# Patient Record
Sex: FEMALE | Race: WHITE | Employment: FULL TIME | URBAN - METROPOLITAN AREA
[De-identification: names, ages, dates, MRNs, and addresses within clinical notes are randomized per-mention and may not be internally consistent; named-entity substitution may affect disease eponyms.]

---

## 2018-09-17 ENCOUNTER — HOSPITAL ENCOUNTER (OUTPATIENT)
Age: 58
Discharge: HOME | End: 2018-09-19
Payer: COMMERCIAL

## 2018-09-17 LAB
ALBUMIN SERPL-MCNC: 4.9 G/DL (ref 3.5–5.2)
ALP BLD-CCNC: 92 U/L (ref 35–104)
ALT SERPL-CCNC: 21 U/L (ref 0–32)
ANION GAP SERPL CALCULATED.3IONS-SCNC: 19 MMOL/L (ref 7–16)
AST SERPL-CCNC: 20 U/L (ref 0–31)
BASOPHILS ABSOLUTE: 0.05 E9/L (ref 0–0.2)
BASOPHILS RELATIVE PERCENT: 0.6 % (ref 0–2)
BILIRUB SERPL-MCNC: 0.7 MG/DL (ref 0–1.2)
BUN BLDV-MCNC: 19 MG/DL (ref 6–20)
CALCIUM SERPL-MCNC: 9.7 MG/DL (ref 8.6–10.2)
CHLORIDE BLD-SCNC: 95 MMOL/L (ref 98–107)
CHOLESTEROL, TOTAL: 213 MG/DL (ref 0–199)
CO2: 24 MMOL/L (ref 22–29)
CREAT SERPL-MCNC: 0.7 MG/DL (ref 0.5–1)
EOSINOPHILS ABSOLUTE: 0.16 E9/L (ref 0.05–0.5)
EOSINOPHILS RELATIVE PERCENT: 2 % (ref 0–6)
GFR AFRICAN AMERICAN: >60
GFR NON-AFRICAN AMERICAN: >60 ML/MIN/1.73
GLUCOSE BLD-MCNC: 115 MG/DL (ref 74–109)
HCT VFR BLD CALC: 49.4 % (ref 34–48)
HDLC SERPL-MCNC: 33 MG/DL
HEMOGLOBIN: 15.2 G/DL (ref 11.5–15.5)
IMMATURE GRANULOCYTES #: 0.03 E9/L
IMMATURE GRANULOCYTES %: 0.4 % (ref 0–5)
LDL CHOLESTEROL CALCULATED: 129 MG/DL (ref 0–99)
LYMPHOCYTES ABSOLUTE: 2.66 E9/L (ref 1.5–4)
LYMPHOCYTES RELATIVE PERCENT: 33.5 % (ref 20–42)
MCH RBC QN AUTO: 30.5 PG (ref 26–35)
MCHC RBC AUTO-ENTMCNC: 30.8 % (ref 32–34.5)
MCV RBC AUTO: 99 FL (ref 80–99.9)
MONOCYTES ABSOLUTE: 0.65 E9/L (ref 0.1–0.95)
MONOCYTES RELATIVE PERCENT: 8.2 % (ref 2–12)
NEUTROPHILS ABSOLUTE: 4.38 E9/L (ref 1.8–7.3)
NEUTROPHILS RELATIVE PERCENT: 55.3 % (ref 43–80)
PDW BLD-RTO: 14.3 FL (ref 11.5–15)
PLATELET # BLD: 259 E9/L (ref 130–450)
PMV BLD AUTO: 10 FL (ref 7–12)
POTASSIUM SERPL-SCNC: 4.5 MMOL/L (ref 3.5–5)
RBC # BLD: 4.99 E12/L (ref 3.5–5.5)
RHEUMATOID FACTOR: <10 IU/ML (ref 0–13)
SEDIMENTATION RATE, ERYTHROCYTE: 35 MM/HR (ref 0–20)
SODIUM BLD-SCNC: 138 MMOL/L (ref 132–146)
TOTAL PROTEIN: 8.3 G/DL (ref 6.4–8.3)
TRIGL SERPL-MCNC: 253 MG/DL (ref 0–149)
TSH SERPL DL<=0.05 MIU/L-ACNC: 16.07 UIU/ML (ref 0.27–4.2)
URIC ACID, SERUM: 6.2 MG/DL (ref 2.4–5.7)
VITAMIN D 25-HYDROXY: 13 NG/ML (ref 30–100)
VLDLC SERPL CALC-MCNC: 51 MG/DL
WBC # BLD: 7.9 E9/L (ref 4.5–11.5)

## 2018-09-17 PROCEDURE — 84550 ASSAY OF BLOOD/URIC ACID: CPT

## 2018-09-17 PROCEDURE — 85651 RBC SED RATE NONAUTOMATED: CPT

## 2018-09-17 PROCEDURE — 80061 LIPID PANEL: CPT

## 2018-09-17 PROCEDURE — 85025 COMPLETE CBC W/AUTO DIFF WBC: CPT

## 2018-09-17 PROCEDURE — 87088 URINE BACTERIA CULTURE: CPT

## 2018-09-17 PROCEDURE — 82306 VITAMIN D 25 HYDROXY: CPT

## 2018-09-17 PROCEDURE — 86431 RHEUMATOID FACTOR QUANT: CPT

## 2018-09-17 PROCEDURE — 84443 ASSAY THYROID STIM HORMONE: CPT

## 2018-09-17 PROCEDURE — 80053 COMPREHEN METABOLIC PANEL: CPT

## 2018-09-19 LAB — URINE CULTURE, ROUTINE: NORMAL

## 2019-04-02 ENCOUNTER — HOSPITAL ENCOUNTER (OUTPATIENT)
Age: 59
Discharge: HOME | End: 2019-04-04
Payer: COMMERCIAL

## 2019-04-02 PROCEDURE — 87088 URINE BACTERIA CULTURE: CPT

## 2019-04-03 LAB — URINE CULTURE, ROUTINE: NORMAL

## 2019-05-06 ENCOUNTER — HOSPITAL ENCOUNTER (OUTPATIENT)
Age: 59
Discharge: HOME | End: 2019-05-08
Payer: COMMERCIAL

## 2019-05-06 LAB
CHOLESTEROL, TOTAL: 229 MG/DL
CHOLESTEROL, TOTAL: 229 MG/DL (ref 0–199)
CHOLESTEROL/HDL RATIO: ABNORMAL
HDLC SERPL-MCNC: 32 MG/DL
HDLC SERPL-MCNC: 32 MG/DL (ref 35–70)
LDL CHOLESTEROL CALCULATED: ABNORMAL MG/DL (ref 0–160)
LDL CHOLESTEROL CALCULATED: ABNORMAL MG/DL (ref 0–99)
TRIGL SERPL-MCNC: 413 MG/DL
TRIGL SERPL-MCNC: 413 MG/DL (ref 0–149)
VITAMIN D 25-HYDROXY: 20 NG/ML (ref 30–100)
VLDLC SERPL CALC-MCNC: ABNORMAL MG/DL
VLDLC SERPL CALC-MCNC: ABNORMAL MG/DL

## 2019-05-06 PROCEDURE — 80061 LIPID PANEL: CPT

## 2019-05-06 PROCEDURE — 82306 VITAMIN D 25 HYDROXY: CPT

## 2019-06-10 ENCOUNTER — OFFICE VISIT (OUTPATIENT)
Dept: FAMILY MEDICINE CLINIC | Age: 59
End: 2019-06-10
Payer: COMMERCIAL

## 2019-06-10 VITALS
DIASTOLIC BLOOD PRESSURE: 72 MMHG | OXYGEN SATURATION: 96 % | RESPIRATION RATE: 16 BRPM | HEIGHT: 64 IN | HEART RATE: 73 BPM | BODY MASS INDEX: 48.14 KG/M2 | WEIGHT: 282 LBS | SYSTOLIC BLOOD PRESSURE: 122 MMHG

## 2019-06-10 DIAGNOSIS — L30.9 DERMATITIS: ICD-10-CM

## 2019-06-10 DIAGNOSIS — E11.9 TYPE 2 DIABETES MELLITUS WITHOUT COMPLICATION, WITHOUT LONG-TERM CURRENT USE OF INSULIN (HCC): ICD-10-CM

## 2019-06-10 DIAGNOSIS — M70.22 OLECRANON BURSITIS OF LEFT ELBOW: ICD-10-CM

## 2019-06-10 DIAGNOSIS — E03.9 ACQUIRED HYPOTHYROIDISM: ICD-10-CM

## 2019-06-10 PROCEDURE — 99203 OFFICE O/P NEW LOW 30 MIN: CPT | Performed by: FAMILY MEDICINE

## 2019-06-10 RX ORDER — DICLOFENAC SODIUM 75 MG/1
TABLET, DELAYED RELEASE ORAL
Refills: 0 | COMMUNITY
Start: 2019-05-07 | End: 2019-08-01 | Stop reason: SDUPTHER

## 2019-06-10 RX ORDER — OMEGA-3-ACID ETHYL ESTERS 1 G/1
CAPSULE, LIQUID FILLED ORAL
Refills: 0 | COMMUNITY
Start: 2019-05-08 | End: 2019-08-01 | Stop reason: SDUPTHER

## 2019-06-10 RX ORDER — CHOLECALCIFEROL (VITAMIN D3) 1250 MCG
CAPSULE ORAL WEEKLY
COMMUNITY
End: 2019-12-10

## 2019-06-10 RX ORDER — OMEPRAZOLE 20 MG/1
20 CAPSULE, DELAYED RELEASE ORAL DAILY
COMMUNITY
End: 2019-08-01 | Stop reason: SDUPTHER

## 2019-06-10 RX ORDER — LEVOTHYROXINE SODIUM 0.2 MG/1
200 TABLET ORAL DAILY
COMMUNITY
End: 2020-07-06

## 2019-06-10 RX ORDER — GEMFIBROZIL 600 MG/1
TABLET, FILM COATED ORAL
Refills: 0 | COMMUNITY
Start: 2019-05-07 | End: 2019-08-01 | Stop reason: SDUPTHER

## 2019-06-10 RX ORDER — ACETAMINOPHEN 500 MG
1000 TABLET ORAL 3 TIMES DAILY
Qty: 540 TABLET | Refills: 1 | Status: SHIPPED
Start: 2019-06-10 | End: 2021-01-11

## 2019-06-10 RX ORDER — DICYCLOMINE HYDROCHLORIDE 10 MG/1
10 CAPSULE ORAL
COMMUNITY
End: 2020-01-31 | Stop reason: SDUPTHER

## 2019-06-10 SDOH — HEALTH STABILITY: MENTAL HEALTH: HOW OFTEN DO YOU HAVE A DRINK CONTAINING ALCOHOL?: NEVER

## 2019-06-10 ASSESSMENT — PATIENT HEALTH QUESTIONNAIRE - PHQ9
SUM OF ALL RESPONSES TO PHQ QUESTIONS 1-9: 0
SUM OF ALL RESPONSES TO PHQ9 QUESTIONS 1 & 2: 0
SUM OF ALL RESPONSES TO PHQ QUESTIONS 1-9: 0
2. FEELING DOWN, DEPRESSED OR HOPELESS: 0
1. LITTLE INTEREST OR PLEASURE IN DOING THINGS: 0

## 2019-06-10 ASSESSMENT — ENCOUNTER SYMPTOMS
APNEA: 0
EYE ITCHING: 0
COUGH: 0
DIARRHEA: 0
ABDOMINAL PAIN: 0
SHORTNESS OF BREATH: 0
BLOOD IN STOOL: 0
SORE THROAT: 0
EYE REDNESS: 0
VISUAL CHANGE: 0
EYE PAIN: 0
SINUS PRESSURE: 0
CONSTIPATION: 0
CHEST TIGHTNESS: 0
BACK PAIN: 0
NAUSEA: 0
WHEEZING: 0
RHINORRHEA: 0
VOMITING: 0
COLOR CHANGE: 0

## 2019-06-10 NOTE — PROGRESS NOTES
Chief Complaint:     Chief Complaint   Patient presents with    Established New Doctor     check up    Diabetes    Fatigue    Hypothyroidism    Rash         Diabetes   She presents for her follow-up diabetic visit. She has type 2 diabetes mellitus. The initial diagnosis of diabetes was made 1 month ago. Her disease course has been stable. There are no hypoglycemic associated symptoms. Pertinent negatives for hypoglycemia include no dizziness, headaches or nervousness/anxiousness. Associated symptoms include fatigue. Pertinent negatives for diabetes include no chest pain, no visual change and no weakness. There are no hypoglycemic complications. Symptoms are stable. There are no diabetic complications. Risk factors for coronary artery disease include dyslipidemia and obesity. Current diabetic treatment includes oral agent (monotherapy). She is compliant with treatment all of the time. She is following a generally healthy diet. When asked about meal planning, she reported none. She has not had a previous visit with a dietitian. There is no change in her home blood glucose trend. An ACE inhibitor/angiotensin II receptor blocker is not being taken. She does not see a podiatrist.Eye exam is not current. Fatigue   This is a chronic problem. The problem occurs intermittently. The problem has been waxing and waning. Associated symptoms include fatigue and a rash. Pertinent negatives include no abdominal pain, arthralgias, chest pain, congestion, coughing, diaphoresis, fever, headaches, myalgias, nausea, neck pain, numbness, sore throat, visual change, vomiting or weakness. Nothing aggravates the symptoms. She has tried nothing for the symptoms. The treatment provided no relief. Rash   This is a new problem. The current episode started more than 1 month ago. The affected locations include the face. The rash is characterized by redness. It is unknown if there was an exposure to a precipitant.  Associated symptoms level: None   Occupational History     Employer: [employer]   Social Needs    Financial resource strain: None    Food insecurity:     Worry: None     Inability: None    Transportation needs:     Medical: None     Non-medical: None   Tobacco Use    Smoking status: Never Smoker    Smokeless tobacco: Never Used   Substance and Sexual Activity    Alcohol use: Never     Frequency: Never    Drug use: None    Sexual activity: None   Lifestyle    Physical activity:     Days per week: None     Minutes per session: None    Stress: None   Relationships    Social connections:     Talks on phone: None     Gets together: None     Attends Rastafarian service: None     Active member of club or organization: None     Attends meetings of clubs or organizations: None     Relationship status: None    Intimate partner violence:     Fear of current or ex partner: None     Emotionally abused: None     Physically abused: None     Forced sexual activity: None   Other Topics Concern    None   Social History Narrative    None       Family History   Problem Relation Age of Onset    Diabetes Mother     Diabetes Father           Review of Systems   Constitutional: Positive for fatigue. Negative for activity change, appetite change, diaphoresis and fever. HENT: Negative for congestion, ear pain, hearing loss, nosebleeds, rhinorrhea, sinus pressure and sore throat. Eyes: Negative for pain, redness, itching and visual disturbance. Respiratory: Negative for apnea, cough, chest tightness, shortness of breath and wheezing. Cardiovascular: Negative for chest pain, palpitations and leg swelling. Gastrointestinal: Negative for abdominal pain, blood in stool, constipation, diarrhea, nausea and vomiting. Endocrine: Negative. Genitourinary: Negative for decreased urine volume, difficulty urinating, dysuria, frequency, hematuria and urgency.    Musculoskeletal: Negative for arthralgias, back pain, gait problem, myalgias and neck pain. Skin: Positive for rash. Negative for color change. Allergic/Immunologic: Negative for environmental allergies and food allergies. Neurological: Negative for dizziness, weakness, light-headedness, numbness and headaches. Hematological: Negative for adenopathy. Does not bruise/bleed easily. Psychiatric/Behavioral: Negative for behavioral problems, dysphoric mood and sleep disturbance. The patient is not nervous/anxious and is not hyperactive. All other systems reviewed and are negative. /72   Pulse 73   Resp 16   Ht 5' 4\" (1.626 m)   Wt 282 lb (127.9 kg)   SpO2 96%   BMI 48.41 kg/m²     Physical Exam   Constitutional: She is oriented to person, place, and time. She appears well-developed and well-nourished. HENT:   Head: Normocephalic and atraumatic. Right Ear: External ear normal.   Left Ear: External ear normal.   Nose: Nose normal.   Mouth/Throat: Oropharynx is clear and moist.   Eyes: Pupils are equal, round, and reactive to light. Conjunctivae and EOM are normal. No scleral icterus. Neck: Normal range of motion. Neck supple. No thyromegaly present. Cardiovascular: Normal rate, regular rhythm and normal heart sounds. No murmur heard. Pulmonary/Chest: Effort normal and breath sounds normal. No respiratory distress. She has no wheezes. She has no rales. Abdominal: Soft. Bowel sounds are normal. She exhibits no distension. There is no tenderness. Musculoskeletal: Normal range of motion. She exhibits no edema or tenderness. Lymphadenopathy:     She has no cervical adenopathy. Neurological: She is alert and oriented to person, place, and time. She has normal reflexes. She displays normal reflexes. No cranial nerve deficit. Skin: Skin is warm and dry. No rash noted. No erythema. Psychiatric: She has a normal mood and affect. Nursing note and vitals reviewed.                               ASSESSMENT/PLAN:    Patient Active Problem List   Diagnosis    Type 2 diabetes mellitus without complication, without long-term current use of insulin ([address]    Dermatitis    Acquired hypothyroidism    Olecranon bursitis of left elbow       Christian Kerns was seen today for established new doctor, diabetes, fatigue, hypothyroidism and rash. Diagnoses and all orders for this visit:    Type 2 diabetes mellitus without complication, without long-term current use of insulin ([address]  -     External Referral To Ophthalmology    Dermatitis  -     External Referral To Dermatology    Acquired hypothyroidism    Olecranon bursitis of left elbow    Other orders  -     acetaminophen (TYLENOL) 500 MG tablet; Take 2 tablets by mouth 3 times daily          Return in about 3 months (around 9/10/2019) for Follow up DM, Recheck labs, Recheck Meds.         Nanette August, DO  6/10/2019  1:26 PM

## 2019-08-01 RX ORDER — OMEGA-3-ACID ETHYL ESTERS 1 G/1
CAPSULE, LIQUID FILLED ORAL
Qty: 90 CAPSULE | Refills: 3 | Status: SHIPPED
Start: 2019-08-01 | End: 2020-07-28 | Stop reason: SDUPTHER

## 2019-08-01 RX ORDER — GEMFIBROZIL 600 MG/1
TABLET, FILM COATED ORAL
Qty: 180 TABLET | Refills: 3 | Status: SHIPPED
Start: 2019-08-01 | End: 2020-07-24 | Stop reason: SDUPTHER

## 2019-08-01 RX ORDER — OMEPRAZOLE 20 MG/1
20 CAPSULE, DELAYED RELEASE ORAL DAILY
Qty: 90 CAPSULE | Refills: 3 | Status: SHIPPED
Start: 2019-08-01 | End: 2020-07-24 | Stop reason: SDUPTHER

## 2019-08-01 RX ORDER — DICLOFENAC SODIUM 75 MG/1
TABLET, DELAYED RELEASE ORAL
Qty: 90 TABLET | Refills: 3 | Status: SHIPPED
Start: 2019-08-01 | End: 2020-07-23 | Stop reason: SDUPTHER

## 2019-08-06 ENCOUNTER — OFFICE VISIT (OUTPATIENT)
Dept: FAMILY MEDICINE CLINIC | Age: 59
End: 2019-08-06
Payer: COMMERCIAL

## 2019-08-06 ENCOUNTER — HOSPITAL ENCOUNTER (OUTPATIENT)
Age: 59
Discharge: HOME | End: 2019-08-08
Payer: COMMERCIAL

## 2019-08-06 VITALS
OXYGEN SATURATION: 98 % | HEIGHT: 64 IN | DIASTOLIC BLOOD PRESSURE: 82 MMHG | RESPIRATION RATE: 16 BRPM | SYSTOLIC BLOOD PRESSURE: 130 MMHG | BODY MASS INDEX: 48.32 KG/M2 | HEART RATE: 88 BPM | WEIGHT: 283 LBS

## 2019-08-06 DIAGNOSIS — R35.0 URINARY FREQUENCY: Primary | ICD-10-CM

## 2019-08-06 DIAGNOSIS — N39.0 URINARY TRACT INFECTION WITHOUT HEMATURIA, SITE UNSPECIFIED: ICD-10-CM

## 2019-08-06 DIAGNOSIS — R35.0 URINARY FREQUENCY: ICD-10-CM

## 2019-08-06 LAB
BILIRUBIN, POC: ABNORMAL
BLOOD URINE, POC: ABNORMAL
CLARITY, POC: ABNORMAL
COLOR, POC: ABNORMAL
GLUCOSE URINE, POC: 250
KETONES, POC: ABNORMAL
LEUKOCYTE EST, POC: ABNORMAL
NITRITE, POC: ABNORMAL
PH, POC: 6.5
PROTEIN, POC: 30
SPECIFIC GRAVITY, POC: 1.02
UROBILINOGEN, POC: 0.2

## 2019-08-06 PROCEDURE — 99213 OFFICE O/P EST LOW 20 MIN: CPT | Performed by: FAMILY MEDICINE

## 2019-08-06 PROCEDURE — 81002 URINALYSIS NONAUTO W/O SCOPE: CPT | Performed by: FAMILY MEDICINE

## 2019-08-06 PROCEDURE — 87186 SC STD MICRODIL/AGAR DIL: CPT

## 2019-08-06 PROCEDURE — 87088 URINE BACTERIA CULTURE: CPT

## 2019-08-06 PROCEDURE — 87077 CULTURE AEROBIC IDENTIFY: CPT

## 2019-08-06 RX ORDER — FLUCONAZOLE 150 MG/1
150 TABLET ORAL ONCE
Qty: 1 TABLET | Refills: 0 | Status: SHIPPED | OUTPATIENT
Start: 2019-08-06 | End: 2019-08-06

## 2019-08-06 RX ORDER — CIPROFLOXACIN 500 MG/1
500 TABLET, FILM COATED ORAL 2 TIMES DAILY
Qty: 20 TABLET | Refills: 0 | Status: SHIPPED
Start: 2019-08-06 | End: 2021-08-27 | Stop reason: SDUPTHER

## 2019-08-06 ASSESSMENT — ENCOUNTER SYMPTOMS
SINUS PRESSURE: 0
VOMITING: 0
BLOOD IN STOOL: 0
CONSTIPATION: 0
CHEST TIGHTNESS: 0
COUGH: 0
EYE ITCHING: 0
WHEEZING: 0
COLOR CHANGE: 0
DIARRHEA: 0
NAUSEA: 0
ABDOMINAL PAIN: 0
SHORTNESS OF BREATH: 0
EYE PAIN: 0
EYE REDNESS: 0
APNEA: 0
RHINORRHEA: 0
SORE THROAT: 0
BACK PAIN: 0

## 2019-08-06 ASSESSMENT — PATIENT HEALTH QUESTIONNAIRE - PHQ9
SUM OF ALL RESPONSES TO PHQ9 QUESTIONS 1 & 2: 0
SUM OF ALL RESPONSES TO PHQ QUESTIONS 1-9: 0
SUM OF ALL RESPONSES TO PHQ QUESTIONS 1-9: 0
2. FEELING DOWN, DEPRESSED OR HOPELESS: 0
1. LITTLE INTEREST OR PLEASURE IN DOING THINGS: 0

## 2019-08-06 NOTE — PROGRESS NOTES
Chief Complaint:     Chief Complaint   Patient presents with    Urinary Frequency     thinks its yeast infection    Vaginal Itching         Urinary Frequency    This is a new problem. The current episode started in the past 7 days. The problem occurs intermittently. The problem has been unchanged. The quality of the pain is described as aching. The patient is experiencing no pain. Associated symptoms include frequency. Pertinent negatives include no hematuria, nausea, urgency or vomiting. She has tried nothing for the symptoms.        Patient Active Problem List   Diagnosis    Type 2 diabetes mellitus without complication, without long-term current use of insulin [facility]    Dermatitis    Acquired hypothyroidism    Olecranon bursitis of left elbow       Past Medical History:   Diagnosis Date    [facility]     Hypothyroidism        Past Surgical History:   Procedure Laterality Date    CHOLECYSTECTOMY      HYSTERECTOMY, TOTAL ABDOMINAL      INCONTINENCE SURGERY      KNEE SURGERY Left        Current Outpatient Medications   Medication Sig Dispense Refill    Oxymetazoline HCl (RHOFADE) 1 % CREA Apply topically      ciprofloxacin (CIPRO) 500 MG tablet Take 1 tablet by mouth 2 times daily for 10 days 20 tablet 0    fluconazole (DIFLUCAN) 150 MG tablet Take 1 tablet by mouth once for 1 dose 1 tablet 0    omeprazole (PRILOSEC) 20 MG delayed release capsule Take 1 capsule by mouth daily 90 capsule 3    metFORMIN (GLUCOPHAGE) 500 MG tablet Take 1 tablet by mouth 2 times daily (with meals) 180 tablet 3    gemfibrozil (LOPID) 600 MG tablet TAKE 1 TABLET EVERY 12 HOURS 180 tablet 3    diclofenac (VOLTAREN) 75 MG EC tablet TAKE 1 TABLET BY MOUTH EVERY DAY FOR ARTHRITIS PAIN 90 tablet 3    omega-3 acid ethyl esters (LOVAZA) 1 g capsule TAKE 1 CAPSULE BY MOUTH EVERY DAY 90 capsule 3    dicyclomine (BENTYL) 10 MG capsule Take 10 mg by mouth 4 times daily (before meals and nightly)      Polyethylene Glycol 3350 (MIRALAX PO) Take 17 g by mouth      levothyroxine (SYNTHROID) 200 MCG tablet Take 200 mcg by mouth Daily      Cholecalciferol (VITAMIN D3) 94713 units CAPS Take by mouth      acetaminophen (TYLENOL) 500 MG tablet Take 2 tablets by mouth 3 times daily 540 tablet 1     No current facility-administered medications for this visit. Allergies   Allergen Reactions    Keflex [Cephalexin]     Percocet [Oxycodone-Acetaminophen]        Social History     Socioeconomic History    Marital status:      Spouse name: None    Number of children: None    Years of education: None    Highest education level: None   Occupational History     Employer: [employer]   Social Needs    Financial resource strain: None    Food insecurity:     Worry: None     Inability: None    Transportation needs:     Medical: None     Non-medical: None   Tobacco Use    Smoking status: Never Smoker    Smokeless tobacco: Never Used   Substance and Sexual Activity    Alcohol use: Never     Frequency: Never    Drug use: None    Sexual activity: None   Lifestyle    Physical activity:     Days per week: None     Minutes per session: None    Stress: None   Relationships    Social connections:     Talks on phone: None     Gets together: None     Attends Sikh service: None     Active member of club or organization: None     Attends meetings of clubs or organizations: None     Relationship status: None    Intimate partner violence:     Fear of current or ex partner: None     Emotionally abused: None     Physically abused: None     Forced sexual activity: None   Other Topics Concern    None   Social History Narrative    None       Family History   Problem Relation Age of Onset    Diabetes Mother     Diabetes Father           Review of Systems   Constitutional: Negative for activity change, appetite change, fatigue and fever.    HENT: Negative for congestion, ear pain, hearing loss, nosebleeds, rhinorrhea, sinus pressure and sore throat. Eyes: Negative for pain, redness, itching and visual disturbance. Respiratory: Negative for apnea, cough, chest tightness, shortness of breath and wheezing. Cardiovascular: Negative for chest pain, palpitations and leg swelling. Gastrointestinal: Negative for abdominal pain, blood in stool, constipation, diarrhea, nausea and vomiting. Endocrine: Negative. Genitourinary: Positive for frequency. Negative for decreased urine volume, difficulty urinating, dysuria, hematuria and urgency. Musculoskeletal: Negative for arthralgias, back pain, gait problem, myalgias and neck pain. Skin: Negative for color change and rash. Allergic/Immunologic: Negative for environmental allergies and food allergies. Neurological: Negative for dizziness, weakness, light-headedness, numbness and headaches. Hematological: Negative for adenopathy. Does not bruise/bleed easily. Psychiatric/Behavioral: Negative for behavioral problems, dysphoric mood and sleep disturbance. The patient is not nervous/anxious and is not hyperactive. All other systems reviewed and are negative. /82   Pulse 88   Resp 16   Ht 5' 4\" (1.626 m)   Wt 283 lb (128.4 kg)   SpO2 98%   BMI 48.58 kg/m²     Physical Exam   Constitutional: She is oriented to person, place, and time. She appears well-developed and well-nourished. HENT:   Head: Normocephalic and atraumatic. Right Ear: External ear normal.   Left Ear: External ear normal.   Nose: Nose normal.   Mouth/Throat: Oropharynx is clear and moist.   Eyes: Pupils are equal, round, and reactive to light. Conjunctivae and EOM are normal. No scleral icterus. Neck: Normal range of motion. Neck supple. No thyromegaly present. Cardiovascular: Normal rate, regular rhythm and normal heart sounds. No murmur heard. Pulmonary/Chest: Effort normal and breath sounds normal. No respiratory distress. She has no wheezes.  She

## 2019-08-09 LAB
ORGANISM: ABNORMAL
ORGANISM: ABNORMAL
URINE CULTURE, ROUTINE: ABNORMAL

## 2019-08-23 ENCOUNTER — OFFICE VISIT (OUTPATIENT)
Dept: PRIMARY CARE CLINIC | Age: 59
End: 2019-08-23
Payer: COMMERCIAL

## 2019-08-23 VITALS
OXYGEN SATURATION: 98 % | WEIGHT: 280.8 LBS | HEIGHT: 64 IN | TEMPERATURE: 98.5 F | SYSTOLIC BLOOD PRESSURE: 136 MMHG | BODY MASS INDEX: 47.94 KG/M2 | HEART RATE: 96 BPM | DIASTOLIC BLOOD PRESSURE: 80 MMHG

## 2019-08-23 DIAGNOSIS — R30.0 BURNING WITH URINATION: Primary | ICD-10-CM

## 2019-08-23 DIAGNOSIS — N76.0 ACUTE VAGINITIS: ICD-10-CM

## 2019-08-23 LAB
BILIRUBIN, POC: NORMAL
BLOOD URINE, POC: NORMAL
CLARITY, POC: CLEAR
COLOR, POC: NORMAL
GLUCOSE URINE, POC: >1000
KETONES, POC: NORMAL
LEUKOCYTE EST, POC: NORMAL
NITRITE, POC: NORMAL
PH, POC: 5.5
PROTEIN, POC: NORMAL
SPECIFIC GRAVITY, POC: 1.03
UROBILINOGEN, POC: 0.2

## 2019-08-23 PROCEDURE — 99213 OFFICE O/P EST LOW 20 MIN: CPT | Performed by: FAMILY MEDICINE

## 2019-08-23 PROCEDURE — 81002 URINALYSIS NONAUTO W/O SCOPE: CPT | Performed by: FAMILY MEDICINE

## 2019-08-23 RX ORDER — FLUCONAZOLE 100 MG/1
100 TABLET ORAL DAILY
Qty: 3 TABLET | Refills: 0 | Status: SHIPPED | OUTPATIENT
Start: 2019-08-23 | End: 2019-10-03 | Stop reason: SDUPTHER

## 2019-08-23 RX ORDER — SULFAMETHOXAZOLE AND TRIMETHOPRIM 800; 160 MG/1; MG/1
1 TABLET ORAL 2 TIMES DAILY
Qty: 14 TABLET | Refills: 0 | Status: SHIPPED | OUTPATIENT
Start: 2019-08-23 | End: 2019-08-30

## 2019-08-23 ASSESSMENT — ENCOUNTER SYMPTOMS
ABDOMINAL PAIN: 0
SHORTNESS OF BREATH: 0
SORE THROAT: 0
BLOOD IN STOOL: 0
NAUSEA: 0
EYE PAIN: 0
RHINORRHEA: 0
APNEA: 0
WHEEZING: 0
BACK PAIN: 0
EYE ITCHING: 0
SINUS PRESSURE: 0
DIARRHEA: 0
VOMITING: 0
COUGH: 0
CHEST TIGHTNESS: 0
COLOR CHANGE: 0
EYE REDNESS: 0
CONSTIPATION: 0

## 2019-08-23 ASSESSMENT — PATIENT HEALTH QUESTIONNAIRE - PHQ9
SUM OF ALL RESPONSES TO PHQ QUESTIONS 1-9: 0
1. LITTLE INTEREST OR PLEASURE IN DOING THINGS: 0
SUM OF ALL RESPONSES TO PHQ QUESTIONS 1-9: 0
2. FEELING DOWN, DEPRESSED OR HOPELESS: 0
SUM OF ALL RESPONSES TO PHQ9 QUESTIONS 1 & 2: 0

## 2019-08-23 NOTE — PROGRESS NOTES
Chief Complaint:     Chief Complaint   Patient presents with    Dysuria     itching and irritation, burning         Dysuria    This is a new problem. The current episode started in the past 7 days. The problem occurs intermittently. The patient is experiencing no pain. There has been no fever. Associated symptoms include urgency. Pertinent negatives include no discharge, frequency, hematuria, nausea or vomiting. She has tried nothing for the symptoms. The treatment provided no relief.        Patient Active Problem List   Diagnosis    Type 2 diabetes mellitus without complication, without long-term current use of insulin ([facility]    Dermatitis    Acquired hypothyroidism    Olecranon bursitis of left elbow       Past Medical History:   Diagnosis Date    [facility]     Hypothyroidism        Past Surgical History:   Procedure Laterality Date    CHOLECYSTECTOMY      HYSTERECTOMY, TOTAL ABDOMINAL      INCONTINENCE SURGERY      KNEE SURGERY Left        Current Outpatient Medications   Medication Sig Dispense Refill    fluconazole (DIFLUCAN) 100 MG tablet Take 1 tablet by mouth daily for 3 days 3 tablet 0    sulfamethoxazole-trimethoprim (BACTRIM DS;SEPTRA DS) 800-160 MG per tablet Take 1 tablet by mouth 2 times daily for 7 days 14 tablet 0    Oxymetazoline HCl (RHOFADE) 1 % CREA Apply topically      omeprazole (PRILOSEC) 20 MG delayed release capsule Take 1 capsule by mouth daily 90 capsule 3    metFORMIN (GLUCOPHAGE) 500 MG tablet Take 1 tablet by mouth 2 times daily (with meals) 180 tablet 3    gemfibrozil (LOPID) 600 MG tablet TAKE 1 TABLET EVERY 12 HOURS 180 tablet 3    diclofenac (VOLTAREN) 75 MG EC tablet TAKE 1 TABLET BY MOUTH EVERY DAY FOR ARTHRITIS PAIN 90 tablet 3    omega-3 acid ethyl esters (LOVAZA) 1 g capsule TAKE 1 CAPSULE BY MOUTH EVERY DAY 90 capsule 3    dicyclomine (BENTYL) 10 MG capsule Take 10 mg by mouth 4 times daily (before meals and nightly)      Polyethylene Glycol

## 2019-09-10 ENCOUNTER — OFFICE VISIT (OUTPATIENT)
Dept: PRIMARY CARE CLINIC | Age: 59
End: 2019-09-10
Payer: COMMERCIAL

## 2019-09-10 ENCOUNTER — HOSPITAL ENCOUNTER (OUTPATIENT)
Age: 59
Discharge: HOME | End: 2019-09-12
Payer: COMMERCIAL

## 2019-09-10 VITALS
RESPIRATION RATE: 16 BRPM | HEART RATE: 78 BPM | DIASTOLIC BLOOD PRESSURE: 80 MMHG | BODY MASS INDEX: 48.14 KG/M2 | OXYGEN SATURATION: 98 % | HEIGHT: 64 IN | SYSTOLIC BLOOD PRESSURE: 122 MMHG | WEIGHT: 282 LBS

## 2019-09-10 DIAGNOSIS — E11.9 TYPE 2 DIABETES MELLITUS WITHOUT COMPLICATION, WITHOUT LONG-TERM CURRENT USE OF INSULIN (HCC): ICD-10-CM

## 2019-09-10 DIAGNOSIS — E03.9 ACQUIRED HYPOTHYROIDISM: ICD-10-CM

## 2019-09-10 DIAGNOSIS — G62.9 PERIPHERAL POLYNEUROPATHY: ICD-10-CM

## 2019-09-10 DIAGNOSIS — E11.9 TYPE 2 DIABETES MELLITUS WITHOUT COMPLICATION, WITHOUT LONG-TERM CURRENT USE OF INSULIN (HCC): Primary | ICD-10-CM

## 2019-09-10 PROBLEM — L30.9 DERMATITIS: Status: RESOLVED | Noted: 2019-06-10 | Resolved: 2019-09-10

## 2019-09-10 PROBLEM — M70.22 OLECRANON BURSITIS OF LEFT ELBOW: Status: RESOLVED | Noted: 2019-06-10 | Resolved: 2019-09-10

## 2019-09-10 LAB
ALBUMIN SERPL-MCNC: 4.7 G/DL (ref 3.5–5.2)
ALP BLD-CCNC: 95 U/L (ref 35–104)
ALT SERPL-CCNC: 27 U/L (ref 0–32)
ANION GAP SERPL CALCULATED.3IONS-SCNC: 16 MMOL/L (ref 7–16)
AST SERPL-CCNC: 18 U/L (ref 0–31)
BILIRUB SERPL-MCNC: 0.6 MG/DL (ref 0–1.2)
BUN BLDV-MCNC: 19 MG/DL (ref 6–20)
CALCIUM SERPL-MCNC: 9.4 MG/DL (ref 8.6–10.2)
CHLORIDE BLD-SCNC: 101 MMOL/L (ref 98–107)
CHOLESTEROL, TOTAL: 221 MG/DL (ref 0–199)
CO2: 24 MMOL/L (ref 22–29)
CREAT SERPL-MCNC: 0.7 MG/DL (ref 0.5–1)
CREATININE URINE: 83 MG/DL (ref 29–226)
GFR AFRICAN AMERICAN: >60
GFR NON-AFRICAN AMERICAN: >60 ML/MIN/1.73
GLUCOSE BLD-MCNC: 194 MG/DL (ref 74–99)
HBA1C MFR BLD: 9.2 % (ref 4–5.6)
HCT VFR BLD CALC: 47.6 % (ref 34–48)
HDLC SERPL-MCNC: 32 MG/DL
HEMOGLOBIN: 15.6 G/DL (ref 11.5–15.5)
LDL CHOLESTEROL CALCULATED: 116 MG/DL (ref 0–99)
MCH RBC QN AUTO: 29.2 PG (ref 26–35)
MCHC RBC AUTO-ENTMCNC: 32.8 % (ref 32–34.5)
MCV RBC AUTO: 89.1 FL (ref 80–99.9)
MICROALBUMIN UR-MCNC: 33.1 MG/L
MICROALBUMIN/CREAT UR-RTO: 39.9 (ref 0–30)
PDW BLD-RTO: 14.1 FL (ref 11.5–15)
PLATELET # BLD: 237 E9/L (ref 130–450)
PMV BLD AUTO: 10.7 FL (ref 7–12)
POTASSIUM SERPL-SCNC: 4.1 MMOL/L (ref 3.5–5)
RBC # BLD: 5.34 E12/L (ref 3.5–5.5)
SODIUM BLD-SCNC: 141 MMOL/L (ref 132–146)
TOTAL PROTEIN: 8 G/DL (ref 6.4–8.3)
TRIGL SERPL-MCNC: 366 MG/DL (ref 0–149)
TSH SERPL DL<=0.05 MIU/L-ACNC: 3.52 UIU/ML (ref 0.27–4.2)
VLDLC SERPL CALC-MCNC: 73 MG/DL
WBC # BLD: 6.9 E9/L (ref 4.5–11.5)

## 2019-09-10 PROCEDURE — 82044 UR ALBUMIN SEMIQUANTITATIVE: CPT

## 2019-09-10 PROCEDURE — 85027 COMPLETE CBC AUTOMATED: CPT

## 2019-09-10 PROCEDURE — 80053 COMPREHEN METABOLIC PANEL: CPT

## 2019-09-10 PROCEDURE — 83036 HEMOGLOBIN GLYCOSYLATED A1C: CPT

## 2019-09-10 PROCEDURE — 80061 LIPID PANEL: CPT

## 2019-09-10 PROCEDURE — 82570 ASSAY OF URINE CREATININE: CPT

## 2019-09-10 PROCEDURE — 36415 COLL VENOUS BLD VENIPUNCTURE: CPT

## 2019-09-10 PROCEDURE — 90471 IMMUNIZATION ADMIN: CPT | Performed by: FAMILY MEDICINE

## 2019-09-10 PROCEDURE — 99214 OFFICE O/P EST MOD 30 MIN: CPT | Performed by: FAMILY MEDICINE

## 2019-09-10 PROCEDURE — 90686 IIV4 VACC NO PRSV 0.5 ML IM: CPT | Performed by: FAMILY MEDICINE

## 2019-09-10 PROCEDURE — 84443 ASSAY THYROID STIM HORMONE: CPT

## 2019-09-10 RX ORDER — LANOLIN ALCOHOL/MO/W.PET/CERES
100 CREAM (GRAM) TOPICAL DAILY
Qty: 90 TABLET | Refills: 3 | Status: SHIPPED
Start: 2019-09-10 | End: 2021-01-11 | Stop reason: ALTCHOICE

## 2019-09-10 ASSESSMENT — ENCOUNTER SYMPTOMS
COLOR CHANGE: 0
NAUSEA: 0
SORE THROAT: 0
COUGH: 0
EYE REDNESS: 0
ABDOMINAL PAIN: 0
RHINORRHEA: 0
BACK PAIN: 0
SINUS PRESSURE: 0
EYE ITCHING: 0
BLOOD IN STOOL: 0
CONSTIPATION: 0
WHEEZING: 0
APNEA: 0
VOMITING: 0
SHORTNESS OF BREATH: 0
DIARRHEA: 0
EYE PAIN: 0
CHEST TIGHTNESS: 0
VISUAL CHANGE: 0

## 2019-09-10 ASSESSMENT — PATIENT HEALTH QUESTIONNAIRE - PHQ9
SUM OF ALL RESPONSES TO PHQ QUESTIONS 1-9: 0
2. FEELING DOWN, DEPRESSED OR HOPELESS: 0
SUM OF ALL RESPONSES TO PHQ QUESTIONS 1-9: 0
SUM OF ALL RESPONSES TO PHQ9 QUESTIONS 1 & 2: 0
1. LITTLE INTEREST OR PLEASURE IN DOING THINGS: 0

## 2019-09-10 NOTE — PROGRESS NOTES
Past Medical History:   Diagnosis Date    [facility] mellitus [facility]     Hypothyroidism        Past Surgical History:   Procedure Laterality Date    CHOLECYSTECTOMY      HYSTERECTOMY, TOTAL ABDOMINAL      INCONTINENCE SURGERY      KNEE SURGERY Left        Current Outpatient Medications   Medication Sig Dispense Refill    metFORMIN (GLUCOPHAGE) 500 MG tablet Take 1 tablet by mouth daily (with breakfast) 90 tablet 3    thiamine 100 MG tablet Take 1 tablet by mouth daily 90 tablet 3    Oxymetazoline HCl (RHOFADE) 1 % CREA Apply topically      omeprazole (PRILOSEC) 20 MG delayed release capsule Take 1 capsule by mouth daily 90 capsule 3    gemfibrozil (LOPID) 600 MG tablet TAKE 1 TABLET EVERY 12 HOURS 180 tablet 3    diclofenac (VOLTAREN) 75 MG EC tablet TAKE 1 TABLET BY MOUTH EVERY DAY FOR ARTHRITIS PAIN 90 tablet 3    omega-3 acid ethyl esters (LOVAZA) 1 g capsule TAKE 1 CAPSULE BY MOUTH EVERY DAY 90 capsule 3    dicyclomine (BENTYL) 10 MG capsule Take 10 mg by mouth 4 times daily (before meals and nightly)      Polyethylene Glycol 3350 (MIRALAX PO) Take 17 g by mouth      levothyroxine (SYNTHROID) 200 MCG tablet Take 200 mcg by mouth Daily      Cholecalciferol (VITAMIN D3) 92669 units CAPS Take by mouth      acetaminophen (TYLENOL) 500 MG tablet Take 2 tablets by mouth 3 times daily 540 tablet 1     No current facility-administered medications for this visit.         Allergies   Allergen Reactions    Keflex [Cephalexin]     Percocet [Oxycodone-Acetaminophen]        Social History     Socioeconomic History    Marital status:      Spouse name: None    Number of children: None    Years of education: None    Highest education level: None   Occupational History     Employer: [employer]   Social Needs    Financial resource strain: None    Food insecurity:     Worry: None     Inability: None    Transportation needs:     Medical: None     Non-medical: None 2 diabetes mellitus without complication, without long-term current use of insulin ([facility])  -     CBC; Future  -     Comprehensive Metabolic Panel; Future  -     Hemoglobin A1C; Future  -     Lipid Panel; Future  -     Microalbumin / Creatinine Urine Ratio; Future  -     TSH without Reflex; Future    Acquired hypothyroidism    Peripheral polyneuropathy  -     EMG; Future    Other orders  -     metFORMIN (GLUCOPHAGE) 500 MG tablet; Take 1 tablet by mouth daily (with breakfast)  -     INFLUENZA, QUADV, 3 YRS AND OLDER, IM PF, PREFILL SYR OR SDV, 0.5ML (AFLURIA QUADV, PF)  -     thiamine 100 MG tablet; Take 1 tablet by mouth daily          Return in about 6 months (around 3/10/2020) for Follow up DM, Recheck labs, Recheck Meds.         Greta Smith DO  9/10/2019  10:04 AM

## 2019-09-11 ENCOUNTER — TELEPHONE (OUTPATIENT)
Dept: PRIMARY CARE CLINIC | Age: 59
End: 2019-09-11

## 2019-09-11 NOTE — TELEPHONE ENCOUNTER
----- Message from Alex Saravia DO sent at 9/10/2019  4:12 PM EDT -----  Microalbumin elevated. Start Lisinopril 10 mg daily. Sugar elevated.  Start Ozempic weekly injection

## 2019-09-12 ENCOUNTER — TELEPHONE (OUTPATIENT)
Dept: PRIMARY CARE CLINIC | Age: 59
End: 2019-09-12

## 2019-09-12 RX ORDER — LISINOPRIL 10 MG/1
10 TABLET ORAL DAILY
Qty: 30 TABLET | Refills: 5 | Status: SHIPPED | OUTPATIENT
Start: 2019-09-12 | End: 2019-12-10 | Stop reason: ALTCHOICE

## 2019-09-16 ENCOUNTER — TELEPHONE (OUTPATIENT)
Dept: PRIMARY CARE CLINIC | Age: 59
End: 2019-09-16

## 2019-09-16 NOTE — TELEPHONE ENCOUNTER
Please send new script for pen needles. Needs to have a quantity of 100 and instructions to test once daily.

## 2019-09-23 ENCOUNTER — OFFICE VISIT (OUTPATIENT)
Dept: PRIMARY CARE CLINIC | Age: 59
End: 2019-09-23
Payer: COMMERCIAL

## 2019-09-23 ENCOUNTER — HOSPITAL ENCOUNTER (OUTPATIENT)
Age: 59
Discharge: HOME | End: 2019-09-25
Payer: COMMERCIAL

## 2019-09-23 VITALS
DIASTOLIC BLOOD PRESSURE: 86 MMHG | OXYGEN SATURATION: 98 % | HEART RATE: 91 BPM | TEMPERATURE: 98.2 F | WEIGHT: 275 LBS | HEIGHT: 64 IN | RESPIRATION RATE: 18 BRPM | BODY MASS INDEX: 46.95 KG/M2 | SYSTOLIC BLOOD PRESSURE: 130 MMHG

## 2019-09-23 DIAGNOSIS — M25.551 PAIN OF RIGHT HIP JOINT: ICD-10-CM

## 2019-09-23 DIAGNOSIS — R30.0 BURNING WITH URINATION: ICD-10-CM

## 2019-09-23 DIAGNOSIS — M70.61 TROCHANTERIC BURSITIS OF RIGHT HIP: ICD-10-CM

## 2019-09-23 DIAGNOSIS — R30.0 BURNING WITH URINATION: Primary | ICD-10-CM

## 2019-09-23 LAB
BILIRUBIN, POC: ABNORMAL
BLOOD URINE, POC: ABNORMAL
CLARITY, POC: CLEAR
COLOR, POC: ABNORMAL
GLUCOSE URINE, POC: ABNORMAL
KETONES, POC: ABNORMAL
LEUKOCYTE EST, POC: ABNORMAL
NITRITE, POC: ABNORMAL
PH, POC: 6
PROTEIN, POC: ABNORMAL
SPECIFIC GRAVITY, POC: 1.03
UROBILINOGEN, POC: 0.2

## 2019-09-23 PROCEDURE — 81002 URINALYSIS NONAUTO W/O SCOPE: CPT | Performed by: FAMILY MEDICINE

## 2019-09-23 PROCEDURE — 96372 THER/PROPH/DIAG INJ SC/IM: CPT | Performed by: FAMILY MEDICINE

## 2019-09-23 PROCEDURE — 87088 URINE BACTERIA CULTURE: CPT

## 2019-09-23 PROCEDURE — 99213 OFFICE O/P EST LOW 20 MIN: CPT | Performed by: FAMILY MEDICINE

## 2019-09-23 RX ORDER — METHYLPREDNISOLONE ACETATE 80 MG/ML
80 INJECTION, SUSPENSION INTRA-ARTICULAR; INTRALESIONAL; INTRAMUSCULAR; SOFT TISSUE ONCE
Status: COMPLETED | OUTPATIENT
Start: 2019-09-23 | End: 2019-09-23

## 2019-09-23 RX ORDER — TRAMADOL HYDROCHLORIDE 50 MG/1
50 TABLET ORAL EVERY 8 HOURS PRN
Qty: 15 TABLET | Refills: 0 | Status: SHIPPED | OUTPATIENT
Start: 2019-09-23 | End: 2019-09-28

## 2019-09-23 RX ADMIN — METHYLPREDNISOLONE ACETATE 80 MG: 80 INJECTION, SUSPENSION INTRA-ARTICULAR; INTRALESIONAL; INTRAMUSCULAR; SOFT TISSUE at 12:43

## 2019-09-23 ASSESSMENT — ENCOUNTER SYMPTOMS
ALLERGIC/IMMUNOLOGIC NEGATIVE: 1
VOMITING: 0
CHEST TIGHTNESS: 0
NAUSEA: 0
FACIAL SWELLING: 0
APNEA: 0
COLOR CHANGE: 0
DIARRHEA: 0
BACK PAIN: 0
COUGH: 0
SINUS PRESSURE: 0
WHEEZING: 0
SHORTNESS OF BREATH: 0
BLOOD IN STOOL: 0
PHOTOPHOBIA: 0
SORE THROAT: 0
ABDOMINAL PAIN: 0

## 2019-09-25 LAB — URINE CULTURE, ROUTINE: NORMAL

## 2019-09-26 ENCOUNTER — OFFICE VISIT (OUTPATIENT)
Dept: PRIMARY CARE CLINIC | Age: 59
End: 2019-09-26
Payer: COMMERCIAL

## 2019-09-26 VITALS
BODY MASS INDEX: 46.95 KG/M2 | HEART RATE: 74 BPM | DIASTOLIC BLOOD PRESSURE: 74 MMHG | SYSTOLIC BLOOD PRESSURE: 132 MMHG | HEIGHT: 64 IN | RESPIRATION RATE: 16 BRPM | OXYGEN SATURATION: 96 % | WEIGHT: 275 LBS

## 2019-09-26 DIAGNOSIS — M70.61 TROCHANTERIC BURSITIS OF RIGHT HIP: Primary | ICD-10-CM

## 2019-09-26 DIAGNOSIS — M25.551 PAIN OF RIGHT HIP JOINT: ICD-10-CM

## 2019-09-26 PROCEDURE — 99213 OFFICE O/P EST LOW 20 MIN: CPT | Performed by: FAMILY MEDICINE

## 2019-09-26 RX ORDER — PREDNISONE 10 MG/1
TABLET ORAL
Qty: 18 TABLET | Refills: 0 | Status: SHIPPED | OUTPATIENT
Start: 2019-09-26 | End: 2019-12-10

## 2019-09-26 RX ORDER — TIZANIDINE 4 MG/1
4 TABLET ORAL EVERY 8 HOURS PRN
Qty: 30 TABLET | Refills: 0 | Status: SHIPPED | OUTPATIENT
Start: 2019-09-26 | End: 2019-12-10

## 2019-09-26 ASSESSMENT — ENCOUNTER SYMPTOMS
COLOR CHANGE: 0
CHEST TIGHTNESS: 0
SORE THROAT: 0
SHORTNESS OF BREATH: 0
APNEA: 0
EYE REDNESS: 0
EYE ITCHING: 0
NAUSEA: 0
DIARRHEA: 0
EYE PAIN: 0
VOMITING: 0
BLOOD IN STOOL: 0
ABDOMINAL PAIN: 0
COUGH: 0
RHINORRHEA: 0
CONSTIPATION: 0
BACK PAIN: 0
SINUS PRESSURE: 0
WHEEZING: 0

## 2019-09-30 ENCOUNTER — TELEPHONE (OUTPATIENT)
Dept: PRIMARY CARE CLINIC | Age: 59
End: 2019-09-30

## 2019-09-30 DIAGNOSIS — M25.551 PAIN OF RIGHT HIP JOINT: ICD-10-CM

## 2019-09-30 DIAGNOSIS — M70.61 TROCHANTERIC BURSITIS OF RIGHT HIP: Primary | ICD-10-CM

## 2019-09-30 NOTE — TELEPHONE ENCOUNTER
Pt called and said when she saw dr Pam Catalan he mentioned her seeing an orthopaedic to get injections in her bursa.  Asking if you can place that referral.   Anmol Guadalupe- [phone]

## 2019-10-02 ENCOUNTER — OFFICE VISIT (OUTPATIENT)
Dept: ORTHOPEDIC SURGERY | Age: 59
End: 2019-10-02
Payer: COMMERCIAL

## 2019-10-02 ENCOUNTER — TELEPHONE (OUTPATIENT)
Dept: PRIMARY CARE CLINIC | Age: 59
End: 2019-10-02

## 2019-10-02 VITALS
HEIGHT: 64 IN | HEART RATE: 91 BPM | DIASTOLIC BLOOD PRESSURE: 88 MMHG | WEIGHT: 285 LBS | BODY MASS INDEX: 48.65 KG/M2 | SYSTOLIC BLOOD PRESSURE: 133 MMHG | TEMPERATURE: 97.9 F

## 2019-10-02 DIAGNOSIS — M70.71 BURSITIS OF RIGHT HIP, UNSPECIFIED BURSA: ICD-10-CM

## 2019-10-02 DIAGNOSIS — M76.31 ILIOTIBIAL BAND SYNDROME OF RIGHT SIDE: Primary | ICD-10-CM

## 2019-10-02 PROCEDURE — 99203 OFFICE O/P NEW LOW 30 MIN: CPT | Performed by: ORTHOPAEDIC SURGERY

## 2019-10-03 ENCOUNTER — TELEPHONE (OUTPATIENT)
Dept: ORTHOPEDIC SURGERY | Age: 59
End: 2019-10-03

## 2019-10-03 DIAGNOSIS — M76.31 ILIOTIBIAL BAND SYNDROME OF RIGHT SIDE: Primary | ICD-10-CM

## 2019-10-07 ENCOUNTER — TELEPHONE (OUTPATIENT)
Dept: PRIMARY CARE CLINIC | Age: 59
End: 2019-10-07

## 2019-10-07 RX ORDER — HYDROXYZINE PAMOATE 50 MG/1
50 CAPSULE ORAL NIGHTLY PRN
Qty: 30 CAPSULE | Refills: 0 | Status: SHIPPED | OUTPATIENT
Start: 2019-10-07 | End: 2019-10-29 | Stop reason: SDUPTHER

## 2019-10-10 ENCOUNTER — TELEPHONE (OUTPATIENT)
Dept: PRIMARY CARE CLINIC | Age: 59
End: 2019-10-10

## 2019-10-10 DIAGNOSIS — M25.551 PAIN OF RIGHT HIP JOINT: ICD-10-CM

## 2019-10-10 DIAGNOSIS — M70.61 TROCHANTERIC BURSITIS OF RIGHT HIP: Primary | ICD-10-CM

## 2019-10-11 ENCOUNTER — OFFICE VISIT (OUTPATIENT)
Dept: NEUROLOGY | Age: 59
End: 2019-10-11
Payer: COMMERCIAL

## 2019-10-11 VITALS
OXYGEN SATURATION: 96 % | HEIGHT: 64 IN | DIASTOLIC BLOOD PRESSURE: 89 MMHG | HEART RATE: 98 BPM | SYSTOLIC BLOOD PRESSURE: 146 MMHG | BODY MASS INDEX: 45.58 KG/M2 | WEIGHT: 267 LBS

## 2019-10-11 DIAGNOSIS — G62.9 PERIPHERAL POLYNEUROPATHY: ICD-10-CM

## 2019-10-11 PROCEDURE — 95913 NRV CNDJ TEST 13/> STUDIES: CPT | Performed by: PSYCHIATRY & NEUROLOGY

## 2019-10-11 PROCEDURE — 95886 MUSC TEST DONE W/N TEST COMP: CPT | Performed by: PSYCHIATRY & NEUROLOGY

## 2019-10-11 RX ORDER — TRAMADOL HYDROCHLORIDE 50 MG/1
50 TABLET ORAL EVERY 6 HOURS PRN
COMMUNITY
End: 2019-12-10

## 2019-10-24 ENCOUNTER — TELEPHONE (OUTPATIENT)
Dept: PRIMARY CARE CLINIC | Age: 59
End: 2019-10-24

## 2019-10-29 RX ORDER — HYDROXYZINE PAMOATE 50 MG/1
50 CAPSULE ORAL NIGHTLY PRN
Qty: 30 CAPSULE | Refills: 0 | Status: SHIPPED | OUTPATIENT
Start: 2019-10-29 | End: 2019-11-21 | Stop reason: SDUPTHER

## 2019-11-05 RX ORDER — ERGOCALCIFEROL 1.25 MG/1
CAPSULE ORAL
Refills: 0 | COMMUNITY
Start: 2019-10-08 | End: 2019-11-05 | Stop reason: SDUPTHER

## 2019-11-05 RX ORDER — ERGOCALCIFEROL 1.25 MG/1
CAPSULE ORAL
Qty: 30 CAPSULE | Refills: 0 | Status: SHIPPED
Start: 2019-11-05 | End: 2020-04-27 | Stop reason: SDUPTHER

## 2019-11-21 RX ORDER — HYDROXYZINE PAMOATE 50 MG/1
50 CAPSULE ORAL NIGHTLY PRN
Qty: 30 CAPSULE | Refills: 0 | Status: SHIPPED | OUTPATIENT
Start: 2019-11-21 | End: 2019-12-10

## 2019-12-10 ENCOUNTER — HOSPITAL ENCOUNTER (OUTPATIENT)
Age: 59
Discharge: HOME | End: 2019-12-12
Payer: COMMERCIAL

## 2019-12-10 ENCOUNTER — OFFICE VISIT (OUTPATIENT)
Dept: PRIMARY CARE CLINIC | Age: 59
End: 2019-12-10
Payer: COMMERCIAL

## 2019-12-10 VITALS
DIASTOLIC BLOOD PRESSURE: 82 MMHG | OXYGEN SATURATION: 96 % | HEART RATE: 81 BPM | WEIGHT: 266 LBS | SYSTOLIC BLOOD PRESSURE: 122 MMHG | BODY MASS INDEX: 45.41 KG/M2 | RESPIRATION RATE: 18 BRPM | HEIGHT: 64 IN

## 2019-12-10 DIAGNOSIS — E11.9 TYPE 2 DIABETES MELLITUS WITHOUT COMPLICATION, WITHOUT LONG-TERM CURRENT USE OF INSULIN (HCC): ICD-10-CM

## 2019-12-10 DIAGNOSIS — E03.9 ACQUIRED HYPOTHYROIDISM: ICD-10-CM

## 2019-12-10 DIAGNOSIS — E11.9 TYPE 2 DIABETES MELLITUS WITHOUT COMPLICATION, WITHOUT LONG-TERM CURRENT USE OF INSULIN (HCC): Primary | ICD-10-CM

## 2019-12-10 DIAGNOSIS — G56.02 CARPAL TUNNEL SYNDROME OF LEFT WRIST: ICD-10-CM

## 2019-12-10 LAB
ALBUMIN SERPL-MCNC: 4.5 G/DL (ref 3.5–5.2)
ALP BLD-CCNC: 73 U/L (ref 35–104)
ALT SERPL-CCNC: 22 U/L (ref 0–32)
ANION GAP SERPL CALCULATED.3IONS-SCNC: 15 MMOL/L (ref 7–16)
AST SERPL-CCNC: 13 U/L (ref 0–31)
BILIRUB SERPL-MCNC: 0.7 MG/DL (ref 0–1.2)
BUN BLDV-MCNC: 17 MG/DL (ref 6–20)
CALCIUM SERPL-MCNC: 9.8 MG/DL (ref 8.6–10.2)
CHLORIDE BLD-SCNC: 101 MMOL/L (ref 98–107)
CHOLESTEROL, TOTAL: 232 MG/DL (ref 0–199)
CO2: 23 MMOL/L (ref 22–29)
CREAT SERPL-MCNC: 0.7 MG/DL (ref 0.5–1)
GFR AFRICAN AMERICAN: >60
GFR NON-AFRICAN AMERICAN: >60 ML/MIN/1.73
GLUCOSE BLD-MCNC: 117 MG/DL (ref 74–99)
HBA1C MFR BLD: 7.1 % (ref 4–5.6)
HDLC SERPL-MCNC: 31 MG/DL
LDL CHOLESTEROL CALCULATED: 147 MG/DL (ref 0–99)
POTASSIUM SERPL-SCNC: 4.1 MMOL/L (ref 3.5–5)
SODIUM BLD-SCNC: 139 MMOL/L (ref 132–146)
TOTAL PROTEIN: 7.4 G/DL (ref 6.4–8.3)
TRIGL SERPL-MCNC: 272 MG/DL (ref 0–149)
TSH SERPL DL<=0.05 MIU/L-ACNC: 1.04 UIU/ML (ref 0.27–4.2)
VLDLC SERPL CALC-MCNC: 54 MG/DL

## 2019-12-10 PROCEDURE — 83036 HEMOGLOBIN GLYCOSYLATED A1C: CPT

## 2019-12-10 PROCEDURE — 36415 COLL VENOUS BLD VENIPUNCTURE: CPT

## 2019-12-10 PROCEDURE — 80061 LIPID PANEL: CPT

## 2019-12-10 PROCEDURE — 84443 ASSAY THYROID STIM HORMONE: CPT

## 2019-12-10 PROCEDURE — 99214 OFFICE O/P EST MOD 30 MIN: CPT | Performed by: FAMILY MEDICINE

## 2019-12-10 PROCEDURE — 80053 COMPREHEN METABOLIC PANEL: CPT

## 2019-12-10 RX ORDER — VALSARTAN 80 MG/1
80 TABLET ORAL DAILY
Qty: 90 TABLET | Refills: 1 | Status: SHIPPED
Start: 2019-12-10 | End: 2020-06-01 | Stop reason: SDUPTHER

## 2019-12-10 RX ORDER — SEMAGLUTIDE 1.34 MG/ML
1 INJECTION, SOLUTION SUBCUTANEOUS WEEKLY
Qty: 6 PEN | Refills: 3 | Status: SHIPPED
Start: 2019-12-10 | End: 2020-10-20 | Stop reason: SDUPTHER

## 2019-12-10 ASSESSMENT — ENCOUNTER SYMPTOMS
WHEEZING: 0
SORE THROAT: 0
BLOOD IN STOOL: 0
ABDOMINAL PAIN: 0
CHEST TIGHTNESS: 0
SHORTNESS OF BREATH: 0
COUGH: 1
BACK PAIN: 0
EYE REDNESS: 0
NAUSEA: 0
VOMITING: 0
EYE PAIN: 0
CONSTIPATION: 0
APNEA: 0
RHINORRHEA: 0
EYE ITCHING: 0
SINUS PRESSURE: 0
COLOR CHANGE: 0
DIARRHEA: 0

## 2019-12-15 RX ORDER — HYDROXYZINE PAMOATE 50 MG/1
50 CAPSULE ORAL NIGHTLY PRN
Qty: 30 CAPSULE | Refills: 0 | Status: SHIPPED | OUTPATIENT
Start: 2019-12-15 | End: 2020-01-08

## 2019-12-30 ENCOUNTER — TELEPHONE (OUTPATIENT)
Dept: PRIMARY CARE CLINIC | Age: 59
End: 2019-12-30

## 2019-12-30 NOTE — TELEPHONE ENCOUNTER
Chantell from [facility] calling states that they are in need of a diabetic clearance for patient to have a lumbar epidural steroid injection under fluoroscopy.   Please call chantell at [phone]

## 2020-01-08 RX ORDER — HYDROXYZINE PAMOATE 50 MG/1
50 CAPSULE ORAL NIGHTLY PRN
Qty: 30 CAPSULE | Refills: 0 | Status: SHIPPED | OUTPATIENT
Start: 2020-01-08 | End: 2020-02-07

## 2020-01-31 RX ORDER — DICYCLOMINE HYDROCHLORIDE 10 MG/1
10 CAPSULE ORAL
Qty: 360 CAPSULE | Refills: 0 | Status: SHIPPED
Start: 2020-01-31 | End: 2021-05-13

## 2020-02-07 ENCOUNTER — TELEPHONE (OUTPATIENT)
Dept: ORTHOPEDIC SURGERY | Age: 60
End: 2020-02-07

## 2020-02-11 ENCOUNTER — OFFICE VISIT (OUTPATIENT)
Dept: ORTHOPEDIC SURGERY | Age: 60
End: 2020-02-11
Payer: COMMERCIAL

## 2020-02-11 VITALS
RESPIRATION RATE: 16 BRPM | HEIGHT: 64 IN | DIASTOLIC BLOOD PRESSURE: 73 MMHG | SYSTOLIC BLOOD PRESSURE: 117 MMHG | WEIGHT: 257 LBS | BODY MASS INDEX: 43.87 KG/M2 | HEART RATE: 84 BPM

## 2020-02-11 PROCEDURE — 99203 OFFICE O/P NEW LOW 30 MIN: CPT | Performed by: ORTHOPAEDIC SURGERY

## 2020-02-11 NOTE — PROGRESS NOTES
Department of Orthopedic Surgery  [facility] Charity Geller MD  History and Physical      CHIEF COMPLAINT:  Left hand numbness and tingling     HISTORY OF PRESENT ILLNESS:                The patient is a 63 y.o. female who presents with left hand numbness and tingling that has been going on for the past 1 year or so. The patient has not tried anything yet for her symptoms. She says it does wake her up sometimes at night but it doesn't bother her too much during the day. She says over the past month or so it has not been bothering her too much. 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 EMG demonstrating median latency of 4.17 on the right 4.74 on the left. Sensory latency of 2.81 on the right and 3.59 on the left. Ulnar velocity of 54 below the elbow and 51 above on the right and 52 below elbow and 45 above on the left. No changes on the EMG portion of the exam.     Past Medical History:        Diagnosis Date    Diabetes mellitus ([address]     Hypothyroidism      Past Surgical History:        Procedure Laterality Date    CHOLECYSTECTOMY      HYSTERECTOMY, TOTAL ABDOMINAL      INCONTINENCE SURGERY      KNEE SURGERY Left      Current Medications:   No current facility-administered medications for this visit. Allergies:  Keflex [cephalexin] and Percocet [oxycodone-acetaminophen]    Social History:   TOBACCO:   reports that she has never smoked. She has never used smokeless tobacco.  ETOH:   reports no history of alcohol use. DRUGS:   has no history on file for drug.   ACTIVITIES OF DAILY LIVING:    OCCUPATION:    Family History:       Problem Relation Age of Onset    Diabetes Mother     Diabetes Father        REVIEW OF SYSTEMS:  CONSTITUTIONAL:  negative  EYES:  negative  HEENT:  negative  RESPIRATORY:  negative  CARDIOVASCULAR:  negative  GASTROINTESTINAL:  negative  MUSCULOSKELETAL:  positive for  pain  NEUROLOGICAL:  positive for numbness and tingling    PHYSICAL EXAM:    VITALS:  /73 (Site: Left Upper Arm, Position: Sitting)   Pulse 84   Resp 16   Ht 5' 4\" (1.626 m)   Wt 257 lb (116.6 kg)   BMI 44.11 kg/m²   CONSTITUTIONAL:  awake, alert, cooperative, no apparent distress, and appears stated age  EYES:  Lids and lashes normal, pupils equal, round and reactive to light, extra ocular muscles intact, sclera clear, conjunctiva normal  ENT:  normocepalic, without obvious abnormality  NECK:  supple, symmetrical, trachea midline  LUNGS:  no increased work of breathing  CARDIOVASCULAR:  no edema  NEUROLOGIC:  Awake, alert, oriented to name, place and time. Cranial nerves II-XII are grossly intact. Motor is 5 out of 5 bilaterally. MUSCULOSKELETAL:  Left upper extremity:   Full ROM of the shoulder, elbow and wrist. No pain at the shoulder or elbow. - tinel at the elbow. + tinel at the wrist, + paige, - cmc grind. No pain or active triggering over any of the A1 pulley's, - finkelstein. Full digital ROM, SILT median, ulnar, radial nerve distribution. +2/4 radial pulse    Right upper extremity   Full ROM of the shoulder, elbow and wrist. No pain at the shoulder or elbow. - tinel at the elbow. - tinel at the wrist, - paige, - cmc grind. No pain or active triggering over any of the A1 pulley's, - finkelstein. Full digital ROM, SILT median, ulnar, radial nerve distribution. +2/4 radial pulse      DATA:    CBC:   Lab Results   Component Value Date    WBC 6.9 09/10/2019    RBC 5.34 09/10/2019    HGB 15.6 09/10/2019    HCT 47.6 09/10/2019    MCV 89.1 09/10/2019    MCH 29.2 09/10/2019    MCHC 32.8 09/10/2019    RDW 14.1 09/10/2019     09/10/2019    MPV 10.7 09/10/2019     PT/INR:  No results found for: PROTIME, INR    Radiology Review:  Xray left wrist obtained and reviewed in office. AP, Lateral, Oblique and carpal tunnel view demonstrating no fracture or dislocation. Impression: No fracture or dislocation.      IMPRESSION:  · Left carpal tunnel syndrome     PLAN:  At this time patient does not want to

## 2020-04-27 RX ORDER — ERGOCALCIFEROL 1.25 MG/1
CAPSULE ORAL
Qty: 30 CAPSULE | Refills: 0 | Status: SHIPPED
Start: 2020-04-27 | End: 2020-07-20

## 2020-06-01 RX ORDER — VALSARTAN 80 MG/1
80 TABLET ORAL DAILY
Qty: 90 TABLET | Refills: 1 | Status: SHIPPED
Start: 2020-06-01 | End: 2020-12-22

## 2020-07-02 ENCOUNTER — HOSPITAL ENCOUNTER (OUTPATIENT)
Age: 60
Discharge: HOME | End: 2020-07-04
Payer: COMMERCIAL

## 2020-07-02 ENCOUNTER — OFFICE VISIT (OUTPATIENT)
Dept: PRIMARY CARE CLINIC | Age: 60
End: 2020-07-02
Payer: COMMERCIAL

## 2020-07-02 VITALS
HEIGHT: 64 IN | HEART RATE: 70 BPM | DIASTOLIC BLOOD PRESSURE: 80 MMHG | TEMPERATURE: 97.4 F | RESPIRATION RATE: 16 BRPM | OXYGEN SATURATION: 98 % | BODY MASS INDEX: 45.24 KG/M2 | SYSTOLIC BLOOD PRESSURE: 132 MMHG | WEIGHT: 265 LBS

## 2020-07-02 LAB
ALBUMIN SERPL-MCNC: 4.3 G/DL (ref 3.5–5.2)
ALP BLD-CCNC: 94 U/L (ref 35–104)
ALT SERPL-CCNC: 30 U/L (ref 0–32)
AMORPHOUS: ABNORMAL
ANION GAP SERPL CALCULATED.3IONS-SCNC: 16 MMOL/L (ref 7–16)
AST SERPL-CCNC: 21 U/L (ref 0–31)
BACTERIA: ABNORMAL /HPF
BILIRUB SERPL-MCNC: 0.6 MG/DL (ref 0–1.2)
BILIRUBIN URINE: NEGATIVE
BLOOD, URINE: NEGATIVE
BUN BLDV-MCNC: 15 MG/DL (ref 6–20)
CALCIUM SERPL-MCNC: 9.2 MG/DL (ref 8.6–10.2)
CHLORIDE BLD-SCNC: 105 MMOL/L (ref 98–107)
CHOLESTEROL, TOTAL: 218 MG/DL (ref 0–199)
CLARITY: ABNORMAL
CO2: 22 MMOL/L (ref 22–29)
COLOR: YELLOW
CREAT SERPL-MCNC: 0.8 MG/DL (ref 0.5–1)
CREATININE URINE: 280 MG/DL (ref 29–226)
EPITHELIAL CELLS, UA: ABNORMAL /HPF
GFR AFRICAN AMERICAN: >60
GFR NON-AFRICAN AMERICAN: >60 ML/MIN/1.73
GLUCOSE BLD-MCNC: 107 MG/DL (ref 74–99)
GLUCOSE URINE: NEGATIVE MG/DL
HBA1C MFR BLD: 6.2 % (ref 4–5.6)
HCT VFR BLD CALC: 49.1 % (ref 34–48)
HDLC SERPL-MCNC: 31 MG/DL
HEMOGLOBIN: 15.4 G/DL (ref 11.5–15.5)
KETONES, URINE: NEGATIVE MG/DL
LDL CHOLESTEROL CALCULATED: 118 MG/DL (ref 0–99)
LEUKOCYTE ESTERASE, URINE: NEGATIVE
MCH RBC QN AUTO: 29.4 PG (ref 26–35)
MCHC RBC AUTO-ENTMCNC: 31.4 % (ref 32–34.5)
MCV RBC AUTO: 93.7 FL (ref 80–99.9)
MICROALBUMIN UR-MCNC: 17.5 MG/L
MICROALBUMIN/CREAT UR-RTO: 6.3 (ref 0–30)
NITRITE, URINE: NEGATIVE
PDW BLD-RTO: 13.8 FL (ref 11.5–15)
PH UA: 6 (ref 5–9)
PLATELET # BLD: 268 E9/L (ref 130–450)
PMV BLD AUTO: 10 FL (ref 7–12)
POTASSIUM SERPL-SCNC: 4.7 MMOL/L (ref 3.5–5)
PROTEIN UA: NEGATIVE MG/DL
RBC # BLD: 5.24 E12/L (ref 3.5–5.5)
RBC UA: ABNORMAL /HPF (ref 0–2)
SODIUM BLD-SCNC: 143 MMOL/L (ref 132–146)
SPECIFIC GRAVITY UA: >=1.03 (ref 1–1.03)
TOTAL PROTEIN: 7.3 G/DL (ref 6.4–8.3)
TRIGL SERPL-MCNC: 344 MG/DL (ref 0–149)
TSH SERPL DL<=0.05 MIU/L-ACNC: 45.1 UIU/ML (ref 0.27–4.2)
UROBILINOGEN, URINE: 1 E.U./DL
VLDLC SERPL CALC-MCNC: 69 MG/DL
WBC # BLD: 6.9 E9/L (ref 4.5–11.5)
WBC UA: ABNORMAL /HPF (ref 0–5)

## 2020-07-02 PROCEDURE — 87088 URINE BACTERIA CULTURE: CPT

## 2020-07-02 PROCEDURE — 85027 COMPLETE CBC AUTOMATED: CPT

## 2020-07-02 PROCEDURE — 81001 URINALYSIS AUTO W/SCOPE: CPT

## 2020-07-02 PROCEDURE — 99214 OFFICE O/P EST MOD 30 MIN: CPT | Performed by: FAMILY MEDICINE

## 2020-07-02 PROCEDURE — 82570 ASSAY OF URINE CREATININE: CPT

## 2020-07-02 PROCEDURE — 36415 COLL VENOUS BLD VENIPUNCTURE: CPT

## 2020-07-02 PROCEDURE — 80061 LIPID PANEL: CPT

## 2020-07-02 PROCEDURE — 83036 HEMOGLOBIN GLYCOSYLATED A1C: CPT

## 2020-07-02 PROCEDURE — 84443 ASSAY THYROID STIM HORMONE: CPT

## 2020-07-02 PROCEDURE — 82044 UR ALBUMIN SEMIQUANTITATIVE: CPT

## 2020-07-02 PROCEDURE — 96372 THER/PROPH/DIAG INJ SC/IM: CPT | Performed by: FAMILY MEDICINE

## 2020-07-02 PROCEDURE — 80053 COMPREHEN METABOLIC PANEL: CPT

## 2020-07-02 RX ORDER — KETOROLAC TROMETHAMINE 30 MG/ML
30 INJECTION, SOLUTION INTRAMUSCULAR; INTRAVENOUS ONCE
Status: COMPLETED | OUTPATIENT
Start: 2020-07-02 | End: 2020-07-02

## 2020-07-02 RX ADMIN — KETOROLAC TROMETHAMINE 30 MG: 30 INJECTION, SOLUTION INTRAMUSCULAR; INTRAVENOUS at 09:07

## 2020-07-02 ASSESSMENT — ENCOUNTER SYMPTOMS
COUGH: 0
APNEA: 0
VOMITING: 0
EYE PAIN: 0
SORE THROAT: 0
VISUAL CHANGE: 0
CONSTIPATION: 0
EYE ITCHING: 0
BACK PAIN: 0
CHEST TIGHTNESS: 0
DIARRHEA: 0
RHINORRHEA: 0
SINUS PRESSURE: 0
NAUSEA: 0
BLOOD IN STOOL: 0
EYE REDNESS: 0
ABDOMINAL PAIN: 0
COLOR CHANGE: 0
WHEEZING: 0
SHORTNESS OF BREATH: 0

## 2020-07-02 ASSESSMENT — PATIENT HEALTH QUESTIONNAIRE - PHQ9
SUM OF ALL RESPONSES TO PHQ QUESTIONS 1-9: 0
SUM OF ALL RESPONSES TO PHQ9 QUESTIONS 1 & 2: 0
1. LITTLE INTEREST OR PLEASURE IN DOING THINGS: 0
2. FEELING DOWN, DEPRESSED OR HOPELESS: 0
SUM OF ALL RESPONSES TO PHQ QUESTIONS 1-9: 0

## 2020-07-02 NOTE — PROGRESS NOTES
Chief Complaint:     Chief Complaint   Patient presents with    Diabetes    Hypothyroidism    Hypertension         Diabetes   She presents for her follow-up diabetic visit. She has type 2 diabetes mellitus. The initial diagnosis of diabetes was made 1 month ago. Her disease course has been stable. There are no hypoglycemic associated symptoms. Pertinent negatives for hypoglycemia include no dizziness, headaches or nervousness/anxiousness. Associated symptoms include fatigue. Pertinent negatives for diabetes include no chest pain, no visual change and no weakness. There are no hypoglycemic complications. Symptoms are stable. There are no diabetic complications. Risk factors for coronary artery disease include dyslipidemia and obesity. Current diabetic treatment includes oral agent (monotherapy). She is compliant with treatment all of the time. She is following a generally healthy diet. When asked about meal planning, she reported none. She has not had a previous visit with a dietitian. There is no change in her home blood glucose trend. An ACE inhibitor/angiotensin II receptor blocker is not being taken. She does not see a podiatrist.Eye exam is not current. Fatigue   This is a chronic problem. The problem occurs intermittently. The problem has been waxing and waning. Associated symptoms include fatigue and a rash. Pertinent negatives include no abdominal pain, arthralgias, chest pain, congestion, coughing, diaphoresis, fever, headaches, myalgias, nausea, neck pain, numbness, sore throat, visual change, vomiting or weakness. Nothing aggravates the symptoms. She has tried nothing for the symptoms. The treatment provided no relief. Back Pain   This is a recurrent problem. The current episode started more than 1 month ago. The problem occurs intermittently. The problem has been waxing and waning since onset. The pain is present in the lumbar spine. The quality of the pain is described as aching.  The pain is moderate. Pertinent negatives include no abdominal pain, chest pain, dysuria, fever, headaches, numbness or weakness. She has tried nothing for the symptoms. The treatment provided no relief.        Patient Active Problem List   Diagnosis    Type 2 diabetes mellitus without complication, without long-term current use of insulin ([facility] [address]    Acquired hypothyroidism    Peripheral neuropathy    Carpal tunnel syndrome of left wrist       Past Medical History:   Diagnosis Date    Diabetes mellitus ([facility] [address]     Hypothyroidism        Past Surgical History:   Procedure Laterality Date    CHOLECYSTECTOMY      HYSTERECTOMY, TOTAL ABDOMINAL      INCONTINENCE SURGERY      KNEE SURGERY Left        Current Outpatient Medications   Medication Sig Dispense Refill    linaclotide (LINZESS) 145 MCG capsule Take 1 capsule by mouth every morning (before breakfast) 90 capsule 3    valsartan (DIOVAN) 80 MG tablet Take 1 tablet by mouth daily 90 tablet 1    vitamin D (ERGOCALCIFEROL) 1.25 MG (17024 UT) CAPS capsule TAKE 1 CAPSULE WEEKLY X 30 WEEKS 30 capsule 0    OZEMPIC, 1 MG/DOSE, 2 MG/1.5ML SOPN Inject 1 mg into the skin once a week 6 pen 3    metFORMIN (GLUCOPHAGE) 500 MG tablet Take 1 tablet by mouth daily (with breakfast) 90 tablet 3    thiamine 100 MG tablet Take 1 tablet by mouth daily 90 tablet 3    omeprazole (PRILOSEC) 20 MG delayed release capsule Take 1 capsule by mouth daily 90 capsule 3    gemfibrozil (LOPID) 600 MG tablet TAKE 1 TABLET EVERY 12 HOURS 180 tablet 3    diclofenac (VOLTAREN) 75 MG EC tablet TAKE 1 TABLET BY MOUTH EVERY DAY FOR ARTHRITIS PAIN 90 tablet 3    omega-3 acid ethyl esters (LOVAZA) 1 g capsule TAKE 1 CAPSULE BY MOUTH EVERY DAY 90 capsule 3    Polyethylene Glycol 3350 (MIRALAX PO) Take 17 g by mouth daily       levothyroxine (SYNTHROID) 200 MCG tablet Take 200 mcg by mouth Daily      acetaminophen (TYLENOL) 500 MG tablet Take 2 tablets by mouth 3 times daily 540 tablet 1    dicyclomine (BENTYL) 10 MG capsule Take 1 capsule by mouth 4 times daily (before meals and nightly) 360 capsule 0     Current Facility-Administered Medications   Medication Dose Route Frequency Provider Last Rate Last Dose    ketorolac (TORADOL) injection 30 mg  30 mg Intravenous Once Haydee Gomez DO           Allergies   Allergen Reactions    Keflex [Cephalexin]     Percocet [Oxycodone-Acetaminophen]        Social History     Socioeconomic History    Marital status:      Spouse name: None    Number of children: None    Years of education: None    Highest education level: None   Occupational History     Employer: [employer]   Social Needs    Financial resource strain: None    Food insecurity     Worry: None     Inability: None    Transportation needs     Medical: None     Non-medical: None   Tobacco Use    Smoking status: Never Smoker    Smokeless tobacco: Never Used   Substance and Sexual Activity    Alcohol use: Never     Frequency: Never    Drug use: None    Sexual activity: None   Lifestyle    Physical activity     Days per week: None     Minutes per session: None    Stress: None   Relationships    Social connections     Talks on phone: None     Gets together: None     Attends Jainism service: None     Active member of club or organization: None     Attends meetings of clubs or organizations: None     Relationship status: None    Intimate partner violence     Fear of current or ex partner: None     Emotionally abused: None     Physically abused: None     Forced sexual activity: None   Other Topics Concern    None   Social History Narrative    None       Family History   Problem Relation Age of Onset    Diabetes Mother     Diabetes Father           Review of Systems   Constitutional: Positive for fatigue. Negative for activity change, appetite change, diaphoresis and fever.    HENT: Negative for congestion, ear pain, hearing loss, nosebleeds, rhinorrhea, sinus pressure and sore throat. Eyes: Negative for pain, redness, itching and visual disturbance. Respiratory: Negative for apnea, cough, chest tightness, shortness of breath and wheezing. Cardiovascular: Negative for chest pain, palpitations and leg swelling. Gastrointestinal: Negative for abdominal pain, blood in stool, constipation, diarrhea, nausea and vomiting. Endocrine: Negative. Genitourinary: Negative for decreased urine volume, difficulty urinating, dysuria, frequency, hematuria and urgency. Musculoskeletal: Negative for arthralgias, back pain, gait problem, myalgias and neck pain. Skin: Positive for rash. Negative for color change. Allergic/Immunologic: Negative for environmental allergies and food allergies. Neurological: Negative for dizziness, weakness, light-headedness, numbness and headaches. Hematological: Negative for adenopathy. Does not bruise/bleed easily. Psychiatric/Behavioral: Negative for behavioral problems, dysphoric mood and sleep disturbance. The patient is not nervous/anxious and is not hyperactive. All other systems reviewed and are negative. /80   Pulse 70   Temp 97.4 °F (36.3 °C)   Resp 16   Ht 5' 4\" (1.626 m)   Wt 265 lb (120.2 kg)   SpO2 98%   BMI 45.49 kg/m²     Physical Exam  Vitals signs and nursing note reviewed. Constitutional:       General: She is not in acute distress. Appearance: Normal appearance. She is well-developed. HENT:      Head: Normocephalic and atraumatic. Right Ear: Hearing, tympanic membrane and external ear normal. No tenderness. No middle ear effusion. Left Ear: Hearing, tympanic membrane and external ear normal. No tenderness. No middle ear effusion. Nose: Nose normal. No congestion or rhinorrhea. Right Turbinates: Not enlarged. Left Turbinates: Not enlarged. Mouth/Throat:      Mouth: Mucous membranes are moist.      Tongue: No lesions. Pharynx: Oropharynx is clear.  No

## 2020-07-04 LAB — URINE CULTURE, ROUTINE: NORMAL

## 2020-07-06 RX ORDER — LEVOTHYROXINE SODIUM 0.12 MG/1
250 TABLET ORAL DAILY
Qty: 60 TABLET | Refills: 3 | Status: SHIPPED
Start: 2020-07-06 | End: 2020-07-06

## 2020-07-20 RX ORDER — ERGOCALCIFEROL 1.25 MG/1
CAPSULE ORAL
Qty: 12 CAPSULE | Refills: 0 | Status: SHIPPED
Start: 2020-07-20 | End: 2020-10-15

## 2020-07-23 RX ORDER — DICLOFENAC SODIUM 75 MG/1
TABLET, DELAYED RELEASE ORAL
Qty: 90 TABLET | Refills: 3 | Status: SHIPPED
Start: 2020-07-23 | End: 2020-12-28 | Stop reason: SDUPTHER

## 2020-07-24 RX ORDER — GEMFIBROZIL 600 MG/1
TABLET, FILM COATED ORAL
Qty: 180 TABLET | Refills: 3 | Status: ON HOLD
Start: 2020-07-24 | End: 2021-05-31 | Stop reason: HOSPADM

## 2020-07-24 RX ORDER — OMEPRAZOLE 20 MG/1
20 CAPSULE, DELAYED RELEASE ORAL DAILY
Qty: 90 CAPSULE | Refills: 3 | Status: SHIPPED
Start: 2020-07-24 | End: 2021-07-12

## 2020-07-28 RX ORDER — OMEGA-3-ACID ETHYL ESTERS 1 G/1
CAPSULE, LIQUID FILLED ORAL
Qty: 90 CAPSULE | Refills: 3 | Status: SHIPPED
Start: 2020-07-28 | End: 2021-07-12

## 2020-08-06 ENCOUNTER — HOSPITAL ENCOUNTER (OUTPATIENT)
Age: 60
Discharge: HOME | End: 2020-08-08
Payer: COMMERCIAL

## 2020-08-06 ENCOUNTER — TELEPHONE (OUTPATIENT)
Dept: PRIMARY CARE CLINIC | Age: 60
End: 2020-08-06

## 2020-08-06 LAB — TSH SERPL DL<=0.05 MIU/L-ACNC: 0.07 UIU/ML (ref 0.27–4.2)

## 2020-08-06 PROCEDURE — 36415 COLL VENOUS BLD VENIPUNCTURE: CPT

## 2020-08-06 PROCEDURE — 84443 ASSAY THYROID STIM HORMONE: CPT

## 2020-09-04 ENCOUNTER — HOSPITAL ENCOUNTER (OUTPATIENT)
Age: 60
Discharge: HOME | End: 2020-09-06
Payer: COMMERCIAL

## 2020-09-04 LAB — TSH SERPL DL<=0.05 MIU/L-ACNC: 0.02 UIU/ML (ref 0.27–4.2)

## 2020-09-04 PROCEDURE — 84443 ASSAY THYROID STIM HORMONE: CPT

## 2020-09-04 PROCEDURE — 36415 COLL VENOUS BLD VENIPUNCTURE: CPT

## 2020-09-08 RX ORDER — LEVOTHYROXINE SODIUM 112 MCG
112 TABLET ORAL DAILY
Qty: 30 TABLET | Refills: 3 | Status: SHIPPED
Start: 2020-09-08 | End: 2020-09-08 | Stop reason: SDUPTHER

## 2020-09-08 RX ORDER — LEVOTHYROXINE SODIUM 112 UG/1
112 TABLET ORAL DAILY
Qty: 30 TABLET | Refills: 3 | Status: SHIPPED
Start: 2020-09-08 | End: 2020-12-21

## 2020-09-08 RX ORDER — LEVOTHYROXINE SODIUM 0.12 MG/1
TABLET ORAL
COMMUNITY
Start: 2020-07-06 | End: 2020-09-08

## 2020-09-28 RX ORDER — LEVOTHYROXINE SODIUM 0.12 MG/1
TABLET ORAL
Qty: 180 TABLET | Refills: 1 | Status: SHIPPED
Start: 2020-09-28 | End: 2021-01-11 | Stop reason: ALTCHOICE

## 2020-10-15 RX ORDER — ERGOCALCIFEROL 1.25 MG/1
CAPSULE ORAL
Qty: 12 CAPSULE | Refills: 0 | Status: SHIPPED
Start: 2020-10-15 | End: 2020-12-28 | Stop reason: SDUPTHER

## 2020-10-20 RX ORDER — SEMAGLUTIDE 1.34 MG/ML
1 INJECTION, SOLUTION SUBCUTANEOUS WEEKLY
Qty: 6 PEN | Refills: 3 | Status: SHIPPED
Start: 2020-10-20 | End: 2021-10-07 | Stop reason: SDUPTHER

## 2020-10-21 ENCOUNTER — NURSE ONLY (OUTPATIENT)
Dept: PRIMARY CARE CLINIC | Age: 60
End: 2020-10-21
Payer: COMMERCIAL

## 2020-10-21 PROCEDURE — 90686 IIV4 VACC NO PRSV 0.5 ML IM: CPT | Performed by: FAMILY MEDICINE

## 2020-10-21 PROCEDURE — 90471 IMMUNIZATION ADMIN: CPT | Performed by: FAMILY MEDICINE

## 2020-12-21 RX ORDER — LEVOTHYROXINE SODIUM 112 UG/1
TABLET ORAL
Qty: 90 TABLET | Refills: 1 | Status: SHIPPED
Start: 2020-12-21 | End: 2021-01-12

## 2020-12-22 ENCOUNTER — NURSE TRIAGE (OUTPATIENT)
Dept: OTHER | Facility: CLINIC | Age: 60
End: 2020-12-22

## 2020-12-22 ENCOUNTER — TELEPHONE (OUTPATIENT)
Dept: ADMINISTRATIVE | Age: 60
End: 2020-12-22

## 2020-12-22 ENCOUNTER — VIRTUAL VISIT (OUTPATIENT)
Dept: PRIMARY CARE CLINIC | Age: 60
End: 2020-12-22
Payer: COMMERCIAL

## 2020-12-22 PROCEDURE — 99213 OFFICE O/P EST LOW 20 MIN: CPT | Performed by: FAMILY MEDICINE

## 2020-12-22 RX ORDER — UBIDECARENONE 75 MG
100 CAPSULE ORAL DAILY
Qty: 30 TABLET | Refills: 3 | Status: SHIPPED
Start: 2020-12-22 | End: 2020-12-28 | Stop reason: SDUPTHER

## 2020-12-22 RX ORDER — DOXYCYCLINE HYCLATE 100 MG
100 TABLET ORAL 2 TIMES DAILY
Qty: 20 TABLET | Refills: 0 | Status: SHIPPED | OUTPATIENT
Start: 2020-12-22 | End: 2021-01-01

## 2020-12-22 RX ORDER — VALSARTAN 80 MG/1
TABLET ORAL
Qty: 90 TABLET | Refills: 1 | Status: ON HOLD
Start: 2020-12-22 | End: 2021-05-31 | Stop reason: HOSPADM

## 2020-12-22 RX ORDER — PREDNISONE 20 MG/1
20 TABLET ORAL 2 TIMES DAILY
Qty: 10 TABLET | Refills: 0 | Status: SHIPPED | OUTPATIENT
Start: 2020-12-22 | End: 2020-12-27

## 2020-12-22 ASSESSMENT — ENCOUNTER SYMPTOMS
DIARRHEA: 0
SORE THROAT: 1
VOMITING: 0
COUGH: 1
SINUS PRESSURE: 1
SHORTNESS OF BREATH: 0
VISUAL CHANGE: 0
NAUSEA: 0
RHINORRHEA: 1
WHEEZING: 0
EYE REDNESS: 0
SWOLLEN GLANDS: 0

## 2020-12-22 NOTE — PROGRESS NOTES
TeleMedicine Video Visit    This visit was performed as a virtual video visit using a synchronous, two-way, audio-video telehealth technology platform. Patient identification was verified at the start of the visit, including the patient's telephone number and physical location. I discussed with the patient the nature of our telehealth visits, that:     1. Due to the nature of an audio- video modality, the only components of a physical exam that could be done are the elements supported by direct observation. 2. I would evaluate the patient and recommend diagnostics and treatments based on my assessment. 3. If it was felt that the patient should be evaluated in clinic or an emergency room setting, then they would be directed there. 4. Our sessions are not being recorded and that personal health information is protected. 5. Our team would provide follow up care in person if/when the patient needs it. Patient does agree to proceed with telemedicine consultation. Patient's location: home address in [facility]  Physician  location other address in [facility] other people involved in call n/a      Time spent: Greater than 15    This visit was completed virtually using D[employer]. me        Chief Complaint:     Chief Complaint   Patient presents with    Cough     bringing up phlem    Pharyngitis     x4 days         Cough  This is a new problem. The current episode started in the past 7 days. The problem has been unchanged. The cough is productive of sputum. Associated symptoms include nasal congestion, postnasal drip, rhinorrhea and a sore throat. Pertinent negatives include no chills, ear pain, eye redness, fever, rash, shortness of breath or wheezing. Nothing aggravates the symptoms. She has tried OTC cough suppressant for the symptoms. The treatment provided mild relief. Pharyngitis  This is a new problem. The current episode started in the past 7 days. The problem occurs daily. The problem has been waxing and waning. Associated symptoms include congestion, coughing and a sore throat. Pertinent negatives include no chills, fever, nausea, neck pain, rash, swollen glands, visual change, vomiting or weakness. Nothing aggravates the symptoms. She has tried nothing for the symptoms.        Patient Active Problem List   Diagnosis    Type 2 diabetes mellitus without complication, without long-term current use of insulin ([facility] [address]    Acquired hypothyroidism    Peripheral neuropathy    Carpal tunnel syndrome of left wrist       Past Medical History:   Diagnosis Date    Diabetes mellitus ([facility] [address]     Hypothyroidism        Past Surgical History:   Procedure Laterality Date    CHOLECYSTECTOMY      HYSTERECTOMY, TOTAL ABDOMINAL      INCONTINENCE SURGERY      KNEE SURGERY Left        Current Outpatient Medications   Medication Sig Dispense Refill    valsartan (DIOVAN) 80 MG tablet TAKE 1 TABLET BY MOUTH EVERY DAY 90 tablet 1    vitamin B-12 (CYANOCOBALAMIN) 100 MCG tablet Take 1 tablet by mouth daily 30 tablet 3    doxycycline hyclate (VIBRA-TABS) 100 MG tablet Take 1 tablet by mouth 2 times daily for 10 days 20 tablet 0    predniSONE (DELTASONE) 20 MG tablet Take 1 tablet by mouth 2 times daily for 5 days 10 tablet 0    levothyroxine (SYNTHROID) 112 MCG tablet TAKE 1 TABLET BY MOUTH EVERY DAY 90 tablet 1    OZEMPIC, 1 MG/DOSE, 2 MG/1.5ML SOPN Inject 1 mg into the skin once a week 6 pen 3    vitamin D (ERGOCALCIFEROL) 1.25 MG (41737 UT) CAPS capsule TAKE 1 CAPSULE WEEKLY 12 capsule 0    levothyroxine (SYNTHROID) 125 MCG tablet TAKE 2 TABLETS BY MOUTH EVERY  tablet 1    omega-3 acid ethyl esters (LOVAZA) 1 g capsule TAKE 1 CAPSULE BY MOUTH EVERY DAY 90 capsule 3    gemfibrozil (LOPID) 600 MG tablet TAKE 1 TABLET EVERY 12 HOURS 180 tablet 3    omeprazole (PRILOSEC) 20 MG delayed release capsule Take 1 capsule by mouth daily 90 capsule 3    metFORMIN (GLUCOPHAGE) 500 MG tablet Take 1 tablet by mouth daily (with breakfast) 90 tablet 3    diclofenac (VOLTAREN) 75 MG EC tablet TAKE 1 TABLET BY MOUTH EVERY DAY FOR ARTHRITIS PAIN 90 tablet 3    linaclotide (LINZESS) 145 MCG capsule Take 1 capsule by mouth every morning (before breakfast) 90 capsule 3    thiamine 100 MG tablet Take 1 tablet by mouth daily 90 tablet 3    Polyethylene Glycol 3350 (MIRALAX PO) Take 17 g by mouth daily       acetaminophen (TYLENOL) 500 MG tablet Take 2 tablets by mouth 3 times daily 540 tablet 1    dicyclomine (BENTYL) 10 MG capsule Take 1 capsule by mouth 4 times daily (before meals and nightly) 360 capsule 0     No current facility-administered medications for this visit.         Allergies   Allergen Reactions    Keflex [Cephalexin]     Percocet [Oxycodone-Acetaminophen]        Social History     Socioeconomic History    Marital status:      Spouse name: None    Number of children: None    Years of education: None    Highest education level: None   Occupational History     Employer: [employer]   Social Needs    Financial resource strain: None    Food insecurity     Worry: None     Inability: None    Transportation needs     Medical: None     Non-medical: None   Tobacco Use    Smoking status: Never Smoker    Smokeless tobacco: Never Used   Substance and Sexual Activity    Alcohol use: Never     Frequency: Never    Drug use: None    Sexual activity: None   Lifestyle    Physical activity     Days per week: None     Minutes per session: None    Stress: None   Relationships    Social connections     Talks on phone: None     Gets together: None     Attends Jehovah's witness service: None     Active member of club or organization: None     Attends meetings of clubs or organizations: None     Relationship status: None    Intimate partner violence     Fear of current or ex partner: None     Emotionally abused: None     Physically abused: None     Forced sexual activity: None   Other Topics Concern    None   Social History Narrative    None       Family History   Problem Relation Age of Onset    Diabetes Mother     Diabetes Father           Review of Systems   Constitutional: Negative for chills and fever. HENT: Positive for congestion, postnasal drip, rhinorrhea, sinus pressure and sore throat. Negative for ear discharge and ear pain. Eyes: Negative for redness. Respiratory: Positive for cough. Negative for shortness of breath and wheezing. Gastrointestinal: Negative for diarrhea, nausea and vomiting. Musculoskeletal: Negative for neck pain. Skin: Negative for rash. Neurological: Negative for weakness. All other systems reviewed and are negative. Patient-Reported Vitals 12/22/2020   Patient-Reported Weight 265lb   Patient-Reported Height 5 4      There were no vitals taken for this visit. Physical Exam  Vitals signs and nursing note reviewed. Constitutional:       General: She is not in acute distress. Appearance: Normal appearance. She is not toxic-appearing. Pulmonary:      Effort: Pulmonary effort is normal. No respiratory distress. Neurological:      Mental Status: She is alert and oriented to person, place, and time. Psychiatric:         Attention and Perception: Attention normal.         Mood and Affect: Mood and affect normal.         Speech: Speech normal.                                 ASSESSMENT/PLAN:    Patient Active Problem List   Diagnosis    Type 2 diabetes mellitus without complication, without long-term current use of insulin ([facility])    Acquired hypothyroidism    Peripheral neuropathy    Carpal tunnel syndrome of left wrist       Cathi Sneed was seen today for cough and pharyngitis. Diagnoses and all orders for this visit:    Acute non-recurrent maxillary sinusitis    Other orders  -     doxycycline hyclate (VIBRA-TABS) 100 MG tablet; Take 1 tablet by mouth 2 times daily for 10 days  -     predniSONE (DELTASONE) 20 MG tablet;  Take 1 tablet by mouth 2 times daily for 5 days Return if symptoms worsen or fail to improve. I spent 15 minutes with this patient. I spent greater than 50% of the time counseling this patient.         Susana Thomas DO  12/22/2020  11:59 AM

## 2020-12-22 NOTE — TELEPHONE ENCOUNTER
Pt called wanting to schedule an appt with Dr Leodan Kyle, for sore throat/losing her voice, and coughing up yellow mucous. [place] [facility].

## 2020-12-22 NOTE — TELEPHONE ENCOUNTER
Call from Nurse triage Nehal Short RN, pt needs seen for irritated throat, cough, green phlem.   Pt is scheduled w/ Dr Solomon Holland for today VV scheduled for 11:45am.

## 2020-12-22 NOTE — TELEPHONE ENCOUNTER
Patient called pre-service center [facility] with red flag complaint. Brief description of triage: sore throat    Triage indicates for patient to be seen today    Care advice provided, patient verbalizes understanding; denies any other questions or concerns; instructed to call back for any new or worsening symptoms. Writer provided warm transfer to Priya Jordan at [facility] for appointment scheduling. Attention Provider: Thank you for allowing me to participate in the care of your patient. The patient was connected to triage in response to information provided to the [facility]. Please do not respond through this encounter as the response is not directed to a shared pool. Reason for Disposition   Patient wants to be seen    Answer Assessment - Initial Assessment Questions  1. ONSET: \"When did the throat start hurting? \" (Hours or days ago)       Friday    2. SEVERITY: \"How bad is the sore throat? \" (Scale 1-10; mild, moderate or severe)    - MILD (1-3):  doesn't interfere with eating or normal activities    - MODERATE (4-7): interferes with eating some solids and normal activities    - SEVERE (8-10):  excruciating pain, interferes with most normal activities    - SEVERE DYSPHAGIA: can't swallow liquids, drooling      2/10    3. STREP EXPOSURE: \"Has there been any exposure to strep within the past week? \" If so, ask: \"What type of contact occurred? \"       No    4. VIRAL SYMPTOMS: Suman Rapidan there any symptoms of a cold, such as a runny nose, cough, hoarse voice or red eyes? \"       Runny nose, hoarse voice, slight cough    5. FEVER: \"Do you have a fever? \" If so, ask: \"What is your temperature, how was it measured, and when did it start? \"      No    6. PUS ON THE TONSILS: \"Is there pus on the tonsils in the back of your throat? \"      No    7. OTHER SYMPTOMS: \"Do you have any other symptoms? \" (e.g., difficulty breathing, headache, rash)      No    8. PREGNANCY: \"Is there any chance you are pregnant? \" \"When was your last menstrual period? \"      N/A    Protocols used: SORE THROAT-ADULT-[place]

## 2020-12-28 RX ORDER — DICLOFENAC SODIUM 75 MG/1
TABLET, DELAYED RELEASE ORAL
Qty: 90 TABLET | Refills: 3 | Status: ON HOLD
Start: 2020-12-28 | End: 2021-05-25

## 2020-12-28 RX ORDER — ERGOCALCIFEROL 1.25 MG/1
CAPSULE ORAL
Qty: 12 CAPSULE | Refills: 0 | Status: SHIPPED
Start: 2020-12-28 | End: 2021-07-12

## 2020-12-28 RX ORDER — UBIDECARENONE 75 MG
100 CAPSULE ORAL DAILY
Qty: 30 TABLET | Refills: 3 | Status: SHIPPED
Start: 2020-12-28 | End: 2021-01-11

## 2020-12-30 ENCOUNTER — APPOINTMENT (OUTPATIENT)
Dept: GENERAL RADIOLOGY | Age: 60
End: 2020-12-30
Payer: COMMERCIAL

## 2020-12-30 ENCOUNTER — HOSPITAL ENCOUNTER (EMERGENCY)
Age: 60
Discharge: HOME | End: 2020-12-30
Payer: COMMERCIAL

## 2020-12-30 VITALS
BODY MASS INDEX: 45.24 KG/M2 | RESPIRATION RATE: 14 BRPM | HEART RATE: 88 BPM | SYSTOLIC BLOOD PRESSURE: 172 MMHG | OXYGEN SATURATION: 96 % | TEMPERATURE: 98.4 F | HEIGHT: 64 IN | DIASTOLIC BLOOD PRESSURE: 86 MMHG | WEIGHT: 265 LBS

## 2020-12-30 PROCEDURE — 6360000002 HC RX W HCPCS: Performed by: NURSE PRACTITIONER

## 2020-12-30 PROCEDURE — 96372 THER/PROPH/DIAG INJ SC/IM: CPT

## 2020-12-30 PROCEDURE — 73564 X-RAY EXAM KNEE 4 OR MORE: CPT

## 2020-12-30 PROCEDURE — 99283 EMERGENCY DEPT VISIT LOW MDM: CPT

## 2020-12-30 PROCEDURE — 6370000000 HC RX 637 (ALT 250 FOR IP): Performed by: NURSE PRACTITIONER

## 2020-12-30 PROCEDURE — 99284 EMERGENCY DEPT VISIT MOD MDM: CPT

## 2020-12-30 RX ORDER — METHYLPREDNISOLONE 4 MG/1
TABLET ORAL
Qty: 1 KIT | Status: SHIPPED | OUTPATIENT
Start: 2020-12-30 | End: 2021-01-05

## 2020-12-30 RX ORDER — KETOROLAC TROMETHAMINE 30 MG/ML
60 INJECTION, SOLUTION INTRAMUSCULAR; INTRAVENOUS ONCE
Status: COMPLETED | OUTPATIENT
Start: 2020-12-30 | End: 2020-12-30

## 2020-12-30 RX ORDER — HYDROCODONE BITARTRATE AND ACETAMINOPHEN 5; 325 MG/1; MG/1
1 TABLET ORAL EVERY 6 HOURS PRN
Qty: 12 TABLET | Refills: 0 | Status: SHIPPED | OUTPATIENT
Start: 2020-12-30 | End: 2021-01-07 | Stop reason: SDUPTHER

## 2020-12-30 RX ORDER — ORPHENADRINE CITRATE 30 MG/ML
60 INJECTION INTRAMUSCULAR; INTRAVENOUS ONCE
Status: COMPLETED | OUTPATIENT
Start: 2020-12-30 | End: 2020-12-30

## 2020-12-30 RX ORDER — HYDROCODONE BITARTRATE AND ACETAMINOPHEN 5; 325 MG/1; MG/1
1 TABLET ORAL ONCE
Status: COMPLETED | OUTPATIENT
Start: 2020-12-30 | End: 2020-12-30

## 2020-12-30 RX ORDER — TRAMADOL HYDROCHLORIDE 50 MG/1
50 TABLET ORAL ONCE
Status: DISCONTINUED | OUTPATIENT
Start: 2020-12-30 | End: 2020-12-30

## 2020-12-30 RX ADMIN — KETOROLAC TROMETHAMINE 60 MG: 30 INJECTION, SOLUTION INTRAMUSCULAR at 12:19

## 2020-12-30 RX ADMIN — ORPHENADRINE CITRATE 60 MG: 60 INJECTION INTRAMUSCULAR; INTRAVENOUS at 12:19

## 2020-12-30 RX ADMIN — HYDROCODONE BITARTRATE AND ACETAMINOPHEN 1 TABLET: 5; 325 TABLET ORAL at 13:21

## 2020-12-30 ASSESSMENT — PAIN SCALES - GENERAL
PAINLEVEL_OUTOF10: 8

## 2020-12-30 ASSESSMENT — PAIN DESCRIPTION - DESCRIPTORS: DESCRIPTORS: CONSTANT;SORE;ACHING

## 2020-12-30 ASSESSMENT — PAIN DESCRIPTION - PAIN TYPE: TYPE: ACUTE PAIN

## 2020-12-30 ASSESSMENT — PAIN DESCRIPTION - LOCATION: LOCATION: BACK

## 2020-12-30 ASSESSMENT — PAIN DESCRIPTION - FREQUENCY: FREQUENCY: CONTINUOUS

## 2020-12-30 ASSESSMENT — PAIN DESCRIPTION - ORIENTATION: ORIENTATION: RIGHT;LOWER

## 2020-12-30 NOTE — ED PROVIDER NOTES
[facility]  Department of Emergency Medicine   ED  Encounter Note  Admit Date/RoomTime: 2020 11:26 AM  ED Room: 33/33  NAME: Ning Caro  : 1960  MRN: [id number]     Chief Complaint:  Back Pain (Pt reports chronic low back pain, hx of epidurals. Pt states recently low right pain is worsening. Radiation of pain from buttocks to mid right leg. Denies numbness/tingling. Denies loss of bowel of bladder. Hx of spinal stenosis.)    HISTORY OF PRESENT ILLNESS        Ning Caro is a 62 y.o. female who presents to the ED by private vehicle with her spouse for on chronic right lower back pain that radiates down to her buttock to the level of the knee and is additionally having right knee pain. Patient reports that she has had MRIs in the past and has spinal stenosis she has followed with Dr. Bakari Ramirez and did have epidurals in January and 2020 which helped until now. She denies any recent or new injury. Patient denies any loss of bladder bowel function denies any rectal paresthesias. 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 She denies any chest pain, shortness of breath, fever or chills. The complaint has been persistent and gradually worsening and are moderate in severity. ROS   Pertinent positives and negatives are stated within HPI, all other systems reviewed and are negative. Past Medical History:  has a past medical history of Diabetes mellitus ([address] and Hypothyroidism. Surgical History:  has a past surgical history that includes Hysterectomy, total abdominal; Cholecystectomy; knee surgery (Left); and Incontinence surgery. Social History:  reports that she has never smoked. She has never used smokeless tobacco. She reports that she does not drink alcohol.     Family History: family history includes Diabetes in her father and mother. Allergies: Keflex [cephalexin], Percocet [oxycodone-acetaminophen], and Ceftin [cefuroxime]    PHYSICAL EXAM   Oxygen Saturation Interpretation: Normal.        ED Triage Vitals [12/30/20 1127]   BP Temp Temp src Pulse Resp SpO2 Height Weight   (!) 172/86 -- -- 88 14 96 % 5' 4\" (1.626 m) 265 lb (120.2 kg)         Physical Exam  Constitutional/General: Alert and oriented x3, well appearing, non toxic  HEENT:  NC/NT. PERRLA,  Airway patent. Neck: Supple, full ROM, non tender to palpation in the midline, no stridor, no crepitus, no meningeal signs  Respiratory: Lungs clear to auscultation bilaterally, no wheezes, rales, or rhonchi. Not in respiratory distress  CV:  Regular rate. Regular rhythm. No murmurs, gallops, or rubs. 2+ distal pulses  Chest: No chest wall tenderness  GI:  Abdomen Soft, Non tender, Non distended. +BS. No rebound, guarding, or rigidity. No pulsatile masses. Musculoskeletal: Moves all extremities x 4. Warm and well perfused, no clubbing, cyanosis, or edema. Capillary refill <3 seconds. Tenderness over the right para spinous muscles and SI joint with no midline tenderness. Tenderness over anserine bursa. 2 + pedal and posterior tibial pulses intact. Lower patellar and ankle reflexes intact. Integument: skin warm and dry. No rashes. Lymphatic: no lymphadenopathy noted  Neurologic: GCS 15, no focal deficits, symmetric strength 5/5 in the upper and lower extremities bilaterally. Patient ambulatory with steady gait with slight limp. Psychiatric: Normal Affect    Lab / Imaging Results   (All laboratory and radiology results have been personally reviewed by myself)  Labs:  No results found for this visit on 12/30/20. Imaging: All Radiology results interpreted by Radiologist unless otherwise noted. XR KNEE RIGHT (MIN 4 VIEWS)   Final Result   No acute radiographic findings.           ED Course / Medical Decision Making     Medications   ketorolac (TORADOL) injection 60 mg (60 mg Intramuscular Given 12/30/20 1219)   orphenadrine (NORFLEX) injection 60 mg (60 mg Intramuscular Given 12/30/20 1219)   HYDROcodone-acetaminophen (NORCO) 5-325 MG per tablet 1 tablet (1 tablet Oral Given 12/30/20 1321)        Re-examination:  12/30/20       Time: 1311 Discussed results and patient getting no relief and will medicate with toradol. 36 Patients condition is improving after norco.    Consult(s):   None    Procedure(s):   none    MDM:   Will obtain x-ray of the knee and medicate for acute exacerbation of chronic back pain. Patient has no signs of acute cauda equina syndrome at this time; she has no neurologic or sensory deficits on examination. Patient is afebrile, nontoxic in appearance, hemodynamically stable has no signs of acute discitis. She has reproducible pain right over the anserine bursa and will give her a Medrol Dosepak. Patient was strongly encouraged to take her blood sugars daily because she just finished up a course of steroids and to report any abnormal high. Patient was given oral and Norflex and no relief and was remedicated with Norco had improvement of symptoms. Patient also given a walker for assisting her ambulation work excuse and advised on signs and symptoms warranting immediate return. Patient has no erythema or fevers or any signs of septic joint. She has no chest pain, shortness of breath or any calf swelling or pain or signs of DVT. Ambulatory on discharge with a steady gait. Patient advised on follow up with her PCP additionally. Controlled Substance Monitoring:    Acute and Chronic Pain Monitoring:   RX Monitoring 12/30/2020   Periodic Controlled Substance Monitoring Possible medication side effects, risk of tolerance/dependence & alternative treatments discussed. ;No signs of potential drug abuse or diversion identified.          Plan of Care/Counseling:  I reviewed today's visit with the patient in addition to providing specific details for the plan of care and counseling regarding the diagnosis and prognosis. Questions are answered at this time and are agreeable with the plan. ASSESSMENT     1. Acute exacerbation of chronic low back pain    2. Right anterior knee pain    3. Anserine bursitis      PLAN   Discharge to home. Patient condition is good    New Medications     New Prescriptions    HYDROCODONE-ACETAMINOPHEN (NORCO) 5-325 MG PER TABLET    Take 1 tablet by mouth every 6 hours as needed for Pain for up to 3 days. METHYLPREDNISOLONE (MEDROL, LAURA,) 4 MG TABLET    Take by mouth. MISC. DEVICES Verlene Stager) MISC    Knee bursitis     Electronically signed by DL Claudio CNP   DD: 12/30/20  **This report was transcribed using voice recognition software. Every effort was made to ensure accuracy; however, inadvertent computerized transcription errors may be present.   END OF ED PROVIDER NOTE      DL Claudio CNP  12/30/20 [id number]

## 2020-12-30 NOTE — ED NOTES
Reviewed discharge instructions with patient, discussed medications and addressed all patient and family questions/concerns. Pt verbalizes understanding.        Walt Arrieta RN  12/30/20 [id number]

## 2020-12-31 ENCOUNTER — TELEPHONE (OUTPATIENT)
Dept: PRIMARY CARE CLINIC | Age: 60
End: 2020-12-31

## 2020-12-31 NOTE — TELEPHONE ENCOUNTER
Patient calling states that she was in ER yesterday.   Asking for order for physical therapy to be sent to [facility] physical therapy in [facility]

## 2021-01-04 ENCOUNTER — TELEPHONE (OUTPATIENT)
Dept: PRIMARY CARE CLINIC | Age: 61
End: 2021-01-04

## 2021-01-04 NOTE — TELEPHONE ENCOUNTER
Patient has made her ED follow up visit appointment for Thursday 1/7- She is asking for Dr. Maximus Trejo to provide her with an excuse to miss work this week as she is unable to return due to pain.  Please follow up with patient

## 2021-01-04 NOTE — LETTER
[facility]  [address]  Erica Stair  [place] CRUZ [address]  Phone: [phone]  Fax: [phone], [place]        January 4, 2021     Patient: Macey Dunne   YOB: 1960   Date of Visit: 1/4/2021       To Whom It May Concern: It is my medical opinion that Katie Bond may return to work on 1/8/21. Please excuse from work until then. If you have any questions or concerns, please don't hesitate to call.     Sincerely,        Apryl Armstrong, DO

## 2021-01-07 ENCOUNTER — OFFICE VISIT (OUTPATIENT)
Dept: PRIMARY CARE CLINIC | Age: 61
End: 2021-01-07
Payer: COMMERCIAL

## 2021-01-07 VITALS
TEMPERATURE: 97.4 F | HEART RATE: 74 BPM | RESPIRATION RATE: 16 BRPM | DIASTOLIC BLOOD PRESSURE: 76 MMHG | HEIGHT: 64 IN | WEIGHT: 265 LBS | BODY MASS INDEX: 45.24 KG/M2 | OXYGEN SATURATION: 97 % | SYSTOLIC BLOOD PRESSURE: 134 MMHG

## 2021-01-07 DIAGNOSIS — E11.9 TYPE 2 DIABETES MELLITUS WITHOUT COMPLICATION, WITHOUT LONG-TERM CURRENT USE OF INSULIN (HCC): ICD-10-CM

## 2021-01-07 DIAGNOSIS — G62.9 PERIPHERAL POLYNEUROPATHY: ICD-10-CM

## 2021-01-07 DIAGNOSIS — G89.29 ACUTE EXACERBATION OF CHRONIC LOW BACK PAIN: ICD-10-CM

## 2021-01-07 DIAGNOSIS — M54.50 ACUTE EXACERBATION OF CHRONIC LOW BACK PAIN: ICD-10-CM

## 2021-01-07 DIAGNOSIS — M48.062 SPINAL STENOSIS OF LUMBAR REGION WITH NEUROGENIC CLAUDICATION: Primary | ICD-10-CM

## 2021-01-07 DIAGNOSIS — M51.36 LUMBAR DEGENERATIVE DISC DISEASE: ICD-10-CM

## 2021-01-07 PROCEDURE — 96372 THER/PROPH/DIAG INJ SC/IM: CPT | Performed by: FAMILY MEDICINE

## 2021-01-07 PROCEDURE — 99214 OFFICE O/P EST MOD 30 MIN: CPT | Performed by: FAMILY MEDICINE

## 2021-01-07 RX ORDER — KETOROLAC TROMETHAMINE 30 MG/ML
30 INJECTION, SOLUTION INTRAMUSCULAR; INTRAVENOUS ONCE
Status: COMPLETED | OUTPATIENT
Start: 2021-01-07 | End: 2021-01-07

## 2021-01-07 RX ORDER — HYDROCODONE BITARTRATE AND ACETAMINOPHEN 5; 325 MG/1; MG/1
1 TABLET ORAL EVERY 6 HOURS PRN
Qty: 28 TABLET | Refills: 0 | Status: SHIPPED | OUTPATIENT
Start: 2021-01-07 | End: 2021-01-14

## 2021-01-07 RX ORDER — TIZANIDINE 4 MG/1
4 TABLET ORAL EVERY 8 HOURS PRN
Qty: 30 TABLET | Refills: 0 | Status: SHIPPED
Start: 2021-01-07 | End: 2021-05-13 | Stop reason: SDUPTHER

## 2021-01-07 RX ADMIN — KETOROLAC TROMETHAMINE 30 MG: 30 INJECTION, SOLUTION INTRAMUSCULAR; INTRAVENOUS at 14:20

## 2021-01-07 ASSESSMENT — ENCOUNTER SYMPTOMS
COUGH: 0
BLOOD IN STOOL: 0
VISUAL CHANGE: 0
EYE PAIN: 0
BACK PAIN: 1
NAUSEA: 0
SORE THROAT: 0
APNEA: 0
WHEEZING: 0
EYE ITCHING: 0
RHINORRHEA: 0
DIARRHEA: 0
SHORTNESS OF BREATH: 0
EYE REDNESS: 0
COLOR CHANGE: 0
CHEST TIGHTNESS: 0
ABDOMINAL PAIN: 0
CONSTIPATION: 0
SINUS PRESSURE: 0
VOMITING: 0

## 2021-01-07 ASSESSMENT — PATIENT HEALTH QUESTIONNAIRE - PHQ9
SUM OF ALL RESPONSES TO PHQ QUESTIONS 1-9: 0
2. FEELING DOWN, DEPRESSED OR HOPELESS: 0

## 2021-01-07 NOTE — PROGRESS NOTES
Chief Complaint:     Chief Complaint   Patient presents with    Back Pain     went to ER on 12/30 for back pain, started in back and went  into right  hip bone and thigh and knee. and now going into left side         Back Pain  This is a recurrent problem. The current episode started 1 to 4 weeks ago. The problem occurs daily. The problem has been waxing and waning since onset. The pain is present in the lumbar spine. The quality of the pain is described as aching. The pain radiates to the left foot, left thigh and left knee. The pain is severe. The pain is the same all the time. The symptoms are aggravated by position and twisting. Pertinent negatives include no abdominal pain, chest pain, dysuria, fever, headaches, numbness, paresthesias or weakness. She has tried nothing for the symptoms. The treatment provided no relief. Diabetes  She presents for her follow-up diabetic visit. She has type 2 diabetes mellitus. The initial diagnosis of diabetes was made 1 month ago. Her disease course has been stable. There are no hypoglycemic associated symptoms. Pertinent negatives for hypoglycemia include no dizziness, headaches or nervousness/anxiousness. Associated symptoms include fatigue. Pertinent negatives for diabetes include no chest pain, no visual change and no weakness. There are no hypoglycemic complications. Symptoms are stable. There are no diabetic complications. Risk factors for coronary artery disease include dyslipidemia and obesity. Current diabetic treatment includes oral agent (monotherapy). She is compliant with treatment all of the time. She is following a generally healthy diet. When asked about meal planning, she reported none. She has not had a previous visit with a dietitian. There is no change in her home blood glucose trend. An ACE inhibitor/angiotensin II receptor blocker is not being taken. She does not see a podiatrist.Eye exam is not current. Fatigue  This is a chronic problem.  The problem occurs intermittently. The problem has been waxing and waning. Associated symptoms include fatigue and a rash. Pertinent negatives include no abdominal pain, arthralgias, chest pain, congestion, coughing, diaphoresis, fever, headaches, myalgias, nausea, neck pain, numbness, sore throat, visual change, vomiting or weakness. Nothing aggravates the symptoms. She has tried nothing for the symptoms. The treatment provided no relief. Patient Active Problem List   Diagnosis    Type 2 diabetes mellitus without complication, without long-term current use of insulin ([facility] [address]    Acquired hypothyroidism    Peripheral neuropathy    Carpal tunnel syndrome of left wrist    Spinal stenosis of lumbar region with neurogenic claudication    Acute exacerbation of chronic low back pain       Past Medical History:   Diagnosis Date    Diabetes mellitus ([facility] [address]     Hypothyroidism        Past Surgical History:   Procedure Laterality Date    CHOLECYSTECTOMY      HYSTERECTOMY, TOTAL ABDOMINAL      INCONTINENCE SURGERY      KNEE SURGERY Left        Current Outpatient Medications   Medication Sig Dispense Refill    HYDROcodone-acetaminophen (NORCO) 5-325 MG per tablet Take 1 tablet by mouth every 6 hours as needed for Pain for up to 7 days. 28 tablet 0    tiZANidine (ZANAFLEX) 4 MG tablet Take 1 tablet by mouth every 8 hours as needed (pain) 30 tablet 0    Misc.  Devices (WALKER) MISC Knee bursitis 1 each 0    vitamin B-12 (CYANOCOBALAMIN) 100 MCG tablet Take 1 tablet by mouth daily 30 tablet 3    diclofenac (VOLTAREN) 75 MG EC tablet TAKE 1 TABLET BY MOUTH EVERY DAY FOR ARTHRITIS PAIN 90 tablet 3    vitamin D (ERGOCALCIFEROL) 1.25 MG (85089 UT) CAPS capsule TAKE 1 CAPSULE WEEKLY 12 capsule 0    valsartan (DIOVAN) 80 MG tablet TAKE 1 TABLET BY MOUTH EVERY DAY 90 tablet 1    levothyroxine (SYNTHROID) 112 MCG tablet TAKE 1 TABLET BY MOUTH EVERY DAY 90 tablet 1    OZEMPIC, 1 MG/DOSE, 2 MG/1.5ML SOPN Inject 1 mg into the skin once a week 6 pen 3    levothyroxine (SYNTHROID) 125 MCG tablet TAKE 2 TABLETS BY MOUTH EVERY  tablet 1    omega-3 acid ethyl esters (LOVAZA) 1 g capsule TAKE 1 CAPSULE BY MOUTH EVERY DAY 90 capsule 3    gemfibrozil (LOPID) 600 MG tablet TAKE 1 TABLET EVERY 12 HOURS 180 tablet 3    omeprazole (PRILOSEC) 20 MG delayed release capsule Take 1 capsule by mouth daily 90 capsule 3    metFORMIN (GLUCOPHAGE) 500 MG tablet Take 1 tablet by mouth daily (with breakfast) 90 tablet 3    linaclotide (LINZESS) 145 MCG capsule Take 1 capsule by mouth every morning (before breakfast) 90 capsule 3    thiamine 100 MG tablet Take 1 tablet by mouth daily 90 tablet 3    Polyethylene Glycol 3350 (MIRALAX PO) Take 17 g by mouth daily       acetaminophen (TYLENOL) 500 MG tablet Take 2 tablets by mouth 3 times daily 540 tablet 1    dicyclomine (BENTYL) 10 MG capsule Take 1 capsule by mouth 4 times daily (before meals and nightly) 360 capsule 0     Current Facility-Administered Medications   Medication Dose Route Frequency Provider Last Rate Last Admin    ketorolac (TORADOL) injection 30 mg  30 mg Intramuscular Once Prieto F Sharda, DO           Allergies   Allergen Reactions    Keflex [Cephalexin]     Percocet [Oxycodone-Acetaminophen]     Ceftin [Cefuroxime] Rash       Social History     Socioeconomic History    Marital status:      Spouse name: None    Number of children: None    Years of education: None    Highest education level: None   Occupational History     Employer: [employer]   Social Needs    Financial resource strain: None    Food insecurity     Worry: None     Inability: None    Transportation needs     Medical: None     Non-medical: None   Tobacco Use    Smoking status: Never Smoker    Smokeless tobacco: Never Used   Substance and Sexual Activity    Alcohol use: Never     Frequency: Never    Drug use: None    Sexual activity: None   Lifestyle    Physical activity Days per week: None     Minutes per session: None    Stress: None   Relationships    Social connections     Talks on phone: None     Gets together: None     Attends Taoism service: None     Active member of club or organization: None     Attends meetings of clubs or organizations: None     Relationship status: None    Intimate partner violence     Fear of current or ex partner: None     Emotionally abused: None     Physically abused: None     Forced sexual activity: None   Other Topics Concern    None   Social History Narrative    None       Family History   Problem Relation Age of Onset    Diabetes Mother     Diabetes Father           Review of Systems   Constitutional: Positive for fatigue. Negative for activity change, appetite change, diaphoresis and fever. HENT: Negative for congestion, ear pain, hearing loss, nosebleeds, rhinorrhea, sinus pressure and sore throat. Eyes: Negative for pain, redness, itching and visual disturbance. Respiratory: Negative for apnea, cough, chest tightness, shortness of breath and wheezing. Cardiovascular: Negative for chest pain, palpitations and leg swelling. Gastrointestinal: Negative for abdominal pain, blood in stool, constipation, diarrhea, nausea and vomiting. Endocrine: Negative. Genitourinary: Negative for decreased urine volume, difficulty urinating, dysuria, frequency, hematuria and urgency. Musculoskeletal: Positive for back pain. Negative for arthralgias, gait problem, myalgias and neck pain. Skin: Positive for rash. Negative for color change. Allergic/Immunologic: Negative for environmental allergies and food allergies. Neurological: Negative for dizziness, weakness, light-headedness, numbness, headaches and paresthesias. Hematological: Negative for adenopathy. Does not bruise/bleed easily. Psychiatric/Behavioral: Negative for behavioral problems, dysphoric mood and sleep disturbance.  The patient is not nervous/anxious and is not hyperactive. All other systems reviewed and are negative. /76   Pulse 74   Temp 97.4 °F (36.3 °C)   Resp 16   Ht 5' 4\" (1.626 m)   Wt 265 lb (120.2 kg)   SpO2 97%   BMI 45.49 kg/m²     Physical Exam  Vitals signs and nursing note reviewed. Constitutional:       General: She is not in acute distress. Appearance: Normal appearance. She is well-developed. HENT:      Head: Normocephalic and atraumatic. Right Ear: Hearing, tympanic membrane and external ear normal. No tenderness. No middle ear effusion. Left Ear: Hearing, tympanic membrane and external ear normal. No tenderness. No middle ear effusion. Nose: Nose normal. No congestion or rhinorrhea. Right Turbinates: Not enlarged. Left Turbinates: Not enlarged. Mouth/Throat:      Mouth: Mucous membranes are moist.      Tongue: No lesions. Pharynx: Oropharynx is clear. No oropharyngeal exudate or posterior oropharyngeal erythema. Eyes:      General: No scleral icterus. Conjunctiva/sclera: Conjunctivae normal.      Pupils: Pupils are equal, round, and reactive to light. Neck:      Musculoskeletal: Normal range of motion and neck supple. No neck rigidity or muscular tenderness. Thyroid: No thyromegaly. Cardiovascular:      Rate and Rhythm: Normal rate and regular rhythm. Heart sounds: Normal heart sounds. No murmur. Pulmonary:      Effort: Pulmonary effort is normal. No respiratory distress. Breath sounds: Normal breath sounds. No wheezing or rales. Abdominal:      General: Bowel sounds are normal. There is no distension. Palpations: Abdomen is soft. Tenderness: There is no abdominal tenderness. Musculoskeletal:      Lumbar back: She exhibits decreased range of motion, tenderness, pain and spasm. Lymphadenopathy:      Cervical: No cervical adenopathy. Skin:     General: Skin is warm and dry. Findings: No erythema or rash.    Neurological:      General: No focal deficit present. Mental Status: She is alert and oriented to person, place, and time. Cranial Nerves: No cranial nerve deficit. Sensory: Sensory deficit (decreased sensation in left leg/foot) present. Motor: Weakness (left leg) present. Deep Tendon Reflexes: Reflexes are normal and symmetric. Reflexes normal.   Psychiatric:         Mood and Affect: Mood normal.                                 ASSESSMENT/PLAN:    Patient Active Problem List   Diagnosis    Type 2 diabetes mellitus without complication, without long-term current use of insulin ([facility] [address]    Acquired hypothyroidism    Peripheral neuropathy    Carpal tunnel syndrome of left wrist    Spinal stenosis of lumbar region with neurogenic claudication    Acute exacerbation of chronic low back pain       Alissa Granado was seen today for back pain. Diagnoses and all orders for this visit:    Spinal stenosis of lumbar region with neurogenic claudication    Acute exacerbation of chronic low back pain  -     HYDROcodone-acetaminophen (NORCO) 5-325 MG per tablet; Take 1 tablet by mouth every 6 hours as needed for Pain for up to 7 days. Type 2 diabetes mellitus without complication, without long-term current use of insulin ([facility])    Peripheral polyneuropathy    Lumbar degenerative disc disease    Other orders  -     ketorolac (TORADOL) injection 30 mg  -     tiZANidine (ZANAFLEX) 4 MG tablet; Take 1 tablet by mouth every 8 hours as needed (pain)      Follow up with Dr. Cyndi Syed at [facility]    Return if symptoms worsen or fail to improve. I spent 30 minutes with this patient. I spent greater than 50% of the time counseling this patient.         Dio Xiong DO  1/7/2021  2:09 PM

## 2021-01-11 ENCOUNTER — OFFICE VISIT (OUTPATIENT)
Dept: PRIMARY CARE CLINIC | Age: 61
End: 2021-01-11
Payer: COMMERCIAL

## 2021-01-11 VITALS
SYSTOLIC BLOOD PRESSURE: 124 MMHG | OXYGEN SATURATION: 98 % | HEART RATE: 84 BPM | DIASTOLIC BLOOD PRESSURE: 78 MMHG | BODY MASS INDEX: 44.59 KG/M2 | HEIGHT: 64 IN | TEMPERATURE: 97.6 F | RESPIRATION RATE: 16 BRPM | WEIGHT: 261.2 LBS

## 2021-01-11 DIAGNOSIS — Z12.31 ENCOUNTER FOR SCREENING MAMMOGRAM FOR MALIGNANT NEOPLASM OF BREAST: ICD-10-CM

## 2021-01-11 DIAGNOSIS — M48.062 SPINAL STENOSIS OF LUMBAR REGION WITH NEUROGENIC CLAUDICATION: ICD-10-CM

## 2021-01-11 DIAGNOSIS — E03.9 ACQUIRED HYPOTHYROIDISM: ICD-10-CM

## 2021-01-11 DIAGNOSIS — G62.9 PERIPHERAL POLYNEUROPATHY: ICD-10-CM

## 2021-01-11 DIAGNOSIS — E11.9 TYPE 2 DIABETES MELLITUS WITHOUT COMPLICATION, WITHOUT LONG-TERM CURRENT USE OF INSULIN (HCC): Primary | ICD-10-CM

## 2021-01-11 DIAGNOSIS — M54.50 ACUTE EXACERBATION OF CHRONIC LOW BACK PAIN: ICD-10-CM

## 2021-01-11 DIAGNOSIS — E11.9 TYPE 2 DIABETES MELLITUS WITHOUT COMPLICATION, WITHOUT LONG-TERM CURRENT USE OF INSULIN (HCC): ICD-10-CM

## 2021-01-11 DIAGNOSIS — G89.29 ACUTE EXACERBATION OF CHRONIC LOW BACK PAIN: ICD-10-CM

## 2021-01-11 LAB
ALBUMIN SERPL-MCNC: 4.4 G/DL (ref 3.5–5.2)
ALP BLD-CCNC: 82 U/L (ref 35–104)
ALT SERPL-CCNC: 19 U/L (ref 0–32)
ANION GAP SERPL CALCULATED.3IONS-SCNC: 16 MMOL/L (ref 7–16)
AST SERPL-CCNC: 13 U/L (ref 0–31)
BILIRUB SERPL-MCNC: 0.5 MG/DL (ref 0–1.2)
BUN BLDV-MCNC: 19 MG/DL (ref 6–20)
CALCIUM SERPL-MCNC: 9.9 MG/DL (ref 8.6–10.2)
CHLORIDE BLD-SCNC: 102 MMOL/L (ref 98–107)
CHOLESTEROL, TOTAL: 250 MG/DL (ref 0–199)
CO2: 23 MMOL/L (ref 22–29)
CREAT SERPL-MCNC: 0.7 MG/DL (ref 0.5–1)
FOLATE: 5.7 NG/ML (ref 4.8–24.2)
GFR AFRICAN AMERICAN: >60
GFR NON-AFRICAN AMERICAN: >60 ML/MIN/1.73
GLUCOSE BLD-MCNC: 154 MG/DL (ref 74–99)
HBA1C MFR BLD: 7 % (ref 4–5.6)
HCT VFR BLD CALC: 50.2 % (ref 34–48)
HDLC SERPL-MCNC: 35 MG/DL
HEMOGLOBIN: 16.8 G/DL (ref 11.5–15.5)
LDL CHOLESTEROL CALCULATED: 150 MG/DL (ref 0–99)
MCH RBC QN AUTO: 28.8 PG (ref 26–35)
MCHC RBC AUTO-ENTMCNC: 33.5 % (ref 32–34.5)
MCV RBC AUTO: 86.1 FL (ref 80–99.9)
PDW BLD-RTO: 13.7 FL (ref 11.5–15)
PLATELET # BLD: 276 E9/L (ref 130–450)
PMV BLD AUTO: 10.4 FL (ref 7–12)
POTASSIUM SERPL-SCNC: 4.4 MMOL/L (ref 3.5–5)
RBC # BLD: 5.83 E12/L (ref 3.5–5.5)
SODIUM BLD-SCNC: 141 MMOL/L (ref 132–146)
T4 FREE: 1.02 NG/DL (ref 0.93–1.7)
TOTAL PROTEIN: 7.5 G/DL (ref 6.4–8.3)
TRIGL SERPL-MCNC: 324 MG/DL (ref 0–149)
TSH SERPL DL<=0.05 MIU/L-ACNC: 4.86 UIU/ML (ref 0.27–4.2)
VITAMIN B-12: 616 PG/ML (ref 211–946)
VLDLC SERPL CALC-MCNC: 65 MG/DL
WBC # BLD: 9.6 E9/L (ref 4.5–11.5)

## 2021-01-11 PROCEDURE — 99214 OFFICE O/P EST MOD 30 MIN: CPT | Performed by: FAMILY MEDICINE

## 2021-01-11 RX ORDER — MELOXICAM 15 MG/1
15 TABLET ORAL DAILY PRN
Qty: 30 TABLET | Refills: 5 | Status: SHIPPED
Start: 2021-01-11 | Stop reason: SDUPTHER

## 2021-01-11 ASSESSMENT — ENCOUNTER SYMPTOMS
NAUSEA: 0
VOMITING: 0
RHINORRHEA: 0
CONSTIPATION: 0
ABDOMINAL PAIN: 0
COLOR CHANGE: 0
SHORTNESS OF BREATH: 0
WHEEZING: 0
BACK PAIN: 0
EYE ITCHING: 0
SINUS PRESSURE: 0
APNEA: 0
CHEST TIGHTNESS: 0
DIARRHEA: 0
BLOOD IN STOOL: 0
SORE THROAT: 0
COUGH: 0
EYE REDNESS: 0
VISUAL CHANGE: 0
EYE PAIN: 0

## 2021-01-11 NOTE — PROGRESS NOTES
Chief Complaint:     Chief Complaint   Patient presents with    Diabetes    Hypothyroidism         Diabetes  She presents for her follow-up diabetic visit. She has type 2 diabetes mellitus. The initial diagnosis of diabetes was made 1 month ago. Her disease course has been stable. There are no hypoglycemic associated symptoms. Pertinent negatives for hypoglycemia include no dizziness, headaches or nervousness/anxiousness. Associated symptoms include fatigue. Pertinent negatives for diabetes include no chest pain, no visual change and no weakness. There are no hypoglycemic complications. Symptoms are stable. There are no diabetic complications. Risk factors for coronary artery disease include dyslipidemia and obesity. Current diabetic treatment includes oral agent (monotherapy). She is compliant with treatment all of the time. She is following a generally healthy diet. When asked about meal planning, she reported none. She has not had a previous visit with a dietitian. There is no change in her home blood glucose trend. An ACE inhibitor/angiotensin II receptor blocker is not being taken. She does not see a podiatrist.Eye exam is not current. Fatigue  This is a chronic problem. The problem occurs intermittently. The problem has been waxing and waning. Associated symptoms include fatigue and a rash. Pertinent negatives include no abdominal pain, arthralgias, chest pain, congestion, coughing, diaphoresis, fever, headaches, myalgias, nausea, neck pain, numbness, sore throat, visual change, vomiting or weakness. Nothing aggravates the symptoms. She has tried nothing for the symptoms. The treatment provided no relief. Back Pain  This is a recurrent problem. The current episode started more than 1 month ago. The problem occurs intermittently. The problem has been waxing and waning since onset. The pain is present in the lumbar spine. The quality of the pain is described as aching. The pain is moderate.  Pertinent negatives include no abdominal pain, chest pain, dysuria, fever, headaches, numbness or weakness. She has tried nothing for the symptoms. The treatment provided no relief. Patient Active Problem List   Diagnosis    Type 2 diabetes mellitus without complication, without long-term current use of insulin ([facility] [address]    Acquired hypothyroidism    Peripheral neuropathy    Carpal tunnel syndrome of left wrist    Spinal stenosis of lumbar region with neurogenic claudication    Acute exacerbation of chronic low back pain       Past Medical History:   Diagnosis Date    Diabetes mellitus ([facility] [address]     Hypothyroidism        Past Surgical History:   Procedure Laterality Date    CHOLECYSTECTOMY      HYSTERECTOMY, TOTAL ABDOMINAL      INCONTINENCE SURGERY      KNEE SURGERY Left        Current Outpatient Medications   Medication Sig Dispense Refill    meloxicam (MOBIC) 15 MG tablet Take 1 tablet by mouth daily as needed for Pain 30 tablet 5    HYDROcodone-acetaminophen (NORCO) 5-325 MG per tablet Take 1 tablet by mouth every 6 hours as needed for Pain for up to 7 days. 28 tablet 0    tiZANidine (ZANAFLEX) 4 MG tablet Take 1 tablet by mouth every 8 hours as needed (pain) 30 tablet 0    Misc.  Devices (WALKER) MISC Knee bursitis 1 each 0    diclofenac (VOLTAREN) 75 MG EC tablet TAKE 1 TABLET BY MOUTH EVERY DAY FOR ARTHRITIS PAIN 90 tablet 3    vitamin D (ERGOCALCIFEROL) 1.25 MG (78563 UT) CAPS capsule TAKE 1 CAPSULE WEEKLY 12 capsule 0    valsartan (DIOVAN) 80 MG tablet TAKE 1 TABLET BY MOUTH EVERY DAY 90 tablet 1    levothyroxine (SYNTHROID) 112 MCG tablet TAKE 1 TABLET BY MOUTH EVERY DAY 90 tablet 1    OZEMPIC, 1 MG/DOSE, 2 MG/1.5ML SOPN Inject 1 mg into the skin once a week 6 pen 3    omega-3 acid ethyl esters (LOVAZA) 1 g capsule TAKE 1 CAPSULE BY MOUTH EVERY DAY 90 capsule 3    gemfibrozil (LOPID) 600 MG tablet TAKE 1 TABLET EVERY 12 HOURS 180 tablet 3    omeprazole (PRILOSEC) 20 MG delayed release capsule Take 1 capsule by mouth daily 90 capsule 3    metFORMIN (GLUCOPHAGE) 500 MG tablet Take 1 tablet by mouth daily (with breakfast) 90 tablet 3    linaclotide (LINZESS) 145 MCG capsule Take 1 capsule by mouth every morning (before breakfast) 90 capsule 3    dicyclomine (BENTYL) 10 MG capsule Take 1 capsule by mouth 4 times daily (before meals and nightly) 360 capsule 0     No current facility-administered medications for this visit.         Allergies   Allergen Reactions    Keflex [Cephalexin]     Percocet [Oxycodone-Acetaminophen]     Ceftin [Cefuroxime] Rash       Social History     Socioeconomic History    Marital status:      Spouse name: None    Number of children: None    Years of education: None    Highest education level: None   Occupational History     Employer: [employer]   Social Needs    Financial resource strain: None    Food insecurity     Worry: None     Inability: None    Transportation needs     Medical: None     Non-medical: None   Tobacco Use    Smoking status: Never Smoker    Smokeless tobacco: Never Used   Substance and Sexual Activity    Alcohol use: Never     Frequency: Never    Drug use: None    Sexual activity: None   Lifestyle    Physical activity     Days per week: None     Minutes per session: None    Stress: None   Relationships    Social connections     Talks on phone: None     Gets together: None     Attends Rastafarian service: None     Active member of club or organization: None     Attends meetings of clubs or organizations: None     Relationship status: None    Intimate partner violence     Fear of current or ex partner: None     Emotionally abused: None     Physically abused: None     Forced sexual activity: None   Other Topics Concern    None   Social History Narrative    None       Family History   Problem Relation Age of Onset    Diabetes Mother     Diabetes Father           Review of Systems   Constitutional: Positive for fatigue. Negative for activity change, appetite change, diaphoresis and fever. HENT: Negative for congestion, ear pain, hearing loss, nosebleeds, rhinorrhea, sinus pressure and sore throat. Eyes: Negative for pain, redness, itching and visual disturbance. Respiratory: Negative for apnea, cough, chest tightness, shortness of breath and wheezing. Cardiovascular: Negative for chest pain, palpitations and leg swelling. Gastrointestinal: Negative for abdominal pain, blood in stool, constipation, diarrhea, nausea and vomiting. Endocrine: Negative. Genitourinary: Negative for decreased urine volume, difficulty urinating, dysuria, frequency, hematuria and urgency. Musculoskeletal: Negative for arthralgias, back pain, gait problem, myalgias and neck pain. Skin: Positive for rash. Negative for color change. Allergic/Immunologic: Negative for environmental allergies and food allergies. Neurological: Negative for dizziness, weakness, light-headedness, numbness and headaches. Hematological: Negative for adenopathy. Does not bruise/bleed easily. Psychiatric/Behavioral: Negative for behavioral problems, dysphoric mood and sleep disturbance. The patient is not nervous/anxious and is not hyperactive. All other systems reviewed and are negative. /78   Pulse 84   Temp 97.6 °F (36.4 °C)   Resp 16   Ht 5' 4\" (1.626 m)   Wt 261 lb 3.2 oz (118.5 kg)   SpO2 98%   BMI 44.83 kg/m²     Physical Exam  Vitals signs and nursing note reviewed. Constitutional:       General: She is not in acute distress. Appearance: Normal appearance. She is well-developed. HENT:      Head: Normocephalic and atraumatic. Right Ear: Hearing, tympanic membrane and external ear normal. No tenderness. No middle ear effusion. Left Ear: Hearing, tympanic membrane and external ear normal. No tenderness. No middle ear effusion. Nose: Nose normal. No congestion or rhinorrhea.       Right Turbinates: Not enlarged. Left Turbinates: Not enlarged. Mouth/Throat:      Mouth: Mucous membranes are moist.      Tongue: No lesions. Pharynx: Oropharynx is clear. No oropharyngeal exudate or posterior oropharyngeal erythema. Eyes:      General: No scleral icterus. Conjunctiva/sclera: Conjunctivae normal.      Pupils: Pupils are equal, round, and reactive to light. Neck:      Musculoskeletal: Normal range of motion and neck supple. No neck rigidity or muscular tenderness. Thyroid: No thyromegaly. Cardiovascular:      Rate and Rhythm: Normal rate and regular rhythm. Heart sounds: Normal heart sounds. No murmur. Pulmonary:      Effort: Pulmonary effort is normal. No respiratory distress. Breath sounds: Normal breath sounds. No wheezing or rales. Abdominal:      General: Bowel sounds are normal. There is no distension. Palpations: Abdomen is soft. Tenderness: There is no abdominal tenderness. Musculoskeletal: Normal range of motion. General: No tenderness. Lymphadenopathy:      Cervical: No cervical adenopathy. Skin:     General: Skin is warm and dry. Findings: No erythema or rash. Neurological:      General: No focal deficit present. Mental Status: She is alert and oriented to person, place, and time. Cranial Nerves: No cranial nerve deficit. Deep Tendon Reflexes: Reflexes are normal and symmetric. Reflexes normal.   Psychiatric:         Mood and Affect: Mood normal.                                 ASSESSMENT/PLAN:    Patient Active Problem List   Diagnosis    Type 2 diabetes mellitus without complication, without long-term current use of insulin ([address]    Acquired hypothyroidism    Peripheral neuropathy    Carpal tunnel syndrome of left wrist    Spinal stenosis of lumbar region with neurogenic claudication    Acute exacerbation of chronic low back pain       Alissa Granado was seen today for diabetes and hypothyroidism.     Diagnoses and all orders for this visit:    Type 2 diabetes mellitus without complication, without long-term current use of insulin (HCC)  -     CBC; Future  -     Comprehensive Metabolic Panel; Future  -     Hemoglobin A1C; Future  -     Lipid Panel; Future  -     TSH without Reflex; Future    Acquired hypothyroidism  -     TSH without Reflex; Future  -     T4, Free; Future    Peripheral polyneuropathy  -     TSH without Reflex; Future  -     T4, Free; Future  -     Vitamin B12 & Folate; Future    Spinal stenosis of lumbar region with neurogenic claudication  -     Bruce Abraham MD, Physical Medicine and Rehabilitation, [place]    Acute exacerbation of chronic low back pain  -     Bruce Abraham MD, Physical Medicine and Rehabilitation, [facility]    Encounter for screening mammogram for malignant neoplasm of breast  -     TOBY DIGITAL SCREEN W OR WO CAD BILATERAL; Future    Other orders  -     meloxicam (MOBIC) 15 MG tablet; Take 1 tablet by mouth daily as needed for Pain          Return in about 6 months (around 7/11/2021) for Follow up DM, Recheck labs, Recheck Meds. I spent 30 minutes with this patient. I spent greater than 50% of the time counseling this patient.         Trever Sung DO  1/11/2021  9:32 AM

## 2021-01-12 DIAGNOSIS — E03.9 ACQUIRED HYPOTHYROIDISM: ICD-10-CM

## 2021-01-12 DIAGNOSIS — E11.9 TYPE 2 DIABETES MELLITUS WITHOUT COMPLICATION, WITHOUT LONG-TERM CURRENT USE OF INSULIN (HCC): Primary | ICD-10-CM

## 2021-01-12 RX ORDER — LEVOTHYROXINE SODIUM 0.15 MG/1
150 TABLET ORAL DAILY
Qty: 90 TABLET | Refills: 1 | Status: SHIPPED
Start: 2021-01-12 | Stop reason: SDUPTHER

## 2021-01-12 RX ORDER — ROSUVASTATIN CALCIUM 5 MG/1
5 TABLET, COATED ORAL DAILY
Qty: 90 TABLET | Refills: 1 | Status: SHIPPED
Start: 2021-01-12 | Stop reason: SDUPTHER

## 2021-02-05 RX ORDER — ROSUVASTATIN CALCIUM 5 MG/1
5 TABLET, COATED ORAL DAILY
Qty: 90 TABLET | Refills: 1 | Status: SHIPPED
Start: 2021-02-05 | End: 2021-05-13 | Stop reason: SINTOL

## 2021-02-05 RX ORDER — MELOXICAM 15 MG/1
15 TABLET ORAL DAILY PRN
Qty: 30 TABLET | Refills: 5 | Status: SHIPPED
Start: 2021-02-05 | End: 2021-05-13

## 2021-02-05 RX ORDER — LEVOTHYROXINE SODIUM 0.15 MG/1
150 TABLET ORAL DAILY
Qty: 90 TABLET | Refills: 1 | Status: SHIPPED
Start: 2021-02-05 | End: 2021-08-13

## 2021-02-22 ENCOUNTER — TELEPHONE (OUTPATIENT)
Dept: PRIMARY CARE CLINIC | Age: 61
End: 2021-02-22

## 2021-02-22 NOTE — TELEPHONE ENCOUNTER
Patient calling states she has leg pain and cramping. She received an epidural for spinal stenosis and it does not touch her discomfort. It never goes away. Asking if this pain could be a side effect from crestor? No redness or warmth to the area.

## 2021-02-22 NOTE — TELEPHONE ENCOUNTER
OK to hold crestor for up to 2 weeks to see if pain/cramping improves.  If no better, will need to be seen

## 2021-03-11 ENCOUNTER — OFFICE VISIT (OUTPATIENT)
Dept: PRIMARY CARE CLINIC | Age: 61
End: 2021-03-11
Payer: COMMERCIAL

## 2021-03-11 VITALS
TEMPERATURE: 97.4 F | HEART RATE: 80 BPM | WEIGHT: 254 LBS | HEIGHT: 64 IN | RESPIRATION RATE: 16 BRPM | OXYGEN SATURATION: 98 % | BODY MASS INDEX: 43.36 KG/M2 | SYSTOLIC BLOOD PRESSURE: 124 MMHG | DIASTOLIC BLOOD PRESSURE: 84 MMHG

## 2021-03-11 DIAGNOSIS — E11.9 TYPE 2 DIABETES MELLITUS WITHOUT COMPLICATION, WITHOUT LONG-TERM CURRENT USE OF INSULIN (HCC): Primary | ICD-10-CM

## 2021-03-11 DIAGNOSIS — G62.9 PERIPHERAL POLYNEUROPATHY: ICD-10-CM

## 2021-03-11 DIAGNOSIS — M48.062 SPINAL STENOSIS OF LUMBAR REGION WITH NEUROGENIC CLAUDICATION: ICD-10-CM

## 2021-03-11 DIAGNOSIS — E03.9 ACQUIRED HYPOTHYROIDISM: ICD-10-CM

## 2021-03-11 DIAGNOSIS — E78.49 OTHER HYPERLIPIDEMIA: ICD-10-CM

## 2021-03-11 DIAGNOSIS — M54.50 ACUTE EXACERBATION OF CHRONIC LOW BACK PAIN: ICD-10-CM

## 2021-03-11 DIAGNOSIS — G89.29 ACUTE EXACERBATION OF CHRONIC LOW BACK PAIN: ICD-10-CM

## 2021-03-11 PROCEDURE — 3051F HG A1C>EQUAL 7.0%<8.0%: CPT | Performed by: FAMILY MEDICINE

## 2021-03-11 PROCEDURE — 99214 OFFICE O/P EST MOD 30 MIN: CPT | Performed by: FAMILY MEDICINE

## 2021-03-11 PROCEDURE — 96372 THER/PROPH/DIAG INJ SC/IM: CPT | Performed by: FAMILY MEDICINE

## 2021-03-11 RX ORDER — KETOROLAC TROMETHAMINE 30 MG/ML
30 INJECTION, SOLUTION INTRAMUSCULAR; INTRAVENOUS ONCE
Status: COMPLETED | OUTPATIENT
Start: 2021-03-11 | End: 2021-03-11

## 2021-03-11 RX ORDER — METHION/INOS/CHOL BT/B COM/LIV 110MG-86MG
CAPSULE ORAL
Qty: 90 TABLET | Refills: 1 | Status: SHIPPED
Start: 2021-03-11 | End: 2021-10-17 | Stop reason: SDUPTHER

## 2021-03-11 RX ORDER — PRAVASTATIN SODIUM 10 MG
10 TABLET ORAL EVERY OTHER DAY
Qty: 45 TABLET | Refills: 1 | Status: SHIPPED
Start: 2021-03-11 | End: 2021-08-30 | Stop reason: SDUPTHER

## 2021-03-11 RX ADMIN — KETOROLAC TROMETHAMINE 30 MG: 30 INJECTION, SOLUTION INTRAMUSCULAR; INTRAVENOUS at 14:50

## 2021-03-11 ASSESSMENT — ENCOUNTER SYMPTOMS
EYE REDNESS: 0
BACK PAIN: 1
EYE ITCHING: 0
APNEA: 0
EYE PAIN: 0
ABDOMINAL PAIN: 0
WHEEZING: 0
SINUS PRESSURE: 0
RHINORRHEA: 0
CHEST TIGHTNESS: 0
DIARRHEA: 0
CONSTIPATION: 0
BLOOD IN STOOL: 0
SHORTNESS OF BREATH: 0
NAUSEA: 0
VISUAL CHANGE: 0
SORE THROAT: 0
COLOR CHANGE: 0
VOMITING: 0
COUGH: 0

## 2021-03-11 NOTE — PROGRESS NOTES
Chief Complaint:     Chief Complaint   Patient presents with    Back Pain     discuss spinal stenosis    Hyperlipidemia    Diabetes         Back Pain  This is a recurrent problem. The current episode started more than 1 month ago. The problem occurs intermittently. The problem has been waxing and waning since onset. The pain is present in the lumbar spine. The quality of the pain is described as aching. The pain is moderate. Associated symptoms include leg pain. Pertinent negatives include no abdominal pain, chest pain, dysuria, fever, headaches, numbness or weakness. She has tried nothing for the symptoms. The treatment provided no relief. Hyperlipidemia  This is a chronic problem. The current episode started more than 1 year ago. The problem is controlled. Recent lipid tests were reviewed and are normal. Exacerbating diseases include diabetes and obesity. Associated symptoms include leg pain and myalgias. Pertinent negatives include no chest pain or shortness of breath. Current antihyperlipidemic treatment includes statins. The current treatment provides significant improvement of lipids. Compliance problems include medication side effects. Risk factors for coronary artery disease include diabetes mellitus, dyslipidemia, obesity and post-menopausal.   Diabetes  She presents for her follow-up diabetic visit. She has type 2 diabetes mellitus. The initial diagnosis of diabetes was made 1 month ago. Her disease course has been stable. There are no hypoglycemic associated symptoms. Pertinent negatives for hypoglycemia include no dizziness, headaches or nervousness/anxiousness. Associated symptoms include fatigue. Pertinent negatives for diabetes include no chest pain, no visual change and no weakness. There are no hypoglycemic complications. Symptoms are stable. There are no diabetic complications. Risk factors for coronary artery disease include dyslipidemia and obesity.  Current diabetic treatment includes oral agent (monotherapy). She is compliant with treatment all of the time. She is following a generally healthy diet. When asked about meal planning, she reported none. She has not had a previous visit with a dietitian. There is no change in her home blood glucose trend. An ACE inhibitor/angiotensin II receptor blocker is not being taken. She does not see a podiatrist.Eye exam is not current. Fatigue  This is a chronic problem. The problem occurs intermittently. The problem has been waxing and waning. Associated symptoms include fatigue, myalgias and a rash. Pertinent negatives include no abdominal pain, arthralgias, chest pain, congestion, coughing, diaphoresis, fever, headaches, nausea, neck pain, numbness, sore throat, visual change, vomiting or weakness. Nothing aggravates the symptoms. She has tried nothing for the symptoms. The treatment provided no relief.        Patient Active Problem List   Diagnosis    Type 2 diabetes mellitus without complication, without long-term current use of insulin ([facility] [address]    Acquired hypothyroidism    Peripheral neuropathy    Carpal tunnel syndrome of left wrist    Spinal stenosis of lumbar region with neurogenic claudication    Acute exacerbation of chronic low back pain    Other hyperlipidemia       Past Medical History:   Diagnosis Date    Diabetes mellitus ([address]     Hypothyroidism        Past Surgical History:   Procedure Laterality Date    CHOLECYSTECTOMY      HYSTERECTOMY, TOTAL ABDOMINAL      INCONTINENCE SURGERY      KNEE SURGERY Left        Current Outpatient Medications   Medication Sig Dispense Refill    Thiamine HCl (B-1) 100 MG TABS Take 1 pill daily 90 tablet 1    pravastatin (PRAVACHOL) 10 MG tablet Take 1 tablet by mouth every other day 45 tablet 1    levothyroxine (SYNTHROID) 150 MCG tablet Take 1 tablet by mouth Daily 90 tablet 1    rosuvastatin (CRESTOR) 5 MG tablet Take 1 tablet by mouth daily 90 tablet 1    meloxicam (MOBIC) 15 MG tablet Take 1 tablet by mouth daily as needed for Pain 30 tablet 5    tiZANidine (ZANAFLEX) 4 MG tablet Take 1 tablet by mouth every 8 hours as needed (pain) 30 tablet 0    Misc.  Devices (WALKER) MISC Knee bursitis 1 each 0    diclofenac (VOLTAREN) 75 MG EC tablet TAKE 1 TABLET BY MOUTH EVERY DAY FOR ARTHRITIS PAIN 90 tablet 3    vitamin D (ERGOCALCIFEROL) 1.25 MG (56664 UT) CAPS capsule TAKE 1 CAPSULE WEEKLY 12 capsule 0    valsartan (DIOVAN) 80 MG tablet TAKE 1 TABLET BY MOUTH EVERY DAY 90 tablet 1    OZEMPIC, 1 MG/DOSE, 2 MG/1.5ML SOPN Inject 1 mg into the skin once a week 6 pen 3    omega-3 acid ethyl esters (LOVAZA) 1 g capsule TAKE 1 CAPSULE BY MOUTH EVERY DAY 90 capsule 3    gemfibrozil (LOPID) 600 MG tablet TAKE 1 TABLET EVERY 12 HOURS 180 tablet 3    omeprazole (PRILOSEC) 20 MG delayed release capsule Take 1 capsule by mouth daily 90 capsule 3    metFORMIN (GLUCOPHAGE) 500 MG tablet Take 1 tablet by mouth daily (with breakfast) 90 tablet 3    linaclotide (LINZESS) 145 MCG capsule Take 1 capsule by mouth every morning (before breakfast) 90 capsule 3    dicyclomine (BENTYL) 10 MG capsule Take 1 capsule by mouth 4 times daily (before meals and nightly) 360 capsule 0     Current Facility-Administered Medications   Medication Dose Route Frequency Provider Last Rate Last Admin    ketorolac (TORADOL) injection 30 mg  30 mg Intramuscular Once Prieto F Sharda, DO           Allergies   Allergen Reactions    Keflex [Cephalexin]     Percocet [Oxycodone-Acetaminophen]     Ceftin [Cefuroxime] Rash       Social History     Socioeconomic History    Marital status:      Spouse name: None    Number of children: None    Years of education: None    Highest education level: None   Occupational History     Employer: [employer]   Social Needs    Financial resource strain: None    Food insecurity     Worry: None     Inability: None    Transportation needs     Medical: None     Non-medical: None   Tobacco Use    Smoking status: Never Smoker    Smokeless tobacco: Never Used   Substance and Sexual Activity    Alcohol use: Never     Frequency: Never    Drug use: None    Sexual activity: None   Lifestyle    Physical activity     Days per week: None     Minutes per session: None    Stress: None   Relationships    Social connections     Talks on phone: None     Gets together: None     Attends Zoroastrianism service: None     Active member of club or organization: None     Attends meetings of clubs or organizations: None     Relationship status: None    Intimate partner violence     Fear of current or ex partner: None     Emotionally abused: None     Physically abused: None     Forced sexual activity: None   Other Topics Concern    None   Social History Narrative    None       Family History   Problem Relation Age of Onset    Diabetes Mother     Diabetes Father           Review of Systems   Constitutional: Positive for fatigue. Negative for activity change, appetite change, diaphoresis and fever. HENT: Negative for congestion, ear pain, hearing loss, nosebleeds, rhinorrhea, sinus pressure and sore throat. Eyes: Negative for pain, redness, itching and visual disturbance. Respiratory: Negative for apnea, cough, chest tightness, shortness of breath and wheezing. Cardiovascular: Negative for chest pain, palpitations and leg swelling. Gastrointestinal: Negative for abdominal pain, blood in stool, constipation, diarrhea, nausea and vomiting. Endocrine: Negative. Genitourinary: Negative for decreased urine volume, difficulty urinating, dysuria, frequency, hematuria and urgency. Musculoskeletal: Positive for back pain and myalgias. Negative for arthralgias, gait problem and neck pain. Skin: Positive for rash. Negative for color change. Allergic/Immunologic: Negative for environmental allergies and food allergies.    Neurological: Negative for dizziness, weakness, light-headedness, numbness and headaches. Hematological: Negative for adenopathy. Does not bruise/bleed easily. Psychiatric/Behavioral: Negative for behavioral problems, dysphoric mood and sleep disturbance. The patient is not nervous/anxious and is not hyperactive. All other systems reviewed and are negative. /84   Pulse 80   Temp 97.4 °F (36.3 °C)   Resp 16   Ht 5' 4\" (1.626 m)   Wt 254 lb (115.2 kg)   SpO2 98%   BMI 43.60 kg/m²     Physical Exam  Vitals signs and nursing note reviewed. Constitutional:       General: She is not in acute distress. Appearance: Normal appearance. She is well-developed. HENT:      Head: Normocephalic and atraumatic. Right Ear: Hearing, tympanic membrane and external ear normal. No tenderness. No middle ear effusion. Left Ear: Hearing, tympanic membrane and external ear normal. No tenderness. No middle ear effusion. Nose: Nose normal. No congestion or rhinorrhea. Right Turbinates: Not enlarged. Left Turbinates: Not enlarged. Mouth/Throat:      Mouth: Mucous membranes are moist.      Tongue: No lesions. Pharynx: Oropharynx is clear. No oropharyngeal exudate or posterior oropharyngeal erythema. Eyes:      General: No scleral icterus. Conjunctiva/sclera: Conjunctivae normal.      Pupils: Pupils are equal, round, and reactive to light. Neck:      Musculoskeletal: Normal range of motion and neck supple. No neck rigidity or muscular tenderness. Thyroid: No thyromegaly. Cardiovascular:      Rate and Rhythm: Normal rate and regular rhythm. Heart sounds: Normal heart sounds. No murmur. Pulmonary:      Effort: Pulmonary effort is normal. No respiratory distress. Breath sounds: Normal breath sounds. No wheezing or rales. Abdominal:      General: Bowel sounds are normal. There is no distension. Palpations: Abdomen is soft. Tenderness: There is no abdominal tenderness.    Musculoskeletal: Normal range of

## 2021-03-16 ENCOUNTER — OFFICE VISIT (OUTPATIENT)
Dept: PHYSICAL MEDICINE AND REHAB | Age: 61
End: 2021-03-16
Payer: COMMERCIAL

## 2021-03-16 VITALS — DIASTOLIC BLOOD PRESSURE: 78 MMHG | SYSTOLIC BLOOD PRESSURE: 130 MMHG

## 2021-03-16 DIAGNOSIS — M54.17 RADICULOPATHY, LUMBOSACRAL REGION: ICD-10-CM

## 2021-03-16 DIAGNOSIS — M48.062 SPINAL STENOSIS OF LUMBAR REGION WITH NEUROGENIC CLAUDICATION: Primary | ICD-10-CM

## 2021-03-16 PROCEDURE — 99204 OFFICE O/P NEW MOD 45 MIN: CPT | Performed by: PHYSICAL MEDICINE & REHABILITATION

## 2021-03-16 RX ORDER — LIDOCAINE 50 MG/G
1 PATCH TOPICAL DAILY
Qty: 30 PATCH | Refills: 2 | Status: SHIPPED
Start: 2021-03-16 | End: 2021-04-06

## 2021-03-16 RX ORDER — GABAPENTIN 100 MG/1
100 CAPSULE ORAL 3 TIMES DAILY
Qty: 90 CAPSULE | Refills: 1 | Status: SHIPPED
Start: 2021-03-16 | End: 2021-04-06

## 2021-03-16 NOTE — PROGRESS NOTES
Maria De Jesus Rosenthal M.D. [facility] PHYSICAL MEDICINE AND REHABILITAION  [address]  [place] [address] [facility]  Dept: [phone]  Dept Fax: [phone]    PCP: Radu Munguia DO  Date of visit: 3/16/21    Chief Complaint   Patient presents with    Lower Back Pain      c/o lower back pain,hx of epidurals c/o  leg weakness and pain in L leg, numbness and tingling. using cane, currently in second round of PT       Nighat Garcia is a 62 y.o. woman with gradual onset of low back pain after no known injury years ago. She reports back and leg pain has now been worse since December 2020 and she is now noticing weakness in her legs. Now, the pain is constant. The pain is rated Pain Score:   5, is described as \"hurting\", and is located all across the low back with radiation to both lateral and posterior lower extremities, worse on the left side. Pain in the left leg radiates all the way to the foot. She endorses numbness/tingling in the left leg and foot. She reports weakness in both legs. She states that she has had back pain for years, but the numbness and leg weakness is new since December 2020. She also endorses spasms in both legs. She states she has been falling. She has needed to use a cane to ambulate since December 2020. She denies bowel/bladder incontinence or saddle anesthesia. The symptoms have been worse since onset, with exacerbation since December 2020. The pain is better with OTC pain patches \"cold and hot\" patches. The pain is worse when trying to stand from a seated position, bending. She is currently in PT, states this is her second round of PT. She has had lumbar ZAK at [facility] without significant relief--most recent epidural on 2/18/2021 with Dr. Padmini Oshea.        The prior workup has included:   Prior lumbar MRI 2019 [facility] -- not available to review at time of office visit, will attempt to obtain      The prior treatment has included:  PT: Attending PT currently (second course of PT) without relief thus far  Modalities: Heat with partial relief. Thinks she used a TENS in the past, unsure of relief. Topicals: \"hot and cold patches\" over the counter with minimal relief. OTC Tylenol: Takes with minimal relief   NSAIDS: Tried mobic and diclofenac without relief   Opioids: Norco for acute exacerbation, states she did not like how it made her feel, no longer taking  Membrane stabilizers: patient does not recall taking   Muscle relaxers: tizanidine - states helps her relax, but not with pain   Previous injections: Multiple epidurals at [facility], most recent on 2/18/2021. She reports set of 3 epidurals helped a year ago, but the most recent set of epidurals did not help with this current exacerbation of pain.    Previous surgery at this site: None        Past Medical History:   Diagnosis Date    Diabetes mellitus ([facility] [address]     Hypothyroidism        Past Surgical History:   Procedure Laterality Date    CHOLECYSTECTOMY      HYSTERECTOMY, TOTAL ABDOMINAL      INCONTINENCE SURGERY      KNEE SURGERY Left        Social History     Socioeconomic History    Marital status:      Spouse name: Not on file    Number of children: Not on file    Years of education: Not on file    Highest education level: Not on file   Occupational History     Employer: [employer] resource strain: Not on file    Food insecurity     Worry: Not on file     Inability: Not on file    Transportation needs     Medical: Not on file     Non-medical: Not on file   Tobacco Use    Smoking status: Never Smoker    Smokeless tobacco: Never Used   Substance and Sexual Activity    Alcohol use: Never     Frequency: Never    Drug use: Not on file    Sexual activity: Not on file   Lifestyle    Physical activity     Days per week: Not on file     Minutes per session: Not on file    Stress: Not on file Relationships    Social connections     Talks on phone: Not on file     Gets together: Not on file     Attends [employer] service: Not on file     Active member of club or organization: Not on file     Attends meetings of clubs or organizations: Not on file     Relationship status: Not on file    Intimate partner violence     Fear of current or ex partner: Not on file     Emotionally abused: Not on file     Physically abused: Not on file     Forced sexual activity: Not on file   Other Topics Concern    Not on file   Social History Narrative    Not on file          Family History   Problem Relation Age of Onset    Diabetes Mother     Diabetes Father        Allergies   Allergen Reactions    Keflex [Cephalexin]     Percocet [Oxycodone-Acetaminophen]     Ceftin [Cefuroxime] Rash       Current Outpatient Medications   Medication Sig Dispense Refill    Thiamine HCl (B-1) 100 MG TABS Take 1 pill daily 90 tablet 1    pravastatin (PRAVACHOL) 10 MG tablet Take 1 tablet by mouth every other day 45 tablet 1    levothyroxine (SYNTHROID) 150 MCG tablet Take 1 tablet by mouth Daily 90 tablet 1    rosuvastatin (CRESTOR) 5 MG tablet Take 1 tablet by mouth daily 90 tablet 1    meloxicam (MOBIC) 15 MG tablet Take 1 tablet by mouth daily as needed for Pain 30 tablet 5    tiZANidine (ZANAFLEX) 4 MG tablet Take 1 tablet by mouth every 8 hours as needed (pain) 30 tablet 0    Misc.  Devices (WALKER) MISC Knee bursitis 1 each 0    diclofenac (VOLTAREN) 75 MG EC tablet TAKE 1 TABLET BY MOUTH EVERY DAY FOR ARTHRITIS PAIN 90 tablet 3    vitamin D (ERGOCALCIFEROL) 1.25 MG (31364 UT) CAPS capsule TAKE 1 CAPSULE WEEKLY 12 capsule 0    valsartan (DIOVAN) 80 MG tablet TAKE 1 TABLET BY MOUTH EVERY DAY 90 tablet 1    OZEMPIC, 1 MG/DOSE, 2 MG/1.5ML SOPN Inject 1 mg into the skin once a week 6 pen 3    omega-3 acid ethyl esters (LOVAZA) 1 g capsule TAKE 1 CAPSULE BY MOUTH EVERY DAY 90 capsule 3    gemfibrozil (LOPID) 600 MG tablet placed by primary care. Agree with NSGY referral. Patient is not improving despite conservative treatments. The patient was educated about the diagnosis, prognosis, indications, risks and benefits of treatment. An opportunity to ask questions was given to the patient and questions were answered. The patient agreed to proceed with the recommended treatment as described above. Follow up after imaging    Thank you for the consultation and for allowing me to participate in the care of this patient. Virginia Mccann M.D.   Physical Medicine and Rehabilitation

## 2021-03-22 RX ORDER — LEVOTHYROXINE SODIUM 0.12 MG/1
TABLET ORAL
Qty: 180 TABLET | Refills: 1 | Status: SHIPPED
Start: 2021-03-22 | End: 2021-05-13

## 2021-03-27 ENCOUNTER — HOSPITAL ENCOUNTER (OUTPATIENT)
Dept: MRI IMAGING | Age: 61
Discharge: HOME | End: 2021-03-29
Payer: COMMERCIAL

## 2021-03-27 DIAGNOSIS — M48.062 SPINAL STENOSIS OF LUMBAR REGION WITH NEUROGENIC CLAUDICATION: ICD-10-CM

## 2021-03-27 DIAGNOSIS — M54.17 RADICULOPATHY, LUMBOSACRAL REGION: ICD-10-CM

## 2021-03-27 PROCEDURE — 72148 MRI LUMBAR SPINE W/O DYE: CPT

## 2021-03-30 ENCOUNTER — TELEPHONE (OUTPATIENT)
Dept: PHYSICAL MEDICINE AND REHAB | Age: 61
End: 2021-03-30

## 2021-03-30 NOTE — TELEPHONE ENCOUNTER
Patient called stating that Gabapentin 100mg is working a little bit, and wants dose increased, also wants more lidocaine patches, is in a lot of pain.  Had MRI done and is waiting on results

## 2021-04-06 ENCOUNTER — TELEPHONE (OUTPATIENT)
Dept: PHYSICAL MEDICINE AND REHAB | Age: 61
End: 2021-04-06

## 2021-04-06 DIAGNOSIS — M54.17 RADICULOPATHY, LUMBOSACRAL REGION: ICD-10-CM

## 2021-04-06 DIAGNOSIS — M48.062 SPINAL STENOSIS OF LUMBAR REGION WITH NEUROGENIC CLAUDICATION: Primary | ICD-10-CM

## 2021-04-06 RX ORDER — LIDOCAINE 50 MG/G
2 PATCH TOPICAL DAILY
Qty: 60 PATCH | Refills: 2 | Status: ON HOLD
Start: 2021-04-06 | End: 2021-05-25

## 2021-04-06 RX ORDER — GABAPENTIN 300 MG/1
300 CAPSULE ORAL 3 TIMES DAILY
Qty: 90 CAPSULE | Refills: 1 | Status: SHIPPED
Start: 2021-04-06 | End: 2021-04-30

## 2021-04-06 NOTE — TELEPHONE ENCOUNTER
3rd attempt to call patient to review lumbar MRI. I did reach her and went over the results in detail with her. MRI shows large disc extrusion at L4-L5 causing severe canal stenosis. NSGY evaluation is recommended. She is scheduled to see NSGY in about 2 weeks and she will keep this appointment. She denies any worsening of weakness or numbness since we saw her. She denies incontinence. Reviewed all red flag signs/symptoms that should prompt urgent medical evaluation and patient voices understanding. She reports gabapentin is helping, but still having pain. Will increase the gabapentin to 300mg TID.

## 2021-04-13 DIAGNOSIS — E11.9 TYPE 2 DIABETES MELLITUS WITHOUT COMPLICATION, WITHOUT LONG-TERM CURRENT USE OF INSULIN (HCC): ICD-10-CM

## 2021-04-13 DIAGNOSIS — E03.9 ACQUIRED HYPOTHYROIDISM: ICD-10-CM

## 2021-04-13 LAB
ALBUMIN SERPL-MCNC: 4.4 G/DL (ref 3.5–5.2)
ALP BLD-CCNC: 81 U/L (ref 35–104)
ALT SERPL-CCNC: 20 U/L (ref 0–32)
ANION GAP SERPL CALCULATED.3IONS-SCNC: 14 MMOL/L (ref 7–16)
AST SERPL-CCNC: 14 U/L (ref 0–31)
BILIRUB SERPL-MCNC: 0.4 MG/DL (ref 0–1.2)
BUN BLDV-MCNC: 17 MG/DL (ref 6–20)
CALCIUM SERPL-MCNC: 9.6 MG/DL (ref 8.6–10.2)
CHLORIDE BLD-SCNC: 106 MMOL/L (ref 98–107)
CHOLESTEROL, TOTAL: 207 MG/DL (ref 0–199)
CO2: 24 MMOL/L (ref 22–29)
CREAT SERPL-MCNC: 0.6 MG/DL (ref 0.5–1)
GFR AFRICAN AMERICAN: >60
GFR NON-AFRICAN AMERICAN: >60 ML/MIN/1.73
GLUCOSE BLD-MCNC: 112 MG/DL (ref 74–99)
HDLC SERPL-MCNC: 32 MG/DL
LDL CHOLESTEROL CALCULATED: 121 MG/DL (ref 0–99)
POTASSIUM SERPL-SCNC: 4.3 MMOL/L (ref 3.5–5)
SODIUM BLD-SCNC: 144 MMOL/L (ref 132–146)
TOTAL PROTEIN: 7.1 G/DL (ref 6.4–8.3)
TRIGL SERPL-MCNC: 270 MG/DL (ref 0–149)
TSH SERPL DL<=0.05 MIU/L-ACNC: 0.28 UIU/ML (ref 0.27–4.2)
VLDLC SERPL CALC-MCNC: 54 MG/DL

## 2021-04-22 ENCOUNTER — INITIAL CONSULT (OUTPATIENT)
Dept: NEUROSURGERY | Age: 61
End: 2021-04-22
Payer: COMMERCIAL

## 2021-04-22 VITALS
SYSTOLIC BLOOD PRESSURE: 138 MMHG | HEART RATE: 82 BPM | BODY MASS INDEX: 43.36 KG/M2 | DIASTOLIC BLOOD PRESSURE: 77 MMHG | HEIGHT: 64 IN | WEIGHT: 254 LBS

## 2021-04-22 DIAGNOSIS — M51.26 LUMBAR DISC HERNIATION: Primary | ICD-10-CM

## 2021-04-22 PROCEDURE — 99204 OFFICE O/P NEW MOD 45 MIN: CPT | Performed by: PHYSICIAN ASSISTANT

## 2021-04-22 ASSESSMENT — ENCOUNTER SYMPTOMS
RESPIRATORY NEGATIVE: 1
EYES NEGATIVE: 1
GASTROINTESTINAL NEGATIVE: 1
BACK PAIN: 1
ALLERGIC/IMMUNOLOGIC NEGATIVE: 1

## 2021-04-22 NOTE — PROGRESS NOTES
Subjective:      Patient ID: Jimbo Noble is a 62 y.o. female. Back Pain  This is a new problem. Episode onset: 4 months. The problem occurs constantly. The problem has been gradually worsening since onset. The pain is present in the lumbar spine (and left leg pain/numbness into the foot. ). The pain is at a severity of 8/10. The symptoms are aggravated by twisting, standing, lying down, sitting and bending. Treatments tried: physical therapy, ZAK x 3, gabapentin, tylenol, lidocain patches. The treatment provided mild relief. Review of Systems   Constitutional: Negative. HENT: Negative. Eyes: Negative. Respiratory: Negative. Cardiovascular: Negative. Gastrointestinal: Negative. Endocrine: Negative. Genitourinary: Negative. Musculoskeletal: Positive for back pain. Skin: Negative. Allergic/Immunologic: Negative. Neurological: Negative. Hematological: Negative. Psychiatric/Behavioral: Negative. Objective:   Physical Exam  Constitutional:       Appearance: Normal appearance. HENT:      Head: Normocephalic and atraumatic. Nose: Nose normal.   Eyes:      Pupils: Pupils are equal, round, and reactive to light. Pulmonary:      Effort: Pulmonary effort is normal.   Abdominal:      General: There is no distension. Skin:     General: Skin is warm and dry. Neurological:      Mental Status: She is alert. GCS: GCS eye subscore is 4. GCS verbal subscore is 5. GCS motor subscore is 6. Cranial Nerves: Cranial nerves are intact. Sensory: Sensory deficit present. Motor: Weakness present. Gait: Gait abnormal.      Deep Tendon Reflexes:      Reflex Scores:       Patellar reflexes are 2+ on the right side and 2+ on the left side. Achilles reflexes are 2+ on the right side and 2+ on the left side.      Comments: Left DF 4/5, right quad 4/5    Decreased sensation to LT in left L5 dist   Psychiatric:         Mood and Affect: Mood normal. Assessment:      62year old female with severe low back and bilateral leg pain/numbness/weakness x 4 months. Lumbar MRI reveals large L4-5 disc herniation with severe central stenosis and right L3-4 far lateral disc herniation causing foraminal stenosis. There is a dynamic L4-5 lithesis on x-rays. Plan:      She has exhausted all conservative measures:  PT, NSAIDS, gabapentin, ZAK x 3.  She will return to schedule a Lumbar decompression and fusion        DIEUDONNE Ramirez

## 2021-04-27 ENCOUNTER — TELEPHONE (OUTPATIENT)
Dept: PHYSICAL MEDICINE AND REHAB | Age: 61
End: 2021-04-27

## 2021-04-27 DIAGNOSIS — M48.062 SPINAL STENOSIS OF LUMBAR REGION WITH NEUROGENIC CLAUDICATION: ICD-10-CM

## 2021-04-27 DIAGNOSIS — M54.17 RADICULOPATHY, LUMBOSACRAL REGION: ICD-10-CM

## 2021-04-27 NOTE — TELEPHONE ENCOUNTER
Patient called stating she is currently on Gabapentin 300mg TID and  is requesting to increase dose because she is still having a lot of pain   Please advise

## 2021-04-29 NOTE — TELEPHONE ENCOUNTER
Patient calling again stating she is still in a lot of pain, wanting to know if her dose can be increased called on Monday and hasn't heard back.  Please advise  Thanks

## 2021-04-30 RX ORDER — GABAPENTIN 400 MG/1
400 CAPSULE ORAL 3 TIMES DAILY
Qty: 90 CAPSULE | Refills: 1 | Status: SHIPPED
Start: 2021-04-30 | End: 2021-05-17 | Stop reason: ALTCHOICE

## 2021-04-30 NOTE — PROGRESS NOTES
NEUROSURGERY HISTORY AND PHYSICAL NOTE    Chief Complaint: ***    Meng Donaldson is a 62 y.o.  female who presents today with weakness and leg pain.       HPI:   HPI     Past Medical History:   Diagnosis Date    Diabetes mellitus ([facility] [address]     Hyperlipidemia     Hypothyroidism      Past Surgical History:   Procedure Laterality Date    CHOLECYSTECTOMY      HYSTERECTOMY, TOTAL ABDOMINAL      INCONTINENCE SURGERY      KNEE SURGERY Left       Family History   Problem Relation Age of Onset    Diabetes Mother     Diabetes Father       Social History     Socioeconomic History    Marital status:      Spouse name: Not on file    Number of children: Not on file    Years of education: Not on file    Highest education level: Not on file   Occupational History     Employer: [employer] resource strain: Not on file    Food insecurity     Worry: Not on file     Inability: Not on file    Transportation needs     Medical: Not on file     Non-medical: Not on file   Tobacco Use    Smoking status: Never Smoker    Smokeless tobacco: Never Used   Substance and Sexual Activity    Alcohol use: Never     Frequency: Never    Drug use: Not on file    Sexual activity: Not on file   Lifestyle    Physical activity     Days per week: Not on file     Minutes per session: Not on file    Stress: Not on file   Relationships    Social connections     Talks on phone: Not on file     Gets together: Not on file     Attends [employer] service: Not on file     Active member of club or organization: Not on file     Attends meetings of clubs or organizations: Not on file     Relationship status: Not on file    Intimate partner violence     Fear of current or ex partner: Not on file     Emotionally abused: Not on file     Physically abused: Not on file     Forced sexual activity: Not on file   Other Topics Concern    Not on file   Social History Narrative    Not on file Medications:   Current Outpatient Medications   Medication Sig Dispense Refill    gabapentin (NEURONTIN) 400 MG capsule Take 1 capsule by mouth 3 times daily for 60 days. 90 capsule 1    lidocaine (LIDODERM) 5 % Place 2 patches onto the skin daily 12 hours on, 12 hours off. 60 patch 2    levothyroxine (SYNTHROID) 125 MCG tablet TAKE 2 TABLETS BY MOUTH EVERY DAY (Patient not taking: Reported on 4/22/2021) 180 tablet 1    Thiamine HCl (B-1) 100 MG TABS Take 1 pill daily 90 tablet 1    pravastatin (PRAVACHOL) 10 MG tablet Take 1 tablet by mouth every other day 45 tablet 1    levothyroxine (SYNTHROID) 150 MCG tablet Take 1 tablet by mouth Daily 90 tablet 1    rosuvastatin (CRESTOR) 5 MG tablet Take 1 tablet by mouth daily 90 tablet 1    meloxicam (MOBIC) 15 MG tablet Take 1 tablet by mouth daily as needed for Pain (Patient not taking: Reported on 4/22/2021) 30 tablet 5    tiZANidine (ZANAFLEX) 4 MG tablet Take 1 tablet by mouth every 8 hours as needed (pain) (Patient not taking: Reported on 4/22/2021) 30 tablet 0    Misc.  Devices (WALKER) MISC Knee bursitis 1 each 0    diclofenac (VOLTAREN) 75 MG EC tablet TAKE 1 TABLET BY MOUTH EVERY DAY FOR ARTHRITIS PAIN 90 tablet 3    vitamin D (ERGOCALCIFEROL) 1.25 MG (69524 UT) CAPS capsule TAKE 1 CAPSULE WEEKLY 12 capsule 0    valsartan (DIOVAN) 80 MG tablet TAKE 1 TABLET BY MOUTH EVERY DAY 90 tablet 1    OZEMPIC, 1 MG/DOSE, 2 MG/1.5ML SOPN Inject 1 mg into the skin once a week 6 pen 3    omega-3 acid ethyl esters (LOVAZA) 1 g capsule TAKE 1 CAPSULE BY MOUTH EVERY DAY 90 capsule 3    gemfibrozil (LOPID) 600 MG tablet TAKE 1 TABLET EVERY 12 HOURS 180 tablet 3    omeprazole (PRILOSEC) 20 MG delayed release capsule Take 1 capsule by mouth daily 90 capsule 3    metFORMIN (GLUCOPHAGE) 500 MG tablet Take 1 tablet by mouth daily (with breakfast) 90 tablet 3    linaclotide (LINZESS) 145 MCG capsule Take 1 capsule by mouth every morning (before breakfast) 90 capsule 3  dicyclomine (BENTYL) 10 MG capsule Take 1 capsule by mouth 4 times daily (before meals and nightly) (Patient not taking: Reported on 4/22/2021) 360 capsule 0     No current facility-administered medications for this visit. Allergies:    Keflex [cephalexin], Percocet [oxycodone-acetaminophen], and Ceftin [cefuroxime]       Review of Systems     Physical Exam     There were no vitals taken for this visit.        Assessment:   · ***    Plan:  · ***      Electronically signed by Micky Giles MD on 4/30/2021 at 2:24 PM

## 2021-05-03 ENCOUNTER — TELEPHONE (OUTPATIENT)
Dept: PRIMARY CARE CLINIC | Age: 61
End: 2021-05-03

## 2021-05-03 ENCOUNTER — OFFICE VISIT (OUTPATIENT)
Dept: NEUROSURGERY | Age: 61
End: 2021-05-03
Payer: COMMERCIAL

## 2021-05-03 VITALS
HEART RATE: 80 BPM | SYSTOLIC BLOOD PRESSURE: 150 MMHG | HEIGHT: 64 IN | BODY MASS INDEX: 43.36 KG/M2 | DIASTOLIC BLOOD PRESSURE: 85 MMHG | WEIGHT: 254 LBS

## 2021-05-03 DIAGNOSIS — M48.062 LUMBAR STENOSIS WITH NEUROGENIC CLAUDICATION: Primary | ICD-10-CM

## 2021-05-03 DIAGNOSIS — M43.16 SPONDYLOLISTHESIS OF LUMBAR REGION: ICD-10-CM

## 2021-05-03 DIAGNOSIS — R29.898 WEAKNESS OF BOTH LOWER EXTREMITIES: ICD-10-CM

## 2021-05-03 PROCEDURE — 99204 OFFICE O/P NEW MOD 45 MIN: CPT | Performed by: NEUROLOGICAL SURGERY

## 2021-05-03 ASSESSMENT — ENCOUNTER SYMPTOMS
BACK PAIN: 1
RESPIRATORY NEGATIVE: 1
EYES NEGATIVE: 1
ALLERGIC/IMMUNOLOGIC NEGATIVE: 1
GASTROINTESTINAL NEGATIVE: 1

## 2021-05-03 NOTE — TELEPHONE ENCOUNTER
Patient calling stating she had a visit with neurosurgery today and will be having surgery scheduled in 2-3 weeks. She is off of work starting today for approximately 3 months. Dr. Shaista Bowman advised patient call pcp office for handicap placard rx. She will pick it up.

## 2021-05-03 NOTE — PROGRESS NOTES
Subjective:      Patient ID: Ulises Medeiros is a 62 y.o. female. Back Pain  This is a chronic problem. The current episode started more than 1 month ago. The problem occurs 2 to 4 times per day. The problem has been gradually worsening since onset. The pain is present in the gluteal and lumbar spine. The quality of the pain is described as cramping. The pain radiates to the left knee and left foot. The pain is at a severity of 7/10. The pain is moderate. The pain is worse during the day. The symptoms are aggravated by bending and standing. Stiffness is present at night. Associated symptoms include leg pain, numbness and weakness. She has tried analgesics and NSAIDs (ESIx3) for the symptoms. Review of Systems   Constitutional: Negative. HENT: Negative. Eyes: Negative. Respiratory: Negative. Cardiovascular: Negative. Gastrointestinal: Negative. Endocrine: Negative. Genitourinary: Negative. Musculoskeletal: Positive for back pain. Skin: Negative. Allergic/Immunologic: Negative. Neurological: Positive for weakness and numbness. Hematological: Negative. Psychiatric/Behavioral: Negative. Objective:   Physical Exam  Vitals signs and nursing note reviewed. HENT:      Head: Normocephalic. Right Ear: Tympanic membrane normal.      Nose: Nose normal.      Mouth/Throat:      Mouth: Mucous membranes are moist.   Eyes:      Extraocular Movements: Extraocular movements intact. Conjunctiva/sclera: Conjunctivae normal.      Pupils: Pupils are equal, round, and reactive to light. Neck:      Musculoskeletal: Normal range of motion and neck supple. Cardiovascular:      Rate and Rhythm: Normal rate and regular rhythm. Pulses: Normal pulses. Heart sounds: Normal heart sounds. Pulmonary:      Effort: Pulmonary effort is normal.      Breath sounds: Normal breath sounds. Abdominal:      Palpations: Abdomen is soft. Musculoskeletal: Normal range of motion. Skin:     General: Skin is warm. Neurological:      Mental Status: She is alert and oriented to person, place, and time. Cranial Nerves: Cranial nerves are intact. Motor: Weakness (in the left DF, HF) present. Coordination: Coordination is intact. Deep Tendon Reflexes: Reflexes are normal and symmetric. Psychiatric:         Mood and Affect: Mood normal.         Assessment:      61 yo with severe leg pain and weakness. Imaging shows severe stenosis and spondylisthesis L3-5 with large disc herniation. Plan: This patient as evaluated in the outpatient neurosurgical clinic with complaints of intractable low back and right leg pain numbness and weakness. An MRI demonstrated degenerative spondylisthesis and lumbar spondylosis from L3-L5 resulting in moderate to severe foraminal stenosis. The patient has significant weakness in hip flexion and dorsiflexion. The patient had undergone a course of physical therapy and injections, but had failed to obtain lasting relief of his symptoms. Given the failure of medical management, operative intervention was discussed in the form of instrumented decompression and fusion L3-L5. The risks and benefits of surgery as well as those of nonoperative treatment were discussed. The risks include, but are not limited to: bleeding, infection, numbness, weakness, paralysis, loss of bowel bladder function, loss of sexual function, spinal fluid leak, failure to improve, failure of fusion, failure of instrumentation, need for further surgery, venous embolism, heart attack, stroke and death. The patient appeared to understand these risks and stated his desire to proceed with surgery.           Jovanna Jones MD

## 2021-05-03 NOTE — PATIENT INSTRUCTIONS
minutes at a time for 2 to 4 weeks after surgery. If you must ride in a car for a longer distance, stop often to walk and stretch your legs.     · Try to change your position about every 30 minutes while sitting or standing. This will help decrease your back pain while you are healing.     · You will probably need to take at least 4 to 6 weeks off from work. It depends on the type of work you do and how you feel.     · You may have sex as soon as you feel able, but avoid positions that put stress on your back or cause pain. Diet    · You can eat your normal diet. If your stomach is upset, try bland, low-fat foods like plain rice, broiled chicken, toast, and yogurt.     · Drink plenty of fluids (unless your doctor tells you not to).     · You may notice that your bowel movements are not regular right after your surgery. This is common. Try to avoid constipation and straining with bowel movements. You may want to take a fiber supplement every day. If you have not had a bowel movement after a couple of days, ask your doctor about taking a mild laxative. Medicines    · Be safe with medicines. Take pain medicines exactly as directed. ? If the doctor gave you a prescription medicine for pain, take it as prescribed. ? If you are not taking a prescription pain medicine, ask your doctor if you can take an over-the-counter medicine.     · If your doctor prescribed antibiotics, take them as directed. Do not stop taking them just because you feel better. You need to take the full course of antibiotics.     · If you think your pain medicine is making you sick to your stomach:  ? Take your medicine after meals (unless your doctor has told you not to). ? Ask your doctor for a different pain medicine. Incision care    · You will be given specific instructions about how to care for the cuts (incisions) the doctor made. The instructions will depend on the type of materials used to close the cut.    Exercise    · Do back exercises as instructed by your doctor.     · Your doctor may advise you to work with a physical therapist to improve the strength and flexibility of your back. Other instructions    · To reduce stiffness and help sore muscles, use a warm water bottle, a heating pad set on low, or a warm cloth on your back. Do not put heat right over the incision. Do not go to sleep with a heating pad on your skin. Follow-up care is a key part of your treatment and safety. Be sure to make and go to all appointments, and call your doctor if you are having problems. It's also a good idea to know your test results and keep a list of the medicines you take. When should you call for help? Call 911 anytime you think you may need emergency care. For example, call if:    · You passed out (lost consciousness).     · You have sudden chest pain and shortness of breath, or you cough up blood.     · You are unable to move a leg at all. Call your doctor now or seek immediate medical care if:    · You have pain that does not get better after you take pain pills.     · You have new or worse symptoms in your legs or buttocks. Symptoms may include:  ? Numbness or tingling. ? Weakness. ? Pain.     · You lose bladder or bowel control.     · You have loose stitches, or your incision comes open.     · You have blood or fluid draining from the incision.     · You have signs of infection, such as:  ? Increased pain, swelling, warmth, or redness. ? Pus draining from the incision. ? A fever. Watch closely for any changes in your health, and be sure to contact your doctor if:    · You do not have a bowel movement after taking a laxative.     · You are not getting better as expected. Where can you learn more? Go to https://whoplusyoujasseb.TickTickTickets. org and sign in to your [employer] account. Enter R374 in the [employer] box to learn more about \"Lumbar Spinal Fusion: What to Expect at Home. \"     If you do not have an account,

## 2021-05-04 ENCOUNTER — PREP FOR PROCEDURE (OUTPATIENT)
Dept: NEUROSURGERY | Age: 61
End: 2021-05-04

## 2021-05-04 DIAGNOSIS — Z01.818 PRE-OP TESTING: Primary | ICD-10-CM

## 2021-05-04 RX ORDER — SODIUM CHLORIDE 0.9 % (FLUSH) 0.9 %
10 SYRINGE (ML) INJECTION EVERY 12 HOURS SCHEDULED
Status: CANCELLED | OUTPATIENT
Start: 2021-05-04

## 2021-05-04 RX ORDER — SODIUM CHLORIDE 0.9 % (FLUSH) 0.9 %
10 SYRINGE (ML) INJECTION PRN
Status: CANCELLED | OUTPATIENT
Start: 2021-05-04

## 2021-05-04 RX ORDER — SODIUM CHLORIDE 9 MG/ML
25 INJECTION, SOLUTION INTRAVENOUS PRN
Status: CANCELLED | OUTPATIENT
Start: 2021-05-04

## 2021-05-04 RX ORDER — SODIUM CHLORIDE 9 MG/ML
INJECTION, SOLUTION INTRAVENOUS CONTINUOUS
Status: CANCELLED | OUTPATIENT
Start: 2021-05-04

## 2021-05-06 ENCOUNTER — TELEPHONE (OUTPATIENT)
Dept: NEUROSURGERY | Age: 61
End: 2021-05-06

## 2021-05-07 NOTE — PROGRESS NOTES
Patient agreed to COVID test on 5/20 at the  [facility], 7 am- 10 am located at  [address] [place] [address]. Patient instructed to bring ID. Patient instructed to self isolate until day of surgery.

## 2021-05-13 ENCOUNTER — TELEPHONE (OUTPATIENT)
Dept: PHYSICAL MEDICINE AND REHAB | Age: 61
End: 2021-05-13

## 2021-05-13 ENCOUNTER — TELEPHONE (OUTPATIENT)
Dept: PRIMARY CARE CLINIC | Age: 61
End: 2021-05-13

## 2021-05-13 ENCOUNTER — OFFICE VISIT (OUTPATIENT)
Dept: PRIMARY CARE CLINIC | Age: 61
End: 2021-05-13
Payer: COMMERCIAL

## 2021-05-13 VITALS
RESPIRATION RATE: 16 BRPM | DIASTOLIC BLOOD PRESSURE: 76 MMHG | HEIGHT: 64 IN | BODY MASS INDEX: 43.54 KG/M2 | TEMPERATURE: 96.9 F | HEART RATE: 86 BPM | OXYGEN SATURATION: 98 % | WEIGHT: 255 LBS | SYSTOLIC BLOOD PRESSURE: 124 MMHG

## 2021-05-13 DIAGNOSIS — M48.062 SPINAL STENOSIS OF LUMBAR REGION WITH NEUROGENIC CLAUDICATION: ICD-10-CM

## 2021-05-13 DIAGNOSIS — E78.49 OTHER HYPERLIPIDEMIA: ICD-10-CM

## 2021-05-13 DIAGNOSIS — M51.36 LUMBAR DEGENERATIVE DISC DISEASE: ICD-10-CM

## 2021-05-13 DIAGNOSIS — M54.17 RADICULOPATHY, LUMBOSACRAL REGION: ICD-10-CM

## 2021-05-13 DIAGNOSIS — E11.9 TYPE 2 DIABETES MELLITUS WITHOUT COMPLICATION, WITHOUT LONG-TERM CURRENT USE OF INSULIN (HCC): Primary | ICD-10-CM

## 2021-05-13 DIAGNOSIS — M48.062 SPINAL STENOSIS OF LUMBAR REGION WITH NEUROGENIC CLAUDICATION: Primary | ICD-10-CM

## 2021-05-13 DIAGNOSIS — E03.9 ACQUIRED HYPOTHYROIDISM: ICD-10-CM

## 2021-05-13 PROCEDURE — 99214 OFFICE O/P EST MOD 30 MIN: CPT | Performed by: FAMILY MEDICINE

## 2021-05-13 PROCEDURE — 3051F HG A1C>EQUAL 7.0%<8.0%: CPT | Performed by: FAMILY MEDICINE

## 2021-05-13 RX ORDER — POLYETHYLENE GLYCOL 3350 17 G/17G
17 POWDER, FOR SOLUTION ORAL DAILY PRN
COMMUNITY

## 2021-05-13 RX ORDER — TIZANIDINE 4 MG/1
4 TABLET ORAL EVERY 8 HOURS PRN
Qty: 90 TABLET | Refills: 0 | Status: ON HOLD
Start: 2021-05-13 | End: 2021-05-31 | Stop reason: HOSPADM

## 2021-05-13 ASSESSMENT — ENCOUNTER SYMPTOMS
COLOR CHANGE: 0
CHEST TIGHTNESS: 0
BACK PAIN: 1
NAUSEA: 0
WHEEZING: 0
DIARRHEA: 0
COUGH: 0
ABDOMINAL PAIN: 0
APNEA: 0
EYE REDNESS: 0
CONSTIPATION: 0
SINUS PRESSURE: 0
VISUAL CHANGE: 0
EYE ITCHING: 0
RHINORRHEA: 0
SHORTNESS OF BREATH: 0
VOMITING: 0
EYE PAIN: 0
SORE THROAT: 0
BLOOD IN STOOL: 0

## 2021-05-13 NOTE — PROGRESS NOTES
Chief Complaint:     Chief Complaint   Patient presents with    Pre-op Exam     5/25/21 spinal fusion    Back Pain    Diabetes         Back Pain  This is a recurrent problem. The current episode started more than 1 month ago. The problem occurs intermittently. The problem has been waxing and waning since onset. The pain is present in the lumbar spine. The quality of the pain is described as aching. The pain is moderate. Associated symptoms include leg pain. Pertinent negatives include no abdominal pain, chest pain, dysuria, fever, headaches, numbness or weakness. She has tried nothing for the symptoms. The treatment provided no relief. Hyperlipidemia  This is a chronic problem. The current episode started more than 1 year ago. The problem is controlled. Recent lipid tests were reviewed and are normal. Exacerbating diseases include diabetes and obesity. Associated symptoms include leg pain and myalgias. Pertinent negatives include no chest pain or shortness of breath. Current antihyperlipidemic treatment includes statins. The current treatment provides significant improvement of lipids. Compliance problems include medication side effects. Risk factors for coronary artery disease include diabetes mellitus, dyslipidemia, obesity and post-menopausal.   Diabetes  She presents for her follow-up diabetic visit. She has type 2 diabetes mellitus. The initial diagnosis of diabetes was made 1 month ago. Her disease course has been stable. There are no hypoglycemic associated symptoms. Pertinent negatives for hypoglycemia include no dizziness, headaches or nervousness/anxiousness. Associated symptoms include fatigue. Pertinent negatives for diabetes include no chest pain, no visual change and no weakness. There are no hypoglycemic complications. Symptoms are stable. There are no diabetic complications. Risk factors for coronary artery disease include dyslipidemia and obesity.  Current diabetic treatment includes oral agent 0    gabapentin (NEURONTIN) 400 MG capsule Take 1 capsule by mouth 3 times daily for 60 days. 90 capsule 1    lidocaine (LIDODERM) 5 % Place 2 patches onto the skin daily 12 hours on, 12 hours off. 60 patch 2    Thiamine HCl (B-1) 100 MG TABS Take 1 pill daily 90 tablet 1    pravastatin (PRAVACHOL) 10 MG tablet Take 1 tablet by mouth every other day 45 tablet 1    levothyroxine (SYNTHROID) 150 MCG tablet Take 1 tablet by mouth Daily 90 tablet 1    rosuvastatin (CRESTOR) 5 MG tablet Take 1 tablet by mouth daily 90 tablet 1    diclofenac (VOLTAREN) 75 MG EC tablet TAKE 1 TABLET BY MOUTH EVERY DAY FOR ARTHRITIS PAIN 90 tablet 3    vitamin D (ERGOCALCIFEROL) 1.25 MG (50431 UT) CAPS capsule TAKE 1 CAPSULE WEEKLY 12 capsule 0    valsartan (DIOVAN) 80 MG tablet TAKE 1 TABLET BY MOUTH EVERY DAY 90 tablet 1    OZEMPIC, 1 MG/DOSE, 2 MG/1.5ML SOPN Inject 1 mg into the skin once a week 6 pen 3    omega-3 acid ethyl esters (LOVAZA) 1 g capsule TAKE 1 CAPSULE BY MOUTH EVERY DAY 90 capsule 3    gemfibrozil (LOPID) 600 MG tablet TAKE 1 TABLET EVERY 12 HOURS 180 tablet 3    omeprazole (PRILOSEC) 20 MG delayed release capsule Take 1 capsule by mouth daily 90 capsule 3    metFORMIN (GLUCOPHAGE) 500 MG tablet Take 1 tablet by mouth daily (with breakfast) 90 tablet 3    linaclotide (LINZESS) 145 MCG capsule Take 1 capsule by mouth every morning (before breakfast) 90 capsule 3     No current facility-administered medications for this visit.         Allergies   Allergen Reactions    Keflex [Cephalexin]     Percocet [Oxycodone-Acetaminophen]     Ceftin [Cefuroxime] Rash       Social History     Socioeconomic History    Marital status:      Spouse name: None    Number of children: None    Years of education: None    Highest education level: None   Occupational History     Employer: [employer]   Social Needs    Financial resource strain: None    Food insecurity     Worry: None Inability: None    Transportation needs     Medical: None     Non-medical: None   Tobacco Use    Smoking status: Never Smoker    Smokeless tobacco: Never Used   Substance and Sexual Activity    Alcohol use: Never     Frequency: Never    Drug use: None    Sexual activity: None   Lifestyle    Physical activity     Days per week: None     Minutes per session: None    Stress: None   Relationships    Social connections     Talks on phone: None     Gets together: None     Attends Christianity service: None     Active member of club or organization: None     Attends meetings of clubs or organizations: None     Relationship status: None    Intimate partner violence     Fear of current or ex partner: None     Emotionally abused: None     Physically abused: None     Forced sexual activity: None   Other Topics Concern    None   Social History Narrative    None       Family History   Problem Relation Age of Onset    Diabetes Mother     Diabetes Father           Review of Systems   Constitutional: Positive for fatigue. Negative for activity change, appetite change, diaphoresis and fever. HENT: Negative for congestion, ear pain, hearing loss, nosebleeds, rhinorrhea, sinus pressure and sore throat. Eyes: Negative for pain, redness, itching and visual disturbance. Respiratory: Negative for apnea, cough, chest tightness, shortness of breath and wheezing. Cardiovascular: Negative for chest pain, palpitations and leg swelling. Gastrointestinal: Negative for abdominal pain, blood in stool, constipation, diarrhea, nausea and vomiting. Endocrine: Negative. Genitourinary: Negative for decreased urine volume, difficulty urinating, dysuria, frequency, hematuria and urgency. Musculoskeletal: Positive for back pain and myalgias. Negative for arthralgias, gait problem and neck pain. Skin: Positive for rash. Negative for color change.    Allergic/Immunologic: Negative for environmental allergies and food allergies. Neurological: Negative for dizziness, weakness, light-headedness, numbness and headaches. Hematological: Negative for adenopathy. Does not bruise/bleed easily. Psychiatric/Behavioral: Negative for behavioral problems, dysphoric mood and sleep disturbance. The patient is not nervous/anxious and is not hyperactive. All other systems reviewed and are negative. /76   Pulse 86   Temp 96.9 °F (36.1 °C)   Resp 16   Ht 5' 4\" (1.626 m)   Wt 255 lb (115.7 kg)   SpO2 98%   BMI 43.77 kg/m²     Physical Exam  Vitals signs and nursing note reviewed. Constitutional:       General: She is not in acute distress. Appearance: Normal appearance. She is well-developed. HENT:      Head: Normocephalic and atraumatic. Right Ear: Hearing, tympanic membrane and external ear normal. No tenderness. No middle ear effusion. Left Ear: Hearing, tympanic membrane and external ear normal. No tenderness. No middle ear effusion. Nose: Nose normal. No congestion or rhinorrhea. Right Turbinates: Not enlarged. Left Turbinates: Not enlarged. Mouth/Throat:      Mouth: Mucous membranes are moist.      Tongue: No lesions. Pharynx: Oropharynx is clear. No oropharyngeal exudate or posterior oropharyngeal erythema. Eyes:      General: No scleral icterus. Conjunctiva/sclera: Conjunctivae normal.      Pupils: Pupils are equal, round, and reactive to light. Neck:      Musculoskeletal: Normal range of motion and neck supple. No neck rigidity or muscular tenderness. Thyroid: No thyromegaly. Cardiovascular:      Rate and Rhythm: Normal rate and regular rhythm. Heart sounds: Normal heart sounds. No murmur. Pulmonary:      Effort: Pulmonary effort is normal. No respiratory distress. Breath sounds: Normal breath sounds. No wheezing or rales. Abdominal:      General: Bowel sounds are normal. There is no distension. Palpations: Abdomen is soft. Tenderness: There is no abdominal tenderness. Musculoskeletal: Normal range of motion. General: No tenderness. Lymphadenopathy:      Cervical: No cervical adenopathy. Skin:     General: Skin is warm and dry. Findings: No erythema or rash. Neurological:      General: No focal deficit present. Mental Status: She is alert and oriented to person, place, and time. Cranial Nerves: No cranial nerve deficit. Deep Tendon Reflexes: Reflexes are normal and symmetric. Reflexes normal.   Psychiatric:         Mood and Affect: Mood normal.                                 ASSESSMENT/PLAN:    Patient Active Problem List   Diagnosis    Type 2 diabetes mellitus without complication, without long-term current use of insulin ([facility] [address]    Acquired hypothyroidism    Peripheral neuropathy    Carpal tunnel syndrome of left wrist    Spinal stenosis of lumbar region with neurogenic claudication    Acute exacerbation of chronic low back pain    Other hyperlipidemia    Spondylolisthesis of lumbar region       [place] was seen today for pre-op exam, back pain and diabetes. Diagnoses and all orders for this visit:    Type 2 diabetes mellitus without complication, without long-term current use of insulin (HCC)    Acquired hypothyroidism    Other hyperlipidemia    Lumbar degenerative disc disease    Spinal stenosis of lumbar region with neurogenic claudication    Other orders  -     tiZANidine (ZANAFLEX) 4 MG tablet; Take 1 tablet by mouth every 8 hours as needed (pain)    will be getting PAT done at the hospital  Chart reviewed  meds and consult notes reviewed      Return if symptoms worsen or fail to improve. I spent 30 minutes with this patient. I spent greater than 50% of the time counseling this patient.         Nick Jauregui DO  5/13/2021  11:25 AM

## 2021-05-13 NOTE — TELEPHONE ENCOUNTER
Patient called stating she is still having a lot of pain even after increasing her Gabapentin dose from 300mg to 400mg was wondering if you can increase it anymore.  Please advise  Thanks

## 2021-05-14 ENCOUNTER — HOSPITAL ENCOUNTER (OUTPATIENT)
Dept: CT IMAGING | Age: 61
Discharge: HOME | End: 2021-05-16
Payer: COMMERCIAL

## 2021-05-14 DIAGNOSIS — M48.062 LUMBAR STENOSIS WITH NEUROGENIC CLAUDICATION: ICD-10-CM

## 2021-05-14 PROCEDURE — 72131 CT LUMBAR SPINE W/O DYE: CPT

## 2021-05-17 RX ORDER — GABAPENTIN 300 MG/1
600 CAPSULE ORAL 3 TIMES DAILY
Qty: 180 CAPSULE | Refills: 2 | Status: SHIPPED
Start: 2021-05-17 | End: 2021-07-26

## 2021-05-17 NOTE — TELEPHONE ENCOUNTER
Increase gabapentin to 600mg TID. New script sent to pharmacy. Please have patient call if side effects. Thank you.

## 2021-05-18 NOTE — PROGRESS NOTES
exercises reviewed. [x]Medication information sheet(s)   [x]Fluoroscopy-Xray used in surgery reviewed with patient. Educational pamphlet placed in chart. [x]Pain: Post-op pain is normal and to be expected. You will be asked to rate your pain from 0-10(a zero is not acceptable-education is needed). Your post-op pain goal is:5  [x] Ask your nurse for your pain medication. [x] Other: No bending, lifting, or twisting until permitted by surgeon. Wear loose comfortable clothing. MEDICATION INSTRUCTIONS:   [x]Bring a complete list of your medications, please write the last time you took the medicine, give this list to the nurse. [x] Take the following medications the morning of surgery with 1-2 ounces of water: GABAPENTIN, OMEPRAZOLE  [x] Stop herbal supplements and vitamins 5 days before your surgery. [x] DO NOT take any diabetic medicine the morning of surgery. [x] If you are diabetic and your blood sugar is low or you feel symptomatic, you may drink 1-2 ounces of apple juice or take a glucose tablet. The morning of your procedure, you may call the pre-op area if you have concerns about your blood sugar [phone]. WHAT TO EXPECT:  [x] The day of surgery you will be greeted and checked in by the [employer].  In addition, you will be registered in the [place] by a Patient Access Representative. Please bring your photo ID and insurance card. A nurse will greet you in accordance to the time you are needed in the pre-op area to prepare you for surgery. Please do not be discouraged if you are not greeted in the order you arrive as there are many variables that are involved in patient preparation. Your patience is greatly appreciated as you wait for your nurse. [x]  Delays may occur with surgery and staff will make a sincere effort to keep you informed of delays.   If any delays occur with your procedure, we apologize ahead of time for your inconvenience as we recognize the value of your time.

## 2021-05-19 ENCOUNTER — TELEPHONE (OUTPATIENT)
Dept: PRIMARY CARE CLINIC | Age: 61
End: 2021-05-19

## 2021-05-19 NOTE — TELEPHONE ENCOUNTER
Patient called into the office stating that she had FMLA papers faxed yesterday and on page 3 it states that her leave started on 05/13/21. She stated the surgeon put her off starting on 05/03/21. She is asking if it is possible to pcp to change the start date. Pts surgery is scheduled for 05/25/21. Please advise.

## 2021-05-20 ENCOUNTER — HOSPITAL ENCOUNTER (OUTPATIENT)
Age: 61
Discharge: HOME | End: 2021-05-22
Payer: COMMERCIAL

## 2021-05-20 ENCOUNTER — HOSPITAL ENCOUNTER (OUTPATIENT)
Dept: PREADMISSION TESTING | Age: 61
Discharge: HOME | End: 2021-05-20
Payer: COMMERCIAL

## 2021-05-20 ENCOUNTER — HOSPITAL ENCOUNTER (OUTPATIENT)
Dept: GENERAL RADIOLOGY | Age: 61
Discharge: HOME | End: 2021-05-22
Payer: COMMERCIAL

## 2021-05-20 ENCOUNTER — TELEPHONE (OUTPATIENT)
Dept: PRIMARY CARE CLINIC | Age: 61
End: 2021-05-20

## 2021-05-20 VITALS
TEMPERATURE: 98.7 F | RESPIRATION RATE: 18 BRPM | DIASTOLIC BLOOD PRESSURE: 75 MMHG | WEIGHT: 252 LBS | BODY MASS INDEX: 43.26 KG/M2 | HEART RATE: 77 BPM | SYSTOLIC BLOOD PRESSURE: 120 MMHG | OXYGEN SATURATION: 96 %

## 2021-05-20 DIAGNOSIS — Z01.812 PRE-OPERATIVE LABORATORY EXAMINATION: ICD-10-CM

## 2021-05-20 DIAGNOSIS — Z01.818 PRE-OP TESTING: ICD-10-CM

## 2021-05-20 DIAGNOSIS — U07.1 COVID-19: ICD-10-CM

## 2021-05-20 LAB
ABO/RH: NORMAL
ANION GAP SERPL CALCULATED.3IONS-SCNC: 12 MMOL/L (ref 7–16)
ANTIBODY SCREEN: NORMAL
BASOPHILS ABSOLUTE: 0.05 E9/L (ref 0–0.2)
BASOPHILS RELATIVE PERCENT: 0.7 % (ref 0–2)
BILIRUBIN URINE: NEGATIVE
BLOOD, URINE: NEGATIVE
BUN BLDV-MCNC: 21 MG/DL (ref 6–20)
CALCIUM SERPL-MCNC: 10.1 MG/DL (ref 8.6–10.2)
CHLORIDE BLD-SCNC: 104 MMOL/L (ref 98–107)
CLARITY: CLEAR
CO2: 24 MMOL/L (ref 22–29)
COLOR: YELLOW
CREAT SERPL-MCNC: 0.6 MG/DL (ref 0.5–1)
EKG ATRIAL RATE: 75 BPM
EKG P AXIS: 35 DEGREES
EKG P-R INTERVAL: 156 MS
EKG Q-T INTERVAL: 410 MS
EKG QRS DURATION: 88 MS
EKG QTC CALCULATION (BAZETT): 457 MS
EKG R AXIS: -24 DEGREES
EKG T AXIS: 64 DEGREES
EKG VENTRICULAR RATE: 75 BPM
EOSINOPHILS ABSOLUTE: 0.11 E9/L (ref 0.05–0.5)
EOSINOPHILS RELATIVE PERCENT: 1.4 % (ref 0–6)
GFR AFRICAN AMERICAN: >60
GFR NON-AFRICAN AMERICAN: >60 ML/MIN/1.73
GLUCOSE BLD-MCNC: 145 MG/DL (ref 74–99)
GLUCOSE URINE: NEGATIVE MG/DL
HCT VFR BLD CALC: 45 % (ref 34–48)
HEMOGLOBIN: 15.2 G/DL (ref 11.5–15.5)
IMMATURE GRANULOCYTES #: 0.04 E9/L
IMMATURE GRANULOCYTES %: 0.5 % (ref 0–5)
INR BLD: 1.1
KETONES, URINE: NEGATIVE MG/DL
LEUKOCYTE ESTERASE, URINE: NEGATIVE
LYMPHOCYTES ABSOLUTE: 2.38 E9/L (ref 1.5–4)
LYMPHOCYTES RELATIVE PERCENT: 31.3 % (ref 20–42)
MCH RBC QN AUTO: 29.5 PG (ref 26–35)
MCHC RBC AUTO-ENTMCNC: 33.8 % (ref 32–34.5)
MCV RBC AUTO: 87.2 FL (ref 80–99.9)
MONOCYTES ABSOLUTE: 0.57 E9/L (ref 0.1–0.95)
MONOCYTES RELATIVE PERCENT: 7.5 % (ref 2–12)
NEUTROPHILS ABSOLUTE: 4.45 E9/L (ref 1.8–7.3)
NEUTROPHILS RELATIVE PERCENT: 58.6 % (ref 43–80)
NITRITE, URINE: NEGATIVE
PDW BLD-RTO: 12.9 FL (ref 11.5–15)
PH UA: 5.5 (ref 5–9)
PLATELET # BLD: 255 E9/L (ref 130–450)
PMV BLD AUTO: 9.8 FL (ref 7–12)
POTASSIUM REFLEX MAGNESIUM: 4.2 MMOL/L (ref 3.5–5)
PROTEIN UA: NEGATIVE MG/DL
PROTHROMBIN TIME: 11.8 SEC (ref 9.3–12.4)
RBC # BLD: 5.16 E12/L (ref 3.5–5.5)
SODIUM BLD-SCNC: 140 MMOL/L (ref 132–146)
SPECIFIC GRAVITY UA: >=1.03 (ref 1–1.03)
UROBILINOGEN, URINE: 0.2 E.U./DL
WBC # BLD: 7.6 E9/L (ref 4.5–11.5)

## 2021-05-20 PROCEDURE — 85610 PROTHROMBIN TIME: CPT

## 2021-05-20 PROCEDURE — 81003 URINALYSIS AUTO W/O SCOPE: CPT

## 2021-05-20 PROCEDURE — 71046 X-RAY EXAM CHEST 2 VIEWS: CPT

## 2021-05-20 PROCEDURE — 93005 ELECTROCARDIOGRAM TRACING: CPT

## 2021-05-20 PROCEDURE — 36415 COLL VENOUS BLD VENIPUNCTURE: CPT

## 2021-05-20 PROCEDURE — U0005 INFEC AGEN DETEC AMPLI PROBE: HCPCS

## 2021-05-20 PROCEDURE — U0003 INFECTIOUS AGENT DETECTION BY NUCLEIC ACID (DNA OR RNA); SEVERE ACUTE RESPIRATORY SYNDROME CORONAVIRUS 2 (SARS-COV-2) (CORONAVIRUS DISEASE [COVID-19]), AMPLIFIED PROBE TECHNIQUE, MAKING USE OF HIGH THROUGHPUT TECHNOLOGIES AS DESCRIBED BY CMS-2020-01-R: HCPCS

## 2021-05-20 PROCEDURE — 86850 RBC ANTIBODY SCREEN: CPT

## 2021-05-20 PROCEDURE — 93010 ELECTROCARDIOGRAM REPORT: CPT | Performed by: INTERNAL MEDICINE

## 2021-05-20 PROCEDURE — 87081 CULTURE SCREEN ONLY: CPT

## 2021-05-20 PROCEDURE — 80048 BASIC METABOLIC PNL TOTAL CA: CPT

## 2021-05-20 PROCEDURE — 86901 BLOOD TYPING SEROLOGIC RH(D): CPT

## 2021-05-20 PROCEDURE — 86900 BLOOD TYPING SEROLOGIC ABO: CPT

## 2021-05-20 PROCEDURE — 87088 URINE BACTERIA CULTURE: CPT

## 2021-05-20 PROCEDURE — 85025 COMPLETE CBC W/AUTO DIFF WBC: CPT

## 2021-05-20 NOTE — TELEPHONE ENCOUNTER
Pre admission calling to see if patient is cleared for surgery. Stated she was seen for pre op but nothing in not says shes cleared. Also pt told them you had said something about reviewing her bloodwork before clearing.  Please advise and call pre-admission back      Marlena Domingo: [phone]

## 2021-05-20 NOTE — LETTER
[facility]  [address] [place] [address]  Lisa Huerta CRZ[facility] [address]  Phone: [phone]  Fax: [phone], DO        May 20, 2021     Patient: Kal Krishnamurthy   YOB: 1960   Date of Visit: 5/20/2021       To Whom It May Concern: It is my medical opinion that Dennie Medley is medically stable for surgery. If you have any questions or concerns, please don't hesitate to call.     Sincerely,        Zoraida Jensen, DO

## 2021-05-21 LAB
MRSA CULTURE ONLY: NORMAL
SARS-COV-2, PCR: NOT DETECTED

## 2021-05-22 LAB — URINE CULTURE, ROUTINE: NORMAL

## 2021-05-24 ENCOUNTER — ANESTHESIA EVENT (OUTPATIENT)
Dept: OPERATING ROOM | Age: 61
DRG: 454 | End: 2021-05-24
Payer: COMMERCIAL

## 2021-05-25 ENCOUNTER — ANESTHESIA (OUTPATIENT)
Dept: OPERATING ROOM | Age: 61
DRG: 454 | End: 2021-05-25
Payer: COMMERCIAL

## 2021-05-25 ENCOUNTER — HOSPITAL ENCOUNTER (INPATIENT)
Age: 61
LOS: 5 days | Discharge: HOME | DRG: 454 | End: 2021-05-31
Attending: NEUROLOGICAL SURGERY | Admitting: NEUROLOGICAL SURGERY
Payer: COMMERCIAL

## 2021-05-25 ENCOUNTER — APPOINTMENT (OUTPATIENT)
Dept: GENERAL RADIOLOGY | Age: 61
DRG: 454 | End: 2021-05-25
Attending: NEUROLOGICAL SURGERY
Payer: COMMERCIAL

## 2021-05-25 VITALS — OXYGEN SATURATION: 95 % | DIASTOLIC BLOOD PRESSURE: 64 MMHG | SYSTOLIC BLOOD PRESSURE: 97 MMHG | TEMPERATURE: 98.6 F

## 2021-05-25 DIAGNOSIS — Z01.812 PRE-OPERATIVE LABORATORY EXAMINATION: ICD-10-CM

## 2021-05-25 DIAGNOSIS — U07.1 COVID-19: Primary | ICD-10-CM

## 2021-05-25 DIAGNOSIS — M48.062 SPINAL STENOSIS OF LUMBAR REGION WITH NEUROGENIC CLAUDICATION: ICD-10-CM

## 2021-05-25 PROBLEM — M54.16 LUMBAR RADICULOPATHY: Status: ACTIVE | Noted: 2021-05-25

## 2021-05-25 LAB
METER GLUCOSE: 145 MG/DL (ref 74–99)
METER GLUCOSE: 263 MG/DL (ref 74–99)
METER GLUCOSE: 275 MG/DL (ref 74–99)
METER GLUCOSE: 280 MG/DL (ref 74–99)

## 2021-05-25 PROCEDURE — 2500000003 HC RX 250 WO HCPCS: Performed by: NEUROLOGICAL SURGERY

## 2021-05-25 PROCEDURE — 6370000000 HC RX 637 (ALT 250 FOR IP): Performed by: PHYSICIAN ASSISTANT

## 2021-05-25 PROCEDURE — 2580000003 HC RX 258

## 2021-05-25 PROCEDURE — C1821 INTERSPINOUS IMPLANT: HCPCS | Performed by: NEUROLOGICAL SURGERY

## 2021-05-25 PROCEDURE — 95940 IONM IN OPERATNG ROOM 15 MIN: CPT | Performed by: AUDIOLOGIST

## 2021-05-25 PROCEDURE — 22853 INSJ BIOMECHANICAL DEVICE: CPT | Performed by: NEUROLOGICAL SURGERY

## 2021-05-25 PROCEDURE — 3700000000 HC ANESTHESIA ATTENDED CARE: Performed by: NEUROLOGICAL SURGERY

## 2021-05-25 PROCEDURE — 95938 SOMATOSENSORY TESTING: CPT | Performed by: AUDIOLOGIST

## 2021-05-25 PROCEDURE — 6360000002 HC RX W HCPCS

## 2021-05-25 PROCEDURE — 0SG00AJ FUSION OF LUMBAR VERTEBRAL JOINT WITH INTERBODY FUSION DEVICE, POSTERIOR APPROACH, ANTERIOR COLUMN, OPEN APPROACH: ICD-10-PCS | Performed by: NEUROLOGICAL SURGERY

## 2021-05-25 PROCEDURE — 0SG1071 FUSION OF 2 OR MORE LUMBAR VERTEBRAL JOINTS WITH AUTOLOGOUS TISSUE SUBSTITUTE, POSTERIOR APPROACH, POSTERIOR COLUMN, OPEN APPROACH: ICD-10-PCS | Performed by: NEUROLOGICAL SURGERY

## 2021-05-25 PROCEDURE — 22630 ARTHRD PST TQ 1NTRSPC LUM: CPT | Performed by: NEUROLOGICAL SURGERY

## 2021-05-25 PROCEDURE — 01NB0ZZ RELEASE LUMBAR NERVE, OPEN APPROACH: ICD-10-PCS | Performed by: NEUROLOGICAL SURGERY

## 2021-05-25 PROCEDURE — 6360000002 HC RX W HCPCS: Performed by: ANESTHESIOLOGY

## 2021-05-25 PROCEDURE — C1713 ANCHOR/SCREW BN/BN,TIS/BN: HCPCS | Performed by: NEUROLOGICAL SURGERY

## 2021-05-25 PROCEDURE — 2580000003 HC RX 258: Performed by: PHYSICIAN ASSISTANT

## 2021-05-25 PROCEDURE — 88304 TISSUE EXAM BY PATHOLOGIST: CPT

## 2021-05-25 PROCEDURE — 2700000000 HC OXYGEN THERAPY PER DAY

## 2021-05-25 PROCEDURE — 7100000001 HC PACU RECOVERY - ADDTL 15 MIN: Performed by: NEUROLOGICAL SURGERY

## 2021-05-25 PROCEDURE — 3600000005 HC SURGERY LEVEL 5 BASE: Performed by: NEUROLOGICAL SURGERY

## 2021-05-25 PROCEDURE — 6370000000 HC RX 637 (ALT 250 FOR IP): Performed by: INTERNAL MEDICINE

## 2021-05-25 PROCEDURE — 6370000000 HC RX 637 (ALT 250 FOR IP): Performed by: ANESTHESIOLOGY

## 2021-05-25 PROCEDURE — 3600000015 HC SURGERY LEVEL 5 ADDTL 15MIN: Performed by: NEUROLOGICAL SURGERY

## 2021-05-25 PROCEDURE — 2709999900 HC NON-CHARGEABLE SUPPLY: Performed by: NEUROLOGICAL SURGERY

## 2021-05-25 PROCEDURE — 6360000002 HC RX W HCPCS: Performed by: PHYSICIAN ASSISTANT

## 2021-05-25 PROCEDURE — 82962 GLUCOSE BLOOD TEST: CPT

## 2021-05-25 PROCEDURE — 22842 INSERT SPINE FIXATION DEVICE: CPT | Performed by: NEUROLOGICAL SURGERY

## 2021-05-25 PROCEDURE — G0378 HOSPITAL OBSERVATION PER HR: HCPCS

## 2021-05-25 PROCEDURE — 3700000001 HC ADD 15 MINUTES (ANESTHESIA): Performed by: NEUROLOGICAL SURGERY

## 2021-05-25 PROCEDURE — 7100000000 HC PACU RECOVERY - FIRST 15 MIN: Performed by: NEUROLOGICAL SURGERY

## 2021-05-25 PROCEDURE — C1729 CATH, DRAINAGE: HCPCS | Performed by: NEUROLOGICAL SURGERY

## 2021-05-25 PROCEDURE — 6360000002 HC RX W HCPCS: Performed by: NEUROLOGICAL SURGERY

## 2021-05-25 PROCEDURE — 0ST20ZZ RESECTION OF LUMBAR VERTEBRAL DISC, OPEN APPROACH: ICD-10-PCS | Performed by: NEUROLOGICAL SURGERY

## 2021-05-25 PROCEDURE — 2500000003 HC RX 250 WO HCPCS

## 2021-05-25 PROCEDURE — 2580000003 HC RX 258: Performed by: NEUROLOGICAL SURGERY

## 2021-05-25 PROCEDURE — 6370000000 HC RX 637 (ALT 250 FOR IP): Performed by: NEUROLOGICAL SURGERY

## 2021-05-25 PROCEDURE — 3209999900 FLUORO FOR SURGICAL PROCEDURES

## 2021-05-25 DEVICE — STIMULAN® RAPID CURE PROVIDED STERILE FOR SINGLE PATIENT USE. STIMULAN® RAPID CURE CONTAINS CALCIUM SULFATE POWDER AND MIXING SOLUTION IN PRE-MEASURED QUANTITIES SO THAT WHEN MIXED TOGETHER IN A STERILE MIXING BOWL, THE RESULTANT PASTE IS TO BE DIGITALLY PACKED INTO OPEN BONE VOID/GAP TO SET INSITU OR PLACED INTO THE MOULD PROVIDED, THE MIXTURE SETS TO FORM BEADS. THE BIODEGRADABLE, RADIOPAQUE BEADS ARE RESORBED IN APPROXIMATELY 30 – 60 DAYS WHEN USED IN ACCORDANCE WITH THE DEVICE LABELLING. STIMULAN® RAPID CURE IS MANUFACTURED FROM SYNTHETIC IMPLANT GRADE CALCIUM SULFATE DIHYDRATE(CASO4.2H2O) THAT RESORBS AND IS REPLACED WITH BONE DURING THE HEALING PROCESS. ALSO, AS THE BONE VOID FILLER BEADS ARE BIODEGRADABLE AND BIOCOMPATIBLE, THEY MAY BE USED AT AN INFECTED SITE.
Type: IMPLANTABLE DEVICE | Site: BACK | Status: FUNCTIONAL
Brand: STIMULAN® RAPID CURE

## 2021-05-25 DEVICE — SCREW SPNL DIA5.5MM OPN TULIP LOK RELINE: Type: IMPLANTABLE DEVICE | Site: BACK | Status: FUNCTIONAL

## 2021-05-25 DEVICE — IMPLANTABLE DEVICE: Type: IMPLANTABLE DEVICE | Site: BACK | Status: FUNCTIONAL

## 2021-05-25 DEVICE — SCREW SPNL L40MM DIA6.5MM POST THORACOLUMBOSACRAL POLYAX 2S: Type: IMPLANTABLE DEVICE | Site: BACK | Status: FUNCTIONAL

## 2021-05-25 DEVICE — GRAFT BNE SUB 30CC 1.7-10MM CANC CHIP MORSELIZED FRZ DRY: Type: IMPLANTABLE DEVICE | Site: BACK | Status: FUNCTIONAL

## 2021-05-25 DEVICE — ROD SPNL LORDTC 5.5X70 MM TI RELINE-O: Type: IMPLANTABLE DEVICE | Site: BACK | Status: FUNCTIONAL

## 2021-05-25 DEVICE — GRAFT BNE SUB M 5ML CANC DBM FRMBL CELLULAR VIVIGEN: Type: IMPLANTABLE DEVICE | Site: BACK | Status: FUNCTIONAL

## 2021-05-25 DEVICE — GRAFT BNE PTTY LG DEGENERATIVE BNE MTRX PROPEL: Type: IMPLANTABLE DEVICE | Site: BACK | Status: FUNCTIONAL

## 2021-05-25 DEVICE — SCREW SPNL L45MM DIA6.5MM POST THORACOLUMBOSACRAL POLYAX 2S: Type: IMPLANTABLE DEVICE | Site: BACK | Status: FUNCTIONAL

## 2021-05-25 RX ORDER — OXYCODONE HYDROCHLORIDE 5 MG/1
10 TABLET ORAL EVERY 4 HOURS PRN
Status: DISCONTINUED | OUTPATIENT
Start: 2021-05-25 | End: 2021-05-31 | Stop reason: HOSPADM

## 2021-05-25 RX ORDER — MEPERIDINE HYDROCHLORIDE 25 MG/ML
12.5 INJECTION INTRAMUSCULAR; INTRAVENOUS; SUBCUTANEOUS EVERY 5 MIN PRN
Status: DISCONTINUED | OUTPATIENT
Start: 2021-05-25 | End: 2021-05-25 | Stop reason: HOSPADM

## 2021-05-25 RX ORDER — SODIUM CHLORIDE 9 MG/ML
INJECTION, SOLUTION INTRAVENOUS CONTINUOUS
Status: DISCONTINUED | OUTPATIENT
Start: 2021-05-25 | End: 2021-05-26

## 2021-05-25 RX ORDER — DEXTROSE MONOHYDRATE 50 MG/ML
100 INJECTION, SOLUTION INTRAVENOUS PRN
Status: DISCONTINUED | OUTPATIENT
Start: 2021-05-25 | End: 2021-05-31 | Stop reason: HOSPADM

## 2021-05-25 RX ORDER — PRAVASTATIN SODIUM 10 MG
10 TABLET ORAL EVERY OTHER DAY
Status: DISCONTINUED | OUTPATIENT
Start: 2021-05-25 | End: 2021-05-31 | Stop reason: HOSPADM

## 2021-05-25 RX ORDER — LIDOCAINE HYDROCHLORIDE 20 MG/ML
INJECTION, SOLUTION INTRAVENOUS PRN
Status: DISCONTINUED | OUTPATIENT
Start: 2021-05-25 | End: 2021-05-25 | Stop reason: SDUPTHER

## 2021-05-25 RX ORDER — SODIUM CHLORIDE 9 MG/ML
INJECTION, SOLUTION INTRAVENOUS CONTINUOUS
Status: DISCONTINUED | OUTPATIENT
Start: 2021-05-25 | End: 2021-05-25

## 2021-05-25 RX ORDER — VANCOMYCIN HYDROCHLORIDE 1 G/20ML
INJECTION, POWDER, LYOPHILIZED, FOR SOLUTION INTRAVENOUS PRN
Status: DISCONTINUED | OUTPATIENT
Start: 2021-05-25 | End: 2021-05-25 | Stop reason: SDUPTHER

## 2021-05-25 RX ORDER — DEXTROSE MONOHYDRATE 25 G/50ML
12.5 INJECTION, SOLUTION INTRAVENOUS PRN
Status: DISCONTINUED | OUTPATIENT
Start: 2021-05-25 | End: 2021-05-31 | Stop reason: HOSPADM

## 2021-05-25 RX ORDER — LEVOTHYROXINE SODIUM 0.15 MG/1
150 TABLET ORAL DAILY
Status: DISCONTINUED | OUTPATIENT
Start: 2021-05-26 | End: 2021-05-31 | Stop reason: HOSPADM

## 2021-05-25 RX ORDER — CYCLOBENZAPRINE HCL 10 MG
10 TABLET ORAL 3 TIMES DAILY PRN
Status: DISCONTINUED | OUTPATIENT
Start: 2021-05-25 | End: 2021-05-31 | Stop reason: HOSPADM

## 2021-05-25 RX ORDER — TOBRAMYCIN 1.2 G/30ML
INJECTION, POWDER, LYOPHILIZED, FOR SOLUTION INTRAVENOUS PRN
Status: DISCONTINUED | OUTPATIENT
Start: 2021-05-25 | End: 2021-05-25 | Stop reason: HOSPADM

## 2021-05-25 RX ORDER — POLYETHYLENE GLYCOL 3350 17 G/17G
17 POWDER, FOR SOLUTION ORAL DAILY
Status: DISCONTINUED | OUTPATIENT
Start: 2021-05-25 | End: 2021-05-31 | Stop reason: HOSPADM

## 2021-05-25 RX ORDER — SODIUM CHLORIDE 0.9 % (FLUSH) 0.9 %
10 SYRINGE (ML) INJECTION EVERY 12 HOURS SCHEDULED
Status: DISCONTINUED | OUTPATIENT
Start: 2021-05-25 | End: 2021-05-25 | Stop reason: HOSPADM

## 2021-05-25 RX ORDER — VANCOMYCIN HYDROCHLORIDE 1 G/20ML
INJECTION, POWDER, LYOPHILIZED, FOR SOLUTION INTRAVENOUS PRN
Status: DISCONTINUED | OUTPATIENT
Start: 2021-05-25 | End: 2021-05-25 | Stop reason: HOSPADM

## 2021-05-25 RX ORDER — FENTANYL CITRATE 50 UG/ML
INJECTION, SOLUTION INTRAMUSCULAR; INTRAVENOUS PRN
Status: DISCONTINUED | OUTPATIENT
Start: 2021-05-25 | End: 2021-05-25 | Stop reason: SDUPTHER

## 2021-05-25 RX ORDER — SODIUM CHLORIDE 0.9 % (FLUSH) 0.9 %
10 SYRINGE (ML) INJECTION PRN
Status: DISCONTINUED | OUTPATIENT
Start: 2021-05-25 | End: 2021-05-25 | Stop reason: HOSPADM

## 2021-05-25 RX ORDER — GEMFIBROZIL 600 MG/1
600 TABLET, FILM COATED ORAL 2 TIMES DAILY WITH MEALS
Status: DISCONTINUED | OUTPATIENT
Start: 2021-05-25 | End: 2021-05-25

## 2021-05-25 RX ORDER — SODIUM CHLORIDE 9 MG/ML
25 INJECTION, SOLUTION INTRAVENOUS PRN
Status: DISCONTINUED | OUTPATIENT
Start: 2021-05-25 | End: 2021-05-25 | Stop reason: HOSPADM

## 2021-05-25 RX ORDER — SENNA AND DOCUSATE SODIUM 50; 8.6 MG/1; MG/1
1 TABLET, FILM COATED ORAL 2 TIMES DAILY
Status: DISCONTINUED | OUTPATIENT
Start: 2021-05-25 | End: 2021-05-26

## 2021-05-25 RX ORDER — NICOTINE POLACRILEX 4 MG
15 LOZENGE BUCCAL PRN
Status: DISCONTINUED | OUTPATIENT
Start: 2021-05-25 | End: 2021-05-31 | Stop reason: HOSPADM

## 2021-05-25 RX ORDER — SODIUM CHLORIDE 9 MG/ML
25 INJECTION, SOLUTION INTRAVENOUS PRN
Status: DISCONTINUED | OUTPATIENT
Start: 2021-05-25 | End: 2021-05-31 | Stop reason: HOSPADM

## 2021-05-25 RX ORDER — DIPHENHYDRAMINE HYDROCHLORIDE 50 MG/ML
12.5 INJECTION INTRAMUSCULAR; INTRAVENOUS
Status: DISCONTINUED | OUTPATIENT
Start: 2021-05-25 | End: 2021-05-25 | Stop reason: HOSPADM

## 2021-05-25 RX ORDER — PROMETHAZINE HYDROCHLORIDE 25 MG/1
12.5 TABLET ORAL EVERY 6 HOURS PRN
Status: DISCONTINUED | OUTPATIENT
Start: 2021-05-25 | End: 2021-05-31 | Stop reason: HOSPADM

## 2021-05-25 RX ORDER — SODIUM PHOSPHATE, DIBASIC AND SODIUM PHOSPHATE, MONOBASIC 7; 19 G/133ML; G/133ML
1 ENEMA RECTAL DAILY PRN
Status: DISCONTINUED | OUTPATIENT
Start: 2021-05-25 | End: 2021-05-31 | Stop reason: HOSPADM

## 2021-05-25 RX ORDER — ONDANSETRON 2 MG/ML
4 INJECTION INTRAMUSCULAR; INTRAVENOUS EVERY 6 HOURS PRN
Status: DISCONTINUED | OUTPATIENT
Start: 2021-05-25 | End: 2021-05-31 | Stop reason: HOSPADM

## 2021-05-25 RX ORDER — DEXAMETHASONE SODIUM PHOSPHATE 10 MG/ML
INJECTION INTRAMUSCULAR; INTRAVENOUS PRN
Status: DISCONTINUED | OUTPATIENT
Start: 2021-05-25 | End: 2021-05-25 | Stop reason: SDUPTHER

## 2021-05-25 RX ORDER — ROCURONIUM BROMIDE 10 MG/ML
INJECTION, SOLUTION INTRAVENOUS PRN
Status: DISCONTINUED | OUTPATIENT
Start: 2021-05-25 | End: 2021-05-25 | Stop reason: SDUPTHER

## 2021-05-25 RX ORDER — 0.9 % SODIUM CHLORIDE 0.9 %
500 INTRAVENOUS SOLUTION INTRAVENOUS ONCE
Status: COMPLETED | OUTPATIENT
Start: 2021-05-25 | End: 2021-05-25

## 2021-05-25 RX ORDER — VALSARTAN 80 MG/1
80 TABLET ORAL DAILY
Status: DISCONTINUED | OUTPATIENT
Start: 2021-05-26 | End: 2021-05-26

## 2021-05-25 RX ORDER — SCOLOPAMINE TRANSDERMAL SYSTEM 1 MG/1
1 PATCH, EXTENDED RELEASE TRANSDERMAL ONCE
Status: DISCONTINUED | OUTPATIENT
Start: 2021-05-25 | End: 2021-05-28

## 2021-05-25 RX ORDER — SODIUM CHLORIDE 9 MG/ML
INJECTION, SOLUTION INTRAVENOUS CONTINUOUS PRN
Status: DISCONTINUED | OUTPATIENT
Start: 2021-05-25 | End: 2021-05-25 | Stop reason: SDUPTHER

## 2021-05-25 RX ORDER — SODIUM CHLORIDE 0.9 % (FLUSH) 0.9 %
5-40 SYRINGE (ML) INJECTION EVERY 12 HOURS SCHEDULED
Status: DISCONTINUED | OUTPATIENT
Start: 2021-05-25 | End: 2021-05-31 | Stop reason: HOSPADM

## 2021-05-25 RX ORDER — PROPOFOL 10 MG/ML
INJECTION, EMULSION INTRAVENOUS PRN
Status: DISCONTINUED | OUTPATIENT
Start: 2021-05-25 | End: 2021-05-25 | Stop reason: SDUPTHER

## 2021-05-25 RX ORDER — PHENYLEPHRINE HCL IN 0.9% NACL 1 MG/10 ML
SYRINGE (ML) INTRAVENOUS PRN
Status: DISCONTINUED | OUTPATIENT
Start: 2021-05-25 | End: 2021-05-25 | Stop reason: SDUPTHER

## 2021-05-25 RX ORDER — MIDAZOLAM HYDROCHLORIDE 1 MG/ML
INJECTION INTRAMUSCULAR; INTRAVENOUS PRN
Status: DISCONTINUED | OUTPATIENT
Start: 2021-05-25 | End: 2021-05-25 | Stop reason: SDUPTHER

## 2021-05-25 RX ORDER — ONDANSETRON 2 MG/ML
INJECTION INTRAMUSCULAR; INTRAVENOUS PRN
Status: DISCONTINUED | OUTPATIENT
Start: 2021-05-25 | End: 2021-05-25 | Stop reason: SDUPTHER

## 2021-05-25 RX ORDER — GABAPENTIN 300 MG/1
600 CAPSULE ORAL 3 TIMES DAILY
Status: DISCONTINUED | OUTPATIENT
Start: 2021-05-25 | End: 2021-05-31 | Stop reason: HOSPADM

## 2021-05-25 RX ORDER — SODIUM CHLORIDE 0.9 % (FLUSH) 0.9 %
5-40 SYRINGE (ML) INJECTION PRN
Status: DISCONTINUED | OUTPATIENT
Start: 2021-05-25 | End: 2021-05-31 | Stop reason: HOSPADM

## 2021-05-25 RX ORDER — OXYCODONE HYDROCHLORIDE 5 MG/1
5 TABLET ORAL EVERY 4 HOURS PRN
Status: DISCONTINUED | OUTPATIENT
Start: 2021-05-25 | End: 2021-05-31 | Stop reason: HOSPADM

## 2021-05-25 RX ORDER — VANCOMYCIN HYDROCHLORIDE 500 MG/10ML
INJECTION, POWDER, LYOPHILIZED, FOR SOLUTION INTRAVENOUS PRN
Status: DISCONTINUED | OUTPATIENT
Start: 2021-05-25 | End: 2021-05-25 | Stop reason: HOSPADM

## 2021-05-25 RX ADMIN — SUGAMMADEX 200 MG: 100 INJECTION, SOLUTION INTRAVENOUS at 13:15

## 2021-05-25 RX ADMIN — ROCURONIUM BROMIDE 30 MG: 10 INJECTION, SOLUTION INTRAVENOUS at 08:39

## 2021-05-25 RX ADMIN — ONDANSETRON 4 MG: 2 INJECTION INTRAMUSCULAR; INTRAVENOUS at 17:44

## 2021-05-25 RX ADMIN — GABAPENTIN 600 MG: 300 CAPSULE ORAL at 21:32

## 2021-05-25 RX ADMIN — Medication 50 MCG: at 09:30

## 2021-05-25 RX ADMIN — ROCURONIUM BROMIDE 10 MG: 10 INJECTION, SOLUTION INTRAVENOUS at 11:34

## 2021-05-25 RX ADMIN — VANCOMYCIN HYDROCHLORIDE 1000 MG: 1 INJECTION, POWDER, LYOPHILIZED, FOR SOLUTION INTRAVENOUS at 08:25

## 2021-05-25 RX ADMIN — HYDROMORPHONE HYDROCHLORIDE 0.5 MG: 1 INJECTION, SOLUTION INTRAMUSCULAR; INTRAVENOUS; SUBCUTANEOUS at 18:43

## 2021-05-25 RX ADMIN — BISACODYL 5 MG: 5 TABLET, COATED ORAL at 17:10

## 2021-05-25 RX ADMIN — FENTANYL CITRATE 50 MCG: 50 INJECTION, SOLUTION INTRAMUSCULAR; INTRAVENOUS at 12:14

## 2021-05-25 RX ADMIN — HYDROMORPHONE HYDROCHLORIDE 0.5 MG: 1 INJECTION, SOLUTION INTRAMUSCULAR; INTRAVENOUS; SUBCUTANEOUS at 22:58

## 2021-05-25 RX ADMIN — VANCOMYCIN HYDROCHLORIDE 1000 MG: 1 INJECTION, POWDER, LYOPHILIZED, FOR SOLUTION INTRAVENOUS at 06:38

## 2021-05-25 RX ADMIN — ROCURONIUM BROMIDE 20 MG: 10 INJECTION, SOLUTION INTRAVENOUS at 08:20

## 2021-05-25 RX ADMIN — GABAPENTIN 600 MG: 300 CAPSULE ORAL at 17:09

## 2021-05-25 RX ADMIN — HYDROMORPHONE HYDROCHLORIDE 0.5 MG: 1 INJECTION, SOLUTION INTRAMUSCULAR; INTRAVENOUS; SUBCUTANEOUS at 13:55

## 2021-05-25 RX ADMIN — SODIUM CHLORIDE: 9 INJECTION, SOLUTION INTRAVENOUS at 19:21

## 2021-05-25 RX ADMIN — SODIUM CHLORIDE 500 ML: 9 INJECTION, SOLUTION INTRAVENOUS at 17:12

## 2021-05-25 RX ADMIN — SODIUM CHLORIDE: 9 INJECTION, SOLUTION INTRAVENOUS at 07:30

## 2021-05-25 RX ADMIN — INSULIN LISPRO 3 UNITS: 100 INJECTION, SOLUTION INTRAVENOUS; SUBCUTANEOUS at 21:34

## 2021-05-25 RX ADMIN — VANCOMYCIN HYDROCHLORIDE 1000 MG: 1 INJECTION, POWDER, LYOPHILIZED, FOR SOLUTION INTRAVENOUS at 22:57

## 2021-05-25 RX ADMIN — HYDROMORPHONE HYDROCHLORIDE 0.5 MG: 1 INJECTION, SOLUTION INTRAMUSCULAR; INTRAVENOUS; SUBCUTANEOUS at 14:32

## 2021-05-25 RX ADMIN — OXYCODONE HYDROCHLORIDE 10 MG: 10 TABLET ORAL at 21:32

## 2021-05-25 RX ADMIN — DEXAMETHASONE SODIUM PHOSPHATE 10 MG: 10 INJECTION INTRAMUSCULAR; INTRAVENOUS at 07:43

## 2021-05-25 RX ADMIN — ONDANSETRON HYDROCHLORIDE 4 MG: 2 INJECTION, SOLUTION INTRAMUSCULAR; INTRAVENOUS at 12:41

## 2021-05-25 RX ADMIN — SODIUM CHLORIDE: 9 INJECTION, SOLUTION INTRAVENOUS at 12:54

## 2021-05-25 RX ADMIN — ROCURONIUM BROMIDE 20 MG: 10 INJECTION, SOLUTION INTRAVENOUS at 10:05

## 2021-05-25 RX ADMIN — LIDOCAINE HYDROCHLORIDE 1.31 MG/KG/HR: 10 INJECTION, SOLUTION INFILTRATION; PERINEURAL at 08:10

## 2021-05-25 RX ADMIN — SODIUM CHLORIDE 25 ML: 9 INJECTION, SOLUTION INTRAVENOUS at 15:35

## 2021-05-25 RX ADMIN — LIDOCAINE HYDROCHLORIDE 100 MG: 20 INJECTION, SOLUTION INTRAVENOUS at 07:35

## 2021-05-25 RX ADMIN — MIDAZOLAM 2 MG: 1 INJECTION INTRAMUSCULAR; INTRAVENOUS at 07:28

## 2021-05-25 RX ADMIN — FENTANYL CITRATE 100 MCG: 50 INJECTION, SOLUTION INTRAMUSCULAR; INTRAVENOUS at 07:35

## 2021-05-25 RX ADMIN — POLYETHYLENE GLYCOL 3350 17 G: 17 POWDER, FOR SOLUTION ORAL at 17:09

## 2021-05-25 RX ADMIN — ROCURONIUM BROMIDE 30 MG: 10 INJECTION, SOLUTION INTRAVENOUS at 09:39

## 2021-05-25 RX ADMIN — OXYCODONE HYDROCHLORIDE 10 MG: 10 TABLET ORAL at 17:10

## 2021-05-25 RX ADMIN — SENNOSIDES AND DOCUSATE SODIUM 1 TABLET: 8.6; 5 TABLET ORAL at 21:33

## 2021-05-25 RX ADMIN — ROCURONIUM BROMIDE 50 MG: 10 INJECTION, SOLUTION INTRAVENOUS at 07:35

## 2021-05-25 RX ADMIN — FENTANYL CITRATE 50 MCG: 50 INJECTION, SOLUTION INTRAMUSCULAR; INTRAVENOUS at 12:50

## 2021-05-25 RX ADMIN — PRAVASTATIN SODIUM 10 MG: 20 TABLET ORAL at 22:58

## 2021-05-25 RX ADMIN — CYCLOBENZAPRINE 10 MG: 10 TABLET, FILM COATED ORAL at 17:09

## 2021-05-25 RX ADMIN — Medication 0.2 MCG/KG/HR: at 08:00

## 2021-05-25 RX ADMIN — Medication 50 MCG: at 09:35

## 2021-05-25 RX ADMIN — PROPOFOL 200 MG: 10 INJECTION, EMULSION INTRAVENOUS at 07:35

## 2021-05-25 RX ADMIN — Medication 100 MCG: at 12:07

## 2021-05-25 ASSESSMENT — PULMONARY FUNCTION TESTS
PIF_VALUE: 24
PIF_VALUE: 25
PIF_VALUE: 18
PIF_VALUE: 24
PIF_VALUE: 18
PIF_VALUE: 23
PIF_VALUE: 24
PIF_VALUE: 17
PIF_VALUE: 18
PIF_VALUE: 24
PIF_VALUE: 6
PIF_VALUE: 16
PIF_VALUE: 16
PIF_VALUE: 3
PIF_VALUE: 24
PIF_VALUE: 24
PIF_VALUE: 17
PIF_VALUE: 23
PIF_VALUE: 24
PIF_VALUE: 24
PIF_VALUE: 5
PIF_VALUE: 24
PIF_VALUE: 18
PIF_VALUE: 24
PIF_VALUE: 18
PIF_VALUE: 1
PIF_VALUE: 0
PIF_VALUE: 17
PIF_VALUE: 24
PIF_VALUE: 17
PIF_VALUE: 18
PIF_VALUE: 16
PIF_VALUE: 2
PIF_VALUE: 18
PIF_VALUE: 24
PIF_VALUE: 25
PIF_VALUE: 25
PIF_VALUE: 3
PIF_VALUE: 1
PIF_VALUE: 24
PIF_VALUE: 23
PIF_VALUE: 18
PIF_VALUE: 24
PIF_VALUE: 16
PIF_VALUE: 24
PIF_VALUE: 24
PIF_VALUE: 23
PIF_VALUE: 23
PIF_VALUE: 26
PIF_VALUE: 25
PIF_VALUE: 24
PIF_VALUE: 24
PIF_VALUE: 25
PIF_VALUE: 18
PIF_VALUE: 24
PIF_VALUE: 25
PIF_VALUE: 25
PIF_VALUE: 22
PIF_VALUE: 24
PIF_VALUE: 0
PIF_VALUE: 24
PIF_VALUE: 18
PIF_VALUE: 24
PIF_VALUE: 16
PIF_VALUE: 3
PIF_VALUE: 24
PIF_VALUE: 23
PIF_VALUE: 24
PIF_VALUE: 24
PIF_VALUE: 1
PIF_VALUE: 16
PIF_VALUE: 24
PIF_VALUE: 24
PIF_VALUE: 1
PIF_VALUE: 24
PIF_VALUE: 18
PIF_VALUE: 1
PIF_VALUE: 24
PIF_VALUE: 24
PIF_VALUE: 14
PIF_VALUE: 16
PIF_VALUE: 24
PIF_VALUE: 24
PIF_VALUE: 16
PIF_VALUE: 24
PIF_VALUE: 25
PIF_VALUE: 6
PIF_VALUE: 24
PIF_VALUE: 24
PIF_VALUE: 0
PIF_VALUE: 17
PIF_VALUE: 16
PIF_VALUE: 18
PIF_VALUE: 18
PIF_VALUE: 24
PIF_VALUE: 24
PIF_VALUE: 23
PIF_VALUE: 16
PIF_VALUE: 16
PIF_VALUE: 24
PIF_VALUE: 16
PIF_VALUE: 24
PIF_VALUE: 16
PIF_VALUE: 24
PIF_VALUE: 1
PIF_VALUE: 25
PIF_VALUE: 24
PIF_VALUE: 18
PIF_VALUE: 24
PIF_VALUE: 27
PIF_VALUE: 24
PIF_VALUE: 18
PIF_VALUE: 3
PIF_VALUE: 24
PIF_VALUE: 23
PIF_VALUE: 24
PIF_VALUE: 16
PIF_VALUE: 0
PIF_VALUE: 25
PIF_VALUE: 2
PIF_VALUE: 24
PIF_VALUE: 24
PIF_VALUE: 23
PIF_VALUE: 27
PIF_VALUE: 24
PIF_VALUE: 2
PIF_VALUE: 24
PIF_VALUE: 17
PIF_VALUE: 22
PIF_VALUE: 25
PIF_VALUE: 24
PIF_VALUE: 25
PIF_VALUE: 24
PIF_VALUE: 25
PIF_VALUE: 24
PIF_VALUE: 16
PIF_VALUE: 25
PIF_VALUE: 24
PIF_VALUE: 25
PIF_VALUE: 24
PIF_VALUE: 4
PIF_VALUE: 25
PIF_VALUE: 24
PIF_VALUE: 24
PIF_VALUE: 18

## 2021-05-25 ASSESSMENT — PAIN SCALES - GENERAL
PAINLEVEL_OUTOF10: 4
PAINLEVEL_OUTOF10: 6
PAINLEVEL_OUTOF10: 7
PAINLEVEL_OUTOF10: 7
PAINLEVEL_OUTOF10: 2
PAINLEVEL_OUTOF10: 5
PAINLEVEL_OUTOF10: 7
PAINLEVEL_OUTOF10: 7
PAINLEVEL_OUTOF10: 0
PAINLEVEL_OUTOF10: 7
PAINLEVEL_OUTOF10: 0
PAINLEVEL_OUTOF10: 0
PAINLEVEL_OUTOF10: 7
PAINLEVEL_OUTOF10: 4
PAINLEVEL_OUTOF10: 0
PAINLEVEL_OUTOF10: 0

## 2021-05-25 ASSESSMENT — PAIN DESCRIPTION - PAIN TYPE
TYPE: SURGICAL PAIN
TYPE: SURGICAL PAIN

## 2021-05-25 ASSESSMENT — PAIN DESCRIPTION - DESCRIPTORS
DESCRIPTORS: CONSTANT
DESCRIPTORS: ACHING;DISCOMFORT

## 2021-05-25 ASSESSMENT — PAIN DESCRIPTION - ORIENTATION
ORIENTATION: MID

## 2021-05-25 ASSESSMENT — PAIN DESCRIPTION - LOCATION
LOCATION: BACK

## 2021-05-25 ASSESSMENT — PAIN - FUNCTIONAL ASSESSMENT: PAIN_FUNCTIONAL_ASSESSMENT: 0-10

## 2021-05-25 NOTE — OP NOTE
Operative Note      Patient: Meng Donaldson  YOB: 1960  MRN: [id number]    Date of Procedure: 5/25/2021    Pre-Op Diagnosis: Lumbar stenosis, lumbar spondylolisthesis, leg weakness, intractable back pain    Post-Op Diagnosis: same         PROCEDURES PERFORMED:     1. L4 Hedrick laminectomy with removal of the bilateral inferior articulating processes and pars interarticularis for decompression of the cauda equina nerve roots     2. L3, L4 bilateral laminectomies with bilateral medial facetectomies and foraminotomies for decompression of the cauda equina nerve roots     3. L4-L5 posterior radical discectomy and interbody arthrodesis     4. Insertion of intervertebral biomechanical device (Nuvasive      Modulus TLIF cage ) at L4-L5     5. L3-L5 posterior arthrodesis between the transverse processes      of L3-L5     6. L3-L5 posterior segmental instrumentation using Nuvasive      Reline pedicle screws, titanium alloy rods. 7. Use of morsellized autograft obtained from same incision     8. Use of morsellized allograft (cancellous bone chips) and DBM       Putty, and Vivigen stem cells    9. Intraoperative spinal image guidance using Vouchercloud     O-arm-based computer navigation. 10. Use of Intraoperative Fluoroscopy and interpretation of radiographic images         11. Use of intraoperative SSEP and EMG monitoring      Surgeon(s): Isabella Paula MD    Assistant:   Physician Assistant: oRyer Murphy PA-C (no resident available)    Anesthesia: General      EBL: 150 cc    Preoperative Indications: This is a patient  who was evaluated in the outpatient neurosurgical clinic with complaints of intractable low back and right leg pain numbness and weakness. An MRI demonstrated degenerative spondylisthesis and lumbar spondylosis resulting in moderate to severe foraminal stenosis. The patient has significant weakness.  The patient had undergone a course of physical therapy and injections, but had failed to obtain lasting relief of his symptoms. Given the failure of medical management, operative intervention was discussed in the form of instrumented decompression and fusion. The risks and benefits of surgery as well as those of nonoperative treatment were discussed. The risks include, but are not limited to: bleeding, infection, numbness, weakness, paralysis, loss of bowel bladder function, loss of sexual function, spinal fluid leak, failure to improve, failure of fusion, failure of instrumentation, need for further surgery, venous embolism, heart attack, stroke and death. The patient appeared to understand these risks and stated his desire to proceed with surgery. OPERATIVE TECHNIQUE:       ID and anesthesia: The patient was identified in the preoperative holding area and taken to the operating room, where general endotracheal anesthesia was administered. Antibiotics: I ordered, and the patient received antibiotics intravenously prior to incision. DVT prophylaxis: Teds and SCDs were placed. Monitoring: Electrodes were placed for intraoperative monitoring of somatosensory evoked potentials and EMG. Preposition baseline potentials were obtained. No changes were observed in the somatosensory evoked potentials or EMG at any point during the case. Positioning: The patient was positioned prone on a Sumanth table with a spine top. All bony prominences were padded. Initial localization and prep: Using C arm fluoroscopy in the lateral plane, the correct levels were identified percutaneously with a metallic marker and a midline incision marked. The lumbosacral region was then prepped and draped in the usual sterile fashion. A preoperative timeout was performed and all parties were in agreement. Exposure: The incision was made with a #10 scalpel blade. Bovie electrocautery was used for hemostasis and to perform a subperiosteal dissection bilaterally.  A Penfield #4 dissector was placed on the L4 transverse process and the level confirmed using C-arm fluoroscopy. Self-retaining cerebellar retractors were inserted. Instrumentation:  The Medtronic spinous process clamp was then affixed to the spinous process and the reference array attached to it. The patient was then draped with two three-quarter drapes clipped together in longitudinal fashion, and the Medtronic O arm brought into the field. A spin was obtained and the data uploaded to the navigation module. The O-arm was then removed from the field and the drapes allowed to fall to the sides of the patient. Then, using intraoperative spinal image guidance, the following pedicles were cannulated and instrumented as follows: at L3 and L4, 6.5x45 mm screws were inserted bilaterally; at L5, 6.5 x40 mm screws were inserted bilaterally; AP and Lateral fluoroscopy and an O-Arm 3-D spin confirmed adequate placement. Decompression: Using Leksell rongeurs and high speed drill, the spinous processes from L3 to L4 were then removed. A complete Hedrick laminectomy was performed at L4 using Kerrison rongeurs and the high-speed electric drill, along with removal of the bilateral inferior articulating processes and pars interarticularis for thorough exposure of the exiting and traversing L4 and L5 . Medial facetectomies were further performed bilaterally from L3 to L5 along with wide foraminotomies. Discectomy and Arthrodesis: The neural elements were then gently medialized at L4-L5, exposing the disc annulus. An annulotomy was made with a #15 scalpel blade. A radical discectomy was carried out in piecemeal fashion with pituitary rongeurs, endplate bishop, and curettes. A 8 mm high x 25mm long, 10° lordotic cage was then selected for insertion. It was packed with several pieces of the patient's autograft bone, which had been cleaned of all soft tissue and morcellized in the bone mill.  The cage was then inserted under direct fluoroscopic visualization in the lateral plane and satisfactory placement confirmed with final AP and lateral fluoroscopic radiographs. The wound was copiously irrigated with antibiotic-containing solution. Then, using a high-speed electric drill, the exposed bony surfaces were decorticated. The patient's autograft bone, which had been harvested during laminectomy and placement of screws, was cleaned of all soft tissue, morcellized in the bone mill, and combined with cancellous bone chips,DBM putty, and stem cells were placed over the decorticated bone surfaces bilaterally. Two titanium alloy rods were selected, bent, and inserted into the screw heads. These were secured in place using locking caps, which were final tightened to the 's specifications. Drains and closure: The self-retaining retractors were removed and the wound checked for hemostasis. Two 1/8 inch medium Hemovac drains were brought out through separate stab incisions, secured to the skin using  suture, and placed into the subfascial space. Antibiotic impregnated stimulan beads were placed in the wound. The wound was then closed in a layered fashion with 0 Vicryl in the fascia, and nylon and staples for the skin closure. The wound was dressed with Aquacel AG bandage. There were no changes in SSEP and EMG monitoring throughout the case.          Complications: none          Electronically signed by Mariela Meek MD on 5/25/2021 at 1:49 PM

## 2021-05-25 NOTE — PROGRESS NOTES
INTRAOPERATIVE MONITORING EMG REPORT    Diagnosis: lumbar stenosis  Procedure: lumbar laminectomy and fusion L3-5, TLIF L4/5 left   Anesthesia: isoflurane  Surgeon: Helen Plata MD     ntra-op Monitorin.25 hours    Procedure:     EMG recording electrodes were placed over the .vastus medialis, vastus lateralis, anterior tibialis, and medial gastrocnemius  musles for recording spontaneous EMG activity. A silent control was performed to test the integrity of the system prior to the procedure. Results:  Spontaneous EMG: was quiet throughout the surgical procedure.      Evoked EMG:     none

## 2021-05-25 NOTE — ANESTHESIA PRE PROCEDURE
Department of Anesthesiology  Preprocedure Note       Name:  Barry Hall   Age:  62 y.o.  :  1960                                          MRN:  [id number]         Date:  2021      Surgeon: Jacy Rowe): Michael Dominguez MD    Procedure: Procedure(s):  L3- L5 DECOMPRESSION AND FUSION , L4- L5 TRANSFORAMINAL LUMBAR INTERBODY FUSION --OARM, PATY TABLE, AUDIOLOGY, PLATES ,SCREWS, --NUVASIVE    Medications prior to admission:   Prior to Admission medications    Medication Sig Start Date End Date Taking? Authorizing Provider   gabapentin (NEURONTIN) 300 MG capsule Take 2 capsules by mouth 3 times daily for 90 days.  5/17/21 8/15/21 Yes Brooklyn Crook MD   tiZANidine (ZANAFLEX) 4 MG tablet Take 1 tablet by mouth every 8 hours as needed (pain) 21  Yes Hailey Hernandez DO   polyethylene glycol (GLYCOLAX) 17 g packet Take 17 g by mouth daily as needed   Yes Historical Provider, MD   lidocaine (LIDODERM) 5 % Place 2 patches onto the skin daily 12 hours on, 12 hours off. 21  Yes Brooklyn Crook MD   pravastatin (PRAVACHOL) 10 MG tablet Take 1 tablet by mouth every other day 3/11/21  Yes Hailey Hernandez DO   levothyroxine (SYNTHROID) 150 MCG tablet Take 1 tablet by mouth Daily 21  Yes Hailey Hernandez DO   valsartan (DIOVAN) 80 MG tablet TAKE 1 TABLET BY MOUTH EVERY DAY 20  Yes Hailey Hernandez DO   gemfibrozil (LOPID) 600 MG tablet TAKE 1 TABLET EVERY 12 HOURS 20  Yes Prieto Robin,    omeprazole (PRILOSEC) 20 MG delayed release capsule Take 1 capsule by mouth daily 20  Yes Hailey Hernandez DO   metFORMIN (GLUCOPHAGE) 500 MG tablet Take 1 tablet by mouth daily (with breakfast) 20  Yes Prieto Sanford, DO   linaclotide (LINZESS) 145 MCG capsule Take 1 capsule by mouth every morning (before breakfast) 20  Yes Hailey Hernandez DO   Handicap Placard [id number] [facility] by Does not apply route Patient cannot walk 200 ft without stopping to rest.    Expiration 1 yr 5/3/21   Alexis Abbasi Min PRN Akin Mackey MD        HYDROmorphone (DILAUDID) injection 0.25 mg  0.25 mg Intravenous Q5 Min PRN Akin Mackey MD           Allergies:     Allergies   Allergen Reactions    Keflex [Cephalexin]     Percocet [Oxycodone-Acetaminophen]     Ceftin [Cefuroxime] Rash       Problem List:    Patient Active Problem List   Diagnosis Code    Type 2 diabetes mellitus without complication, without long-term current use of insulin (HCC) E11.9    Acquired hypothyroidism E03.9    Peripheral neuropathy G62.9    Carpal tunnel syndrome of left wrist G56.02    Spinal stenosis of lumbar region with neurogenic claudication M48.062    Acute exacerbation of chronic low back pain M54.5, G89.29    Other hyperlipidemia E78.49    Spondylolisthesis of lumbar region M43.16       Past Medical History:        Diagnosis Date    Diabetes mellitus ([address]     Hyperlipidemia     Hypothyroidism     Obesity        Past Surgical History:        Procedure Laterality Date    CHOLECYSTECTOMY      HYSTERECTOMY, TOTAL ABDOMINAL      INCONTINENCE SURGERY      BLADDER SLING    KNEE SURGERY Left        Social History:    Social History     Tobacco Use    Smoking status: Never Smoker    Smokeless tobacco: Never Used   Substance Use Topics    Alcohol use: Never                                Counseling given: Not Answered      Vital Signs (Current):   Vitals:    05/25/21 0552   BP: (!) 145/77   Pulse: 83   Resp: 18   Temp: 97.8 °F (36.6 °C)   TempSrc: Temporal   SpO2: 97%   Weight: 252 lb (114.3 kg)   Height: 5' 4\" (1.626 m)                                              BP Readings from Last 3 Encounters:   05/25/21 (!) 145/77   05/20/21 120/75   05/13/21 124/76       NPO Status: Time of last liquid consumption: 2230                        Time of last solid consumption: 2200                        Date of last liquid consumption: 05/24/21                        Date of last solid food consumption: 05/24/21    BMI:   Wt Readings from Last 3 Encounters:   05/25/21 252 lb (114.3 kg)   05/20/21 252 lb (114.3 kg)   05/13/21 255 lb (115.7 kg)     Body mass index is 43.26 kg/m². CBC:   Lab Results   Component Value Date    WBC 7.6 05/20/2021    RBC 5.16 05/20/2021    HGB 15.2 05/20/2021    HCT 45.0 05/20/2021    MCV 87.2 05/20/2021    RDW 12.9 05/20/2021     05/20/2021       CMP:   Lab Results   Component Value Date     05/20/2021    K 4.2 05/20/2021     05/20/2021    CO2 24 05/20/2021    BUN 21 05/20/2021    CREATININE 0.6 05/20/2021    GFRAA >60 05/20/2021    LABGLOM >60 05/20/2021    GLUCOSE 145 05/20/2021    PROT 7.1 04/13/2021    CALCIUM 10.1 05/20/2021    BILITOT 0.4 04/13/2021    ALKPHOS 81 04/13/2021    AST 14 04/13/2021    ALT 20 04/13/2021       POC Tests: No results for input(s): POCGLU, POCNA, POCK, POCCL, POCBUN, POCHEMO, POCHCT in the last 72 hours.     Coags:   Lab Results   Component Value Date    PROTIME 11.8 05/20/2021    INR 1.1 05/20/2021       HCG (If Applicable): No results found for: PREGTESTUR, PREGSERUM, HCG, HCGQUANT     ABGs: No results found for: PHART, PO2ART, CFE9UYB, ULB2OFH, BEART, R2RNHWJG     Type & Screen (If Applicable):  No results found for: LABABO, LABRH    Drug/Infectious Status (If Applicable):  No results found for: HIV, HEPCAB    COVID-19 Screening (If Applicable):   Lab Results   Component Value Date    COVID19 Not Detected 05/20/2021           Anesthesia Evaluation  Patient summary reviewed no history of anesthetic complications:   Airway: Mallampati: II  TM distance: >3 FB   Neck ROM: full   Dental:    (+) upper dentures      Pulmonary:Negative Pulmonary ROS breath sounds clear to auscultation                             Cardiovascular:    (+) hypertension:, hyperlipidemia      ECG reviewed  Rhythm: regular  Rate: normal           Beta Blocker:  Not on Beta Blocker         Neuro/Psych:   (+) neuromuscular disease (Peripheral neuropathy):,             GI/Hepatic/Renal:   (+) morbid obesity          Endo/Other:    (+) DiabetesType II DM, , hypothyroidism::., .                 Abdominal:           Vascular: negative vascular ROS. Anesthesia Plan      general     ASA 3       Induction: intravenous. arterial line  MIPS: Postoperative opioids intended and Prophylactic antiemetics administered. Anesthetic plan and risks discussed with patient. Plan discussed with CRNA.                   Michael Nam MD   5/25/2021

## 2021-05-25 NOTE — PLAN OF CARE
Problem: Discharge Planning:  Goal: Discharged to appropriate level of care  Description: Discharged to appropriate level of care  Outcome: Met This Shift     Problem: Infection - Surgical Site:  Goal: Will show no infection signs and symptoms  Description: Will show no infection signs and symptoms  Outcome: Met This Shift

## 2021-05-25 NOTE — H&P
Back Pain  This is a chronic problem. The current episode started more than 1 month ago. The problem occurs 2 to 4 times per day. The problem has been gradually worsening since onset. The pain is present in the gluteal and lumbar spine. The quality of the pain is described as cramping. The pain radiates to the left knee and left foot. The pain is at a severity of 7/10. The pain is moderate. The pain is worse during the day. The symptoms are aggravated by bending and standing. Stiffness is present at night. Associated symptoms include leg pain, numbness and weakness. She has tried analgesics and NSAIDs (ESIx3) for the symptoms.         Review of Systems   Constitutional: Negative. HENT: Negative. Eyes: Negative. Respiratory: Negative. Cardiovascular: Negative. Gastrointestinal: Negative. Endocrine: Negative. Genitourinary: Negative. Musculoskeletal: Positive for back pain. Skin: Negative. Allergic/Immunologic: Negative. Neurological: Positive for weakness and numbness. Hematological: Negative. Psychiatric/Behavioral: Negative.          Objective:   Physical Exam  Vitals signs and nursing note reviewed. HENT:      Head: Normocephalic. Right Ear: Tympanic membrane normal.      Nose: Nose normal.      Mouth/Throat:      Mouth: Mucous membranes are moist.   Eyes:      Extraocular Movements: Extraocular movements intact. Conjunctiva/sclera: Conjunctivae normal.      Pupils: Pupils are equal, round, and reactive to light. Neck:      Musculoskeletal: Normal range of motion and neck supple. Cardiovascular:      Rate and Rhythm: Normal rate and regular rhythm. Pulses: Normal pulses. Heart sounds: Normal heart sounds. Pulmonary:      Effort: Pulmonary effort is normal.      Breath sounds: Normal breath sounds. Abdominal:      Palpations: Abdomen is soft. Musculoskeletal: Normal range of motion. Skin:     General: Skin is warm.    Neurological:      Mental Status: She is alert and oriented to person, place, and time. Cranial Nerves: Cranial nerves are intact. Motor: Weakness (in the left DF, HF) present. Coordination: Coordination is intact. Deep Tendon Reflexes: Reflexes are normal and symmetric. Psychiatric:         Mood and Affect: Mood normal.            Assessment:   63 yo with severe leg pain and weakness. Imaging shows severe stenosis and spondylisthesis L3-5 with large disc herniation. Plan: An MRI demonstrated degenerative spondylisthesis and lumbar spondylosis from L3-L5 resulting in moderate to severe foraminal stenosis. The patient has significant weakness in hip flexion and dorsiflexion. The patient had undergone a course of physical therapy and injections, but had failed to obtain lasting relief of his symptoms. Given the failure of medical management, operative intervention was discussed in the form of instrumented decompression and fusion L3-L5, possible L3-4, L4-5 interbodies.      The risks and benefits of surgery as well as those of nonoperative treatment were discussed. The risks include, but are not limited to: bleeding, infection, numbness, weakness, paralysis, loss of bowel bladder function, loss of sexual function, spinal fluid leak, failure to improve, failure of fusion, failure of instrumentation, need for further surgery, venous embolism, heart attack, stroke and death. The patient appeared to understand these risks and stated his desire to proceed with surgery.

## 2021-05-25 NOTE — PROGRESS NOTES
INTRAOPERATIVE SSEP REPORT      Date of Surgery: 2021   Diagnosis: lumbar stenosis  Procedure: lumbar laminectomy and fusion L3-5, TLIF L4/5 left   Anesthesia: isoflurane   Surgeon: Michael Dominguez MD    Intra-op Monitorin.25 hours    Somatosensory evoked potentials (SSEPs) for ulnar and tibial nerve stimulation were used to monitor upper and lower limb function during surgery. Responses were elicited at the aforementioned sites and were recorded peripherally, cervically and over the somatosensory cortex. Standard elicitation and recording parameters were used. Pre-incision responses were present from all recording sites. Baseline data for upper and lower limb SSEPs were obtained. At closing, responses were comparable to baseline. The surgeon was informed of the status of all responses.           _____________________________________  Bárbara Salvador M.D., Interpreting Physician

## 2021-05-25 NOTE — ANESTHESIA PROCEDURE NOTES
Arterial Line:    An arterial line was placed using ultrasound guidance, in the OR for the following indication(s): continuous blood pressure monitoring. A 20 gauge (size), 1 and 3/4 inch (length), Arrow (type) catheter was placed, into the left radial artery, secured by Tegaderm. Anesthesia type: General    Events:  patient tolerated procedure well with no complications.   Anesthesiologist: Birtni Wren MD  Resident/CRNA: DL Mejía CRNA  Performed: Resident/CRNA   Preanesthetic Checklist  Completed: patient identified, IV checked, site marked, risks and benefits discussed, surgical consent, monitors and equipment checked, pre-op evaluation, timeout performed, anesthesia consent given, oxygen available and patient being monitored

## 2021-05-25 NOTE — PROGRESS NOTES
Alissa Ulrich was ordered linaclotide (linzess) 145mcg which is a nonformulary medication. The patient has indicated that the home supply of this medication will be brought in to the hospital for inpatient use. If the medication has not been administered by 1400 on the following day from the time the order was placed, a pharmacist will follow-up with the nurse of the patient to assess the capability of the patient to bring in the medication. If it is determined that the patient cannot supply the medication and it is not available to be dispensed from the pharmacy, a call will be placed to the ordering provider to discuss alternative options.

## 2021-05-25 NOTE — PROGRESS NOTES
Admitted to Same Day Surgery. Preop instructions given to patient. COVID testing completed on: 5/20/21  Results of the test: not detected  The patient verbally confirms that they did adhere to the self-quarantine guidelines. No signs or symptoms expressed or observed.

## 2021-05-26 ENCOUNTER — APPOINTMENT (OUTPATIENT)
Dept: CT IMAGING | Age: 61
DRG: 454 | End: 2021-05-26
Attending: NEUROLOGICAL SURGERY
Payer: COMMERCIAL

## 2021-05-26 ENCOUNTER — APPOINTMENT (OUTPATIENT)
Dept: GENERAL RADIOLOGY | Age: 61
DRG: 454 | End: 2021-05-26
Attending: NEUROLOGICAL SURGERY
Payer: COMMERCIAL

## 2021-05-26 PROBLEM — N17.9 AKI (ACUTE KIDNEY INJURY) (HCC): Status: ACTIVE | Noted: 2021-05-26

## 2021-05-26 PROBLEM — E78.5 HYPERLIPIDEMIA: Chronic | Status: ACTIVE | Noted: 2021-03-11

## 2021-05-26 PROBLEM — I95.81 POSTOPERATIVE HYPOTENSION: Status: ACTIVE | Noted: 2021-05-26

## 2021-05-26 PROBLEM — E03.9 ACQUIRED HYPOTHYROIDISM: Chronic | Status: ACTIVE | Noted: 2019-06-10

## 2021-05-26 PROBLEM — D62 POSTOPERATIVE ANEMIA DUE TO ACUTE BLOOD LOSS: Status: ACTIVE | Noted: 2021-05-26

## 2021-05-26 PROBLEM — E11.9 TYPE 2 DIABETES MELLITUS WITHOUT COMPLICATION, WITHOUT LONG-TERM CURRENT USE OF INSULIN (HCC): Chronic | Status: ACTIVE | Noted: 2019-06-10

## 2021-05-26 PROBLEM — E78.5 HYPERLIPIDEMIA: Status: ACTIVE | Noted: 2021-03-11

## 2021-05-26 LAB
ANION GAP SERPL CALCULATED.3IONS-SCNC: 9 MMOL/L (ref 7–16)
ANISOCYTOSIS: ABNORMAL
BACTERIA: ABNORMAL /HPF
BACTERIA: ABNORMAL /HPF
BASOPHILS ABSOLUTE: 0 E9/L (ref 0–0.2)
BASOPHILS RELATIVE PERCENT: 0.3 % (ref 0–2)
BILIRUBIN URINE: NEGATIVE
BILIRUBIN URINE: NEGATIVE
BLOOD, URINE: ABNORMAL
BLOOD, URINE: ABNORMAL
BUN BLDV-MCNC: 28 MG/DL (ref 6–20)
CALCIUM SERPL-MCNC: 8.1 MG/DL (ref 8.6–10.2)
CHLORIDE BLD-SCNC: 104 MMOL/L (ref 98–107)
CHLORIDE URINE RANDOM: 95 MMOL/L
CK MB: 10.9 NG/ML (ref 0–4.3)
CLARITY: CLEAR
CLARITY: CLEAR
CO2: 24 MMOL/L (ref 22–29)
COLOR: YELLOW
COLOR: YELLOW
CORTISOL TOTAL: 13.12 MCG/DL (ref 2.68–18.4)
CREAT SERPL-MCNC: 1.7 MG/DL (ref 0.5–1)
CREATININE URINE: 39 MG/DL (ref 29–226)
EOSINOPHILS ABSOLUTE: 0 E9/L (ref 0.05–0.5)
EOSINOPHILS RELATIVE PERCENT: 0.1 % (ref 0–6)
FERRITIN: 404 NG/ML
FOLATE: 14.3 NG/ML (ref 4.8–24.2)
GFR AFRICAN AMERICAN: 37
GFR NON-AFRICAN AMERICAN: 31 ML/MIN/1.73
GLUCOSE BLD-MCNC: 167 MG/DL (ref 74–99)
GLUCOSE URINE: 250 MG/DL
GLUCOSE URINE: NEGATIVE MG/DL
HAPTOGLOBIN: 124 MG/DL (ref 30–200)
HBA1C MFR BLD: 6.5 % (ref 4–5.6)
HCT VFR BLD CALC: 26.2 % (ref 34–48)
HCT VFR BLD CALC: 27.2 % (ref 34–48)
HCT VFR BLD CALC: 27.3 % (ref 34–48)
HEMOGLOBIN: 8.7 G/DL (ref 11.5–15.5)
HEMOGLOBIN: 8.8 G/DL (ref 11.5–15.5)
HEMOGLOBIN: 9.2 G/DL (ref 11.5–15.5)
IRON SATURATION: 8 % (ref 15–50)
IRON: 18 MCG/DL (ref 37–145)
KETONES, URINE: 15 MG/DL
KETONES, URINE: NEGATIVE MG/DL
LACTATE DEHYDROGENASE: 154 U/L (ref 135–214)
LACTIC ACID, SEPSIS: 1.1 MMOL/L (ref 0.5–1.9)
LACTIC ACID, SEPSIS: 1.6 MMOL/L (ref 0.5–1.9)
LEUKOCYTE ESTERASE, URINE: NEGATIVE
LEUKOCYTE ESTERASE, URINE: NEGATIVE
LYMPHOCYTES ABSOLUTE: 2.64 E9/L (ref 1.5–4)
LYMPHOCYTES RELATIVE PERCENT: 14.7 % (ref 20–42)
MCH RBC QN AUTO: 29.3 PG (ref 26–35)
MCH RBC QN AUTO: 29.3 PG (ref 26–35)
MCH RBC QN AUTO: 29.9 PG (ref 26–35)
MCHC RBC AUTO-ENTMCNC: 32.4 % (ref 32–34.5)
MCHC RBC AUTO-ENTMCNC: 33.2 % (ref 32–34.5)
MCHC RBC AUTO-ENTMCNC: 33.7 % (ref 32–34.5)
MCV RBC AUTO: 88.2 FL (ref 80–99.9)
MCV RBC AUTO: 88.6 FL (ref 80–99.9)
MCV RBC AUTO: 90.7 FL (ref 80–99.9)
METER GLUCOSE: 192 MG/DL (ref 74–99)
METER GLUCOSE: 192 MG/DL (ref 74–99)
METER GLUCOSE: 199 MG/DL (ref 74–99)
METER GLUCOSE: 222 MG/DL (ref 74–99)
METER GLUCOSE: 272 MG/DL (ref 74–99)
MONOCYTES ABSOLUTE: 1.41 E9/L (ref 0.1–0.95)
MONOCYTES RELATIVE PERCENT: 7.8 % (ref 2–12)
NEUTROPHILS ABSOLUTE: 13.73 E9/L (ref 1.8–7.3)
NEUTROPHILS RELATIVE PERCENT: 77.6 % (ref 43–80)
NITRITE, URINE: NEGATIVE
NITRITE, URINE: NEGATIVE
PDW BLD-RTO: 13.2 FL (ref 11.5–15)
PDW BLD-RTO: 13.2 FL (ref 11.5–15)
PDW BLD-RTO: 13.4 FL (ref 11.5–15)
PH UA: 5.5 (ref 5–9)
PH UA: 5.5 (ref 5–9)
PLATELET # BLD: 259 E9/L (ref 130–450)
PLATELET # BLD: 325 E9/L (ref 130–450)
PLATELET # BLD: 332 E9/L (ref 130–450)
PMV BLD AUTO: 10.1 FL (ref 7–12)
PMV BLD AUTO: 10.2 FL (ref 7–12)
PMV BLD AUTO: 9.7 FL (ref 7–12)
POLYCHROMASIA: ABNORMAL
POTASSIUM SERPL-SCNC: 4.8 MMOL/L (ref 3.5–5)
POTASSIUM, UR: 20.4 MMOL/L
PRO-BNP: 107 PG/ML (ref 0–125)
PROCALCITONIN: 0.61 NG/ML (ref 0–0.08)
PROTEIN UA: NEGATIVE MG/DL
PROTEIN UA: NEGATIVE MG/DL
RBC # BLD: 2.97 E12/L (ref 3.5–5.5)
RBC # BLD: 3 E12/L (ref 3.5–5.5)
RBC # BLD: 3.08 E12/L (ref 3.5–5.5)
RBC UA: ABNORMAL /HPF (ref 0–2)
RBC UA: ABNORMAL /HPF (ref 0–2)
SODIUM BLD-SCNC: 137 MMOL/L (ref 132–146)
SODIUM URINE: 94 MMOL/L
SPECIFIC GRAVITY UA: 1.01 (ref 1–1.03)
SPECIFIC GRAVITY UA: 1.01 (ref 1–1.03)
T3 FREE: 1.9 PG/ML (ref 2–4.4)
T4 FREE: 1.21 NG/DL (ref 0.93–1.7)
TOTAL CK: 936 U/L (ref 20–180)
TOTAL IRON BINDING CAPACITY: 217 MCG/DL (ref 250–450)
TRANSFERRIN: 179 MG/DL (ref 200–360)
TROPONIN, HIGH SENSITIVITY: 17 NG/L (ref 0–9)
TROPONIN, HIGH SENSITIVITY: 18 NG/L (ref 0–9)
TROPONIN, HIGH SENSITIVITY: 25 NG/L (ref 0–9)
TSH SERPL DL<=0.05 MIU/L-ACNC: 0.14 UIU/ML (ref 0.27–4.2)
UREA NITROGEN, UR: 412 MG/DL (ref 800–1666)
UROBILINOGEN, URINE: 0.2 E.U./DL
UROBILINOGEN, URINE: 0.2 E.U./DL
VITAMIN B-12: 444 PG/ML (ref 211–946)
WBC # BLD: 12.5 E9/L (ref 4.5–11.5)
WBC # BLD: 17.6 E9/L (ref 4.5–11.5)
WBC # BLD: 18.5 E9/L (ref 4.5–11.5)
WBC UA: ABNORMAL /HPF (ref 0–5)
WBC UA: ABNORMAL /HPF (ref 0–5)

## 2021-05-26 PROCEDURE — 2580000003 HC RX 258: Performed by: INTERNAL MEDICINE

## 2021-05-26 PROCEDURE — 84484 ASSAY OF TROPONIN QUANT: CPT

## 2021-05-26 PROCEDURE — 83010 ASSAY OF HAPTOGLOBIN QUANT: CPT

## 2021-05-26 PROCEDURE — 82533 TOTAL CORTISOL: CPT

## 2021-05-26 PROCEDURE — 36620 INSERTION CATHETER ARTERY: CPT

## 2021-05-26 PROCEDURE — 84300 ASSAY OF URINE SODIUM: CPT

## 2021-05-26 PROCEDURE — 83540 ASSAY OF IRON: CPT

## 2021-05-26 PROCEDURE — 6370000000 HC RX 637 (ALT 250 FOR IP): Performed by: PHYSICIAN ASSISTANT

## 2021-05-26 PROCEDURE — 85025 COMPLETE CBC W/AUTO DIFF WBC: CPT

## 2021-05-26 PROCEDURE — 2500000003 HC RX 250 WO HCPCS: Performed by: INTERNAL MEDICINE

## 2021-05-26 PROCEDURE — 02HV33Z INSERTION OF INFUSION DEVICE INTO SUPERIOR VENA CAVA, PERCUTANEOUS APPROACH: ICD-10-PCS | Performed by: INTERNAL MEDICINE

## 2021-05-26 PROCEDURE — 6370000000 HC RX 637 (ALT 250 FOR IP): Performed by: INTERNAL MEDICINE

## 2021-05-26 PROCEDURE — 83550 IRON BINDING TEST: CPT

## 2021-05-26 PROCEDURE — 83615 LACTATE (LD) (LDH) ENZYME: CPT

## 2021-05-26 PROCEDURE — 83036 HEMOGLOBIN GLYCOSYLATED A1C: CPT

## 2021-05-26 PROCEDURE — 37799 UNLISTED PX VASCULAR SURGERY: CPT

## 2021-05-26 PROCEDURE — 84466 ASSAY OF TRANSFERRIN: CPT

## 2021-05-26 PROCEDURE — 83880 ASSAY OF NATRIURETIC PEPTIDE: CPT

## 2021-05-26 PROCEDURE — 74176 CT ABD & PELVIS W/O CONTRAST: CPT

## 2021-05-26 PROCEDURE — 82436 ASSAY OF URINE CHLORIDE: CPT

## 2021-05-26 PROCEDURE — 2000000000 HC ICU R&B

## 2021-05-26 PROCEDURE — 2580000003 HC RX 258: Performed by: PHYSICIAN ASSISTANT

## 2021-05-26 PROCEDURE — 71045 X-RAY EXAM CHEST 1 VIEW: CPT

## 2021-05-26 PROCEDURE — 84540 ASSAY OF URINE/UREA-N: CPT

## 2021-05-26 PROCEDURE — 93005 ELECTROCARDIOGRAM TRACING: CPT | Performed by: INTERNAL MEDICINE

## 2021-05-26 PROCEDURE — 83605 ASSAY OF LACTIC ACID: CPT

## 2021-05-26 PROCEDURE — 82550 ASSAY OF CK (CPK): CPT

## 2021-05-26 PROCEDURE — 80048 BASIC METABOLIC PNL TOTAL CA: CPT

## 2021-05-26 PROCEDURE — 81001 URINALYSIS AUTO W/SCOPE: CPT

## 2021-05-26 PROCEDURE — 2700000000 HC OXYGEN THERAPY PER DAY

## 2021-05-26 PROCEDURE — 99024 POSTOP FOLLOW-UP VISIT: CPT | Performed by: NEUROLOGICAL SURGERY

## 2021-05-26 PROCEDURE — 82553 CREATINE MB FRACTION: CPT

## 2021-05-26 PROCEDURE — 82607 VITAMIN B-12: CPT

## 2021-05-26 PROCEDURE — 84443 ASSAY THYROID STIM HORMONE: CPT

## 2021-05-26 PROCEDURE — 87040 BLOOD CULTURE FOR BACTERIA: CPT

## 2021-05-26 PROCEDURE — 85027 COMPLETE CBC AUTOMATED: CPT

## 2021-05-26 PROCEDURE — 82746 ASSAY OF FOLIC ACID SERUM: CPT

## 2021-05-26 PROCEDURE — 82962 GLUCOSE BLOOD TEST: CPT

## 2021-05-26 PROCEDURE — 2500000003 HC RX 250 WO HCPCS

## 2021-05-26 PROCEDURE — 82728 ASSAY OF FERRITIN: CPT

## 2021-05-26 PROCEDURE — 84145 PROCALCITONIN (PCT): CPT

## 2021-05-26 PROCEDURE — 84481 FREE ASSAY (FT-3): CPT

## 2021-05-26 PROCEDURE — 36556 INSERT NON-TUNNEL CV CATH: CPT

## 2021-05-26 PROCEDURE — 6360000002 HC RX W HCPCS: Performed by: INTERNAL MEDICINE

## 2021-05-26 PROCEDURE — 84133 ASSAY OF URINE POTASSIUM: CPT

## 2021-05-26 PROCEDURE — 82570 ASSAY OF URINE CREATININE: CPT

## 2021-05-26 PROCEDURE — 36415 COLL VENOUS BLD VENIPUNCTURE: CPT

## 2021-05-26 PROCEDURE — 87088 URINE BACTERIA CULTURE: CPT

## 2021-05-26 PROCEDURE — 84439 ASSAY OF FREE THYROXINE: CPT

## 2021-05-26 PROCEDURE — 03HY32Z INSERTION OF MONITORING DEVICE INTO UPPER ARTERY, PERCUTANEOUS APPROACH: ICD-10-PCS | Performed by: INTERNAL MEDICINE

## 2021-05-26 RX ORDER — SODIUM CHLORIDE, SODIUM LACTATE, POTASSIUM CHLORIDE, AND CALCIUM CHLORIDE .6; .31; .03; .02 G/100ML; G/100ML; G/100ML; G/100ML
500 INJECTION, SOLUTION INTRAVENOUS ONCE
Status: COMPLETED | OUTPATIENT
Start: 2021-05-26 | End: 2021-05-26

## 2021-05-26 RX ORDER — SODIUM CHLORIDE 9 MG/ML
INJECTION, SOLUTION INTRAVENOUS CONTINUOUS
Status: DISCONTINUED | OUTPATIENT
Start: 2021-05-26 | End: 2021-05-28

## 2021-05-26 RX ORDER — LIDOCAINE HYDROCHLORIDE 10 MG/ML
INJECTION, SOLUTION INFILTRATION; PERINEURAL
Status: COMPLETED
Start: 2021-05-26 | End: 2021-05-26

## 2021-05-26 RX ORDER — SENNA PLUS 8.6 MG/1
1 TABLET ORAL NIGHTLY
Status: DISCONTINUED | OUTPATIENT
Start: 2021-05-26 | End: 2021-05-31 | Stop reason: HOSPADM

## 2021-05-26 RX ORDER — 0.9 % SODIUM CHLORIDE 0.9 %
500 INTRAVENOUS SOLUTION INTRAVENOUS ONCE
Status: COMPLETED | OUTPATIENT
Start: 2021-05-26 | End: 2021-05-26

## 2021-05-26 RX ADMIN — SODIUM CHLORIDE, POTASSIUM CHLORIDE, SODIUM LACTATE AND CALCIUM CHLORIDE 500 ML: 600; 310; 30; 20 INJECTION, SOLUTION INTRAVENOUS at 13:31

## 2021-05-26 RX ADMIN — SENNOSIDES 8.6 MG: 8.6 TABLET, FILM COATED ORAL at 20:25

## 2021-05-26 RX ADMIN — INSULIN LISPRO 4 UNITS: 100 INJECTION, SOLUTION INTRAVENOUS; SUBCUTANEOUS at 17:06

## 2021-05-26 RX ADMIN — OXYCODONE HYDROCHLORIDE 10 MG: 10 TABLET ORAL at 04:36

## 2021-05-26 RX ADMIN — VANCOMYCIN HYDROCHLORIDE 1750 MG: 10 INJECTION, POWDER, LYOPHILIZED, FOR SOLUTION INTRAVENOUS at 16:51

## 2021-05-26 RX ADMIN — OXYCODONE 5 MG: 5 TABLET ORAL at 20:44

## 2021-05-26 RX ADMIN — SODIUM CHLORIDE 500 ML: 9 INJECTION, SOLUTION INTRAVENOUS at 17:28

## 2021-05-26 RX ADMIN — CEFEPIME HYDROCHLORIDE 1000 MG: 1 INJECTION, POWDER, FOR SOLUTION INTRAMUSCULAR; INTRAVENOUS at 13:24

## 2021-05-26 RX ADMIN — HYDROCORTISONE SODIUM SUCCINATE 100 MG: 100 INJECTION, POWDER, FOR SOLUTION INTRAMUSCULAR; INTRAVENOUS at 11:28

## 2021-05-26 RX ADMIN — INSULIN LISPRO 2 UNITS: 100 INJECTION, SOLUTION INTRAVENOUS; SUBCUTANEOUS at 11:28

## 2021-05-26 RX ADMIN — Medication 10 ML: at 20:26

## 2021-05-26 RX ADMIN — HYDROCORTISONE SODIUM SUCCINATE 100 MG: 100 INJECTION, POWDER, FOR SOLUTION INTRAMUSCULAR; INTRAVENOUS at 17:58

## 2021-05-26 RX ADMIN — LEVOTHYROXINE SODIUM 150 MCG: 0.15 TABLET ORAL at 06:31

## 2021-05-26 RX ADMIN — INSULIN LISPRO 2 UNITS: 100 INJECTION, SOLUTION INTRAVENOUS; SUBCUTANEOUS at 09:18

## 2021-05-26 RX ADMIN — LIDOCAINE HYDROCHLORIDE: 10 INJECTION, SOLUTION INFILTRATION; PERINEURAL at 14:28

## 2021-05-26 RX ADMIN — GABAPENTIN 600 MG: 300 CAPSULE ORAL at 20:25

## 2021-05-26 RX ADMIN — NOREPINEPHRINE BITARTRATE 10 MCG/MIN: 1 INJECTION, SOLUTION, CONCENTRATE INTRAVENOUS at 08:40

## 2021-05-26 RX ADMIN — GABAPENTIN 600 MG: 300 CAPSULE ORAL at 15:12

## 2021-05-26 RX ADMIN — INSULIN LISPRO 3 UNITS: 100 INJECTION, SOLUTION INTRAVENOUS; SUBCUTANEOUS at 20:28

## 2021-05-26 RX ADMIN — GABAPENTIN 600 MG: 300 CAPSULE ORAL at 09:17

## 2021-05-26 RX ADMIN — SODIUM CHLORIDE: 9 INJECTION, SOLUTION INTRAVENOUS at 19:30

## 2021-05-26 ASSESSMENT — PAIN SCALES - GENERAL
PAINLEVEL_OUTOF10: 5
PAINLEVEL_OUTOF10: 0
PAINLEVEL_OUTOF10: 0
PAINLEVEL_OUTOF10: 3
PAINLEVEL_OUTOF10: 7
PAINLEVEL_OUTOF10: 0
PAINLEVEL_OUTOF10: 0
PAINLEVEL_OUTOF10: 3
PAINLEVEL_OUTOF10: 1
PAINLEVEL_OUTOF10: 1
PAINLEVEL_OUTOF10: 0

## 2021-05-26 NOTE — PROGRESS NOTES
Pharmacy Consultation Note  (Antibiotic Dosing and Monitoring)    Initial consult date: 5/26/21  Consulting physician: Dr. Jeanie Syed  Drug(s): Vancomycin  Indication: Sepsis vs shock      Age/  Gender Height Weight IBW Dosing weight  Allergy Information   60 y.o./female 5' 4\" (162.6 cm) 252 lb (114.3 kg)     Ideal body weight: 54.7 kg (120 lb 9.5 oz)  Adjusted ideal body weight: 78.5 kg (173 lb 2.5 oz)  114.3 kg  Keflex [cephalexin], Percocet [oxycodone-acetaminophen], and Ceftin [cefuroxime]            Date  Tmax WBC BUN/CR CrCL  (mL/min) Drug/Dose Time   Given Level(s)   (Time) Comments   5/26 99 12.5 28/1.7 44  Vancomycin 1750 mg IV q18h                                             Intake/Output Summary (Last 24 hours) at 5/26/2021 1457  Last data filed at 5/26/2021 1400  Gross per 24 hour   Intake 120 ml   Output 3175 ml   Net -3055 ml       No results for input(s): BLOODCULT2 in the last 72 hours. Historical Cultures:  Organism   Date Value Ref Range Status   08/06/2019 Klebsiella pneumoniae ssp pneumoniae (A)  Final   08/06/2019 Klebsiella pneumoniae ssp pneumoniae (A)  Final     No results for input(s): BC in the last 72 hours. Assessment:  · 62 yoF s/p L3-L5 decompression and fusion w TLIF for L3-L5 spondylolisthesis admitted to MICU for further evaluation of shock. Empiric antibiotics initiated and pharmacy consulted to dose/monitor vancomycin. · Patient received vancomycin in op?   · Goal AUC/CLAUDY 400 - 600; Goal trough 15 - 20  · sCr 1.7 (baseline 0.6)    Plan:  · Vancomycin 1750 mg IV q18h  · Pharmacist will follow and monitor/adjust dosing as necessary    Sonali Mckenzie, PharmD, BCPS 5/26/2021 2:57 PM

## 2021-05-26 NOTE — CONSULTS
[address]                  [address] [id number],  [facility]                                  CONSULTATION    PATIENT NAME: Jose C Landry                      :        1960  MED REC NO:   [id number]                            ROOM:       [facility]  ACCOUNT NO:   [de-identified]                           ADMIT DATE: 2021  PROVIDER:     Estrellita Zapata DO    CONSULT DATE:  2021    MEDICAL CONSULTATION    ADMITTING PROVIDER:  Josephine Rudd MD    PRIMARY CARE PROVIDER:  Bonita Gill DO    REASON FOR CONSULTATION:  Medical management. CHIEF COMPLAINT:  Lumbar radiculopathy. HISTORY OF PRESENT ILLNESS:  The patient is a 61-year-old   female who was admitted to the hospital on 2021 after undergoing  lumbar laminectomy, diskectomy, arthrodesis per Dr. Darcie Haywood. Postoperatively, she developed hypotension and was transferred to the  intensive care unit for pressor support. PAST MEDICAL HISTORY:  Diabetes mellitus type 2, hyperlipidemia,  hypothyroidism, obesity. PAST SURGICAL HISTORY:  Cholecystectomy, hysterectomy, bladder sling,  left knee meniscus surgery. MEDICATIONS PRIOR TO ADMISSION:  Neurontin, Zanaflex, GlycoLax,  Pravachol, Synthroid, Diovan, Lopid, Prilosec, Glucophage, Linzess,  thiamine, Ozempic, vitamin D.    SOCIAL HISTORY:  No tobacco, no alcohol. REVIEW OF SYSTEMS:  Remarkable for above-stated chief complaint plus  allergy to [facility], [address], [facility]. PHYSICAL EXAMINATION:  GENERAL APPEARANCE:  Reveals a 61-year-old  female who is seen  in the intensive care unit. She is on pressor. She is alert,  cooperative and a fair historian. VITAL SIGNS:  Temperature 98.7, pulse 91, respirations 16, blood  pressure 95/50. HEENT:  Head:  Normocephalic, atraumatic. Eyes:  Pupils are equal and  reactive to light. Extraocular muscles intact. Fundi not well  visualized.   Nose:  No obstruction,

## 2021-05-26 NOTE — FLOWSHEET NOTE
Stage  CI HR MAP TPRI SVI   Baseline 2.2 2.9   23   Challenge 2.9 103   29   Result (%Ä) 30.4% 4.8%   27.0%

## 2021-05-26 NOTE — PROGRESS NOTES
[facility]  Internal Medicine Residency Program  History and Physical  MICU    Patient:  Todd Ulloa 62 y.o. female MRN: [id number]     Date of Service: 5/26/2021    Hospital Day: 2      Chief complaint: RRT for hypotension, admitted for elective L3-L5 laminectomy  History of Present Illness   The patient is a 62 y.o. female with PMHx of T2DM, HLP, hypothyrodism, chronic back pain 2/2 to L3-L5 spondylolisthesis, admitted yesterday for L3-L5 laminectomyand fusion w/ TLIF. Pt tolerating surgery well, with estimated BL of 150 ml, no intra and post procedural complications. Today, RRT was called because pt was hypotensive 60/38 at 0750am, given 3L during the RRT, started on levopad, and transfer to ICU for management. EKG shows, Sinus tachycardia with premature supraventricular complexes and fusion complexes, lab wise, Cr 1.7, procal 0.61, WBC 18.5, trop 25, with cortisol 13.12. concerning shock secondary to neurogenic vs adrenal insurfficient vs septic       Past Medical History:      Diagnosis Date    Diabetes mellitus ([facility] [address]     Hyperlipidemia     Hypothyroidism     Obesity        Past Surgical History:        Procedure Laterality Date    CHOLECYSTECTOMY      HYSTERECTOMY, TOTAL ABDOMINAL      INCONTINENCE SURGERY      BLADDER SLING    KNEE SURGERY Left        Medications Prior to Admission:    Prior to Admission medications    Medication Sig Start Date End Date Taking? Authorizing Provider   gabapentin (NEURONTIN) 300 MG capsule Take 2 capsules by mouth 3 times daily for 90 days.  5/17/21 8/15/21 Yes Romario Stahl MD   tiZANidine (ZANAFLEX) 4 MG tablet Take 1 tablet by mouth every 8 hours as needed (pain) 5/13/21  Yes Chary Treviño DO   polyethylene glycol (GLYCOLAX) 17 g packet Take 17 g by mouth daily as needed   Yes Historical Provider, MD   pravastatin (PRAVACHOL) 10 MG tablet Take 1 tablet by mouth every other day 3/11/21  Yes Chary Treviño,    levothyroxine (SYNTHROID) 150 MCG tablet Take 1 tablet by mouth Daily 2/5/21  Yes Ej Hernandez DO   valsartan (DIOVAN) 80 MG tablet TAKE 1 TABLET BY MOUTH EVERY DAY 12/22/20  Yes Ej Hernandez DO   gemfibrozil (LOPID) 600 MG tablet TAKE 1 TABLET EVERY 12 HOURS 7/24/20  Yes Ej Hernandez DO   omeprazole (PRILOSEC) 20 MG delayed release capsule Take 1 capsule by mouth daily 7/24/20  Yes Ej Hernandez DO   metFORMIN (GLUCOPHAGE) 500 MG tablet Take 1 tablet by mouth daily (with breakfast) 7/24/20  Yes Prieto Smiley DO   linaclotide (LINZESS) 145 MCG capsule Take 1 capsule by mouth every morning (before breakfast) 7/2/20  Yes Prieto Robin DO   Handicap Placard MISC by Does not apply route Patient cannot walk 200 ft without stopping to rest.    Expiration 1 yr 5/3/21   Ej Hernandez DO   Thiamine HCl (B-1) 100 MG TABS Take 1 pill daily 3/11/21   Ej Hernandez DO   levothyroxine (SYNTHROID) 150 MCG tablet Take 1 tablet by mouth Daily 1/12/21   Ej Hernandez DO   rosuvastatin (CRESTOR) 5 MG tablet Take 1 tablet by mouth daily 1/12/21   Ej Hernandez DO   meloxicam (MOBIC) 15 MG tablet Take 1 tablet by mouth daily as needed for Pain 1/11/21   Ej Hernandez DO   vitamin D (ERGOCALCIFEROL) 1.25 MG (57288 UT) CAPS capsule TAKE 1 CAPSULE WEEKLY  Patient taking differently: Take 50,000 Units by mouth once a week [facility] 12/28/20   Prieto Robin DO   OZEMPIC, 1 MG/DOSE, 2 MG/1.5ML SOPN Inject 1 mg into the skin once a week  Patient taking differently: Inject 1 mg into the skin once a week YESSY 10/20/20   Prieto Robin DO   omega-3 acid ethyl esters (LOVAZA) 1 g capsule TAKE 1 CAPSULE BY MOUTH EVERY DAY 7/28/20   Ej Hernandez DO       Allergies:  Keflex [cephalexin], Percocet [oxycodone-acetaminophen], and Ceftin [cefuroxime]    Social History:   TOBACCO:   reports that she has never smoked. She has never used smokeless tobacco.  ETOH:   reports no history of alcohol use.     Family History: Problem Relation Age of Onset    Diabetes Mother     Diabetes Father        REVIEW OF SYSTEMS:    · Constitutional: No fever, no chills, no change in weight; good appetite  · HEENT: No blurred vision, no ear problems, no sore throat, no rhinorrhea. · Respiratory: No cough, no sputum production, no pleuritic chest pain, no shortness of breath  · Cardiology: No angina, no dyspnea on exertion, no paroxysmal nocturnal dyspnea, no orthopnea, no palpitation, no leg swelling. · Gastroenterology: No dysphagia, no reflux; no abdominal pain, no nausea or vomiting; no constipation or diarrhea.  No hematochezia   · Genitourinary: No dysuria, no frequency, hesitancy; no hematuria  · Musculoskeletal: no joint pain, no myalgia, no change in range of movement  · Neurology: no focal weakness in extremities, no slurred speech, no double vision, no tingling or numbness sensation  · Endocrinology: no temperature intolerance, no polyphagia, polydipsia or polyuria  · Hematology: no increased bleeding, no bruising, no lymphadenopathy  · Skin: no skin changes noticed by patient  · Psychology: no depressed mood, no suicidal ideation    Physical Exam   · Vitals: BP (!) 75/55   Pulse 79   Temp 98.2 °F (36.8 °C) (Infrared)   Resp 26   Ht 5' 4\" (1.626 m)   Wt 252 lb (114.3 kg)   SpO2 100%   BMI 43.26 kg/m²   · ABP (Arterial line BP): 112/46  · ABP mean (Arterial line mean): 71 mmHg    · General Appearance: alert and oriented to person, place and time, well developed and well- nourished, in no acute distress  · Skin: warm and dry, no rash or erythema  · Head: normocephalic and atraumatic  · Eyes: pupils equal, round, and reactive to light, extraocular eye movements intact, conjunctivae normal  · ENT: tympanic membrane, external ear and ear canal normal bilaterally, nose without deformity, nasal mucosa and turbinates normal without polyps  · Neck: supple and non-tender without mass, no thyromegaly or thyroid nodules, no cervical 5/14/2021  EXAMINATION: CT OF THE LUMBAR SPINE WITHOUT CONTRAST  5/14/2021 TECHNIQUE: CT of the lumbar spine was performed without the administration of intravenous contrast. Multiplanar reformatted images are provided for review. Dose modulation, iterative reconstruction, and/or weight based adjustment of the mA/kV was utilized to reduce the radiation dose to as low as reasonably achievable. COMPARISON: MRI lumbar spine 03/27/2021 HISTORY: ORDERING SYSTEM PROVIDED HISTORY: Lumbar stenosis with neurogenic claudication TECHNOLOGIST PROVIDED HISTORY: Reason for exam:->preop CT scan must be done before surgery FINDINGS: BONES/ALIGNMENT: No acute fracture or traumatic malalignment. DEGENERATIVE CHANGES:  There is advanced disc space narrowing at L5-S1 with mild disc osteophyte complex. Moderate degenerative disc changes are present at the remaining levels with vacuum disc phenomenon at L4-L5. There is narrowing of the L3-L4 interspinous space, compatible with developing Baastrup's disease. Moderate-advanced facet arthropathy in the lower lumbar spine is most prominent on the right at L5-S1. Areas of central and neural foraminal stenosis are better evaluated on the recent MRI exam. SOFT TISSUES/RETROPERITONEUM: The visualized superficial soft tissues are unremarkable. There is mild-moderate atherosclerosis. A parapelvic cyst is partially imaged in the right kidney. 1.  No acute osseous abnormality is identified. 2. Degenerative changes appear similar to the recent MRI exam.     XR CHEST PORTABLE    Result Date: 5/26/2021  EXAMINATION: ONE XRAY VIEW OF THE CHEST 5/26/2021 9:05 am COMPARISON: May 20 HISTORY: ORDERING SYSTEM PROVIDED HISTORY: acute hypoxia TECHNOLOGIST PROVIDED HISTORY: Reason for exam:->acute hypoxia What reading provider will be dictating this exam?->CRC FINDINGS: Heart has upper normal size. Areas of subsegmental atelectasis are seen at the bases.   There is slight prominence of perihilar vessels. Development of signs of volume overload since may 20. FLUORO FOR SURGICAL PROCEDURES    Result Date: 5/25/2021  EXAMINATION: SPOT FLUOROSCOPIC IMAGES 5/25/2021 1:56 pm TECHNIQUE: Fluoroscopy was provided by the radiology department for procedure. Radiologist was not present during examination. FLUOROSCOPY DOSE AND TYPE OR TIME AND EXPOSURES: Fluoro time: 7 seconds. Images: 2 COMPARISON: None HISTORY: ORDERING SYSTEM PROVIDED HISTORY: lumbar fusion TECHNOLOGIST PROVIDED HISTORY: Reason for exam:->lumbar fusion What reading provider will be dictating this exam?->CRC Intraprocedural imaging. FINDINGS: Intraoperative fluoroscopy utilized for orthopedic spine procedure. Multiple pedicle screws seen at level of lumbar spine. Metallic surgical instrumentation present. Procedure appears to be underway. Intraprocedural fluoroscopic spot images as above. See separate procedure report for more information. EKG: Sinus tachycardia with premature supraventricular complexes and fusion complexes.     Resident's Assessment and Plan     Kenna Pepper is a 62 y.o. female    Assessment  Shock, in the setting of hypotension refractory to 2.5 L IVF NS, secondary to neurogenic (dural traction) vs adrenal insuffiency vs septic  Lumbar spondylolithesis s/p decompression and fusion  Acute anemia  ALTA, likely prerenal  Elevated troponin, likely 2/2 to demand ischemia  Hx of T2DM  Hx of HLP  Hx of hypothyroidism      Plan  IVF resuscitation, received 3.5 thus far, obtain NICOM, continue in 500 cc incremental as indicated  Solu cortef 100 q8h  Start cefepime and vanc  Septic workup, pro-marbin, esr, crp, pan-cultures, adjust abx per workup  Pain control dilaudid PRN per NS  NS on the case, recs apprecaited  CT abd pelvis  Obtain, FOBT, iron panel, PBS, B12/B9, hepato, LDH  Obtain EKG, CK CKMB, tropo  Obtain BNP  obtain Cr electrolyte, nitro, creatine  Carb control diet  MDSSI ACHS  followup BG ACHS      DVT prophylaxis/ GI prophylaxis:  Hold/on diet  Disposition: ICU management    Pattie Godinez MD, PGY-1   Attending physician: Dr. Ender Burton      I personally saw, examined and provided care for the patient. Radiographs, labs and medication list were reviewed by me independently. Review of Residents documentation was conducted and revisions were made as appropriate. I agree with the above documented exam, problem list and plan of care.         CCT excluding procedures >30 minutes    Kya Garcia DO

## 2021-05-26 NOTE — CARE COORDINATION
5/26, Patient to be transferred to ICU. Per neurosurgery note Pre-op and Post-op diagnosis-Lumbar stenosis, Lumbar spondylolistheses leg weakness, intractable back pain. Surgery was done on 5/25. ALEJANDRA to follow for further discharge planning needs.     JD Deleon  [facility] Case Management  [phone]

## 2021-05-26 NOTE — H&P
[facility]  Internal Medicine Residency Program  History and Physical  MICU    Patient:  Arcenio Carr 62 y.o. female MRN: [id number]     Date of Service: 5/26/2021    Hospital Day: 2      Chief complaint: refractory HyTN  History of Present Illness   The patient is a 62 y.o. female 1 day s/p L3-L5 decompression and fusion w/ TLIF for L3-L5 spondylolisthesis. Patient tolerated procedure well and there were no pre-, intra-, or post-operative complications. Patient was admitted to the floor for post-operative observation and was able to max out her incentive spirometry device with inhalation. Today, the patient was HyTN in the morning (60/38 @ 0750) and remained HyTN despite 2.5 L NaCl bolus (110/50 @ 0850). Patient was started on levophed drip and had received 17.5 mL of 20mcg/mn. Patient was transferred to MICU for further observation and remains on levophed drip. Plan to place art line for BP monitoring and a central line for fluid drip administration. Past Medical History:      Diagnosis Date    Diabetes mellitus ([facility] [address]     Hyperlipidemia     Hypothyroidism     Obesity        Past Surgical History:        Procedure Laterality Date    CHOLECYSTECTOMY      HYSTERECTOMY, TOTAL ABDOMINAL      INCONTINENCE SURGERY      BLADDER SLING    KNEE SURGERY Left        Medications Prior to Admission:    Prior to Admission medications    Medication Sig Start Date End Date Taking? Authorizing Provider   gabapentin (NEURONTIN) 300 MG capsule Take 2 capsules by mouth 3 times daily for 90 days.  5/17/21 8/15/21 Yes Willian Noland MD   tiZANidine (ZANAFLEX) 4 MG tablet Take 1 tablet by mouth every 8 hours as needed (pain) 5/13/21  Yes Ej Hernandez DO   polyethylene glycol (GLYCOLAX) 17 g packet Take 17 g by mouth daily as needed   Yes Historical Provider, MD   pravastatin (PRAVACHOL) 10 MG tablet Take 1 tablet by mouth every other day 3/11/21  Yes Ej Hernandez DO   levothyroxine (SYNTHROID) 150 MCG tablet Take 1 tablet by mouth Daily 2/5/21  Yes Chary Treviño DO   valsartan (DIOVAN) 80 MG tablet TAKE 1 TABLET BY MOUTH EVERY DAY 12/22/20  Yes Chary Treviño DO   gemfibrozil (LOPID) 600 MG tablet TAKE 1 TABLET EVERY 12 HOURS 7/24/20  Yes Chary Treviño DO   omeprazole (PRILOSEC) 20 MG delayed release capsule Take 1 capsule by mouth daily 7/24/20  Yes Chary Treviño DO   metFORMIN (GLUCOPHAGE) 500 MG tablet Take 1 tablet by mouth daily (with breakfast) 7/24/20  Yes Prieto Lamb DO   linaclotide (LINZESS) 145 MCG capsule Take 1 capsule by mouth every morning (before breakfast) 7/2/20  Yes Prieto Robin DO   Handicap Placard MISC by Does not apply route Patient cannot walk 200 ft without stopping to rest.    Expiration 1 yr 5/3/21   Chary Treviño DO   Thiamine HCl (B-1) 100 MG TABS Take 1 pill daily 3/11/21   Chary Treviño DO   levothyroxine (SYNTHROID) 150 MCG tablet Take 1 tablet by mouth Daily 1/12/21   Chary Treviño DO   rosuvastatin (CRESTOR) 5 MG tablet Take 1 tablet by mouth daily 1/12/21   Chary Treivño DO   meloxicam (MOBIC) 15 MG tablet Take 1 tablet by mouth daily as needed for Pain 1/11/21   Chary Treviño DO   vitamin D (ERGOCALCIFEROL) 1.25 MG (95685 UT) CAPS capsule TAKE 1 CAPSULE WEEKLY  Patient taking differently: Take 50,000 Units by mouth once a week [facility] 12/28/20   Prieto Robin DO   OZEMPIC, 1 MG/DOSE, 2 MG/1.5ML SOPN Inject 1 mg into the skin once a week  Patient taking differently: Inject 1 mg into the skin once a week YESSY 10/20/20   Prieto Robin DO   omega-3 acid ethyl esters (LOVAZA) 1 g capsule TAKE 1 CAPSULE BY MOUTH EVERY DAY 7/28/20   Chary Treviño DO       Allergies:  Keflex [cephalexin], Percocet [oxycodone-acetaminophen], and Ceftin [cefuroxime]    Social History:   TOBACCO:   reports that she has never smoked. She has never used smokeless tobacco.  ETOH:   reports no history of alcohol use.       Family History: Problem Relation Age of Onset    Diabetes Mother     Diabetes Father        REVIEW OF SYSTEMS:    · Constitutional: No fever, no chills, no change in weight; good appetite  · HEENT: No blurred vision, no ear problems, no sore throat, no rhinorrhea. · Respiratory: No cough, no sputum production, no pleuritic chest pain, no shortness of breath  · Cardiology: No angina, no dyspnea on exertion, no paroxysmal nocturnal dyspnea, no orthopnea, no palpitation, no leg swelling. · Gastroenterology: No dysphagia, no reflux; no abdominal pain, no nausea or vomiting; no constipation or diarrhea. No hematochezia   · Genitourinary: No dysuria, no frequency, hesitancy; no hematuria  · Musculoskeletal: no joint pain, no myalgia, no change in range of movement. Tenderness in RLE in calf region  · Neurology: no focal weakness in extremities, no slurred speech, no double vision, no tingling or numbness sensation.  Has weakness in BLE, much improved following surgery  · Endocrinology: no temperature intolerance, no polyphagia, polydipsia or polyuria  · Hematology: no increased bleeding, no bruising, no lymphadenopathy  · Skin: no skin changes noticed by patient  · Psychology: no depressed mood, no suicidal ideation    Physical Exam     Vitals:    05/26/21 0915 05/26/21 1030 05/26/21 1100 05/26/21 1200   BP: (!) 113/53 (!) 110/55 (!) 109/54 94/60   Pulse: 96 90 92 87   Resp: 20 20 21 28   Temp:  98.9 °F (37.2 °C) 99 °F (37.2 °C) 98.7 °F (37.1 °C)   TempSrc:  Infrared Infrared Infrared   SpO2:  98% 98% 100%   Weight:       Height:           · Vitals: BP 94/60   Pulse 87   Temp 98.7 °F (37.1 °C) (Infrared)   Resp 28   Ht 5' 4\" (1.626 m)   Wt 252 lb (114.3 kg)   SpO2 100%   BMI 43.26 kg/m²   ABP (Arterial line BP): 110/53  ABP mean (Arterial line mean): 68 mmHg    · General Appearance: alert and oriented to person, place and time, well-developed and well-nourished, in no acute distress  · Skin: warm and dry, no rash or erythema  · Head: normocephalic and atraumatic  · Eyes: pupils equal, round, and reactive to light  · Pulmonary/Chest: clear to auscultation bilaterally- no wheezes, rales or rhonchi, normal air movement, no respiratory distress  · Cardiovascular: normal rate, normal S1 and S2, no gallops, intact distal pulses and no carotid bruits  · Abdomen: soft, non-tender, non-distended, normal bowel sounds, no masses or organomegaly  · Extremities: no cyanosis and no clubbing  · Musculoskeletal: normal range of motion, no joint swelling, deformity. Posterior calf tenderness in RLE  · Neurologic: speech normal, 4/5 power in RLE w/ 2-5 beats of myoclonus, 3/5 power in LLE w/ 10-12 beats of myoclonus. Gait deferred due to post-op and HyTN status     Lines     site day    Art line   None Plan to place today   TLC R IJ 05/26/2021   PICC None    Hemoaccess None    Ponce catheter  05/25/2021   Oxygen:     nasal canula at 2L/min/face mask (FiO2:21%)  Mechanical Ventilation:   On nasal cannula    ABG:   No results found for: PHART, PH, QFX2VQC, PCO2, PO2ART, PO2, XDO7RUY, HCO3, BEART, BE, THGBART, THB, WVT8UGA, P3UMZQMR, O2SAT     Labs and Imaging Studies   Basic Labs  CBC:   Lab Results   Component Value Date    WBC 12.5 05/26/2021    RBC 3.00 05/26/2021    HGB 8.8 05/26/2021    HCT 27.2 05/26/2021    MCV 90.7 05/26/2021    RDW 13.4 05/26/2021     05/26/2021     CMP:  Lab Results   Component Value Date     05/26/2021    K 4.8 05/26/2021    K 4.2 05/20/2021     05/26/2021    CO2 24 05/26/2021    BUN 28 05/26/2021    PROT 7.1 04/13/2021     TSH:    Lab Results   Component Value Date    TSH 0.144 05/26/2021       Imaging Studies:     XR CHEST (2 VW)    Result Date: 5/20/2021  EXAMINATION: TWO XRAY VIEWS OF THE CHEST 5/20/2021 9:53 am COMPARISON: None.  HISTORY: ORDERING SYSTEM PROVIDED HISTORY: Pre-op testing TECHNOLOGIST PROVIDED HISTORY: Reason for exam:->pre op What reading provider will be dictating this exam?->CRC FINDINGS: No airspace opacity or pleural effusion. The heart is normal size. No pneumothorax. No free air beneath the hemidiaphragms. No pneumonia or pleural effusion. CT LUMBAR SPINE WO CONTRAST    Result Date: 5/14/2021  EXAMINATION: CT OF THE LUMBAR SPINE WITHOUT CONTRAST  5/14/2021 TECHNIQUE: CT of the lumbar spine was performed without the administration of intravenous contrast. Multiplanar reformatted images are provided for review. Dose modulation, iterative reconstruction, and/or weight based adjustment of the mA/kV was utilized to reduce the radiation dose to as low as reasonably achievable. COMPARISON: MRI lumbar spine 03/27/2021 HISTORY: ORDERING SYSTEM PROVIDED HISTORY: Lumbar stenosis with neurogenic claudication TECHNOLOGIST PROVIDED HISTORY: Reason for exam:->preop CT scan must be done before surgery FINDINGS: BONES/ALIGNMENT: No acute fracture or traumatic malalignment. DEGENERATIVE CHANGES:  There is advanced disc space narrowing at L5-S1 with mild disc osteophyte complex. Moderate degenerative disc changes are present at the remaining levels with vacuum disc phenomenon at L4-L5. There is narrowing of the L3-L4 interspinous space, compatible with developing Baastrup's disease. Moderate-advanced facet arthropathy in the lower lumbar spine is most prominent on the right at L5-S1. Areas of central and neural foraminal stenosis are better evaluated on the recent MRI exam. SOFT TISSUES/RETROPERITONEUM: The visualized superficial soft tissues are unremarkable. There is mild-moderate atherosclerosis. A parapelvic cyst is partially imaged in the right kidney. 1.  No acute osseous abnormality is identified.  2. Degenerative changes appear similar to the recent MRI exam.     XR CHEST PORTABLE    Result Date: 5/26/2021  EXAMINATION: ONE XRAY VIEW OF THE CHEST 5/26/2021 9:05 am COMPARISON: May 20 HISTORY: ORDERING SYSTEM PROVIDED HISTORY: acute hypoxia TECHNOLOGIST PROVIDED HISTORY: and labs (replete electrolytes as needed   -patient on IV NaCl   -LA 1.1, TNI high Sn 25     2. Lumbar spondylolithesis s/p decompression and fusion   -patient stable from a neurosurgical standpoint   -PRN pain medication on board    3. Hyperglycemia/ DM type 2   -continue patient home meds   -monitor patient labs   -sliding scale on board    4. Hx of hyperlipidemia   -continue home meds    5. Hx of hypothyroidism   -continue home meds    6.  Hx of obesity   -stable      PT/OT evaluation: consulted  DVT prophylaxis: none  GI prophylaxis: milk of magnesia  Disposition: maintain observation in ICU for refractory hypotension    Ivy Salazar, PGY-1   Attending physician: Dr. Mónica Mueller, DO

## 2021-05-26 NOTE — PROGRESS NOTES
Physical Therapy  Name: Ervin Novak  Room: 5129/9807-N  Date: 05/26/21    PT consult received and appreciated. Pt transferred to ICU d/t hypotension and RRT this AM.   Will follow and complete at a later time/date as appropriate.      Mandy Bledsoe, PT, DPT  [id number]

## 2021-05-26 NOTE — PROCEDURES
Central Line Insertion     Procedure: right internal jugular vein Triple Lumen Catheter placement. Indications: vascular access    Consent: The patient provided verbal consent for this procedure. Number of sticks: 1    Number of Kits used: 1    Procedure: Time Out: Immediately prior to the procedure a \"timeout\" was called to verify the correct patient and procedure. The patient was place in the trendelenburg position and the skin over the right internal jugular vein was prepped with betadine and draped in a sterile fashion. Local anesthesia was obtained by infiltration using 1% Lidocaine without epinephrine. With Ultrasound guidance a large bore needle was used to identify the vein, dark non pulsatile blood returned. The guide wire was then inserted through the needle with minimal resistance. 2 mm nick was made in the skin beside the guidewire. Then a dilator was inserted and removed. A triple lumen catheter was then inserted into the vessel over the guide wire using the Seldinger technique to the  16 cm osbaldo. All ports showed good, free flowing blood return and were flushed with saline solution. The catheter was then securely fastened to the skin with sutures and covered with a bio patch and sterile dressing. A post procedure X-ray was ordered and showed good line position. Complications: None   The patient tolerated the procedure well. Estimated blood loss: 5 ml.     Bakari Lubin MD PGY-1  5/26/2021  2:44 PM      Attending: Dr. Yvette Parker    Electronically signed by Nishant Grajeda DO on 5/26/2021 at 7:42 PM

## 2021-05-26 NOTE — SIGNIFICANT EVENT
Rapid Response Team Note  Date of event: 5/26/2021   Time of event: Dolores 62y.o. year old female   YOB: 1960   Admit date:  5/25/2021   Location: 0/36-A   Witnessed? : [x]Yes  [] No  Monitored? : [x]Yes  [] No  Code status: [x] Full  [] DNR-CCA  []DNR-CC  ______________________________________________________________________  Reason for RRT:    [] RR < 8     [] RR > 28   [] SpO2 <90%   [] HR < 40 bpm   [] HR > 130 bpm  [x] SBP < 90 mmHg    [] SpO2 <90%   [] LOC   [] Seizures    [] Significant Bleeding Event    [] Other:      Subjective:   CTSP regarding the above event, patient noticed to be hypotensive. She is s/p lumbar left neck 70 a day prior, as remains on IV fluids. She has no focal neurologic deficits, and heart rate was in the 90s. She is afebrile. She was given 3 L normal saline in total, patient remained hypotensive and was started on Levophed for infusion, and transferred to the MICU for further management.   Objective:   Vital signs: Temp: 99/BP: 74/39/RR: 20/HR: 94  Initial Condition:  Conscious   [x] Yes  [] No     Breathing [x] Yes  [] No     Pulse  [x] Yes  [] No    Airway:   [x] Open/ Clear     Intervention: [] None  [] Pooled secretions     [] Suctioned  [] Stridor      [] Intubation    Lungs:   [x] Symmetrical chest rise/ CTABL Intervention: [] None  [] Use of accessory muscles    [] NIV (CPAP/BiPAP)  [] Cyanosis      [] Nasal Oxygen/Mask  [] Wheezing       [] ABG             [] CXR      Circulation:   Rhythm:  [x] Sinus [] Other: with PVCs Intervention: [] None            [] IV Access  [] Peripheral              [] Central            [] EKG            [] Cardioversion            [] Defibrillation     Capillary Refill:  [] > 2 seconds [] < 2 seconds    Neurologic:   [] NIHSS      [] Pupillary Response:  PERRL   Response to pain:   [x] Yes  [] No  Follow commands:  [x] Yes  [] No  Facial asymmetry:  [] Yes  [x] No  Motor strength:  [x] Equal  [] Focal deficit:

## 2021-05-26 NOTE — PROGRESS NOTES
OT consult received and appreciated. Chart reviewed. Will hold evaluation due to patient with low BP this am and RRT called . Will evaluate at a later time. Thank you.  Ronak Emmanuel, OTR/L #[id number]

## 2021-05-26 NOTE — PLAN OF CARE
Problem: Discharge Planning:  Goal: Discharged to appropriate level of care  Description: Discharged to appropriate level of care  5/26/2021 0251 by Geraline Duverney, RN  Outcome: Met This Shift  5/25/2021 1857 by Ladonna Goodwin RN  Outcome: Met This Shift     Problem: Infection - Surgical Site:  Goal: Will show no infection signs and symptoms  Description: Will show no infection signs and symptoms  5/26/2021 0251 by Geraline Duverney, RN  Outcome: Met This Shift  5/25/2021 1857 by Ladonna Goodwin RN  Outcome: Met This Shift     Problem: Mobility - Impaired:  Goal: Mobility will improve to maximum level  Description: Mobility will improve to maximum level  Outcome: Met This Shift     Problem: Pain - Acute:  Goal: Pain level will decrease  Description: Pain level will decrease  Outcome: Met This Shift     Problem: Sensory Perception - Impaired:  Goal: Sensory function intact, lower extremity  Description: Sensory function intact, lower extremity  Outcome: Met This Shift     Problem: Falls - Risk of:  Goal: Will remain free from falls  Description: Will remain free from falls  Outcome: Met This Shift  Goal: Absence of physical injury  Description: Absence of physical injury  Outcome: Met This Shift     Problem: Pain:  Goal: Pain level will decrease  Description: Pain level will decrease  Outcome: Met This Shift  Goal: Control of acute pain  Description: Control of acute pain  Outcome: Met This Shift  Goal: Control of chronic pain  Description: Control of chronic pain  Outcome: Met This Shift

## 2021-05-26 NOTE — PROGRESS NOTES
Department of Neurosurgery  Progress Note        SUBJECTIVE:  Hypotensive this morning, Rapid response called    REVIEW OF SYSTEMS :  Constitutional: Negative for chills and fever. Neurological: Negative for dizziness, tremors and speech change.  no numbness, no weakness    OBJECTIVE:   VITALS:  BP (!) 108/52   Pulse 81   Temp 99 °F (37.2 °C) (Temporal)   Resp 18   Ht 5' 4\" (1.626 m)   Wt 252 lb (114.3 kg)   SpO2 91%   BMI 43.26 kg/m²   PHYSICAL:    COGNITION:       Awake, Alert and Oriented: X3    LANGUAGE:       comprehension-intact       repetition-intact       fluency-intact    CN:       OU- 4-3mm Brisk and reactive       EOMI, visual fields full, TML, no facial sensory deficits, face symmetric, Hearing intact    SENSORY: grossly normal      MOTOR:  (R)  (L)  FC  FC   ---------  FC  FC    5/5 strength in all motor groups, except left leg 4/5 baseline    DATA:  CBC:   Lab Results   Component Value Date    WBC 12.5 05/26/2021    RBC 3.00 05/26/2021    HGB 8.8 05/26/2021    HCT 27.2 05/26/2021    MCV 90.7 05/26/2021    MCH 29.3 05/26/2021    MCHC 32.4 05/26/2021    RDW 13.4 05/26/2021     05/26/2021    MPV 10.1 05/26/2021     BMP:    Lab Results   Component Value Date     05/26/2021    K 4.8 05/26/2021    K 4.2 05/20/2021     05/26/2021    CO2 24 05/26/2021    BUN 28 05/26/2021    LABALBU 4.4 04/13/2021    CREATININE 1.7 05/26/2021    CALCIUM 8.1 05/26/2021    GFRAA 37 05/26/2021    LABGLOM 31 05/26/2021    GLUCOSE 167 05/26/2021     PT/INR:    Lab Results   Component Value Date    PROTIME 11.8 05/20/2021    INR 1.1 05/20/2021     PTT:  No results found for: APTT, PTT[APTT}    Current Inpatient Medications  Current Facility-Administered Medications: norepinephrine (LEVOPHED) 16 mg in dextrose 5 % 250 mL infusion, 2-100 mcg/min, Intravenous, Continuous  gabapentin (NEURONTIN) capsule 600 mg, 600 mg, Oral, TID  levothyroxine (SYNTHROID) tablet 150 mcg, 150 mcg, Oral, Daily  linaclotide AM

## 2021-05-27 ENCOUNTER — APPOINTMENT (OUTPATIENT)
Dept: GENERAL RADIOLOGY | Age: 61
DRG: 454 | End: 2021-05-27
Attending: NEUROLOGICAL SURGERY
Payer: COMMERCIAL

## 2021-05-27 LAB
ALBUMIN SERPL-MCNC: 3.2 G/DL (ref 3.5–5.2)
ALP BLD-CCNC: 56 U/L (ref 35–104)
ALT SERPL-CCNC: 25 U/L (ref 0–32)
ANION GAP SERPL CALCULATED.3IONS-SCNC: 7 MMOL/L (ref 7–16)
AST SERPL-CCNC: 20 U/L (ref 0–31)
BILIRUB SERPL-MCNC: 0.2 MG/DL (ref 0–1.2)
BILIRUBIN DIRECT: <0.2 MG/DL (ref 0–0.3)
BILIRUBIN, INDIRECT: ABNORMAL MG/DL (ref 0–1)
BUN BLDV-MCNC: 7 MG/DL (ref 6–20)
CALCIUM SERPL-MCNC: 8.2 MG/DL (ref 8.6–10.2)
CHLORIDE BLD-SCNC: 106 MMOL/L (ref 98–107)
CO2: 26 MMOL/L (ref 22–29)
CREAT SERPL-MCNC: 0.5 MG/DL (ref 0.5–1)
EKG ATRIAL RATE: 101 BPM
EKG ATRIAL RATE: 102 BPM
EKG P AXIS: 25 DEGREES
EKG P AXIS: 47 DEGREES
EKG P-R INTERVAL: 148 MS
EKG P-R INTERVAL: 158 MS
EKG Q-T INTERVAL: 334 MS
EKG Q-T INTERVAL: 356 MS
EKG QRS DURATION: 76 MS
EKG QRS DURATION: 88 MS
EKG QTC CALCULATION (BAZETT): 435 MS
EKG QTC CALCULATION (BAZETT): 461 MS
EKG R AXIS: -6 DEGREES
EKG R AXIS: 27 DEGREES
EKG T AXIS: 21 DEGREES
EKG T AXIS: 52 DEGREES
EKG VENTRICULAR RATE: 101 BPM
EKG VENTRICULAR RATE: 102 BPM
GFR AFRICAN AMERICAN: >60
GFR NON-AFRICAN AMERICAN: >60 ML/MIN/1.73
GLUCOSE BLD-MCNC: 259 MG/DL (ref 74–99)
HCT VFR BLD CALC: 23.9 % (ref 34–48)
HEMOGLOBIN: 7.9 G/DL (ref 11.5–15.5)
MCH RBC QN AUTO: 29.3 PG (ref 26–35)
MCHC RBC AUTO-ENTMCNC: 33.1 % (ref 32–34.5)
MCV RBC AUTO: 88.5 FL (ref 80–99.9)
METER GLUCOSE: 236 MG/DL (ref 74–99)
METER GLUCOSE: 269 MG/DL (ref 74–99)
METER GLUCOSE: 279 MG/DL (ref 74–99)
METER GLUCOSE: 325 MG/DL (ref 74–99)
PATHOLOGIST REVIEW: NORMAL
PDW BLD-RTO: 13.2 FL (ref 11.5–15)
PLATELET # BLD: 248 E9/L (ref 130–450)
PMV BLD AUTO: 9.7 FL (ref 7–12)
POTASSIUM SERPL-SCNC: 3.8 MMOL/L (ref 3.5–5)
RBC # BLD: 2.7 E12/L (ref 3.5–5.5)
SODIUM BLD-SCNC: 139 MMOL/L (ref 132–146)
TOTAL PROTEIN: 5.5 G/DL (ref 6.4–8.3)
TROPONIN, HIGH SENSITIVITY: 15 NG/L (ref 0–9)
WBC # BLD: 12.3 E9/L (ref 4.5–11.5)

## 2021-05-27 PROCEDURE — 97165 OT EVAL LOW COMPLEX 30 MIN: CPT

## 2021-05-27 PROCEDURE — 80076 HEPATIC FUNCTION PANEL: CPT

## 2021-05-27 PROCEDURE — 82962 GLUCOSE BLOOD TEST: CPT

## 2021-05-27 PROCEDURE — 84484 ASSAY OF TROPONIN QUANT: CPT

## 2021-05-27 PROCEDURE — 6370000000 HC RX 637 (ALT 250 FOR IP): Performed by: INTERNAL MEDICINE

## 2021-05-27 PROCEDURE — 2580000003 HC RX 258: Performed by: INTERNAL MEDICINE

## 2021-05-27 PROCEDURE — 6360000002 HC RX W HCPCS: Performed by: PHYSICIAN ASSISTANT

## 2021-05-27 PROCEDURE — 93010 ELECTROCARDIOGRAM REPORT: CPT | Performed by: INTERNAL MEDICINE

## 2021-05-27 PROCEDURE — 97530 THERAPEUTIC ACTIVITIES: CPT

## 2021-05-27 PROCEDURE — 2700000000 HC OXYGEN THERAPY PER DAY

## 2021-05-27 PROCEDURE — 80048 BASIC METABOLIC PNL TOTAL CA: CPT

## 2021-05-27 PROCEDURE — 99024 POSTOP FOLLOW-UP VISIT: CPT | Performed by: NEUROLOGICAL SURGERY

## 2021-05-27 PROCEDURE — 36415 COLL VENOUS BLD VENIPUNCTURE: CPT

## 2021-05-27 PROCEDURE — 72100 X-RAY EXAM L-S SPINE 2/3 VWS: CPT

## 2021-05-27 PROCEDURE — 2500000003 HC RX 250 WO HCPCS: Performed by: INTERNAL MEDICINE

## 2021-05-27 PROCEDURE — 6370000000 HC RX 637 (ALT 250 FOR IP): Performed by: PHYSICIAN ASSISTANT

## 2021-05-27 PROCEDURE — 97161 PT EVAL LOW COMPLEX 20 MIN: CPT

## 2021-05-27 PROCEDURE — 6360000002 HC RX W HCPCS: Performed by: INTERNAL MEDICINE

## 2021-05-27 PROCEDURE — 97535 SELF CARE MNGMENT TRAINING: CPT

## 2021-05-27 PROCEDURE — 2000000000 HC ICU R&B

## 2021-05-27 PROCEDURE — 85027 COMPLETE CBC AUTOMATED: CPT

## 2021-05-27 RX ORDER — INSULIN GLARGINE 100 [IU]/ML
10 INJECTION, SOLUTION SUBCUTANEOUS NIGHTLY
Status: DISCONTINUED | OUTPATIENT
Start: 2021-05-27 | End: 2021-05-28

## 2021-05-27 RX ORDER — 0.9 % SODIUM CHLORIDE 0.9 %
500 INTRAVENOUS SOLUTION INTRAVENOUS ONCE
Status: COMPLETED | OUTPATIENT
Start: 2021-05-27 | End: 2021-05-27

## 2021-05-27 RX ORDER — POTASSIUM CHLORIDE 29.8 MG/ML
40 INJECTION INTRAVENOUS ONCE
Status: COMPLETED | OUTPATIENT
Start: 2021-05-27 | End: 2021-05-27

## 2021-05-27 RX ADMIN — SENNOSIDES 8.6 MG: 8.6 TABLET, FILM COATED ORAL at 21:46

## 2021-05-27 RX ADMIN — HYDROCORTISONE SODIUM SUCCINATE 100 MG: 100 INJECTION, POWDER, FOR SOLUTION INTRAMUSCULAR; INTRAVENOUS at 03:28

## 2021-05-27 RX ADMIN — INSULIN GLARGINE 10 UNITS: 100 INJECTION, SOLUTION SUBCUTANEOUS at 21:24

## 2021-05-27 RX ADMIN — GABAPENTIN 600 MG: 300 CAPSULE ORAL at 21:28

## 2021-05-27 RX ADMIN — OXYCODONE 5 MG: 5 TABLET ORAL at 09:49

## 2021-05-27 RX ADMIN — VANCOMYCIN HYDROCHLORIDE 1500 MG: 10 INJECTION, POWDER, LYOPHILIZED, FOR SOLUTION INTRAVENOUS at 21:28

## 2021-05-27 RX ADMIN — VANCOMYCIN HYDROCHLORIDE 1750 MG: 10 INJECTION, POWDER, LYOPHILIZED, FOR SOLUTION INTRAVENOUS at 07:59

## 2021-05-27 RX ADMIN — HYDROMORPHONE HYDROCHLORIDE 0.5 MG: 1 INJECTION, SOLUTION INTRAMUSCULAR; INTRAVENOUS; SUBCUTANEOUS at 18:47

## 2021-05-27 RX ADMIN — OXYCODONE 5 MG: 5 TABLET ORAL at 22:30

## 2021-05-27 RX ADMIN — CEFEPIME HYDROCHLORIDE 1000 MG: 1 INJECTION, POWDER, FOR SOLUTION INTRAMUSCULAR; INTRAVENOUS at 12:12

## 2021-05-27 RX ADMIN — POLYETHYLENE GLYCOL 3350 17 G: 17 POWDER, FOR SOLUTION ORAL at 07:55

## 2021-05-27 RX ADMIN — INSULIN LISPRO 12 UNITS: 100 INJECTION, SOLUTION INTRAVENOUS; SUBCUTANEOUS at 12:16

## 2021-05-27 RX ADMIN — LEVOTHYROXINE SODIUM 150 MCG: 0.15 TABLET ORAL at 06:55

## 2021-05-27 RX ADMIN — INSULIN LISPRO 5 UNITS: 100 INJECTION, SOLUTION INTRAVENOUS; SUBCUTANEOUS at 21:27

## 2021-05-27 RX ADMIN — CEFEPIME HYDROCHLORIDE 1000 MG: 1 INJECTION, POWDER, FOR SOLUTION INTRAMUSCULAR; INTRAVENOUS at 00:37

## 2021-05-27 RX ADMIN — INSULIN LISPRO 4 UNITS: 100 INJECTION, SOLUTION INTRAVENOUS; SUBCUTANEOUS at 07:56

## 2021-05-27 RX ADMIN — GABAPENTIN 600 MG: 300 CAPSULE ORAL at 07:53

## 2021-05-27 RX ADMIN — POTASSIUM CHLORIDE 40 MEQ: 400 INJECTION, SOLUTION INTRAVENOUS at 06:55

## 2021-05-27 RX ADMIN — INSULIN LISPRO 9 UNITS: 100 INJECTION, SOLUTION INTRAVENOUS; SUBCUTANEOUS at 16:51

## 2021-05-27 RX ADMIN — HYDROCORTISONE SODIUM SUCCINATE 100 MG: 100 INJECTION, POWDER, FOR SOLUTION INTRAMUSCULAR; INTRAVENOUS at 11:27

## 2021-05-27 RX ADMIN — PRAVASTATIN SODIUM 10 MG: 20 TABLET ORAL at 21:35

## 2021-05-27 RX ADMIN — Medication 10 MCG/MIN: at 12:55

## 2021-05-27 RX ADMIN — BISACODYL 5 MG: 5 TABLET, COATED ORAL at 07:54

## 2021-05-27 RX ADMIN — HYDROCORTISONE SODIUM SUCCINATE 100 MG: 100 INJECTION, POWDER, FOR SOLUTION INTRAMUSCULAR; INTRAVENOUS at 17:23

## 2021-05-27 RX ADMIN — OXYCODONE 5 MG: 5 TABLET ORAL at 16:54

## 2021-05-27 RX ADMIN — SODIUM CHLORIDE 500 ML: 9 INJECTION, SOLUTION INTRAVENOUS at 09:46

## 2021-05-27 RX ADMIN — GABAPENTIN 600 MG: 300 CAPSULE ORAL at 13:45

## 2021-05-27 ASSESSMENT — PAIN SCALES - GENERAL
PAINLEVEL_OUTOF10: 0
PAINLEVEL_OUTOF10: 5
PAINLEVEL_OUTOF10: 7
PAINLEVEL_OUTOF10: 5
PAINLEVEL_OUTOF10: 5
PAINLEVEL_OUTOF10: 0
PAINLEVEL_OUTOF10: 0
PAINLEVEL_OUTOF10: 5

## 2021-05-27 NOTE — PLAN OF CARE
Problem: Infection - Surgical Site:  Goal: Will show no infection signs and symptoms  Description: Will show no infection signs and symptoms  Outcome: Met This Shift     Problem: Pain - Acute:  Goal: Pain level will decrease  Description: Pain level will decrease  5/27/2021 1358 by Helena Ragsdale RN  Outcome: Met This Shift     Problem: Falls - Risk of:  Goal: Will remain free from falls  Description: Will remain free from falls  5/27/2021 1358 by Helena Ragsdale RN  Outcome: Met This Shift     Problem: Falls - Risk of:  Goal: Absence of physical injury  Description: Absence of physical injury  5/27/2021 1358 by Helena Ragsdale RN  Outcome: Met This Shift     Problem: Pain:  Goal: Pain level will decrease  Description: Pain level will decrease  5/27/2021 1358 by Helena Ragsdale RN  Outcome: Met This Shift

## 2021-05-27 NOTE — PLAN OF CARE
Problem: Pain - Acute:  Goal: Pain level will decrease  Description: Pain level will decrease  Outcome: Met This Shift     Problem: Falls - Risk of:  Goal: Will remain free from falls  Description: Will remain free from falls  Outcome: Met This Shift     Problem: Falls - Risk of:  Goal: Absence of physical injury  Description: Absence of physical injury  Outcome: Met This Shift     Problem: Pain:  Goal: Pain level will decrease  Description: Pain level will decrease  Outcome: Met This Shift     Problem: Pain:  Goal: Control of acute pain  Description: Control of acute pain  Outcome: Met This Shift     Problem: Skin Integrity:  Goal: Absence of new skin breakdown  Description: Absence of new skin breakdown  Outcome: Met This Shift

## 2021-05-27 NOTE — PROGRESS NOTES
[address] [facility]  Department of Internal Medicine   Internal Medicine Residency   MICU Progress Note    Patient:  Kal Krishnamurthy 62 y.o. female  MRN: [id number]     Date of Service: 5/27/2021    Allergy: Keflex [cephalexin], Percocet [oxycodone-acetaminophen], and Ceftin [cefuroxime]    Subjective     Seen and examined in the room, AOx3, strength intact UE, LE 4/5, Lt slightly weaker than Rt. CN grossly intact. Weaned levo to 13 this morning, will keep weaning. CT of abd and pelvis -ve of acute bleeding, Hb 7.9 this morning    Objective     VS: BP (!) 115/53   Pulse 96   Temp 98.8 °F (37.1 °C) (Temporal)   Resp 26   Ht 5' 4\" (1.626 m)   Wt 252 lb (114.3 kg)   SpO2 95%   BMI 43.26 kg/m²   ABP (Arterial line BP): 115/53  ABP mean (Arterial line mean): 73 mmHg    I & O - 24hr:     Intake/Output Summary (Last 24 hours) at 5/27/2021 1423  Last data filed at 5/27/2021 1400  Gross per 24 hour   Intake 4527.28 ml   Output 7005 ml   Net -2477.72 ml       Physical Exam:  · General Appearance: alert, appears stated age and cooperative  · Neck: no adenopathy, no carotid bruit, no JVD, supple, symmetrical, trachea midline and thyroid not enlarged, symmetric, no tenderness/mass/nodules  · Lung: clear to auscultation bilaterally  · Heart: regular rate and rhythm, S1, S2 normal, no murmur, click, rub or gallop  · Abdomen: soft, non-tender; bowel sounds normal; no masses,  no organomegaly  · Extremities:  extremities normal, atraumatic, no cyanosis or edema  · Musculoskeletal: No joint swelling, no muscle tenderness. ROM normal in all joints of extremities. · Neurologic: Mental status: Alert, oriented, thought content appropriate, strength intact UE, LE 4/5, Lt slightly weaker than Rt.  CN grossly intact    Lines     site day    Art line   L Radial 5/26   TLC R IJ 5/26   PICC None    Hemoaccess None    Oxygen:     nasal canula at 2L  Mechanical Ventilation:     ABG:   No results found for: PHART, PH, UJL6SNX, PCO2, PO2ART, PO2, EUA0LXT, HCO3, BEART, BE, THGBART, THB, TPV1XVO, G6QYNNUZ, O2SAT     Medications     Infusions: (Fluid, Sedation, Vasopressors)  IVF:    NS 75 ml /hr  Vasopressors   Levo currently weaning  Sedation       Nutrition:   Carb control  ATB:   Antibiotics  Days   cefepime 5/26   vanc 5/25         Labs     CBC with Differential:    Lab Results   Component Value Date    WBC 12.3 05/27/2021    RBC 2.70 05/27/2021    HGB 7.9 05/27/2021    HCT 23.9 05/27/2021     05/27/2021    MCV 88.5 05/27/2021    MCH 29.3 05/27/2021    MCHC 33.1 05/27/2021    RDW 13.2 05/27/2021    LYMPHOPCT 14.7 05/26/2021    MONOPCT 7.8 05/26/2021    BASOPCT 0.3 05/26/2021    MONOSABS 1.41 05/26/2021    LYMPHSABS 2.64 05/26/2021    EOSABS 0.00 05/26/2021    BASOSABS 0.00 05/26/2021     CMP:    Lab Results   Component Value Date     05/27/2021    K 3.8 05/27/2021    K 4.2 05/20/2021     05/27/2021    CO2 26 05/27/2021    BUN 7 05/27/2021    CREATININE 0.5 05/27/2021    GFRAA >60 05/27/2021    LABGLOM >60 05/27/2021    GLUCOSE 259 05/27/2021    PROT 5.5 05/27/2021    LABALBU 3.2 05/27/2021    CALCIUM 8.2 05/27/2021    BILITOT 0.2 05/27/2021    ALKPHOS 56 05/27/2021    AST 20 05/27/2021    ALT 25 05/27/2021         Resident's Assessment and Plan       Assessment  Shock, in the setting of hypotension refractory to 2.5 L IVF NS, secondary to neurogenic (local stimulation d/t infusion screw) vs adrenal insuffiency vs septic   Afebrile this am, mild leukocytosis though  Lumbar spondylolithesis s/p decompression and fusion  Acute anemia   Iron panel show ACD pattern  ALTA, likely prerenal, resolved  Elevated troponin, likely 2/2 to demand ischemia, improving  Hx of T2DM  Hx of HLP  Hx of hypothyroidism        Plan  Reassess NICOM this morning is non fluid responsive  Continue to wean levo as able  Solu cortef 100 q8h  On cefepime and vanc  Await pan pan-cultures, adjust abx per workup  Pain control dilaudid PRN per NS  NS on the case, recs apprecaited  Await FOBT  Carb control diet  HDSSI ACHS  followup BG Trina Stein MD, PGY-1    Attending physician: Dr. Misa Sevilla    I personally saw, examined and provided care for the patient. Radiographs, labs and medication list were reviewed by me independently. Review of Residents documentation was conducted and revisions were made as appropriate. I agree with the above documented exam, problem list and plan of care.         CCT excluding procedures >30 minutes    Mateusz Coto, DO

## 2021-05-27 NOTE — PROGRESS NOTES
[address] [facility]  Department of Internal Medicine   Internal Medicine Residency   MICU Progress Note    Patient:  Sheila Srivastava 62 y.o. female  MRN: [id number]     Date of Service: 5/27/2021    Allergy: Keflex [cephalexin], Percocet [oxycodone-acetaminophen], and Ceftin [cefuroxime]    Subjective     Patient examined at bedside. Patient remains on pressor support. BP around 128/58 throughout night while on pressor support. Patient continues to have no complaints. On physical exam it was noted patient was extremely tender to palpation of the anterior left ankle region. Patient MAP dropped and levophed increased to 12, SVI was 9.1 suggesting patient pressure not responsive to fluids.  24h change: as above  ROS: Denies Fever/chills/CP/SOB/N/V/D/C/Dysuria/Blood in stool or urine  Objective     VS: BP (!) 128/58   Pulse 92   Temp 99.1 °F (37.3 °C) (Temporal)   Resp 27   Ht 5' 4\" (1.626 m)   Wt 252 lb (114.3 kg)   SpO2 97%   BMI 43.26 kg/m²   ABP (Arterial line BP): 128/58  ABP mean (Arterial line mean): 82 mmHg    I & O - 24hr:     Intake/Output Summary (Last 24 hours) at 5/27/2021 0759  Last data filed at 5/27/2021 0700  Gross per 24 hour   Intake 3927.28 ml   Output 6830 ml   Net -2902.72 ml       Physical Exam:  · General Appearance: alert, appears stated age and cooperative  · Neck: no adenopathy, no carotid bruit, no JVD and supple, symmetrical, trachea midline  · Lung: clear to auscultation bilaterally  · Heart: regular rate and rhythm, S1, S2 normal, no murmur, click, rub or gallop  · Abdomen: soft, non-tender; bowel sounds normal; no masses,  no organomegaly  · Extremities:  extremities normal, atraumatic, no cyanosis or edema; left anterior ankle (cuboid and navicular region) exquisitely tender to palpation, no swelling or limited ROM  · Musculoskeletal: No joint swelling, no muscle tenderness. ROM normal in all joints of extremities.    · Neurologic: Mental status: Alert, oriented, thought content appropriate, power decreased in BLE L>R    Lines     site day    Art line   L Radial 05/26   TLC R IJ 05/26   PICC None    Hemoaccess None    Oxygen:     nasal canula at 2L/min/face mask (FiO2:21%)  Mechanical Ventilation:   none  ABG:   No results found for: PHART, PH, LAQ0BVM, PCO2, PO2ART, PO2, FFT7ADV, HCO3, BEART, BE, THGBART, THB, YUT0AYW, A6JNDVGM, O2SAT     Medications     Infusions: (Fluid, Sedation, Vasopressors)  IVF:    NaCl 0.9%: paired with meds  Vasopressors   Levophed at rate 13 mcg/min  Sedation  none  Nutrition:   Carb control diet  ATB:   Antibiotics  Days   cefepime 05/26-05/27   vancomycin 05/26-05/27         Skin issues: none  Patient currently has   Urinary cath  Isolation  Restraints  DVT prophylaxis: lovenox  GI prophylaxis,    PT/OT: pending stabilization   Labs     CBC with Differential:    Lab Results   Component Value Date    WBC 12.3 05/27/2021    RBC 2.70 05/27/2021    HGB 7.9 05/27/2021    HCT 23.9 05/27/2021     05/27/2021    MCV 88.5 05/27/2021    MCH 29.3 05/27/2021    MCHC 33.1 05/27/2021    RDW 13.2 05/27/2021    LYMPHOPCT 14.7 05/26/2021    MONOPCT 7.8 05/26/2021    BASOPCT 0.3 05/26/2021    MONOSABS 1.41 05/26/2021    LYMPHSABS 2.64 05/26/2021    EOSABS 0.00 05/26/2021    BASOSABS 0.00 05/26/2021     CMP:    Lab Results   Component Value Date     05/27/2021    K 3.8 05/27/2021    K 4.2 05/20/2021     05/27/2021    CO2 26 05/27/2021    BUN 7 05/27/2021    CREATININE 0.5 05/27/2021    GFRAA >60 05/27/2021    LABGLOM >60 05/27/2021    GLUCOSE 259 05/27/2021    PROT 7.1 04/13/2021    LABALBU 4.4 04/13/2021    CALCIUM 8.2 05/27/2021    BILITOT 0.4 04/13/2021    ALKPHOS 81 04/13/2021    AST 14 04/13/2021    ALT 20 04/13/2021     PT/INR:    Lab Results   Component Value Date    PROTIME 11.8 05/20/2021    INR 1.1 05/20/2021     U/A:    Lab Results   Component Value Date    NITRITE neg 09/23/2019    COLORU Yellow 05/26/2021    PROTEINU Negative 05/26/2021 PHUR 5.5 05/26/2021    WBCUA 0-1 05/26/2021    RBCUA 1-3 05/26/2021    BACTERIA FEW 05/26/2021    CLARITYU Clear 05/26/2021    SPECGRAV 1.015 05/26/2021    LEUKOCYTESUR Negative 05/26/2021    UROBILINOGEN 0.2 05/26/2021    BILIRUBINUR Negative 05/26/2021    BILIRUBINUR neg 09/23/2019    BLOODU SMALL 05/26/2021    GLUCOSEU 250 05/26/2021    AMORPHOUS MANY 07/02/2020       Imaging Studies:  XR CHEST PORTABLE [[id number]] Collected: 05/27/21 7710      Order Status: Completed Updated: 05/27/21 0720     Narrative:       EXAMINATION:   ONE XRAY VIEW OF THE CHEST     5/26/2021 1:45 pm     COMPARISON:   Comparison is dated the 06/20/2020     HISTORY:   ORDERING SYSTEM PROVIDED HISTORY: Central line placement   TECHNOLOGIST PROVIDED HISTORY:   Reason for exam:->Central line placement   What reading provider will be dictating this exam?->CRC     FINDINGS:   There is of subsegmental atelectasis at the lung bases are unchanged. Cardiac and mediastinal contours are unremarkable. Geovanna Martinez is been placement   of a right IJ central venous catheter with tip at the cavoatrial junction. No evidence for pneumothorax.  No pleural effusion seen.  Pulmonary   vasculature is unremarkable.      Impression:       Right IJ catheter appears in satisfactory position     No acute interval change          Resident's Assessment and Plan     Yrn Rahman is a 62 y.o. female 1 day s/p L3-L5 decompression and fusion currently being treated for:     1. Refractory HyTN 2/2 to adrenal insuffiency neurogenic shock (dural traction) v cardiogenic shock              -patient 1 day post-op from spinal surgery has remained hypotensive despite fluid bolus of 2.5L NaCl.  Has required norepinephrine drip              -continue steroids              -monitor patient vitals and labs (replete electrolytes as needed              -patient on IV NaCl, SVI 9.1 suggesting HyTN not responding to fluids, consider holding fluids and starting diuresis to prevent fluid overload              -TNI high Sn 25 -> 18 -> 17 -> 15     2.  possible pneumonia   -suggestive findings os possible infectious process/ atelectasis in the lungs   -patient on cefepime    3. Lumbar spondylolithesis s/p decompression and fusion              -patient stable from a neurosurgical standpoint              -PRN pain medication on board     4. Hyperglycemia/ DM type 2              -continue patient home meds              -monitor patient labs              -sliding scale on board     5. Hx of hyperlipidemia              -continue home meds     6. Hx of hypothyroidism              -continue home meds     7.  Hx of obesity              -stable        PT/OT evaluation: consulted  DVT prophylaxis: none  GI prophylaxis: milk of magnesia  Disposition: maintain observation in ICU for refractory hypotension    Tameka Uriarte, PGY-1    Attending physician: Dr. Isai Lainez

## 2021-05-27 NOTE — PROGRESS NOTES
Pharmacy Consultation Note  (Antibiotic Dosing and Monitoring)    Initial consult date: 5/26/21  Consulting physician: Dr. Ronald Sacks  Drug(s): Vancomycin  Indication: Septic vs. neurogenic shock - s/p RRT for hypotension      Age/  Gender Height Weight IBW Dosing weight  Allergy Information   60 y.o./female 5' 4\" (162.6 cm) 252 lb (114.3 kg)     Ideal body weight: 54.7 kg (120 lb 9.5 oz)  Adjusted ideal body weight: 78.5 kg (173 lb 2.5 oz)  114.3 kg  Keflex [cephalexin], Percocet [oxycodone-acetaminophen], and Ceftin [cefuroxime]            Date  Tmax WBC BUN/CR CrCL  (mL/min) Drug/Dose Time   Given Level(s)   (Time) Comments   5/26 99.2 12.5 28/1.7 44  Vancomycin 1750 mg IV q18h 1651     5/27 99.1 12.3 7/0.5 >100 Vancomycin 1750 mg IV q18h    Vancomycin 1500 mg IV q12h 0759        <2100>            Vanco trough @0730                     Intake/Output Summary (Last 24 hours) at 5/27/2021 0752  Last data filed at 5/27/2021 0600  Gross per 24 hour   Intake 3927.28 ml   Output 6430 ml   Net -2502.72 ml     Urine output over the last 24 hours: 2.4 mL/kg/hr (6630 mL total)      No results for input(s): BLOODCULT2 in the last 72 hours. Historical Cultures:  Organism   Date Value Ref Range Status   08/06/2019 Klebsiella pneumoniae ssp pneumoniae (A)  Final   08/06/2019 Klebsiella pneumoniae ssp pneumoniae (A)  Final     No results for input(s): BC in the last 72 hours. Assessment:  · 62 yoF s/p L3-L5 decompression and fusion w TLIF for L3-L5 spondylolisthesis admitted to MICU for further evaluation of shock following RRT for hypotension. Empiric antibiotics initiated and pharmacy consulted to dose/monitor vancomycin. · Patient received vancomycin in op?   · Goal AUC/CLAUDY 400 - 600; Goal trough 15 - 20  · ALTA resolved - sCr 0.5 (baseline 0.6)    Plan:  · Adjust dose to Vancomycin 1500 mg IV q12h  · Will order trough level with 9am dose tomorrow and will adjust dose as needed  · Pharmacist will follow and

## 2021-05-27 NOTE — ANESTHESIA POSTPROCEDURE EVALUATION
Department of Anesthesiology  Postprocedure Note    Patient: Jimbo Noble  MRN: [id number]  YOB: 1960  Date of evaluation: 5/27/2021  Time:  6:48 AM     Procedure Summary     Date: 05/25/21 Room / Location: [address] [place] [address] 20 / [facility]    Anesthesia Start: 0730 Anesthesia Stop: 4921    Procedure: L3- L5 DECOMPRESSION AND FUSION , L4- L5 TRANSFORAMINAL LUMBAR INTERBODY FUSION --OARM, PATY TABLE, AUDIOLOGY, PLATES ,SCREWS, --NUVASIVE (N/A Back) Diagnosis: (LUMBAR STENOSIS)    Surgeons: Macy Sanchez MD Responsible Provider: Adrianne Weaver MD    Anesthesia Type: general ASA Status: 3          Anesthesia Type: general    Miranda Phase I: Miranda Score: 8    Miranda Phase II:      Last vitals: Reviewed and per EMR flowsheets.        Anesthesia Post Evaluation    Patient location during evaluation: PACU  Patient participation: complete - patient participated  Level of consciousness: awake and alert  Airway patency: patent  Nausea & Vomiting: no nausea and no vomiting  Complications: no  Cardiovascular status: hemodynamically stable  Respiratory status: acceptable  Hydration status: euvolemic

## 2021-05-27 NOTE — PROGRESS NOTES
05/26/21 2026    sodium chloride flush 0.9 % injection 5-40 mL, 5-40 mL, Intravenous, PRN, Haley Csatellanos PA-C    0.9 % sodium chloride infusion, 25 mL, Intravenous, PRN, Carrie Clements PA-C, Last Rate: 100 mL/hr at 05/25/21 1535, 25 mL at 05/25/21 1535    promethazine (PHENERGAN) tablet 12.5 mg, 12.5 mg, Oral, Q6H PRN **OR** ondansetron (ZOFRAN) injection 4 mg, 4 mg, Intravenous, Q6H PRN, Carrie Clements PA-C, 4 mg at 05/25/21 1744    oxyCODONE (ROXICODONE) immediate release tablet 5 mg, 5 mg, Oral, Q4H PRN, 5 mg at 05/27/21 0949 **OR** oxyCODONE (ROXICODONE) immediate release tablet 10 mg, 10 mg, Oral, Q4H PRN, Carrie Clements PA-C, 10 mg at 05/26/21 0436    HYDROmorphone (DILAUDID) injection 0.5 mg, 0.5 mg, Intravenous, Q2H PRN, Carrie Clements PA-C, 0.5 mg at 05/25/21 2258    cyclobenzaprine (FLEXERIL) tablet 10 mg, 10 mg, Oral, TID PRN, Carrie Clements PA-C, 10 mg at 05/25/21 1709    polyethylene glycol (GLYCOLAX) packet 17 g, 17 g, Oral, Daily, Haley Castellanos PA-C, 17 g at 05/27/21 0755    bisacodyl (DULCOLAX) EC tablet 5 mg, 5 mg, Oral, Daily, Haley Castellanos PA-C, 5 mg at 05/27/21 0754    magnesium hydroxide (MILK OF MAGNESIA) 400 MG/5ML suspension 30 mL, 30 mL, Oral, Daily PRN, Carrie Clements PA-C    fleet rectal enema 1 enema, 1 enema, Rectal, Daily PRN, Haley Castellanos PA-C    glucose (GLUTOSE) 40 % oral gel 15 g, 15 g, Oral, PRN, Karen Pepper Malmer, DO    dextrose 50 % IV solution, 12.5 g, Intravenous, PRN, Karen Pepper Malmer, DO    glucagon (rDNA) injection 1 mg, 1 mg, Intramuscular, PRN, Karen Pepper Malmer, DO    dextrose 5 % solution, 100 mL/hr, Intravenous, PRN, Karen Pepper Malmer, DO    Objective:    BP (!) 136/56   Pulse 88   Temp 98.7 °F (37.1 °C) (Temporal)   Resp 16   Ht 5' 4\" (1.626 m)   Wt 252 lb (114.3 kg)   SpO2 96%   BMI 43.26 kg/m²     Heart:  reg  Lungs:  ctab  Abd: + bs soft nontender  Extrem:  W/o edema    CBC with Differential:    Lab Results   Component Value Date WBC 12.3 05/27/2021    RBC 2.70 05/27/2021    HGB 7.9 05/27/2021    HCT 23.9 05/27/2021     05/27/2021    MCV 88.5 05/27/2021    MCH 29.3 05/27/2021    MCHC 33.1 05/27/2021    RDW 13.2 05/27/2021    LYMPHOPCT 14.7 05/26/2021    MONOPCT 7.8 05/26/2021    BASOPCT 0.3 05/26/2021    MONOSABS 1.41 05/26/2021    LYMPHSABS 2.64 05/26/2021    EOSABS 0.00 05/26/2021    BASOSABS 0.00 05/26/2021     CMP:    Lab Results   Component Value Date     05/27/2021    K 3.8 05/27/2021    K 4.2 05/20/2021     05/27/2021    CO2 26 05/27/2021    BUN 7 05/27/2021    CREATININE 0.5 05/27/2021    GFRAA >60 05/27/2021    LABGLOM >60 05/27/2021    GLUCOSE 259 05/27/2021    PROT 5.5 05/27/2021    LABALBU 3.2 05/27/2021    CALCIUM 8.2 05/27/2021    BILITOT 0.2 05/27/2021    ALKPHOS 56 05/27/2021    AST 20 05/27/2021    ALT 25 05/27/2021     Warfarin PT/INR:    Lab Results   Component Value Date    INR 1.1 05/20/2021    PROTIME 11.8 05/20/2021       Assessment:    Active Problems:    Type 2 diabetes mellitus without complication, without long-term current use of insulin (HCC)    Acquired hypothyroidism    Hyperlipidemia    Lumbar radiculopathy    Postoperative hypotension    Postoperative anemia due to acute blood loss    ALTA (acute kidney injury) ([facility] [address]  Resolved Problems:    * No resolved hospital problems.  *      Plan:  Wean pressor as able cont ivf cont postop care as per neurosurgery        Kylejessica Damon, DO  4:28 PM  5/27/2021

## 2021-05-27 NOTE — PROGRESS NOTES
Occupational Therapy  OCCUPATIONAL THERAPY INITIAL EVALUATION     [address], [place]    Date:2021                                                  Patient Name: Ervin Novak  MRN: [id number]  : 1960  Room: [address] [place] [address]    Evaluating OT: Kenzie Howell OTR/EMILIO #[id number]  Referring Provider:  Viv Aguilar PA-C  Specific Provider Orders: OT eval and treat; 21    Diagnosis:  Lumbar radiculopathy [M54.16]  Surgery/Procedure:  L3- L5 DECOMPRESSION AND FUSION , L4- L5 TRANSFORAMINAL LUMBAR INTERBODY FUSION; 21  Pertinent Medical History: DM, HLD, hypothyroidism, obesity       Precautions:  Fall Risk, spine, O2, hemovac drain    Assessment of current deficits   [x] Functional mobility  [x]ADLs  [x] Strength               []Cognition   [x] Functional transfers   [x] IADLs         [x] Safety Awareness   [x]Endurance   [] Fine Coordination              [x] Balance      [] Vision/perception   []Sensation    []Gross Motor Coordination  [] ROM  [] Delirium                   [] Motor Control     OT PLAN OF CARE   OT POC based on physician orders, patient diagnosis and results of clinical assessment  Frequency/Duration: 2-5 days/wk for 2 weeks PRN   Specific OT Treatment to include:    Instruction/training on adapted ADL techniques and AE recommendations to increase functional independence within precautions  Training on energy conservation strategies, correct breathing pattern and techniques to improve independence/tolerance for self-care routine  Functional transfer/mobility training/DME recommendations for increased independence, safety, and fall prevention  Patient/Family education to increase follow through with safety techniques and functional independence  Recommendation of environmental modifications for increased safety with functional transfers/mobility and ADLs  Therapeutic exercise to improve motor endurance, ROM, and functional strength for ADLs/functional transfers  Therapeutic activities to facilitate/challenge dynamic balance, stand tolerance for increased safety and independence with ADLs  Positioning to improve skin integrity, interaction with environment and functional independence      Recommended Adaptive Equipment: TBD     Home Living: Pt lives with  and son in a 2 story house w/ 1 LEE ANN +1 step to enter kitchen level (no handrail); bed/bath on main floor   Bathroom setup: tub/shower   Equipment owned: [facility]    Prior Level of Function: Independent with ADLs , Independent with IADLs; ambulated w/ no AD; used SPC for steps as needed  Driving: Yes  Occupation: Nursing aide    Pain Level: Pt reports 4/10 low back/L ankle pain this session, RN administered pain meds prior to session  Cognition: A&O: 4/4; Follows 1-2 step directions   Memory:  good   Sequencing:  Fair+   Problem solving:  Fair+   Judgement/safety:  Fair+    RASS: 0  CAM-ICU: (NT) Delirium     Functional Assessment:  AM-PAC Daily Activity Raw Score: 15/24   Initial Eval Status  Date: 5/27/21 Treatment Status  Date: STGs = LTGs  Time frame: 10-14 days   Feeding Independent      Grooming Minimal Assist (seated in bedside chair)  Modified [place]    UB Dressing Moderate Assist (limited d/t lines)  Modified [place]    LB Dressing Max A  Modified [place]    Bathing Moderate Assist  Modified [place]    Toileting Maximal Assist   Modified [place]    Bed Mobility  Supine to sit: Maximal Assist x2 (log roll)  Sit to supine: NT   Supine to sit: Modified [place]   Sit to supine: Modified [place]    Functional Transfers Minimal Assist   Modified [place]    Functional Mobility Minimal Assist w/ ww (few steps to bedside chair)  Modified [place]    Balance Sitting:     Static: SBA    Dynamic:Min. A  Standing: Min. A w/ ww  Sitting:     Static:  Indep    Dynamic:Indep  Standing: Mod.  I   Activity Tolerance Fair (limited d/t pain)  good   Visual/  Perceptual Glasses: readers    WFL                Hand Dominance R   AROM (PROM) Strength Additional Info:    JOSE  WFL WFL noted during ADLs good  and wfl FMC/dexterity noted during ADL tasks       ANTONIA [place] WFL noted during ADLs good  and wfl FMC/dexterity noted during ADL tasks       Hearing: WFL   Sensation:  No c/o numbness or tingling   Tone: WFL   Edema: none noted      Vitals:   HR at rest: 108 bpm HR at end of session: 103 bpm   Spo2 at rest:98% Spo2 at end of session 98%   BP at rest:127/56 mmHg BP at end of session 122/48 mmHg       Comments: OK from RN to see patient. Upon arrival, patient lying in bed. Pt demo fair tolerance with fair+ understanding of education/techniques. At end of session, patient seated in bedside chair(RN aware). Call light within reach, all lines and tubes intact. Pt instructed on use of call light for assistance and fall prevention. Line management and environmental modifications made prior to and end of session to ensure patient safety and to increase efficiency of session. Skilled monitoring of HR, O2 saturation, blood pressure and patient's response to activity performed throughout session. Overall, pt presents with decreased activity tolerance, dynamic balance, functional mobility limiting completion of ADLs and safe return home. Pt can benefit from continued skilled OT to increase safety and functional independence. Treatment:   · Bed mobility: Facilitated bed mobility with cues for proper body mechanics and sequencing to prepare for ADL completion. Educated pt on log roll to maintain spinal precautions. · Functional transfers: Facilitated transfers from EOB(sit>stand) to bedside chair(stand to sit) with cues for body alignment, safety and hand placement. · ADL completion: Self-care retraining for the above-mentioned ADLs; training on proper hand placement, safety technique, sequencing, and energy conservation techniques.  Completed washing face seated in bedside chair. Educated pt on modified techniques to maintain spinal precautions during ADLs. · Postural Balance: Sitting and/or standing balance retraining to improve righting reactions with postural changes during ADLS. Use of ww for support in standing. · Skilled positioning: Proper positioning to improve interaction with environment, overall functioning and decrease/prevent edema and contractures. Assistance of 2 required for safety d/t pt's medical complexity, line management and pt's deconditioned. Rehab Potential: Good for established goals     Patient / Family Goal: to go home      Patient and/or family were instructed on functional diagnosis, prognosis/goals and OT plan of care. Demonstrated good understanding. Eval Complexity: Low  · History: Expanded chart review of consults, imaging, and psychosocial history related to current functional performance. · Exam: 5+ performance deficits identified limiting functional independence and safe return home   · Assistance/Modification: Min/mod assistance or modifications required to perform tasks. May have comorbidities that affect occupational performance. Time In: 10:20  Time Out: 10:45  Total Treatment Time: 10 minutes    Min Units   OT Eval Low 97165  x  1   OT Eval Medium 38281      OT Eval High 43093       OT Re-Eval [id number]       Therapeutic Ex 30688       Therapeutic Activities 03520       ADL/Self Care 41638  10  1   Orthotic Management 49278       Neuro Re-Ed 39406       Non-Billable Time          Evaluation Time includes thorough review of current medical information, gathering information on past medical history/social history and prior level of function, completion of standardized testing/informal observation of tasks, assessment of data and education on plan of care and goals.           Priya Rizzo, OTR/L #[id number]

## 2021-05-27 NOTE — PROGRESS NOTES
Physical Therapy  Physical Therapy Initial Assessment     Name: Erasmo Ruiz  : 1960  MRN: [id number]      Date of Service: 2021    Evaluating PT:  Agustin Peter, PT, DPT [id number]     Room #:  2679/5861-X  Diagnosis:  Lumbar radiculopathy [M54.16]  Reason for admission: back pain  Precautions:  Falls, spinal precautions, hemovac drain, hypotension  Procedure/Surgery:   L3-5 laminectomy and fusion  Equipment Recommendations:  FWW initially     SUBJECTIVE:  Pt lives with  and son in a 1 story home with 1 stair(s) to enter and 0 rail(s) - additional 1 step into kitchen. Bed is on 1st floor and bath is on 1st floor. Pt ambulated with no AD PTA. OBJECTIVE:   Initial Evaluation  Date:  Treatment   Short Term/ Long Term   Goals   AM-PAC 6 Clicks 98/32     Was pt agreeable to Eval/treatment? Yes      Does pt have pain?  4/10 back      Bed Mobility  Rolling: MaxA  Supine to sit: MaxA  Sit to supine: NT  Scooting: MaxA  Independent    Transfers Sit to stand: [place]  Stand to sit: Christianne  Stand pivot: [place]ne with [employer]  Independent    Ambulation    3 feet with [employer] Christianne    >150 feet with AAD mod I vs independent   Stair negotiation: ascended and descended  NT  >1 steps with 1 rail Mod I    ROM BUE:  See OT eval   BLE:  WFL     Strength BUE:  See OT eval   BLE:  knee ext 4/5  Ankle DF 4/5  Increase by 1/3 MMT grade    Balance Sitting EOB:  SBA  Dynamic Standing:  Christianne  Sitting EOB:  Independent   Dynamic Standing:  Independent      -Pt is A & O x 4  -Sensation:  Pt denies numbness and tingling to extremities  -Edema:  unremarkable   -RASS: 0  -CAM-ICU: NT    Vitals:  Heart Rate at rest 103 Heart Rate post session 102   SPO2 at rest 98% SPO2 post session 98%   Blood pressure at rest 120/56 Blood Pressure post session 122/48     Functional Status Score-Intensive Care Unit (FSS-ICU)   Rolling 2/7   Supine to sit transfer 2/7   Unsupported sitting  4/7   Sit to stand transfers /   Ambulation    Total 13/35     Therapeutic Exercises:  Functional activity     Patient education  Pt educated on safety, sequencing of transfers, and role of PT    Patient response to education:   Pt verbalized understanding Pt demonstrated skill Pt requires further education in this area   Yes  With assist Yes      ASSESSMENT:  Conditions Requiring Skilled Therapeutic Intervention:  [x]Decreased strength     []Decreased ROM  [x]Decreased functional mobility  [x]Decreased balance   [x]Decreased endurance   [x]Decreased posture  []Decreased sensation  []Decreased coordination   []Decreased vision  [x]Decreased safety awareness   [x]Increased pain     Comments:  Pt received supine in bed and agreeable to PT session with OT collaboration. RN cleared pt for participation prior to session. *OT collaboration required d/t ICU patient complexity, extent of line management, and for safety of patient and staff. Educated on spinal precautions with good understanding. Moving slowly needing extended time with verbal cueing and assist for all mobility. Slight drop in BP during transfers but quickly recovers back to baseline range with resting. Stood and took a few, small steps with support of walker. Seated in chair to end session  Pt with all needs met and call light in reach. Pt would benefit from continued PT POC to address functional deficits described above. Treatment:  Patient practiced and was instructed in the following treatment:     Patient education provided continuously throughout session for sequencing, safety maintenance, and improving any deficits found during the evaluation.    Bed mobility training - pt given verbal and tactile cues to facilitate proper sequencing and safety during rolling and supine>sit as well as provided with physical assistance to complete task     Sitting EOB for >7 minutes for upright tolerance, postural awareness and BLE ROM    STS and pivot transfer training - pt educated on proper hand and foot placement, safety and sequencing, and use of WW to safely complete sit<>stand and pivot transfers with hands on assistance to complete task safely        Pt's/ family goals   1. Return home     Patient and or family understand(s) diagnosis, prognosis, and plan of care. Yes     Prognosis is good for reaching above PT goals. PHYSICAL THERAPY PLAN OF CARE:    PT POC is established based on physician order and patient diagnosis     Referring provider/PT Order:    05/25/21 1515  PT eval and treat      Emiliano Agee PA-C     Diagnosis:  Lumbar radiculopathy [M54.16]  Specific instructions for next treatment:  Progress transfers. Increase ambulation distance. Reinforce precautions. Assess need for AD. Current Treatment Recommendations:   [x] Strengthening to improve independence with functional mobility   [] ROM to improve independence with functional mobility   [x] Balance Training to improve static/dynamic balance and to reduce fall risk  [x] Endurance Training to improve activity tolerance during functional mobility   [x] Transfer Training to improve safety and independence with all functional transfers   [x] Gait Training to improve gait mechanics, endurance and asses need for appropriate assistive device  [x] Stair Training in preparation for safe discharge home and/or into the community   [x] Positioning to prevent skin breakdown and contractures  [] Safety and Education Training   [] Patient/Caregiver Education   [] HEP  [] Other     PT long term treatment goals are located in above grid    Frequency of treatments: 2-5x/week x 1-2 weeks. Time in  1020  Time out  1050    Total Treatment Time  12 minutes     Evaluation Time includes thorough review of current medical information, gathering information on past medical history/social history and prior level of function, completion of standardized testing/informal observation of tasks, assessment of data and education on plan of care and goals.     CPT codes:  [x] Low Complexity PT evaluation 69380  [] Moderate Complexity PT evaluation 76139  [] High Complexity PT evaluation 06405  [] PT Re-evaluation 35884  [] Gait training 33274 - minutes  [] Manual therapy 55513 - minutes  [x] Therapeutic activities 11907 12 minutes  [] Therapeutic exercises 68465 - minutes  [] Neuromuscular reeducation 82658 - minutes     Prashant Grey, PT, DPT  [id number]

## 2021-05-27 NOTE — PROGRESS NOTES
Department of Neurosurgery  Progress Note        SUBJECTIVE:  C/o left ankle pain and weakness this AM. BP stable. REVIEW OF SYSTEMS :  Constitutional: Negative for chills and fever. Neurological: Negative for dizziness, tremors and speech change.  no numbness, no weakness    OBJECTIVE:   VITALS:  BP (!) 136/56   Pulse 88   Temp 98.7 °F (37.1 °C) (Temporal)   Resp 16   Ht 5' 4\" (1.626 m)   Wt 252 lb (114.3 kg)   SpO2 96%   BMI 43.26 kg/m²   PHYSICAL:    COGNITION:       Awake, Alert and Oriented: X3    LANGUAGE:       comprehension-intact       repetition-intact       fluency-intact    CN:       OU- 4-3mm Brisk and reactive       EOMI, visual fields full, TML, no facial sensory deficits, face symmetric, Hearing intact    SENSORY: grossly normal      MOTOR:  (R)  (L)  FC  FC   ---------  FC  FC    5/5 strength in all motor groups, except left leg 4/5 baseline    DATA:  CBC:   Lab Results   Component Value Date    WBC 12.3 05/27/2021    RBC 2.70 05/27/2021    HGB 7.9 05/27/2021    HCT 23.9 05/27/2021    MCV 88.5 05/27/2021    MCH 29.3 05/27/2021    MCHC 33.1 05/27/2021    RDW 13.2 05/27/2021     05/27/2021    MPV 9.7 05/27/2021     BMP:    Lab Results   Component Value Date     05/27/2021    K 3.8 05/27/2021    K 4.2 05/20/2021     05/27/2021    CO2 26 05/27/2021    BUN 7 05/27/2021    LABALBU 3.2 05/27/2021    CREATININE 0.5 05/27/2021    CALCIUM 8.2 05/27/2021    GFRAA >60 05/27/2021    LABGLOM >60 05/27/2021    GLUCOSE 259 05/27/2021     PT/INR:    Lab Results   Component Value Date    PROTIME 11.8 05/20/2021    INR 1.1 05/20/2021     PTT:  No results found for: APTT, PTT[APTT}    Current Inpatient Medications  Current Facility-Administered Medications: vancomycin 1500 mg in dextrose 5% 300 mL IVPB, 1,500 mg, Intravenous, Q12H  insulin lispro (HUMALOG) injection vial 0-18 Units, 0-18 Units, Subcutaneous, TID WC  insulin lispro (HUMALOG) injection vial 0-9 Units, 0-9 Units, Subcutaneous, Nightly  insulin glargine (LANTUS) injection vial 10 Units, 10 Units, Subcutaneous, Nightly  hydrocortisone sodium succinate PF (SOLU-CORTEF) injection 100 mg, 100 mg, Intravenous, Q8H  cefepime (MAXIPIME) 1,000 mg in dextrose 5 % 50 mL extended infusion IVPB, 1,000 mg, Intravenous, Q12H **AND** 0.9 % sodium chloride infusion admixture, , Intravenous, Q12H  norepinephrine (LEVOPHED) 16 mg in dextrose 5% 250 mL infusion, 2-100 mcg/min, Intravenous, Continuous  senna (SENOKOT) tablet 8.6 mg, 1 tablet, Oral, Nightly  0.9 % sodium chloride infusion, , Intravenous, Continuous  gabapentin (NEURONTIN) capsule 600 mg, 600 mg, Oral, TID  levothyroxine (SYNTHROID) tablet 150 mcg, 150 mcg, Oral, Daily  linaclotide (LINZESS) capsule 145 mcg, 145 mcg, Oral, QAM AC  pravastatin (PRAVACHOL) tablet 10 mg, 10 mg, Oral, Every Other Day  sodium chloride flush 0.9 % injection 5-40 mL, 5-40 mL, Intravenous, 2 times per day  sodium chloride flush 0.9 % injection 5-40 mL, 5-40 mL, Intravenous, PRN  0.9 % sodium chloride infusion, 25 mL, Intravenous, PRN  promethazine (PHENERGAN) tablet 12.5 mg, 12.5 mg, Oral, Q6H PRN **OR** ondansetron (ZOFRAN) injection 4 mg, 4 mg, Intravenous, Q6H PRN  oxyCODONE (ROXICODONE) immediate release tablet 5 mg, 5 mg, Oral, Q4H PRN **OR** oxyCODONE (ROXICODONE) immediate release tablet 10 mg, 10 mg, Oral, Q4H PRN  HYDROmorphone (DILAUDID) injection 0.5 mg, 0.5 mg, Intravenous, Q2H PRN  cyclobenzaprine (FLEXERIL) tablet 10 mg, 10 mg, Oral, TID PRN  polyethylene glycol (GLYCOLAX) packet 17 g, 17 g, Oral, Daily  bisacodyl (DULCOLAX) EC tablet 5 mg, 5 mg, Oral, Daily  magnesium hydroxide (MILK OF MAGNESIA) 400 MG/5ML suspension 30 mL, 30 mL, Oral, Daily PRN  fleet rectal enema 1 enema, 1 enema, Rectal, Daily PRN  glucose (GLUTOSE) 40 % oral gel 15 g, 15 g, Oral, PRN  dextrose 50 % IV solution, 12.5 g, Intravenous, PRN  glucagon (rDNA) injection 1 mg, 1 mg, Intramuscular, PRN  dextrose 5 % solution, 100 mL/hr, Intravenous, PRN    ASSESSMENT and Plan:    1. Continue ICU care and medical management   2. Patient will require admission for IV pain control and complex drain care  3. SCDs   4. Continue Hemovac drain. Plan to remove Saturday  5.  PMR consult      Electronically signed by Ct Mcmullen PA-C on 5/27/2021 at 4:56 PM

## 2021-05-28 LAB
ALBUMIN SERPL-MCNC: 2.9 G/DL (ref 3.5–5.2)
ALP BLD-CCNC: 49 U/L (ref 35–104)
ALT SERPL-CCNC: 20 U/L (ref 0–32)
ANION GAP SERPL CALCULATED.3IONS-SCNC: 6 MMOL/L (ref 7–16)
AST SERPL-CCNC: 13 U/L (ref 0–31)
BASOPHILS ABSOLUTE: 0.03 E9/L (ref 0–0.2)
BASOPHILS RELATIVE PERCENT: 0.3 % (ref 0–2)
BILIRUB SERPL-MCNC: <0.2 MG/DL (ref 0–1.2)
BUN BLDV-MCNC: 9 MG/DL (ref 6–20)
CALCIUM SERPL-MCNC: 8.5 MG/DL (ref 8.6–10.2)
CHLORIDE BLD-SCNC: 102 MMOL/L (ref 98–107)
CO2: 29 MMOL/L (ref 22–29)
CREAT SERPL-MCNC: 0.5 MG/DL (ref 0.5–1)
EOSINOPHILS ABSOLUTE: 0.19 E9/L (ref 0.05–0.5)
EOSINOPHILS RELATIVE PERCENT: 2.1 % (ref 0–6)
GFR AFRICAN AMERICAN: >60
GFR NON-AFRICAN AMERICAN: >60 ML/MIN/1.73
GLUCOSE BLD-MCNC: 157 MG/DL (ref 74–99)
HCT VFR BLD CALC: 20.5 % (ref 34–48)
HCT VFR BLD CALC: 20.9 % (ref 34–48)
HCT VFR BLD CALC: 21.9 % (ref 34–48)
HCT VFR BLD CALC: 22 % (ref 34–48)
HCT VFR BLD CALC: 22.5 % (ref 34–48)
HEMOGLOBIN: 6.8 G/DL (ref 11.5–15.5)
HEMOGLOBIN: 7 G/DL (ref 11.5–15.5)
HEMOGLOBIN: 7.3 G/DL (ref 11.5–15.5)
HEMOGLOBIN: 7.4 G/DL (ref 11.5–15.5)
IMMATURE GRANULOCYTES #: 0.08 E9/L
IMMATURE GRANULOCYTES %: 0.9 % (ref 0–5)
IMMATURE RETIC FRACT: 29.9 % (ref 3–15.9)
LYMPHOCYTES ABSOLUTE: 3.31 E9/L (ref 1.5–4)
LYMPHOCYTES RELATIVE PERCENT: 36.2 % (ref 20–42)
MCH RBC QN AUTO: 29.7 PG (ref 26–35)
MCHC RBC AUTO-ENTMCNC: 34.1 % (ref 32–34.5)
MCV RBC AUTO: 86.9 FL (ref 80–99.9)
METER GLUCOSE: 191 MG/DL (ref 74–99)
METER GLUCOSE: 207 MG/DL (ref 74–99)
METER GLUCOSE: 232 MG/DL (ref 74–99)
METER GLUCOSE: 263 MG/DL (ref 74–99)
MONOCYTES ABSOLUTE: 0.83 E9/L (ref 0.1–0.95)
MONOCYTES RELATIVE PERCENT: 9.1 % (ref 2–12)
NEUTROPHILS ABSOLUTE: 4.7 E9/L (ref 1.8–7.3)
NEUTROPHILS RELATIVE PERCENT: 51.4 % (ref 43–80)
PDW BLD-RTO: 13.2 FL (ref 11.5–15)
PLATELET # BLD: 216 E9/L (ref 130–450)
PMV BLD AUTO: 9.5 FL (ref 7–12)
POTASSIUM SERPL-SCNC: 3.4 MMOL/L (ref 3.5–5)
PRO-BNP: 237 PG/ML (ref 0–125)
PROCALCITONIN: 0.22 NG/ML (ref 0–0.08)
RBC # BLD: 2.36 E12/L (ref 3.5–5.5)
RETIC HGB EQUIVALENT: 29.9 PG (ref 28.2–36.6)
RETICULOCYTE ABSOLUTE COUNT: 0.1 E12/L
RETICULOCYTE COUNT PCT: 4.1 % (ref 0.4–1.9)
SODIUM BLD-SCNC: 137 MMOL/L (ref 132–146)
TOTAL PROTEIN: 5.3 G/DL (ref 6.4–8.3)
URINE CULTURE, ROUTINE: NORMAL
URINE CULTURE, ROUTINE: NORMAL
VANCOMYCIN TROUGH: 10.2 MCG/ML (ref 5–16)
WBC # BLD: 9.1 E9/L (ref 4.5–11.5)

## 2021-05-28 PROCEDURE — 2700000000 HC OXYGEN THERAPY PER DAY

## 2021-05-28 PROCEDURE — 6370000000 HC RX 637 (ALT 250 FOR IP): Performed by: PHYSICIAN ASSISTANT

## 2021-05-28 PROCEDURE — C9113 INJ PANTOPRAZOLE SODIUM, VIA: HCPCS | Performed by: INTERNAL MEDICINE

## 2021-05-28 PROCEDURE — 36415 COLL VENOUS BLD VENIPUNCTURE: CPT

## 2021-05-28 PROCEDURE — 85014 HEMATOCRIT: CPT

## 2021-05-28 PROCEDURE — 2580000003 HC RX 258: Performed by: INTERNAL MEDICINE

## 2021-05-28 PROCEDURE — 85025 COMPLETE CBC W/AUTO DIFF WBC: CPT

## 2021-05-28 PROCEDURE — 6360000002 HC RX W HCPCS: Performed by: INTERNAL MEDICINE

## 2021-05-28 PROCEDURE — 6360000002 HC RX W HCPCS: Performed by: PHYSICIAN ASSISTANT

## 2021-05-28 PROCEDURE — 84145 PROCALCITONIN (PCT): CPT

## 2021-05-28 PROCEDURE — 6370000000 HC RX 637 (ALT 250 FOR IP): Performed by: INTERNAL MEDICINE

## 2021-05-28 PROCEDURE — 85045 AUTOMATED RETICULOCYTE COUNT: CPT

## 2021-05-28 PROCEDURE — 80053 COMPREHEN METABOLIC PANEL: CPT

## 2021-05-28 PROCEDURE — 80202 ASSAY OF VANCOMYCIN: CPT

## 2021-05-28 PROCEDURE — 85018 HEMOGLOBIN: CPT

## 2021-05-28 PROCEDURE — 2580000003 HC RX 258: Performed by: PHYSICIAN ASSISTANT

## 2021-05-28 PROCEDURE — 99024 POSTOP FOLLOW-UP VISIT: CPT | Performed by: NEUROLOGICAL SURGERY

## 2021-05-28 PROCEDURE — 83880 ASSAY OF NATRIURETIC PEPTIDE: CPT

## 2021-05-28 PROCEDURE — 37799 UNLISTED PX VASCULAR SURGERY: CPT

## 2021-05-28 PROCEDURE — 99253 IP/OBS CNSLTJ NEW/EST LOW 45: CPT | Performed by: PHYSICAL MEDICINE & REHABILITATION

## 2021-05-28 PROCEDURE — 82962 GLUCOSE BLOOD TEST: CPT

## 2021-05-28 PROCEDURE — 2060000000 HC ICU INTERMEDIATE R&B

## 2021-05-28 RX ORDER — LIDOCAINE 4 G/G
1 PATCH TOPICAL DAILY
Status: DISCONTINUED | OUTPATIENT
Start: 2021-05-28 | End: 2021-05-31 | Stop reason: HOSPADM

## 2021-05-28 RX ORDER — INSULIN GLARGINE 100 [IU]/ML
12 INJECTION, SOLUTION SUBCUTANEOUS NIGHTLY
Status: DISCONTINUED | OUTPATIENT
Start: 2021-05-28 | End: 2021-05-30

## 2021-05-28 RX ORDER — PANTOPRAZOLE SODIUM 40 MG/10ML
40 INJECTION, POWDER, LYOPHILIZED, FOR SOLUTION INTRAVENOUS 2 TIMES DAILY
Status: DISCONTINUED | OUTPATIENT
Start: 2021-05-28 | End: 2021-05-30

## 2021-05-28 RX ORDER — SODIUM CHLORIDE 9 MG/ML
10 INJECTION INTRAVENOUS 2 TIMES DAILY
Status: DISCONTINUED | OUTPATIENT
Start: 2021-05-28 | End: 2021-05-31 | Stop reason: HOSPADM

## 2021-05-28 RX ORDER — POTASSIUM CHLORIDE 29.8 MG/ML
40 INJECTION INTRAVENOUS EVERY 4 HOURS
Status: COMPLETED | OUTPATIENT
Start: 2021-05-28 | End: 2021-05-28

## 2021-05-28 RX ADMIN — LEVOTHYROXINE SODIUM 150 MCG: 0.15 TABLET ORAL at 06:14

## 2021-05-28 RX ADMIN — INSULIN LISPRO 9 UNITS: 100 INJECTION, SOLUTION INTRAVENOUS; SUBCUTANEOUS at 11:19

## 2021-05-28 RX ADMIN — HYDROCORTISONE SODIUM SUCCINATE 50 MG: 100 INJECTION, POWDER, FOR SOLUTION INTRAMUSCULAR; INTRAVENOUS at 17:51

## 2021-05-28 RX ADMIN — CEFEPIME HYDROCHLORIDE 1000 MG: 1 INJECTION, POWDER, FOR SOLUTION INTRAMUSCULAR; INTRAVENOUS at 00:20

## 2021-05-28 RX ADMIN — INSULIN LISPRO 3 UNITS: 100 INJECTION, SOLUTION INTRAVENOUS; SUBCUTANEOUS at 15:58

## 2021-05-28 RX ADMIN — GABAPENTIN 600 MG: 300 CAPSULE ORAL at 08:04

## 2021-05-28 RX ADMIN — INSULIN LISPRO 6 UNITS: 100 INJECTION, SOLUTION INTRAVENOUS; SUBCUTANEOUS at 07:33

## 2021-05-28 RX ADMIN — OXYCODONE 5 MG: 5 TABLET ORAL at 06:16

## 2021-05-28 RX ADMIN — POLYETHYLENE GLYCOL 3350 17 G: 17 POWDER, FOR SOLUTION ORAL at 08:05

## 2021-05-28 RX ADMIN — INSULIN LISPRO 3 UNITS: 100 INJECTION, SOLUTION INTRAVENOUS; SUBCUTANEOUS at 20:32

## 2021-05-28 RX ADMIN — POTASSIUM CHLORIDE 40 MEQ: 400 INJECTION, SOLUTION INTRAVENOUS at 07:28

## 2021-05-28 RX ADMIN — HYDROCORTISONE SODIUM SUCCINATE 100 MG: 100 INJECTION, POWDER, FOR SOLUTION INTRAMUSCULAR; INTRAVENOUS at 04:30

## 2021-05-28 RX ADMIN — SENNOSIDES 8.6 MG: 8.6 TABLET, FILM COATED ORAL at 20:30

## 2021-05-28 RX ADMIN — GABAPENTIN 600 MG: 300 CAPSULE ORAL at 13:31

## 2021-05-28 RX ADMIN — VANCOMYCIN HYDROCHLORIDE 1500 MG: 10 INJECTION, POWDER, LYOPHILIZED, FOR SOLUTION INTRAVENOUS at 09:04

## 2021-05-28 RX ADMIN — PANTOPRAZOLE SODIUM 40 MG: 40 INJECTION, POWDER, FOR SOLUTION INTRAVENOUS at 08:55

## 2021-05-28 RX ADMIN — INSULIN GLARGINE 12 UNITS: 100 INJECTION, SOLUTION SUBCUTANEOUS at 20:30

## 2021-05-28 RX ADMIN — PANTOPRAZOLE SODIUM 40 MG: 40 INJECTION, POWDER, FOR SOLUTION INTRAVENOUS at 20:29

## 2021-05-28 RX ADMIN — HYDROCORTISONE SODIUM SUCCINATE 100 MG: 100 INJECTION, POWDER, FOR SOLUTION INTRAMUSCULAR; INTRAVENOUS at 10:14

## 2021-05-28 RX ADMIN — GABAPENTIN 600 MG: 300 CAPSULE ORAL at 20:30

## 2021-05-28 RX ADMIN — BISACODYL 5 MG: 5 TABLET, COATED ORAL at 08:05

## 2021-05-28 RX ADMIN — Medication 10 ML: at 20:30

## 2021-05-28 RX ADMIN — HYDROMORPHONE HYDROCHLORIDE 0.5 MG: 1 INJECTION, SOLUTION INTRAMUSCULAR; INTRAVENOUS; SUBCUTANEOUS at 20:26

## 2021-05-28 RX ADMIN — POTASSIUM CHLORIDE 40 MEQ: 400 INJECTION, SOLUTION INTRAVENOUS at 10:13

## 2021-05-28 RX ADMIN — SODIUM CHLORIDE, PRESERVATIVE FREE 10 ML: 5 INJECTION INTRAVENOUS at 20:30

## 2021-05-28 RX ADMIN — OXYCODONE HYDROCHLORIDE 10 MG: 10 TABLET ORAL at 13:31

## 2021-05-28 ASSESSMENT — PAIN SCALES - GENERAL
PAINLEVEL_OUTOF10: 0
PAINLEVEL_OUTOF10: 7
PAINLEVEL_OUTOF10: 0
PAINLEVEL_OUTOF10: 7
PAINLEVEL_OUTOF10: 5
PAINLEVEL_OUTOF10: 6
PAINLEVEL_OUTOF10: 0
PAINLEVEL_OUTOF10: 5
PAINLEVEL_OUTOF10: 6

## 2021-05-28 NOTE — PROGRESS NOTES
Pharmacy Consultation Note  (Antibiotic Dosing and Monitoring)    Initial consult date: 5/26/21  Consulting physician: Dr. Amilcar Garcia  Drug(s): Vancomycin  Indication: Septic vs. neurogenic shock - s/p RRT for hypotension    Vancomycin has been discontinued. Clinical pharmacy will sign off, please reconsult if further assistance is needed.      Celso Greer PharmD, BCPS 5/28/2021 12:09 PM

## 2021-05-28 NOTE — PLAN OF CARE
Problem: Infection - Surgical Site:  Goal: Will show no infection signs and symptoms  Description: Will show no infection signs and symptoms  Outcome: Met This Shift     Problem: Pain - Acute:  Goal: Pain level will decrease  Description: Pain level will decrease  Outcome: Met This Shift     Problem: Falls - Risk of:  Goal: Will remain free from falls  Description: Will remain free from falls  Outcome: Met This Shift     Problem: Falls - Risk of:  Goal: Absence of physical injury  Description: Absence of physical injury  Outcome: Met This Shift     Problem: Pain:  Goal: Pain level will decrease  Description: Pain level will decrease  Outcome: Met This Shift     Problem: Skin Integrity:  Goal: Will show no infection signs and symptoms  Description: Will show no infection signs and symptoms  Outcome: Met This Shift     Problem: Skin Integrity:  Goal: Absence of new skin breakdown  Description: Absence of new skin breakdown  Outcome: Met This Shift     Problem: Musculor/Skeletal Functional Status  Goal: Absence of falls  Outcome: Met This Shift

## 2021-05-28 NOTE — PROGRESS NOTES
injection 10 mL, 10 mL, Intravenous, BID  vancomycin 1500 mg in dextrose 5% 300 mL IVPB, 1,500 mg, Intravenous, Q12H  insulin lispro (HUMALOG) injection vial 0-18 Units, 0-18 Units, Subcutaneous, TID WC  insulin lispro (HUMALOG) injection vial 0-9 Units, 0-9 Units, Subcutaneous, Nightly  insulin glargine (LANTUS) injection vial 10 Units, 10 Units, Subcutaneous, Nightly  hydrocortisone sodium succinate PF (SOLU-CORTEF) injection 100 mg, 100 mg, Intravenous, Q8H  cefepime (MAXIPIME) 1,000 mg in dextrose 5 % 50 mL extended infusion IVPB, 1,000 mg, Intravenous, Q12H **AND** 0.9 % sodium chloride infusion admixture, , Intravenous, Q12H  norepinephrine (LEVOPHED) 16 mg in dextrose 5% 250 mL infusion, 2-100 mcg/min, Intravenous, Continuous  senna (SENOKOT) tablet 8.6 mg, 1 tablet, Oral, Nightly  0.9 % sodium chloride infusion, , Intravenous, Continuous  gabapentin (NEURONTIN) capsule 600 mg, 600 mg, Oral, TID  levothyroxine (SYNTHROID) tablet 150 mcg, 150 mcg, Oral, Daily  linaclotide (LINZESS) capsule 145 mcg, 145 mcg, Oral, QAM AC  pravastatin (PRAVACHOL) tablet 10 mg, 10 mg, Oral, Every Other Day  sodium chloride flush 0.9 % injection 5-40 mL, 5-40 mL, Intravenous, 2 times per day  sodium chloride flush 0.9 % injection 5-40 mL, 5-40 mL, Intravenous, PRN  0.9 % sodium chloride infusion, 25 mL, Intravenous, PRN  promethazine (PHENERGAN) tablet 12.5 mg, 12.5 mg, Oral, Q6H PRN **OR** ondansetron (ZOFRAN) injection 4 mg, 4 mg, Intravenous, Q6H PRN  oxyCODONE (ROXICODONE) immediate release tablet 5 mg, 5 mg, Oral, Q4H PRN **OR** oxyCODONE (ROXICODONE) immediate release tablet 10 mg, 10 mg, Oral, Q4H PRN  HYDROmorphone (DILAUDID) injection 0.5 mg, 0.5 mg, Intravenous, Q2H PRN  cyclobenzaprine (FLEXERIL) tablet 10 mg, 10 mg, Oral, TID PRN  polyethylene glycol (GLYCOLAX) packet 17 g, 17 g, Oral, Daily  bisacodyl (DULCOLAX) EC tablet 5 mg, 5 mg, Oral, Daily  magnesium hydroxide (MILK OF MAGNESIA) 400 MG/5ML suspension 30 mL, 30 mL, Oral, Daily PRN  fleet rectal enema 1 enema, 1 enema, Rectal, Daily PRN  glucose (GLUTOSE) 40 % oral gel 15 g, 15 g, Oral, PRN  dextrose 50 % IV solution, 12.5 g, Intravenous, PRN  glucagon (rDNA) injection 1 mg, 1 mg, Intramuscular, PRN  dextrose 5 % solution, 100 mL/hr, Intravenous, PRN    ASSESSMENT:    s/p L3-L5 PLIF 5/25    PLAN:    1. Continue ICU care and medical management   2. Patient will require admission for IV pain control and complex drain care  3. SCDs   4. Continue Hemovac drain. Plan to remove tomorrow  5. PMR consult  6.  No brace needed to be worn      Electronically signed by Al Juan PA-C on 5/28/2021 at 9:41 AM

## 2021-05-28 NOTE — PROGRESS NOTES
extremities. · Neurologic: Mental status: Alert, oriented, thought content appropriate, strength intact UE, LE 4/5, Lt slightly weaker than Rt. CN grossly intact    Lines     site day    Art line   L Radial 5/26   TLC R IJ 5/26   PICC None    Hemoaccess None    Oxygen:     RA  Mechanical Ventilation:     ABG:   No results found for: PHART, PH, IMZ1ZXU, PCO2, PO2ART, PO2, BQD1RZZ, HCO3, BEART, BE, THGBART, THB, HHK9NNX, A6JARXDR, O2SAT     Medications     Infusions: (Fluid, Sedation, Vasopressors)  IVF:    NS 75 ml /hr  Vasopressors   Off pressors  Sedation       Nutrition:   Carb control    ATB:   Antibiotics  Days   cefepime 5/26   vanc 5/25         Labs     CBC with Differential:    Lab Results   Component Value Date    WBC 9.1 05/28/2021    RBC 2.36 05/28/2021    HGB 7.0 05/28/2021    HCT 20.5 05/28/2021     05/28/2021    MCV 86.9 05/28/2021    MCH 29.7 05/28/2021    MCHC 34.1 05/28/2021    RDW 13.2 05/28/2021    LYMPHOPCT 36.2 05/28/2021    MONOPCT 9.1 05/28/2021    BASOPCT 0.3 05/28/2021    MONOSABS 0.83 05/28/2021    LYMPHSABS 3.31 05/28/2021    EOSABS 0.19 05/28/2021    BASOSABS 0.03 05/28/2021     CMP:    Lab Results   Component Value Date     05/28/2021    K 3.4 05/28/2021    K 4.2 05/20/2021     05/28/2021    CO2 29 05/28/2021    BUN 9 05/28/2021    CREATININE 0.5 05/28/2021    GFRAA >60 05/28/2021    LABGLOM >60 05/28/2021    GLUCOSE 157 05/28/2021    PROT 5.3 05/28/2021    LABALBU 2.9 05/28/2021    CALCIUM 8.5 05/28/2021    BILITOT <0.2 05/28/2021    ALKPHOS 49 05/28/2021    AST 13 05/28/2021    ALT 20 05/28/2021         Resident's Assessment and Plan       Assessment    1. Shock 2/2 neurogenic (local stimulation d/t infusion screw) vs septic, less likely  2. Lumbar spondylolithesis s/p decompression and fusion  3. Acute Normocytic anemia, continues to downtrend, iron profile showing ACD picture  4. ALTA, likely prerenal, resolved  5.  Elevated troponin, likely 2/2 to demand ischemia, improving  6. Hx of T2DM, stable  8. Hx of hypothyroidism, on synthroid        Plan    · Solu cortef 100 q8h, to be weaned down to 25mg Q8 today for 3 doses, followed by 25mg BID for 3 doses  · On cefepime and vanc, procal: 0.22, has downtrended  · Pan pan-cultures: neg, no leukocytosis today, have stopped all ABx  · Pain control dilaudid PRN per NS, NS is following, recommendations are apprecaited  · Await FOBT, H&H Q8H  · Carb control diet to be continued  · Continue Lipitor 10mg  · HDSSI ACHS, Lantus 10-->12U nightly, followup BG ACHS  · Continue Synthroid 150ug daily, T4: 1.21  · Transfer to telemetry bed    Honey Ashley MD, PGY-1  Attending physician: Dr. Michele Cobb      I personally saw, examined and provided care for the patient. Radiographs, labs and medication list were reviewed by me independently. Review of Residents documentation was conducted and revisions were made as appropriate. I agree with the above documented exam, problem list and plan of care. Wean off vasopressor and clinically improved.   Monitor off antibiotic  Follow hemoglobin  Wean Solu-Cortef    Transfer to telemetry    Pepito Mi DO

## 2021-05-28 NOTE — CARE COORDINATION
5/28 Care Coordination: Pt remains in MICU. Remains on IV Levophed drip. Awake,Alert. s/p L3-L5 PLIF 5/25. PT/OT consults. Eli Keller with Brit Geller MD put an order in for PM&R. Will await there input. Pt wanted to be discharged to home with OP rehab. Will need FWW per PT recommendation. CM/SW will continue to follow for discharge planning.    Amanda Singleton BSN,RN-CV-BC  [phone]

## 2021-05-28 NOTE — PLAN OF CARE
Problem: Pain - Acute:  Goal: Pain level will decrease  Description: Pain level will decrease  5/28/2021 1210 by Gilford Rodney, RN  Outcome: Met This Shift     Problem: Falls - Risk of:  Goal: Will remain free from falls  Description: Will remain free from falls  5/28/2021 1210 by Gilford Rodney, RN  Outcome: Met This Shift     Problem: Falls - Risk of:  Goal: Absence of physical injury  Description: Absence of physical injury  5/28/2021 1210 by Gilford Rodney, RN  Outcome: Met This Shift     Problem: Pain:  Goal: Pain level will decrease  Description: Pain level will decrease  5/28/2021 1210 by Gilford Rodney, RN  Outcome: Met This Shift

## 2021-05-28 NOTE — PROGRESS NOTES
[facility] Medicine    Subjective:  Pt alert conversive off pressor      Current Facility-Administered Medications:     pantoprazole (PROTONIX) injection 40 mg, 40 mg, Intravenous, BID, 40 mg at 05/28/21 0855 **AND** sodium chloride (PF) 0.9 % injection 10 mL, 10 mL, Intravenous, BID, Dennis Rogers MD    hydrocortisone sodium succinate PF (SOLU-CORTEF) injection 50 mg, 50 mg, Intravenous, Q8H, Santiago Pearson DO    [START ON 5/29/2021] hydrocortisone sodium succinate PF (SOLU-CORTEF) injection 25 mg, 25 mg, Intravenous, Q8H **FOLLOWED BY** [START ON 5/30/2021] hydrocortisone sodium succinate PF (SOLU-CORTEF) injection 25 mg, 25 mg, Intravenous, BID, Santiago Pearson DO    insulin glargine (LANTUS) injection vial 12 Units, 12 Units, Subcutaneous, Nightly, Dennis Rogers MD    insulin lispro (HUMALOG) injection vial 0-18 Units, 0-18 Units, Subcutaneous, TID WC, Paras Urena MD, 9 Units at 05/28/21 1119    insulin lispro (HUMALOG) injection vial 0-9 Units, 0-9 Units, Subcutaneous, Nightly, Paras Urena MD, 5 Units at 05/27/21 2127    senna (SENOKOT) tablet 8.6 mg, 1 tablet, Oral, Nightly, Fatemeh Domingo MD, 8.6 mg at 05/27/21 2146    gabapentin (NEURONTIN) capsule 600 mg, 600 mg, Oral, TID, Haley Castellanos PA-C, 600 mg at 05/28/21 8160    levothyroxine (SYNTHROID) tablet 150 mcg, 150 mcg, Oral, Daily, Julisa Mcgarry PA-C, 150 mcg at 05/28/21 8806    linaclotide (LINZESS) capsule 145 mcg, 145 mcg, Oral, QAM AC, Haley Castellanos PA-C    pravastatin (PRAVACHOL) tablet 10 mg, 10 mg, Oral, Every Other Day, Haley Castellanos PA-C, 10 mg at 05/27/21 2135    sodium chloride flush 0.9 % injection 5-40 mL, 5-40 mL, Intravenous, 2 times per day, Haley Castellanos PA-C, 10 mL at 05/26/21 2026    sodium chloride flush 0.9 % injection 5-40 mL, 5-40 mL, Intravenous, PRN, Julisa Mcgarry PA-C    0.9 % sodium chloride infusion, 25 mL, Intravenous, PRN, Julisa Mcgarry PA-C, Last Rate: 100 mL/hr at 05/25/21 1535, 25 mL at 05/25/21 1535    promethazine (PHENERGAN) tablet 12.5 mg, 12.5 mg, Oral, Q6H PRN **OR** ondansetron (ZOFRAN) injection 4 mg, 4 mg, Intravenous, Q6H PRN, Will Alison, PA-C, 4 mg at 05/25/21 1744    oxyCODONE (ROXICODONE) immediate release tablet 5 mg, 5 mg, Oral, Q4H PRN, 5 mg at 05/28/21 0616 **OR** oxyCODONE (ROXICODONE) immediate release tablet 10 mg, 10 mg, Oral, Q4H PRN, Will Sit, PA-C, 10 mg at 05/26/21 0436    HYDROmorphone (DILAUDID) injection 0.5 mg, 0.5 mg, Intravenous, Q2H PRN, Willdafne Rosas, PA-C, 0.5 mg at 05/27/21 1847    cyclobenzaprine (FLEXERIL) tablet 10 mg, 10 mg, Oral, TID PRN, Will Rosas PA-C, 10 mg at 05/25/21 1709    polyethylene glycol (GLYCOLAX) packet 17 g, 17 g, Oral, Daily, Haley Castellanos PA-C, 17 g at 05/28/21 0805    bisacodyl (DULCOLAX) EC tablet 5 mg, 5 mg, Oral, Daily, Haley Castellanos PA-C, 5 mg at 05/28/21 0805    magnesium hydroxide (MILK OF MAGNESIA) 400 MG/5ML suspension 30 mL, 30 mL, Oral, Daily PRN, Will Rosas PAVÍCTOR    fleet rectal enema 1 enema, 1 enema, Rectal, Daily PRN, Haley Castellanos PA-C    glucose (GLUTOSE) 40 % oral gel 15 g, 15 g, Oral, PRN, Abigail Giles,     dextrose 50 % IV solution, 12.5 g, Intravenous, PRN, Abigail Giles,     glucagon (rDNA) injection 1 mg, 1 mg, Intramuscular, PRN, Abigail Giles,     dextrose 5 % solution, 100 mL/hr, Intravenous, PRN, Abigail Giles,     Objective:    BP (!) 108/59   Pulse 84   Temp 98 °F (36.7 °C) (Temporal)   Resp 17   Ht 5' 4\" (1.626 m)   Wt 252 lb (114.3 kg)   SpO2 91%   BMI 43.26 kg/m²     Heart:  reg  Lungs:  ctab  Abd: + bs soft nontender   Extrem:  Min edema legs    CBC with Differential:    Lab Results   Component Value Date    WBC 9.1 05/28/2021    RBC 2.36 05/28/2021    HGB 7.3 05/28/2021    HCT 22.0 05/28/2021     05/28/2021    MCV 86.9 05/28/2021    MCH 29.7 05/28/2021    MCHC 34.1 05/28/2021    RDW 13.2 05/28/2021    LYMPHOPCT 36.2 05/28/2021 MONOPCT 9.1 05/28/2021    BASOPCT 0.3 05/28/2021    MONOSABS 0.83 05/28/2021    LYMPHSABS 3.31 05/28/2021    EOSABS 0.19 05/28/2021    BASOSABS 0.03 05/28/2021     CMP:    Lab Results   Component Value Date     05/28/2021    K 3.4 05/28/2021    K 4.2 05/20/2021     05/28/2021    CO2 29 05/28/2021    BUN 9 05/28/2021    CREATININE 0.5 05/28/2021    GFRAA >60 05/28/2021    LABGLOM >60 05/28/2021    GLUCOSE 157 05/28/2021    PROT 5.3 05/28/2021    LABALBU 2.9 05/28/2021    CALCIUM 8.5 05/28/2021    BILITOT <0.2 05/28/2021    ALKPHOS 49 05/28/2021    AST 13 05/28/2021    ALT 20 05/28/2021     Warfarin PT/INR:    Lab Results   Component Value Date    INR 1.1 05/20/2021    PROTIME 11.8 05/20/2021       Assessment:    Active Problems:    Type 2 diabetes mellitus without complication, without long-term current use of insulin (HCC)    Acquired hypothyroidism    Hyperlipidemia    Lumbar radiculopathy    Postoperative hypotension    Postoperative anemia due to acute blood loss    ALTA (acute kidney injury) ([facility] [address]  Resolved Problems:    * No resolved hospital problems.  *      Plan:  Pm&r to see transfuse prn dc ivf wean jo ann Whatley DO  1:28 PM  5/28/2021

## 2021-05-28 NOTE — CONSULTS
PM&R Consult Note  Patient: Geovanny Madison  Age/sex: 62 y.o. female  Medical Record #: [id number]      Referring physician: Radha Abad MD  Consulting physician: Dr. Antonino Peña MD  Primary care provider: Alan Alberto DO      Chief complaint:   Impairments and acitivities limitations in ADLs and mobility secondary to lumbar stenosis, now s/p L3-L5 PLIF    HPI:   Geovanny Madison is a 62 y.o. female with a history of chronic low back pain who failed conservative management. Patient had worsening pain radiating to the legs, as well as weakness in the lower extremities. Lumbar MRI showed degenerative spondylolisthesis and a large disc herniation causing severe lumbar stenosis at L3-L5. She was referred to neurosurgery who recommended surgical intervention. Patient presented to [facility] on 5/25/2021 and underwent L3-L5 PLIF. Postoperative course noted for hypotension requiring pressors. Patient is now off of pressors. Course also noted for acute blood loss anemia, acute postoperative pain. She has participated in therapy evaluations.        Past Medical History:   Diagnosis Date    Diabetes mellitus ([facility] [address]     Hyperlipidemia     Hypothyroidism     Obesity      Past Surgical History:   Procedure Laterality Date    CHOLECYSTECTOMY      HYSTERECTOMY, TOTAL ABDOMINAL      INCONTINENCE SURGERY      BLADDER SLING    KNEE SURGERY Left     LUMBAR SPINE SURGERY N/A 5/25/2021    L3- L5 DECOMPRESSION AND FUSION , L4- L5 TRANSFORAMINAL LUMBAR INTERBODY FUSION --OARM, PATY TABLE, AUDIOLOGY, PLATES ,SCREWS, --NUVASIVE performed by Radha Abad MD at [facility] OR       Family History   Problem Relation Age of Onset    Diabetes Mother     Diabetes Father        Allergies   Allergen Reactions    Keflex [Cephalexin]     Percocet [Oxycodone-Acetaminophen]      \"tummy hurts\"    Ceftin [Cefuroxime] Rash       Scheduled Meds:  pantoprazole, 40 mg, BID   And  sodium chloride (PF), 10 mL, BID  hydrocortisone sodium succinate PF, 50 mg, Q8H  [START ON 5/29/2021] hydrocortisone sodium succinate PF, 25 mg, Q8H   Followed by  Pennelope Liner ON 5/30/2021] hydrocortisone sodium succinate PF, 25 mg, BID  insulin glargine, 12 Units, Nightly  lidocaine, 1 patch, Daily  insulin lispro, 0-18 Units, TID WC  insulin lispro, 0-9 Units, Nightly  senna, 1 tablet, Nightly  gabapentin, 600 mg, TID  levothyroxine, 150 mcg, Daily  linaclotide, 145 mcg, QAM AC  pravastatin, 10 mg, Every Other Day  sodium chloride flush, 5-40 mL, 2 times per day  polyethylene glycol, 17 g, Daily  bisacodyl, 5 mg, Daily        PRN Meds  sodium chloride flush, 5-40 mL, PRN  sodium chloride, 25 mL, PRN  promethazine, 12.5 mg, Q6H PRN   Or  ondansetron, 4 mg, Q6H PRN  oxyCODONE, 5 mg, Q4H PRN   Or  oxyCODONE, 10 mg, Q4H PRN  HYDROmorphone, 0.5 mg, Q2H PRN  cyclobenzaprine, 10 mg, TID PRN  magnesium hydroxide, 30 mL, Daily PRN  fleet, 1 enema, Daily PRN  glucose, 15 g, PRN  dextrose, 12.5 g, PRN  glucagon (rDNA), 1 mg, PRN  dextrose, 100 mL/hr, PRN        Social History:  Social History     Socioeconomic History    Marital status:      Spouse name: Not on file    Number of children: Not on file    Years of education: Not on file    Highest education level: Not on file   Occupational History     Employer: [employer]   Tobacco Use    Smoking status: Never Smoker    Smokeless tobacco: Never Used   Substance and Sexual Activity    Alcohol use: Never    Drug use: Never    Sexual activity: Not on file   Other Topics Concern    Not on file   Social History Narrative    Not on file     Social Determinants of Health     Financial Resource Strain:     Difficulty of Paying Living Expenses:    Food Insecurity:     Worried About Running Out of Food in the Last Year:     Ran Out of Food in the Last Year:    Transportation Needs:     Lack of Transportation (Medical):      Lack of Transportation (Non-Medical):    Physical Activity:     Days of Exercise per Week:     Minutes of Exercise per Session:    Stress:     Feeling of Stress :    Social Connections:     Frequency of Communication with Friends and Family:     Frequency of Social Gatherings with Friends and Family:     Attends Restorationism Services:     Active Member of Clubs or Organizations:     Attends Club or Organization Meetings:     Marital Status:    Intimate Partner Violence:     Fear of Current or Ex-Partner:     Emotionally Abused:     Physically Abused:     Sexually Abused:      Home situation: Lives with  and son in a 1 level home with one-step to enter and 0 rails. Bed and bath are on first floor. Functional History:  Premorbid ADL's: independent   Premorbid Mobility: Independent  Present: significant decrease in mobility and function    Physical Therapy:  Bed mobility: Max. A  Transfers: Min. A  Ambulation: 3 feet WW min. A    Occupational Therapy:  Feeding: Independent  Grooming: Min. A  UB dressing: Mod. A  LB dressing: Max. A    Review Of Systems:   Contsitutional: No Fever, chills  HEENT: No HA, blurry vision  Respiratory: No Cough, SOB  Cardiovascular: No CP  Gastrointestinal: No nausea, vomiting, abdominal discomfort; + continent   Genitourinary: No Dysuria  Musculoskeletal: per HPI  Neurologic: No numbness or tingling  Psychiatric: No Depression, No anxiety      Physical Examination:  Vitals:   Vitals:    05/28/21 1100 05/28/21 1200 05/28/21 1300 05/28/21 1400   BP: 129/60 102/67 (!) 108/59 (!) 113/56   Pulse: 90 83 84 89   Resp: 25 18 17 18   Temp:  98.4 °F (36.9 °C)     TempSrc:  Temporal     SpO2: 92% 97% 91% 90%   Weight:       Height:           GEN: NAD, resting in bed  HEENT: NC/AT, PERRL, EOMI  RESP: CTAB, no R/R/W  CV: +S1 S2, RRR  ABD: soft, NT, ND, normal BS   EXT: No erythema/clubbing/cyanosis, 2+ pulses  Skin: No rash. Surgical incision not examined.   Psych: Appropriate mood and affect    Neuro Exam:  AAOx4  Speech clear content appropriate  Follows commands    Cranial Nerves:  I: olfactory not tested  II  visual fields intact to confrontation  III, IV, VI: extra occular muscles intact  Pupils (II, III): ERRL  V: Facial Sensation/Jaw clench: intact  VII: Facial Motor: intact  VIII. Hearing: intact  XI/X: Palate: intact  XI: Shoulder shrug/SCM:intact  XII: Tongue: Midline      Sensory:    LT: intact throughout          Motor:    Strength is 5/5 throughout bilateral upper extremities. 2/5 bilateral HF, 4/5 Right KE, DF, PF; 4-/5 left KE, DF, PF      DTRs:  Reflex Right Left   Biceps 2+ 2+   Triceps 2+ 2+   Brachioradialis 2+ 2+   Patella 2+ 2+   Achilles  2+ 2+     No Babinski    Balance/Gait:  Gait: Deferred    Laboratory:    Lab Results   Component Value Date    WBC 9.1 05/28/2021    HGB 7.4 (L) 05/28/2021    HCT 22.5 (L) 05/28/2021    MCV 86.9 05/28/2021     05/28/2021     Lab Results   Component Value Date     05/28/2021    K 3.4 05/28/2021    K 4.2 05/20/2021     05/28/2021    CO2 29 05/28/2021    BUN 9 05/28/2021    CREATININE 0.5 05/28/2021    GLUCOSE 157 05/28/2021    CALCIUM 8.5 05/28/2021      Lab Results   Component Value Date    ALT 20 05/28/2021    AST 13 05/28/2021    ALKPHOS 49 05/28/2021    BILITOT <0.2 05/28/2021       Lab Results   Component Value Date    COLORU Yellow 05/26/2021    NITRU Negative 05/26/2021    GLUCOSEU 250 05/26/2021    KETUA 15 05/26/2021    UROBILINOGEN 0.2 05/26/2021    BILIRUBINUR Negative 05/26/2021    BILIRUBINUR neg 09/23/2019     Lab Results   Component Value Date    LABA1C 6.5 (H) 05/26/2021         Pertinent Imaging:  Lumbar MRI  FINDINGS:   BONES/ALIGNMENT: There is normal alignment of the spine. The vertebral body   heights are maintained.  The bone marrow signal appears unremarkable.       SPINAL CORD: The conus terminates normally.       SOFT TISSUES: No paraspinal mass identified.       L1-L2: There is no significant disc herniation, spinal canal stenosis or   neural foraminal narrowing.       L2-L3: Disc bulge causes mild right foraminal stenosis.       L3-L4: Disc bulge with superimposed right foraminal disc protrusion causes   mild thecal sac, moderate right foraminal and mild left foraminal stenosis.       L4-L5: Large central cranially migrated disc extrusion measuring 25 x 12 x 13   mm (craniocaudal by anteroposterior by transverse) causes severe spinal canal   stenosis and may compress the cauda equina.  This is slightly worse on the   left side.  A disc bulge also causes mild bilateral foraminal stenosis.       L5-S1: Disc bulge and facet hypertrophy without stenosis.         Impression   L4-L5 disc extrusion causing severe spinal canal stenosis worse on the left   side.                   IMPRESSION:  Arcenio Carr is a 62 y.o. female with impairments and acitivities limitations in ADLs and mobility secondary to lumbar stenosis, now s/p L3-L5 PLIF    Demonstrating impaired strength, impaired ROM, impaired balance, decreased endurance, impaired functional mobility and impaired self care. Recommendations:   Patient is appropriate and would benefit from Acute Rehabilitation to improve functional outcomes. Her drain will be in place until tomorrow. Will need to get pre-cert for ARU. Thank you for the consult.     Kenney Jones MD  Physical Medicine and Rehabilitation

## 2021-05-29 LAB
ABO/RH: NORMAL
ALBUMIN SERPL-MCNC: 3 G/DL (ref 3.5–5.2)
ALP BLD-CCNC: 52 U/L (ref 35–104)
ALT SERPL-CCNC: 20 U/L (ref 0–32)
ANION GAP SERPL CALCULATED.3IONS-SCNC: 10 MMOL/L (ref 7–16)
ANTIBODY SCREEN: NORMAL
AST SERPL-CCNC: 14 U/L (ref 0–31)
BASOPHILS ABSOLUTE: 0.04 E9/L (ref 0–0.2)
BASOPHILS RELATIVE PERCENT: 0.5 % (ref 0–2)
BILIRUB SERPL-MCNC: 0.4 MG/DL (ref 0–1.2)
BLOOD BANK DISPENSE STATUS: NORMAL
BLOOD BANK PRODUCT CODE: NORMAL
BPU ID: NORMAL
BUN BLDV-MCNC: 16 MG/DL (ref 6–20)
CALCIUM SERPL-MCNC: 8.9 MG/DL (ref 8.6–10.2)
CHLORIDE BLD-SCNC: 101 MMOL/L (ref 98–107)
CO2: 28 MMOL/L (ref 22–29)
CREAT SERPL-MCNC: 0.6 MG/DL (ref 0.5–1)
DESCRIPTION BLOOD BANK: NORMAL
EOSINOPHILS ABSOLUTE: 0.22 E9/L (ref 0.05–0.5)
EOSINOPHILS RELATIVE PERCENT: 2.6 % (ref 0–6)
GFR AFRICAN AMERICAN: >60
GFR NON-AFRICAN AMERICAN: >60 ML/MIN/1.73
GLUCOSE BLD-MCNC: 163 MG/DL (ref 74–99)
HCT VFR BLD CALC: 23.9 % (ref 34–48)
HCT VFR BLD CALC: 24.5 % (ref 34–48)
HEMOGLOBIN: 7.8 G/DL (ref 11.5–15.5)
HEMOGLOBIN: 8.2 G/DL (ref 11.5–15.5)
IMMATURE GRANULOCYTES #: 0.13 E9/L
IMMATURE GRANULOCYTES %: 1.5 % (ref 0–5)
LYMPHOCYTES ABSOLUTE: 2.07 E9/L (ref 1.5–4)
LYMPHOCYTES RELATIVE PERCENT: 24.4 % (ref 20–42)
MCH RBC QN AUTO: 29.7 PG (ref 26–35)
MCHC RBC AUTO-ENTMCNC: 32.6 % (ref 32–34.5)
MCV RBC AUTO: 90.9 FL (ref 80–99.9)
METER GLUCOSE: 150 MG/DL (ref 74–99)
METER GLUCOSE: 187 MG/DL (ref 74–99)
METER GLUCOSE: 202 MG/DL (ref 74–99)
METER GLUCOSE: 228 MG/DL (ref 74–99)
MONOCYTES ABSOLUTE: 0.64 E9/L (ref 0.1–0.95)
MONOCYTES RELATIVE PERCENT: 7.6 % (ref 2–12)
NEUTROPHILS ABSOLUTE: 5.37 E9/L (ref 1.8–7.3)
NEUTROPHILS RELATIVE PERCENT: 63.4 % (ref 43–80)
PDW BLD-RTO: 13.2 FL (ref 11.5–15)
PLATELET # BLD: 233 E9/L (ref 130–450)
PMV BLD AUTO: 9.6 FL (ref 7–12)
POTASSIUM SERPL-SCNC: 4.1 MMOL/L (ref 3.5–5)
RBC # BLD: 2.63 E12/L (ref 3.5–5.5)
SODIUM BLD-SCNC: 139 MMOL/L (ref 132–146)
TOTAL PROTEIN: 5.7 G/DL (ref 6.4–8.3)
WBC # BLD: 8.5 E9/L (ref 4.5–11.5)

## 2021-05-29 PROCEDURE — 85018 HEMOGLOBIN: CPT

## 2021-05-29 PROCEDURE — 86923 COMPATIBILITY TEST ELECTRIC: CPT

## 2021-05-29 PROCEDURE — 36415 COLL VENOUS BLD VENIPUNCTURE: CPT

## 2021-05-29 PROCEDURE — 82962 GLUCOSE BLOOD TEST: CPT

## 2021-05-29 PROCEDURE — C9113 INJ PANTOPRAZOLE SODIUM, VIA: HCPCS | Performed by: INTERNAL MEDICINE

## 2021-05-29 PROCEDURE — 2580000003 HC RX 258: Performed by: PHYSICIAN ASSISTANT

## 2021-05-29 PROCEDURE — 6360000002 HC RX W HCPCS: Performed by: INTERNAL MEDICINE

## 2021-05-29 PROCEDURE — 6370000000 HC RX 637 (ALT 250 FOR IP): Performed by: INTERNAL MEDICINE

## 2021-05-29 PROCEDURE — 85025 COMPLETE CBC W/AUTO DIFF WBC: CPT

## 2021-05-29 PROCEDURE — 85014 HEMATOCRIT: CPT

## 2021-05-29 PROCEDURE — 2580000003 HC RX 258: Performed by: INTERNAL MEDICINE

## 2021-05-29 PROCEDURE — 36430 TRANSFUSION BLD/BLD COMPNT: CPT

## 2021-05-29 PROCEDURE — 2700000000 HC OXYGEN THERAPY PER DAY

## 2021-05-29 PROCEDURE — 6360000002 HC RX W HCPCS: Performed by: PHYSICIAN ASSISTANT

## 2021-05-29 PROCEDURE — 86850 RBC ANTIBODY SCREEN: CPT

## 2021-05-29 PROCEDURE — 37799 UNLISTED PX VASCULAR SURGERY: CPT

## 2021-05-29 PROCEDURE — 86901 BLOOD TYPING SEROLOGIC RH(D): CPT

## 2021-05-29 PROCEDURE — 80053 COMPREHEN METABOLIC PANEL: CPT

## 2021-05-29 PROCEDURE — P9016 RBC LEUKOCYTES REDUCED: HCPCS

## 2021-05-29 PROCEDURE — 6370000000 HC RX 637 (ALT 250 FOR IP): Performed by: PHYSICIAN ASSISTANT

## 2021-05-29 PROCEDURE — 2060000000 HC ICU INTERMEDIATE R&B

## 2021-05-29 PROCEDURE — 86900 BLOOD TYPING SEROLOGIC ABO: CPT

## 2021-05-29 RX ORDER — SODIUM CHLORIDE 9 MG/ML
INJECTION, SOLUTION INTRAVENOUS PRN
Status: DISCONTINUED | OUTPATIENT
Start: 2021-05-29 | End: 2021-05-31 | Stop reason: HOSPADM

## 2021-05-29 RX ADMIN — INSULIN LISPRO 3 UNITS: 100 INJECTION, SOLUTION INTRAVENOUS; SUBCUTANEOUS at 20:20

## 2021-05-29 RX ADMIN — INSULIN LISPRO 3 UNITS: 100 INJECTION, SOLUTION INTRAVENOUS; SUBCUTANEOUS at 08:30

## 2021-05-29 RX ADMIN — INSULIN LISPRO 6 UNITS: 100 INJECTION, SOLUTION INTRAVENOUS; SUBCUTANEOUS at 11:53

## 2021-05-29 RX ADMIN — HYDROCORTISONE SODIUM SUCCINATE 50 MG: 100 INJECTION, POWDER, FOR SOLUTION INTRAMUSCULAR; INTRAVENOUS at 03:10

## 2021-05-29 RX ADMIN — GABAPENTIN 600 MG: 300 CAPSULE ORAL at 20:30

## 2021-05-29 RX ADMIN — LEVOTHYROXINE SODIUM 150 MCG: 0.15 TABLET ORAL at 06:08

## 2021-05-29 RX ADMIN — Medication 10 ML: at 20:37

## 2021-05-29 RX ADMIN — BISACODYL 5 MG: 5 TABLET, COATED ORAL at 08:32

## 2021-05-29 RX ADMIN — INSULIN GLARGINE 12 UNITS: 100 INJECTION, SOLUTION SUBCUTANEOUS at 20:20

## 2021-05-29 RX ADMIN — PRAVASTATIN SODIUM 10 MG: 20 TABLET ORAL at 20:24

## 2021-05-29 RX ADMIN — SODIUM CHLORIDE, PRESERVATIVE FREE 10 ML: 5 INJECTION INTRAVENOUS at 20:22

## 2021-05-29 RX ADMIN — OXYCODONE HYDROCHLORIDE 10 MG: 10 TABLET ORAL at 15:52

## 2021-05-29 RX ADMIN — SODIUM CHLORIDE, PRESERVATIVE FREE 10 ML: 5 INJECTION INTRAVENOUS at 08:39

## 2021-05-29 RX ADMIN — SODIUM CHLORIDE, PRESERVATIVE FREE 10 ML: 5 INJECTION INTRAVENOUS at 15:07

## 2021-05-29 RX ADMIN — Medication 10 ML: at 08:32

## 2021-05-29 RX ADMIN — OXYCODONE 5 MG: 5 TABLET ORAL at 20:30

## 2021-05-29 RX ADMIN — HYDROCORTISONE SODIUM SUCCINATE 25 MG: 100 INJECTION, POWDER, FOR SOLUTION INTRAMUSCULAR; INTRAVENOUS at 18:33

## 2021-05-29 RX ADMIN — GABAPENTIN 600 MG: 300 CAPSULE ORAL at 08:31

## 2021-05-29 RX ADMIN — SENNOSIDES 8.6 MG: 8.6 TABLET, FILM COATED ORAL at 20:25

## 2021-05-29 RX ADMIN — POLYETHYLENE GLYCOL 3350 17 G: 17 POWDER, FOR SOLUTION ORAL at 08:32

## 2021-05-29 RX ADMIN — PANTOPRAZOLE SODIUM 40 MG: 40 INJECTION, POWDER, FOR SOLUTION INTRAVENOUS at 08:32

## 2021-05-29 RX ADMIN — HYDROMORPHONE HYDROCHLORIDE 0.5 MG: 1 INJECTION, SOLUTION INTRAMUSCULAR; INTRAVENOUS; SUBCUTANEOUS at 06:09

## 2021-05-29 RX ADMIN — HYDROMORPHONE HYDROCHLORIDE 0.5 MG: 1 INJECTION, SOLUTION INTRAMUSCULAR; INTRAVENOUS; SUBCUTANEOUS at 00:26

## 2021-05-29 RX ADMIN — INSULIN LISPRO 3 UNITS: 100 INJECTION, SOLUTION INTRAVENOUS; SUBCUTANEOUS at 16:49

## 2021-05-29 RX ADMIN — OXYCODONE 5 MG: 5 TABLET ORAL at 03:24

## 2021-05-29 RX ADMIN — HYDROCORTISONE SODIUM SUCCINATE 50 MG: 100 INJECTION, POWDER, FOR SOLUTION INTRAMUSCULAR; INTRAVENOUS at 11:53

## 2021-05-29 RX ADMIN — PANTOPRAZOLE SODIUM 40 MG: 40 INJECTION, POWDER, FOR SOLUTION INTRAVENOUS at 20:22

## 2021-05-29 RX ADMIN — GABAPENTIN 600 MG: 300 CAPSULE ORAL at 14:30

## 2021-05-29 ASSESSMENT — PAIN SCALES - GENERAL
PAINLEVEL_OUTOF10: 0
PAINLEVEL_OUTOF10: 8
PAINLEVEL_OUTOF10: 5
PAINLEVEL_OUTOF10: 6
PAINLEVEL_OUTOF10: 3
PAINLEVEL_OUTOF10: 7
PAINLEVEL_OUTOF10: 5
PAINLEVEL_OUTOF10: 0
PAINLEVEL_OUTOF10: 5

## 2021-05-29 ASSESSMENT — PAIN DESCRIPTION - DIRECTION: RADIATING_TOWARDS: BLE

## 2021-05-29 ASSESSMENT — PAIN DESCRIPTION - DESCRIPTORS
DESCRIPTORS: ACHING;DISCOMFORT
DESCRIPTORS: ACHING;DISCOMFORT

## 2021-05-29 ASSESSMENT — PAIN DESCRIPTION - PAIN TYPE
TYPE: SURGICAL PAIN
TYPE: SURGICAL PAIN

## 2021-05-29 ASSESSMENT — PAIN DESCRIPTION - ORIENTATION: ORIENTATION: MID

## 2021-05-29 ASSESSMENT — PAIN DESCRIPTION - LOCATION
LOCATION: BACK
LOCATION: BACK

## 2021-05-29 NOTE — PROGRESS NOTES
lispro (HUMALOG) injection vial 0-9 Units, 0-9 Units, Subcutaneous, Nightly  senna (SENOKOT) tablet 8.6 mg, 1 tablet, Oral, Nightly  gabapentin (NEURONTIN) capsule 600 mg, 600 mg, Oral, TID  levothyroxine (SYNTHROID) tablet 150 mcg, 150 mcg, Oral, Daily  linaclotide (LINZESS) capsule 145 mcg, 145 mcg, Oral, QAM AC  pravastatin (PRAVACHOL) tablet 10 mg, 10 mg, Oral, Every Other Day  sodium chloride flush 0.9 % injection 5-40 mL, 5-40 mL, Intravenous, 2 times per day  sodium chloride flush 0.9 % injection 5-40 mL, 5-40 mL, Intravenous, PRN  0.9 % sodium chloride infusion, 25 mL, Intravenous, PRN  promethazine (PHENERGAN) tablet 12.5 mg, 12.5 mg, Oral, Q6H PRN **OR** ondansetron (ZOFRAN) injection 4 mg, 4 mg, Intravenous, Q6H PRN  oxyCODONE (ROXICODONE) immediate release tablet 5 mg, 5 mg, Oral, Q4H PRN **OR** oxyCODONE (ROXICODONE) immediate release tablet 10 mg, 10 mg, Oral, Q4H PRN  HYDROmorphone (DILAUDID) injection 0.5 mg, 0.5 mg, Intravenous, Q2H PRN  cyclobenzaprine (FLEXERIL) tablet 10 mg, 10 mg, Oral, TID PRN  polyethylene glycol (GLYCOLAX) packet 17 g, 17 g, Oral, Daily  bisacodyl (DULCOLAX) EC tablet 5 mg, 5 mg, Oral, Daily  magnesium hydroxide (MILK OF MAGNESIA) 400 MG/5ML suspension 30 mL, 30 mL, Oral, Daily PRN  fleet rectal enema 1 enema, 1 enema, Rectal, Daily PRN  glucose (GLUTOSE) 40 % oral gel 15 g, 15 g, Oral, PRN  dextrose 50 % IV solution, 12.5 g, Intravenous, PRN  glucagon (rDNA) injection 1 mg, 1 mg, Intramuscular, PRN  dextrose 5 % solution, 100 mL/hr, Intravenous, PRN    ASSESSMENT AND PLAN:    POD 4 s/p L3-L5 fusion. Stable. Blood pressure is better. Pain is better. Will D/C drain. Okay to transfer to monitored bed.   Await rehab placement    Behzad Martin MD

## 2021-05-29 NOTE — PROGRESS NOTES
legs    CBC with Differential:    Lab Results   Component Value Date    WBC 8.5 05/29/2021    RBC 2.63 05/29/2021    HGB 7.8 05/29/2021    HCT 23.9 05/29/2021     05/29/2021    MCV 90.9 05/29/2021    MCH 29.7 05/29/2021    MCHC 32.6 05/29/2021    RDW 13.2 05/29/2021    LYMPHOPCT 24.4 05/29/2021    MONOPCT 7.6 05/29/2021    BASOPCT 0.5 05/29/2021    MONOSABS 0.64 05/29/2021    LYMPHSABS 2.07 05/29/2021    EOSABS 0.22 05/29/2021    BASOSABS 0.04 05/29/2021     CMP:    Lab Results   Component Value Date     05/29/2021    K 4.1 05/29/2021    K 4.2 05/20/2021     05/29/2021    CO2 28 05/29/2021    BUN 16 05/29/2021    CREATININE 0.6 05/29/2021    GFRAA >60 05/29/2021    LABGLOM >60 05/29/2021    GLUCOSE 163 05/29/2021    PROT 5.7 05/29/2021    LABALBU 3.0 05/29/2021    CALCIUM 8.9 05/29/2021    BILITOT 0.4 05/29/2021    ALKPHOS 52 05/29/2021    AST 14 05/29/2021    ALT 20 05/29/2021     Warfarin PT/INR:    Lab Results   Component Value Date    INR 1.1 05/20/2021    PROTIME 11.8 05/20/2021       Assessment:    Active Problems:    Type 2 diabetes mellitus without complication, without long-term current use of insulin (HCC)    Acquired hypothyroidism    Hyperlipidemia    Lumbar radiculopathy    Postoperative hypotension    Postoperative anemia due to acute blood loss    ALTA (acute kidney injury) ([facility] [address]  Resolved Problems:    * No resolved hospital problems.  *      Plan:  Increase activity ok to transfer to telemetry        [facility], DO  9:00 AM  5/29/2021

## 2021-05-29 NOTE — PLAN OF CARE
Problem: Infection - Surgical Site:  Goal: Will show no infection signs and symptoms  Description: Will show no infection signs and symptoms  Outcome: Met This Shift     Problem: Pain - Acute:  Goal: Pain level will decrease  Description: Pain level will decrease  Outcome: Met This Shift     Problem: Falls - Risk of:  Goal: Will remain free from falls  Description: Will remain free from falls  Outcome: Met This Shift     Problem: Falls - Risk of:  Goal: Absence of physical injury  Description: Absence of physical injury  Outcome: Met This Shift     Problem: Pain:  Goal: Pain level will decrease  Description: Pain level will decrease  Outcome: Met This Shift     Problem: Musculor/Skeletal Functional Status  Goal: Absence of falls  Outcome: Met This Shift

## 2021-05-29 NOTE — PROGRESS NOTES
Comprehensive Nutrition Assessment    Type and Reason for Visit:  RD Nutrition Re-Screen/LOS    Nutrition Recommendations/Plan: Continue current diet. Nutrition Assessment:  Adm with back pain, now s/p back surgery. Hx DM. PO variable: 0% or %. No current actual wt to assess. Presents with no apparent risk of malnutrition to follow. Malnutrition Assessment:  Malnutrition Status:  No malnutrition      Nutrition Related Findings:  Abd soft and NT. +BS. Edema noted to all extremities, non-pitting. Labs reviewed: A1c 6.5. Meds incl: Insulin.       Wounds:  Surgical Incision       Current Nutrition Therapies:    DIET CARB CONTROL; Carb Control: 3 carb choices (45 gms)/meal    Anthropometric Measures:  · Height: 5' 4\" (162.6 cm)  · Current Body Weight: 252 lb (114.3 kg)   · Admission Body Weight: 252 lb (114.3 kg) (Stated)    · Usual Body Weight:  (-748@ over past year, actual)     · Ideal Body Weight: 120 lbs; % Ideal Body Weight 210 %   · BMI: 43.2  · BMI Categories: Obese Class 3 (BMI 40.0 or greater)       Nutrition Diagnosis:   No nutrition diagnosis at this time    Nutrition Interventions:   Food and/or Nutrient Delivery:  Continue Current Diet  Nutrition Education/Counseling:  No recommendation at this time   Coordination of Nutrition Care:  Continue to monitor while inpatient    Goals:  PO % of most meals       Nutrition Monitoring and Evaluation:   Behavioral-Environmental Outcomes:  None Identified   Food/Nutrient Intake Outcomes:  Food and Nutrient Intake  Physical Signs/Symptoms Outcomes:  Biochemical Data, Fluid Status or Edema, Nutrition Focused Physical Findings, Weight     Discharge Planning:    Continue current diet     Electronically signed by Joann Green RD, LD on 5/28/21 at 8:23 PM EDT    Contact: [phone]

## 2021-05-29 NOTE — PROGRESS NOTES
[address]   Department of Internal Medicine   Internal Medicine Residency   MICU Progress Note    Patient:  Barry Hall 62 y.o. female  MRN: [id number]     Date of Service: 5/29/2021    Allergy: Keflex [cephalexin], Percocet [oxycodone-acetaminophen], and Ceftin [cefuroxime]    Subjective     Examined the patient by the bedside this morning. She is alert, awake, conversant. Off levophed, on scheduled Dulcolax, glycolax and senna, Magnesium hydroxide and fleet enema once for BM. she has been passing gas. Transfused with 1 unit because Hb 6.8 OV, repeat is 7.8 this am. Await FOBT, on Protonix, no evidence of overt/tatiana bleeding. She is stable on other measures, for transfer  Objective     VS: /60   Pulse 85   Temp 98.4 °F (36.9 °C) (Temporal)   Resp 24   Ht 5' 4\" (1.626 m)   Wt 252 lb (114.3 kg)   SpO2 93%   BMI 43.26 kg/m²   ABP (Arterial line BP): 95/65  ABP mean (Arterial line mean): 76 mmHg    I & O - 24hr:     Intake/Output Summary (Last 24 hours) at 5/29/2021 1349  Last data filed at 5/29/2021 1100  Gross per 24 hour   Intake 2034.98 ml   Output 2575 ml   Net -540.02 ml       Physical Exam:  · General Appearance: alert, appears stated age and cooperative  · Neck: no adenopathy, no carotid bruit, no JVD, supple, symmetrical, trachea midline and thyroid not enlarged, symmetric, no tenderness/mass/nodules  · Lung: clear to auscultation bilaterally  · Heart: regular rate and rhythm, S1, S2 normal, no murmur, click, rub or gallop  · Abdomen: soft, non-tender; bowel sounds normal; no masses,  no organomegaly  · Extremities:  extremities normal, atraumatic, no cyanosis or edema  · Musculoskeletal: No joint swelling, no muscle tenderness. ROM normal in all joints of extremities. · Neurologic: Mental status: Alert, oriented, thought content appropriate, strength intact UE, LE 4/5, Lt slightly weaker than Rt.  CN grossly intact    Lines     site day    Art line   L Radial 5/26   TLC R IJ 5/26   PICC None    Hemoaccess None    Oxygen:     RA  Mechanical Ventilation:     ABG:   No results found for: PHART, PH, BNA2DIR, PCO2, PO2ART, PO2, LFE8QLP, HCO3, BEART, BE, THGBART, THB, BOR5GUI, Q4GNYUVI, O2SAT     Medications     Infusions: (Fluid, Sedation, Vasopressors)  IVF:    NS 75 ml /hr  Vasopressors   Off pressors  Sedation       Nutrition:   Carb control    ATB:   Antibiotics  Days   cefepime 5/26   vanc 5/25         Labs     CBC with Differential:    Lab Results   Component Value Date    WBC 8.5 05/29/2021    RBC 2.63 05/29/2021    HGB 7.8 05/29/2021    HCT 23.9 05/29/2021     05/29/2021    MCV 90.9 05/29/2021    MCH 29.7 05/29/2021    MCHC 32.6 05/29/2021    RDW 13.2 05/29/2021    LYMPHOPCT 24.4 05/29/2021    MONOPCT 7.6 05/29/2021    BASOPCT 0.5 05/29/2021    MONOSABS 0.64 05/29/2021    LYMPHSABS 2.07 05/29/2021    EOSABS 0.22 05/29/2021    BASOSABS 0.04 05/29/2021     CMP:    Lab Results   Component Value Date     05/29/2021    K 4.1 05/29/2021    K 4.2 05/20/2021     05/29/2021    CO2 28 05/29/2021    BUN 16 05/29/2021    CREATININE 0.6 05/29/2021    GFRAA >60 05/29/2021    LABGLOM >60 05/29/2021    GLUCOSE 163 05/29/2021    PROT 5.7 05/29/2021    LABALBU 3.0 05/29/2021    CALCIUM 8.9 05/29/2021    BILITOT 0.4 05/29/2021    ALKPHOS 52 05/29/2021    AST 14 05/29/2021    ALT 20 05/29/2021         Resident's Assessment and Plan       Assessment    1. Shock 2/2 neurogenic (local stimulation d/t infusion screw) vs septic, less likely   Off levophad, VS stable resolve   Pan pan-cultures: neg, no leukocytosis today, have stopped all ABx  2. Lumbar spondylolithesis s/p decompression and fusion  3. Acute Normocytic anemia, continues to downtrend, iron profile showing ACD picture  4. ALTA, likely prerenal, resolved  5. Elevated troponin, likely 2/2 to demand ischemia, improving  6. Hx of T2DM, stable  8.  Hx of hypothyroidism, on synthroid        Plan    · Solu cortef 100 q8h, to be weaned down to 25mg Q8 today for 3 doses, followed by 25mg BID for 3 doses  · Pain control dilaudid PRN per NS, NS is following, recommendations are apprecaited  · Await FOBT, H&H Q8H  · Carb control diet to be continued  · Continue Lipitor 10mg  · HDSSI ACHS, Lantus 10-->12U nightly, followup BG ACHS  · Continue Synthroid 150ug daily, T4: 1.21  · Transfer to telemetry bed    Emilee Hebert MD, PGY-1  Attending physician: Dr. Noble Barone    I personally saw, examined and provided care for the patient. Radiographs, labs and medication list were reviewed by me independently. Review of Residents documentation was conducted and revisions were made as appropriate. I agree with the above documented exam, problem list and plan of care.       Sahil Bartlett, DO

## 2021-05-29 NOTE — PLAN OF CARE
Problem: Falls - Risk of:  Goal: Will remain free from falls  Description: Will remain free from falls  5/29/2021 0852 by Nahed Trinh RN  Outcome: Met This Shift  5/29/2021 0440 by Ramón Moe RN  Outcome: Met This Shift  Goal: Absence of physical injury  Description: Absence of physical injury  5/29/2021 0852 by Nahed Trinh RN  Outcome: Met This Shift  5/29/2021 0440 by Ramón Moe RN  Outcome: Met This Shift

## 2021-05-30 LAB
ALBUMIN SERPL-MCNC: 3.1 G/DL (ref 3.5–5.2)
ALP BLD-CCNC: 52 U/L (ref 35–104)
ALT SERPL-CCNC: 21 U/L (ref 0–32)
ANION GAP SERPL CALCULATED.3IONS-SCNC: 8 MMOL/L (ref 7–16)
AST SERPL-CCNC: 13 U/L (ref 0–31)
BASOPHILS ABSOLUTE: 0.04 E9/L (ref 0–0.2)
BASOPHILS RELATIVE PERCENT: 0.4 % (ref 0–2)
BILIRUB SERPL-MCNC: 0.4 MG/DL (ref 0–1.2)
BUN BLDV-MCNC: 14 MG/DL (ref 6–20)
CALCIUM SERPL-MCNC: 8.6 MG/DL (ref 8.6–10.2)
CHLORIDE BLD-SCNC: 100 MMOL/L (ref 98–107)
CO2: 29 MMOL/L (ref 22–29)
CREAT SERPL-MCNC: 0.5 MG/DL (ref 0.5–1)
EOSINOPHILS ABSOLUTE: 0.31 E9/L (ref 0.05–0.5)
EOSINOPHILS RELATIVE PERCENT: 3.2 % (ref 0–6)
GFR AFRICAN AMERICAN: >60
GFR NON-AFRICAN AMERICAN: >60 ML/MIN/1.73
GLUCOSE BLD-MCNC: 138 MG/DL (ref 74–99)
HCT VFR BLD CALC: 24.3 % (ref 34–48)
HCT VFR BLD CALC: 24.6 % (ref 34–48)
HCT VFR BLD CALC: 30.3 % (ref 34–48)
HEMOGLOBIN: 8 G/DL (ref 11.5–15.5)
HEMOGLOBIN: 8.1 G/DL (ref 11.5–15.5)
HEMOGLOBIN: 9.6 G/DL (ref 11.5–15.5)
IMMATURE GRANULOCYTES #: 0.15 E9/L
IMMATURE GRANULOCYTES %: 1.6 % (ref 0–5)
LYMPHOCYTES ABSOLUTE: 2.32 E9/L (ref 1.5–4)
LYMPHOCYTES RELATIVE PERCENT: 24.3 % (ref 20–42)
MCH RBC QN AUTO: 29.5 PG (ref 26–35)
MCHC RBC AUTO-ENTMCNC: 32.9 % (ref 32–34.5)
MCV RBC AUTO: 89.7 FL (ref 80–99.9)
METER GLUCOSE: 137 MG/DL (ref 74–99)
METER GLUCOSE: 144 MG/DL (ref 74–99)
METER GLUCOSE: 163 MG/DL (ref 74–99)
METER GLUCOSE: 176 MG/DL (ref 74–99)
MONOCYTES ABSOLUTE: 0.89 E9/L (ref 0.1–0.95)
MONOCYTES RELATIVE PERCENT: 9.3 % (ref 2–12)
NEUTROPHILS ABSOLUTE: 5.85 E9/L (ref 1.8–7.3)
NEUTROPHILS RELATIVE PERCENT: 61.2 % (ref 43–80)
PDW BLD-RTO: 13.5 FL (ref 11.5–15)
PLATELET # BLD: 263 E9/L (ref 130–450)
PMV BLD AUTO: 9.6 FL (ref 7–12)
POTASSIUM SERPL-SCNC: 3.8 MMOL/L (ref 3.5–5)
RBC # BLD: 2.71 E12/L (ref 3.5–5.5)
SODIUM BLD-SCNC: 137 MMOL/L (ref 132–146)
TOTAL PROTEIN: 5.4 G/DL (ref 6.4–8.3)
WBC # BLD: 9.6 E9/L (ref 4.5–11.5)

## 2021-05-30 PROCEDURE — 6360000002 HC RX W HCPCS: Performed by: INTERNAL MEDICINE

## 2021-05-30 PROCEDURE — 6370000000 HC RX 637 (ALT 250 FOR IP): Performed by: INTERNAL MEDICINE

## 2021-05-30 PROCEDURE — 85014 HEMATOCRIT: CPT

## 2021-05-30 PROCEDURE — 2700000000 HC OXYGEN THERAPY PER DAY

## 2021-05-30 PROCEDURE — 6370000000 HC RX 637 (ALT 250 FOR IP): Performed by: NEUROLOGICAL SURGERY

## 2021-05-30 PROCEDURE — 85025 COMPLETE CBC W/AUTO DIFF WBC: CPT

## 2021-05-30 PROCEDURE — 6370000000 HC RX 637 (ALT 250 FOR IP): Performed by: PHYSICIAN ASSISTANT

## 2021-05-30 PROCEDURE — 1200000000 HC SEMI PRIVATE

## 2021-05-30 PROCEDURE — 80053 COMPREHEN METABOLIC PANEL: CPT

## 2021-05-30 PROCEDURE — 2580000003 HC RX 258: Performed by: INTERNAL MEDICINE

## 2021-05-30 PROCEDURE — 82962 GLUCOSE BLOOD TEST: CPT

## 2021-05-30 PROCEDURE — 97530 THERAPEUTIC ACTIVITIES: CPT

## 2021-05-30 PROCEDURE — 2580000003 HC RX 258: Performed by: PHYSICIAN ASSISTANT

## 2021-05-30 PROCEDURE — 85018 HEMOGLOBIN: CPT

## 2021-05-30 PROCEDURE — 36415 COLL VENOUS BLD VENIPUNCTURE: CPT

## 2021-05-30 PROCEDURE — 2580000003 HC RX 258

## 2021-05-30 PROCEDURE — C9113 INJ PANTOPRAZOLE SODIUM, VIA: HCPCS | Performed by: INTERNAL MEDICINE

## 2021-05-30 RX ORDER — POTASSIUM CHLORIDE 7.45 MG/ML
10 INJECTION INTRAVENOUS
Status: DISCONTINUED | OUTPATIENT
Start: 2021-05-30 | End: 2021-05-30

## 2021-05-30 RX ORDER — POTASSIUM CHLORIDE 20 MEQ/1
20 TABLET, EXTENDED RELEASE ORAL ONCE
Status: COMPLETED | OUTPATIENT
Start: 2021-05-30 | End: 2021-05-30

## 2021-05-30 RX ORDER — PANTOPRAZOLE SODIUM 40 MG/1
40 TABLET, DELAYED RELEASE ORAL
Status: DISCONTINUED | OUTPATIENT
Start: 2021-05-31 | End: 2021-05-31 | Stop reason: HOSPADM

## 2021-05-30 RX ADMIN — OXYCODONE HYDROCHLORIDE 10 MG: 10 TABLET ORAL at 18:59

## 2021-05-30 RX ADMIN — WATER 2 ML: 1 INJECTION INTRAMUSCULAR; INTRAVENOUS; SUBCUTANEOUS at 09:53

## 2021-05-30 RX ADMIN — OXYCODONE 5 MG: 5 TABLET ORAL at 06:15

## 2021-05-30 RX ADMIN — LEVOTHYROXINE SODIUM 150 MCG: 0.15 TABLET ORAL at 08:40

## 2021-05-30 RX ADMIN — METFORMIN HYDROCHLORIDE 500 MG: 500 TABLET ORAL at 13:14

## 2021-05-30 RX ADMIN — POLYETHYLENE GLYCOL 3350 17 G: 17 POWDER, FOR SOLUTION ORAL at 08:43

## 2021-05-30 RX ADMIN — PANTOPRAZOLE SODIUM 40 MG: 40 INJECTION, POWDER, FOR SOLUTION INTRAVENOUS at 08:43

## 2021-05-30 RX ADMIN — Medication 10 ML: at 08:49

## 2021-05-30 RX ADMIN — GABAPENTIN 600 MG: 300 CAPSULE ORAL at 08:40

## 2021-05-30 RX ADMIN — GABAPENTIN 600 MG: 300 CAPSULE ORAL at 20:29

## 2021-05-30 RX ADMIN — HYDROCORTISONE SODIUM SUCCINATE 25 MG: 100 INJECTION, POWDER, FOR SOLUTION INTRAMUSCULAR; INTRAVENOUS at 20:29

## 2021-05-30 RX ADMIN — CYCLOBENZAPRINE 10 MG: 10 TABLET, FILM COATED ORAL at 18:59

## 2021-05-30 RX ADMIN — INSULIN LISPRO 3 UNITS: 100 INJECTION, SOLUTION INTRAVENOUS; SUBCUTANEOUS at 12:28

## 2021-05-30 RX ADMIN — GABAPENTIN 600 MG: 300 CAPSULE ORAL at 13:15

## 2021-05-30 RX ADMIN — BISACODYL 5 MG: 5 TABLET, COATED ORAL at 08:40

## 2021-05-30 RX ADMIN — SENNOSIDES 8.6 MG: 8.6 TABLET, FILM COATED ORAL at 20:29

## 2021-05-30 RX ADMIN — Medication 10 ML: at 20:29

## 2021-05-30 RX ADMIN — POTASSIUM CHLORIDE 20 MEQ: 1500 TABLET, EXTENDED RELEASE ORAL at 09:53

## 2021-05-30 RX ADMIN — HYDROCORTISONE SODIUM SUCCINATE 25 MG: 100 INJECTION, POWDER, FOR SOLUTION INTRAMUSCULAR; INTRAVENOUS at 09:48

## 2021-05-30 RX ADMIN — OXYCODONE HYDROCHLORIDE 10 MG: 10 TABLET ORAL at 00:30

## 2021-05-30 RX ADMIN — OXYCODONE 5 MG: 5 TABLET ORAL at 10:20

## 2021-05-30 RX ADMIN — INSULIN LISPRO 2 UNITS: 100 INJECTION, SOLUTION INTRAVENOUS; SUBCUTANEOUS at 20:34

## 2021-05-30 RX ADMIN — MAGNESIUM CITRATE 296 ML: 1.75 LIQUID ORAL at 18:32

## 2021-05-30 RX ADMIN — HYDROCORTISONE SODIUM SUCCINATE 25 MG: 100 INJECTION, POWDER, FOR SOLUTION INTRAMUSCULAR; INTRAVENOUS at 03:12

## 2021-05-30 RX ADMIN — SODIUM CHLORIDE, PRESERVATIVE FREE 10 ML: 5 INJECTION INTRAVENOUS at 03:12

## 2021-05-30 RX ADMIN — INSULIN LISPRO 3 UNITS: 100 INJECTION, SOLUTION INTRAVENOUS; SUBCUTANEOUS at 08:42

## 2021-05-30 RX ADMIN — SODIUM CHLORIDE, PRESERVATIVE FREE 10 ML: 5 INJECTION INTRAVENOUS at 20:30

## 2021-05-30 RX ADMIN — SODIUM CHLORIDE, PRESERVATIVE FREE 10 ML: 5 INJECTION INTRAVENOUS at 08:44

## 2021-05-30 ASSESSMENT — PAIN DESCRIPTION - DESCRIPTORS
DESCRIPTORS: ACHING;DISCOMFORT
DESCRIPTORS: ACHING

## 2021-05-30 ASSESSMENT — PAIN SCALES - GENERAL
PAINLEVEL_OUTOF10: 5
PAINLEVEL_OUTOF10: 4
PAINLEVEL_OUTOF10: 5
PAINLEVEL_OUTOF10: 2
PAINLEVEL_OUTOF10: 8
PAINLEVEL_OUTOF10: 2
PAINLEVEL_OUTOF10: 0
PAINLEVEL_OUTOF10: 7
PAINLEVEL_OUTOF10: 0
PAINLEVEL_OUTOF10: 0

## 2021-05-30 ASSESSMENT — PAIN DESCRIPTION - PAIN TYPE
TYPE: SURGICAL PAIN
TYPE: SURGICAL PAIN

## 2021-05-30 ASSESSMENT — PAIN DESCRIPTION - DIRECTION
RADIATING_TOWARDS: BLE
RADIATING_TOWARDS: BLE

## 2021-05-30 ASSESSMENT — PAIN DESCRIPTION - LOCATION
LOCATION: BACK;ANKLE
LOCATION: BACK

## 2021-05-30 ASSESSMENT — PAIN DESCRIPTION - ORIENTATION
ORIENTATION: LOWER
ORIENTATION: MID

## 2021-05-30 ASSESSMENT — PAIN DESCRIPTION - ONSET: ONSET: GRADUAL

## 2021-05-30 ASSESSMENT — PAIN DESCRIPTION - PROGRESSION
CLINICAL_PROGRESSION: GRADUALLY WORSENING
CLINICAL_PROGRESSION: GRADUALLY WORSENING

## 2021-05-30 ASSESSMENT — PAIN DESCRIPTION - FREQUENCY: FREQUENCY: CONTINUOUS

## 2021-05-30 ASSESSMENT — PAIN - FUNCTIONAL ASSESSMENT: PAIN_FUNCTIONAL_ASSESSMENT: PREVENTS OR INTERFERES SOME ACTIVE ACTIVITIES AND ADLS

## 2021-05-30 NOTE — PROGRESS NOTES
[facility]  Department of Internal Medicine   Internal Medicine Residency   MICU Progress Note    Patient:  Brady Gipson 62 y.o. female  MRN: [id number]     Date of Service: 5/30/2021    Allergy: Keflex [cephalexin], Percocet [oxycodone-acetaminophen], and Ceftin [cefuroxime]    Subjective     Examined the patient by the bedside this morning. She is alert, awake, conversant. Off levophed, on scheduled Dulcolax, glycolax and senna, Magnesium hydroxide and fleet enema once for BM. Did not move bowl yet, but she has been passing gas. Hb 8.0,  Await FOBT, on Protonix, no evidence of overt/tatiana bleeding. She is stable on other measures, for transfer to general floor, await bed  Objective     VS: /64   Pulse 86   Temp 98 °F (36.7 °C) (Oral)   Resp 14   Ht 5' 4\" (1.626 m)   Wt 252 lb (114.3 kg)   SpO2 97%   BMI 43.26 kg/m²   ABP (Arterial line BP): 95/65  ABP mean (Arterial line mean): 76 mmHg    I & O - 24hr:     Intake/Output Summary (Last 24 hours) at 5/30/2021 1129  Last data filed at 5/30/2021 0600  Gross per 24 hour   Intake 960 ml   Output 1825 ml   Net -865 ml       Physical Exam:  · General Appearance: alert, appears stated age and cooperative  · Neck: no adenopathy, no carotid bruit, no JVD, supple, symmetrical, trachea midline and thyroid not enlarged, symmetric, no tenderness/mass/nodules  · Lung: clear to auscultation bilaterally  · Heart: regular rate and rhythm, S1, S2 normal, no murmur, click, rub or gallop  · Abdomen: soft, non-tender; bowel sounds normal; no masses,  no organomegaly  · Extremities:  extremities normal, atraumatic, no cyanosis or edema  · Musculoskeletal: No joint swelling, no muscle tenderness. ROM normal in all joints of extremities. · Neurologic: Mental status: Alert, oriented, thought content appropriate, strength intact UE, LE 4/5, Lt slightly weaker than Rt.  CN grossly intact    Lines     site day    Art line   L Radial 5/26   TLC R IJ 5/26   PICC 25mg Q8 today for 3 doses, followed by 25mg BID for 3 doses  · Pain control dilaudid PRN per NS, NS is following, recommendations are apprecaited  · Await FOBT, H&H Q8H  · Carb control diet to be continued  · Continue Lipitor 10mg  · HDSSI ACHS, Lantus 12U nightly, followup BG ACHS  · Continue Synthroid 150ug daily, T4: 1.21  · Transfer to general bed    Tammy Miller MD, PGY-1  Attending physician: Dr. Garo Spear    I personally saw, examined and provided care for the patient. Radiographs, labs and medication list were reviewed by me independently. Review of Residents documentation was conducted and revisions were made as appropriate. I agree with the above documented exam, problem list and plan of care.     Transfer to F/post neuro-surg/ortho unit [address], DO

## 2021-05-30 NOTE — PROGRESS NOTES
Physical Therapy  Treatment Note     Name: Jimbo Noble  : 1960  MRN: [id number]      Date of Service: 2021    Evaluating PT:  Melvi Santana, PT, DPT [id number]     Room #:  4013/8705-S  Diagnosis:  Lumbar radiculopathy [M54.16]  Reason for admission: back pain  Precautions:  Falls, spinal precautions, hemovac drain, hypotension  Procedure/Surgery:   L3-5 laminectomy and fusion  Equipment Recommendations:  FWW initially     SUBJECTIVE:  Pt lives with  and son in a 1 story home with 1 stair(s) to enter and 0 rail(s) - additional 1 step into kitchen. Bed is on 1st floor and bath is on 1st floor. Pt ambulated with no AD PTA. OBJECTIVE:   Initial Evaluation  Date:  Treatment  Date:  Short Term/ Long Term   Goals   AM-PAC 6 Clicks     Was pt agreeable to Eval/treatment? Yes  Yes    Does pt have pain?  4/10 back  2/10 BLE \"nerve pain\"    Bed Mobility  Rolling: MaxA  Supine to sit: MaxA  Sit to supine: NT  Scooting: MaxA Rolling: Supervision  Supine to sit: Supervision  Scooting: Supervision Independent    Transfers Sit to stand: Christianne  Stand to sit: Christianne  Stand pivot: Christianne with [employer] Sit<>stand: Supervision  Stand pivot: Supervision with [employer] Independent    Ambulation    3 feet with [employer] Christianne   150 feet with [employer] with Supervision >150 feet with AAD mod I vs independent   Stair negotiation: ascended and descended  NT 3 steps with 1 rail with BUE support with SBA >1 steps with 1 rail Mod I    ROM BUE:  See OT eval   BLE:  WFL BLE: WFL    Strength BUE:  See OT eval   BLE:  knee ext 4/5  Ankle DF 4/5 BLE: grossly 4/5 at the knees and ankles, hip flexion was not resisted Increase by 1/3 MMT grade    Balance Sitting EOB:  SBA  Dynamic Standing:  Christianne Sitting: Independent  Standing: Supervision with AD Sitting EOB:  Independent   Dynamic Standing:  Independent        Pt is A & O x 4  Edema:  Unremarkable    Vitals:  Heart Rate at rest 85 Heart Rate post activity 101   SPO2 at rest 99 SPO2 post activity 93     Patient education  Pt educated on spinal precautions (reinforced), hand placement with transfers, safe stair negotiation. Patient response to education:   Pt verbalized understanding Pt demonstrated skill Pt requires further education in this area   Yes Yes Reinforce as needed     ASSESSMENT:    Comments: The pt was seen supine in bed and was agreeable to PT treatment. Discussed spinal precautions with the pt and she was able to repeat, required occasional cues throughout the session to maintain the precautions with ADLs and basic mobility. The pt was able to perform all activity on her own, she required no physical assistance to perform tasks. The pt was able to ambulate with a reciprocal gait pattern and no sign of buckling or unsteadiness, performed stair negotiation with education on side-step technique to allow for BUE support on a single rail while maintaining precautions. The pt was left seated in a bedside chair at the end of the treatment session. Treatment:  Patient practiced and was instructed in the following treatment:     Transfer training: verbal cues for hand placement sit to stand transfer   Stair negotiation: verbal cuing for body positioning and technique    Skilled repositioning in chair to promote improved posture and improved cardiorespiratory function; pt positioned in chair to ensure skin/joint protection    PLAN:    Patient is making good progress towards established goals. Will continue with current POC. Time in  0735  Time out  0800    Total Treatment Time  25 minutes     CPT codes:  [] Gait training 86458 0 minutes  [] Manual therapy 31186 0 minutes  [x] Therapeutic activities 86006 25 minutes  [] Therapeutic exercises 16760 0 minutes  [] Neuromuscular reeducation 85228 0 minutes    Emilee Alcaraz.  Emily Choudhary, [address]  number:  [id number]

## 2021-05-30 NOTE — PLAN OF CARE
Problem: Infection - Surgical Site:  Goal: Will show no infection signs and symptoms  Description: Will show no infection signs and symptoms  Outcome: Met This Shift     Problem: Pain - Acute:  Goal: Pain level will decrease  Description: Pain level will decrease  Outcome: Met This Shift     Problem: Sensory Perception - Impaired:  Goal: Sensory function intact, lower extremity  Description: Sensory function intact, lower extremity  Outcome: Met This Shift     Problem: Falls - Risk of:  Goal: Will remain free from falls  Description: Will remain free from falls  Outcome: Met This Shift  Goal: Absence of physical injury  Description: Absence of physical injury  Outcome: Met This Shift     Problem: Pain:  Goal: Pain level will decrease  Description: Pain level will decrease  Outcome: Met This Shift  Goal: Control of acute pain  Description: Control of acute pain  Outcome: Met This Shift     Problem: Skin Integrity:  Goal: Will show no infection signs and symptoms  Description: Will show no infection signs and symptoms  Outcome: Met This Shift  Goal: Absence of new skin breakdown  Description: Absence of new skin breakdown  Outcome: Met This Shift     Problem: Musculor/Skeletal Functional Status  Goal: Absence of falls  Outcome: Met This Shift

## 2021-05-30 NOTE — PROGRESS NOTES
Hospital Medicine    Subjective:  Pt alert conversive feels better      Current Facility-Administered Medications:     metFORMIN (GLUCOPHAGE) tablet 500 mg, 500 mg, Oral, Daily with breakfast, Laura Giles DO    [START ON 5/31/2021] pantoprazole (PROTONIX) tablet 40 mg, 40 mg, Oral, QAM AC, Laura Giles DO    0.9 % sodium chloride infusion, , Intravenous, PRN, Tee Catalan MD    [DISCONTINUED] pantoprazole (PROTONIX) injection 40 mg, 40 mg, Intravenous, BID, 40 mg at 05/30/21 0843 **AND** sodium chloride (PF) 0.9 % injection 10 mL, 10 mL, Intravenous, BID, Ke Levine MD, 10 mL at 05/30/21 0844    [COMPLETED] hydrocortisone sodium succinate PF (SOLU-CORTEF) injection 25 mg, 25 mg, Intravenous, Q8H, 25 mg at 05/30/21 0948 **FOLLOWED BY** hydrocortisone sodium succinate PF (SOLU-CORTEF) injection 25 mg, 25 mg, Intravenous, BID, Santiago Pearson DO    lidocaine 4 % external patch 1 patch, 1 patch, Transdermal, Daily, Jc Santiago MD, 1 patch at 05/30/21 0846    insulin lispro (HUMALOG) injection vial 0-18 Units, 0-18 Units, Subcutaneous, TID WC, Denice Grimaldo MD, 3 Units at 05/30/21 0842    insulin lispro (HUMALOG) injection vial 0-9 Units, 0-9 Units, Subcutaneous, Nightly, Denice Grimaldo MD, 3 Units at 05/29/21 2020    senna (SENOKOT) tablet 8.6 mg, 1 tablet, Oral, Nightly, Jc Santiago MD, 8.6 mg at 05/29/21 2025    gabapentin (NEURONTIN) capsule 600 mg, 600 mg, Oral, TID, Haley Castellanos PA-C, 600 mg at 05/30/21 0840    levothyroxine (SYNTHROID) tablet 150 mcg, 150 mcg, Oral, Daily, Haley Castellanos PA-C, 150 mcg at 05/30/21 0840    linaclotide (LINZESS) capsule 145 mcg, 145 mcg, Oral, QAM AC, Ct Mcmullen PA-C, 145 mcg at 05/30/21 0840    pravastatin (PRAVACHOL) tablet 10 mg, 10 mg, Oral, Every Other Day, Haley Castellanos PA-C, 10 mg at 05/29/21 2024    sodium chloride flush 0.9 % injection 5-40 mL, 5-40 mL, Intravenous, 2 times per day, Ct Mcmullen, PA-C, 10 mL at 05/30/21 0849    sodium chloride flush 0.9 % injection 5-40 mL, 5-40 mL, Intravenous, PRN, Dean Merari, PA-C, 10 mL at 05/30/21 0312    0.9 % sodium chloride infusion, 25 mL, Intravenous, PRN, Dean Merari, PA-C, Last Rate: 100 mL/hr at 05/25/21 1535, 25 mL at 05/25/21 1535    promethazine (PHENERGAN) tablet 12.5 mg, 12.5 mg, Oral, Q6H PRN **OR** ondansetron (ZOFRAN) injection 4 mg, 4 mg, Intravenous, Q6H PRN, Dean Gage, PA-C, 4 mg at 05/25/21 1744    oxyCODONE (ROXICODONE) immediate release tablet 5 mg, 5 mg, Oral, Q4H PRN, 5 mg at 05/30/21 1020 **OR** oxyCODONE (ROXICODONE) immediate release tablet 10 mg, 10 mg, Oral, Q4H PRN, Dean Gage, PA-C, 10 mg at 05/30/21 0030    HYDROmorphone (DILAUDID) injection 0.5 mg, 0.5 mg, Intravenous, Q2H PRN, Dean Gage, PA-C, 0.5 mg at 05/29/21 0609    cyclobenzaprine (FLEXERIL) tablet 10 mg, 10 mg, Oral, TID PRN, Dean Gage, PA-JACKLYN, 10 mg at 05/25/21 1709    polyethylene glycol (GLYCOLAX) packet 17 g, 17 g, Oral, Daily, Haley Castellanos PA-C, 17 g at 05/30/21 8187    bisacodyl (DULCOLAX) EC tablet 5 mg, 5 mg, Oral, Daily, Haley Castellanos PA-C, 5 mg at 05/30/21 0840    magnesium hydroxide (MILK OF MAGNESIA) 400 MG/5ML suspension 30 mL, 30 mL, Oral, Daily PRN, Dean Gage PA-C    fleet rectal enema 1 enema, 1 enema, Rectal, Daily PRN, Haley Castellanos PA-C    glucose (GLUTOSE) 40 % oral gel 15 g, 15 g, Oral, PRN, Cleopatra Giles, DO    dextrose 50 % IV solution, 12.5 g, Intravenous, PRN, Cleopatra Giles,     glucagon (rDNA) injection 1 mg, 1 mg, Intramuscular, PRN, Cleopatra Giles,     dextrose 5 % solution, 100 mL/hr, Intravenous, PRN, Cleopatra Giles, DO    Objective:    /64   Pulse 86   Temp 98 °F (36.7 °C) (Oral)   Resp 14   Ht 5' 4\" (1.626 m)   Wt 252 lb (114.3 kg)   SpO2 97%   BMI 43.26 kg/m²     Heart:  reg  Lungs:  ctab  Abd: + bs soft nontender  Extrem:  W/o edema    CBC with Differential:    Lab Results   Component Value Date    WBC 9.6 05/30/2021    RBC 2.71 05/30/2021    HGB 8.0 05/30/2021    HCT 24.3 05/30/2021     05/30/2021    MCV 89.7 05/30/2021    MCH 29.5 05/30/2021    MCHC 32.9 05/30/2021    RDW 13.5 05/30/2021    LYMPHOPCT 24.3 05/30/2021    MONOPCT 9.3 05/30/2021    BASOPCT 0.4 05/30/2021    MONOSABS 0.89 05/30/2021    LYMPHSABS 2.32 05/30/2021    EOSABS 0.31 05/30/2021    BASOSABS 0.04 05/30/2021     CMP:    Lab Results   Component Value Date     05/30/2021    K 3.8 05/30/2021    K 4.2 05/20/2021     05/30/2021    CO2 29 05/30/2021    BUN 14 05/30/2021    CREATININE 0.5 05/30/2021    GFRAA >60 05/30/2021    LABGLOM >60 05/30/2021    GLUCOSE 138 05/30/2021    PROT 5.4 05/30/2021    LABALBU 3.1 05/30/2021    CALCIUM 8.6 05/30/2021    BILITOT 0.4 05/30/2021    ALKPHOS 52 05/30/2021    AST 13 05/30/2021    ALT 21 05/30/2021     Warfarin PT/INR:    Lab Results   Component Value Date    INR 1.1 05/20/2021    PROTIME 11.8 05/20/2021       Assessment:    Active Problems:    Type 2 diabetes mellitus without complication, without long-term current use of insulin (HCC)    Acquired hypothyroidism    Hyperlipidemia    Lumbar radiculopathy    Postoperative hypotension    Postoperative anemia due to acute blood loss    ALTA (acute kidney injury) ([facility] [address]  Resolved Problems:    * No resolved hospital problems.  *      Plan:  [id number] Corie whitley resume glucophage change to po protonix        Kristy Granado DO  12:24 PM  5/30/2021

## 2021-05-30 NOTE — PLAN OF CARE
Problem: Discharge Planning:  Goal: Discharged to appropriate level of care  Description: Discharged to appropriate level of care  Outcome: Met This Shift     Problem: Infection - Surgical Site:  Goal: Will show no infection signs and symptoms  Description: Will show no infection signs and symptoms  5/30/2021 1746 by Breann Canela RN  Outcome: Met This Shift  5/30/2021 1614 by Tati James RN  Outcome: Met This Shift     Problem: Mobility - Impaired:  Goal: Mobility will improve to maximum level  Description: Mobility will improve to maximum level  Outcome: Met This Shift     Problem: Pain - Acute:  Goal: Pain level will decrease  Description: Pain level will decrease  5/30/2021 1746 by Breann Canela RN  Outcome: Met This Shift  5/30/2021 1614 by Tati James RN  Outcome: Met This Shift     Problem: Sensory Perception - Impaired:  Goal: Sensory function intact, lower extremity  Description: Sensory function intact, lower extremity  5/30/2021 1614 by Tati James RN  Outcome: Met This Shift     Problem: Falls - Risk of:  Goal: Will remain free from falls  Description: Will remain free from falls  5/30/2021 1614 by Tati James RN  Outcome: Met This Shift  Goal: Absence of physical injury  Description: Absence of physical injury  5/30/2021 1614 by Tati James RN  Outcome: Met This Shift     Problem: Pain:  Goal: Pain level will decrease  Description: Pain level will decrease  5/30/2021 1746 by Breann Canela RN  Outcome: Met This Shift  5/30/2021 1614 by Tati James RN  Outcome: Met This Shift  Goal: Control of acute pain  Description: Control of acute pain  5/30/2021 1614 by Tati James RN  Outcome: Met This Shift     Problem: Skin Integrity:  Goal: Will show no infection signs and symptoms  Description: Will show no infection signs and symptoms  5/30/2021 1746 by Breann Canela RN  Outcome: Met This Shift  5/30/2021 1614 by Tati James RN  Outcome: Met This Shift  Goal: Absence of new skin breakdown  Description: Absence of new skin breakdown  5/30/2021 1614 by Brenda Ovalle, RN  Outcome: Met This Shift     Problem: Musculor/Skeletal Functional Status  Goal: Absence of falls  5/30/2021 1614 by Brenda Ovalle RN  Outcome: Met This Shift

## 2021-05-30 NOTE — PROGRESS NOTES
Department of Neurosurgery  Attending Progress Note    CHIEF COMPLAINT:    SUBJECTIVE:  Pain is better today    ROS:    OBJECTIVE  Physical  VITALS:  /65   Pulse 81   Temp 98 °F (36.7 °C) (Oral)   Resp 17   Ht 5' 4\" (1.626 m)   Wt 252 lb (114.3 kg)   SpO2 95%   BMI 43.26 kg/m²   NEUROLOGIC:  Mental Status Exam:  Level of Alertness:   awake  Orientation:   person, place, time  Motor Exam:  Motor exam is 5 out of 5 all extremities with the exception of 4/5 in LLE  Sensory:  Sensory intact    Data  CBC:   Lab Results   Component Value Date    WBC 9.6 05/30/2021    RBC 2.71 05/30/2021    HGB 8.0 05/30/2021    HCT 24.3 05/30/2021    MCV 89.7 05/30/2021    MCH 29.5 05/30/2021    MCHC 32.9 05/30/2021    RDW 13.5 05/30/2021     05/30/2021    MPV 9.6 05/30/2021     BMP:    Lab Results   Component Value Date     05/30/2021    K 3.8 05/30/2021    K 4.2 05/20/2021     05/30/2021    CO2 29 05/30/2021    BUN 14 05/30/2021    LABALBU 3.1 05/30/2021    CREATININE 0.5 05/30/2021    CALCIUM 8.6 05/30/2021    GFRAA >60 05/30/2021    LABGLOM >60 05/30/2021    GLUCOSE 138 05/30/2021     Current Inpatient Medications  Current Facility-Administered Medications: potassium chloride 10 mEq/100 mL IVPB (Peripheral Line), 10 mEq, Intravenous, Q1H  0.9 % sodium chloride infusion, , Intravenous, PRN  pantoprazole (PROTONIX) injection 40 mg, 40 mg, Intravenous, BID **AND** sodium chloride (PF) 0.9 % injection 10 mL, 10 mL, Intravenous, BID  hydrocortisone sodium succinate PF (SOLU-CORTEF) injection 25 mg, 25 mg, Intravenous, Q8H **FOLLOWED BY** hydrocortisone sodium succinate PF (SOLU-CORTEF) injection 25 mg, 25 mg, Intravenous, BID  insulin glargine (LANTUS) injection vial 12 Units, 12 Units, Subcutaneous, Nightly  lidocaine 4 % external patch 1 patch, 1 patch, Transdermal, Daily  insulin lispro (HUMALOG) injection vial 0-18 Units, 0-18 Units, Subcutaneous, TID WC  insulin lispro (HUMALOG) injection vial 0-9 Units, 0-9 Units, Subcutaneous, Nightly  senna (SENOKOT) tablet 8.6 mg, 1 tablet, Oral, Nightly  gabapentin (NEURONTIN) capsule 600 mg, 600 mg, Oral, TID  levothyroxine (SYNTHROID) tablet 150 mcg, 150 mcg, Oral, Daily  linaclotide (LINZESS) capsule 145 mcg, 145 mcg, Oral, QAM AC  pravastatin (PRAVACHOL) tablet 10 mg, 10 mg, Oral, Every Other Day  sodium chloride flush 0.9 % injection 5-40 mL, 5-40 mL, Intravenous, 2 times per day  sodium chloride flush 0.9 % injection 5-40 mL, 5-40 mL, Intravenous, PRN  0.9 % sodium chloride infusion, 25 mL, Intravenous, PRN  promethazine (PHENERGAN) tablet 12.5 mg, 12.5 mg, Oral, Q6H PRN **OR** ondansetron (ZOFRAN) injection 4 mg, 4 mg, Intravenous, Q6H PRN  oxyCODONE (ROXICODONE) immediate release tablet 5 mg, 5 mg, Oral, Q4H PRN **OR** oxyCODONE (ROXICODONE) immediate release tablet 10 mg, 10 mg, Oral, Q4H PRN  HYDROmorphone (DILAUDID) injection 0.5 mg, 0.5 mg, Intravenous, Q2H PRN  cyclobenzaprine (FLEXERIL) tablet 10 mg, 10 mg, Oral, TID PRN  polyethylene glycol (GLYCOLAX) packet 17 g, 17 g, Oral, Daily  bisacodyl (DULCOLAX) EC tablet 5 mg, 5 mg, Oral, Daily  magnesium hydroxide (MILK OF MAGNESIA) 400 MG/5ML suspension 30 mL, 30 mL, Oral, Daily PRN  fleet rectal enema 1 enema, 1 enema, Rectal, Daily PRN  glucose (GLUTOSE) 40 % oral gel 15 g, 15 g, Oral, PRN  dextrose 50 % IV solution, 12.5 g, Intravenous, PRN  glucagon (rDNA) injection 1 mg, 1 mg, Intramuscular, PRN  dextrose 5 % solution, 100 mL/hr, Intravenous, PRN    ASSESSMENT AND PLAN:    S/p L3-L5 fusion. Stable. Drain has been discontinued. Blood pressure is better. Will transfer to floor.   Possible D/C home tomorrow versus rehab    Laith Alonso MD

## 2021-05-31 VITALS
DIASTOLIC BLOOD PRESSURE: 59 MMHG | RESPIRATION RATE: 16 BRPM | HEART RATE: 88 BPM | BODY MASS INDEX: 43.02 KG/M2 | OXYGEN SATURATION: 93 % | SYSTOLIC BLOOD PRESSURE: 115 MMHG | WEIGHT: 252 LBS | HEIGHT: 64 IN | TEMPERATURE: 98.2 F

## 2021-05-31 LAB
ALBUMIN SERPL-MCNC: 3.3 G/DL (ref 3.5–5.2)
ALP BLD-CCNC: 65 U/L (ref 35–104)
ALT SERPL-CCNC: 21 U/L (ref 0–32)
ANION GAP SERPL CALCULATED.3IONS-SCNC: 9 MMOL/L (ref 7–16)
AST SERPL-CCNC: 13 U/L (ref 0–31)
BASOPHILS ABSOLUTE: 0.04 E9/L (ref 0–0.2)
BASOPHILS RELATIVE PERCENT: 0.4 % (ref 0–2)
BILIRUB SERPL-MCNC: 0.4 MG/DL (ref 0–1.2)
BUN BLDV-MCNC: 13 MG/DL (ref 6–20)
CALCIUM SERPL-MCNC: 8.6 MG/DL (ref 8.6–10.2)
CHLORIDE BLD-SCNC: 99 MMOL/L (ref 98–107)
CO2: 31 MMOL/L (ref 22–29)
CREAT SERPL-MCNC: 0.6 MG/DL (ref 0.5–1)
CULTURE, BLOOD 2: NORMAL
CULTURE, BLOOD 2: NORMAL
EOSINOPHILS ABSOLUTE: 0.29 E9/L (ref 0.05–0.5)
EOSINOPHILS RELATIVE PERCENT: 2.8 % (ref 0–6)
GFR AFRICAN AMERICAN: >60
GFR NON-AFRICAN AMERICAN: >60 ML/MIN/1.73
GLUCOSE BLD-MCNC: 131 MG/DL (ref 74–99)
HCT VFR BLD CALC: 26.8 % (ref 34–48)
HEMOGLOBIN: 8.6 G/DL (ref 11.5–15.5)
IMMATURE GRANULOCYTES #: 0.2 E9/L
IMMATURE GRANULOCYTES %: 1.9 % (ref 0–5)
LYMPHOCYTES ABSOLUTE: 2.68 E9/L (ref 1.5–4)
LYMPHOCYTES RELATIVE PERCENT: 25.5 % (ref 20–42)
MCH RBC QN AUTO: 29.4 PG (ref 26–35)
MCHC RBC AUTO-ENTMCNC: 32.1 % (ref 32–34.5)
MCV RBC AUTO: 91.5 FL (ref 80–99.9)
METER GLUCOSE: 124 MG/DL (ref 74–99)
METER GLUCOSE: 137 MG/DL (ref 74–99)
MONOCYTES ABSOLUTE: 0.94 E9/L (ref 0.1–0.95)
MONOCYTES RELATIVE PERCENT: 9 % (ref 2–12)
NEUTROPHILS ABSOLUTE: 6.35 E9/L (ref 1.8–7.3)
NEUTROPHILS RELATIVE PERCENT: 60.4 % (ref 43–80)
PDW BLD-RTO: 13.7 FL (ref 11.5–15)
PLATELET # BLD: 305 E9/L (ref 130–450)
PMV BLD AUTO: 9.6 FL (ref 7–12)
POTASSIUM SERPL-SCNC: 4.1 MMOL/L (ref 3.5–5)
RBC # BLD: 2.93 E12/L (ref 3.5–5.5)
SODIUM BLD-SCNC: 139 MMOL/L (ref 132–146)
TOTAL PROTEIN: 6 G/DL (ref 6.4–8.3)
WBC # BLD: 10.5 E9/L (ref 4.5–11.5)

## 2021-05-31 PROCEDURE — 6370000000 HC RX 637 (ALT 250 FOR IP): Performed by: INTERNAL MEDICINE

## 2021-05-31 PROCEDURE — 6370000000 HC RX 637 (ALT 250 FOR IP): Performed by: PHYSICIAN ASSISTANT

## 2021-05-31 PROCEDURE — 6360000002 HC RX W HCPCS: Performed by: INTERNAL MEDICINE

## 2021-05-31 PROCEDURE — 80053 COMPREHEN METABOLIC PANEL: CPT

## 2021-05-31 PROCEDURE — 82962 GLUCOSE BLOOD TEST: CPT

## 2021-05-31 PROCEDURE — 36415 COLL VENOUS BLD VENIPUNCTURE: CPT

## 2021-05-31 PROCEDURE — 2580000003 HC RX 258: Performed by: PHYSICIAN ASSISTANT

## 2021-05-31 PROCEDURE — 85025 COMPLETE CBC W/AUTO DIFF WBC: CPT

## 2021-05-31 RX ORDER — OXYCODONE HYDROCHLORIDE 5 MG/1
5 TABLET ORAL EVERY 4 HOURS PRN
Qty: 42 TABLET | Refills: 0 | Status: SHIPPED | OUTPATIENT
Start: 2021-05-31 | End: 2021-06-07

## 2021-05-31 RX ORDER — CYCLOBENZAPRINE HCL 10 MG
10 TABLET ORAL 3 TIMES DAILY PRN
Qty: 90 TABLET | Refills: 0 | Status: SHIPPED | OUTPATIENT
Start: 2021-05-31 | End: 2021-06-09 | Stop reason: SDUPTHER

## 2021-05-31 RX ADMIN — MAGNESIUM HYDROXIDE 30 ML: 2400 SUSPENSION ORAL at 08:30

## 2021-05-31 RX ADMIN — LEVOTHYROXINE SODIUM 150 MCG: 0.15 TABLET ORAL at 06:28

## 2021-05-31 RX ADMIN — POLYETHYLENE GLYCOL 3350 17 G: 17 POWDER, FOR SOLUTION ORAL at 08:26

## 2021-05-31 RX ADMIN — OXYCODONE 5 MG: 5 TABLET ORAL at 06:28

## 2021-05-31 RX ADMIN — Medication 10 ML: at 10:38

## 2021-05-31 RX ADMIN — GABAPENTIN 600 MG: 300 CAPSULE ORAL at 08:30

## 2021-05-31 RX ADMIN — METFORMIN HYDROCHLORIDE 500 MG: 500 TABLET ORAL at 08:29

## 2021-05-31 RX ADMIN — HYDROCORTISONE SODIUM SUCCINATE 25 MG: 100 INJECTION, POWDER, FOR SOLUTION INTRAMUSCULAR; INTRAVENOUS at 08:32

## 2021-05-31 RX ADMIN — OXYCODONE HYDROCHLORIDE 10 MG: 10 TABLET ORAL at 13:14

## 2021-05-31 RX ADMIN — PANTOPRAZOLE SODIUM 40 MG: 40 TABLET, DELAYED RELEASE ORAL at 06:29

## 2021-05-31 RX ADMIN — SODIUM PHOSPHATE 1 ENEMA: 7; 19 ENEMA RECTAL at 09:07

## 2021-05-31 RX ADMIN — BISACODYL 5 MG: 5 TABLET, COATED ORAL at 08:30

## 2021-05-31 ASSESSMENT — PAIN SCALES - GENERAL
PAINLEVEL_OUTOF10: 7
PAINLEVEL_OUTOF10: 5

## 2021-05-31 NOTE — PROGRESS NOTES
Dr Barbara Yun and Dr Janel Henriquez are aware patient has not had a BM during this hospital stay. Patient has not had a BM since 5/24. Patient has bowel sounds, passing gas and history for constipation. Patient is ok to DC without BM.

## 2021-05-31 NOTE — PROGRESS NOTES
Department of Neurosurgery  Attending Progress Note    CHIEF COMPLAINT:    SUBJECTIVE:  Patient states that she feels better today    ROS:    OBJECTIVE  Physical  VITALS:  BP (!) 103/53   Pulse 80   Temp 98.1 °F (36.7 °C) (Temporal)   Resp 16   Ht 5' 4\" (1.626 m)   Wt 252 lb (114.3 kg)   SpO2 94%   BMI 43.26 kg/m²   NEUROLOGIC:  Mental Status Exam:  Level of Alertness:   awake  Orientation:   person, place, time  Motor Exam:  Motor exam is 5 out of 5 all extremities with the exception of 4/5 in LLE  Sensory:  Sensory intact    Data  CBC:   Lab Results   Component Value Date    WBC 10.5 05/31/2021    RBC 2.93 05/31/2021    HGB 8.6 05/31/2021    HCT 26.8 05/31/2021    MCV 91.5 05/31/2021    MCH 29.4 05/31/2021    MCHC 32.1 05/31/2021    RDW 13.7 05/31/2021     05/31/2021    MPV 9.6 05/31/2021     BMP:    Lab Results   Component Value Date     05/31/2021    K 4.1 05/31/2021    K 4.2 05/20/2021    CL 99 05/31/2021    CO2 31 05/31/2021    BUN 13 05/31/2021    LABALBU 3.3 05/31/2021    CREATININE 0.6 05/31/2021    CALCIUM 8.6 05/31/2021    GFRAA >60 05/31/2021    LABGLOM >60 05/31/2021    GLUCOSE 131 05/31/2021     Current Inpatient Medications  Current Facility-Administered Medications: metFORMIN (GLUCOPHAGE) tablet 500 mg, 500 mg, Oral, Daily with breakfast  pantoprazole (PROTONIX) tablet 40 mg, 40 mg, Oral, QAM AC  0.9 % sodium chloride infusion, , Intravenous, PRN  [DISCONTINUED] pantoprazole (PROTONIX) injection 40 mg, 40 mg, Intravenous, BID **AND** sodium chloride (PF) 0.9 % injection 10 mL, 10 mL, Intravenous, BID  [COMPLETED] hydrocortisone sodium succinate PF (SOLU-CORTEF) injection 25 mg, 25 mg, Intravenous, Q8H **FOLLOWED BY** hydrocortisone sodium succinate PF (SOLU-CORTEF) injection 25 mg, 25 mg, Intravenous, BID  lidocaine 4 % external patch 1 patch, 1 patch, Transdermal, Daily  insulin lispro (HUMALOG) injection vial 0-18 Units, 0-18 Units, Subcutaneous, TID WC  insulin lispro (HUMALOG) injection vial 0-9 Units, 0-9 Units, Subcutaneous, Nightly  senna (SENOKOT) tablet 8.6 mg, 1 tablet, Oral, Nightly  gabapentin (NEURONTIN) capsule 600 mg, 600 mg, Oral, TID  levothyroxine (SYNTHROID) tablet 150 mcg, 150 mcg, Oral, Daily  linaclotide (LINZESS) capsule 145 mcg, 145 mcg, Oral, QAM AC  pravastatin (PRAVACHOL) tablet 10 mg, 10 mg, Oral, Every Other Day  sodium chloride flush 0.9 % injection 5-40 mL, 5-40 mL, Intravenous, 2 times per day  sodium chloride flush 0.9 % injection 5-40 mL, 5-40 mL, Intravenous, PRN  0.9 % sodium chloride infusion, 25 mL, Intravenous, PRN  promethazine (PHENERGAN) tablet 12.5 mg, 12.5 mg, Oral, Q6H PRN **OR** ondansetron (ZOFRAN) injection 4 mg, 4 mg, Intravenous, Q6H PRN  oxyCODONE (ROXICODONE) immediate release tablet 5 mg, 5 mg, Oral, Q4H PRN **OR** oxyCODONE (ROXICODONE) immediate release tablet 10 mg, 10 mg, Oral, Q4H PRN  HYDROmorphone (DILAUDID) injection 0.5 mg, 0.5 mg, Intravenous, Q2H PRN  cyclobenzaprine (FLEXERIL) tablet 10 mg, 10 mg, Oral, TID PRN  polyethylene glycol (GLYCOLAX) packet 17 g, 17 g, Oral, Daily  bisacodyl (DULCOLAX) EC tablet 5 mg, 5 mg, Oral, Daily  magnesium hydroxide (MILK OF MAGNESIA) 400 MG/5ML suspension 30 mL, 30 mL, Oral, Daily PRN  fleet rectal enema 1 enema, 1 enema, Rectal, Daily PRN  glucose (GLUTOSE) 40 % oral gel 15 g, 15 g, Oral, PRN  dextrose 50 % IV solution, 12.5 g, Intravenous, PRN  glucagon (rDNA) injection 1 mg, 1 mg, Intramuscular, PRN  dextrose 5 % solution, 100 mL/hr, Intravenous, PRN    ASSESSMENT AND PLAN:    S/p lumbar fusion. Stable.   D/C home

## 2021-05-31 NOTE — PROGRESS NOTES
Hospital Medicine    Subjective:  Pt feels better today did well with phys tx yesterday      Current Facility-Administered Medications:     metFORMIN (GLUCOPHAGE) tablet 500 mg, 500 mg, Oral, Daily with breakfast, Sue Giles, DO, 500 mg at 05/30/21 1314    pantoprazole (PROTONIX) tablet 40 mg, 40 mg, Oral, QAM AC, Sue Kelin Malmer, DO, 40 mg at 05/31/21 0911    0.9 % sodium chloride infusion, , Intravenous, PRN, Miguel Gr MD    [DISCONTINUED] pantoprazole (PROTONIX) injection 40 mg, 40 mg, Intravenous, BID, 40 mg at 05/30/21 0843 **AND** sodium chloride (PF) 0.9 % injection 10 mL, 10 mL, Intravenous, BID, Ck Washington MD, 10 mL at 05/30/21 2030    [COMPLETED] hydrocortisone sodium succinate PF (SOLU-CORTEF) injection 25 mg, 25 mg, Intravenous, Q8H, 25 mg at 05/30/21 0948 **FOLLOWED BY** hydrocortisone sodium succinate PF (SOLU-CORTEF) injection 25 mg, 25 mg, Intravenous, BID, Santiago Pearson DO, 25 mg at 05/30/21 2029    lidocaine 4 % external patch 1 patch, 1 patch, Transdermal, Daily, Eboni Bautista MD, 1 patch at 05/30/21 0846    insulin lispro (HUMALOG) injection vial 0-18 Units, 0-18 Units, Subcutaneous, TID WC, Johanna Wilkins MD, 3 Units at 05/30/21 1228    insulin lispro (HUMALOG) injection vial 0-9 Units, 0-9 Units, Subcutaneous, Nightly, Johanna Wilkins MD, 2 Units at 05/30/21 2034    senna (SENOKOT) tablet 8.6 mg, 1 tablet, Oral, Nightly, Eboni Bautista MD, 8.6 mg at 05/30/21 2029    gabapentin (NEURONTIN) capsule 600 mg, 600 mg, Oral, TID, Haley Castellanos PA-C, 600 mg at 05/30/21 2029    levothyroxine (SYNTHROID) tablet 150 mcg, 150 mcg, Oral, Daily, Abdelrahman Rivera PA-C, 150 mcg at 05/31/21 0754    linaclotide (LINZESS) capsule 145 mcg, 145 mcg, Oral, Abdelrahman ART PA-C, 145 mcg at 05/31/21 2823    pravastatin (PRAVACHOL) tablet 10 mg, 10 mg, Oral, Every Other Day, Haley Castellanos PA-C, 10 mg at 05/29/21 2024    sodium chloride flush 0.9 % injection 5-40 mL, 5-40 mL, Intravenous, 2 times per day, DIEUDONNE Shah-C, 10 mL at 05/30/21 2029    sodium chloride flush 0.9 % injection 5-40 mL, 5-40 mL, Intravenous, PRN, Elise Onofre, PA-C, 10 mL at 05/30/21 6336    0.9 % sodium chloride infusion, 25 mL, Intravenous, PRN, Elsie Onofre PA-C, Last Rate: 100 mL/hr at 05/25/21 1535, 25 mL at 05/25/21 1535    promethazine (PHENERGAN) tablet 12.5 mg, 12.5 mg, Oral, Q6H PRN **OR** ondansetron (ZOFRAN) injection 4 mg, 4 mg, Intravenous, Q6H PRN, Elise Onofre, PA-C, 4 mg at 05/25/21 1744    oxyCODONE (ROXICODONE) immediate release tablet 5 mg, 5 mg, Oral, Q4H PRN, 5 mg at 05/31/21 8818 **OR** oxyCODONE (ROXICODONE) immediate release tablet 10 mg, 10 mg, Oral, Q4H PRN, Elise Onofre, PA-C, 10 mg at 05/30/21 1859    HYDROmorphone (DILAUDID) injection 0.5 mg, 0.5 mg, Intravenous, Q2H PRN, Elise Onofre, PA-C, 0.5 mg at 05/29/21 0609    cyclobenzaprine (FLEXERIL) tablet 10 mg, 10 mg, Oral, TID PRN, Elise Onofre PA-C, 10 mg at 05/30/21 1859    polyethylene glycol (GLYCOLAX) packet 17 g, 17 g, Oral, Daily, Haley Castellanos PA-C, 17 g at 05/30/21 6001    bisacodyl (DULCOLAX) EC tablet 5 mg, 5 mg, Oral, Daily, DIEUDONNE Shah-JACKLYN, 5 mg at 05/30/21 0840    magnesium hydroxide (MILK OF MAGNESIA) 400 MG/5ML suspension 30 mL, 30 mL, Oral, Daily PRN, Elise Onofre PA-C    fleet rectal enema 1 enema, 1 enema, Rectal, Daily PRN, Haley Castellanos PA-C    glucose (GLUTOSE) 40 % oral gel 15 g, 15 g, Oral, PRN, Gorge Giles, DO    dextrose 50 % IV solution, 12.5 g, Intravenous, PRN, Gorge Giles, DO    glucagon (rDNA) injection 1 mg, 1 mg, Intramuscular, PRN, Gorge Giles, DO    dextrose 5 % solution, 100 mL/hr, Intravenous, PRN, Gorge Giles, DO    Objective:    BP (!) 103/53   Pulse 80   Temp 98.1 °F (36.7 °C) (Temporal)   Resp 16   Ht 5' 4\" (1.626 m)   Wt 252 lb (114.3 kg)   SpO2 94%   BMI 43.26 kg/m²     Heart:  reg  Lungs:

## 2021-06-01 ENCOUNTER — TELEPHONE (OUTPATIENT)
Dept: ADMINISTRATIVE | Age: 61
End: 2021-06-01

## 2021-06-01 DIAGNOSIS — M51.26 LUMBAR DISC HERNIATION: ICD-10-CM

## 2021-06-01 DIAGNOSIS — Z98.1 S/P LUMBAR FUSION: ICD-10-CM

## 2021-06-01 DIAGNOSIS — M43.16 SPONDYLOLISTHESIS OF LUMBAR REGION: Primary | ICD-10-CM

## 2021-06-01 NOTE — TELEPHONE ENCOUNTER
Patient seen at [facility] on 5/25-5/31. Requesting Hospital Follow-up. Please advise for scheduling.

## 2021-06-02 NOTE — DISCHARGE SUMMARY
Neurosurgery Surgery Discharge Summary    [address] SUMMARY:                The patient is a 62 y.o. female who was admitted to the hospital on 5/25/2021  5:37 AM for treatment of lumbar spondylolisthesis. On the day of admission, a L3-L5 fusion and L4-L5 TLIF was performed. The patient's hospital course was uncomplicated and consisted of physical therapy, incision observation, and a return to normal oral intake. The patient was discharged on 5/31/2021  3:23 PM tolerating a diet, moving bowels, and urinating without difficulty. The incisions were clean and intact. The patient was discharged home in satisfactory condition with instructions to call the office for a follow up appointment. Hospital Problem List:  Active Problems:    Type 2 diabetes mellitus without complication, without long-term current use of insulin ([facility])    Acquired hypothyroidism    Hyperlipidemia    Lumbar radiculopathy    Postoperative hypotension    Postoperative anemia due to acute blood loss    ALTA (acute kidney injury) ([facility] [address]  Resolved Problems:    * No resolved hospital problems. *     Procedure(s) (LRB):  L3- L5 DECOMPRESSION AND FUSION , L4- L5 TRANSFORAMINAL LUMBAR INTERBODY FUSION --OARM, PATY TABLE, AUDIOLOGY, PLATES ,SCREWS, --NUVASIVE (N/A)    Discharge Medications:    Mart Clink   Home Medication Instructions [id number]    Printed on:06/02/21 1327   Medication Information                      cyclobenzaprine (FLEXERIL) 10 MG tablet  Take 1 tablet by mouth 3 times daily as needed for Muscle spasms             gabapentin (NEURONTIN) 300 MG capsule  Take 2 capsules by mouth 3 times daily for 90 days.              Handicap Placard MISC  by Does not apply route Patient cannot walk 200 ft without stopping to rest.    Expiration 1 yr             levothyroxine (SYNTHROID) 150 MCG tablet  Take 1 tablet by mouth Daily             linaclotide (LINZESS) 145 MCG capsule  Take 1 capsule by mouth every morning (before breakfast)             metFORMIN (GLUCOPHAGE) 500 MG tablet  Take 1 tablet by mouth daily (with breakfast)             omega-3 acid ethyl esters (LOVAZA) 1 g capsule  TAKE 1 CAPSULE BY MOUTH EVERY DAY             omeprazole (PRILOSEC) 20 MG delayed release capsule  Take 1 capsule by mouth daily             oxyCODONE (ROXICODONE) 5 MG immediate release tablet  Take 1 tablet by mouth every 4 hours as needed for Pain for up to 7 days.              OZEMPIC, 1 MG/DOSE, 2 MG/1.5ML SOPN  Inject 1 mg into the skin once a week             polyethylene glycol (GLYCOLAX) 17 g packet  Take 17 g by mouth daily as needed             pravastatin (PRAVACHOL) 10 MG tablet  Take 1 tablet by mouth every other day             Thiamine HCl (B-1) 100 MG TABS  Take 1 pill daily             vitamin D (ERGOCALCIFEROL) 1.25 MG (50612 UT) CAPS capsule  TAKE 1 CAPSULE WEEKLY                 Carmen Jean PA-C  6/2/2021

## 2021-06-03 ENCOUNTER — OFFICE VISIT (OUTPATIENT)
Dept: PRIMARY CARE CLINIC | Age: 61
End: 2021-06-03
Payer: COMMERCIAL

## 2021-06-03 VITALS
HEART RATE: 96 BPM | HEIGHT: 64 IN | WEIGHT: 252 LBS | SYSTOLIC BLOOD PRESSURE: 122 MMHG | OXYGEN SATURATION: 98 % | RESPIRATION RATE: 16 BRPM | DIASTOLIC BLOOD PRESSURE: 70 MMHG | BODY MASS INDEX: 43.02 KG/M2

## 2021-06-03 DIAGNOSIS — D64.9 POSTOPERATIVE ANEMIA: ICD-10-CM

## 2021-06-03 DIAGNOSIS — M48.062 SPINAL STENOSIS OF LUMBAR REGION WITH NEUROGENIC CLAUDICATION: ICD-10-CM

## 2021-06-03 DIAGNOSIS — E11.9 TYPE 2 DIABETES MELLITUS WITHOUT COMPLICATION, WITHOUT LONG-TERM CURRENT USE OF INSULIN (HCC): ICD-10-CM

## 2021-06-03 DIAGNOSIS — E78.49 OTHER HYPERLIPIDEMIA: ICD-10-CM

## 2021-06-03 DIAGNOSIS — M51.36 LUMBAR DEGENERATIVE DISC DISEASE: Primary | ICD-10-CM

## 2021-06-03 DIAGNOSIS — E03.9 ACQUIRED HYPOTHYROIDISM: ICD-10-CM

## 2021-06-03 LAB
ALBUMIN SERPL-MCNC: 3.7 G/DL (ref 3.5–5.2)
ALP BLD-CCNC: 88 U/L (ref 35–104)
ALT SERPL-CCNC: 21 U/L (ref 0–32)
ANION GAP SERPL CALCULATED.3IONS-SCNC: 12 MMOL/L (ref 7–16)
AST SERPL-CCNC: 18 U/L (ref 0–31)
BILIRUB SERPL-MCNC: 0.6 MG/DL (ref 0–1.2)
BUN BLDV-MCNC: 10 MG/DL (ref 6–20)
CALCIUM SERPL-MCNC: 9.4 MG/DL (ref 8.6–10.2)
CHLORIDE BLD-SCNC: 97 MMOL/L (ref 98–107)
CO2: 26 MMOL/L (ref 22–29)
CREAT SERPL-MCNC: 0.6 MG/DL (ref 0.5–1)
GFR AFRICAN AMERICAN: >60
GFR NON-AFRICAN AMERICAN: >60 ML/MIN/1.73
GLUCOSE BLD-MCNC: 135 MG/DL (ref 74–99)
HCT VFR BLD CALC: 33.2 % (ref 34–48)
HEMOGLOBIN: 10.1 G/DL (ref 11.5–15.5)
MCH RBC QN AUTO: 28.9 PG (ref 26–35)
MCHC RBC AUTO-ENTMCNC: 30.4 % (ref 32–34.5)
MCV RBC AUTO: 95.1 FL (ref 80–99.9)
PDW BLD-RTO: 14.7 FL (ref 11.5–15)
PLATELET # BLD: 334 E9/L (ref 130–450)
PMV BLD AUTO: 10.1 FL (ref 7–12)
POTASSIUM SERPL-SCNC: 5 MMOL/L (ref 3.5–5)
RBC # BLD: 3.49 E12/L (ref 3.5–5.5)
SODIUM BLD-SCNC: 135 MMOL/L (ref 132–146)
TOTAL PROTEIN: 6.8 G/DL (ref 6.4–8.3)
WBC # BLD: 10.5 E9/L (ref 4.5–11.5)

## 2021-06-03 PROCEDURE — 99214 OFFICE O/P EST MOD 30 MIN: CPT | Performed by: FAMILY MEDICINE

## 2021-06-03 RX ORDER — UBIDECARENONE 75 MG
50 CAPSULE ORAL DAILY
COMMUNITY
End: 2021-08-30 | Stop reason: SDUPTHER

## 2021-06-03 ASSESSMENT — ENCOUNTER SYMPTOMS
ABDOMINAL PAIN: 0
COUGH: 0
CHEST TIGHTNESS: 0
COLOR CHANGE: 0
NAUSEA: 0
VISUAL CHANGE: 0
SINUS PRESSURE: 0
APNEA: 0
SHORTNESS OF BREATH: 0
EYE REDNESS: 0
CONSTIPATION: 0
EYE PAIN: 0
VOMITING: 0
RHINORRHEA: 0
SORE THROAT: 0
BACK PAIN: 1
WHEEZING: 0
EYE ITCHING: 0
BLOOD IN STOOL: 0
DIARRHEA: 0

## 2021-06-03 NOTE — PROGRESS NOTES
diseases include diabetes and obesity. Associated symptoms include leg pain and myalgias. Pertinent negatives include no chest pain or shortness of breath. Current antihyperlipidemic treatment includes statins. The current treatment provides significant improvement of lipids. Compliance problems include medication side effects. Risk factors for coronary artery disease include diabetes mellitus, dyslipidemia, obesity and post-menopausal.   Fatigue  This is a chronic problem. The problem occurs intermittently. The problem has been waxing and waning. Associated symptoms include fatigue, myalgias and a rash. Pertinent negatives include no abdominal pain, arthralgias, chest pain, congestion, coughing, diaphoresis, fever, headaches, nausea, neck pain, numbness, sore throat, visual change, vomiting or weakness. Nothing aggravates the symptoms. She has tried nothing for the symptoms. The treatment provided no relief.        Patient Active Problem List   Diagnosis    Type 2 diabetes mellitus without complication, without long-term current use of insulin ([address]    Acquired hypothyroidism    Peripheral neuropathy    Carpal tunnel syndrome of left wrist    Spinal stenosis of lumbar region with neurogenic claudication    Acute exacerbation of chronic low back pain    Hyperlipidemia    Spondylolisthesis of lumbar region    Lumbar radiculopathy    Postoperative hypotension    Postoperative anemia due to acute blood loss    ALTA (acute kidney injury) ([address]       Past Medical History:   Diagnosis Date    Diabetes mellitus ([address]     Hyperlipidemia     Hypothyroidism     Obesity        Past Surgical History:   Procedure Laterality Date    CHOLECYSTECTOMY      HYSTERECTOMY, TOTAL ABDOMINAL      INCONTINENCE SURGERY      BLADDER SLING    KNEE SURGERY Left     LUMBAR SPINE SURGERY N/A 5/25/2021    L3- L5 DECOMPRESSION AND FUSION , L4- L5 TRANSFORAMINAL LUMBAR INTERBODY FUSION --OARM, PATY TABLE, AUDIOLOGY, PLATES shortness of breath and wheezing. Cardiovascular: Negative for chest pain, palpitations and leg swelling. Gastrointestinal: Negative for abdominal pain, blood in stool, constipation, diarrhea, nausea and vomiting. Endocrine: Negative. Genitourinary: Negative for decreased urine volume, difficulty urinating, dysuria, frequency, hematuria and urgency. Musculoskeletal: Positive for back pain and myalgias. Negative for arthralgias, gait problem and neck pain. Skin: Positive for rash. Negative for color change. Allergic/Immunologic: Negative for environmental allergies and food allergies. Neurological: Negative for dizziness, weakness, light-headedness, numbness and headaches. Hematological: Negative for adenopathy. Does not bruise/bleed easily. Psychiatric/Behavioral: Negative for behavioral problems, dysphoric mood and sleep disturbance. The patient is not nervous/anxious and is not hyperactive. All other systems reviewed and are negative. /70   Pulse 96   Resp 16   Ht 5' 4\" (1.626 m)   Wt 252 lb (114.3 kg)   SpO2 98%   BMI 43.26 kg/m²     Physical Exam  Vitals and nursing note reviewed. Constitutional:       General: She is not in acute distress. Appearance: Normal appearance. She is well-developed. HENT:      Head: Normocephalic and atraumatic. Right Ear: Hearing, tympanic membrane and external ear normal. No tenderness. No middle ear effusion. Left Ear: Hearing, tympanic membrane and external ear normal. No tenderness. No middle ear effusion. Nose: Nose normal. No congestion or rhinorrhea. Right Turbinates: Not enlarged. Left Turbinates: Not enlarged. Mouth/Throat:      Mouth: Mucous membranes are moist.      Tongue: No lesions. Pharynx: Oropharynx is clear. No oropharyngeal exudate or posterior oropharyngeal erythema. Eyes:      General: No scleral icterus.      Conjunctiva/sclera: Conjunctivae normal.      Pupils: Pupils are equal, round, and reactive to light. Neck:      Thyroid: No thyromegaly. Cardiovascular:      Rate and Rhythm: Normal rate and regular rhythm. Heart sounds: Normal heart sounds. No murmur heard. Pulmonary:      Effort: Pulmonary effort is normal. No respiratory distress. Breath sounds: Normal breath sounds. No wheezing or rales. Abdominal:      General: Bowel sounds are normal. There is no distension. Palpations: Abdomen is soft. Tenderness: There is no abdominal tenderness. Musculoskeletal:         General: No tenderness. Normal range of motion. Cervical back: Normal range of motion and neck supple. No rigidity. No muscular tenderness. Lymphadenopathy:      Cervical: No cervical adenopathy. Skin:     General: Skin is warm and dry. Findings: No erythema or rash. Neurological:      General: No focal deficit present. Mental Status: She is alert and oriented to person, place, and time. Cranial Nerves: No cranial nerve deficit. Deep Tendon Reflexes: Reflexes are normal and symmetric. Reflexes normal.   Psychiatric:         Mood and Affect: Mood normal.                                 ASSESSMENT/PLAN:    Patient Active Problem List   Diagnosis    Type 2 diabetes mellitus without complication, without long-term current use of insulin ([facility])    Acquired hypothyroidism    Peripheral neuropathy    Carpal tunnel syndrome of left wrist    Spinal stenosis of lumbar region with neurogenic claudication    Acute exacerbation of chronic low back pain    Hyperlipidemia    Spondylolisthesis of lumbar region    Lumbar radiculopathy    Postoperative hypotension    Postoperative anemia due to acute blood loss    ALTA (acute kidney injury) ([address]       Selene Gallegos was seen today for post-op check, back pain, diabetes, hyperlipidemia and hypothyroidism.     Diagnoses and all orders for this visit:    Lumbar degenerative disc disease    Spinal stenosis of lumbar region with

## 2021-06-04 NOTE — ADT AUTH CERT
Lumbar Laminectomy - Care Day 5 (5/30/2021) by Patito Barriga RN       Review Status Review Entered   Completed 6/4/2021 15:24      Criteria Review      Care Day: 5 Care Date: 5/30/2021 Level of Care:    Guideline Day 1    Clinical Status    ( ) * Clinical Indications met    6/4/2021 3:24 PM EDT by Joe Mallory      VITALS: T 99.4; P 86; RR 18; /60; PULSE OX 97 % RA    * Milestone   Additional Notes   5/30/2021   MEDICATIONS:   bisacodyl (DULCOLAX) EC tablet 5 mg    Dose: 5 mg   Freq: DAILY Route: PO      gabapentin (NEURONTIN) capsule 600 mg    Dose: 600 mg   Freq: 3 TIMES DAILY Route: PO      hydrocortisone sodium succinate PF (SOLU-CORTEF) injection 25 mg    Dose: 25 mg   Freq: EVERY 8 HOURS Route: IV      hydrocortisone sodium succinate PF (SOLU-CORTEF) injection 25 mg    Dose: 25 mg   Freq: 2 TIMES DAILY Route: IV      insulin lispro (HUMALOG) injection vial 0-18 Units    Dose: 0-18 Units   Freq: 3 TIMES DAILY WITH MEALS Route: SC      insulin lispro (HUMALOG) injection vial 0-9 Units    Dose: 0-9 Units   Freq: NIGHTLY Route: SC      levothyroxine (SYNTHROID) tablet 150 mcg    Dose: 150 mcg   Freq: DAILY Route: PO      lidocaine 4 % external patch 1 patch    Dose: 1 patch   Freq: DAILY Route: TD      linaclotide (LINZESS) capsule 145 mcg    Dose: 145 mcg   Freq: DAILY BEFORE BREAKFAST Route: PO      magnesium citrate solution 296 mL    Dose: 296 mL   Freq: ONCE Route: PO      metFORMIN (GLUCOPHAGE) tablet 500 mg    Dose: 500 mg   Freq: DAILY WITH BREAKFAST Route: PO      pantoprazole (PROTONIX) injection 40 mg    Dose: 40 mg   Freq: 2 TIMES DAILY Route: IV      polyethylene glycol (GLYCOLAX) packet 17 g    Dose: 17 g   Freq: DAILY Route: PO      potassium chloride (KLOR-CON M) extended release tablet 20 mEq    Dose: 20 mEq   Freq: ONCE Route: PO      senna (SENOKOT) tablet 8.6 mg    Dose: 1 tablet   Freq: NIGHTLY Route: PO      cyclobenzaprine (FLEXERIL) tablet 10 mg    Dose: 10 mg   Freq: 3 TIMES DAILY PRN Route: PO X1      oxyCODONE (ROXICODONE) immediate release tablet 5 mg    Dose: 5 mg   Freq: EVERY 4 HOURS PRN Route: PO X2      oxyCODONE (ROXICODONE) immediate release tablet 10 mg    Dose: 10 mg   Freq: EVERY 4 HOURS PRN Route: PO X2      LABS:   5/30/2021 04:58   Glucose: 138 (H)   Total Protein: 5.4 (L)   Albumin: 3.1 (L)   RBC: 2.71 (L)   Hemoglobin Quant: 8.0 (L)   Hematocrit: 24.3 (L)      5/30/2021 08:39   Meter Glucose: 144 (H)      5/30/2021 12:24   Meter Glucose: 176 (H)      5/30/2021 16:46   Meter Glucose: 137 (H)      5/30/2021 17:37   Hemoglobin Quant: 9.6 (L)   Hematocrit: 30.3 (L)      5/30/2021 20:28   Meter Glucose: 163 (H)      * * NEUROSURGERY * *    ASSESSMENT AND PLAN:       S/p L3-L5 fusion. Donya Andrew has been discontinued.  Blood pressure is better. Charlane Figures transfer to floor.  Possible D/C home tomorrow versus rehab      * * CRITICAL CARE * *        Assessment       1. Shock 2/2 neurogenic (local stimulation d/t infusion screw) vs septic, less likely               Off levophad, VS stable resolve               Pan pan-cultures: neg, no leukocytosis today, have stopped all ABx   2. Lumbar spondylolithesis s/p decompression and fusion   3. Acute Normocytic anemia, continues to downtrend, iron profile showing ACD picture   4. ALTA, likely prerenal, resolved   5. Elevated troponin, likely 2/2 to demand ischemia, improving   6. Hx of T2DM, stable   8.  Hx of hypothyroidism, on synthroid           Plan       · Weaning Solu cortef 100 q8h, to be weaned down to 25mg Q8 today for 3 doses, followed by 25mg BID for 3 doses   · Pain control dilaudid PRN per NS, NS is following, recommendations are apprecaited   · Await FOBT, H&H Q8H   · Carb control diet to be continued   · Continue Lipitor 10mg   · HDSSI ACHS, Lantus 12U nightly, followup BG ACHS   · Continue Synthroid 150ug daily, T4: 1.21   · Transfer to general bed      * * INTERNAL MEDICINE * *    Assessment:       Active Problems:     Type 2 diabetes mellitus without complication, without long-term current use of insulin ([facility] [address]     Acquired hypothyroidism     Hyperlipidemia     Lumbar radiculopathy     Postoperative hypotension     Postoperative anemia due to acute blood loss     ALTA (acute kidney injury) ([facility] [address]       Plan:   Rubiada  to ransfer resume glucophage change to po protonix          Lumbar Laminectomy - Care Day 4 (5/29/2021) by Jurgen Bowman RN       Review Status Review Entered   Completed 6/4/2021 15:18      Criteria Review      Care Day: 4 Care Date: 5/29/2021 Level of Care:    Guideline Day 1    Clinical Status    ( ) * Clinical Indications met    6/4/2021 3:18 PM EDT by Pete Levine: T 98.3; P 89; RR 25; /69; PULSE OX 93 % RA    * Milestone   Additional Notes   5/29/2021   MEDICATIONS:   bisacodyl (DULCOLAX) EC tablet 5 mg    Dose: 5 mg   Freq: DAILY Route: PO      gabapentin (NEURONTIN) capsule 600 mg    Dose: 600 mg   Freq: 3 TIMES DAILY Route: PO      hydrocortisone sodium succinate PF (SOLU-CORTEF) injection 25 mg    Dose: 25 mg   Freq: EVERY 8 HOURS Route: IV      hydrocortisone sodium succinate PF (SOLU-CORTEF) injection 50 mg    Dose: 50 mg   Freq: EVERY 8 HOURS Route: IV      insulin glargine (LANTUS) injection vial 12 Units    Dose: 12 Units   Freq: NIGHTLY Route: SC      insulin lispro (HUMALOG) injection vial 0-18 Units    Dose: 0-18 Units   Freq: 3 TIMES DAILY WITH MEALS Route: SC      insulin lispro (HUMALOG) injection vial 0-9 Units    Dose: 0-9 Units   Freq: NIGHTLY Route: SC      levothyroxine (SYNTHROID) tablet 150 mcg    Dose: 150 mcg   Freq: DAILY Route: PO      lidocaine 4 % external patch 1 patch    Dose: 1 patch   Freq: DAILY Route: TD      linaclotide (LINZESS) capsule 145 mcg    Dose: 145 mcg   Freq: DAILY BEFORE BREAKFAST Route: PO      pantoprazole (PROTONIX) injection 40 mg    Dose: 40 mg   Freq: 2 TIMES DAILY Route: IV      polyethylene glycol (GLYCOLAX) packet 17 g    Dose: 17 g   Freq: DAILY Route:  PO      pravastatin (PRAVACHOL) tablet 10 mg    Dose: 10 mg   Freq: EVERY OTHER DAY Route: PO      senna (SENOKOT) tablet 8.6 mg    Dose: 1 tablet   Freq: NIGHTLY Route: PO      HYDROmorphone (DILAUDID) injection 0.5 mg    Dose: 0.5 mg   Freq: EVERY 2 HOURS PRN Route: IV X2      oxyCODONE (ROXICODONE) immediate release tablet 5 mg    Dose: 5 mg   Freq: EVERY 4 HOURS PRN Route: PO X2      oxyCODONE (ROXICODONE) immediate release tablet 10 mg    Dose: 10 mg   Freq: EVERY 4 HOURS PRN Route: PO X1      LABS:   5/29/2021 04:35   Glucose: 163 (H)   Total Protein: 5.7 (L)   Albumin: 3.0 (L)   RBC: 2.63 (L)   Hemoglobin Quant: 7.8 (L)   Hematocrit: 23.9 (L)      5/29/2021 08:20   Meter Glucose: 150 (H)      5/29/2021 11:48   Meter Glucose: 202 (H)      5/29/2021 15:06   Hemoglobin Quant: 8.2 (L)   Hematocrit: 24.5 (L)      5/29/2021 16:44   Meter Glucose: 187 (H)      5/29/2021 20:17   Meter Glucose: 228 (H)      5/29/2021 23:46   Hemoglobin Quant: 8.1 (L)   Hematocrit: 24.6 (L)      * * NEUROSURGERY * *    ASSESSMENT AND PLAN:       POD 4 s/p L3-L5 fusion.  Stable.  Blood pressure is better.  Pain is better. [facility] D/C drain.  Okay to transfer to monitored bed.  Await rehab placement       * * INTERNAL MEDICINE * *    Assessment:       Active Problems:     Type 2 diabetes mellitus without complication, without long-term current use of insulin (HCC)     Acquired hypothyroidism     Hyperlipidemia     Lumbar radiculopathy     Postoperative hypotension     Postoperative anemia due to acute blood loss     ALAT (acute kidney injury) ([facility] [address]       Plan:   Increase activity ok to transfer to telemetry       * * CRITICAL CARE * *    Plan       · Solu cortef 100 q8h, to be weaned down to 25mg Q8 today for 3 doses, followed by 25mg BID for 3 doses   · Pain control dilaudid PRN per NS, NS is following, recommendations are apprecaited   · Await FOBT, H&H Q8H   · Carb control diet to be continued   · Continue Lipitor 10mg   · HDSSI

## 2021-06-07 RX ORDER — TIZANIDINE 4 MG/1
4 TABLET ORAL EVERY 8 HOURS PRN
Qty: 90 TABLET | Refills: 0 | Status: SHIPPED | OUTPATIENT
Start: 2021-06-07 | End: 2021-06-09 | Stop reason: SDUPTHER

## 2021-06-09 ENCOUNTER — TELEPHONE (OUTPATIENT)
Dept: PHYSICAL MEDICINE AND REHAB | Age: 61
End: 2021-06-09

## 2021-06-09 ENCOUNTER — OFFICE VISIT (OUTPATIENT)
Dept: NEUROSURGERY | Age: 61
End: 2021-06-09
Payer: COMMERCIAL

## 2021-06-09 ENCOUNTER — TELEPHONE (OUTPATIENT)
Dept: NEUROSURGERY | Age: 61
End: 2021-06-09

## 2021-06-09 VITALS
OXYGEN SATURATION: 96 % | DIASTOLIC BLOOD PRESSURE: 62 MMHG | SYSTOLIC BLOOD PRESSURE: 107 MMHG | HEIGHT: 64 IN | HEART RATE: 110 BPM | RESPIRATION RATE: 16 BRPM | WEIGHT: 252 LBS | BODY MASS INDEX: 43.02 KG/M2 | TEMPERATURE: 97.1 F

## 2021-06-09 DIAGNOSIS — Z98.1 S/P LUMBAR FUSION: ICD-10-CM

## 2021-06-09 DIAGNOSIS — M51.26 LUMBAR DISC HERNIATION: ICD-10-CM

## 2021-06-09 DIAGNOSIS — M43.16 SPONDYLOLISTHESIS OF LUMBAR REGION: Primary | ICD-10-CM

## 2021-06-09 PROCEDURE — 99024 POSTOP FOLLOW-UP VISIT: CPT | Performed by: PHYSICIAN ASSISTANT

## 2021-06-09 RX ORDER — OXYCODONE HYDROCHLORIDE 5 MG/1
5 TABLET ORAL EVERY 4 HOURS PRN
Qty: 42 TABLET | Refills: 0 | Status: SHIPPED | OUTPATIENT
Start: 2021-06-09 | End: 2021-06-16

## 2021-06-09 RX ORDER — LIDOCAINE 50 MG/G
PATCH TOPICAL
COMMUNITY
Start: 2021-06-02

## 2021-06-09 RX ORDER — CYCLOBENZAPRINE HCL 10 MG
10 TABLET ORAL 3 TIMES DAILY PRN
Qty: 30 TABLET | Refills: 0 | Status: SHIPPED
Start: 2021-06-09 | End: 2021-06-17 | Stop reason: SDUPTHER

## 2021-06-09 NOTE — TELEPHONE ENCOUNTER
Patient called stating she had back surgery and was told she would only have nerve pain in one leg, but she is having the pain in both legs and is wanting to know if you can increase her medication.   Please advise  Thanks

## 2021-06-09 NOTE — TELEPHONE ENCOUNTER
Prior auth submitted through CoverMyMeds for   Oxycodone 5mg   Prescribed by DIEUDONNE Zuniga  [place]: [place]  Waiting on response        Electronically signed by Gonzalo Mckay on 6/9/21 at 4:36 PM EDT

## 2021-06-09 NOTE — PROGRESS NOTES
Encounter for Staple Removal     Red Kemp is a 62 y.o.  female  two weeks s/p  L3-L5 fusion and L4-L5 TLIF      Patient presents for staple removal.      Equipment: General staple removal kit, ChloraPrep, sterile gloves     Procedure: Pt was placed in the sitting position. Using a sterile technique, ChloraPrep was used to clean the incisional wound. The wound healing well without signs of infection. Staples were removed with no pain and no complications. Pt tolerated procedure well. Pts questions were answered and wound care instructions and restrictions were discussed. Pain medications refilled to pharmacy. Pt is to return to neurosurgery clinic in 4 weeks for surgery follow-up or sooner if new issues or concerns arise.

## 2021-06-12 NOTE — TELEPHONE ENCOUNTER
Patient is about 2 weeks out from surgery. She is likely still experiencing acute post operative pain and is prescribed PRN narcotics by her surgeon for post op pain. We are prescribing gabapentin 600mg TID. No changes to gabapentin at this time. Can re-evaluate patient when seen in follow up.

## 2021-06-14 RX ORDER — VALSARTAN 80 MG/1
TABLET ORAL
Qty: 90 TABLET | Refills: 1 | Status: SHIPPED
Start: 2021-06-14 | End: 2021-10-17 | Stop reason: SDUPTHER

## 2021-06-16 RX ORDER — LINACLOTIDE 145 UG/1
CAPSULE, GELATIN COATED ORAL
Qty: 90 CAPSULE | Refills: 3 | Status: SHIPPED
Start: 2021-06-16 | End: 2021-10-17 | Stop reason: SDUPTHER

## 2021-06-17 ENCOUNTER — TELEPHONE (OUTPATIENT)
Dept: NEUROSURGERY | Age: 61
End: 2021-06-17

## 2021-06-17 DIAGNOSIS — M54.16 LUMBAR RADICULOPATHY: Primary | ICD-10-CM

## 2021-06-17 RX ORDER — OXYCODONE HYDROCHLORIDE 10 MG/1
10 TABLET ORAL EVERY 6 HOURS PRN
Qty: 42 TABLET | Refills: 0 | Status: SHIPPED | OUTPATIENT
Start: 2021-06-17 | End: 2021-06-28 | Stop reason: SDUPTHER

## 2021-06-17 RX ORDER — CYCLOBENZAPRINE HCL 10 MG
10 TABLET ORAL 3 TIMES DAILY PRN
Qty: 30 TABLET | Refills: 0 | Status: SHIPPED | OUTPATIENT
Start: 2021-06-17 | End: 2021-07-26 | Stop reason: SDUPTHER

## 2021-06-17 NOTE — TELEPHONE ENCOUNTER
Patient requesting refill on pain medication to [facility]. Patient states she is having a lot of pain and wants to know if we can increase her oxycodone 5 mg.

## 2021-06-21 ENCOUNTER — TELEPHONE (OUTPATIENT)
Dept: PHYSICAL MEDICINE AND REHAB | Age: 61
End: 2021-06-21

## 2021-06-21 NOTE — TELEPHONE ENCOUNTER
aSm Cruz called and the gabapentin isnt working and wants a higher dose.     Next office visit 07/26/2021    Electronically signed by Lisy Carrillo on 6/21/2021 at 8:01 AM

## 2021-06-22 NOTE — TELEPHONE ENCOUNTER
Already addressed. She is a few weeks post op and likely still having post op pain--being prescribed narcotics for acute post op pain by her surgeon. I have not seen her in office since her surgery. I need to evaluate her in office before any further medication changes.

## 2021-06-28 ENCOUNTER — TELEPHONE (OUTPATIENT)
Dept: NEUROSURGERY | Age: 61
End: 2021-06-28

## 2021-06-28 DIAGNOSIS — Z98.1 S/P LUMBAR FUSION: ICD-10-CM

## 2021-06-28 DIAGNOSIS — Z98.1 HISTORY OF LUMBAR FUSION: Primary | ICD-10-CM

## 2021-06-28 DIAGNOSIS — M48.062 LUMBAR STENOSIS WITH NEUROGENIC CLAUDICATION: Primary | ICD-10-CM

## 2021-06-28 DIAGNOSIS — M54.16 LUMBAR RADICULOPATHY: ICD-10-CM

## 2021-06-28 RX ORDER — OXYCODONE HYDROCHLORIDE 10 MG/1
10 TABLET ORAL EVERY 6 HOURS PRN
Qty: 42 TABLET | Refills: 0 | Status: SHIPPED
Start: 2021-06-28 | End: 2021-07-06 | Stop reason: SDUPTHER

## 2021-06-28 NOTE — TELEPHONE ENCOUNTER
Patient requesting shower chair. Please send order to [facility] ([facility] DME.

## 2021-07-06 ENCOUNTER — TELEPHONE (OUTPATIENT)
Dept: NEUROSURGERY | Age: 61
End: 2021-07-06

## 2021-07-06 DIAGNOSIS — M54.16 LUMBAR RADICULOPATHY: ICD-10-CM

## 2021-07-06 RX ORDER — METHYLPREDNISOLONE 4 MG/1
TABLET ORAL
Qty: 1 KIT | Refills: 0 | Status: SHIPPED | OUTPATIENT
Start: 2021-07-06 | End: 2021-07-12

## 2021-07-06 RX ORDER — OXYCODONE HYDROCHLORIDE 10 MG/1
10 TABLET ORAL EVERY 6 HOURS PRN
Qty: 42 TABLET | Refills: 0 | Status: SHIPPED
Start: 2021-07-06 | End: 2021-07-12 | Stop reason: SDUPTHER

## 2021-07-06 NOTE — TELEPHONE ENCOUNTER
Patient states she is having severe leg pain, getting worse since surgery. She feels that it is nerve pain. Oxy-10 is not helping her and she can't sleep at night. Please call [place] at [phone].

## 2021-07-07 ENCOUNTER — HOSPITAL ENCOUNTER (OUTPATIENT)
Age: 61
Discharge: HOME | End: 2021-07-09
Payer: COMMERCIAL

## 2021-07-07 ENCOUNTER — HOSPITAL ENCOUNTER (OUTPATIENT)
Dept: GENERAL RADIOLOGY | Age: 61
Discharge: HOME | End: 2021-07-09
Payer: COMMERCIAL

## 2021-07-07 ENCOUNTER — OFFICE VISIT (OUTPATIENT)
Dept: NEUROSURGERY | Age: 61
End: 2021-07-07
Payer: COMMERCIAL

## 2021-07-07 VITALS
DIASTOLIC BLOOD PRESSURE: 79 MMHG | BODY MASS INDEX: 43.02 KG/M2 | HEART RATE: 51 BPM | TEMPERATURE: 97.7 F | HEIGHT: 64 IN | WEIGHT: 252 LBS | SYSTOLIC BLOOD PRESSURE: 117 MMHG | RESPIRATION RATE: 16 BRPM

## 2021-07-07 DIAGNOSIS — Z98.1 S/P LUMBAR FUSION: ICD-10-CM

## 2021-07-07 DIAGNOSIS — M48.062 LUMBAR STENOSIS WITH NEUROGENIC CLAUDICATION: ICD-10-CM

## 2021-07-07 DIAGNOSIS — Z48.89 ENCOUNTER FOR POSTOPERATIVE WOUND CHECK: Primary | ICD-10-CM

## 2021-07-07 PROCEDURE — 72100 X-RAY EXAM L-S SPINE 2/3 VWS: CPT

## 2021-07-07 PROCEDURE — 99024 POSTOP FOLLOW-UP VISIT: CPT | Performed by: NEUROLOGICAL SURGERY

## 2021-07-07 NOTE — PROGRESS NOTES
Patient here for wound check. Crusting in suture line. 3 sutures left in place. 4 sutures removed. She will return in 4 weeks for removal of remaining sutures. I ordered her PT for her legs. She is to continue off of work. She is avoid NSAIDs for one year.      Physical Exam  Alert and Oriented X 3  HALEY MAGDALENO 5/5  Wound: C/D/I with some crusting

## 2021-07-12 ENCOUNTER — TELEPHONE (OUTPATIENT)
Dept: NEUROSURGERY | Age: 61
End: 2021-07-12

## 2021-07-12 DIAGNOSIS — M54.16 LUMBAR RADICULOPATHY: ICD-10-CM

## 2021-07-12 RX ORDER — OMEPRAZOLE 20 MG/1
CAPSULE, DELAYED RELEASE ORAL
Qty: 90 CAPSULE | Refills: 3 | Status: SHIPPED
Start: 2021-07-12 | End: 2021-10-17 | Stop reason: SDUPTHER

## 2021-07-12 RX ORDER — OXYCODONE HYDROCHLORIDE 10 MG/1
10 TABLET ORAL EVERY 6 HOURS PRN
Qty: 42 TABLET | Refills: 0 | Status: SHIPPED | OUTPATIENT
Start: 2021-07-12 | End: 2021-07-26 | Stop reason: SDUPTHER

## 2021-07-12 RX ORDER — GEMFIBROZIL 600 MG/1
TABLET, FILM COATED ORAL
Qty: 180 TABLET | Refills: 3 | Status: SHIPPED
Start: 2021-07-12 | End: 2021-10-17 | Stop reason: SDUPTHER

## 2021-07-12 RX ORDER — ERGOCALCIFEROL 1.25 MG/1
CAPSULE ORAL
Qty: 12 CAPSULE | Refills: 0 | Status: SHIPPED
Start: 2021-07-12 | End: 2021-10-17 | Stop reason: SDUPTHER

## 2021-07-12 RX ORDER — OMEGA-3-ACID ETHYL ESTERS 1 G/1
CAPSULE, LIQUID FILLED ORAL
Qty: 90 CAPSULE | Refills: 3 | Status: SHIPPED
Start: 2021-07-12 | End: 2021-10-17 | Stop reason: SDUPTHER

## 2021-07-12 NOTE — TELEPHONE ENCOUNTER
Patient called for a refill on her oxycodone 10, please send to [facility] in [facility].       Electronically signed by Laury Martinez on 7/12/21 at 12:18 PM EDT

## 2021-07-15 ENCOUNTER — OFFICE VISIT (OUTPATIENT)
Dept: PRIMARY CARE CLINIC | Age: 61
End: 2021-07-15
Payer: COMMERCIAL

## 2021-07-15 VITALS
TEMPERATURE: 97.9 F | BODY MASS INDEX: 44.53 KG/M2 | OXYGEN SATURATION: 96 % | DIASTOLIC BLOOD PRESSURE: 74 MMHG | SYSTOLIC BLOOD PRESSURE: 128 MMHG | WEIGHT: 259.4 LBS | HEART RATE: 96 BPM

## 2021-07-15 DIAGNOSIS — E11.9 TYPE 2 DIABETES MELLITUS WITHOUT COMPLICATION, WITHOUT LONG-TERM CURRENT USE OF INSULIN (HCC): Chronic | ICD-10-CM

## 2021-07-15 DIAGNOSIS — G62.9 PERIPHERAL POLYNEUROPATHY: ICD-10-CM

## 2021-07-15 DIAGNOSIS — E03.9 ACQUIRED HYPOTHYROIDISM: Chronic | ICD-10-CM

## 2021-07-15 DIAGNOSIS — E78.49 OTHER HYPERLIPIDEMIA: Chronic | ICD-10-CM

## 2021-07-15 DIAGNOSIS — E11.9 TYPE 2 DIABETES MELLITUS WITHOUT COMPLICATION, WITHOUT LONG-TERM CURRENT USE OF INSULIN (HCC): Primary | Chronic | ICD-10-CM

## 2021-07-15 LAB
ALBUMIN SERPL-MCNC: 4.2 G/DL (ref 3.5–5.2)
ALP BLD-CCNC: 92 U/L (ref 35–104)
ALT SERPL-CCNC: 12 U/L (ref 0–32)
ANION GAP SERPL CALCULATED.3IONS-SCNC: 16 MMOL/L (ref 7–16)
AST SERPL-CCNC: 16 U/L (ref 0–31)
BILIRUB SERPL-MCNC: 0.4 MG/DL (ref 0–1.2)
BUN BLDV-MCNC: 15 MG/DL (ref 6–20)
CALCIUM SERPL-MCNC: 9.3 MG/DL (ref 8.6–10.2)
CHLORIDE BLD-SCNC: 99 MMOL/L (ref 98–107)
CHOLESTEROL, TOTAL: 205 MG/DL (ref 0–199)
CO2: 23 MMOL/L (ref 22–29)
CREAT SERPL-MCNC: 0.7 MG/DL (ref 0.5–1)
CREATININE URINE: 86 MG/DL (ref 29–226)
GFR AFRICAN AMERICAN: >60
GFR NON-AFRICAN AMERICAN: >60 ML/MIN/1.73
GLUCOSE BLD-MCNC: 101 MG/DL (ref 74–99)
HBA1C MFR BLD: 5.8 % (ref 4–5.6)
HCT VFR BLD CALC: 44.8 % (ref 34–48)
HDLC SERPL-MCNC: 30 MG/DL
HEMOGLOBIN: 13.8 G/DL (ref 11.5–15.5)
LDL CHOLESTEROL CALCULATED: 124 MG/DL (ref 0–99)
MCH RBC QN AUTO: 27 PG (ref 26–35)
MCHC RBC AUTO-ENTMCNC: 30.8 % (ref 32–34.5)
MCV RBC AUTO: 87.7 FL (ref 80–99.9)
MICROALBUMIN UR-MCNC: <12 MG/L
MICROALBUMIN/CREAT UR-RTO: ABNORMAL (ref 0–30)
PDW BLD-RTO: 14 FL (ref 11.5–15)
PLATELET # BLD: 272 E9/L (ref 130–450)
PMV BLD AUTO: 9.7 FL (ref 7–12)
POTASSIUM SERPL-SCNC: 4.3 MMOL/L (ref 3.5–5)
RBC # BLD: 5.11 E12/L (ref 3.5–5.5)
SODIUM BLD-SCNC: 138 MMOL/L (ref 132–146)
TOTAL PROTEIN: 7.1 G/DL (ref 6.4–8.3)
TRIGL SERPL-MCNC: 253 MG/DL (ref 0–149)
TSH SERPL DL<=0.05 MIU/L-ACNC: 2.4 UIU/ML (ref 0.27–4.2)
VLDLC SERPL CALC-MCNC: 51 MG/DL
WBC # BLD: 9.2 E9/L (ref 4.5–11.5)

## 2021-07-15 PROCEDURE — 99214 OFFICE O/P EST MOD 30 MIN: CPT | Performed by: FAMILY MEDICINE

## 2021-07-15 SDOH — ECONOMIC STABILITY: FOOD INSECURITY: WITHIN THE PAST 12 MONTHS, THE FOOD YOU BOUGHT JUST DIDN'T LAST AND YOU DIDN'T HAVE MONEY TO GET MORE.: NEVER TRUE

## 2021-07-15 SDOH — ECONOMIC STABILITY: FOOD INSECURITY: WITHIN THE PAST 12 MONTHS, YOU WORRIED THAT YOUR FOOD WOULD RUN OUT BEFORE YOU GOT MONEY TO BUY MORE.: NEVER TRUE

## 2021-07-15 ASSESSMENT — ENCOUNTER SYMPTOMS
SORE THROAT: 0
DIARRHEA: 0
RHINORRHEA: 0
SINUS PRESSURE: 0
SHORTNESS OF BREATH: 0
COLOR CHANGE: 0
BLOOD IN STOOL: 0
EYE REDNESS: 0
APNEA: 0
WHEEZING: 0
CHEST TIGHTNESS: 0
COUGH: 0
EYE ITCHING: 0
CONSTIPATION: 0
EYE PAIN: 0
VOMITING: 0
VISUAL CHANGE: 0
NAUSEA: 0
BACK PAIN: 1
ABDOMINAL PAIN: 0

## 2021-07-15 ASSESSMENT — SOCIAL DETERMINANTS OF HEALTH (SDOH): HOW HARD IS IT FOR YOU TO PAY FOR THE VERY BASICS LIKE FOOD, HOUSING, MEDICAL CARE, AND HEATING?: NOT HARD AT ALL

## 2021-07-15 NOTE — PROGRESS NOTES
Chief Complaint:     Chief Complaint   Patient presents with    Diabetes    Hypothyroidism    Hyperlipidemia         Diabetes  She presents for her follow-up diabetic visit. She has type 2 diabetes mellitus. The initial diagnosis of diabetes was made 1 month ago. Her disease course has been stable. There are no hypoglycemic associated symptoms. Pertinent negatives for hypoglycemia include no dizziness, headaches or nervousness/anxiousness. Associated symptoms include fatigue. Pertinent negatives for diabetes include no chest pain, no visual change and no weakness. There are no hypoglycemic complications. Symptoms are stable. There are no diabetic complications. Risk factors for coronary artery disease include dyslipidemia and obesity. Current diabetic treatment includes oral agent (monotherapy). She is compliant with treatment all of the time. She is following a generally healthy diet. When asked about meal planning, she reported none. She has not had a previous visit with a dietitian. There is no change in her home blood glucose trend. An ACE inhibitor/angiotensin II receptor blocker is not being taken. She does not see a podiatrist.Eye exam is not current. Hyperlipidemia  This is a chronic problem. The current episode started more than 1 year ago. The problem is controlled. Recent lipid tests were reviewed and are normal. Exacerbating diseases include diabetes and obesity. Associated symptoms include leg pain and myalgias. Pertinent negatives include no chest pain or shortness of breath. Current antihyperlipidemic treatment includes statins. The current treatment provides significant improvement of lipids. Compliance problems include medication side effects. Risk factors for coronary artery disease include diabetes mellitus, dyslipidemia, obesity and post-menopausal.   Back Pain  This is a recurrent problem. The current episode started more than 1 month ago. The problem occurs intermittently.  The problem has been waxing and waning since onset. The pain is present in the lumbar spine. The quality of the pain is described as aching. The pain is moderate. Associated symptoms include leg pain. Pertinent negatives include no abdominal pain, chest pain, dysuria, fever, headaches, numbness or weakness. She has tried nothing for the symptoms. The treatment provided no relief. Fatigue  This is a chronic problem. The problem occurs intermittently. The problem has been waxing and waning. Associated symptoms include fatigue, myalgias and a rash. Pertinent negatives include no abdominal pain, arthralgias, chest pain, congestion, coughing, diaphoresis, fever, headaches, nausea, neck pain, numbness, sore throat, visual change, vomiting or weakness. Nothing aggravates the symptoms. She has tried nothing for the symptoms. The treatment provided no relief.        Patient Active Problem List   Diagnosis    Type 2 diabetes mellitus without complication, without long-term current use of insulin ([address]    Acquired hypothyroidism    Peripheral neuropathy    Carpal tunnel syndrome of left wrist    Spinal stenosis of lumbar region with neurogenic claudication    Acute exacerbation of chronic low back pain    Hyperlipidemia    Spondylolisthesis of lumbar region    Lumbar radiculopathy    Postoperative hypotension    Postoperative anemia due to acute blood loss    ALTA (acute kidney injury) ([address]       Past Medical History:   Diagnosis Date    Diabetes mellitus ([address]     Hyperlipidemia     Hypothyroidism     Obesity        Past Surgical History:   Procedure Laterality Date    CHOLECYSTECTOMY      HYSTERECTOMY, TOTAL ABDOMINAL      INCONTINENCE SURGERY      BLADDER SLING    KNEE SURGERY Left     LUMBAR SPINE SURGERY N/A 5/25/2021    L3- L5 DECOMPRESSION AND FUSION , L4- L5 TRANSFORAMINAL LUMBAR INTERBODY FUSION --OARM, PATY TABLE, AUDIOLOGY, PLATES ,SCREWS, --NUVASIVE performed by Agus Aldrich MD at [address] [place] [address]       Current Outpatient Medications   Medication Sig Dispense Refill    gemfibrozil (LOPID) 600 MG tablet TAKE 1 TABLET BY MOUTH EVERY 12 HOURS 180 tablet 3    metFORMIN (GLUCOPHAGE) 500 MG tablet TAKE 1 TABLET BY MOUTH EVERY DAY WITH BREAKFAST 90 tablet 3    omeprazole (PRILOSEC) 20 MG delayed release capsule TAKE 1 CAPSULE BY MOUTH EVERY DAY 90 capsule 3    omega-3 acid ethyl esters (LOVAZA) 1 g capsule TAKE 1 CAPSULE BY MOUTH EVERY DAY 90 capsule 3    oxyCODONE HCl (OXY-IR) 10 MG immediate release tablet Take 1 tablet by mouth every 6 hours as needed for Pain for up to 7 days. Intended supply: 30 days 42 tablet 0    vitamin D (ERGOCALCIFEROL) 1.25 MG (19195 UT) CAPS capsule TAKE 1 CAPSULE BY MOUTH ONE TIME PER WEEK 12 capsule 0    LINZESS 145 MCG capsule TAKE 1 CAPSULE BY MOUTH EVERY DAY IN THE MORNING BEFORE BREAKFAST 90 capsule 3    valsartan (DIOVAN) 80 MG tablet TAKE 1 TABLET BY MOUTH EVERY DAY 90 tablet 1    lidocaine (LIDODERM) 5 % PLACE 1 PATCH ONTO THE SKIN DAILY 12 HOURS ON, 12 HOURS OFF.  vitamin B-12 (CYANOCOBALAMIN) 100 MCG tablet Take 50 mcg by mouth daily      gabapentin (NEURONTIN) 300 MG capsule Take 2 capsules by mouth 3 times daily for 90 days. 180 capsule 2    polyethylene glycol (GLYCOLAX) 17 g packet Take 17 g by mouth daily as needed      Handicap Placard MISC by Does not apply route Patient cannot walk 200 ft without stopping to rest.    Expiration 1 yr 1 each 0    Thiamine HCl (B-1) 100 MG TABS Take 1 pill daily 90 tablet 1    pravastatin (PRAVACHOL) 10 MG tablet Take 1 tablet by mouth every other day 45 tablet 1    levothyroxine (SYNTHROID) 150 MCG tablet Take 1 tablet by mouth Daily 90 tablet 1    OZEMPIC, 1 MG/DOSE, 2 MG/1.5ML SOPN Inject 1 mg into the skin once a week (Patient taking differently: Inject 1 mg into the skin once a week SUNDAY) 6 pen 3     No current facility-administered medications for this visit.        Allergies   Allergen Reactions    Keflex [Cephalexin]     Percocet [Oxycodone-Acetaminophen]      \"tummy hurts\"    Ceftin [Cefuroxime] Rash       Social History     Socioeconomic History    Marital status:      Spouse name: None    Number of children: None    Years of education: None    Highest education level: None   Occupational History     Employer: [employer]   Tobacco Use    Smoking status: Never Smoker    Smokeless tobacco: Never Used   Substance and Sexual Activity    Alcohol use: Never    Drug use: Never    Sexual activity: None   Other Topics Concern    None   Social History Narrative    None     Social Determinants of Health     Financial Resource Strain: Low Risk     Difficulty of Paying Living Expenses: Not hard at all   Food Insecurity: No Food Insecurity    Worried About Running Out of Food in the Last Year: Never true    [address] in the Last Year: Never true   Transportation Needs:     Lack of Transportation (Medical):  Lack of Transportation (Non-Medical):    Physical Activity:     Days of Exercise per Week:     Minutes of Exercise per Session:    Stress:     Feeling of Stress :    Social Connections:     Frequency of Communication with Friends and Family:     Frequency of Social Gatherings with Friends and Family:     Attends Orthodoxy Services:     Active Member of Clubs or Organizations:     Attends Club or Organization Meetings:     Marital Status:    Intimate Partner Violence:     Fear of Current or Ex-Partner:     Emotionally Abused:     Physically Abused:     Sexually Abused:        Family History   Problem Relation Age of Onset    Diabetes Mother     Diabetes Father           Review of Systems   Constitutional: Positive for fatigue. Negative for activity change, appetite change, diaphoresis and fever. HENT: Negative for congestion, ear pain, hearing loss, nosebleeds, rhinorrhea, sinus pressure and sore throat.     Eyes: Negative for pain, redness, itching and visual disturbance. Respiratory: Negative for apnea, cough, chest tightness, shortness of breath and wheezing. Cardiovascular: Negative for chest pain, palpitations and leg swelling. Gastrointestinal: Negative for abdominal pain, blood in stool, constipation, diarrhea, nausea and vomiting. Endocrine: Negative. Genitourinary: Negative for decreased urine volume, difficulty urinating, dysuria, frequency, hematuria and urgency. Musculoskeletal: Positive for back pain and myalgias. Negative for arthralgias, gait problem and neck pain. Skin: Positive for rash. Negative for color change. Allergic/Immunologic: Negative for environmental allergies and food allergies. Neurological: Negative for dizziness, weakness, light-headedness, numbness and headaches. Hematological: Negative for adenopathy. Does not bruise/bleed easily. Psychiatric/Behavioral: Negative for behavioral problems, dysphoric mood and sleep disturbance. The patient is not nervous/anxious and is not hyperactive. All other systems reviewed and are negative. /74 (Site: Left Upper Arm, Position: Sitting)   Pulse 96   Temp 97.9 °F (36.6 °C) (Temporal)   Wt 259 lb 6.4 oz (117.7 kg)   SpO2 96%   BMI 44.53 kg/m²     Physical Exam  Vitals and nursing note reviewed. Constitutional:       General: She is not in acute distress. Appearance: Normal appearance. She is well-developed. HENT:      Head: Normocephalic and atraumatic. Right Ear: Hearing, tympanic membrane and external ear normal. No tenderness. No middle ear effusion. Left Ear: Hearing, tympanic membrane and external ear normal. No tenderness. No middle ear effusion. Nose: Nose normal. No congestion or rhinorrhea. Right Turbinates: Not enlarged. Left Turbinates: Not enlarged. Mouth/Throat:      Mouth: Mucous membranes are moist.      Tongue: No lesions. Pharynx: Oropharynx is clear.  No oropharyngeal exudate or posterior oropharyngeal erythema. Eyes:      General: No scleral icterus. Conjunctiva/sclera: Conjunctivae normal.      Pupils: Pupils are equal, round, and reactive to light. Neck:      Thyroid: No thyromegaly. Cardiovascular:      Rate and Rhythm: Normal rate and regular rhythm. Heart sounds: Normal heart sounds. No murmur heard. Pulmonary:      Effort: Pulmonary effort is normal. No respiratory distress. Breath sounds: Normal breath sounds. No wheezing or rales. Abdominal:      General: Bowel sounds are normal. There is no distension. Palpations: Abdomen is soft. Tenderness: There is no abdominal tenderness. Musculoskeletal:         General: No tenderness. Normal range of motion. Cervical back: Normal range of motion and neck supple. No rigidity. No muscular tenderness. Lymphadenopathy:      Cervical: No cervical adenopathy. Skin:     General: Skin is warm and dry. Findings: No erythema or rash. Neurological:      General: No focal deficit present. Mental Status: She is alert and oriented to person, place, and time. Cranial Nerves: No cranial nerve deficit. Deep Tendon Reflexes: Reflexes are normal and symmetric. Reflexes normal.   Psychiatric:         Mood and Affect: Mood normal.                                 ASSESSMENT/PLAN:    Patient Active Problem List   Diagnosis    Type 2 diabetes mellitus without complication, without long-term current use of insulin (HCC)    Acquired hypothyroidism    Peripheral neuropathy    Carpal tunnel syndrome of left wrist    Spinal stenosis of lumbar region with neurogenic claudication    Acute exacerbation of chronic low back pain    Hyperlipidemia    Spondylolisthesis of lumbar region    Lumbar radiculopathy    Postoperative hypotension    Postoperative anemia due to acute blood loss    ALTA (acute kidney injury) ([facility] [address]       Satya Sanchez was seen today for diabetes, hypothyroidism and hyperlipidemia.     Diagnoses and all orders for this visit:    Type 2 diabetes mellitus without complication, without long-term current use of insulin (HCC)  -     CBC; Future  -     Comprehensive Metabolic Panel; Future  -     Hemoglobin A1C; Future  -     Lipid Panel; Future  -     TSH without Reflex; Future  -     Microalbumin / Creatinine Urine Ratio; Future    Acquired hypothyroidism  -     TSH without Reflex; Future    Peripheral polyneuropathy    Other hyperlipidemia  -     Comprehensive Metabolic Panel; Future  -     Lipid Panel; Future          Return in about 6 months (around 1/15/2022) for Follow up DM, Follow up Lipids, Follow up HTN, Recheck labs, Recheck Meds. I spent 30 minutes with this patient. I spent greater than 50% of the time counseling this patient.         Luis Armando Monterroso,   7/15/2021  1:43 PM

## 2021-07-26 ENCOUNTER — OFFICE VISIT (OUTPATIENT)
Dept: PHYSICAL MEDICINE AND REHAB | Age: 61
End: 2021-07-26
Payer: COMMERCIAL

## 2021-07-26 VITALS
BODY MASS INDEX: 44.22 KG/M2 | WEIGHT: 259 LBS | HEIGHT: 64 IN | RESPIRATION RATE: 18 BRPM | SYSTOLIC BLOOD PRESSURE: 132 MMHG | DIASTOLIC BLOOD PRESSURE: 86 MMHG

## 2021-07-26 DIAGNOSIS — Z98.1 S/P LUMBAR FUSION: ICD-10-CM

## 2021-07-26 DIAGNOSIS — M54.17 RADICULOPATHY, LUMBOSACRAL REGION: ICD-10-CM

## 2021-07-26 DIAGNOSIS — M48.062 SPINAL STENOSIS, LUMBAR REGION WITH NEUROGENIC CLAUDICATION: Primary | ICD-10-CM

## 2021-07-26 DIAGNOSIS — M54.16 LUMBAR RADICULOPATHY: ICD-10-CM

## 2021-07-26 DIAGNOSIS — M25.511 RIGHT SHOULDER PAIN, UNSPECIFIED CHRONICITY: ICD-10-CM

## 2021-07-26 DIAGNOSIS — M25.611 SHOULDER STIFFNESS, RIGHT: ICD-10-CM

## 2021-07-26 PROCEDURE — 99214 OFFICE O/P EST MOD 30 MIN: CPT | Performed by: PHYSICAL MEDICINE & REHABILITATION

## 2021-07-26 RX ORDER — CYCLOBENZAPRINE HCL 10 MG
10 TABLET ORAL 3 TIMES DAILY PRN
Qty: 30 TABLET | Refills: 0 | Status: SHIPPED
Start: 2021-07-26 | End: 2021-08-04 | Stop reason: SDUPTHER

## 2021-07-26 RX ORDER — OXYCODONE HYDROCHLORIDE 10 MG/1
10 TABLET ORAL EVERY 6 HOURS PRN
Qty: 42 TABLET | Refills: 0 | Status: SHIPPED
Start: 2021-07-26 | End: 2021-08-04 | Stop reason: SDUPTHER

## 2021-07-26 RX ORDER — GABAPENTIN 400 MG/1
800 CAPSULE ORAL 3 TIMES DAILY
Qty: 180 CAPSULE | Refills: 2 | Status: SHIPPED
Start: 2021-07-26 | End: 2021-10-13

## 2021-07-26 NOTE — TELEPHONE ENCOUNTER
Pt called in requesting refill on Oxycodone 10mg 1 tab every 6 hr PRN  And Cyclobenzaprine 10mg 1 tab 3x day PRN  To go to [address] [place] [address]     Thanks

## 2021-07-26 NOTE — PROGRESS NOTES
Palomo Ellis M.D. [address] [facility] PHYSICAL MEDICINE AND REHABILITAION  [address]  [facility] [address]  Dept: [phone]  Dept Fax: [phone]    PCP: Nayeli Troy DO  Date of visit: 7/26/21      Chief Complaint   Patient presents with    Follow-up     Low back pain, had surgery in May. Would like to discuss gabapentin, she is still taking narcotics from surgon and flexeril          Donna Linn is a 62 y.o. woman who presents for follow up of low back pain. She had reported gradual onset of low back pain after no known injury years ago. She reports back and leg pain was worse since December 2020. Since last visit patient saw Brenda Bassett and underwent L3-L5 PLIF on 5/25/2021. She states that since surgery the radiating left lower extremity pain has improved significantly. She reports that she is still having some back pain and significant \"nerve pain\" in the right lower extremity. She denies any new or worsening weakness in the legs, she feels leg weakness is about the same as it was prior to surgery. No incontinence. She states she just started PT last week. She is still taking PRN oxycodone postoperatively. She states she is trying to wean off of the oxycodone. She followed up with NSGY and completed a medrol dose pack earlier this month, which did help. She is taking gabapentin 600mg TID with partial relief. She states that her right shoulder has been somewhat stiff since her surgery and it hurts to lift her arm overhead. No upper extremity numbness/tingling, denies focal weakness. Now, the pain is constant. The pain is rated Pain Score:   6, is described as \"painful, burning\", and is located all across the low back with radiation to the right lower extremity. She previously had pain radiating primarily to the LLE, but states this has improved since surgery. She endorses tingling/burning in the right lower leg and foot.  She reports weakness in both legs that is unchanged since prior to surgery. She did not report significant spasms today. She denies falls since surgery. She has used a cane to ambulate since December 2020. She denies bowel/bladder incontinence or saddle anesthesia. The prior workup has included:   -Lumbar MRI 3/27/2021:  FINDINGS:   BONES/ALIGNMENT: There is normal alignment of the spine. The vertebral body   heights are maintained. The bone marrow signal appears unremarkable.       SPINAL CORD: The conus terminates normally.       SOFT TISSUES: No paraspinal mass identified.       L1-L2: There is no significant disc herniation, spinal canal stenosis or   neural foraminal narrowing.       L2-L3: Disc bulge causes mild right foraminal stenosis.       L3-L4: Disc bulge with superimposed right foraminal disc protrusion causes   mild thecal sac, moderate right foraminal and mild left foraminal stenosis.       L4-L5: Large central cranially migrated disc extrusion measuring 25 x 12 x 13   mm (craniocaudal by anteroposterior by transverse) causes severe spinal canal   stenosis and may compress the cauda equina.  This is slightly worse on the   left side.  A disc bulge also causes mild bilateral foraminal stenosis.       L5-S1: Disc bulge and facet hypertrophy without stenosis.         Impression   L4-L5 disc extrusion causing severe spinal canal stenosis worse on the left   side. Prior lumbar MRI 2019 [facility] -- not available to review at time of office visit, will attempt to obtain      The prior treatment has included:  PT: PT x 2 courses without relief prior to surgery. Currently just started PT post operatively. Modalities: Heat with partial relief. Thinks she used a TENS in the past, unsure of relief. Topicals: \"hot and cold patches\" over the counter with minimal relief.    OTC Tylenol: Takes with minimal relief   NSAIDS: Tried mobic and diclofenac without relief   Opioids: Tried Norco for acute exacerbation, did not like how it made her feel, no longer taking. Now still on Oxycodone post operatively  Membrane stabilizers: Taking gabapentin 600mg TID with partial relief   Muscle relaxers: tizanidine - states helps her relax, but not with pain. Now taking flexeril post operatively, Rx'd by NSKATARINA  Previous injections: Multiple epidurals at [facility], most recent on 2/18/2021. She reports set of 3 epidurals helped a year ago, but the most recent set of epidurals did not help with most recent exacerbation of pain prior to surgery.    Previous surgery at this site: L3-L5 PLIF on  5/25/2021        Past Medical History:   Diagnosis Date    Diabetes mellitus ([facility] [address]     Hyperlipidemia     Hypothyroidism     Obesity        Past Surgical History:   Procedure Laterality Date    CHOLECYSTECTOMY      HYSTERECTOMY, TOTAL ABDOMINAL      INCONTINENCE SURGERY      BLADDER SLING    KNEE SURGERY Left     LUMBAR SPINE SURGERY N/A 5/25/2021    L3- L5 DECOMPRESSION AND FUSION , L4- L5 TRANSFORAMINAL LUMBAR INTERBODY FUSION --OARM, PATY TABLE, AUDIOLOGY, PLATES ,SCREWS, --NUVASIVE performed by Irais Martinez MD at [facility] History     Socioeconomic History    Marital status:      Spouse name: Not on file    Number of children: Not on file    Years of education: Not on file    Highest education level: Not on file   Occupational History     Employer: [employer]   Tobacco Use    Smoking status: Never Smoker    Smokeless tobacco: Never Used   Substance and Sexual Activity    Alcohol use: Never    Drug use: Never    Sexual activity: Not on file   Other Topics Concern    Not on file   Social History Narrative    Not on file     Social Determinants of Health     Financial Resource Strain: Low Risk     Difficulty of Paying Living Expenses: Not hard at all   Food Insecurity: No Food Insecurity    Worried About Running Out of Food in the Last Year: Never true   [employer] of Food in the Last Year: Never true   Transportation Needs:     Lack of Transportation (Medical):  Lack of Transportation (Non-Medical):    Physical Activity:     Days of Exercise per Week:     Minutes of Exercise per Session:    Stress:     Feeling of Stress :    Social Connections:     Frequency of Communication with Friends and Family:     Frequency of Social Gatherings with Friends and Family:     Attends Quaker Services:     Active Member of Clubs or Organizations:     Attends Club or Organization Meetings:     Marital Status:    Intimate Partner Violence:     Fear of Current or Ex-Partner:     Emotionally Abused:     Physically Abused:     Sexually Abused:           Family History   Problem Relation Age of Onset    Diabetes Mother     Diabetes Father        Allergies   Allergen Reactions    Keflex [Cephalexin]     Percocet [Oxycodone-Acetaminophen]      \"tummy hurts\"    Ceftin [Cefuroxime] Rash       Current Outpatient Medications   Medication Sig Dispense Refill    oxyCODONE HCl (OXY-IR) 10 MG immediate release tablet Take 1 tablet by mouth every 6 hours as needed for Pain for up to 7 days.  Intended supply: 30 days 42 tablet 0    cyclobenzaprine (FLEXERIL) 10 MG tablet Take 1 tablet by mouth 3 times daily as needed for Muscle spasms 30 tablet 0    gemfibrozil (LOPID) 600 MG tablet TAKE 1 TABLET BY MOUTH EVERY 12 HOURS 180 tablet 3    metFORMIN (GLUCOPHAGE) 500 MG tablet TAKE 1 TABLET BY MOUTH EVERY DAY WITH BREAKFAST 90 tablet 3    omeprazole (PRILOSEC) 20 MG delayed release capsule TAKE 1 CAPSULE BY MOUTH EVERY DAY 90 capsule 3    omega-3 acid ethyl esters (LOVAZA) 1 g capsule TAKE 1 CAPSULE BY MOUTH EVERY DAY 90 capsule 3    vitamin D (ERGOCALCIFEROL) 1.25 MG (31979 UT) CAPS capsule TAKE 1 CAPSULE BY MOUTH ONE TIME PER WEEK 12 capsule 0    LINZESS 145 MCG capsule TAKE 1 CAPSULE BY MOUTH EVERY DAY IN THE MORNING BEFORE BREAKFAST 90 capsule 3    valsartan (DIOVAN) 80 MG tablet TAKE 1 TABLET BY MOUTH EVERY DAY 90 tablet 1    lidocaine (LIDODERM) 5 % PLACE 1 PATCH ONTO THE SKIN DAILY 12 HOURS ON, 12 HOURS OFF.  vitamin B-12 (CYANOCOBALAMIN) 100 MCG tablet Take 50 mcg by mouth daily      gabapentin (NEURONTIN) 300 MG capsule Take 2 capsules by mouth 3 times daily for 90 days. 180 capsule 2    polyethylene glycol (GLYCOLAX) 17 g packet Take 17 g by mouth daily as needed      Handicap Placard MISC by Does not apply route Patient cannot walk 200 ft without stopping to rest.    Expiration 1 yr 1 each 0    Thiamine HCl (B-1) 100 MG TABS Take 1 pill daily 90 tablet 1    pravastatin (PRAVACHOL) 10 MG tablet Take 1 tablet by mouth every other day 45 tablet 1    levothyroxine (SYNTHROID) 150 MCG tablet Take 1 tablet by mouth Daily 90 tablet 1    OZEMPIC, 1 MG/DOSE, 2 MG/1.5ML SOPN Inject 1 mg into the skin once a week (Patient taking differently: Inject 1 mg into the skin once a week SUNDAY) 6 pen 3     No current facility-administered medications for this visit. Review of Systems  General: No chills, fatigue, fever, malaise, night sweats, weight gain,  weight loss. Psychological: No anxiety, depression, suicidal ideation   Ophthalmic: No blurry vision, decreased vision, double vision, loss of vision  Ear Nose Throat: No hearing loss, tinnitus, phonophobia, sensitivity to smells, vertigo, or vocal changes. Allergy/Immunology: No watery eyes, itchy eyes, frequent infections. Hematological and Lymphatic: No bleeding problems, blood clots, bruising  Endocrine:  No polydypsia, polyuria, temperature intolerance. Respiratory: No cough, shortness of breath, wheezing. Cardiovascular: No syncope, chest pain, dyspnea on exertion,palpitations.    Gastrointestinal: No abdominal pain, hematemesis, melena, nausea, vomiting, stool incontinence  Genito-Urinary: No dysuria, hematuria, incontinence   Musculoskeletal: Per HPI  Neurological: Per HPI  Dermatological:  No rash      Physical Exam:   Vitals:    07/26/21 1000   BP: 132/86   Resp: 18     General: The patient is in no apparent distress. Body habitus is morbidly obese  HEENT: No rhinorrhea, sneezing, yawning, or lacrimation. No scleral icterus or conjunctival injection. SKIN: No piloerection. No track marks. No rash. Normal turgor. No erythema or ecchymosis. Psychological: Mood and affect are appropriate. Hygiene is appropriate. Cardiovascular:  Heart is regular rate. Peripheral pulses are 2+ and symmetric. Respiratory: Respirations are regular and unlabored. There is no cyanosis. Gastrointestinal: No abdominal distension   Genitourinary: No costovertebral angle tenderness. MSK: Lumbar:  Thoracic kyphosis increased and lumbar lordosis decreased. Pelvis level. There is no step off deformity. No superficial or bony tenderness. Lumbar AROM NT today, post op. There is tenderness to palpation over the bilateral lumbar paraspinals. No tenderness to palpation at bilateral SIJ, gluteal muscles, PSIS, ischial tuberosity, greater trochanter, ASIS, iliac crest.  There is mild spasm of the bilateral lumbar paraspinals. No edema, erythema, ecchymosis,  mass or deformity. Hip: Full painless AROM bilateral hip. Right shoulder:  No significant tenderness. No deformity. Active abduction to 85-90 degrees which was limited by discomfort, passive flexion/abduction to 170 degrees. No specific impingement signs, pt states it feels stiff in general. Cervical AROM intact. Negative Spurling's. Neurologic: Awake, alert and oriented in three planes. Speech is fluent. No facial weakness. Hearing is intact for conversation. Pupils are equal and round. Extraocular muscles are intact. Shoulder shrug symmetric. Strength:   R  L  Hip Flex  4-  4-  Knee Ext  4  4  Ankle dorsi  5  4  EHL   5 4  Ankle Plantar  5  5    Right shoulder abduction 2-3/5, limited by pain. RUE otherwise 5/5.      Sensory:  Intact for light touch in

## 2021-08-02 ENCOUNTER — OFFICE VISIT (OUTPATIENT)
Dept: NEUROSURGERY | Age: 61
End: 2021-08-02
Payer: COMMERCIAL

## 2021-08-02 VITALS
TEMPERATURE: 98 F | HEIGHT: 64 IN | DIASTOLIC BLOOD PRESSURE: 69 MMHG | WEIGHT: 259 LBS | SYSTOLIC BLOOD PRESSURE: 112 MMHG | RESPIRATION RATE: 18 BRPM | HEART RATE: 75 BPM | OXYGEN SATURATION: 98 % | BODY MASS INDEX: 44.22 KG/M2

## 2021-08-02 DIAGNOSIS — Z98.1 S/P LUMBAR FUSION: Primary | ICD-10-CM

## 2021-08-02 PROCEDURE — 99024 POSTOP FOLLOW-UP VISIT: CPT | Performed by: PHYSICIAN ASSISTANT

## 2021-08-02 NOTE — PROGRESS NOTES
Suture Removal    Patient c/o back pain. No changes since last visit. Some difficulty sleeping and laying flat. C/o continued right LE n/t. Denies incisional drainage or redness. No fevers, chills, or night sweats. Three sutures remained for continued wound healing. Physical Exam   Awake and alert   Non-labored breathing   FC x 4 ext   Incision c/d/i    A/P: Patient is s/p L3-L5 fusion on 5/25/21. Stable.    -Sutures removed utilizing sterile technique--no complications  -Continue current care and restrictions  -Routine f/u schedule for 8/20/21

## 2021-08-02 NOTE — PATIENT INSTRUCTIONS

## 2021-08-04 ENCOUNTER — TELEPHONE (OUTPATIENT)
Dept: NEUROSURGERY | Age: 61
End: 2021-08-04

## 2021-08-04 DIAGNOSIS — M54.16 LUMBAR RADICULOPATHY: ICD-10-CM

## 2021-08-04 RX ORDER — OXYCODONE HYDROCHLORIDE 10 MG/1
10 TABLET ORAL EVERY 6 HOURS PRN
Qty: 42 TABLET | Refills: 0 | Status: SHIPPED
Start: 2021-08-04 | End: 2021-08-12 | Stop reason: SDUPTHER

## 2021-08-04 RX ORDER — CYCLOBENZAPRINE HCL 10 MG
10 TABLET ORAL 3 TIMES DAILY PRN
Qty: 30 TABLET | Refills: 0 | Status: SHIPPED
Start: 2021-08-04 | End: 2021-08-12 | Stop reason: SDUPTHER

## 2021-08-04 NOTE — TELEPHONE ENCOUNTER
Pt requesting refills of Oxycodone 10mg and flexeril sent to [facility] in [place]      Electronically signed by Conine Iraheta on 8/4/21 at 10:02 AM EDT

## 2021-08-09 ENCOUNTER — TELEPHONE (OUTPATIENT)
Dept: NEUROSURGERY | Age: 61
End: 2021-08-09

## 2021-08-09 DIAGNOSIS — Z98.1 S/P LUMBAR FUSION: Primary | ICD-10-CM

## 2021-08-12 ENCOUNTER — TELEPHONE (OUTPATIENT)
Dept: NEUROSURGERY | Age: 61
End: 2021-08-12

## 2021-08-12 DIAGNOSIS — M54.16 LUMBAR RADICULOPATHY: ICD-10-CM

## 2021-08-12 RX ORDER — CYCLOBENZAPRINE HCL 10 MG
10 TABLET ORAL 3 TIMES DAILY PRN
Qty: 30 TABLET | Refills: 0 | Status: SHIPPED | OUTPATIENT
Start: 2021-08-12 | End: 2021-08-22

## 2021-08-12 RX ORDER — OXYCODONE HYDROCHLORIDE 10 MG/1
10 TABLET ORAL EVERY 6 HOURS PRN
Qty: 42 TABLET | Refills: 0 | Status: SHIPPED | OUTPATIENT
Start: 2021-08-12 | End: 2021-08-19

## 2021-08-13 ENCOUNTER — HOSPITAL ENCOUNTER (OUTPATIENT)
Dept: GENERAL RADIOLOGY | Age: 61
Discharge: HOME | End: 2021-08-15
Payer: COMMERCIAL

## 2021-08-13 ENCOUNTER — HOSPITAL ENCOUNTER (OUTPATIENT)
Age: 61
Discharge: HOME | End: 2021-08-15
Payer: COMMERCIAL

## 2021-08-13 ENCOUNTER — OFFICE VISIT (OUTPATIENT)
Dept: NEUROSURGERY | Age: 61
End: 2021-08-13
Payer: COMMERCIAL

## 2021-08-13 VITALS
TEMPERATURE: 98.1 F | SYSTOLIC BLOOD PRESSURE: 131 MMHG | HEIGHT: 64 IN | WEIGHT: 259 LBS | OXYGEN SATURATION: 96 % | BODY MASS INDEX: 44.22 KG/M2 | RESPIRATION RATE: 18 BRPM | HEART RATE: 71 BPM | DIASTOLIC BLOOD PRESSURE: 70 MMHG

## 2021-08-13 DIAGNOSIS — M54.16 LUMBAR RADICULOPATHY: Primary | ICD-10-CM

## 2021-08-13 DIAGNOSIS — Z98.1 S/P LUMBAR FUSION: ICD-10-CM

## 2021-08-13 PROCEDURE — 72100 X-RAY EXAM L-S SPINE 2/3 VWS: CPT

## 2021-08-13 PROCEDURE — 99024 POSTOP FOLLOW-UP VISIT: CPT | Performed by: PHYSICIAN ASSISTANT

## 2021-08-13 RX ORDER — LEVOTHYROXINE SODIUM 0.15 MG/1
TABLET ORAL
Qty: 90 TABLET | Refills: 1 | Status: SHIPPED
Start: 2021-08-13 | End: 2022-01-18 | Stop reason: SDUPTHER

## 2021-08-13 NOTE — LETTER
[address] [place] [address] [address] [place] [place] [address]  Phone: [phone]  Fax: [phone]    Federico Myrick        August 13, 2021     Patient: Arely Robertson   YOB: 1960   Date of Visit: 8/13/2021       To Whom It May Concern: It is my medical opinion that Travis Strong may be off work for another 6 weeks. She may return to work full duty without restrictions starting 9/27/21. If you have any questions or concerns, please don't hesitate to call.     Sincerely,        Sylvia Groves PA-C

## 2021-08-13 NOTE — PROGRESS NOTES
Post Operative Follow-up     This is a 62year old female who presents to the office for a 3 month follow-up s/p L3-L5 PLIF     Subjective: Patient states she has been doing [place]. \" Pain has improved compared to before surgery. Continues to have issues ambulating. Denies new pain, weakness, numbness, or tingling. Lumbar x-rays reviewed.      Physical Exam:              WDWN, no apparent distress              Non-labored breathing               Vitals Stable              Alert and oriented x3              CN 3-12 intact              PERRL              EOMI              ORTIZ well              Motor strength symmetric              Sensation to LT intact bilaterally   Incision healed well with no signs of infection                 Imagin21 lumbar x-rays: Stable alignment, stable fusion. Final report pending.      Assessment: This is a 62 y.o.  female presenting for a 3 month follow-up s/p L3-L5 PLIF. Stable.      Plan:  -Pain control and expectations discussed  -Pt does not feel she can return to work at this time. Will continue to be off work for 6 weeks until PT completed then will return full duty no restrictions. Letter written.  -Script for PT given to patient  -OARRS report reviewed  -Follow-up in neurosurgery clinic in 9 months for 1 year follow up with repeat lumbar x-rays  -Call or return to neurosurgery office sooner if symptoms worsen or if new issues arise in the interim.     Electronically signed by Leighton Sun PA-C on 2021 at 2:47 PM

## 2021-08-18 ENCOUNTER — OFFICE VISIT (OUTPATIENT)
Dept: FAMILY MEDICINE CLINIC | Age: 61
End: 2021-08-18
Payer: COMMERCIAL

## 2021-08-18 VITALS
TEMPERATURE: 97.3 F | WEIGHT: 259 LBS | SYSTOLIC BLOOD PRESSURE: 126 MMHG | BODY MASS INDEX: 44.22 KG/M2 | DIASTOLIC BLOOD PRESSURE: 82 MMHG | OXYGEN SATURATION: 99 % | HEART RATE: 55 BPM | RESPIRATION RATE: 16 BRPM | HEIGHT: 64 IN

## 2021-08-18 DIAGNOSIS — R39.15 URGENCY OF URINATION: ICD-10-CM

## 2021-08-18 DIAGNOSIS — R35.0 URINARY FREQUENCY: Primary | ICD-10-CM

## 2021-08-18 LAB
BILIRUBIN, POC: NEGATIVE
BLOOD URINE, POC: NEGATIVE
CLARITY, POC: CLEAR
COLOR, POC: YELLOW
GLUCOSE URINE, POC: NEGATIVE
KETONES, POC: NEGATIVE
LEUKOCYTE EST, POC: NEGATIVE
NITRITE, POC: NEGATIVE
PH, POC: 5.5
PROTEIN, POC: NEGATIVE
SPECIFIC GRAVITY, POC: 1.02
UROBILINOGEN, POC: 0.2

## 2021-08-18 PROCEDURE — 99203 OFFICE O/P NEW LOW 30 MIN: CPT | Performed by: PHYSICIAN ASSISTANT

## 2021-08-18 PROCEDURE — 81002 URINALYSIS NONAUTO W/O SCOPE: CPT | Performed by: PHYSICIAN ASSISTANT

## 2021-08-18 NOTE — PROGRESS NOTES
21  Brian Benavides : 1960 Sex: female  Age 62 y.o. Subjective:  Chief Complaint   Patient presents with    Urinary Frequency     since monday          HPI:   Brian Benavides , 62 y.o. female presents to express care for evaluation of urinary frequency. HPI  81-year-old female presents to express care for evaluation of possible urinary tract infection. The patient has had some increased urinary frequency and urgency but is not having any burning with urination. No side pain, back pain, flank pain. The patient is not currently on any antibiotics. The patient denies nausea, vomiting. ROS:   Unless otherwise stated in this report the patient's positive and negative responses for review of systems for constitutional, eyes, ENT, cardiovascular, respiratory, gastrointestinal, neurological, , musculoskeletal, and integument systems and related systems to the presenting problem are either stated in the history of present illness or were not pertinent or were negative for the symptoms and/or complaints related to the presenting medical problem. Positives and pertinent negatives as per HPI. All others reviewed and are negative.       PMH:     Past Medical History:   Diagnosis Date    [facility] mellitus ([facility] [address]     Hyperlipidemia     Hypothyroidism     Obesity        Past Surgical History:   Procedure Laterality Date    CHOLECYSTECTOMY      HYSTERECTOMY, TOTAL ABDOMINAL      INCONTINENCE SURGERY      BLADDER SLING    KNEE SURGERY Left     LUMBAR SPINE SURGERY N/A 2021    L3- L5 DECOMPRESSION AND FUSION , L4- L5 TRANSFORAMINAL LUMBAR INTERBODY FUSION --OARM, PATY TABLE, AUDIOLOGY, PLATES ,SCREWS, --NUVASIVE performed by Lissy Nascimento MD at [facility]       Family History   Problem Relation Age of Onset    Diabetes Mother     Diabetes Father        Medications:     Current Outpatient Medications:     levothyroxine (SYNTHROID) 150 MCG tablet, TAKE 1 TABLET BY MOUTH EVERY DAY, skin once a week (Patient taking differently: Inject 1 mg into the skin once a week SUNDAY), Disp: 6 pen, Rfl: 3    Allergies: Allergies   Allergen Reactions    Keflex [Cephalexin]     Percocet [Oxycodone-Acetaminophen]      \"tummy hurts\"    Ceftin [Cefuroxime] Rash       Social History:     Social History     Tobacco Use    Smoking status: Never Smoker    Smokeless tobacco: Never Used   Vaping Use    Vaping Use: Never used   Substance Use Topics    Alcohol use: Never    Drug use: Never       Patient lives at home. Physical Exam:     Vitals:    08/18/21 1410   BP: 126/82   Pulse: 55   Resp: 16   Temp: 97.3 °F (36.3 °C)   SpO2: 99%   Weight: 259 lb (117.5 kg)   Height: 5' 4\" (1.626 m)       Exam:  Physical Exam  Nurses note and vital signs reviewed and patient is not hypoxic. General: The patient appears well and in no apparent distress. Patient is resting comfortably on cart. Skin: Warm, dry, no pallor noted. There is no rash noted. Head: Normocephalic, atraumatic. Eye: Normal conjunctiva  Ears, Nose, Mouth, and Throat: Oral mucosa is moist  Cardiovascular: Regular Rate and Rhythm  Respiratory: Patient is in no distress, no accessory muscle use, lungs are clear to auscultation, no wheezing, crackles or rhonchi  Back: Non-tender, no CVA tenderness bilaterally to percussion. GI: Normal bowel sounds, no tenderness to palpation, no masses appreciated. No rebound, guarding, or rigidity noted.   Neurological: A&O x4, normal speech        Testing:     Results for orders placed or performed in visit on 08/18/21   POCT Urinalysis no Micro   Result Value Ref Range    Color, UA yellow     Clarity, UA clear     Glucose, UA POC negative     Bilirubin, UA negative     Ketones, UA negative     Spec Grav, UA 1.025     Blood, UA POC negative     pH, UA 5.5     Protein, UA POC negative     Urobilinogen, UA 0.2     Leukocytes, UA negative     Nitrite, UA negative            Medical Decision Making:     Vital signs reviewed    Past medical history reviewed. Allergies reviewed. Medications reviewed. Patient on arrival does not appear to be in any apparent distress or discomfort. The patient has been seen and evaluated. The patient does not appear to be toxic or lethargic. Urinalysis did not show any evidence of urinary tract infection. The patient had no evidence of blood. No nitrates or leukocytes. The patient has no other symptoms. We will set up a urine culture and contact patient with results. Add antibiotic if necessary. The patient is to return to express care or go directly to the emergency department should any of the signs or symptoms worsen. The patient is to followup with primary care physician in 2-3 days for repeat evaluation. The patient has no other questions or concerns at this time the patient will be discharged home. Clinical Impression:   Marlys Ghosh was seen today for urinary frequency. Diagnoses and all orders for this visit:    Urinary frequency  -     POCT Urinalysis no Micro  -     Culture, Urine; Future    Urgency of urination        The patient is to call for any concerns or return if any of the signs or symptoms worsen. The patient is to follow-up with PCP in the next 2-3 days for repeat evaluation repeat assessment or go directly to the emergency department.      SIGNATURE: Alberto Schneider III, PA-C

## 2021-08-20 RX ORDER — SULFAMETHOXAZOLE AND TRIMETHOPRIM 800; 160 MG/1; MG/1
1 TABLET ORAL 2 TIMES DAILY
Qty: 20 TABLET | Refills: 0 | Status: SHIPPED | OUTPATIENT
Start: 2021-08-20 | End: 2021-08-30

## 2021-08-23 ENCOUNTER — TELEPHONE (OUTPATIENT)
Dept: NEUROSURGERY | Age: 61
End: 2021-08-23

## 2021-08-23 ENCOUNTER — TELEPHONE (OUTPATIENT)
Dept: PRIMARY CARE CLINIC | Age: 61
End: 2021-08-23

## 2021-08-23 DIAGNOSIS — M54.16 LUMBAR RADICULOPATHY: Primary | ICD-10-CM

## 2021-08-23 RX ORDER — OXYCODONE HYDROCHLORIDE 5 MG/1
5 TABLET ORAL EVERY 6 HOURS PRN
Qty: 28 TABLET | Refills: 0 | Status: SHIPPED | OUTPATIENT
Start: 2021-08-23 | End: 2021-08-30

## 2021-08-23 RX ORDER — NITROFURANTOIN 25; 75 MG/1; MG/1
100 CAPSULE ORAL 2 TIMES DAILY
Qty: 20 CAPSULE | Refills: 0 | Status: SHIPPED | OUTPATIENT
Start: 2021-08-23 | End: 2021-09-02

## 2021-08-23 NOTE — TELEPHONE ENCOUNTER
She's past her 3 month appointment. We won't be refilling her pain meds anymore. We can refer her to pain management if needed.

## 2021-08-23 NOTE — TELEPHONE ENCOUNTER
Patient requesting one last refill; states that her 3 months is not up until Wednesday. Says she doesn't want to quit her pain medication cold turkey. Asking for 5 mg instead of 10 and she will call [facility] for pain management because she's been there before.

## 2021-08-23 NOTE — TELEPHONE ENCOUNTER
Pt c/o she came into express and started Bactrim for UTI but had dizziness and light headed x 2 day so she d/c med and asking if you can put her on a diff ATB

## 2021-08-27 ENCOUNTER — TELEPHONE (OUTPATIENT)
Dept: PRIMARY CARE CLINIC | Age: 61
End: 2021-08-27

## 2021-08-27 RX ORDER — CIPROFLOXACIN 500 MG/1
500 TABLET, FILM COATED ORAL 2 TIMES DAILY
Qty: 20 TABLET | Refills: 0 | Status: SHIPPED | OUTPATIENT
Start: 2021-08-27 | End: 2021-09-06

## 2021-08-27 NOTE — TELEPHONE ENCOUNTER
Pt calling stating you changed her to Rosam[address] on Mon after rxn to ATB and she feels her UTI sxs are not getting any better and asking for new ATB

## 2021-08-30 ENCOUNTER — OFFICE VISIT (OUTPATIENT)
Dept: PRIMARY CARE CLINIC | Age: 61
End: 2021-08-30
Payer: COMMERCIAL

## 2021-08-30 VITALS
HEART RATE: 68 BPM | OXYGEN SATURATION: 96 % | SYSTOLIC BLOOD PRESSURE: 126 MMHG | BODY MASS INDEX: 46.17 KG/M2 | TEMPERATURE: 97.8 F | DIASTOLIC BLOOD PRESSURE: 78 MMHG | WEIGHT: 269 LBS

## 2021-08-30 DIAGNOSIS — N39.0 URINARY TRACT INFECTION WITHOUT HEMATURIA, SITE UNSPECIFIED: ICD-10-CM

## 2021-08-30 DIAGNOSIS — R30.0 DYSURIA: Primary | ICD-10-CM

## 2021-08-30 DIAGNOSIS — M54.16 LUMBAR RADICULOPATHY: ICD-10-CM

## 2021-08-30 DIAGNOSIS — I87.2 VENOUS INSUFFICIENCY OF BOTH LOWER EXTREMITIES: ICD-10-CM

## 2021-08-30 DIAGNOSIS — E11.9 TYPE 2 DIABETES MELLITUS WITHOUT COMPLICATION, WITHOUT LONG-TERM CURRENT USE OF INSULIN (HCC): Chronic | ICD-10-CM

## 2021-08-30 DIAGNOSIS — M43.16 SPONDYLOLISTHESIS OF LUMBAR REGION: ICD-10-CM

## 2021-08-30 PROBLEM — N17.9 AKI (ACUTE KIDNEY INJURY) (HCC): Status: RESOLVED | Noted: 2021-05-26 | Resolved: 2021-08-30

## 2021-08-30 LAB
APPEARANCE FLUID: CLEAR
BILIRUBIN, POC: NEGATIVE
BLOOD URINE, POC: NEGATIVE
CLARITY, POC: CLEAR
COLOR, POC: YELLOW
GLUCOSE URINE, POC: NEGATIVE
KETONES, POC: NEGATIVE
LEUKOCYTE EST, POC: NEGATIVE
NITRITE, POC: NEGATIVE
PH, POC: 5.5
PROTEIN, POC: NEGATIVE
SPECIFIC GRAVITY, POC: 1.02
UROBILINOGEN, POC: 0.2

## 2021-08-30 PROCEDURE — 81002 URINALYSIS NONAUTO W/O SCOPE: CPT | Performed by: FAMILY MEDICINE

## 2021-08-30 PROCEDURE — 99214 OFFICE O/P EST MOD 30 MIN: CPT | Performed by: FAMILY MEDICINE

## 2021-08-30 RX ORDER — TORSEMIDE 10 MG/1
10 TABLET ORAL DAILY
Qty: 30 TABLET | Refills: 3 | Status: SHIPPED
Start: 2021-08-30 | End: 2021-12-16

## 2021-08-30 RX ORDER — UBIDECARENONE 75 MG
50 CAPSULE ORAL DAILY
Qty: 90 TABLET | Refills: 3 | Status: SHIPPED
Start: 2021-08-30 | End: 2022-01-18

## 2021-08-30 RX ORDER — PRAVASTATIN SODIUM 10 MG
10 TABLET ORAL EVERY OTHER DAY
Qty: 45 TABLET | Refills: 1 | Status: SHIPPED
Start: 2021-08-30 | End: 2022-02-24

## 2021-08-30 ASSESSMENT — ENCOUNTER SYMPTOMS
WHEEZING: 0
VOMITING: 0
APNEA: 0
CHEST TIGHTNESS: 0
EYE PAIN: 0
SHORTNESS OF BREATH: 0
SORE THROAT: 0
COUGH: 0
VISUAL CHANGE: 0
BLOOD IN STOOL: 0
ABDOMINAL PAIN: 0
BACK PAIN: 1
EYE REDNESS: 0
DIARRHEA: 0
CONSTIPATION: 0
COLOR CHANGE: 0
RHINORRHEA: 0
EYE ITCHING: 0
NAUSEA: 0
SINUS PRESSURE: 0

## 2021-08-30 NOTE — PROGRESS NOTES
Chief Complaint:     Chief Complaint   Patient presents with    Urinary Tract Infection     frequently going, urge to go    Back Pain         Urinary Tract Infection   This is a recurrent problem. The current episode started 1 to 4 weeks ago. The problem has been waxing and waning. The quality of the pain is described as burning and aching. The pain is mild. There has been no fever. She is not sexually active. There is no history of pyelonephritis. Associated symptoms include frequency. Pertinent negatives include no hematuria, nausea, urgency or vomiting. She has tried antibiotics and increased fluids for the symptoms. The treatment provided moderate relief. Back Pain  This is a recurrent problem. The current episode started more than 1 month ago. The problem occurs intermittently. The problem has been waxing and waning since onset. The pain is present in the lumbar spine. The quality of the pain is described as aching. The pain is moderate. Associated symptoms include leg pain. Pertinent negatives include no abdominal pain, chest pain, dysuria, fever, headaches, numbness or weakness. She has tried nothing for the symptoms. The treatment provided no relief. Leg Pain   The incident occurred more than 1 week ago. The incident occurred at home. There was no injury mechanism. The quality of the pain is described as aching. The pain is mild. The pain has been intermittent since onset. Pertinent negatives include no numbness. She reports no foreign bodies present. Nothing aggravates the symptoms. The treatment provided no relief. Diabetes  She presents for her follow-up diabetic visit. She has type 2 diabetes mellitus. The initial diagnosis of diabetes was made 1 month ago. Her disease course has been stable. There are no hypoglycemic associated symptoms. Pertinent negatives for hypoglycemia include no dizziness, headaches or nervousness/anxiousness. Associated symptoms include fatigue.  Pertinent negatives for hypothyroidism    Peripheral neuropathy    Carpal tunnel syndrome of left wrist    Spinal stenosis of lumbar region with neurogenic claudication    Acute exacerbation of chronic low back pain    Hyperlipidemia    Spondylolisthesis of lumbar region    Lumbar radiculopathy    Postoperative hypotension    Postoperative anemia due to acute blood loss    Venous insufficiency of both lower extremities       Past Medical History:   Diagnosis Date    Diabetes mellitus ([address]     Hyperlipidemia     Hypothyroidism     Obesity        Past Surgical History:   Procedure Laterality Date    CHOLECYSTECTOMY      HYSTERECTOMY, TOTAL ABDOMINAL      INCONTINENCE SURGERY      BLADDER SLING    KNEE SURGERY Left     LUMBAR SPINE SURGERY N/A 5/25/2021    L3- L5 DECOMPRESSION AND FUSION , L4- L5 TRANSFORAMINAL LUMBAR INTERBODY FUSION --OARM, PATY TABLE, AUDIOLOGY, PLATES ,SCREWS, --NUVASIVE performed by Shiela Armijo MD at [facility] OR       Current Outpatient Medications   Medication Sig Dispense Refill    torsemide (DEMADEX) 10 MG tablet Take 1 tablet by mouth daily 30 tablet 3    ciprofloxacin (CIPRO) 500 MG tablet Take 1 tablet by mouth 2 times daily for 10 days 20 tablet 0    nitrofurantoin, macrocrystal-monohydrate, (MACROBID) 100 MG capsule Take 1 capsule by mouth 2 times daily for 10 days 20 capsule 0    oxyCODONE (ROXICODONE) 5 MG immediate release tablet Take 1 tablet by mouth every 6 hours as needed for Pain for up to 7 days. Intended supply: 7 days. Take lowest dose possible to manage pain 28 tablet 0    sulfamethoxazole-trimethoprim (BACTRIM DS;SEPTRA DS) 800-160 MG per tablet Take 1 tablet by mouth 2 times daily for 10 days 20 tablet 0    levothyroxine (SYNTHROID) 150 MCG tablet TAKE 1 TABLET BY MOUTH EVERY DAY 90 tablet 1    gabapentin (NEURONTIN) 400 MG capsule Take 2 capsules by mouth 3 times daily for 90 days.  180 capsule 2    gemfibrozil (LOPID) 600 MG tablet TAKE 1 TABLET BY MOUTH EVERY 12 HOURS 180 tablet 3    metFORMIN (GLUCOPHAGE) 500 MG tablet TAKE 1 TABLET BY MOUTH EVERY DAY WITH BREAKFAST 90 tablet 3    omeprazole (PRILOSEC) 20 MG delayed release capsule TAKE 1 CAPSULE BY MOUTH EVERY DAY 90 capsule 3    omega-3 acid ethyl esters (LOVAZA) 1 g capsule TAKE 1 CAPSULE BY MOUTH EVERY DAY 90 capsule 3    vitamin D (ERGOCALCIFEROL) 1.25 MG (82457 UT) CAPS capsule TAKE 1 CAPSULE BY MOUTH ONE TIME PER WEEK 12 capsule 0    LINZESS 145 MCG capsule TAKE 1 CAPSULE BY MOUTH EVERY DAY IN THE MORNING BEFORE BREAKFAST 90 capsule 3    valsartan (DIOVAN) 80 MG tablet TAKE 1 TABLET BY MOUTH EVERY DAY 90 tablet 1    lidocaine (LIDODERM) 5 % PLACE 1 PATCH ONTO THE SKIN DAILY 12 HOURS ON, 12 HOURS OFF.  vitamin B-12 (CYANOCOBALAMIN) 100 MCG tablet Take 50 mcg by mouth daily      polyethylene glycol (GLYCOLAX) 17 g packet Take 17 g by mouth daily as needed      Handicap Placard MISC by Does not apply route Patient cannot walk 200 ft without stopping to rest.    Expiration 1 yr 1 each 0    Thiamine HCl (B-1) 100 MG TABS Take 1 pill daily 90 tablet 1    pravastatin (PRAVACHOL) 10 MG tablet Take 1 tablet by mouth every other day 45 tablet 1    OZEMPIC, 1 MG/DOSE, 2 MG/1.5ML SOPN Inject 1 mg into the skin once a week (Patient taking differently: Inject 1 mg into the skin once a week SUNDAY) 6 pen 3     No current facility-administered medications for this visit.        Allergies   Allergen Reactions    Bactrim [Sulfamethoxazole-Trimethoprim] Nausea Only    Keflex [Cephalexin]     Percocet [Oxycodone-Acetaminophen]      \"tummy hurts\"    Ceftin [Cefuroxime] Rash       Social History     Socioeconomic History    Marital status:      Spouse name: None    Number of children: None    Years of education: None    Highest education level: None   Occupational History     Employer: [employer]   Tobacco Use    Smoking status: Never Smoker    Smokeless tobacco: Never Used Vaping Use    Vaping Use: Never used   Substance and Sexual Activity    Alcohol use: Never    Drug use: Never    Sexual activity: None   Other Topics Concern    None   Social History Narrative    None     Social Determinants of Health     Financial Resource Strain: Low Risk     Difficulty of Paying Living Expenses: Not hard at all   Food Insecurity: No Food Insecurity    Worried About Running Out of Food in the Last Year: Never true    [address] in the Last Year: Never true   Transportation Needs:     Lack of Transportation (Medical):  Lack of Transportation (Non-Medical):    Physical Activity:     Days of Exercise per Week:     Minutes of Exercise per Session:    Stress:     Feeling of Stress :    Social Connections:     Frequency of Communication with Friends and Family:     Frequency of Social Gatherings with Friends and Family:     Attends Judaism Services:     Active Member of Clubs or Organizations:     Attends Club or Organization Meetings:     Marital Status:    Intimate Partner Violence:     Fear of Current or Ex-Partner:     Emotionally Abused:     Physically Abused:     Sexually Abused:        Family History   Problem Relation Age of Onset    Diabetes Mother     Diabetes Father           Review of Systems   Constitutional: Positive for fatigue. Negative for activity change, appetite change, diaphoresis and fever. HENT: Negative for congestion, ear pain, hearing loss, nosebleeds, rhinorrhea, sinus pressure and sore throat. Eyes: Negative for pain, redness, itching and visual disturbance. Respiratory: Negative for apnea, cough, chest tightness, shortness of breath and wheezing. Cardiovascular: Negative for chest pain, palpitations and leg swelling. Gastrointestinal: Negative for abdominal pain, blood in stool, constipation, diarrhea, nausea and vomiting. Endocrine: Negative. Genitourinary: Positive for frequency.  Negative for decreased urine volume, difficulty urinating, dysuria, hematuria and urgency. Musculoskeletal: Positive for back pain and myalgias. Negative for arthralgias, gait problem and neck pain. Skin: Positive for rash. Negative for color change. Allergic/Immunologic: Negative for environmental allergies and food allergies. Neurological: Negative for dizziness, weakness, light-headedness, numbness and headaches. Hematological: Negative for adenopathy. Does not bruise/bleed easily. Psychiatric/Behavioral: Negative for behavioral problems, dysphoric mood and sleep disturbance. The patient is not nervous/anxious and is not hyperactive. All other systems reviewed and are negative. /78   Pulse 68   Temp 97.8 °F (36.6 °C)   Wt 269 lb (122 kg)   SpO2 96%   BMI 46.17 kg/m²     Physical Exam  Vitals and nursing note reviewed. Constitutional:       General: She is not in acute distress. Appearance: Normal appearance. She is well-developed. HENT:      Head: Normocephalic and atraumatic. Right Ear: Hearing, tympanic membrane and external ear normal. No tenderness. No middle ear effusion. Left Ear: Hearing, tympanic membrane and external ear normal. No tenderness. No middle ear effusion. Nose: Nose normal. No congestion or rhinorrhea. Right Turbinates: Not enlarged. Left Turbinates: Not enlarged. Mouth/Throat:      Mouth: Mucous membranes are moist.      Tongue: No lesions. Pharynx: Oropharynx is clear. No oropharyngeal exudate or posterior oropharyngeal erythema. Eyes:      General: No scleral icterus. Conjunctiva/sclera: Conjunctivae normal.      Pupils: Pupils are equal, round, and reactive to light. Neck:      Thyroid: No thyromegaly. Cardiovascular:      Rate and Rhythm: Normal rate and regular rhythm. Heart sounds: Normal heart sounds. No murmur heard. Pulmonary:      Effort: Pulmonary effort is normal. No respiratory distress.       Breath sounds: Normal breath sounds. No wheezing or rales. Abdominal:      General: Bowel sounds are normal. There is no distension. Palpations: Abdomen is soft. Tenderness: There is no abdominal tenderness. Musculoskeletal:         General: Swelling present. No tenderness. Normal range of motion. Cervical back: Normal range of motion and neck supple. No rigidity. No muscular tenderness. Right lower leg: Edema present. Left lower leg: Edema present. Lymphadenopathy:      Cervical: No cervical adenopathy. Skin:     General: Skin is warm and dry. Findings: No erythema or rash. Neurological:      General: No focal deficit present. Mental Status: She is alert and oriented to person, place, and time. Cranial Nerves: No cranial nerve deficit. Deep Tendon Reflexes: Reflexes are normal and symmetric. Reflexes normal.   Psychiatric:         Mood and Affect: Mood normal.                                 ASSESSMENT/PLAN:    Patient Active Problem List   Diagnosis    Type 2 diabetes mellitus without complication, without long-term current use of insulin (HCC)    Acquired hypothyroidism    Peripheral neuropathy    Carpal tunnel syndrome of left wrist    Spinal stenosis of lumbar region with neurogenic claudication    Acute exacerbation of chronic low back pain    Hyperlipidemia    Spondylolisthesis of lumbar region    Lumbar radiculopathy    Postoperative hypotension    Postoperative anemia due to acute blood loss    Venous insufficiency of both lower extremities       Lio Peacockshauna was seen today for urinary tract infection and back pain. Diagnoses and all orders for this visit:    Dysuria  -     POCT Urinalysis no Micro  -     Culture, Urine; Future    Urinary tract infection without hematuria, site unspecified  -     Culture, Urine; Future  -     CBC Auto Differential; Future  -     Comprehensive Metabolic Panel;  Future    Type 2 diabetes mellitus without complication, without long-term current

## 2021-09-14 ENCOUNTER — HOSPITAL ENCOUNTER (OUTPATIENT)
Dept: GENERAL RADIOLOGY | Age: 61
Discharge: HOME | End: 2021-09-16
Payer: COMMERCIAL

## 2021-09-14 ENCOUNTER — HOSPITAL ENCOUNTER (OUTPATIENT)
Age: 61
Discharge: HOME | End: 2021-09-16
Payer: COMMERCIAL

## 2021-09-14 ENCOUNTER — OFFICE VISIT (OUTPATIENT)
Dept: NEUROSURGERY | Age: 61
End: 2021-09-14
Payer: COMMERCIAL

## 2021-09-14 VITALS
BODY MASS INDEX: 45.93 KG/M2 | RESPIRATION RATE: 18 BRPM | TEMPERATURE: 97.8 F | HEIGHT: 64 IN | SYSTOLIC BLOOD PRESSURE: 132 MMHG | DIASTOLIC BLOOD PRESSURE: 65 MMHG | HEART RATE: 49 BPM | OXYGEN SATURATION: 98 % | WEIGHT: 269 LBS

## 2021-09-14 DIAGNOSIS — Z98.1 S/P LUMBAR FUSION: ICD-10-CM

## 2021-09-14 DIAGNOSIS — M54.16 LUMBAR RADICULOPATHY: Primary | ICD-10-CM

## 2021-09-14 DIAGNOSIS — M54.16 LUMBAR RADICULOPATHY: ICD-10-CM

## 2021-09-14 PROCEDURE — 72100 X-RAY EXAM L-S SPINE 2/3 VWS: CPT

## 2021-09-14 PROCEDURE — 99213 OFFICE O/P EST LOW 20 MIN: CPT | Performed by: PHYSICIAN ASSISTANT

## 2021-09-14 NOTE — PROGRESS NOTES
Problem Focused Office Visit     Subjective: Fermin Long is a 62year old female who previously underwent L3-L4 fusion and L4-L5 TLIF on 21 by Dr. Yamileth Linares. She presents to the office today with continued right leg pain. Pt states her left leg pain has resolved, but developed shooting pain down her right leg after surgery. Also c/o right shoulder pain. She is currently participating in physical therapy and is following up with Dr. Yogesh Martin. Denies loss of bowel or bladder, saddle anesthesia, fever, chills, N/V, SOB, or chest pain.      Physical Exam:              WDWN, no apparent distress              Non-labored breathing               Vitals Stable              Alert and oriented x3              CN 3-12 intact              PERRL              Upper strength 5/5   RLE HF 4/5, KF/E 4/5, PF/DF 5/5   LLE 4/5 strength   Decreased ROM right shoulder              Sensation to LT intact bilaterally   Incision healed well with no signs of infection                 Imagin21 lumbar spine x-rays:      FINDINGS:   Posterior spinal fusion L3-L5 with normal alignment.  Degenerative disc   disease is present at multiple levels and is greatest at L5-S1 where it is   moderate to severe.  Normal soft tissues.          Assessment: This is a 62 y.o.  female presenting for evaluation of left leg pain s/p lumbar fusion     Plan:  -Continue PT. If symptoms continue or worsen, we will discuss additional imaging/procedures  -Referral to pain management  -OARRS report reviewed  -Follow-up in neurosurgery clinic as scheduled  -Call or return to neurosurgery office sooner if symptoms worsen or if new issues arise in the interim.     Electronically signed by Beatriz Floyd PA-C on 2021 at 1:40 PM

## 2021-09-16 ENCOUNTER — OFFICE VISIT (OUTPATIENT)
Dept: PRIMARY CARE CLINIC | Age: 61
End: 2021-09-16
Payer: COMMERCIAL

## 2021-09-16 VITALS
SYSTOLIC BLOOD PRESSURE: 136 MMHG | TEMPERATURE: 97.8 F | OXYGEN SATURATION: 96 % | HEART RATE: 91 BPM | DIASTOLIC BLOOD PRESSURE: 78 MMHG | WEIGHT: 268 LBS | BODY MASS INDEX: 46 KG/M2

## 2021-09-16 DIAGNOSIS — M75.41 IMPINGEMENT SYNDROME OF RIGHT SHOULDER: Primary | ICD-10-CM

## 2021-09-16 DIAGNOSIS — M54.16 LUMBAR RADICULOPATHY: ICD-10-CM

## 2021-09-16 DIAGNOSIS — G89.29 ACUTE EXACERBATION OF CHRONIC LOW BACK PAIN: ICD-10-CM

## 2021-09-16 DIAGNOSIS — E11.9 TYPE 2 DIABETES MELLITUS WITHOUT COMPLICATION, WITHOUT LONG-TERM CURRENT USE OF INSULIN (HCC): Chronic | ICD-10-CM

## 2021-09-16 DIAGNOSIS — M17.11 PRIMARY OSTEOARTHRITIS OF RIGHT KNEE: ICD-10-CM

## 2021-09-16 DIAGNOSIS — M48.062 SPINAL STENOSIS OF LUMBAR REGION WITH NEUROGENIC CLAUDICATION: ICD-10-CM

## 2021-09-16 DIAGNOSIS — M54.50 ACUTE EXACERBATION OF CHRONIC LOW BACK PAIN: ICD-10-CM

## 2021-09-16 PROCEDURE — 99214 OFFICE O/P EST MOD 30 MIN: CPT | Performed by: FAMILY MEDICINE

## 2021-09-16 RX ORDER — METHYLPREDNISOLONE 4 MG/1
TABLET ORAL
Qty: 21 TABLET | Refills: 0 | Status: SHIPPED | OUTPATIENT
Start: 2021-09-16 | End: 2021-09-22

## 2021-09-16 RX ORDER — OXYCODONE AND ACETAMINOPHEN 10; 325 MG/1; MG/1
1 TABLET ORAL EVERY 8 HOURS PRN
Qty: 21 TABLET | Refills: 0 | Status: SHIPPED
Start: 2021-09-16 | End: 2021-09-27 | Stop reason: SDUPTHER

## 2021-09-16 ASSESSMENT — ENCOUNTER SYMPTOMS
EYE ITCHING: 0
VOMITING: 0
SORE THROAT: 0
BLOOD IN STOOL: 0
VISUAL CHANGE: 0
SINUS PRESSURE: 0
CHEST TIGHTNESS: 0
EYE REDNESS: 0
COLOR CHANGE: 0
DIARRHEA: 0
COUGH: 0
NAUSEA: 0
WHEEZING: 0
SHORTNESS OF BREATH: 0
EYE PAIN: 0
RHINORRHEA: 0
BACK PAIN: 1
CONSTIPATION: 0
APNEA: 0
ABDOMINAL PAIN: 0

## 2021-09-16 NOTE — PROGRESS NOTES
Chief Complaint:     Chief Complaint   Patient presents with    Leg Pain     Right side.  Arm Pain    Shoulder Pain    Back Pain    Diabetes         Arm Pain   The incident occurred more than 1 week ago. The incident occurred at home. There was no injury mechanism. The pain is present in the right shoulder and upper right arm. The quality of the pain is described as aching and shooting. The pain is moderate. The pain has been constant since the incident. Associated symptoms include muscle weakness, numbness and tingling. Pertinent negatives include no chest pain. The symptoms are aggravated by movement, palpation and lifting. Shoulder Pain   The pain is present in the right shoulder and right arm. This is a recurrent problem. The current episode started more than 1 month ago. The problem occurs daily. The problem has been unchanged. The quality of the pain is described as aching and sharp. The pain is moderate. Associated symptoms include an inability to bear weight, a limited range of motion, numbness, stiffness and tingling. Pertinent negatives include no fever. The symptoms are aggravated by activity. The treatment provided no relief. Her past medical history is significant for diabetes. Back Pain  This is a recurrent problem. The current episode started more than 1 month ago. The problem occurs intermittently. The problem has been waxing and waning since onset. The pain is present in the lumbar spine. The quality of the pain is described as aching. The pain is moderate. Associated symptoms include leg pain, numbness and tingling. Pertinent negatives include no abdominal pain, chest pain, dysuria, fever, headaches or weakness. She has tried nothing for the symptoms. The treatment provided no relief. Diabetes  She presents for her follow-up diabetic visit. She has type 2 diabetes mellitus. The initial diagnosis of diabetes was made 1 month ago. Her disease course has been stable.  There are no hypoglycemic associated symptoms. Pertinent negatives for hypoglycemia include no dizziness, headaches or nervousness/anxiousness. Associated symptoms include fatigue. Pertinent negatives for diabetes include no chest pain, no visual change and no weakness. There are no hypoglycemic complications. Symptoms are stable. There are no diabetic complications. Risk factors for coronary artery disease include dyslipidemia and obesity. Current diabetic treatment includes oral agent (monotherapy). She is compliant with treatment all of the time. She is following a generally healthy diet. When asked about meal planning, she reported none. She has not had a previous visit with a dietitian. There is no change in her home blood glucose trend. An ACE inhibitor/angiotensin II receptor blocker is not being taken. She does not see a podiatrist.Eye exam is not current. Hyperlipidemia  This is a chronic problem. The current episode started more than 1 year ago. The problem is controlled. Recent lipid tests were reviewed and are normal. Exacerbating diseases include diabetes and obesity. Associated symptoms include leg pain and myalgias. Pertinent negatives include no chest pain or shortness of breath. Current antihyperlipidemic treatment includes statins. The current treatment provides significant improvement of lipids. Compliance problems include medication side effects. Risk factors for coronary artery disease include diabetes mellitus, dyslipidemia, obesity and post-menopausal.   Fatigue  This is a chronic problem. The problem occurs intermittently. The problem has been waxing and waning. Associated symptoms include fatigue, myalgias, numbness and a rash. Pertinent negatives include no abdominal pain, arthralgias, chest pain, congestion, coughing, diaphoresis, fever, headaches, nausea, neck pain, sore throat, visual change, vomiting or weakness. Nothing aggravates the symptoms. She has tried nothing for the symptoms.  The treatment provided no relief. Patient Active Problem List   Diagnosis    Type 2 diabetes mellitus without complication, without long-term current use of insulin ([address]    Acquired hypothyroidism    Peripheral neuropathy    Carpal tunnel syndrome of left wrist    Spinal stenosis of lumbar region with neurogenic claudication    Acute exacerbation of chronic low back pain    Hyperlipidemia    Spondylolisthesis of lumbar region    Lumbar radiculopathy    Postoperative hypotension    Postoperative anemia due to acute blood loss    Venous insufficiency of both lower extremities    Impingement syndrome of right shoulder       Past Medical History:   Diagnosis Date    Diabetes mellitus ([address]     Hyperlipidemia     Hypothyroidism     Obesity        Past Surgical History:   Procedure Laterality Date    CHOLECYSTECTOMY      HYSTERECTOMY, TOTAL ABDOMINAL      INCONTINENCE SURGERY      BLADDER SLING    KNEE SURGERY Left     LUMBAR SPINE SURGERY N/A 5/25/2021    L3- L5 DECOMPRESSION AND FUSION , L4- L5 TRANSFORAMINAL LUMBAR INTERBODY FUSION --OARM, PATY TABLE, AUDIOLOGY, PLATES ,SCREWS, --NUVASIVE performed by Irais Martinez MD at [facility]       Current Outpatient Medications   Medication Sig Dispense Refill    oxyCODONE-acetaminophen (PERCOCET)  MG per tablet Take 1 tablet by mouth every 8 hours as needed for Pain for up to 7 days. 21 tablet 0    methylPREDNISolone (MEDROL DOSEPACK) 4 MG tablet Take by mouth. 21 tablet 0    torsemide (DEMADEX) 10 MG tablet Take 1 tablet by mouth daily 30 tablet 3    pravastatin (PRAVACHOL) 10 MG tablet Take 1 tablet by mouth every other day 45 tablet 1    vitamin B-12 (CYANOCOBALAMIN) 100 MCG tablet Take 0.5 tablets by mouth daily 90 tablet 3    levothyroxine (SYNTHROID) 150 MCG tablet TAKE 1 TABLET BY MOUTH EVERY DAY 90 tablet 1    gabapentin (NEURONTIN) 400 MG capsule Take 2 capsules by mouth 3 times daily for 90 days.  180 capsule 2    gemfibrozil Social History Narrative    None     Social Determinants of Health     Financial Resource Strain: Low Risk     Difficulty of Paying Living Expenses: Not hard at all   Food Insecurity: No Food Insecurity    Worried About Running Out of Food in the Last Year: Never true    Stephen of Food in the Last Year: Never true   Transportation Needs:     Lack of Transportation (Medical):  Lack of Transportation (Non-Medical):    Physical Activity:     Days of Exercise per Week:     Minutes of Exercise per Session:    Stress:     Feeling of Stress :    Social Connections:     Frequency of Communication with Friends and Family:     Frequency of Social Gatherings with Friends and Family:     Attends Rastafarian Services:     Active Member of Clubs or Organizations:     Attends Club or Organization Meetings:     Marital Status:    Intimate Partner Violence:     Fear of Current or Ex-Partner:     Emotionally Abused:     Physically Abused:     Sexually Abused:        Family History   Problem Relation Age of Onset    Diabetes Mother     Diabetes Father           Review of Systems   Constitutional: Positive for fatigue. Negative for activity change, appetite change, diaphoresis and fever. HENT: Negative for congestion, ear pain, hearing loss, nosebleeds, rhinorrhea, sinus pressure and sore throat. Eyes: Negative for pain, redness, itching and visual disturbance. Respiratory: Negative for apnea, cough, chest tightness, shortness of breath and wheezing. Cardiovascular: Negative for chest pain, palpitations and leg swelling. Gastrointestinal: Negative for abdominal pain, blood in stool, constipation, diarrhea, nausea and vomiting. Endocrine: Negative. Genitourinary: Negative for decreased urine volume, difficulty urinating, dysuria, frequency, hematuria and urgency. Musculoskeletal: Positive for back pain, myalgias and stiffness. Negative for arthralgias, gait problem and neck pain.    Skin: Take 1 tablet by mouth every 8 hours as needed for Pain for up to 7 days. Type 2 diabetes mellitus without complication, without long-term current use of insulin (HCC)    Acute exacerbation of chronic low back pain    Other orders  -     methylPREDNISolone (MEDROL DOSEPACK) 4 MG tablet; Take by mouth. Still having a lot of debilitating pain  She is in no condition to return to work at this time  Will continue PT  See Pain Management  Tentative RTW 12/13/2021    Return in about 2 months (around 11/16/2021) for [address], [address] labs. I spent 30 minutes with this patient. I spent greater than 50% of the time counseling this patient.         Mart Push, DO  9/16/2021  3:49 PM

## 2021-09-23 ENCOUNTER — TELEPHONE (OUTPATIENT)
Dept: NEUROSURGERY | Age: 61
End: 2021-09-23

## 2021-09-27 ENCOUNTER — TELEPHONE (OUTPATIENT)
Dept: PRIMARY CARE CLINIC | Age: 61
End: 2021-09-27

## 2021-09-27 DIAGNOSIS — M54.16 LUMBAR RADICULOPATHY: ICD-10-CM

## 2021-09-27 DIAGNOSIS — M75.41 IMPINGEMENT SYNDROME OF RIGHT SHOULDER: ICD-10-CM

## 2021-09-27 DIAGNOSIS — M48.062 SPINAL STENOSIS OF LUMBAR REGION WITH NEUROGENIC CLAUDICATION: ICD-10-CM

## 2021-09-27 RX ORDER — OXYCODONE AND ACETAMINOPHEN 10; 325 MG/1; MG/1
1 TABLET ORAL EVERY 8 HOURS PRN
Qty: 21 TABLET | Refills: 0 | Status: SHIPPED
Start: 2021-09-27 | End: 2021-10-06

## 2021-09-27 RX ORDER — CYCLOBENZAPRINE HCL 10 MG
10 TABLET ORAL 3 TIMES DAILY PRN
COMMUNITY
End: 2021-09-27 | Stop reason: SDUPTHER

## 2021-09-27 RX ORDER — CYCLOBENZAPRINE HCL 10 MG
10 TABLET ORAL 3 TIMES DAILY PRN
Qty: 30 TABLET | Refills: 0 | Status: SHIPPED
Start: 2021-09-27 | End: 2021-10-13 | Stop reason: SDUPTHER

## 2021-10-06 ENCOUNTER — OFFICE VISIT (OUTPATIENT)
Dept: PAIN MANAGEMENT | Age: 61
End: 2021-10-06
Payer: COMMERCIAL

## 2021-10-06 ENCOUNTER — PREP FOR PROCEDURE (OUTPATIENT)
Dept: PAIN MANAGEMENT | Age: 61
End: 2021-10-06

## 2021-10-06 VITALS
DIASTOLIC BLOOD PRESSURE: 78 MMHG | SYSTOLIC BLOOD PRESSURE: 136 MMHG | OXYGEN SATURATION: 97 % | HEART RATE: 95 BPM | TEMPERATURE: 98.1 F | WEIGHT: 268 LBS | RESPIRATION RATE: 16 BRPM | HEIGHT: 64 IN | BODY MASS INDEX: 45.75 KG/M2

## 2021-10-06 DIAGNOSIS — M53.3 SACROILIAC DYSFUNCTION: Primary | ICD-10-CM

## 2021-10-06 DIAGNOSIS — E11.9 TYPE 2 DIABETES MELLITUS WITHOUT COMPLICATION, WITHOUT LONG-TERM CURRENT USE OF INSULIN (HCC): Chronic | ICD-10-CM

## 2021-10-06 DIAGNOSIS — E66.9 OBESITY, UNSPECIFIED CLASSIFICATION, UNSPECIFIED OBESITY TYPE, UNSPECIFIED WHETHER SERIOUS COMORBIDITY PRESENT: ICD-10-CM

## 2021-10-06 DIAGNOSIS — Z98.1 S/P LUMBAR FUSION: ICD-10-CM

## 2021-10-06 DIAGNOSIS — M51.36 DDD (DEGENERATIVE DISC DISEASE), LUMBAR: Primary | ICD-10-CM

## 2021-10-06 DIAGNOSIS — M53.3 SACROILIAC DYSFUNCTION: ICD-10-CM

## 2021-10-06 DIAGNOSIS — M54.16 LUMBAR RADICULOPATHY: ICD-10-CM

## 2021-10-06 DIAGNOSIS — M47.817 LUMBOSACRAL SPONDYLOSIS WITHOUT MYELOPATHY: ICD-10-CM

## 2021-10-06 PROBLEM — M51.369 DDD (DEGENERATIVE DISC DISEASE), LUMBAR: Status: ACTIVE | Noted: 2021-10-06

## 2021-10-06 PROCEDURE — 99205 OFFICE O/P NEW HI 60 MIN: CPT | Performed by: ANESTHESIOLOGY

## 2021-10-06 PROCEDURE — 99204 OFFICE O/P NEW MOD 45 MIN: CPT | Performed by: ANESTHESIOLOGY

## 2021-10-06 RX ORDER — ACETAMINOPHEN 500 MG
500 TABLET ORAL EVERY 6 HOURS PRN
COMMUNITY

## 2021-10-06 RX ORDER — DULOXETIN HYDROCHLORIDE 30 MG/1
30 CAPSULE, DELAYED RELEASE ORAL DAILY
Qty: 30 CAPSULE | Refills: 1 | Status: SHIPPED
Start: 2021-10-06 | End: 2021-11-01 | Stop reason: DRUGHIGH

## 2021-10-06 RX ORDER — OXYCODONE HYDROCHLORIDE AND ACETAMINOPHEN 5; 325 MG/1; MG/1
1 TABLET ORAL 2 TIMES DAILY PRN
Qty: 30 TABLET | Refills: 0 | Status: SHIPPED
Start: 2021-10-06 | End: 2021-10-19 | Stop reason: SDUPTHER

## 2021-10-06 NOTE — PROGRESS NOTES
[facility] - BE[facility]AVIORAL HE[facility] SERVICES Pain Management  [place], [address] [facility]  Dept: [phone]          Consult Note      Patient:  TARI Yap 1960    Date of Service:  10/06/21     Requesting Physician:  Radha Martinez    Reason for Consult:      Patient presents with complaints of low back pain    HISTORY OF PRESENT ILLNESS:      Ms. Christian Love is a 62 y.o. female presented today to [address] [place] [address] for evaluation of  Low back pain. S/P L3-5 fusion and L4-5 TLIF on May 25 2021- helped the left LE pain significantly. Has noticed right LE pain after the surgery. Has been followed by NSG had X-ray done. Intact fusion. Low back pain and right LE pain- Has tingling of the foot (h/o DM). Also has right shoulder pain and right knee pain for which she had X-rays. Has been taking Percocet 10/325 and Gabapentin 800 mg tid. Pain is constant and is described as aching and throbbing. Patient does not have bladder or bowel dysfunction. Alleviating factors include: rest.  Aggravating factors include: movement, standing, bending. Pain causes functional limitations/ limits Adl's : Yes    Nursing notes and details of the pain history reviewed. Please see intake notes for details. Previous treatments:   Physical Therapy : yes, currently in PT     Medications: - NSAID's : yes             - Membrane stabilizers : yes - gabapentin            - Opioids : yes, post op use            - Adjuvants or Others : yes,     Surgeries: yes, L3-5 fusion in May 2021    She has not been on anticoagulation medications     She has not been on herbal supplements. She is diabetic.     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  Degenerative disc   disease is present at multiple levels and is greatest at L5-S1 where it is   moderate to severe.  Normal soft tissues.           Impression   Intact lumbar fusion hardware with normal alignment.  Stable degenerative   disc disease.           Xray Right knee: 9/16/2021:     Impression   1.  Moderate right patellofemoral and medial compartment joint space   narrowing, with very minimal secondary osteoarthritic degenerative disease,   not significantly changed since 12/30/2020.       2.  Incidental left medial compartment joint space narrowing, osseous   degenerative disease, and slight genu-varus configuration, as described.       .     Xray right shoulder: 9/16/2021:  Impression   1.  A previously-existing right-internal-jugular-approach single-lumen   central venous catheter has been removed over the interval. Negative for   gross pneumothorax, bilaterally, within the limits of this study.       2.  Mild irregular cortical exostosis involves the undersurface ease of the   right acromion and lateral right clavicular head, slightly hook-like in   configuration at the level of the right acromion.       3.  Osseous degenerative disease, as described. Past Medical History:   Diagnosis Date    Diabetes mellitus ([address]     Hyperlipidemia     Hypothyroidism     IBS (irritable bowel syndrome)     Obesity     Osteoarthritis        Past Surgical History:   Procedure Laterality Date    BACK SURGERY      CHOLECYSTECTOMY      HYSTERECTOMY, TOTAL ABDOMINAL      INCONTINENCE SURGERY      BLADDER SLING    KNEE SURGERY Left     LUMBAR SPINE SURGERY N/A 5/25/2021    L3- L5 DECOMPRESSION AND FUSION , L4- L5 TRANSFORAMINAL LUMBAR INTERBODY FUSION --OARM, PATY TABLE, AUDIOLOGY, PLATES ,SCREWS, --NUVASIVE performed by Yvrose Montiel MD at [address] [place] [address]       Prior to Admission medications    Medication Sig Start Date End Date Taking?  Authorizing Provider   acetaminophen (TYLENOL) 500 MG tablet Take 500 mg by mouth every 6 hours as needed for Pain   Yes Historical Provider, Take 1 pill daily 3/11/21  Yes Oskar Guillaume DO   OZEMPIC, 1 MG/DOSE, 2 MG/1.5ML SOPN Inject 1 mg into the skin once a week  Patient taking differently: Inject 1 mg into the skin once a week YESSY 10/20/20  Yes Oskar Guillaume DO   levothyroxine (SYNTHROID) 150 MCG tablet Take 1 tablet by mouth Daily 1/12/21   Oskar Guillaume DO   rosuvastatin (CRESTOR) 5 MG tablet Take 1 tablet by mouth daily 1/12/21   Oskar Guillaume DO   meloxicam (MOBIC) 15 MG tablet Take 1 tablet by mouth daily as needed for Pain 1/11/21   Oskar Guillaume DO       Allergies   Allergen Reactions    Bactrim [Sulfamethoxazole-Trimethoprim] Nausea Only    Keflex [Cephalexin]     Ceftin [Cefuroxime] Rash       Social History     Socioeconomic History    Marital status:      Spouse name: Not on file    Number of children: Not on file    Years of education: Not on file    Highest education level: Not on file   Occupational History     Employer: [employer]   Tobacco Use    Smoking status: Never Smoker    Smokeless tobacco: Never Used   Vaping Use    Vaping Use: Never used   Substance and Sexual Activity    Alcohol use: Never    Drug use: Never    Sexual activity: Not on file   Other Topics Concern    Not on file   Social History Narrative    Not on file     Social Determinants of Health     Financial Resource Strain: Low Risk     Difficulty of Paying Living Expenses: Not hard at all   Food Insecurity: No Food Insecurity    Worried About [address] in the Last Year: Never true    [address]  in the Last Year: Never true   Transportation Needs:     Lack of Transportation (Medical):      Lack of Transportation (Non-Medical):    Physical Activity:     Days of Exercise per Week:     Minutes of Exercise per Session:    Stress:     Feeling of Stress :    Social Connections:     Frequency of Communication with Friends and Family:     Frequency of Social Gatherings with Friends and Family:     Attends Yazidi Services:     Active Member of Clubs or Organizations:     Attends Club or Organization Meetings:     Marital Status:    Intimate Partner Violence:     Fear of Current or Ex-Partner:     Emotionally Abused:     Physically Abused:     Sexually Abused:        Family History   Problem Relation Age of Onset    Diabetes Mother     Diabetes Father     Rheum Arthritis Sister     Cancer Maternal Grandfather        REVIEW OF SYSTEMS:     Patient specifically denies fever/chills, chest pain, shortness of breath, new bowel or bladder complaints. All other review of systems was negative. Review of Systems - documented reviewed. PHYSICAL EXAMINATION:      /78   Pulse 95   Temp 98.1 °F (36.7 °C) (Infrared)   Resp 16   Ht 5' 4\" (1.626 m)   Wt 268 lb (121.6 kg)   SpO2 97%   BMI 46.00 kg/m²     General:      General appearance:  Pleasant and well-hydrated, in no distress and A & O x 3  Build:Obese  Function: Rises from seated position easily and Moves about room without difficulty    HEENT:    Head:normocephalic, atraumatic  Pupils:regular, round, equal  Sclera: icterus absent    Lungs:    Breathing:normal breathing pattern     CVS:     RRR    Abdomen:    Shape:obese, non-distended and normal  Tenderness:none  Guarding:none    Cervical spine:    Inspection:normal  Palpation:tenderness paravertebral muscles, tenderness trapezium, left, right -no  Range of motion:Normal flexion, extension, rotation bilaterally and is not painful. Spurling's: negative bilaterally    Thoracic spine:     Spine inspection:normal   Palpation:No tenderness over the midline and paraspinal area, bilaterally  Range of motion:normal in flexion, extension rotation bilateral and is not painful.     Lumbar spine:    Spine inspection: scar from the prior surgery noted- healed well  Palpation: Tenderness paravertebral muscles Yes bilaterally  Range of motion: Decreased, flexion Decreased, Lateral bending, extension and rotation bilaterally reduced is somewhat painful. Sacroiliac joint tenderness Yes bilaterally  GLENIS test: positive   Gaenslen's test:positive    Piriformis tenderness: negative bilaterally  SLR : negative bilaterally    Musculoskeletal:    Trigger points no    Extremities:    Tremors:None bilaterally upper and lower  Edema:none x all 4 extremities  Distal LE Peripheral pulses felt    Knee Right:    ROM : pain +   Joint line tenderness : yes     Neurological:    Sensory: Normal to light touch     Motor:   Right Hip flexors: 4/5  Left hip flexors 4/5               Right Quadriceps 4+/5          Left Quadriceps 4+/5           Right Gastrocnemius 5/5    Left Gastrocnemius 5/5  Right Ant Tibialis 5/5  Left Ant Tibialis 5/5    Gait: uses a cane to assist in ambulation. Dermatology:    Skin:no rashes or lesions noted    Assessment/Plan:     Diagnosis Orders   1. DDD (degenerative disc disease), lumbar     2. Lumbosacral spondylosis without myelopathy     3. Lumbar radiculopathy     4. Sacroiliac dysfunction     5. Obesity, unspecified classification, unspecified obesity type, unspecified whether serious comorbidity present     6. Type 2 diabetes mellitus without complication, without long-term current use of insulin (HCC)     7. S/P lumbar fusion         62 y.o. female with H/o L3-5 Lumbar fusion in May 2021 by Dr. Dario Eaton. Helped left LE pain - but noticed low back and right LE pain. Has been evaluated by NSG- recommended conservative treatment. Needing pain meds- percocet/ gabapentin. Exam: SIJ tenderness +, LE weakness +    Post op images reviewed. Has chronic right shoulder pain and knee pain. Images reviewed. Plan:  PT / Estella Leija for prn use to help with PT/ HEP and exercise- will prescribe Percocet 5/325 po bid prn. Continue Gabapentin. Add Cymbalta 30 mg Q hs.    B/l SIJ injection under fluoroscopy. RBA discussed.     If LE pain persists, consider imaging of the spine MRI/ CT / NSG reeval.    Opioid agreement- signed today 10/6/2021. Salient features of opioid agreement discussed. Discussed issues with chronic opioid therapy including the phenomenon of tolerance/ dependence. Oral drug screen today-10/6/2021- Addendum: result reviewed consistent. Weight loss. Consider right shoulder and knee injection in office in future. Counseling : Patient encouraged to stay active and to watch/lose weight    Encouraged to continue Regular home exercise program as tolerated - stretching / strengthening. Treatment plan discussed with the patient including medication and procedure side effects. Controlled Substances Monitoring:   [place] reviewed. We discussed with the patient that combining opioids, benzodiazepines, alcohol, illicit drugs or sleep aids increases the risk of respiratory depression including death. We discussed that these medications may cause drowsiness, sedation or dizziness and have counseled the patient not to drive or operate machinery. We have discussed that these medications will be prescribed only by one provider. We have discussed with the patient about age related risk factors and have thoroughly discussed the importance of taking these medications as prescribed. The patient verbalizes understanding. Ramesh Lopes MD    Dear MS. Jagdish Alvarez,   Thank you for referring Ms. Mustapha Delaney and allowing us to participate in her care. Please do not hesitate to contact me if you have any questions regarding her care. Reyna Romero MD    CC:    Mae Sal PA-C  [address] [facility]. [place],  [address] [facility], [facility] & [place] [address]       .

## 2021-10-06 NOTE — PROGRESS NOTES
Patient:  TARI Tee 1960  Date of Service:  10/6/21      Do you currently have any of the following:    Fever: No  Headache:  No  Cough: No  Shortness of breath: No  Exposed to anyone with these symptoms: No       Patient presents with complaints of lower back and leg pain that started 2 years ago and has been getting worse. She states the pain began following No specific cause    Pain is intermittent and is described as aching. She rates the pain as a 9/10 on her worst day , 2/10 on her best day, and a 6/10 on average on the VAS scale. Pain does radiate to both lower extremities. She  has numbness, tingling of the both lower extremities. Alleviating factors include: heat and rest.  Aggravating factors include:  standing. She states that the pain does keep her from sleeping at night. She took her last dose of Percocet and Tramadol last night. She is not on NSAIDS and  is not on anticoagulation medications to include none and is managed by . Previous treatments: Physical Therapy, Epidural Steroid Injection, back Surgery  and medications. .      Personal Expectations from this treatment: increase activity and decrease pain    /78   Pulse 95   Temp 98.1 °F (36.7 °C) (Infrared)   Resp 16   Ht 5' 4\" (1.626 m)   Wt 268 lb (121.6 kg)   SpO2 97%   BMI 46.00 kg/m²     No LMP recorded. Patient has had a hysterectomy.

## 2021-10-07 RX ORDER — SEMAGLUTIDE 1.34 MG/ML
1 INJECTION, SOLUTION SUBCUTANEOUS WEEKLY
Qty: 6 PEN | Refills: 3 | Status: SHIPPED
Start: 2021-10-07 | End: 2022-04-11

## 2021-10-07 NOTE — PROGRESS NOTES
Have you been tested for COVID  Yes           Have you been told you were positive for COVID Yes  Have you had any known exposure to someone that is positive for COVID No  Do you have a cough                   No              Do you have shortness of breath No                 Do you have a sore throat            No                Are you having chills                    No                Are you having muscle aches. No                    Please come to the hospital wearing a mask and have your significant other wear a mask as well. Both of you should check your temperature before leaving to come here,  if it is 100 or higher please call [phone] for instruction.

## 2021-10-07 NOTE — PROGRESS NOTES
[facility]  INSTRUCTIONS  . .......................................................................................................................................... [] Parking the day of Surgery is located in the [facility].   Upon entering the door, make immediate right into the surgery reception room    []  Bring photo ID and insurance card     [] You may have a light breakfast day of procedure    []  Wear loose comfortable clothing    []  Please follow instructions for medications as given per Dr's office    [] You can expect a call the business day prior to procedure to notify you of your arrival time     [] Please arrange for     []  Other instructions

## 2021-10-11 ENCOUNTER — HOSPITAL ENCOUNTER (OUTPATIENT)
Dept: GENERAL RADIOLOGY | Age: 61
Setting detail: OUTPATIENT SURGERY
Discharge: HOME | End: 2021-10-13
Attending: ANESTHESIOLOGY
Payer: COMMERCIAL

## 2021-10-11 ENCOUNTER — HOSPITAL ENCOUNTER (OUTPATIENT)
Age: 61
Setting detail: OUTPATIENT SURGERY
Discharge: HOME | End: 2021-10-11
Attending: ANESTHESIOLOGY | Admitting: ANESTHESIOLOGY
Payer: COMMERCIAL

## 2021-10-11 VITALS
TEMPERATURE: 98.1 F | HEIGHT: 64 IN | OXYGEN SATURATION: 94 % | RESPIRATION RATE: 14 BRPM | HEART RATE: 74 BPM | WEIGHT: 268 LBS | SYSTOLIC BLOOD PRESSURE: 108 MMHG | BODY MASS INDEX: 45.75 KG/M2 | DIASTOLIC BLOOD PRESSURE: 68 MMHG

## 2021-10-11 DIAGNOSIS — R52 PAIN MANAGEMENT: ICD-10-CM

## 2021-10-11 LAB — METER GLUCOSE: 211 MG/DL (ref 74–99)

## 2021-10-11 PROCEDURE — 3600000002 HC SURGERY LEVEL 2 BASE: Performed by: ANESTHESIOLOGY

## 2021-10-11 PROCEDURE — 2709999900 HC NON-CHARGEABLE SUPPLY: Performed by: ANESTHESIOLOGY

## 2021-10-11 PROCEDURE — 6360000004 HC RX CONTRAST MEDICATION: Performed by: ANESTHESIOLOGY

## 2021-10-11 PROCEDURE — 27096 INJECT SACROILIAC JOINT: CPT | Performed by: ANESTHESIOLOGY

## 2021-10-11 PROCEDURE — 7100000011 HC PHASE II RECOVERY - ADDTL 15 MIN: Performed by: ANESTHESIOLOGY

## 2021-10-11 PROCEDURE — 3209999900 FLUORO FOR SURGICAL PROCEDURES

## 2021-10-11 PROCEDURE — 82962 GLUCOSE BLOOD TEST: CPT

## 2021-10-11 PROCEDURE — 6360000002 HC RX W HCPCS: Performed by: ANESTHESIOLOGY

## 2021-10-11 PROCEDURE — 7100000010 HC PHASE II RECOVERY - FIRST 15 MIN: Performed by: ANESTHESIOLOGY

## 2021-10-11 PROCEDURE — 2500000003 HC RX 250 WO HCPCS: Performed by: ANESTHESIOLOGY

## 2021-10-11 RX ORDER — METHYLPREDNISOLONE ACETATE 80 MG/ML
INJECTION, SUSPENSION INTRA-ARTICULAR; INTRALESIONAL; INTRAMUSCULAR; SOFT TISSUE PRN
Status: DISCONTINUED | OUTPATIENT
Start: 2021-10-11 | End: 2021-10-11 | Stop reason: ALTCHOICE

## 2021-10-11 RX ORDER — BUPIVACAINE HYDROCHLORIDE 2.5 MG/ML
INJECTION, SOLUTION EPIDURAL; INFILTRATION; INTRACAUDAL PRN
Status: DISCONTINUED | OUTPATIENT
Start: 2021-10-11 | End: 2021-10-11 | Stop reason: ALTCHOICE

## 2021-10-11 RX ORDER — LIDOCAINE HYDROCHLORIDE 5 MG/ML
INJECTION, SOLUTION INFILTRATION; INTRAVENOUS PRN
Status: DISCONTINUED | OUTPATIENT
Start: 2021-10-11 | End: 2021-10-11 | Stop reason: ALTCHOICE

## 2021-10-11 ASSESSMENT — PAIN SCALES - GENERAL
PAINLEVEL_OUTOF10: 0

## 2021-10-11 ASSESSMENT — PAIN DESCRIPTION - DESCRIPTORS: DESCRIPTORS: ACHING;DISCOMFORT

## 2021-10-11 ASSESSMENT — PAIN - FUNCTIONAL ASSESSMENT: PAIN_FUNCTIONAL_ASSESSMENT: 0-10

## 2021-10-11 NOTE — OP NOTE
Operative Note      Patient: Yung Mustafa  YOB: 1960  MRN: [id number]    Date of Procedure: 10/11/2021    Pre-Op Diagnosis: SACROILIAC DYSFUNCTION    Post-Op Diagnosis: Same       Procedure(s):  BILATERAL SACROILIAC JOINT INJECTION UNDER FLUOROSCOPY     Surgeon(s):  Lindsey Parikh MD    Assistant:   * No surgical staff found *    Anesthesia: Local    Estimated Blood Loss (mL): Minimal    Complications: None    Specimens:   * No specimens in log *    Implants:  * No implants in log *      Drains: * No LDAs found *    Findings: good needle placement    Detailed Description of Procedure:   10/11/2021    Patient: Yung Mustafa  :  1960  Age:  62 y.o. Sex:  female     PRE-OPERATIVE DIAGNOSIS: Bilateral   Sacroiliitis, somatic dysfunction of the lumbosacral spine. POST-OPERATIVE DIAGNOSIS: Same. PROCEDURE:  Fluoroscopic guided Bilateral   sacroiliac joint injection with steroid (#1). SURGEON:  Lindsey Parikh MD    ANESTHESIA: Local    ESTIMATED BLOOD LOSS: None.  ______________________________________________________________________  BRIEF HISTORY:  Yung Mustafa comes in today for first Bilateral sacroiliac joint injection under fluoroscopic guidance. The potential complications as well as the procedure in detail were explained to her today. She has elected to undergo the aforementioned procedure. Conchis's complete History & Physical examination were reviewed in depth, a copy of which is in the chart. DESCRIPTION OF PROCEDURE:    After confirming written and informed consent, a time-out was performed and Bridgett name and date of birth, the procedure to be performed as well as the plan for the location of the needle insertion were confirmed. The patient was brought into the procedure room and placed in the prone position on the fluoroscopy table. Standard monitors were placed and vital signs were observed throughout the procedure.  The low back and upper buttocks area was prepped with chloraprep and draped in a sterile manner. AP fluoroscopy was used to visualize the sacroiliac joint. The fluoroscopic beam was then obliqued until the anterior and posterior margins of the joint were aligned. The inferior margin of the joint was identified and marked. The skin and subcutaneous tissue about this identified point were anesthestized with 0.5% lidocaine. Two # 22 gauge 3-1/2 spinal needle was advanced toward the the identified point under fluoroscopic guidance. Once the targeted point was reached and the joint space was entered, negative aspiration was confirmed, and 0.5 cc of 240 omnipaque was injected. The  Joint space was appropriately outlined. Then, after negative aspiration, a solution consisiting of 0.25% marcaine 2 cc and 30 mg DepoMedrol was easily injected on each side. The needle was then removed and the needle insertion site was covered with Band-Aid. Disposition the patient tolerated the procedure well and there were no complications . Vital signs remained stable throughout the procedure. The patient was escorted to the recovery area where they remained until discharge and written discharge instructions for the procedure were given. Plan: Bessie Be will return to our pain management center as scheduled.      Kathryn Amador MD

## 2021-10-11 NOTE — H&P
[facility]  [place], [address] [facility]  Dept: [phone]    Procedure History & Physical      Javier Eddy     HPI:    Patient  is here for SIJ injection  for low back pain. Labs/imaging studies reviewed   All question and concerns addressed including R/B/A associated with the procedure    Past Medical History:   Diagnosis Date    Back pain     COVID-19 09/2020    Diabetes mellitus ([address]     Hyperlipidemia     Hypertension     Hypothyroidism     IBS (irritable bowel syndrome)     Obesity     Osteoarthritis     PONV (postoperative nausea and vomiting)        Past Surgical History:   Procedure Laterality Date    BACK SURGERY      CHOLECYSTECTOMY      HYSTERECTOMY, TOTAL ABDOMINAL      INCONTINENCE SURGERY      BLADDER SLING    KNEE SURGERY Left     LUMBAR SPINE SURGERY N/A 5/25/2021    L3- L5 DECOMPRESSION AND FUSION , L4- L5 TRANSFORAMINAL LUMBAR INTERBODY FUSION --OARM, PATY TABLE, AUDIOLOGY, PLATES ,SCREWS, --NUVASIVE performed by Mariaa Osborn MD at [address] [place] [address]       Prior to Admission medications    Medication Sig Start Date End Date Taking? Authorizing Provider   acetaminophen (TYLENOL) 500 MG tablet Take 500 mg by mouth every 6 hours as needed for Pain   Yes Historical Provider, MD   oxyCODONE-acetaminophen (PERCOCET) 5-325 MG per tablet Take 1 tablet by mouth 2 times daily as needed for Pain for up to 15 days.  10/6/21 10/21/21 Yes Malcolm Samuel MD   DULoxetine (CYMBALTA) 30 MG extended release capsule Take 1 capsule by mouth daily 10/6/21  Yes Malcolm Samuel MD   cyclobenzaprine (FLEXERIL) 10 MG tablet Take 1 tablet by mouth 3 times daily as needed for Muscle spasms 9/27/21  Yes Toshia Milian DO   torsemide (DEMADEX) 10 MG tablet Take 1 tablet by mouth daily 8/30/21  Yes Toshia Milian DO   pravastatin (PRAVACHOL) 10 MG tablet Take 1 tablet by mouth every other day 8/30/21  Yes Prieto Robin, DO   vitamin B-12 (CYANOCOBALAMIN) 100 MCG tablet Take 0.5 tablets by mouth daily 8/30/21  Yes Tatiana Bauman DO   levothyroxine (SYNTHROID) 150 MCG tablet TAKE 1 TABLET BY MOUTH EVERY DAY 8/13/21  Yes Prieto Robin DO   gabapentin (NEURONTIN) 400 MG capsule Take 2 capsules by mouth 3 times daily for 90 days.  7/26/21 10/24/21 Yes Army Manjit MD   gemfibrozil (LOPID) 600 MG tablet TAKE 1 TABLET BY MOUTH EVERY 12 HOURS 7/12/21  Yes Tatiana Bauman DO   metFORMIN (GLUCOPHAGE) 500 MG tablet TAKE 1 TABLET BY MOUTH EVERY DAY WITH BREAKFAST 7/12/21  Yes Prieto Robin DO   omeprazole (PRILOSEC) 20 MG delayed release capsule TAKE 1 CAPSULE BY MOUTH EVERY DAY 7/12/21  Yes Tatiana Bauman DO   omega-3 acid ethyl esters (LOVAZA) 1 g capsule TAKE 1 CAPSULE BY MOUTH EVERY DAY 7/12/21  Yes Prieot Robin DO   vitamin D (ERGOCALCIFEROL) 1.25 MG (33384 UT) CAPS capsule TAKE 1 CAPSULE BY MOUTH ONE TIME PER WEEK 7/12/21  Yes Prieto Robin DO   LINZESS 145 MCG capsule TAKE 1 CAPSULE BY MOUTH EVERY DAY IN THE MORNING BEFORE BREAKFAST 6/16/21  Yes Uli Díaz DO   valsartan (DIOVAN) 80 MG tablet TAKE 1 TABLET BY MOUTH EVERY DAY 6/14/21  Yes Uli Díaz DO   lidocaine (LIDODERM) 5 % PLACE 1 PATCH ONTO THE SKIN DAILY 12 HOURS ON, 12 HOURS OFF. 6/2/21  Yes Historical Provider, MD   polyethylene glycol (GLYCOLAX) 17 g packet Take 17 g by mouth daily as needed   Yes Historical Provider, MD   Thiamine HCl (B-1) 100 MG TABS Take 1 pill daily 3/11/21  Yes Tatiana Bauman DO   OZEMPIC, 1 MG/DOSE, 2 MG/1.5ML SOPN Inject 1 mg into the skin once a week 10/7/21   Tatiana Bauman DO   Handicap Placard MISC by Does not apply route Patient cannot walk 200 ft without stopping to rest.    Expiration 1 yr 5/3/21   Tatiana Bauman DO   levothyroxine (SYNTHROID) 150 MCG tablet Take 1 tablet by mouth Daily 1/12/21   Tatiana Bauman DO   rosuvastatin (CRESTOR) 5 MG tablet Take 1 tablet by mouth daily 1/12/21   Prieto Robin DO   meloxicam (MOBIC) 15 MG tablet Take 1 tablet by mouth daily as needed for Pain 1/11/21   Altamease Punch, DO       Allergies   Allergen Reactions    Bactrim [Sulfamethoxazole-Trimethoprim] Nausea Only    Ceftin [Cefuroxime] Rash    Keflex [Cephalexin] Rash       Social History     Socioeconomic History    Marital status:      Spouse name: Not on file    Number of children: Not on file    Years of education: Not on file    Highest education level: Not on file   Occupational History     Employer: [employer]   Tobacco Use    Smoking status: Never Smoker    Smokeless tobacco: Never Used   Vaping Use    Vaping Use: Never used   Substance and Sexual Activity    Alcohol use: Never    Drug use: Never    Sexual activity: Not on file   Other Topics Concern    Not on file   Social History Narrative    Not on file     Social Determinants of Health     Financial Resource Strain: Low Risk     Difficulty of Paying Living Expenses: Not hard at all   Food Insecurity: No Food Insecurity    Worried About [address] in the Last Year: Never true    [facility] [employer] in the Last Year: Never true   Transportation Needs:     Lack of Transportation (Medical):      Lack of Transportation (Non-Medical):    Physical Activity:     Days of Exercise per Week:     Minutes of Exercise per Session:    Stress:     Feeling of Stress :    Social Connections:     Frequency of Communication with Friends and Family:     Frequency of Social Gatherings with Friends and Family:     Attends Pentecostalism Services:     Active Member of Clubs or Organizations:     Attends Club or Organization Meetings:     Marital Status:    Intimate Partner Violence:     Fear of Current or Ex-Partner:     Emotionally Abused:     Physically Abused:     Sexually Abused:        Family History   Problem Relation Age of Onset    Diabetes Mother     Diabetes Father     Rheum Arthritis Sister     Cancer Maternal Grandfather          REVIEW OF SYSTEMS: CONSTITUTIONAL:  negative for  fevers, chills, sweats and fatigue    RESPIRATORY:  negative for  dry cough, cough with sputum, dyspnea, wheezing and chest pain    CARDIOVASCULAR:  negative for chest pain, dyspnea, palpitations, syncope    GASTROINTESTINAL:  negative for nausea, vomiting, change in bowel habits, diarrhea, constipation and abdominal pain    MUSCULOSKELETAL: negative for muscle weakness    SKIN: negative for itching or rashes. BEHAVIOR/PSYCH:  negative for poor appetite, increased appetite, decreased sleep and poor concentration    All other systems negative      PHYSICAL EXAM:    VITALS:  BP (!) 120/57   Pulse 87   Temp 97.9 °F (36.6 °C) (Temporal)   Resp 18   Ht 5' 4\" (1.626 m)   Wt 268 lb (121.6 kg)   SpO2 93%   BMI 46.00 kg/m²     CONSTITUTIONAL:  awake, alert, cooperative, no apparent distress, and appears stated age    EYES: PERRLA, EOMI    LUNGS:  No increased work of breathing, no audible wheezing    CARDIOVASCULAR:  regular rate and rhythm    ABDOMEN:  Soft non tender non distended     EXTREMITIES: no signs of clubbing or cyanosis. MUSCULOSKELETAL: negative for flaccid muscle tone or spastic movements. SKIN: gross examination reveals no signs of rashes, or diaphoresis. NEURO: Cranial nerves II-XII grossly intact. No signs of agitated mood. Assessment/Plan:    Patient  is here for SIJ injection  for low back pain. The patient was counseled at length about the risks of tom Covid-19 during their perioperative period and any recovery window from their procedure. The patient was made aware that tom Covid-19  may worsen their prognosis for recovering from their procedure  and lend to a higher morbidity and/or mortality risk. All material risks, benefits, and reasonable alternatives including postponing the procedure were discussed. The patient does wish to proceed with the procedure at this time.       Ivana Mosher MD

## 2021-10-11 NOTE — H&P (VIEW-ONLY)
[facility]  [place], [address]  Dept: [phone]    Procedure History & Physical      Katie Ortega     HPI:    Patient  is here for SIJ injection  for low back pain. Labs/imaging studies reviewed   All question and concerns addressed including R/B/A associated with the procedure    Past Medical History:   Diagnosis Date    Back pain     COVID-19 09/2020    Diabetes mellitus ([address]     Hyperlipidemia     Hypertension     Hypothyroidism     IBS (irritable bowel syndrome)     Obesity     Osteoarthritis     PONV (postoperative nausea and vomiting)        Past Surgical History:   Procedure Laterality Date    BACK SURGERY      CHOLECYSTECTOMY      HYSTERECTOMY, TOTAL ABDOMINAL      INCONTINENCE SURGERY      BLADDER SLING    KNEE SURGERY Left     LUMBAR SPINE SURGERY N/A 5/25/2021    L3- L5 DECOMPRESSION AND FUSION , L4- L5 TRANSFORAMINAL LUMBAR INTERBODY FUSION --OARM, PATY TABLE, AUDIOLOGY, PLATES ,SCREWS, --NUVASIVE performed by Mago Valdez MD at [address] [place] [address]       Prior to Admission medications    Medication Sig Start Date End Date Taking? Authorizing Provider   acetaminophen (TYLENOL) 500 MG tablet Take 500 mg by mouth every 6 hours as needed for Pain   Yes Historical Provider, MD   oxyCODONE-acetaminophen (PERCOCET) 5-325 MG per tablet Take 1 tablet by mouth 2 times daily as needed for Pain for up to 15 days.  10/6/21 10/21/21 Yes Pippa Davis MD   DULoxetine (CYMBALTA) 30 MG extended release capsule Take 1 capsule by mouth daily 10/6/21  Yes Pippa Davis MD   cyclobenzaprine (FLEXERIL) 10 MG tablet Take 1 tablet by mouth 3 times daily as needed for Muscle spasms 9/27/21  Yes Osman Good,    torsemide (DEMADEX) 10 MG tablet Take 1 tablet by mouth daily 8/30/21  Yes Osman Good DO   pravastatin (PRAVACHOL) 10 MG tablet Take 1 tablet by mouth every other day 8/30/21  Yes Prieto Robin, DO   vitamin B-12 (CYANOCOBALAMIN) 100 MCG tablet Take 0.5 tablets by mouth daily 8/30/21  Yes Arn Files, DO   levothyroxine (SYNTHROID) 150 MCG tablet TAKE 1 TABLET BY MOUTH EVERY DAY 8/13/21  Yes Prieto Mabryi, DO   gabapentin (NEURONTIN) 400 MG capsule Take 2 capsules by mouth 3 times daily for 90 days.  7/26/21 10/24/21 Yes Diego Goldberg MD   gemfibrozil (LOPID) 600 MG tablet TAKE 1 TABLET BY MOUTH EVERY 12 HOURS 7/12/21  Yes Arn Files, DO   metFORMIN (GLUCOPHAGE) 500 MG tablet TAKE 1 TABLET BY MOUTH EVERY DAY WITH BREAKFAST 7/12/21  Yes Prieto Mabryi, DO   omeprazole (PRILOSEC) 20 MG delayed release capsule TAKE 1 CAPSULE BY MOUTH EVERY DAY 7/12/21  Yes Arn Files, DO   omega-3 acid ethyl esters (LOVAZA) 1 g capsule TAKE 1 CAPSULE BY MOUTH EVERY DAY 7/12/21  Yes Prieto Robin, DO   vitamin D (ERGOCALCIFEROL) 1.25 MG (39958 UT) CAPS capsule TAKE 1 CAPSULE BY MOUTH ONE TIME PER WEEK 7/12/21  Yes Prieto Robin, DO   LINZESS 145 MCG capsule TAKE 1 CAPSULE BY MOUTH EVERY DAY IN THE MORNING BEFORE BREAKFAST 6/16/21  Yes Justina Mallory, DO   valsartan (DIOVAN) 80 MG tablet TAKE 1 TABLET BY MOUTH EVERY DAY 6/14/21  Yes Justina Mallory, DO   lidocaine (LIDODERM) 5 % PLACE 1 PATCH ONTO THE SKIN DAILY 12 HOURS ON, 12 HOURS OFF. 6/2/21  Yes Historical Provider, MD   polyethylene glycol (GLYCOLAX) 17 g packet Take 17 g by mouth daily as needed   Yes Historical Provider, MD   Thiamine HCl (B-1) 100 MG TABS Take 1 pill daily 3/11/21  Yes Arn Files, DO   OZEMPIC, 1 MG/DOSE, 2 MG/1.5ML SOPN Inject 1 mg into the skin once a week 10/7/21   Arn Files, DO   Handicap Placard MISC by Does not apply route Patient cannot walk 200 ft without stopping to rest.    Expiration 1 yr 5/3/21   Arn Files, DO   levothyroxine (SYNTHROID) 150 MCG tablet Take 1 tablet by mouth Daily 1/12/21   Arn Files, DO   rosuvastatin (CRESTOR) 5 MG tablet Take 1 tablet by mouth daily 1/12/21   Prieto Robin DO   meloxicam (MOBIC) 15 MG tablet Take 1 tablet by mouth daily as needed for Pain 1/11/21   Arn Files, DO       Allergies   Allergen Reactions    Bactrim [Sulfamethoxazole-Trimethoprim] Nausea Only    Ceftin [Cefuroxime] Rash    Keflex [Cephalexin] Rash       Social History     Socioeconomic History    Marital status:      Spouse name: Not on file    Number of children: Not on file    Years of education: Not on file    Highest education level: Not on file   Occupational History     Employer: [employer]   Tobacco Use    Smoking status: Never Smoker    Smokeless tobacco: Never Used   Vaping Use    Vaping Use: Never used   Substance and Sexual Activity    Alcohol use: Never    Drug use: Never    Sexual activity: Not on file   Other Topics Concern    Not on file   Social History Narrative    Not on file     Social Determinants of Health     Financial Resource Strain: Low Risk     Difficulty of Paying Living Expenses: Not hard at all   Food Insecurity: No Food Insecurity    Worried About [address] in the Last Year: Never true    [address] [facility] [employer] in the Last Year: Never true   Transportation Needs:     Lack of Transportation (Medical):      Lack of Transportation (Non-Medical):    Physical Activity:     Days of Exercise per Week:     Minutes of Exercise per Session:    Stress:     Feeling of Stress :    Social Connections:     Frequency of Communication with Friends and Family:     Frequency of Social Gatherings with Friends and Family:     Attends Zoroastrian Services:     Active Member of Clubs or Organizations:     Attends Club or Organization Meetings:     Marital Status:    Intimate Partner Violence:     Fear of Current or Ex-Partner:     Emotionally Abused:     Physically Abused:     Sexually Abused:        Family History   Problem Relation Age of Onset    Diabetes Mother     Diabetes Father     Rheum Arthritis Sister     Cancer Maternal Grandfather          REVIEW OF SYSTEMS: CONSTITUTIONAL:  negative for  fevers, chills, sweats and fatigue    RESPIRATORY:  negative for  dry cough, cough with sputum, dyspnea, wheezing and chest pain    CARDIOVASCULAR:  negative for chest pain, dyspnea, palpitations, syncope    GASTROINTESTINAL:  negative for nausea, vomiting, change in bowel habits, diarrhea, constipation and abdominal pain    MUSCULOSKELETAL: negative for muscle weakness    SKIN: negative for itching or rashes. BEHAVIOR/PSYCH:  negative for poor appetite, increased appetite, decreased sleep and poor concentration    All other systems negative      PHYSICAL EXAM:    VITALS:  BP (!) 120/57   Pulse 87   Temp 97.9 °F (36.6 °C) (Temporal)   Resp 18   Ht 5' 4\" (1.626 m)   Wt 268 lb (121.6 kg)   SpO2 93%   BMI 46.00 kg/m²     CONSTITUTIONAL:  awake, alert, cooperative, no apparent distress, and appears stated age    EYES: PERRLA, EOMI    LUNGS:  No increased work of breathing, no audible wheezing    CARDIOVASCULAR:  regular rate and rhythm    ABDOMEN:  Soft non tender non distended     EXTREMITIES: no signs of clubbing or cyanosis. MUSCULOSKELETAL: negative for flaccid muscle tone or spastic movements. SKIN: gross examination reveals no signs of rashes, or diaphoresis. NEURO: Cranial nerves II-XII grossly intact. No signs of agitated mood. Assessment/Plan:    Patient  is here for SIJ injection  for low back pain. The patient was counseled at length about the risks of tom Covid-19 during their perioperative period and any recovery window from their procedure. The patient was made aware that tom Covid-19  may worsen their prognosis for recovering from their procedure  and lend to a higher morbidity and/or mortality risk. All material risks, benefits, and reasonable alternatives including postponing the procedure were discussed. The patient does wish to proceed with the procedure at this time.       Rob Kiser MD

## 2021-10-13 DIAGNOSIS — M54.17 RADICULOPATHY, LUMBOSACRAL REGION: ICD-10-CM

## 2021-10-13 DIAGNOSIS — M48.062 SPINAL STENOSIS, LUMBAR REGION WITH NEUROGENIC CLAUDICATION: Primary | ICD-10-CM

## 2021-10-13 RX ORDER — CYCLOBENZAPRINE HCL 10 MG
10 TABLET ORAL 3 TIMES DAILY PRN
Qty: 30 TABLET | Refills: 0 | Status: SHIPPED
Start: 2021-10-13 | End: 2021-11-01 | Stop reason: SDUPTHER

## 2021-10-14 RX ORDER — GABAPENTIN 400 MG/1
CAPSULE ORAL
Qty: 180 CAPSULE | Refills: 2 | Status: SHIPPED
Start: 2021-10-14 | End: 2022-01-15

## 2021-10-18 ENCOUNTER — OFFICE VISIT (OUTPATIENT)
Dept: PHYSICAL MEDICINE AND REHAB | Age: 61
End: 2021-10-18
Payer: COMMERCIAL

## 2021-10-18 VITALS
BODY MASS INDEX: 44.05 KG/M2 | TEMPERATURE: 98.4 F | HEART RATE: 92 BPM | WEIGHT: 258 LBS | HEIGHT: 64 IN | OXYGEN SATURATION: 97 % | DIASTOLIC BLOOD PRESSURE: 76 MMHG | SYSTOLIC BLOOD PRESSURE: 114 MMHG

## 2021-10-18 DIAGNOSIS — G89.29 CHRONIC RIGHT SHOULDER PAIN: ICD-10-CM

## 2021-10-18 DIAGNOSIS — Z98.1 S/P LUMBAR FUSION: ICD-10-CM

## 2021-10-18 DIAGNOSIS — M17.11 PRIMARY OSTEOARTHRITIS OF RIGHT KNEE: Primary | ICD-10-CM

## 2021-10-18 DIAGNOSIS — M25.511 CHRONIC RIGHT SHOULDER PAIN: ICD-10-CM

## 2021-10-18 DIAGNOSIS — M54.17 RADICULOPATHY, LUMBOSACRAL REGION: ICD-10-CM

## 2021-10-18 DIAGNOSIS — M48.062 SPINAL STENOSIS, LUMBAR REGION WITH NEUROGENIC CLAUDICATION: ICD-10-CM

## 2021-10-18 PROCEDURE — 99214 OFFICE O/P EST MOD 30 MIN: CPT | Performed by: PHYSICAL MEDICINE & REHABILITATION

## 2021-10-18 PROCEDURE — 20610 DRAIN/INJ JOINT/BURSA W/O US: CPT | Performed by: PHYSICAL MEDICINE & REHABILITATION

## 2021-10-18 RX ORDER — VALSARTAN 80 MG/1
TABLET ORAL
Qty: 90 TABLET | Refills: 1 | Status: SHIPPED
Start: 2021-10-18 | End: 2022-05-13

## 2021-10-18 RX ORDER — METHION/INOS/CHOL BT/B COM/LIV 110MG-86MG
CAPSULE ORAL
Qty: 90 TABLET | Refills: 1 | Status: SHIPPED
Start: 2021-10-18 | End: 2022-08-24

## 2021-10-18 RX ORDER — ERGOCALCIFEROL 1.25 MG/1
CAPSULE ORAL
Qty: 12 CAPSULE | Refills: 0 | Status: SHIPPED
Start: 2021-10-18 | End: 2022-01-18 | Stop reason: SDUPTHER

## 2021-10-18 RX ORDER — OMEPRAZOLE 20 MG/1
CAPSULE, DELAYED RELEASE ORAL
Qty: 90 CAPSULE | Refills: 3 | Status: SHIPPED | OUTPATIENT
Start: 2021-10-18

## 2021-10-18 RX ORDER — METHION/INOS/CHOL BT/B COM/LIV 110MG-86MG
CAPSULE ORAL
Qty: 90 TABLET | Refills: 1 | Status: SHIPPED
Start: 2021-10-18 | End: 2021-11-01

## 2021-10-18 RX ORDER — GEMFIBROZIL 600 MG/1
TABLET, FILM COATED ORAL
Qty: 180 TABLET | Refills: 3 | OUTPATIENT
Start: 2021-10-18

## 2021-10-18 RX ORDER — GEMFIBROZIL 600 MG/1
TABLET, FILM COATED ORAL
Qty: 180 TABLET | Refills: 3 | Status: SHIPPED
Start: 2021-10-18 | End: 2022-08-29

## 2021-10-18 RX ORDER — OMEGA-3-ACID ETHYL ESTERS 1 G/1
CAPSULE, LIQUID FILLED ORAL
Qty: 90 CAPSULE | Refills: 3 | Status: SHIPPED
Start: 2021-10-18 | End: 2021-10-18 | Stop reason: SDUPTHER

## 2021-10-18 RX ORDER — TRIAMCINOLONE ACETONIDE 40 MG/ML
40 INJECTION, SUSPENSION INTRA-ARTICULAR; INTRAMUSCULAR ONCE
Status: COMPLETED | OUTPATIENT
Start: 2021-10-18 | End: 2021-10-18

## 2021-10-18 RX ORDER — BUPIVACAINE HYDROCHLORIDE 2.5 MG/ML
3 INJECTION, SOLUTION INFILTRATION; PERINEURAL ONCE
Status: COMPLETED | OUTPATIENT
Start: 2021-10-18 | End: 2021-10-18

## 2021-10-18 RX ORDER — OMEGA-3-ACID ETHYL ESTERS 1 G/1
1 CAPSULE, LIQUID FILLED ORAL 2 TIMES DAILY
Qty: 180 CAPSULE | Refills: 1 | Status: SHIPPED
Start: 2021-10-18 | End: 2022-05-17

## 2021-10-18 RX ADMIN — BUPIVACAINE HYDROCHLORIDE 7.5 MG: 2.5 INJECTION, SOLUTION INFILTRATION; PERINEURAL at 12:55

## 2021-10-18 RX ADMIN — TRIAMCINOLONE ACETONIDE 40 MG: 40 INJECTION, SUSPENSION INTRA-ARTICULAR; INTRAMUSCULAR at 12:56

## 2021-10-18 NOTE — PROGRESS NOTES
León Galeano M.D. [facility] PHYSICAL MEDICINE AND REHABILITAION  [address]  [place] [address]  Dept: [phone]  Dept Fax: [phone]    PCP: Anabelle Bentley DO  Date of visit: 10/18/21      Chief Complaint   Patient presents with    Lower Back Pain     Lower back and right lower leg pain. States increase in Gabapentin 'helped some\". Completed PT for right shoulder, had very little relief. Since last visit her PCP ordered xrays of right shoulder and knee. Daniel Swartz is a 62 y.o. woman who presents for follow up of low back pain. She had reported gradual onset of low back pain after no known injury years ago. She reports back and leg pain was worse since December 2020. She underwent L3-L5 PLIF on 5/25/2021. She states that since surgery the radiating left lower extremity pain has improved significantly, however has now been having pain radiating to the right lower extremity. She denies any new or worsening weakness in the legs, she feels leg weakness is about the same as it was prior to surgery. No incontinence. She is currently in PT. She saw Pain management and underwent bilateral SIJ injections on 10/11/2021. She does not feel she has had much relief from the injections. She is taking Percocet PRN (followed by Pain mgmt) with reported relief. She also continues gabapentin 800mg TID with partial relief. Cymbalta was added by Pain management and she is unsure of benefit. She is also complaining of a lot of pain in the right knee itself. Pain is constant. Pain is described as aching and sharp. Pain is located primarily at the medial joint line. No swelling. She reports that the knee occasionally gives out on her. No cracking or popping. No specific injury. Pain is worse with walking and worse toward the end of the day. Pain is better with Percocet. She had a right knee xray completed that showed arthritic changes. She is still reported aching and stiffness in the right shoulder, no improvement with PT. Now, the pain is constant. The pain is rated Pain Score:   4, is described as \"painful, burning\", and is located all across the low back with radiation to the right lower extremity. She previously had pain radiating primarily to the LLE, but states this has improved since surgery. She endorses tingling/burning in the right foot. She reports weakness in both legs that is unchanged since prior to surgery. She did not report significant spasms today. She denies falls since surgery. She has used a cane to ambulate since December 2020. She denies bowel/bladder incontinence or saddle anesthesia. The prior workup has included:   -Right / Left knee xray 9/16/2021:     Impression   1.  Moderate right patellofemoral and medial compartment joint space   narrowing, with very minimal secondary osteoarthritic degenerative disease,   not significantly changed since 12/30/2020.       2.  Incidental left medial compartment joint space narrowing, osseous   degenerative disease, and slight genu-varus configuration, as described.         -Right shoulder xray 9/16/2021     Impression   1.  A previously-existing right-internal-jugular-approach single-lumen   central venous catheter has been removed over the interval. Negative for   gross pneumothorax, bilaterally, within the limits of this study.       2.  Mild irregular cortical exostosis involves the undersurface ease of the   right acromion and lateral right clavicular head, slightly hook-like in   configuration at the level of the right acromion.       3.  Osseous degenerative disease, as described.           -Lumbar MRI 3/27/2021:  FINDINGS:   BONES/ALIGNMENT: There is normal alignment of the spine. The vertebral body   heights are maintained.  The bone marrow signal appears unremarkable.       SPINAL CORD: The conus terminates normally.       SOFT TISSUES: No paraspinal mass set of epidurals did not help with most recent exacerbation of pain prior to surgery. Bilateral SIJ injections on 10/11/2021 without relief. Previous surgery at this site: L3-L5 PLIF on  5/25/2021 with improvement in left lower extremity radiating pain.          Past Medical History:   Diagnosis Date    Back pain     COVID-19 09/2020    Diabetes mellitus (HCC)     Hyperlipidemia     Hypertension     Hypothyroidism     IBS (irritable bowel syndrome)     Obesity     Osteoarthritis     PONV (postoperative nausea and vomiting)        Past Surgical History:   Procedure Laterality Date    BACK SURGERY      CHOLECYSTECTOMY      HYSTERECTOMY, TOTAL ABDOMINAL      INCONTINENCE SURGERY      BLADDER SLING    KNEE SURGERY Left     LUMBAR SPINE SURGERY N/A 5/25/2021    L3- L5 DECOMPRESSION AND FUSION , L4- L5 TRANSFORAMINAL LUMBAR INTERBODY FUSION --OARM, PATY TABLE, AUDIOLOGY, PLATES ,SCREWS, --NUVASIVE performed by Amandeep Dale MD at [address] [place] [address] 10/11/2021    BILATERAL SACROILIAC JOINT INJECTION UNDER FLUOROSCOPY (CPT 93838) performed by Savanna Hernandez MD at [facility] OR       Social History     Socioeconomic History    Marital status:      Spouse name: Not on file    Number of children: Not on file    Years of education: Not on file    Highest education level: Not on file   Occupational History     Employer: [employer]   Tobacco Use    Smoking status: Never Smoker    Smokeless tobacco: Never Used   Vaping Use    Vaping Use: Never used   Substance and Sexual Activity    Alcohol use: Never    Drug use: Never    Sexual activity: Not on file   Other Topics Concern    Not on file   Social History Narrative    Not on file     Social Determinants of Health     Financial Resource Strain: Low Risk     Difficulty of Paying Living Expenses: Not hard at all   Food Insecurity: No Food Insecurity    Worried About [facility] in the Last Year: Never true    Ran Out of Food in the Last Year: Never true   Transportation Needs:     Lack of Transportation (Medical):      Lack of Transportation (Non-Medical):    Physical Activity:     Days of Exercise per Week:     Minutes of Exercise per Session:    Stress:     Feeling of Stress :    Social Connections:     Frequency of Communication with Friends and Family:     Frequency of Social Gatherings with Friends and Family:     Attends Protestant Services:     Active Member of Clubs or Organizations:     Attends Club or Organization Meetings:     Marital Status:    Intimate Partner Violence:     Fear of Current or Ex-Partner:     Emotionally Abused:     Physically Abused:     Sexually Abused:           Family History   Problem Relation Age of Onset    Diabetes Mother     Diabetes Father     Rheum Arthritis Sister     Cancer Maternal Grandfather        Allergies   Allergen Reactions    Bactrim [Sulfamethoxazole-Trimethoprim] Nausea Only    Ceftin [Cefuroxime] Rash    Keflex [Cephalexin] Rash       Current Outpatient Medications   Medication Sig Dispense Refill    Thiamine HCl (B-1) 100 MG TABS Take 1 pill daily 90 tablet 1    gemfibrozil (LOPID) 600 MG tablet TAKE 1 TABLET BY MOUTH EVERY 12 HOURS 180 tablet 3    metFORMIN (GLUCOPHAGE) 500 MG tablet TAKE 1 TABLET BY MOUTH EVERY DAY WITH BREAKFAST 90 tablet 3    omeprazole (PRILOSEC) 20 MG delayed release capsule TAKE 1 CAPSULE BY MOUTH EVERY DAY 90 capsule 3    omega-3 acid ethyl esters (LOVAZA) 1 g capsule 30mg daily x3 days, then 20mg daily x3 days, then 10mg daily x3 days (Patient taking differently: Take 1 g by mouth daily ) 90 capsule 3    vitamin D (ERGOCALCIFEROL) 1.25 MG (04381 UT) CAPS capsule TAKE 1 CAPSULE BY MOUTH ONE TIME PER WEEK 12 capsule 0    linaclotide (LINZESS) 145 MCG capsule TAKE 1 CAPSULE BY MOUTH EVERY DAY IN THE MORNING BEFORE BREAKFAST 90 capsule 3    valsartan (DIOVAN) 80 MG tablet TAKE 1 TABLET BY MOUTH EVERY DAY 90 tablet 1    Thiamine HCl (B-1) 100 MG TABS Take 1 pill daily 90 tablet 1    gabapentin (NEURONTIN) 400 MG capsule take 2 capsules by mouth three times a day 180 capsule 2    cyclobenzaprine (FLEXERIL) 10 MG tablet Take 1 tablet by mouth 3 times daily as needed for Muscle spasms 30 tablet 0    OZEMPIC, 1 MG/DOSE, 2 MG/1.5ML SOPN Inject 1 mg into the skin once a week 6 pen 3    acetaminophen (TYLENOL) 500 MG tablet Take 500 mg by mouth every 6 hours as needed for Pain      oxyCODONE-acetaminophen (PERCOCET) 5-325 MG per tablet Take 1 tablet by mouth 2 times daily as needed for Pain for up to 15 days. 30 tablet 0    DULoxetine (CYMBALTA) 30 MG extended release capsule Take 1 capsule by mouth daily 30 capsule 1    torsemide (DEMADEX) 10 MG tablet Take 1 tablet by mouth daily 30 tablet 3    pravastatin (PRAVACHOL) 10 MG tablet Take 1 tablet by mouth every other day 45 tablet 1    vitamin B-12 (CYANOCOBALAMIN) 100 MCG tablet Take 0.5 tablets by mouth daily 90 tablet 3    levothyroxine (SYNTHROID) 150 MCG tablet TAKE 1 TABLET BY MOUTH EVERY DAY 90 tablet 1    lidocaine (LIDODERM) 5 % PLACE 1 PATCH ONTO THE SKIN DAILY 12 HOURS ON, 12 HOURS OFF.  polyethylene glycol (GLYCOLAX) 17 g packet Take 17 g by mouth daily as needed      Handicap Placard MISC by Does not apply route Patient cannot walk 200 ft without stopping to rest.    Expiration 1 yr 1 each 0     No current facility-administered medications for this visit. Review of Systems  General: No chills, fatigue, fever, malaise, night sweats, weight gain,  weight loss. Psychological: No anxiety, depression, suicidal ideation   Ophthalmic: No blurry vision, decreased vision, double vision, loss of vision  Ear Nose Throat: No hearing loss, tinnitus, phonophobia, sensitivity to smells, vertigo, or vocal changes. Allergy/Immunology: No watery eyes, itchy eyes, frequent infections.     Hematological and Lymphatic: No bleeding problems, blood clots, bruising  Endocrine:  No polydypsia, polyuria, temperature intolerance. Respiratory: No cough, shortness of breath, wheezing. Cardiovascular: No syncope, chest pain, dyspnea on exertion,palpitations. Gastrointestinal: No abdominal pain, hematemesis, melena, nausea, vomiting, stool incontinence  Genito-Urinary: No dysuria, hematuria, incontinence   Musculoskeletal: Per HPI  Neurological: Per HPI  Dermatological:  No rash      Physical Exam:   Vitals:    10/18/21 1125   BP: 114/76   Pulse: 92   Temp: 98.4 °F (36.9 °C)   SpO2: 97%     General: The patient is in no apparent distress. Body habitus is morbidly obese  HEENT: No rhinorrhea, sneezing, yawning, or lacrimation. No scleral icterus or conjunctival injection. SKIN: No piloerection. No track marks. No rash. Normal turgor. No erythema or ecchymosis. Psychological: Mood and affect are appropriate. Hygiene is appropriate. Cardiovascular:  Heart is regular rate. Peripheral pulses are 2+ and symmetric. Respiratory: Respirations are regular and unlabored. There is no cyanosis. Gastrointestinal: No abdominal distension   Genitourinary: No costovertebral angle tenderness. MSK: Lumbar:  Thoracic kyphosis increased and lumbar lordosis decreased. Pelvis level. There is no step off deformity. No superficial or bony tenderness. Lumbar AROM impaired in all planes. There is tenderness to palpation over the bilateral lumbar paraspinals. No tenderness to palpation at bilateral SIJ, gluteal muscles, PSIS, ischial tuberosity, greater trochanter, ASIS, iliac crest.  There is mild spasm of the bilateral lumbar paraspinals. No edema, erythema, ecchymosis,  mass or deformity. Hip: Full painless AROM bilateral hip. Right shoulder:  No significant tenderness. No deformity. Active abduction to 85-90 degrees which was limited by discomfort, passive flexion/abduction to 170 degrees.  No specific impingement signs, pt states it feels stiff in general. Cervical osteoarthritis of right knee    5. Chronic right shoulder pain          Plan:   · Continue gabapentin 800mg TID. · Patient will continue Cymbalta and Percocet as prescribed by Pain management  · Continue Lidoderm patch as needed  · Will add Voltaren gel for right knee and right shoulder  · Right knee steroid injection today  · Could consider right shoulder injection in the future if continues to be painful   The patient was educated about the diagnosis, prognosis, indications, risks and benefits of treatment. An opportunity to ask questions was given to the patient and questions were answered. The patient agreed to proceed with the recommended treatment as described above. Follow up 3 months          Thompson Cordero M.D.   Physical Medicine and Rehabilitation

## 2021-10-19 DIAGNOSIS — Z98.1 S/P LUMBAR FUSION: ICD-10-CM

## 2021-10-20 RX ORDER — OXYCODONE HYDROCHLORIDE AND ACETAMINOPHEN 5; 325 MG/1; MG/1
1 TABLET ORAL 2 TIMES DAILY PRN
Qty: 30 TABLET | Refills: 0 | Status: SHIPPED
Start: 2021-10-20 | End: 2021-11-01 | Stop reason: SDUPTHER

## 2021-10-20 NOTE — TELEPHONE ENCOUNTER
Ok to refill this time, please inform the patient that for future refills, need to make follow up appointments.     Thanks

## 2021-11-01 ENCOUNTER — OFFICE VISIT (OUTPATIENT)
Dept: PRIMARY CARE CLINIC | Age: 61
End: 2021-11-01
Payer: COMMERCIAL

## 2021-11-01 ENCOUNTER — PREP FOR PROCEDURE (OUTPATIENT)
Dept: PAIN MANAGEMENT | Age: 61
End: 2021-11-01

## 2021-11-01 ENCOUNTER — OFFICE VISIT (OUTPATIENT)
Dept: PAIN MANAGEMENT | Age: 61
End: 2021-11-01
Payer: COMMERCIAL

## 2021-11-01 VITALS
BODY MASS INDEX: 44.05 KG/M2 | WEIGHT: 258 LBS | HEART RATE: 74 BPM | RESPIRATION RATE: 16 BRPM | TEMPERATURE: 98 F | OXYGEN SATURATION: 98 % | HEIGHT: 64 IN | SYSTOLIC BLOOD PRESSURE: 138 MMHG | DIASTOLIC BLOOD PRESSURE: 88 MMHG

## 2021-11-01 VITALS
SYSTOLIC BLOOD PRESSURE: 124 MMHG | TEMPERATURE: 97.2 F | DIASTOLIC BLOOD PRESSURE: 82 MMHG | BODY MASS INDEX: 45.24 KG/M2 | OXYGEN SATURATION: 97 % | HEART RATE: 55 BPM | WEIGHT: 265 LBS | HEIGHT: 64 IN

## 2021-11-01 DIAGNOSIS — M96.1 POSTLAMINECTOMY SYNDROME, LUMBAR: ICD-10-CM

## 2021-11-01 DIAGNOSIS — M51.36 DDD (DEGENERATIVE DISC DISEASE), LUMBAR: Primary | ICD-10-CM

## 2021-11-01 DIAGNOSIS — M47.817 LUMBOSACRAL SPONDYLOSIS WITHOUT MYELOPATHY: ICD-10-CM

## 2021-11-01 DIAGNOSIS — M54.50 ACUTE EXACERBATION OF CHRONIC LOW BACK PAIN: ICD-10-CM

## 2021-11-01 DIAGNOSIS — Z98.1 S/P LUMBAR FUSION: ICD-10-CM

## 2021-11-01 DIAGNOSIS — E66.9 OBESITY, UNSPECIFIED CLASSIFICATION, UNSPECIFIED OBESITY TYPE, UNSPECIFIED WHETHER SERIOUS COMORBIDITY PRESENT: ICD-10-CM

## 2021-11-01 DIAGNOSIS — E78.49 OTHER HYPERLIPIDEMIA: Chronic | ICD-10-CM

## 2021-11-01 DIAGNOSIS — G89.29 ACUTE EXACERBATION OF CHRONIC LOW BACK PAIN: ICD-10-CM

## 2021-11-01 DIAGNOSIS — M51.36 DDD (DEGENERATIVE DISC DISEASE), LUMBAR: ICD-10-CM

## 2021-11-01 DIAGNOSIS — E11.9 TYPE 2 DIABETES MELLITUS WITHOUT COMPLICATION, WITHOUT LONG-TERM CURRENT USE OF INSULIN (HCC): Primary | Chronic | ICD-10-CM

## 2021-11-01 DIAGNOSIS — M47.817 LUMBOSACRAL SPONDYLOSIS WITHOUT MYELOPATHY: Primary | ICD-10-CM

## 2021-11-01 DIAGNOSIS — E11.9 TYPE 2 DIABETES MELLITUS WITHOUT COMPLICATION, WITHOUT LONG-TERM CURRENT USE OF INSULIN (HCC): Chronic | ICD-10-CM

## 2021-11-01 DIAGNOSIS — M54.16 LUMBAR RADICULOPATHY: ICD-10-CM

## 2021-11-01 DIAGNOSIS — E03.9 ACQUIRED HYPOTHYROIDISM: Chronic | ICD-10-CM

## 2021-11-01 PROCEDURE — 99214 OFFICE O/P EST MOD 30 MIN: CPT | Performed by: ANESTHESIOLOGY

## 2021-11-01 PROCEDURE — 99214 OFFICE O/P EST MOD 30 MIN: CPT | Performed by: FAMILY MEDICINE

## 2021-11-01 PROCEDURE — 90471 IMMUNIZATION ADMIN: CPT | Performed by: FAMILY MEDICINE

## 2021-11-01 PROCEDURE — 90674 CCIIV4 VAC NO PRSV 0.5 ML IM: CPT | Performed by: FAMILY MEDICINE

## 2021-11-01 PROCEDURE — 99213 OFFICE O/P EST LOW 20 MIN: CPT | Performed by: ANESTHESIOLOGY

## 2021-11-01 RX ORDER — CYCLOBENZAPRINE HCL 10 MG
10 TABLET ORAL 3 TIMES DAILY PRN
Qty: 180 TABLET | Refills: 0 | Status: SHIPPED
Start: 2021-11-01 | End: 2022-01-18 | Stop reason: SDUPTHER

## 2021-11-01 RX ORDER — OXYCODONE HYDROCHLORIDE AND ACETAMINOPHEN 5; 325 MG/1; MG/1
1 TABLET ORAL 2 TIMES DAILY PRN
Qty: 60 TABLET | Refills: 0 | Status: SHIPPED | OUTPATIENT
Start: 2021-11-04 | End: 2021-12-04

## 2021-11-01 RX ORDER — DULOXETIN HYDROCHLORIDE 60 MG/1
60 CAPSULE, DELAYED RELEASE ORAL DAILY
Qty: 30 CAPSULE | Refills: 0 | Status: SHIPPED
Start: 2021-11-01 | End: 2021-12-13 | Stop reason: SDUPTHER

## 2021-11-01 ASSESSMENT — ENCOUNTER SYMPTOMS
EYE REDNESS: 0
SINUS PRESSURE: 0
COUGH: 0
SORE THROAT: 0
EYE ITCHING: 0
DIARRHEA: 0
RHINORRHEA: 0
ABDOMINAL PAIN: 0
BACK PAIN: 1
NAUSEA: 0
COLOR CHANGE: 0
APNEA: 0
EYE PAIN: 0
CONSTIPATION: 0
VISUAL CHANGE: 0
VOMITING: 0
BLOOD IN STOOL: 0
SHORTNESS OF BREATH: 0
CHEST TIGHTNESS: 0
WHEEZING: 0

## 2021-11-01 NOTE — PROGRESS NOTES
Do you currently have any of the following:    Fever: No  Headache:  No  Cough: No  Shortness of breath: No  Exposed to anyone with these symptoms: No         Deanne Mark presents to the [address] [place] [address] on 11/1/2021. Kolby Javier is complaining of pain low back/rt. Shoulder/rt. Leg  The pain is constant. The pain is described as aching, throbbing, shooting and stabbing. Pain is rated on her best day at a 3, on her worst day at a 7, and on average at a 5 on the VAS scale. She took her last dose of Percocet last night     Any procedures since your last visit:     Pacemaker or defibrillator: No managed by . She is not on NSAIDS and is not on anticoagulation medications to include none and is managed by     Medication Contract and Consent for Opioid Use Documents Filed     Patient Documents       Type of Document Status Date Received Received By Description     Medication Contract Received 10/6/2021 10:43 AM SHANE CRENSHAW pain management                /88   Pulse 74   Temp 98 °F (36.7 °C) (Infrared)   Resp 16   Ht 5' 4\" (1.626 m)   Wt 258 lb (117 kg)   SpO2 98%   BMI 44.29 kg/m²      No LMP recorded. Patient has had a hysterectomy.

## 2021-11-01 NOTE — PROGRESS NOTES
Chief Complaint:     Chief Complaint   Patient presents with    3 Month Follow-Up    Back Pain    Diabetes    Hyperlipidemia    Hypertension    Hypothyroidism         Back Pain  This is a recurrent problem. The current episode started more than 1 month ago. The problem occurs intermittently. The problem has been waxing and waning since onset. The pain is present in the lumbar spine. The quality of the pain is described as aching. The pain is moderate. Associated symptoms include leg pain. Pertinent negatives include no abdominal pain, chest pain, dysuria, fever, headaches, numbness or weakness. She has tried nothing for the symptoms. The treatment provided no relief. Diabetes  She presents for her follow-up diabetic visit. She has type 2 diabetes mellitus. The initial diagnosis of diabetes was made 1 month ago. Her disease course has been stable. There are no hypoglycemic associated symptoms. Pertinent negatives for hypoglycemia include no dizziness, headaches or nervousness/anxiousness. Associated symptoms include fatigue. Pertinent negatives for diabetes include no chest pain, no visual change and no weakness. There are no hypoglycemic complications. Symptoms are stable. There are no diabetic complications. Risk factors for coronary artery disease include dyslipidemia and obesity. Current diabetic treatment includes oral agent (monotherapy). She is compliant with treatment all of the time. She is following a generally healthy diet. When asked about meal planning, she reported none. She has not had a previous visit with a dietitian. There is no change in her home blood glucose trend. An ACE inhibitor/angiotensin II receptor blocker is not being taken. She does not see a podiatrist.Eye exam is not current. Hyperlipidemia  This is a chronic problem. The current episode started more than 1 year ago. The problem is controlled.  Recent lipid tests were reviewed and are normal. Exacerbating diseases include diabetes and obesity. Associated symptoms include leg pain and myalgias. Pertinent negatives include no chest pain or shortness of breath. Current antihyperlipidemic treatment includes statins. The current treatment provides significant improvement of lipids. Compliance problems include medication side effects. Risk factors for coronary artery disease include diabetes mellitus, dyslipidemia, obesity and post-menopausal.   Hypertension  This is a chronic problem. The current episode started more than 1 year ago. The problem is unchanged. The problem is controlled. Associated symptoms include malaise/fatigue. Pertinent negatives include no chest pain, headaches, neck pain, palpitations or shortness of breath. There are no associated agents to hypertension. Risk factors for coronary artery disease include diabetes mellitus, dyslipidemia, obesity and post-menopausal state. Past treatments include angiotensin blockers. The current treatment provides significant improvement. There are no compliance problems. Identifiable causes of hypertension include a thyroid problem. There is no history of a hypertension causing med, pheochromocytoma, renovascular disease or sleep apnea. Fatigue  This is a chronic problem. The problem occurs intermittently. The problem has been waxing and waning. Associated symptoms include fatigue, myalgias and a rash. Pertinent negatives include no abdominal pain, arthralgias, chest pain, congestion, coughing, diaphoresis, fever, headaches, nausea, neck pain, numbness, sore throat, visual change, vomiting or weakness. Nothing aggravates the symptoms. She has tried nothing for the symptoms. The treatment provided no relief.        Patient Active Problem List   Diagnosis    Type 2 diabetes mellitus without complication, without long-term current use of insulin ([facility] [address]    Acquired hypothyroidism    Peripheral neuropathy    Carpal tunnel syndrome of left wrist    Spinal stenosis of lumbar region TABLET BY MOUTH EVERY 12 HOURS 180 tablet 3    metFORMIN (GLUCOPHAGE) 500 MG tablet TAKE 1 TABLET BY MOUTH EVERY DAY WITH BREAKFAST 90 tablet 3    omeprazole (PRILOSEC) 20 MG delayed release capsule TAKE 1 CAPSULE BY MOUTH EVERY DAY 90 capsule 3    vitamin D (ERGOCALCIFEROL) 1.25 MG (54464 UT) CAPS capsule TAKE 1 CAPSULE BY MOUTH ONE TIME PER WEEK 12 capsule 0    linaclotide (LINZESS) 145 MCG capsule TAKE 1 CAPSULE BY MOUTH EVERY DAY IN THE MORNING BEFORE BREAKFAST 90 capsule 3    valsartan (DIOVAN) 80 MG tablet TAKE 1 TABLET BY MOUTH EVERY DAY 90 tablet 1    diclofenac sodium (VOLTAREN) 1 % GEL Apply 4 g topically 4 times daily as needed for Pain 150 g 3    omega-3 acid ethyl esters (LOVAZA) 1 g capsule Take 1 capsule by mouth 2 times daily 180 capsule 1    gabapentin (NEURONTIN) 400 MG capsule take 2 capsules by mouth three times a day 180 capsule 2    OZEMPIC, 1 MG/DOSE, 2 MG/1.5ML SOPN Inject 1 mg into the skin once a week 6 pen 3    acetaminophen (TYLENOL) 500 MG tablet Take 500 mg by mouth every 6 hours as needed for Pain      torsemide (DEMADEX) 10 MG tablet Take 1 tablet by mouth daily 30 tablet 3    pravastatin (PRAVACHOL) 10 MG tablet Take 1 tablet by mouth every other day 45 tablet 1    vitamin B-12 (CYANOCOBALAMIN) 100 MCG tablet Take 0.5 tablets by mouth daily 90 tablet 3    levothyroxine (SYNTHROID) 150 MCG tablet TAKE 1 TABLET BY MOUTH EVERY DAY 90 tablet 1    lidocaine (LIDODERM) 5 % PLACE 1 PATCH ONTO THE SKIN DAILY 12 HOURS ON, 12 HOURS OFF.  polyethylene glycol (GLYCOLAX) 17 g packet Take 17 g by mouth daily as needed      Handicap Placard MISC by Does not apply route Patient cannot walk 200 ft without stopping to rest.    Expiration 1 yr 1 each 0     No current facility-administered medications for this visit.        Allergies   Allergen Reactions    Bactrim [Sulfamethoxazole-Trimethoprim] Nausea Only    Ceftin [Cefuroxime] Rash    Keflex [Cephalexin] Rash       Social History     Socioeconomic History    Marital status:      Spouse name: None    Number of children: None    Years of education: None    Highest education level: None   Occupational History     Employer: [employer]   Tobacco Use    Smoking status: Never Smoker    Smokeless tobacco: Never Used   Vaping Use    Vaping Use: Never used   Substance and Sexual Activity    Alcohol use: Never    Drug use: Never    Sexual activity: None   Other Topics Concern    None   Social History Narrative    None     Social Determinants of Health     Financial Resource Strain: Low Risk     Difficulty of Paying Living Expenses: Not hard at all   Food Insecurity: No Food Insecurity    Worried About Running Out of Food in the Last Year: Never true    Stephen of Food in the Last Year: Never true   Transportation Needs:     Lack of Transportation (Medical):  Lack of Transportation (Non-Medical):    Physical Activity:     Days of Exercise per Week:     Minutes of Exercise per Session:    Stress:     Feeling of Stress :    Social Connections:     Frequency of Communication with Friends and Family:     Frequency of Social Gatherings with Friends and Family:     Attends Hinduism Services:     Active Member of Clubs or Organizations:     Attends Club or Organization Meetings:     Marital Status:    Intimate Partner Violence:     Fear of Current or Ex-Partner:     Emotionally Abused:     Physically Abused:     Sexually Abused:        Family History   Problem Relation Age of Onset    Diabetes Mother     Diabetes Father     Rheum Arthritis Sister     Cancer Maternal Grandfather           Review of Systems   Constitutional: Positive for fatigue and malaise/fatigue. Negative for activity change, appetite change, diaphoresis and fever. HENT: Negative for congestion, ear pain, hearing loss, nosebleeds, rhinorrhea, sinus pressure and sore throat.     Eyes: Negative for pain, redness, itching and visual disturbance. Respiratory: Negative for apnea, cough, chest tightness, shortness of breath and wheezing. Cardiovascular: Negative for chest pain, palpitations and leg swelling. Gastrointestinal: Negative for abdominal pain, blood in stool, constipation, diarrhea, nausea and vomiting. Endocrine: Negative. Genitourinary: Negative for decreased urine volume, difficulty urinating, dysuria, frequency, hematuria and urgency. Musculoskeletal: Positive for back pain and myalgias. Negative for arthralgias, gait problem and neck pain. Skin: Positive for rash. Negative for color change. Allergic/Immunologic: Negative for environmental allergies and food allergies. Neurological: Negative for dizziness, weakness, light-headedness, numbness and headaches. Hematological: Negative for adenopathy. Does not bruise/bleed easily. Psychiatric/Behavioral: Negative for behavioral problems, dysphoric mood and sleep disturbance. The patient is not nervous/anxious and is not hyperactive. All other systems reviewed and are negative. /82   Pulse 55   Temp 97.2 °F (36.2 °C)   Ht 5' 4\" (1.626 m)   Wt 265 lb (120.2 kg)   SpO2 97%   BMI 45.49 kg/m²     Physical Exam  Vitals and nursing note reviewed. Constitutional:       General: She is not in acute distress. Appearance: Normal appearance. She is well-developed. HENT:      Head: Normocephalic and atraumatic. Right Ear: Hearing, tympanic membrane and external ear normal. No tenderness. No middle ear effusion. Left Ear: Hearing, tympanic membrane and external ear normal. No tenderness. No middle ear effusion. Nose: Nose normal. No congestion or rhinorrhea. Right Turbinates: Not enlarged. Left Turbinates: Not enlarged. Mouth/Throat:      Mouth: Mucous membranes are moist.      Tongue: No lesions. Pharynx: Oropharynx is clear. No oropharyngeal exudate or posterior oropharyngeal erythema. Eyes:      General: No scleral icterus. Conjunctiva/sclera: Conjunctivae normal.      Pupils: Pupils are equal, round, and reactive to light. Neck:      Thyroid: No thyromegaly. Cardiovascular:      Rate and Rhythm: Normal rate and regular rhythm. Heart sounds: Normal heart sounds. No murmur heard. Pulmonary:      Effort: Pulmonary effort is normal. No respiratory distress. Breath sounds: Normal breath sounds. No wheezing or rales. Abdominal:      General: Bowel sounds are normal. There is no distension. Palpations: Abdomen is soft. Tenderness: There is no abdominal tenderness. Musculoskeletal:         General: No tenderness. Normal range of motion. Cervical back: Normal range of motion and neck supple. No rigidity. No muscular tenderness. Lymphadenopathy:      Cervical: No cervical adenopathy. Skin:     General: Skin is warm and dry. Findings: No erythema or rash. Neurological:      General: No focal deficit present. Mental Status: She is alert and oriented to person, place, and time. Cranial Nerves: No cranial nerve deficit. Deep Tendon Reflexes: Reflexes are normal and symmetric.  Reflexes normal.   Psychiatric:         Mood and Affect: Mood normal.                                 ASSESSMENT/PLAN:    Patient Active Problem List   Diagnosis    Type 2 diabetes mellitus without complication, without long-term current use of insulin ([facility])    Acquired hypothyroidism    Peripheral neuropathy    Carpal tunnel syndrome of left wrist    Spinal stenosis of lumbar region with neurogenic claudication    Acute exacerbation of chronic low back pain    Hyperlipidemia    Spondylolisthesis of lumbar region    Lumbar radiculopathy    Postoperative hypotension    Postoperative anemia due to acute blood loss    Venous insufficiency of both lower extremities    Impingement syndrome of right shoulder    DDD (degenerative disc disease), lumbar    Lumbosacral spondylosis without myelopathy    S/P lumbar fusion    Sacroiliac dysfunction    Postlaminectomy syndrome, lumbar       Bessie Be was seen today for 3 month follow-up, back pain, diabetes, hyperlipidemia, hypertension and hypothyroidism. Diagnoses and all orders for this visit:    Type 2 diabetes mellitus without complication, without long-term current use of insulin ([facility]CC)    Acquired hypothyroidism    Other hyperlipidemia    DDD (degenerative disc disease), lumbar    Acute exacerbation of chronic low back pain    Postlaminectomy syndrome, lumbar    Lumbar radiculopathy    Other orders  -     cyclobenzaprine (FLEXERIL) 10 MG tablet; Take 1 tablet by mouth 3 times daily as needed for Muscle spasms  -     INFLUENZA, MDCK QUADV, 2 YRS AND OLDER, IM, PF, PREFILL SYR OR SDV, 0.5ML (FLUCELVAX QUADV, PF)    Continue plan per Pain Management  Injections to be done  Pain meds per Pain Management      Return in about 3 months (around 2/1/2022) for Follow up DM, Follow up Lipids, Follow up HTN, Recheck labs, Recheck Meds. I spent 30 minutes with this patient. I spent greater than 50% of the time counseling this patient.         Skylar Weldon DO  11/1/2021  2:46 PM

## 2021-11-01 NOTE — PROGRESS NOTES
[facility]  [place], [address]  Dept: [phone]      Follow up Note      Mercy Russo     Date of Visit:  11/1/2021    CC:  Patient presents for follow up   Chief Complaint   Patient presents with    Follow-up     post procedure        HPI:  Low back pain. S/P L3-5 fusion and L4-5 TLIF on May 25 2021- helped the left LE pain significantly.     Has noticed right LE pain after the surgery. Has been followed by NSG had X-ray done. Intact fusion.     Low back pain and right LE pain- Has tingling of the foot (h/o DM).    Also has right shoulder pain and right knee pain for which she had X-rays. Pain causes functional limitations/ limits Adl's : Yes     Nursing notes and details of the pain history reviewed.  Please see intake notes for details.     Previous treatments:   Physical Therapy : yes, currently in PT      Medications: - NSAID's : yes                        - Membrane stabilizers : yes - gabapentin                       - Opioids : yes, post op use                       - Adjuvants or Others : yes,      Surgeries: yes, L3-5 fusion in May 2021     She has not been on anticoagulation medications      She has not been on herbal supplements.       She is diabetic.     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  Degenerative disc   disease is present at multiple levels and is greatest at L5-S1 where it is   moderate to severe.  Normal soft tissues.           Impression   Intact lumbar fusion hardware with normal alignment.  Stable degenerative   disc disease.             Xray Right knee: 9/16/2021:      Impression   1.  Moderate right patellofemoral and medial compartment joint space   narrowing, with very minimal secondary osteoarthritic degenerative disease,   not significantly changed since 12/30/2020.       2.  Incidental left medial compartment joint space narrowing, osseous   degenerative disease, and slight genu-varus configuration, as described.       .      Xray right shoulder: 9/16/2021:  Impression   1.  A previously-existing right-internal-jugular-approach single-lumen   central venous catheter has been removed over the interval. Negative for   gross pneumothorax, bilaterally, within the limits of this study.       2.  Mild irregular cortical exostosis involves the undersurface ease of the   right acromion and lateral right clavicular head, slightly hook-like in   configuration at the level of the right acromion.       3.  Osseous degenerative disease, as described.           Potential Aberrant Drug-Related Behavior:      Urine Drug Screening:    OARRS report[de-identified] reviewed    Past Medical History:   Diagnosis Date    Back pain     COVID-19 09/2020    Diabetes mellitus ([facility] [address]     Hyperlipidemia     Hypertension     Hypothyroidism     IBS (irritable bowel syndrome)     Obesity     Osteoarthritis     PONV (postoperative nausea and vomiting)        Past Surgical History:   Procedure Laterality Date    BACK SURGERY      CHOLECYSTECTOMY      HYSTERECTOMY, TOTAL ABDOMINAL      INCONTINENCE SURGERY      BLADDER SLING    KNEE SURGERY Left     LUMBAR SPINE SURGERY N/A 5/25/2021    L3- L5 DECOMPRESSION AND FUSION , L4- L5 TRANSFORAMINAL LUMBAR INTERBODY FUSION --OARM, PATY TABLE, AUDIOLOGY, PLATES ,SCREWS, --NUVASIVE performed by Megan Knox MD at [facility] 21 Bilateral 10/11/2021    BILATERAL SACROILIAC JOINT INJECTION UNDER FLUOROSCOPY (CPT 13027) performed by Benitez Castro MD at [facility] OR       Prior to Admission medications    Medication Sig Start Date End Date Taking? Authorizing Provider   oxyCODONE-acetaminophen (PERCOCET) 5-325 MG per tablet Take 1 tablet by mouth 2 times daily as needed for Pain for up to 15 days.  10/20/21 11/4/21 Yes Phoenix Stout Julio Mullen, [address]   Thiamine HCl (B-1) 100 MG TABS Take 1 pill daily 10/18/21  Yes Anabelle Bentley DO   gemfibrozil (LOPID) 600 MG tablet TAKE 1 TABLET BY MOUTH EVERY 12 HOURS 10/18/21  Yes Anabelle Bentley DO   metFORMIN (GLUCOPHAGE) 500 MG tablet TAKE 1 TABLET BY MOUTH EVERY DAY WITH BREAKFAST 10/18/21  Yes Anabelle Bentley DO   omeprazole (PRILOSEC) 20 MG delayed release capsule TAKE 1 CAPSULE BY MOUTH EVERY DAY 10/18/21  Yes Prieto Robin DO   vitamin D (ERGOCALCIFEROL) 1.25 MG (51136 UT) CAPS capsule TAKE 1 CAPSULE BY MOUTH ONE TIME PER WEEK 10/18/21  Yes Prieto Robin DO   linaclotide (LINZESS) 145 MCG capsule TAKE 1 CAPSULE BY MOUTH EVERY DAY IN THE MORNING BEFORE BREAKFAST 10/18/21  Yes Prieto Robin DO   valsartan (DIOVAN) 80 MG tablet TAKE 1 TABLET BY MOUTH EVERY DAY 10/18/21  Yes Prieto Robin DO   diclofenac sodium (VOLTAREN) 1 % GEL Apply 4 g topically 4 times daily as needed for Pain 10/18/21  Yes Asuh Lujan MD   omega-3 acid ethyl esters (LOVAZA) 1 g capsule Take 1 capsule by mouth 2 times daily 10/18/21  Yes Prieto Robin DO   gabapentin (NEURONTIN) 400 MG capsule take 2 capsules by mouth three times a day 10/14/21 11/13/21 Yes Ashu Lujan MD   cyclobenzaprine (FLEXERIL) 10 MG tablet Take 1 tablet by mouth 3 times daily as needed for Muscle spasms 10/13/21  Yes Anabelle Bentley DO   OZEMPIC, 1 MG/DOSE, 2 MG/1.5ML SOPN Inject 1 mg into the skin once a week 10/7/21  Yes Anabelle Bentley DO   acetaminophen (TYLENOL) 500 MG tablet Take 500 mg by mouth every 6 hours as needed for Pain   Yes Cass Vanegas MD   DULoxetine (CYMBALTA) 30 MG extended release capsule Take 1 capsule by mouth daily 10/6/21  Yes Hattie Marcelino MD   torsemide (DEMADEX) 10 MG tablet Take 1 tablet by mouth daily 8/30/21  Yes Anabelle Sheree, DO   pravastatin (PRAVACHOL) 10 MG tablet Take 1 tablet by mouth every other day 8/30/21  Yes Anabelle Bentley DO   vitamin B-12 (CYANOCOBALAMIN) 100 MCG tablet Take 0.5 tablets by mouth daily 8/30/21  Yes Lu Arnold, DO   levothyroxine (SYNTHROID) 150 MCG tablet TAKE 1 TABLET BY MOUTH EVERY DAY 8/13/21  Yes Prieto Robin,    lidocaine (LIDODERM) 5 % PLACE 1 PATCH ONTO THE SKIN DAILY 12 HOURS ON, 12 HOURS OFF. 6/2/21  Yes Historical Provider, MD   polyethylene glycol (GLYCOLAX) 17 g packet Take 17 g by mouth daily as needed   Yes Historical Provider, MD   Handicap Placard [address] [facility] by Does not apply route Patient cannot walk 200 ft without stopping to rest.    Expiration 1 yr 5/3/21  Yes Lu Arnold, DO   Thiamine HCl (B-1) 100 MG TABS Take 1 pill daily 10/18/21   Lu Arnold, DO   levothyroxine (SYNTHROID) 150 MCG tablet Take 1 tablet by mouth Daily 1/12/21   Lu Arnold, DO   rosuvastatin (CRESTOR) 5 MG tablet Take 1 tablet by mouth daily 1/12/21   Lu Arnold, DO   meloxicam (MOBIC) 15 MG tablet Take 1 tablet by mouth daily as needed for Pain 1/11/21   Lu Arnold, DO       Allergies   Allergen Reactions    Bactrim [Sulfamethoxazole-Trimethoprim] Nausea Only    Ceftin [Cefuroxime] Rash    Keflex [Cephalexin] Rash       Social History     Socioeconomic History    Marital status:      Spouse name: Not on file    Number of children: Not on file    Years of education: Not on file    Highest education level: Not on file   Occupational History     Employer: [employer]   Tobacco Use    Smoking status: Never Smoker    Smokeless tobacco: Never Used   Vaping Use    Vaping Use: Never used   Substance and Sexual Activity    Alcohol use: Never    Drug use: Never    Sexual activity: Not on file   Other Topics Concern    Not on file   Social History Narrative    Not on file     Social Determinants of Health     Financial Resource Strain: Low Risk     Difficulty of Paying Living Expenses: Not hard at all   Food Insecurity: No Food Insecurity    Worried About [address] in the Last Year: Never true  Ran Out of Food in the Last Year: Never true   Transportation Needs:     Lack of Transportation (Medical):  Lack of Transportation (Non-Medical):    Physical Activity:     Days of Exercise per Week:     Minutes of Exercise per Session:    Stress:     Feeling of Stress :    Social Connections:     Frequency of Communication with Friends and Family:     Frequency of Social Gatherings with Friends and Family:     Attends Church Services:     Active Member of Clubs or Organizations:     Attends Club or Organization Meetings:     Marital Status:    Intimate Partner Violence:     Fear of Current or Ex-Partner:     Emotionally Abused:     Physically Abused:     Sexually Abused:        Family History   Problem Relation Age of Onset    Diabetes Mother     Diabetes Father     Rheum Arthritis Sister     Cancer Maternal Grandfather        REVIEW OF SYSTEMS:     Elisha Queen denies fever/chills, chest pain, shortness of breath, new bowel or bladder complaints. All other review of systems was negative. PHYSICAL EXAMINATION:      /88   Pulse 74   Temp 98 °F (36.7 °C) (Infrared)   Resp 16   Ht 5' 4\" (1.626 m)   Wt 258 lb (117 kg)   SpO2 98%   BMI 44.29 kg/m²   General:       General appearance:  Pleasant and well-hydrated, in no distress and A & O x 3  Build:Obese  Function: Rises from seated position easily and Moves about room without difficulty     HEENT:     Head:normocephalic, atraumatic  Pupils:regular, round, equal  Sclera: icterus absent     Lungs:     Breathing:normal breathing pattern      CVS:     RRR     Abdomen:     Shape:obese, non-distended and normal  Tenderness:none  Guarding:none     Cervical spine:     Inspection:normal  Palpation:tenderness paravertebral muscles, tenderness trapezium, left, right -no  Range of motion:Normal flexion, extension, rotation bilaterally and is not painful.   Spurling's: negative bilaterally     Thoracic spine:                Spine inspection:normal Palpation:No tenderness over the midline and paraspinal area, bilaterally  Range of motion:normal in flexion, extension rotation bilateral and is not painful.     Lumbar spine:     Spine inspection: scar from the prior surgery noted- healed well  Palpation: Tenderness paravertebral muscles Yes bilaterally  Range of motion: Decreased, flexion Decreased, Lateral bending, extension and rotation bilaterally reduced is somewhat painful. Lower lumbar facet tenderness +    Sacroiliac joint tenderness     Piriformis tenderness: negative bilaterally  SLR : negative bilaterally     Musculoskeletal:     Trigger points no     Extremities:     Tremors:None bilaterally upper and lower  Edema:none x all 4 extremities  Distal LE Peripheral pulses felt     Knee Right:     ROM : pain +   Joint line tenderness : yes      Neurological:     Sensory: Normal to light touch      Motor:   Right Hip flexors: 4/5  Left hip flexors 4/5               Right Quadriceps 4+/5          Left Quadriceps 4+/5           Right Gastrocnemius 5/5    Left Gastrocnemius 5/5  Right Ant Tibialis 5/5  Left Ant Tibialis 5/5     Gait: uses a cane to assist in ambulation.     Dermatology:     Skin:no rashes or lesions noted      Assessment/Plan:    Diagnosis Orders   1. DDD (degenerative disc disease), lumbar      2. Lumbosacral spondylosis without myelopathy      3. Lumbar radiculopathy      4. Sacroiliac dysfunction      5. Obesity, unspecified classification, unspecified obesity type, unspecified whether serious comorbidity present      6. Type 2 diabetes mellitus without complication, without long-term current use of insulin ([facility])      7. S/P lumbar fusion            62 y.o.  female with H/o L3-5 Lumbar fusion in May 2021 by Dr. Tim Esparza.   Helped left LE pain - but noticed low back and right LE pain.     Has been evaluated by NSG- recommended conservative treatment.     Needing pain meds- percocet/ gabapentin.     Exam: Facet tenderness +, LE weakness +    S/P SIJ injection - no significant pain relief.      Has chronic right shoulder pain and knee pain. Images reviewed. S/p Right knee injection by Dr. Citlali Hernandez:  PT / Misty Koehler for prn use to help with PT/ HEP and exercise- will prescribe Percocet 5/325 po bid prn. Last filled # 30 on 10/20/2021   Refill given. # 60 postdated. Recommended to take only for severe pain. To help with pain and functionality and do HEP/ PT. No signs of misuse abuse or diversion, no side effects, compliant with recommendations. OARRS reviewed.     Continue Gabapentin.     Taking Cymbalta 30 mg Q hs. Will increase to 60 mg.     B/l L5-S1 facet injection under fluoroscopy. RBA discussed. Will consider LESI / Caudal if LE pain bothers much.     If LE pain persists, consider imaging of the spine MRI/ CT / NSG reeval.     Opioid agreement- signed  10/6/2021. Salient features of opioid agreement discussed. Discussed issues with chronic opioid therapy including the phenomenon of tolerance/ dependence.     Oral drug screen -10/6/2021-  result reviewed consistent.     OARRS reviewed- today: Consistent     She had knee injection with Dr Lauren Santana.      Counseling : Patient encouraged to stay active, to continue Regular home exercise program and to watch/lose weight     Treatment plan discussed with the patient including medication and procedure side effects.     Controlled Substances Monitoring:   OARRS reviewed.     We discussed with the patient that combining opioids, benzodiazepines, alcohol, illicit drugs or sleep aids increases the risk of respiratory depression including death. We discussed that these medications may cause drowsiness, sedation or dizziness and have counseled the patient not to drive or operate machinery. We have discussed that these medications will be prescribed only by one provider.  We have discussed with the patient about age related risk factors and have thoroughly discussed the importance of taking these medications as prescribed.  The patient verbalizes understanding.     Heather Austin MD    CC:  Kelsey Conway DO

## 2021-11-08 ENCOUNTER — HOSPITAL ENCOUNTER (OUTPATIENT)
Age: 61
Setting detail: OUTPATIENT SURGERY
Discharge: HOME | End: 2021-11-08
Attending: ANESTHESIOLOGY | Admitting: ANESTHESIOLOGY
Payer: COMMERCIAL

## 2021-11-08 ENCOUNTER — HOSPITAL ENCOUNTER (OUTPATIENT)
Dept: GENERAL RADIOLOGY | Age: 61
Setting detail: OUTPATIENT SURGERY
Discharge: HOME | End: 2021-11-10
Attending: ANESTHESIOLOGY
Payer: COMMERCIAL

## 2021-11-08 VITALS
SYSTOLIC BLOOD PRESSURE: 130 MMHG | OXYGEN SATURATION: 94 % | WEIGHT: 265 LBS | BODY MASS INDEX: 45.24 KG/M2 | HEIGHT: 64 IN | DIASTOLIC BLOOD PRESSURE: 68 MMHG | TEMPERATURE: 97.9 F | HEART RATE: 80 BPM | RESPIRATION RATE: 14 BRPM

## 2021-11-08 DIAGNOSIS — R52 PAIN MANAGEMENT: ICD-10-CM

## 2021-11-08 LAB — METER GLUCOSE: 170 MG/DL (ref 74–99)

## 2021-11-08 PROCEDURE — 2709999900 HC NON-CHARGEABLE SUPPLY: Performed by: ANESTHESIOLOGY

## 2021-11-08 PROCEDURE — 2500000003 HC RX 250 WO HCPCS: Performed by: ANESTHESIOLOGY

## 2021-11-08 PROCEDURE — 3209999900 FLUORO FOR SURGICAL PROCEDURES

## 2021-11-08 PROCEDURE — 82962 GLUCOSE BLOOD TEST: CPT

## 2021-11-08 PROCEDURE — 6360000004 HC RX CONTRAST MEDICATION: Performed by: ANESTHESIOLOGY

## 2021-11-08 PROCEDURE — 3600000002 HC SURGERY LEVEL 2 BASE: Performed by: ANESTHESIOLOGY

## 2021-11-08 PROCEDURE — 64493 INJ PARAVERT F JNT L/S 1 LEV: CPT | Performed by: ANESTHESIOLOGY

## 2021-11-08 PROCEDURE — 6360000002 HC RX W HCPCS: Performed by: ANESTHESIOLOGY

## 2021-11-08 PROCEDURE — 7100000010 HC PHASE II RECOVERY - FIRST 15 MIN: Performed by: ANESTHESIOLOGY

## 2021-11-08 RX ORDER — LIDOCAINE HYDROCHLORIDE 5 MG/ML
INJECTION, SOLUTION INFILTRATION; INTRAVENOUS PRN
Status: DISCONTINUED | OUTPATIENT
Start: 2021-11-08 | End: 2021-11-08 | Stop reason: ALTCHOICE

## 2021-11-08 RX ORDER — BUPIVACAINE HYDROCHLORIDE 5 MG/ML
INJECTION, SOLUTION EPIDURAL; INTRACAUDAL PRN
Status: DISCONTINUED | OUTPATIENT
Start: 2021-11-08 | End: 2021-11-08 | Stop reason: ALTCHOICE

## 2021-11-08 RX ORDER — METHYLPREDNISOLONE ACETATE 40 MG/ML
INJECTION, SUSPENSION INTRA-ARTICULAR; INTRALESIONAL; INTRAMUSCULAR; SOFT TISSUE PRN
Status: DISCONTINUED | OUTPATIENT
Start: 2021-11-08 | End: 2021-11-08 | Stop reason: ALTCHOICE

## 2021-11-08 ASSESSMENT — PAIN DESCRIPTION - DESCRIPTORS
DESCRIPTORS: ACHING;DISCOMFORT
DESCRIPTORS: ACHING;DISCOMFORT
DESCRIPTORS: ACHING

## 2021-11-08 ASSESSMENT — PAIN DESCRIPTION - ORIENTATION
ORIENTATION: LOWER
ORIENTATION: LOWER

## 2021-11-08 ASSESSMENT — PAIN SCALES - GENERAL
PAINLEVEL_OUTOF10: 5

## 2021-11-08 ASSESSMENT — PAIN DESCRIPTION - LOCATION
LOCATION: BACK

## 2021-11-08 ASSESSMENT — PAIN DESCRIPTION - PAIN TYPE
TYPE: CHRONIC PAIN

## 2021-11-08 ASSESSMENT — PAIN - FUNCTIONAL ASSESSMENT: PAIN_FUNCTIONAL_ASSESSMENT: 0-10

## 2021-11-08 NOTE — PROGRESS NOTES
Discharge instructions gone over, follow up discussed. Pt verbalized understanding of discharge instructions, all questions answered. Pt told she was to wait to speak to doctor.

## 2021-11-08 NOTE — PROGRESS NOTES
This nurse asked doctor he spoke with pt, Dr Creighton Fothergill states Caleb Payment all can go\". Pt ambulatory to exit with this nurse.

## 2021-11-08 NOTE — INTERVAL H&P NOTE
Update History & Physical    The patient's History and Physical of October 11, 2021 was reviewed with the patient and I examined the patient. There was no change. The surgical site was confirmed by the patient and me. Plan:   Lumbar facet injection    The risks, benefits, expected outcome, and alternative to the recommended procedure have been discussed with the patient. Patient understands and wants to proceed with the procedure.      Electronically signed by Omid De Jesus MD on 11/8/2021

## 2021-12-06 ENCOUNTER — TELEPHONE (OUTPATIENT)
Dept: PRIMARY CARE CLINIC | Age: 61
End: 2021-12-06

## 2021-12-06 NOTE — TELEPHONE ENCOUNTER
The pt is going to call her [phone] to extend her FMLA and have them send you the paperwork to be filled out, but they will ask her how she will be extending it for.  She is calling to see how long you want to extend it for

## 2021-12-13 ENCOUNTER — OFFICE VISIT (OUTPATIENT)
Dept: PAIN MANAGEMENT | Age: 61
End: 2021-12-13
Payer: COMMERCIAL

## 2021-12-13 ENCOUNTER — PREP FOR PROCEDURE (OUTPATIENT)
Dept: PAIN MANAGEMENT | Age: 61
End: 2021-12-13

## 2021-12-13 VITALS
DIASTOLIC BLOOD PRESSURE: 80 MMHG | SYSTOLIC BLOOD PRESSURE: 136 MMHG | TEMPERATURE: 96.7 F | HEIGHT: 64 IN | HEART RATE: 88 BPM | OXYGEN SATURATION: 93 % | WEIGHT: 265 LBS | RESPIRATION RATE: 16 BRPM | BODY MASS INDEX: 45.24 KG/M2

## 2021-12-13 DIAGNOSIS — M96.1 POSTLAMINECTOMY SYNDROME, LUMBAR: ICD-10-CM

## 2021-12-13 DIAGNOSIS — M53.3 SACROILIAC DYSFUNCTION: Primary | ICD-10-CM

## 2021-12-13 DIAGNOSIS — E66.9 OBESITY, UNSPECIFIED CLASSIFICATION, UNSPECIFIED OBESITY TYPE, UNSPECIFIED WHETHER SERIOUS COMORBIDITY PRESENT: ICD-10-CM

## 2021-12-13 DIAGNOSIS — M47.817 LUMBOSACRAL SPONDYLOSIS WITHOUT MYELOPATHY: ICD-10-CM

## 2021-12-13 DIAGNOSIS — Z98.1 S/P LUMBAR FUSION: ICD-10-CM

## 2021-12-13 DIAGNOSIS — M51.36 DDD (DEGENERATIVE DISC DISEASE), LUMBAR: ICD-10-CM

## 2021-12-13 PROCEDURE — 99213 OFFICE O/P EST LOW 20 MIN: CPT | Performed by: ANESTHESIOLOGY

## 2021-12-13 PROCEDURE — 99214 OFFICE O/P EST MOD 30 MIN: CPT | Performed by: ANESTHESIOLOGY

## 2021-12-13 RX ORDER — OXYCODONE HYDROCHLORIDE AND ACETAMINOPHEN 5; 325 MG/1; MG/1
1 TABLET ORAL 2 TIMES DAILY PRN
Qty: 60 TABLET | Refills: 0 | Status: SHIPPED | OUTPATIENT
Start: 2021-12-15 | End: 2022-01-14

## 2021-12-13 RX ORDER — DULOXETIN HYDROCHLORIDE 60 MG/1
60 CAPSULE, DELAYED RELEASE ORAL DAILY
Qty: 30 CAPSULE | Refills: 2 | Status: SHIPPED
Start: 2021-12-13 | End: 2022-03-07 | Stop reason: SDUPTHER

## 2021-12-13 NOTE — PROGRESS NOTES
[facility]  Pedro, Hipolito Curiel  Dept: [phone]      Follow up Note      Tyrel Erps     Date of Visit:  12/13/2021    CC:  Patient presents for follow up   Chief Complaint   Patient presents with    Follow-up    Back Pain    Shoulder Pain     right shoulder    Follow Up After Procedure     BILATERAL LUMBAR FACET INJECTION AT L5-S 1 FACETS BLOCK UNDER FLUOROSCOPIC GUIDANCE        HPI:  Low back pain. S/P L3-5 fusion and L4-5 TLIF on May 25 2021- helped the left LE pain significantly.     Has noticed right LE pain after the surgery. Has been followed by NSG had X-ray done. Intact fusion.     Low back pain and right LE pain- Has tingling of the foot (h/o DM).    Also has right shoulder pain and right knee pain for which she had X-rays. Pain causes functional limitations/ limits Adl's : Yes     Nursing notes and details of the pain history reviewed.  Please see intake notes for details.     Previous treatments:   Physical Therapy : yes, currently in PT      Medications: - NSAID's : yes                        - Membrane stabilizers : yes - gabapentin                       - Opioids : yes, post op use                       - Adjuvants or Others : yes,      Surgeries: yes, L3-5 fusion in May 2021     She has not been on anticoagulation medications      She has not been on herbal supplements.       She is diabetic.     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  Degenerative disc   disease is present at multiple levels and is greatest at L5-S1 where it is   moderate to severe.  Normal soft tissues.           Impression   Intact lumbar fusion hardware with normal alignment.  Stable degenerative   disc disease.             Xray Right knee: 9/16/2021:      Impression   1.  Moderate right patellofemoral and medial compartment joint space   narrowing, with very minimal secondary osteoarthritic degenerative disease,   not significantly changed since 12/30/2020.       2.  Incidental left medial compartment joint space narrowing, osseous   degenerative disease, and slight genu-varus configuration, as described.       .      Xray right shoulder: 9/16/2021:  Impression   1.  A previously-existing right-internal-jugular-approach single-lumen   central venous catheter has been removed over the interval. Negative for   gross pneumothorax, bilaterally, within the limits of this study.       2.  Mild irregular cortical exostosis involves the undersurface ease of the   right acromion and lateral right clavicular head, slightly hook-like in   configuration at the level of the right acromion.       3.  Osseous degenerative disease, as described.           Potential Aberrant Drug-Related Behavior:      Urine Drug Screening:    OARRS report[de-identified] reviewed    Past Medical History:   Diagnosis Date    Back pain     COVID-19 09/2020    Diabetes mellitus ([address]     Hyperlipidemia     Hypertension     Hypothyroidism     IBS (irritable bowel syndrome)     Obesity     Osteoarthritis     PONV (postoperative nausea and vomiting)        Past Surgical History:   Procedure Laterality Date    BACK SURGERY      CHOLECYSTECTOMY      HYSTERECTOMY, TOTAL ABDOMINAL      INCONTINENCE SURGERY      BLADDER SLING    KNEE SURGERY Left     LUMBAR SPINE SURGERY N/A 5/25/2021    L3- L5 DECOMPRESSION AND FUSION , L4- L5 TRANSFORAMINAL LUMBAR INTERBODY FUSION --OARM, PATY TABLE, AUDIOLOGY, PLATES ,SCREWS, --NUVASIVE performed by Noemi Goodwin MD at [address] Bilateral 10/11/2021    BILATERAL SACROILIAC JOINT INJECTION UNDER FLUOROSCOPY (CPT [id number]) performed by Blanka Méndez MD at [address] Bilateral 11/8/2021    BILATERAL LUMBAR FACET INJECTION AT L5-S 1 FACETS BLOCK UNDER FLUOROSCOPIC GUIDANCE performed by Benitez Castro MD at [facility] OR       Prior to Admission medications    Medication Sig Start Date End Date Taking?  Authorizing Provider   DULoxetine (CYMBALTA) 60 MG extended release capsule Take 1 capsule by mouth daily 11/1/21  Yes Benitez Castro MD   cyclobenzaprine (FLEXERIL) 10 MG tablet Take 1 tablet by mouth 3 times daily as needed for Muscle spasms 11/1/21  Yes Oskar Guillaume DO   Thiamine HCl (B-1) 100 MG TABS Take 1 pill daily 10/18/21  Yes Oskar Guillaume DO   gemfibrozil (LOPID) 600 MG tablet TAKE 1 TABLET BY MOUTH EVERY 12 HOURS 10/18/21  Yes Oskar Guillaume DO   metFORMIN (GLUCOPHAGE) 500 MG tablet TAKE 1 TABLET BY MOUTH EVERY DAY WITH BREAKFAST 10/18/21  Yes Prieto Robin DO   omeprazole (PRILOSEC) 20 MG delayed release capsule TAKE 1 CAPSULE BY MOUTH EVERY DAY 10/18/21  Yes Prieto Robin DO   vitamin D (ERGOCALCIFEROL) 1.25 MG (68836 UT) CAPS capsule TAKE 1 CAPSULE BY MOUTH ONE TIME PER WEEK 10/18/21  Yes Prieto Robin DO   linaclotide (LINZESS) 145 MCG capsule TAKE 1 CAPSULE BY MOUTH EVERY DAY IN THE MORNING BEFORE BREAKFAST 10/18/21  Yes Prieto Robin DO   valsartan (DIOVAN) 80 MG tablet TAKE 1 TABLET BY MOUTH EVERY DAY 10/18/21  Yes Prieto Robin DO   diclofenac sodium (VOLTAREN) 1 % GEL Apply 4 g topically 4 times daily as needed for Pain 10/18/21  Yes Jj oMrton MD   omega-3 acid ethyl esters (LOVAZA) 1 g capsule Take 1 capsule by mouth 2 times daily 10/18/21  Yes Prieto Robin DO   OZEMPIC, 1 MG/DOSE, 2 MG/1.5ML SOPN Inject 1 mg into the skin once a week 10/7/21  Yes Oskar Guillaume DO   acetaminophen (TYLENOL) 500 MG tablet Take 500 mg by mouth every 6 hours as needed for Pain   Yes Historical Provider, MD   torsemide (DEMADEX) 10 MG tablet Take 1 tablet by mouth daily 8/30/21  Yes Oskar Guillaume DO   pravastatin (PRAVACHOL) 10 MG tablet Take 1 tablet by mouth every other day 8/30/21  Yes Oskar Guillaume, DO   vitamin B-12 (CYANOCOBALAMIN) 100 MCG tablet Take 0.5 tablets by mouth daily 8/30/21  Yes Christianne Lopes, DO   levothyroxine (SYNTHROID) 150 MCG tablet TAKE 1 TABLET BY MOUTH EVERY DAY 8/13/21  Yes Prieto Robin,    lidocaine (LIDODERM) 5 % PLACE 1 PATCH ONTO THE SKIN DAILY 12 HOURS ON, 12 HOURS OFF. 6/2/21  Yes Historical Provider, MD   polyethylene glycol (GLYCOLAX) 17 g packet Take 17 g by mouth daily as needed   Yes Historical Provider, MD   Handicap Placard MISC by Does not apply route Patient cannot walk 200 ft without stopping to rest.    Expiration 1 yr 5/3/21  Yes Christianne Lopes, DO   gabapentin (NEURONTIN) 400 MG capsule take 2 capsules by mouth three times a day 10/14/21 11/13/21  Mary Reno MD   levothyroxine (SYNTHROID) 150 MCG tablet Take 1 tablet by mouth Daily 1/12/21   Christianne Lopes, DO   rosuvastatin (CRESTOR) 5 MG tablet Take 1 tablet by mouth daily 1/12/21   Christianne Lopes DO   meloxicam (MOBIC) 15 MG tablet Take 1 tablet by mouth daily as needed for Pain 1/11/21   Christianne Lopes, DO       Allergies   Allergen Reactions    Bactrim [Sulfamethoxazole-Trimethoprim] Nausea Only    Ceftin [Cefuroxime] Rash    Keflex [Cephalexin] Rash       Social History     Socioeconomic History    Marital status:      Spouse name: Not on file    Number of children: Not on file    Years of education: Not on file    Highest education level: Not on file   Occupational History     Employer: [employer]   Tobacco Use    Smoking status: Never Smoker    Smokeless tobacco: Never Used   Vaping Use    Vaping Use: Never used   Substance and Sexual Activity    Alcohol use: Never    Drug use: Never    Sexual activity: Not on file   Other Topics Concern    Not on file   Social History Narrative    Not on file     Social Determinants of Health     Financial Resource Strain: Low Risk     Difficulty of Paying Living Expenses: Not hard at all   Food Insecurity: No Food Insecurity  Worried About Running Out of Food in the Last Year: Never true    Stephen of Food in the Last Year: Never true   Transportation Needs:     Lack of Transportation (Medical): Not on file    Lack of Transportation (Non-Medical): Not on file   Physical Activity:     Days of Exercise per Week: Not on file    Minutes of Exercise per Session: Not on file   Stress:     Feeling of Stress : Not on file   Social Connections:     Frequency of Communication with Friends and Family: Not on file    Frequency of Social Gatherings with Friends and Family: Not on file    Attends Restorationism Services: Not on file    Active Member of [address] [place] [address] [employer] or Organizations: Not on file    Attends Club or Organization Meetings: Not on file    Marital Status: Not on file   Intimate Partner Violence:     Fear of Current or Ex-Partner: Not on file    Emotionally Abused: Not on file    Physically Abused: Not on file    Sexually Abused: Not on file   Housing Stability:     Unable to Pay for Housing in the Last Year: Not on file    Number of [place] in the Last Year: Not on file    Unstable Housing in the Last Year: Not on file       Family History   Problem Relation Age of Onset    Diabetes Mother     Diabetes Father     Rheum Arthritis Sister     Cancer Maternal Grandfather        REVIEW OF SYSTEMS:     Denver Porter denies fever/chills, chest pain, shortness of breath, new bowel or bladder complaints. All other review of systems was negative.     PHYSICAL EXAMINATION:      /80   Pulse 88   Temp 96.7 °F (35.9 °C) (Infrared)   Resp 16   Ht 5' 4\" (1.626 m)   Wt 265 lb (120.2 kg)   SpO2 93%   BMI 45.49 kg/m²   General:       General appearance:  Pleasant and well-hydrated, in no distress and A & O x 3  Build:Obese  Function: Rises from seated position easily and Moves about room without difficulty     HEENT:     Head:normocephalic, atraumatic  Pupils:regular, round, equal  Sclera: icterus absent     Lungs:     Breathing:normal breathing pattern      CVS:     RRR     Abdomen:     Shape:obese, non-distended and normal  Tenderness:none  Guarding:none     Cervical spine:     Inspection:normal  Palpation:tenderness paravertebral muscles, tenderness trapezium, left, right -no  Range of motion:Normal flexion, extension, rotation bilaterally and is not painful. Spurling's: negative bilaterally     Thoracic spine:                Spine inspection:normal   Palpation:No tenderness over the midline and paraspinal area, bilaterally  Range of motion:normal in flexion, extension rotation bilateral and is not painful.     Lumbar spine:     Spine inspection: scar from the prior surgery noted- healed well  Palpation: Tenderness paravertebral muscles Yes bilaterally  Range of motion: Decreased, flexion Decreased, Lateral bending, extension and rotation bilaterally reduced is somewhat painful. Lower lumbar facet tenderness +    Sacroiliac joint tenderness     Piriformis tenderness: negative bilaterally  SLR : negative bilaterally     Musculoskeletal:     Trigger points no     Extremities:     Tremors:None bilaterally upper and lower  Edema:none x all 4 extremities  Distal LE Peripheral pulses felt     Knee Right:     ROM : pain +   Joint line tenderness : yes      Neurological:     Sensory: Normal to light touch      Motor:   Right Hip flexors: 4/5  Left hip flexors 4/5               Right Quadriceps 4+/5          Left Quadriceps 4+/5           Right Gastrocnemius 5/5    Left Gastrocnemius 5/5  Right Ant Tibialis 5/5  Left Ant Tibialis 5/5     Gait: uses a cane to assist in ambulation.     Dermatology:     Skin:no rashes or lesions noted      Assessment/Plan:    Diagnosis Orders   1. DDD (degenerative disc disease), lumbar      2. Lumbosacral spondylosis without myelopathy      3. Lumbar radiculopathy      4. Sacroiliac dysfunction      5. Obesity, unspecified classification, unspecified obesity type, unspecified whether serious comorbidity present      6. Type 2 diabetes mellitus without complication, without long-term current use of insulin (HCC)      7. S/P lumbar fusion            62 y.o.  female with H/o L3-5 Lumbar fusion in May 2021 by Dr. Cookie Mendez. Helped left LE pain - but noticed low back and right LE pain.     Has been evaluated by NSG- recommended conservative treatment.     Needing pain meds- percocet/ gabapentin.     Exam: Facet tenderness +, LE weakness +    S/P Facet injection - no significant pain relief.      Has chronic right shoulder pain and knee pain. Images reviewed. S/p Right knee injection by Dr. Maco Vincent:  PT / Ankita Aponte for prn use to help with PT/ HEP and exercise- will prescribe Percocet 5/325 po bid prn. Last filled # 60 on 11/4/2021. OARRS reviewed. Refill given. # 60 post dated 12/15/2021. Recommended to take only for severe pain. To help with pain and functionality and do HEP/ PT. No signs of misuse abuse or diversion, no side effects, compliant with recommendations.     Continue Gabapentin.     Cymbalta 60 mg Q hs. Refill given.     Will schedule for Bilateral; S1, S2, S3 & L5 DR block under fluoroscopy. RBA discussed. Moderate sedation due to h/o anxiety of needles. If short term relief, will do RFA. Will consider LESI / Caudal if LE pain bothers much/  consider imaging of the spine MRI/ CT / NSG reeval.    Obesity: Consult Bariatric medicine- placed today.     Opioid agreement- signed  10/6/2021. Salient features of opioid agreement discussed.    Discussed issues with chronic opioid therapy including the phenomenon of tolerance/ dependence.     Oral drug screen -10/6/2021-  result reviewed consistent.     OARRS reviewed- today: Consistent     She had knee injection with Dr Ольга Cohen.      Counseling : Patient encouraged to stay active, to continue Regular home exercise program and to watch/lose weight     Treatment plan discussed with the patient including medication and procedure side

## 2021-12-13 NOTE — PROGRESS NOTES
Do you currently have any of the following:    Fever: No  Headache:  No  Cough: No  Shortness of breath: No  Exposed to anyone with these symptoms: No         Mustapha Delaney presents to the [address] [place] [address] on 12/13/2021. Milla Gutierrez is complaining of pain back and right shoulder. The pain is intermittent. The pain is described as aching, throbbing and sharp. Pain is rated on her best day at a 5, on her worst day at a 8, and on average at a 6 on the VAS scale. She took her last dose of Percocet yesterday. Any procedures since your last visit: Yes, with  % relief. Pacemaker or defibrillator: No managed by . She is not on NSAIDS and is not on anticoagulation medications to include none and is managed by . Medication Contract and Consent for Opioid Use Documents Filed     Patient Documents     Type of Document Status Date Received Received By Description    Medication Contract Received 10/6/2021 10:43 AM [facility]                /80   Pulse 88   Resp 16   Ht 5' 4\" (1.626 m)   Wt 265 lb (120.2 kg)   SpO2 93%   BMI 45.49 kg/m²      No LMP recorded. Patient has had a hysterectomy.

## 2021-12-16 RX ORDER — TORSEMIDE 10 MG/1
TABLET ORAL
Qty: 30 TABLET | Refills: 3 | Status: SHIPPED | OUTPATIENT
Start: 2021-12-16

## 2021-12-17 ENCOUNTER — HOSPITAL ENCOUNTER (OUTPATIENT)
Age: 61
Discharge: HOME | End: 2021-12-19
Payer: COMMERCIAL

## 2021-12-17 ENCOUNTER — ANESTHESIA EVENT (OUTPATIENT)
Dept: OPERATING ROOM | Age: 61
End: 2021-12-17
Payer: COMMERCIAL

## 2021-12-17 DIAGNOSIS — M53.3 SACROILIAC DYSFUNCTION: ICD-10-CM

## 2021-12-17 LAB — SARS-COV-2, PCR: NOT DETECTED

## 2021-12-17 PROCEDURE — U0005 INFEC AGEN DETEC AMPLI PROBE: HCPCS

## 2021-12-17 PROCEDURE — U0003 INFECTIOUS AGENT DETECTION BY NUCLEIC ACID (DNA OR RNA); SEVERE ACUTE RESPIRATORY SYNDROME CORONAVIRUS 2 (SARS-COV-2) (CORONAVIRUS DISEASE [COVID-19]), AMPLIFIED PROBE TECHNIQUE, MAKING USE OF HIGH THROUGHPUT TECHNOLOGIES AS DESCRIBED BY CMS-2020-01-R: HCPCS

## 2021-12-20 ASSESSMENT — LIFESTYLE VARIABLES: SMOKING_STATUS: 0

## 2021-12-20 NOTE — ANESTHESIA PRE PROCEDURE
Department of Anesthesiology  Preprocedure Note       Name:  Deanne Mark   Age:  62 y.o.  :  1960                                          MRN:  [id number]         Date:  2021      Surgeon: Ramsey Orlando):  Luke Bunch MD    Procedure: Procedure(s):  BILATERAL S1, S2, S3 BRANCH & L5 [facility] NERVE BLOCK UNDER XRAY GUIDANCE (55724) (SEDATION)    Medications prior to admission:   Prior to Admission medications    Medication Sig Start Date End Date Taking? Authorizing Provider   DULoxetine (CYMBALTA) 60 MG extended release capsule Take 1 capsule by mouth daily 21  Yes Luke Bunch MD   oxyCODONE-acetaminophen (PERCOCET) 5-325 MG per tablet Take 1 tablet by mouth 2 times daily as needed for Pain for up to 30 days.  12/15/21 1/14/22 Yes Luke Bunch MD   cyclobenzaprine (FLEXERIL) 10 MG tablet Take 1 tablet by mouth 3 times daily as needed for Muscle spasms 21  Yes Prieto Robin DO   Thiamine HCl (B-1) 100 MG TABS Take 1 pill daily 10/18/21  Yes Benita Mead DO   gemfibrozil (LOPID) 600 MG tablet TAKE 1 TABLET BY MOUTH EVERY 12 HOURS 10/18/21  Yes Benita Mead DO   metFORMIN (GLUCOPHAGE) 500 MG tablet TAKE 1 TABLET BY MOUTH EVERY DAY WITH BREAKFAST 10/18/21  Yes Prieto Robin DO   omeprazole (PRILOSEC) 20 MG delayed release capsule TAKE 1 CAPSULE BY MOUTH EVERY DAY 10/18/21  Yes Prieto Robin DO   vitamin D (ERGOCALCIFEROL) 1.25 MG (67369 UT) CAPS capsule TAKE 1 CAPSULE BY MOUTH ONE TIME PER WEEK 10/18/21  Yes Prieto Robin DO   linaclotide (LINZESS) 145 MCG capsule TAKE 1 CAPSULE BY MOUTH EVERY DAY IN THE MORNING BEFORE BREAKFAST 10/18/21  Yes Prieto Robin DO   valsartan (DIOVAN) 80 MG tablet TAKE 1 TABLET BY MOUTH EVERY DAY 10/18/21  Yes Prieto Robin DO   omega-3 acid ethyl esters (LOVAZA) 1 g capsule Take 1 capsule by mouth 2 times daily 10/18/21  Yes Devere Boston, DO   gabapentin (NEURONTIN) 400 MG capsule take 2 capsules by mouth three times a day 10/14/21 12/15/21 Yes Johanny Mcdonald MD   OZEMPIC, 1 MG/DOSE, 2 MG/1.5ML SOPN Inject 1 mg into the skin once a week 10/7/21  Yes Aristides Romero, DO   acetaminophen (TYLENOL) 500 MG tablet Take 500 mg by mouth every 6 hours as needed for Pain   Yes Historical Provider, MD   pravastatin (PRAVACHOL) 10 MG tablet Take 1 tablet by mouth every other day 8/30/21  Yes Aristides Romero, DO   vitamin B-12 (CYANOCOBALAMIN) 100 MCG tablet Take 0.5 tablets by mouth daily 8/30/21  Yes Aristides Romero, DO   levothyroxine (SYNTHROID) 150 MCG tablet TAKE 1 TABLET BY MOUTH EVERY DAY 8/13/21  Yes Prieto Robin, DO   torsemide (DEMADEX) 10 MG tablet take 1 tablet by mouth once daily 12/16/21   Aristides Romero, DO   diclofenac sodium (VOLTAREN) 1 % GEL Apply 4 g topically 4 times daily as needed for Pain 10/18/21   Johanny Mcdonald MD   lidocaine (LIDODERM) 5 % PLACE 1 PATCH ONTO THE SKIN DAILY 12 HOURS ON, 12 HOURS OFF. 6/2/21   Historical Provider, MD   polyethylene glycol (GLYCOLAX) 17 g packet Take 17 g by mouth daily as needed    Historical Provider, MD   Handicap Placard MISC by Does not apply route Patient cannot walk 200 ft without stopping to rest.    Expiration 1 yr 5/3/21   Aristides Romero, DO   levothyroxine (SYNTHROID) 150 MCG tablet Take 1 tablet by mouth Daily 1/12/21   Aristides Romero, DO   rosuvastatin (CRESTOR) 5 MG tablet Take 1 tablet by mouth daily 1/12/21   Aristides Romero, DO   meloxicam (MOBIC) 15 MG tablet Take 1 tablet by mouth daily as needed for Pain 1/11/21   Aristides Romero DO       Current medications:    No current facility-administered medications for this encounter. Current Outpatient Medications   Medication Sig Dispense Refill    DULoxetine (CYMBALTA) 60 MG extended release capsule Take 1 capsule by mouth daily 30 capsule 2    oxyCODONE-acetaminophen (PERCOCET) 5-325 MG per tablet Take 1 tablet by mouth 2 times daily as needed for Pain for up to 30 days.  60 tablet 0    cyclobenzaprine (FLEXERIL) 10 MG tablet Take 1 tablet by mouth 3 times daily as needed for Muscle spasms 180 tablet 0    Thiamine HCl (B-1) 100 MG TABS Take 1 pill daily 90 tablet 1    gemfibrozil (LOPID) 600 MG tablet TAKE 1 TABLET BY MOUTH EVERY 12 HOURS 180 tablet 3    metFORMIN (GLUCOPHAGE) 500 MG tablet TAKE 1 TABLET BY MOUTH EVERY DAY WITH BREAKFAST 90 tablet 3    omeprazole (PRILOSEC) 20 MG delayed release capsule TAKE 1 CAPSULE BY MOUTH EVERY DAY 90 capsule 3    vitamin D (ERGOCALCIFEROL) 1.25 MG (71875 UT) CAPS capsule TAKE 1 CAPSULE BY MOUTH ONE TIME PER WEEK 12 capsule 0    linaclotide (LINZESS) 145 MCG capsule TAKE 1 CAPSULE BY MOUTH EVERY DAY IN THE MORNING BEFORE BREAKFAST 90 capsule 3    valsartan (DIOVAN) 80 MG tablet TAKE 1 TABLET BY MOUTH EVERY DAY 90 tablet 1    omega-3 acid ethyl esters (LOVAZA) 1 g capsule Take 1 capsule by mouth 2 times daily 180 capsule 1    gabapentin (NEURONTIN) 400 MG capsule take 2 capsules by mouth three times a day 180 capsule 2    OZEMPIC, 1 MG/DOSE, 2 MG/1.5ML SOPN Inject 1 mg into the skin once a week 6 pen 3    acetaminophen (TYLENOL) 500 MG tablet Take 500 mg by mouth every 6 hours as needed for Pain      pravastatin (PRAVACHOL) 10 MG tablet Take 1 tablet by mouth every other day 45 tablet 1    vitamin B-12 (CYANOCOBALAMIN) 100 MCG tablet Take 0.5 tablets by mouth daily 90 tablet 3    levothyroxine (SYNTHROID) 150 MCG tablet TAKE 1 TABLET BY MOUTH EVERY DAY 90 tablet 1    torsemide (DEMADEX) 10 MG tablet take 1 tablet by mouth once daily 30 tablet 3    diclofenac sodium (VOLTAREN) 1 % GEL Apply 4 g topically 4 times daily as needed for Pain 150 g 3    lidocaine (LIDODERM) 5 % PLACE 1 PATCH ONTO THE SKIN DAILY 12 HOURS ON, 12 HOURS OFF.       polyethylene glycol (GLYCOLAX) 17 g packet Take 17 g by mouth daily as needed      Handicap Placard MISC by Does not apply route Patient cannot walk 200 ft without stopping to rest.    Expiration 1 yr 1 each 0 Allergies:     Allergies   Allergen Reactions    Bactrim [Sulfamethoxazole-Trimethoprim] Nausea Only    Ceftin [Cefuroxime] Rash    Keflex [Cephalexin] Rash       Problem List:    Patient Active Problem List   Diagnosis Code    Type 2 diabetes mellitus without complication, without long-term current use of insulin ([facility]) E11.9    Acquired hypothyroidism E03.9    Peripheral neuropathy G62.9    Carpal tunnel syndrome of left wrist G56.02    Spinal stenosis of lumbar region with neurogenic claudication M48.062    Acute exacerbation of chronic low back pain M54.50, G89.29    Hyperlipidemia E78.5    Spondylolisthesis of lumbar region M43.16    Lumbar radiculopathy M54.16    Postoperative hypotension I95.89    Postoperative anemia due to acute blood loss D62    Venous insufficiency of both lower extremities I87.2    Impingement syndrome of right shoulder M75.41    DDD (degenerative disc disease), lumbar M51.36    Lumbosacral spondylosis without myelopathy M47.817    S/P lumbar fusion Z98.1    Sacroiliac dysfunction M53.3    Postlaminectomy syndrome, lumbar M96.1       Past Medical History:        Diagnosis Date    Back pain     COVID-19 09/2020    mild per patient    Diabetes mellitus ([facility] [address]     Hyperlipidemia     Hypertension     Hypothyroidism     IBS (irritable bowel syndrome)     Obesity     Osteoarthritis     PONV (postoperative nausea and vomiting)        Past Surgical History:        Procedure Laterality Date    BACK SURGERY      CHOLECYSTECTOMY      COLONOSCOPY      HYSTERECTOMY, TOTAL ABDOMINAL      INCONTINENCE SURGERY      BLADDER SLING    KNEE SURGERY Left     LUMBAR SPINE SURGERY N/A 5/25/2021    L3- L5 DECOMPRESSION AND FUSION , L4- L5 TRANSFORAMINAL LUMBAR INTERBODY FUSION --OARM, PATY TABLE, AUDIOLOGY, PLATES ,SCREWS, --NUVASIVE performed by Torin Eng MD at [address] [facility] Bilateral 10/11/2021    BILATERAL SACROILIAC JOINT INJECTION UNDER FLUOROSCOPY (CPT [id number]) performed by Doss Schilder, MD at [address] Bilateral 11/8/2021    BILATERAL LUMBAR FACET INJECTION AT L5-S 1 FACETS BLOCK UNDER FLUOROSCOPIC GUIDANCE performed by Doss Schilder, MD at [address] [place] [address] History:    Social History     Tobacco Use    Smoking status: Never Smoker    Smokeless tobacco: Never Used   Substance Use Topics    Alcohol use: Never                                Counseling given: Not Answered      Vital Signs (Current):   Vitals:    12/15/21 1316   Weight: 265 lb (120.2 kg)   Height: 5' 4\" (1.626 m)                                              BP Readings from Last 3 Encounters:   12/13/21 136/80   11/08/21 130/68   11/01/21 124/82       NPO Status:  >8.H                                                                               BMI:   Wt Readings from Last 3 Encounters:   12/15/21 265 lb (120.2 kg)   12/13/21 265 lb (120.2 kg)   11/08/21 265 lb (120.2 kg)     Body mass index is 45.49 kg/m². CBC:   Lab Results   Component Value Date    WBC 8.9 08/30/2021    RBC 5.20 08/30/2021    HGB 13.7 08/30/2021    HCT 43.5 08/30/2021    MCV 83.7 08/30/2021    RDW 15.3 08/30/2021     08/30/2021       CMP:   Lab Results   Component Value Date     08/30/2021    K 4.6 08/30/2021    K 4.2 05/20/2021     08/30/2021    CO2 21 08/30/2021    BUN 18 08/30/2021    CREATININE 0.8 08/30/2021    GFRAA >60 08/30/2021    LABGLOM >60 08/30/2021    GLUCOSE 138 08/30/2021    PROT 7.5 08/30/2021    CALCIUM 9.5 08/30/2021    BILITOT 0.3 08/30/2021    ALKPHOS 101 08/30/2021    AST 17 08/30/2021    ALT 25 08/30/2021       POC Tests: No results for input(s): POCGLU, POCNA, POCK, POCCL, POCBUN, POCHEMO, POCHCT in the last 72 hours.     Coags:   Lab Results   Component Value Date    PROTIME 11.8 05/20/2021    INR 1.1 05/20/2021       HCG (If Applicable): No results found for: PREGTESTUR, PREGSERUM, HCG, HCGQUANT     ABGs: No results found for: PHART, PO2ART, VVZ0PNF, GWM5QEC, BEART, E5NBPBCB     Type & Screen (If Applicable):  No results found for: LABABO, LABRH    Drug/Infectious Status (If Applicable):  No results found for: HIV, HEPCAB    COVID-19 Screening (If Applicable):   Lab Results   Component Value Date    COVID19 Not Detected 12/16/2021           Anesthesia Evaluation  Patient summary reviewed   history of anesthetic complications (postop \"hypotension\"): PONV. Airway: Mallampati: III  TM distance: >3 FB   Neck ROM: full  Mouth opening: > = 3 FB Dental:    (+) upper dentures      Pulmonary: breath sounds clear to auscultation      (-) not a current smoker                           Cardiovascular:    (+) hypertension:, hyperlipidemia        Rhythm: regular  Rate: normal           Beta Blocker:  Not on Beta Blocker      ROS comment: EKG=Sinus tachycardia with premature supraventricular complexes  Low voltage QRS  Nonspecific T wave abnormality  Abnormal ECG  When compared with ECG of 26-MAY-2021 07:56,  Significant changes have occurred  Confirmed by Anaya Farrell ([id number]) on 5/27/2021 6:58:14 PM     Neuro/Psych:   (+) neuromuscular disease:,              ROS comment: L Spine surgery GI/Hepatic/Renal:   (+) morbid obesity          Endo/Other:    (+) DiabetesType II DM, , hypothyroidism::., .                 Abdominal:   (+) obese,           Vascular: Other Findings:           Anesthesia Plan      MAC     ASA 3     (Hx PONV!!)  Induction: intravenous. MIPS: Prophylactic antiemetics administered. Anesthetic plan and risks discussed with patient. Plan discussed with CRNA.                 David Haddad MD   12/20/2021

## 2021-12-21 ENCOUNTER — HOSPITAL ENCOUNTER (OUTPATIENT)
Age: 61
Setting detail: OUTPATIENT SURGERY
Discharge: HOME | End: 2021-12-21
Attending: ANESTHESIOLOGY | Admitting: ANESTHESIOLOGY
Payer: COMMERCIAL

## 2021-12-21 ENCOUNTER — HOSPITAL ENCOUNTER (OUTPATIENT)
Dept: OPERATING ROOM | Age: 61
Setting detail: OUTPATIENT SURGERY
Discharge: HOME | End: 2021-12-21
Attending: ANESTHESIOLOGY
Payer: COMMERCIAL

## 2021-12-21 ENCOUNTER — ANESTHESIA (OUTPATIENT)
Dept: OPERATING ROOM | Age: 61
End: 2021-12-21
Payer: COMMERCIAL

## 2021-12-21 VITALS
DIASTOLIC BLOOD PRESSURE: 58 MMHG | RESPIRATION RATE: 16 BRPM | WEIGHT: 265 LBS | HEIGHT: 64 IN | HEART RATE: 80 BPM | OXYGEN SATURATION: 94 % | TEMPERATURE: 97 F | BODY MASS INDEX: 45.24 KG/M2 | SYSTOLIC BLOOD PRESSURE: 109 MMHG

## 2021-12-21 VITALS
SYSTOLIC BLOOD PRESSURE: 114 MMHG | OXYGEN SATURATION: 93 % | RESPIRATION RATE: 7 BRPM | DIASTOLIC BLOOD PRESSURE: 75 MMHG

## 2021-12-21 DIAGNOSIS — M53.3 SACROILIAC DYSFUNCTION: Primary | ICD-10-CM

## 2021-12-21 DIAGNOSIS — M46.1 SACROILIITIS (HCC): ICD-10-CM

## 2021-12-21 LAB
METER GLUCOSE: 153 MG/DL (ref 74–99)
METER GLUCOSE: 182 MG/DL (ref 74–99)

## 2021-12-21 PROCEDURE — 82962 GLUCOSE BLOOD TEST: CPT

## 2021-12-21 PROCEDURE — 3209999900 FLUORO FOR SURGICAL PROCEDURES

## 2021-12-21 PROCEDURE — 6360000002 HC RX W HCPCS: Performed by: NURSE ANESTHETIST, CERTIFIED REGISTERED

## 2021-12-21 PROCEDURE — 6360000002 HC RX W HCPCS: Performed by: ANESTHESIOLOGY

## 2021-12-21 PROCEDURE — 82962 GLUCOSE BLOOD TEST: CPT | Performed by: ANESTHESIOLOGY

## 2021-12-21 PROCEDURE — 2580000003 HC RX 258: Performed by: ANESTHESIOLOGY

## 2021-12-21 PROCEDURE — 3700000001 HC ADD 15 MINUTES (ANESTHESIA): Performed by: ANESTHESIOLOGY

## 2021-12-21 PROCEDURE — 3600000015 HC SURGERY LEVEL 5 ADDTL 15MIN: Performed by: ANESTHESIOLOGY

## 2021-12-21 PROCEDURE — 64451 NJX AA&/STRD NRV NRVTG SI JT: CPT | Performed by: ANESTHESIOLOGY

## 2021-12-21 PROCEDURE — 3600000005 HC SURGERY LEVEL 5 BASE: Performed by: ANESTHESIOLOGY

## 2021-12-21 PROCEDURE — 3700000000 HC ANESTHESIA ATTENDED CARE: Performed by: ANESTHESIOLOGY

## 2021-12-21 PROCEDURE — 7100000011 HC PHASE II RECOVERY - ADDTL 15 MIN: Performed by: ANESTHESIOLOGY

## 2021-12-21 PROCEDURE — 2500000003 HC RX 250 WO HCPCS: Performed by: ANESTHESIOLOGY

## 2021-12-21 PROCEDURE — 64450 NJX AA&/STRD OTHER PN/BRANCH: CPT | Performed by: ANESTHESIOLOGY

## 2021-12-21 PROCEDURE — 2709999900 HC NON-CHARGEABLE SUPPLY: Performed by: ANESTHESIOLOGY

## 2021-12-21 PROCEDURE — 7100000010 HC PHASE II RECOVERY - FIRST 15 MIN: Performed by: ANESTHESIOLOGY

## 2021-12-21 RX ORDER — DIPHENHYDRAMINE HYDROCHLORIDE 50 MG/ML
12.5 INJECTION INTRAMUSCULAR; INTRAVENOUS
Status: DISCONTINUED | OUTPATIENT
Start: 2021-12-21 | End: 2021-12-21 | Stop reason: HOSPADM

## 2021-12-21 RX ORDER — PROMETHAZINE HYDROCHLORIDE 25 MG/ML
25 INJECTION, SOLUTION INTRAMUSCULAR; INTRAVENOUS
Status: DISCONTINUED | OUTPATIENT
Start: 2021-12-21 | End: 2021-12-21 | Stop reason: HOSPADM

## 2021-12-21 RX ORDER — METHYLPREDNISOLONE ACETATE 40 MG/ML
INJECTION, SUSPENSION INTRA-ARTICULAR; INTRALESIONAL; INTRAMUSCULAR; SOFT TISSUE PRN
Status: DISCONTINUED | OUTPATIENT
Start: 2021-12-21 | End: 2021-12-21 | Stop reason: ALTCHOICE

## 2021-12-21 RX ORDER — FENTANYL CITRATE 50 UG/ML
INJECTION, SOLUTION INTRAMUSCULAR; INTRAVENOUS PRN
Status: DISCONTINUED | OUTPATIENT
Start: 2021-12-21 | End: 2021-12-21 | Stop reason: SDUPTHER

## 2021-12-21 RX ORDER — MIDAZOLAM HYDROCHLORIDE 1 MG/ML
INJECTION INTRAMUSCULAR; INTRAVENOUS PRN
Status: DISCONTINUED | OUTPATIENT
Start: 2021-12-21 | End: 2021-12-21 | Stop reason: SDUPTHER

## 2021-12-21 RX ORDER — LABETALOL HYDROCHLORIDE 5 MG/ML
5 INJECTION, SOLUTION INTRAVENOUS EVERY 10 MIN PRN
Status: DISCONTINUED | OUTPATIENT
Start: 2021-12-21 | End: 2021-12-21 | Stop reason: HOSPADM

## 2021-12-21 RX ORDER — ONDANSETRON 2 MG/ML
INJECTION INTRAMUSCULAR; INTRAVENOUS PRN
Status: DISCONTINUED | OUTPATIENT
Start: 2021-12-21 | End: 2021-12-21 | Stop reason: SDUPTHER

## 2021-12-21 RX ORDER — MEPERIDINE HYDROCHLORIDE 25 MG/ML
12.5 INJECTION INTRAMUSCULAR; INTRAVENOUS; SUBCUTANEOUS EVERY 5 MIN PRN
Status: DISCONTINUED | OUTPATIENT
Start: 2021-12-21 | End: 2021-12-21 | Stop reason: HOSPADM

## 2021-12-21 RX ORDER — SODIUM CHLORIDE, SODIUM LACTATE, POTASSIUM CHLORIDE, CALCIUM CHLORIDE 600; 310; 30; 20 MG/100ML; MG/100ML; MG/100ML; MG/100ML
INJECTION, SOLUTION INTRAVENOUS CONTINUOUS
Status: DISCONTINUED | OUTPATIENT
Start: 2021-12-21 | End: 2021-12-21 | Stop reason: HOSPADM

## 2021-12-21 RX ORDER — BUPIVACAINE HYDROCHLORIDE 5 MG/ML
INJECTION, SOLUTION PERINEURAL PRN
Status: DISCONTINUED | OUTPATIENT
Start: 2021-12-21 | End: 2021-12-21 | Stop reason: ALTCHOICE

## 2021-12-21 RX ORDER — LIDOCAINE HYDROCHLORIDE 5 MG/ML
INJECTION, SOLUTION INFILTRATION; INTRAVENOUS PRN
Status: DISCONTINUED | OUTPATIENT
Start: 2021-12-21 | End: 2021-12-21 | Stop reason: ALTCHOICE

## 2021-12-21 RX ORDER — HYDRALAZINE HYDROCHLORIDE 20 MG/ML
5 INJECTION INTRAMUSCULAR; INTRAVENOUS EVERY 10 MIN PRN
Status: DISCONTINUED | OUTPATIENT
Start: 2021-12-21 | End: 2021-12-21 | Stop reason: HOSPADM

## 2021-12-21 RX ADMIN — FENTANYL CITRATE 50 MCG: 50 INJECTION, SOLUTION INTRAMUSCULAR; INTRAVENOUS at 10:22

## 2021-12-21 RX ADMIN — SODIUM CHLORIDE, POTASSIUM CHLORIDE, SODIUM LACTATE AND CALCIUM CHLORIDE: 600; 310; 30; 20 INJECTION, SOLUTION INTRAVENOUS at 09:46

## 2021-12-21 RX ADMIN — MIDAZOLAM 1 MG: 1 INJECTION INTRAMUSCULAR; INTRAVENOUS at 10:18

## 2021-12-21 RX ADMIN — MIDAZOLAM 1 MG: 1 INJECTION INTRAMUSCULAR; INTRAVENOUS at 10:22

## 2021-12-21 RX ADMIN — ONDANSETRON 4 MG: 2 INJECTION INTRAMUSCULAR; INTRAVENOUS at 10:19

## 2021-12-21 ASSESSMENT — PAIN - FUNCTIONAL ASSESSMENT: PAIN_FUNCTIONAL_ASSESSMENT: 0-10

## 2021-12-21 ASSESSMENT — PAIN DESCRIPTION - DESCRIPTORS: DESCRIPTORS: DISCOMFORT

## 2021-12-21 ASSESSMENT — PAIN SCALES - GENERAL
PAINLEVEL_OUTOF10: 0

## 2021-12-21 NOTE — OP NOTE
Operative Note      Patient: Tyrone Bass  YOB: 1960  MRN: [id number]    Date of Procedure: 2021    Pre-Op Diagnosis: SACROILLEC DYSFUNCTION , LUMBOSACRAL SPONDYLOSIS WITHOUT MYELOPATHY    Post-Op Diagnosis: Same       Procedure(s):  BILATERAL S1, S2, S3 BRANCH & L5 DR BRANCH NERVE BLOCK UNDER XRAY GUIDANCE    Surgeon(s):  Nimisha Melo MD    Assistant:   * No surgical staff found *    Anesthesia: Monitor Anesthesia Care    Estimated Blood Loss (mL): Minimal    Complications: None    Specimens:   * No specimens in log *    Implants:  * No implants in log *      Drains: * No LDAs found *    Findings: good needle placement    Detailed Description of Procedure:   2021    Patient: Tyrone Bass  :  1960  Age:  62 y.o. Sex:  female     PRE-OPERATIVE DIAGNOSIS: Bilateral Lumbar spondylosis, Sacroiliac joint dysfunction    POST-OPERATIVE DIAGNOSIS: Same. PROCEDURE:  BILATERAL S1, S2, S3 BRANCH & L5 [facility] NERVE BLOCK UNDER XRAY GUIDANCE    SURGEON: Nimisha Melo MD    ANESTHESIA: MAC    ESTIMATED BLOOD LOSS: None.  ______________________________________________________________________  BRIEF HISTORY:  Tyrone Bass comes in today for the above procedure. The potential complications of this procedure were discussed with her again today. She has elected to undergo the aforementioned procedure. Bridgett complete History & Physical examination were reviewed in depth, a copy of which is in the chart. DESCRIPTION OF PROCEDURE:   After confirming written and informed consent, a time-out was performed and Bridgett name and date of birth, the procedure to be performed as well as the plan for the location of the needle insertion were confirmed. The patient was brought into the procedure room and placed in the prone position on the fluoroscopy table. Standard monitors were placed and vital signs were observed throughout the procedure.  The area of the lumbar spine was prepped with chloraprep and draped in a sterile manner. AP fluoroscopy was used to identify and osbaldo bartons point at the targeted levels. The skin and subcutaneous tissues in these identified areas were anesthetized with 0.5%Lidocaine. A 22 # gauge sacral alar at the base of the superior articulating process where the L4 DR  branch traverses under fluoroscopic guidance. The S1, S2, S3 foramen were identified and point lateral was marked. After anesthetizing the skin, # 22 G needles were advanced lateral to the S1, S2, S3 until bone was contacted. Once bone was contacted and negative aspiration was confirmed. A solution of 0.5% marcaine 0.5-1 ml and total of 60 mg o DepoMedrol was then injected (distributed equally at each level). Following the procedure Ailyn Schafer noted improvement of previous pain symptoms. Disposition the patient tolerated the procedure well and there were no complications . Vital signs remained stable throughout the procedure. The patient was escorted to the recovery area where they remained until discharge and written discharge instructions for the procedure were given. Plan: Ailyn Schafer will return to our pain management center as scheduled.          Kalli Hui MD

## 2021-12-21 NOTE — ANESTHESIA POSTPROCEDURE EVALUATION
Department of Anesthesiology  Postprocedure Note    Patient: Manan Villarreal  MRN: [id number]  YOB: 1960  Date of evaluation: 12/21/2021  Time:  11:07 AM     Procedure Summary     Date: 12/21/21 Room / Location: [address] [place] [address] 04 / [facility]    Anesthesia Start: 6355 Anesthesia Stop: 0725    Procedure: BILATERAL S1, S2, S3 BRANCH & L5 [facility] NERVE BLOCK UNDER XRAY GUIDANCE (79026) (SEDATION) (Bilateral ) Diagnosis: (SACROILLEC DYSFUNCTION ,LUMBOSACRAL SPONDYLOSIS WITHOUT MYELOPATHY)    Surgeons: Jeannette Lisa MD Responsible Provider: Chiara Hwang MD    Anesthesia Type: MAC ASA Status: 3          Anesthesia Type: MAC    Miranda Phase I: Miranda Score: 10    Miranda Phase II: Miranda Score: 9    Last vitals: Reviewed and per EMR flowsheets.        Anesthesia Post Evaluation    Patient location during evaluation: PACU  Patient participation: complete - patient participated  Level of consciousness: awake and alert  Airway patency: patent  Nausea & Vomiting: no nausea and no vomiting  Complications: no  Cardiovascular status: hemodynamically stable  Respiratory status: room air and spontaneous ventilation  Hydration status: stable

## 2021-12-21 NOTE — H&P
[address]  [facility], [address] [place] [address] Sean  [phone]    Procedure History & Physical      Edward Mauro     HPI:    Patient  is here for S1,S2, S3, L5 DR block  for low back pain. Labs/imaging studies reviewed   All question and concerns addressed including R/B/A associated with the procedure    Past Medical History:   Diagnosis Date    Back pain     COVID-19 09/2020    mild per patient    Diabetes mellitus ([address]     Hyperlipidemia     Hypertension     Hypothyroidism     IBS (irritable bowel syndrome)     Obesity     Osteoarthritis     PONV (postoperative nausea and vomiting)        Past Surgical History:   Procedure Laterality Date    BACK SURGERY      CHOLECYSTECTOMY      COLONOSCOPY      HYSTERECTOMY, TOTAL ABDOMINAL      INCONTINENCE SURGERY      BLADDER SLING    KNEE SURGERY Left     LUMBAR SPINE SURGERY N/A 5/25/2021    L3- L5 DECOMPRESSION AND FUSION , L4- L5 TRANSFORAMINAL LUMBAR INTERBODY FUSION --OARM, PATY TABLE, AUDIOLOGY, PLATES ,SCREWS, --NUVASIVE performed by Amandeep Dale MD at [address] Bilateral 10/11/2021    BILATERAL [address] (CPT [id number]) performed by Savanna Hernandez MD at [address] Bilateral 11/8/2021    BILATERAL LUMBAR FACET INJECTION AT L5-S 1 FACETS BLOCK UNDER FLUOROSCOPIC GUIDANCE performed by Savanna Hernandez MD at [address]       Prior to Admission medications    Medication Sig Start Date End Date Taking? Authorizing Provider   DULoxetine (CYMBALTA) 60 MG extended release capsule Take 1 capsule by mouth daily 12/13/21  Yes Savanna Hernandez MD   oxyCODONE-acetaminophen (PERCOCET) 5-325 MG per tablet Take 1 tablet by mouth 2 times daily as needed for Pain for up to 30 days.  12/15/21 1/14/22 Yes Savanna Hernandez MD   cyclobenzaprine (FLEXERIL) 10 MG tablet Take 1 tablet by mouth 3 times daily as needed for Muscle spasms 11/1/21  Yes Maddie Smiley Rocio Pedraza, DO   Thiamine HCl (B-1) 100 MG TABS Take 1 pill daily 10/18/21  Yes Dante Maza DO   gemfibrozil (LOPID) 600 MG tablet TAKE 1 TABLET BY MOUTH EVERY 12 HOURS 10/18/21  Yes Dante Maza DO   metFORMIN (GLUCOPHAGE) 500 MG tablet TAKE 1 TABLET BY MOUTH EVERY DAY WITH BREAKFAST 10/18/21  Yes Dante Maza DO   omeprazole (PRILOSEC) 20 MG delayed release capsule TAKE 1 CAPSULE BY MOUTH EVERY DAY 10/18/21  Yes Prieto Robin DO   vitamin D (ERGOCALCIFEROL) 1.25 MG (89315 UT) CAPS capsule TAKE 1 CAPSULE BY MOUTH ONE TIME PER WEEK 10/18/21  Yes Prieto Robin DO   linaclotide (LINZESS) 145 MCG capsule TAKE 1 CAPSULE BY MOUTH EVERY DAY IN THE MORNING BEFORE BREAKFAST 10/18/21  Yes Prieto Robin DO   valsartan (DIOVAN) 80 MG tablet TAKE 1 TABLET BY MOUTH EVERY DAY 10/18/21  Yes Prieto Robin DO   omega-3 acid ethyl esters (LOVAZA) 1 g capsule Take 1 capsule by mouth 2 times daily 10/18/21  Yes Prieto Robin DO   gabapentin (NEURONTIN) 400 MG capsule take 2 capsules by mouth three times a day 10/14/21 12/15/21 Yes Ricardo Miller MD   OZEMPIC, 1 MG/DOSE, 2 MG/1.5ML SOPN Inject 1 mg into the skin once a week 10/7/21  Yes Dante Maza DO   acetaminophen (TYLENOL) 500 MG tablet Take 500 mg by mouth every 6 hours as needed for Pain   Yes Historical Provider, MD   pravastatin (PRAVACHOL) 10 MG tablet Take 1 tablet by mouth every other day 8/30/21  Yes Dante Maza DO   vitamin B-12 (CYANOCOBALAMIN) 100 MCG tablet Take 0.5 tablets by mouth daily 8/30/21  Yes Dante Maza DO   levothyroxine (SYNTHROID) 150 MCG tablet TAKE 1 TABLET BY MOUTH EVERY DAY 8/13/21  Yes Prieto Robin DO   torsemide (DEMADEX) 10 MG tablet take 1 tablet by mouth once daily 12/16/21   Dante Maza DO   diclofenac sodium (VOLTAREN) 1 % GEL Apply 4 g topically 4 times daily as needed for Pain 10/18/21   Ricardo Miller MD   lidocaine (LIDODERM) 5 % PLACE 1 PATCH ONTO THE SKIN DAILY 12 HOURS ON, 12 HOURS OFF. 6/2/21   Historical Provider, MD   polyethylene glycol (GLYCOLAX) 17 g packet Take 17 g by mouth daily as needed    Historical Provider, MD   Handicap Placard MISC by Does not apply route Patient cannot walk 200 ft without stopping to rest.    Expiration 1 yr 5/3/21   Daniel Gifford, DO   levothyroxine (SYNTHROID) 150 MCG tablet Take 1 tablet by mouth Daily 1/12/21   Daniel Gifford, DO   rosuvastatin (CRESTOR) 5 MG tablet Take 1 tablet by mouth daily 1/12/21   Daniel Gifford, DO   meloxicam (MOBIC) 15 MG tablet Take 1 tablet by mouth daily as needed for Pain 1/11/21   Daniel Gifford, DO       Allergies   Allergen Reactions    Bactrim [Sulfamethoxazole-Trimethoprim] Nausea Only    Ceftin [Cefuroxime] Rash    Keflex [Cephalexin] Rash       Social History     Socioeconomic History    Marital status:      Spouse name: Not on file    Number of children: Not on file    Years of education: Not on file    Highest education level: Not on file   Occupational History     Employer: [employer]   Tobacco Use    Smoking status: Never Smoker    Smokeless tobacco: Never Used   Vaping Use    Vaping Use: Never used   Substance and Sexual Activity    Alcohol use: Never    Drug use: Never    Sexual activity: Not on file   Other Topics Concern    Not on file   Social History Narrative    Not on file     Social Determinants of Health     Financial Resource Strain: Low Risk     Difficulty of Paying Living Expenses: Not hard at all   Food Insecurity: No Food Insecurity    Worried About [address] in the Last Year: Never true    [address] in the Last Year: Never true   Transportation Needs:     Lack of Transportation (Medical): Not on file    Lack of Transportation (Non-Medical):  Not on file   Physical Activity:     Days of Exercise per Week: Not on file    Minutes of Exercise per Session: Not on file   Stress:     Feeling of Stress : Not on file   Social Connections:  Frequency of Communication with Friends and Family: Not on file    Frequency of Social Gatherings with Friends and Family: Not on file    Attends [employer] Services: Not on file    Active Member of Clubs or Organizations: Not on file    Attends Club or Organization Meetings: Not on file    Marital Status: Not on file   Intimate Partner Violence:     Fear of Current or Ex-Partner: Not on file    Emotionally Abused: Not on file    Physically Abused: Not on file    Sexually Abused: Not on file   Housing Stability:     Unable to Pay for Housing in the Last Year: Not on file    Number of [place] in the Last Year: Not on file    Unstable Housing in the Last Year: Not on file       Family History   Problem Relation Age of Onset    Diabetes Mother     Diabetes Father     Rheum Arthritis Sister     Cancer Maternal Grandfather          REVIEW OF SYSTEMS:    CONSTITUTIONAL:  negative for  fevers, chills, sweats and fatigue    RESPIRATORY:  negative for  dry cough, cough with sputum, dyspnea, wheezing and chest pain    CARDIOVASCULAR:  negative for chest pain, dyspnea, palpitations, syncope    GASTROINTESTINAL:  negative for nausea, vomiting, change in bowel habits, diarrhea, constipation and abdominal pain    MUSCULOSKELETAL: negative for muscle weakness    SKIN: negative for itching or rashes.     BEHAVIOR/PSYCH:  negative for poor appetite, increased appetite, decreased sleep and poor concentration    All other systems negative      PHYSICAL EXAM:    VITALS:  BP (!) 141/79   Pulse 52   Temp 97 °F (36.1 °C) (Skin)   Resp 18   Ht 5' 4\" (1.626 m)   Wt 265 lb (120.2 kg)   SpO2 95%   BMI 45.49 kg/m²     CONSTITUTIONAL:  awake, alert, cooperative, no apparent distress, and appears stated age    EYES: PERRLA, EOMI    LUNGS:  No increased work of breathing, no audible wheezing    CARDIOVASCULAR:  regular rate and rhythm    ABDOMEN:  Soft non tender non distended     EXTREMITIES: no signs of clubbing or cyanosis. MUSCULOSKELETAL: negative for flaccid muscle tone or spastic movements. SKIN: gross examination reveals no signs of rashes, or diaphoresis. NEURO: Cranial nerves II-XII grossly intact. No signs of agitated mood. Assessment/Plan:    Patient  is here for S1,S2, S3, L5 DR block  for low back pain. The patient was counseled at length about the risks of tom Covid-19 during their perioperative period and any recovery window from their procedure. The patient was made aware that tom Covid-19  may worsen their prognosis for recovering from their procedure  and lend to a higher morbidity and/or mortality risk. All material risks, benefits, and reasonable alternatives including postponing the procedure were discussed. The patient does wish to proceed with the procedure at this time.       Lia De La Rosa MD

## 2022-01-08 DIAGNOSIS — M54.17 RADICULOPATHY, LUMBOSACRAL REGION: ICD-10-CM

## 2022-01-08 DIAGNOSIS — M48.062 SPINAL STENOSIS, LUMBAR REGION WITH NEUROGENIC CLAUDICATION: ICD-10-CM

## 2022-01-15 RX ORDER — GABAPENTIN 400 MG/1
CAPSULE ORAL
Qty: 180 CAPSULE | Refills: 2 | Status: SHIPPED
Start: 2022-01-15 | End: 2022-02-17

## 2022-01-18 ENCOUNTER — OFFICE VISIT (OUTPATIENT)
Dept: FAMILY MEDICINE CLINIC | Age: 62
End: 2022-01-18
Payer: COMMERCIAL

## 2022-01-18 ENCOUNTER — OFFICE VISIT (OUTPATIENT)
Dept: PRIMARY CARE CLINIC | Age: 62
End: 2022-01-18
Payer: COMMERCIAL

## 2022-01-18 ENCOUNTER — OFFICE VISIT (OUTPATIENT)
Dept: PHYSICAL MEDICINE AND REHAB | Age: 62
End: 2022-01-18
Payer: COMMERCIAL

## 2022-01-18 VITALS
DIASTOLIC BLOOD PRESSURE: 76 MMHG | HEART RATE: 94 BPM | WEIGHT: 269 LBS | BODY MASS INDEX: 45.93 KG/M2 | SYSTOLIC BLOOD PRESSURE: 134 MMHG | TEMPERATURE: 97.3 F | HEIGHT: 64 IN | OXYGEN SATURATION: 96 %

## 2022-01-18 VITALS
BODY MASS INDEX: 45.93 KG/M2 | WEIGHT: 269 LBS | OXYGEN SATURATION: 96 % | HEIGHT: 64 IN | SYSTOLIC BLOOD PRESSURE: 140 MMHG | DIASTOLIC BLOOD PRESSURE: 80 MMHG | HEART RATE: 108 BPM | TEMPERATURE: 97.8 F

## 2022-01-18 VITALS
DIASTOLIC BLOOD PRESSURE: 64 MMHG | HEART RATE: 103 BPM | WEIGHT: 270.5 LBS | SYSTOLIC BLOOD PRESSURE: 138 MMHG | BODY MASS INDEX: 46.18 KG/M2 | HEIGHT: 64 IN | TEMPERATURE: 98 F | OXYGEN SATURATION: 96 %

## 2022-01-18 DIAGNOSIS — M25.511 CHRONIC RIGHT SHOULDER PAIN: ICD-10-CM

## 2022-01-18 DIAGNOSIS — S33.5XXA LUMBAR SPRAIN, INITIAL ENCOUNTER: ICD-10-CM

## 2022-01-18 DIAGNOSIS — S43.401A SPRAIN OF RIGHT SHOULDER, UNSPECIFIED SHOULDER SPRAIN TYPE, INITIAL ENCOUNTER: ICD-10-CM

## 2022-01-18 DIAGNOSIS — G89.29 CHRONIC RIGHT SHOULDER PAIN: ICD-10-CM

## 2022-01-18 DIAGNOSIS — M17.11 PRIMARY OSTEOARTHRITIS OF RIGHT KNEE: ICD-10-CM

## 2022-01-18 DIAGNOSIS — E66.01 CLASS 3 SEVERE OBESITY DUE TO EXCESS CALORIES WITH SERIOUS COMORBIDITY AND BODY MASS INDEX (BMI) OF 45.0 TO 49.9 IN ADULT (HCC): ICD-10-CM
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
## 2022-01-18 DIAGNOSIS — E11.9 TYPE 2 DIABETES MELLITUS WITHOUT COMPLICATION, WITHOUT LONG-TERM CURRENT USE OF INSULIN (HCC): Chronic | ICD-10-CM

## 2022-01-18 DIAGNOSIS — M54.17 RADICULOPATHY, LUMBOSACRAL REGION: ICD-10-CM

## 2022-01-18 DIAGNOSIS — Z98.1 S/P LUMBAR FUSION: ICD-10-CM

## 2022-01-18 DIAGNOSIS — E78.49 OTHER HYPERLIPIDEMIA: Chronic | ICD-10-CM

## 2022-01-18 DIAGNOSIS — E03.9 ACQUIRED HYPOTHYROIDISM: Chronic | ICD-10-CM

## 2022-01-18 DIAGNOSIS — S23.9XXA THORACIC SPRAIN: ICD-10-CM

## 2022-01-18 DIAGNOSIS — S16.1XXA STRAIN OF NECK MUSCLE, INITIAL ENCOUNTER: ICD-10-CM

## 2022-01-18 DIAGNOSIS — M96.1 POSTLAMINECTOMY SYNDROME, LUMBAR: ICD-10-CM

## 2022-01-18 DIAGNOSIS — M48.062 SPINAL STENOSIS, LUMBAR REGION WITH NEUROGENIC CLAUDICATION: Primary | ICD-10-CM

## 2022-01-18 DIAGNOSIS — M25.511 RIGHT SHOULDER PAIN, UNSPECIFIED CHRONICITY: ICD-10-CM
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
## 2022-01-18 DIAGNOSIS — E03.9 ACQUIRED HYPOTHYROIDISM: Primary | Chronic | ICD-10-CM

## 2022-01-18 PROBLEM — E66.813 CLASS 3 SEVERE OBESITY DUE TO EXCESS CALORIES WITH SERIOUS COMORBIDITY AND BODY MASS INDEX (BMI) OF 45.0 TO 49.9 IN ADULT: Status: ACTIVE | Noted: 2022-01-18

## 2022-01-18 LAB
ALBUMIN SERPL-MCNC: 4.1 G/DL (ref 3.5–5.2)
ALP BLD-CCNC: 91 U/L (ref 35–104)
ALT SERPL-CCNC: 23 U/L (ref 0–32)
ANION GAP SERPL CALCULATED.3IONS-SCNC: 15 MMOL/L (ref 7–16)
AST SERPL-CCNC: 20 U/L (ref 0–31)
BILIRUB SERPL-MCNC: 0.5 MG/DL (ref 0–1.2)
BUN BLDV-MCNC: 13 MG/DL (ref 6–23)
CALCIUM SERPL-MCNC: 9.6 MG/DL (ref 8.6–10.2)
CHLORIDE BLD-SCNC: 102 MMOL/L (ref 98–107)
CHOLESTEROL, TOTAL: 208 MG/DL (ref 0–199)
CO2: 22 MMOL/L (ref 22–29)
CREAT SERPL-MCNC: 0.7 MG/DL (ref 0.5–1)
GFR AFRICAN AMERICAN: >60
GFR NON-AFRICAN AMERICAN: >60 ML/MIN/1.73
GLUCOSE BLD-MCNC: 166 MG/DL (ref 74–99)
HBA1C MFR BLD: 7.9 % (ref 4–5.6)
HCT VFR BLD CALC: 46.9 % (ref 34–48)
HDLC SERPL-MCNC: 27 MG/DL
HEMOGLOBIN: 15 G/DL (ref 11.5–15.5)
LDL CHOLESTEROL CALCULATED: 103 MG/DL (ref 0–99)
MCH RBC QN AUTO: 29.1 PG (ref 26–35)
MCHC RBC AUTO-ENTMCNC: 32 % (ref 32–34.5)
MCV RBC AUTO: 90.9 FL (ref 80–99.9)
PDW BLD-RTO: 14.1 FL (ref 11.5–15)
PLATELET # BLD: 268 E9/L (ref 130–450)
PMV BLD AUTO: 10 FL (ref 7–12)
POTASSIUM SERPL-SCNC: 5.2 MMOL/L (ref 3.5–5)
RBC # BLD: 5.16 E12/L (ref 3.5–5.5)
SODIUM BLD-SCNC: 139 MMOL/L (ref 132–146)
TOTAL PROTEIN: 7.4 G/DL (ref 6.4–8.3)
TRIGL SERPL-MCNC: 391 MG/DL (ref 0–149)
TSH SERPL DL<=0.05 MIU/L-ACNC: 6.6 UIU/ML (ref 0.27–4.2)
VLDLC SERPL CALC-MCNC: 78 MG/DL
WBC # BLD: 6.8 E9/L (ref 4.5–11.5)

## 2022-01-18 PROCEDURE — 99215 OFFICE O/P EST HI 40 MIN: CPT | Performed by: PHYSICAL MEDICINE & REHABILITATION

## 2022-01-18 PROCEDURE — 99214 OFFICE O/P EST MOD 30 MIN: CPT | Performed by: FAMILY MEDICINE

## 2022-01-18 RX ORDER — LEVOTHYROXINE SODIUM 0.15 MG/1
TABLET ORAL
Qty: 90 TABLET | Refills: 1 | Status: SHIPPED
Start: 2022-01-18 | End: 2022-01-19

## 2022-01-18 RX ORDER — UBIDECARENONE 75 MG
100 CAPSULE ORAL DAILY
Qty: 90 TABLET | Refills: 3 | Status: SHIPPED
Start: 2022-01-18 | End: 2022-07-19 | Stop reason: SDUPTHER

## 2022-01-18 RX ORDER — ERGOCALCIFEROL 1.25 MG/1
CAPSULE ORAL
Qty: 12 CAPSULE | Refills: 1 | Status: SHIPPED
Start: 2022-01-18 | End: 2022-08-24

## 2022-01-18 RX ORDER — CYCLOBENZAPRINE HCL 10 MG
10 TABLET ORAL 3 TIMES DAILY PRN
Qty: 90 TABLET | Refills: 2 | Status: SHIPPED
Start: 2022-01-18 | End: 2022-04-26

## 2022-01-18 ASSESSMENT — ENCOUNTER SYMPTOMS
VOMITING: 0
SORE THROAT: 0
CONSTIPATION: 0
VOMITING: 0
SORE THROAT: 0
BACK PAIN: 1
COUGH: 0
COLOR CHANGE: 0
CHEST TIGHTNESS: 0
DIARRHEA: 0
DIARRHEA: 0
SHORTNESS OF BREATH: 0
EYE PAIN: 0
WHEEZING: 0
COLOR CHANGE: 0
CHEST TIGHTNESS: 0
SHORTNESS OF BREATH: 0
EYE ITCHING: 0
BLOOD IN STOOL: 0
NAUSEA: 0
EYE REDNESS: 0
EYE REDNESS: 0
APNEA: 0
SINUS PRESSURE: 0
COUGH: 0
BACK PAIN: 1
WHEEZING: 0
BLOOD IN STOOL: 0
VISUAL CHANGE: 0
EYE ITCHING: 0
APNEA: 0
ABDOMINAL PAIN: 0
RHINORRHEA: 0
ABDOMINAL PAIN: 0
RHINORRHEA: 0
SINUS PRESSURE: 0
CONSTIPATION: 0
EYE PAIN: 0
NAUSEA: 0

## 2022-01-18 ASSESSMENT — PATIENT HEALTH QUESTIONNAIRE - PHQ9
SUM OF ALL RESPONSES TO PHQ QUESTIONS 1-9: 0
1. LITTLE INTEREST OR PLEASURE IN DOING THINGS: 0
SUM OF ALL RESPONSES TO PHQ9 QUESTIONS 1 & 2: 0
2. FEELING DOWN, DEPRESSED OR HOPELESS: 0
SUM OF ALL RESPONSES TO PHQ QUESTIONS 1-9: 0
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
## 2022-01-18 NOTE — PROGRESS NOTES
Chief Complaint:     Chief Complaint   Patient presents with    Check-Up    Diabetes    Hypothyroidism    Hypertension    Hyperlipidemia         Diabetes  She presents for her follow-up diabetic visit. She has type 2 diabetes mellitus. The initial diagnosis of diabetes was made 1 month ago. Her disease course has been stable. There are no hypoglycemic associated symptoms. Pertinent negatives for hypoglycemia include no dizziness, headaches or nervousness/anxiousness. Associated symptoms include fatigue. Pertinent negatives for diabetes include no chest pain, no visual change and no weakness. There are no hypoglycemic complications. Symptoms are stable. There are no diabetic complications. Risk factors for coronary artery disease include dyslipidemia and obesity. Current diabetic treatment includes oral agent (monotherapy). She is compliant with treatment all of the time. She is following a generally healthy diet. When asked about meal planning, she reported none. She has not had a previous visit with a dietitian. There is no change in her home blood glucose trend. An ACE inhibitor/angiotensin II receptor blocker is not being taken. She does not see a podiatrist.Eye exam is not current. Hyperlipidemia  This is a chronic problem. The current episode started more than 1 year ago. The problem is controlled. Recent lipid tests were reviewed and are normal. Exacerbating diseases include diabetes and obesity. Associated symptoms include leg pain and myalgias. Pertinent negatives include no chest pain or shortness of breath. Current antihyperlipidemic treatment includes statins. The current treatment provides significant improvement of lipids. Compliance problems include medication side effects. Risk factors for coronary artery disease include diabetes mellitus, dyslipidemia, obesity and post-menopausal.   Back Pain  This is a recurrent problem. The current episode started more than 1 month ago.  The problem occurs intermittently. The problem has been waxing and waning since onset. The pain is present in the lumbar spine. The quality of the pain is described as aching. The pain is moderate. Associated symptoms include leg pain. Pertinent negatives include no abdominal pain, chest pain, dysuria, fever, headaches, numbness or weakness. She has tried nothing for the symptoms. The treatment provided no relief. Fatigue  This is a chronic problem. The problem occurs intermittently. The problem has been waxing and waning. Associated symptoms include fatigue, myalgias and a rash. Pertinent negatives include no abdominal pain, arthralgias, chest pain, congestion, coughing, diaphoresis, fever, headaches, nausea, neck pain, numbness, sore throat, visual change, vomiting or weakness. Nothing aggravates the symptoms. She has tried nothing for the symptoms. The treatment provided no relief.        Patient Active Problem List   Diagnosis    Type 2 diabetes mellitus without complication, without long-term current use of insulin ([address]    Acquired hypothyroidism    Peripheral neuropathy    Carpal tunnel syndrome of left wrist    Spinal stenosis of lumbar region with neurogenic claudication    Acute exacerbation of chronic low back pain    Hyperlipidemia    Spondylolisthesis of lumbar region    Lumbar radiculopathy    Postoperative hypotension    Postoperative anemia due to acute blood loss    Venous insufficiency of both lower extremities    Impingement syndrome of right shoulder    DDD (degenerative disc disease), lumbar    Lumbosacral spondylosis without myelopathy    S/P lumbar fusion    Sacroiliac dysfunction    Postlaminectomy syndrome, lumbar    Class 3 severe obesity due to excess calories with serious comorbidity and body mass index (BMI) of 45.0 to 49.9 in adult [facility])       Past Medical History:   Diagnosis Date    Back pain     COVID-19 09/2020    mild per patient    Diabetes mellitus ([address]     Hyperlipidemia  Hypertension     Hypothyroidism     IBS (irritable bowel syndrome)     Obesity     Osteoarthritis     PONV (postoperative nausea and vomiting)        Past Surgical History:   Procedure Laterality Date    BACK SURGERY      CHOLECYSTECTOMY      COLONOSCOPY      HYSTERECTOMY, TOTAL ABDOMINAL      INCONTINENCE SURGERY      BLADDER SLING    KNEE SURGERY Left     LUMBAR SPINE SURGERY N/A 5/25/2021    L3- L5 DECOMPRESSION AND FUSION , L4- L5 TRANSFORAMINAL LUMBAR INTERBODY FUSION --OARM, PATY TABLE, AUDIOLOGY, PLATES ,SCREWS, --NUVASIVE performed by Laurita Foley MD at [address] Bilateral 10/11/2021    BILATERAL SACROILIAC JOINT INJECTION UNDER FLUOROSCOPY (CPT 58072) performed by Fabiola Dacosta MD at [address] Bilateral 11/8/2021    BILATERAL LUMBAR FACET INJECTION AT L5-S 1 FACETS BLOCK UNDER FLUOROSCOPIC GUIDANCE performed by Fabiola Dacosta MD at [address] Bilateral 12/21/2021    BILATERAL S1, S2, S3 BRANCH & L5 [facility] NERVE BLOCK UNDER XRAY GUIDANCE (02136) (SEDATION) performed by Fabiola Dacosta MD at [address] [place] [address]       Current Outpatient Medications   Medication Sig Dispense Refill    vitamin D (ERGOCALCIFEROL) 1.25 MG (85178 UT) CAPS capsule TAKE 1 CAPSULE BY MOUTH ONE TIME PER WEEK 12 capsule 1    levothyroxine (SYNTHROID) 150 MCG tablet TAKE 1 TABLET BY MOUTH EVERY DAY 90 tablet 1    cyclobenzaprine (FLEXERIL) 10 MG tablet Take 1 tablet by mouth 3 times daily as needed for Muscle spasms 90 tablet 2    vitamin B-12 (CYANOCOBALAMIN) 100 MCG tablet Take 1 tablet by mouth daily 90 tablet 3    gabapentin (NEURONTIN) 400 MG capsule take 2 capsules by mouth three times a day 180 capsule 2    torsemide (DEMADEX) 10 MG tablet take 1 tablet by mouth once daily 30 tablet 3    DULoxetine (CYMBALTA) 60 MG extended release capsule Take 1 capsule by mouth daily 30 capsule 2    Thiamine HCl (B-1) 100 MG TABS Take 1 pill daily 90 tablet 1    gemfibrozil (LOPID) 600 MG tablet TAKE 1 TABLET BY MOUTH EVERY 12 HOURS 180 tablet 3    metFORMIN (GLUCOPHAGE) 500 MG tablet TAKE 1 TABLET BY MOUTH EVERY DAY WITH BREAKFAST 90 tablet 3    omeprazole (PRILOSEC) 20 MG delayed release capsule TAKE 1 CAPSULE BY MOUTH EVERY DAY 90 capsule 3    linaclotide (LINZESS) 145 MCG capsule TAKE 1 CAPSULE BY MOUTH EVERY DAY IN THE MORNING BEFORE BREAKFAST 90 capsule 3    valsartan (DIOVAN) 80 MG tablet TAKE 1 TABLET BY MOUTH EVERY DAY 90 tablet 1    diclofenac sodium (VOLTAREN) 1 % GEL Apply 4 g topically 4 times daily as needed for Pain 150 g 3    omega-3 acid ethyl esters (LOVAZA) 1 g capsule Take 1 capsule by mouth 2 times daily 180 capsule 1    OZEMPIC, 1 MG/DOSE, 2 MG/1.5ML SOPN Inject 1 mg into the skin once a week 6 pen 3    acetaminophen (TYLENOL) 500 MG tablet Take 500 mg by mouth every 6 hours as needed for Pain      pravastatin (PRAVACHOL) 10 MG tablet Take 1 tablet by mouth every other day 45 tablet 1    lidocaine (LIDODERM) 5 % PLACE 1 PATCH ONTO THE SKIN DAILY 12 HOURS ON, 12 HOURS OFF.  polyethylene glycol (GLYCOLAX) 17 g packet Take 17 g by mouth daily as needed      Handicap Placard MISC by Does not apply route Patient cannot walk 200 ft without stopping to rest.    Expiration 1 yr 1 each 0     No current facility-administered medications for this visit.        Allergies   Allergen Reactions    Bactrim [Sulfamethoxazole-Trimethoprim] Nausea Only    Ceftin [Cefuroxime] Rash    Keflex [Cephalexin] Rash       Social History     Socioeconomic History    Marital status:      Spouse name: None    Number of children: None    Years of education: None    Highest education level: None   Occupational History     Employer: [employer]   Tobacco Use    Smoking status: Never Smoker    Smokeless tobacco: Never Used   Vaping Use    Vaping Use: Never used   Substance and Sexual Activity    Alcohol use: Never    Drug use: Never    Sexual activity: None   Other Topics Concern    None   Social History Narrative    None     Social Determinants of Health     Financial Resource Strain: Low Risk     Difficulty of Paying Living Expenses: Not hard at all   Food Insecurity: No Food Insecurity    Worried About Running Out of Food in the Last Year: Never true    Stephen of Food in the Last Year: Never true   Transportation Needs:     Lack of Transportation (Medical): Not on file    Lack of Transportation (Non-Medical): Not on file   Physical Activity:     Days of Exercise per Week: Not on file    Minutes of Exercise per Session: Not on file   Stress:     Feeling of Stress : Not on file   Social Connections:     Frequency of Communication with Friends and Family: Not on file    Frequency of Social Gatherings with Friends and Family: Not on file    Attends [employer] Services: Not on file    Active Member of [address] [place] [address] [employer] or Organizations: Not on file    Attends Club or Organization Meetings: Not on file    Marital Status: Not on file   Intimate Partner Violence:     Fear of Current or Ex-Partner: Not on file    Emotionally Abused: Not on file    Physically Abused: Not on file    Sexually Abused: Not on file   Housing Stability:     Unable to Pay for Housing in the Last Year: Not on file    Number of [place] in the Last Year: Not on file    Unstable Housing in the Last Year: Not on file       Family History   Problem Relation Age of Onset    Diabetes Mother     Diabetes Father     Rheum Arthritis Sister     Cancer Maternal Grandfather           Review of Systems   Constitutional: Positive for fatigue and malaise/fatigue. Negative for activity change, appetite change, diaphoresis and fever. HENT: Negative for congestion, ear pain, hearing loss, nosebleeds, rhinorrhea, sinus pressure and sore throat. Eyes: Negative for pain, redness, itching and visual disturbance.    Respiratory: Negative for apnea, cough, chest tightness, shortness of breath and wheezing. Cardiovascular: Negative for chest pain, palpitations and leg swelling. Gastrointestinal: Negative for abdominal pain, blood in stool, constipation, diarrhea, nausea and vomiting. Endocrine: Negative. Genitourinary: Negative for decreased urine volume, difficulty urinating, dysuria, frequency, hematuria and urgency. Musculoskeletal: Positive for back pain and myalgias. Negative for arthralgias, gait problem and neck pain. Skin: Positive for rash. Negative for color change. Allergic/Immunologic: Negative for environmental allergies and food allergies. Neurological: Negative for dizziness, weakness, light-headedness, numbness and headaches. Hematological: Negative for adenopathy. Does not bruise/bleed easily. Psychiatric/Behavioral: Negative for behavioral problems, dysphoric mood and sleep disturbance. The patient is not nervous/anxious and is not hyperactive. All other systems reviewed and are negative. /76   Pulse 94   Temp 97.3 °F (36.3 °C)   Ht 5' 4\" (1.626 m)   Wt 269 lb (122 kg)   SpO2 96%   BMI 46.17 kg/m²     Physical Exam  Vitals and nursing note reviewed. Constitutional:       General: She is not in acute distress. Appearance: Normal appearance. She is well-developed. HENT:      Head: Normocephalic and atraumatic. Right Ear: Hearing, tympanic membrane and external ear normal. No tenderness. No middle ear effusion. Left Ear: Hearing, tympanic membrane and external ear normal. No tenderness. No middle ear effusion. Nose: Nose normal. No congestion or rhinorrhea. Right Turbinates: Not enlarged. Left Turbinates: Not enlarged. Mouth/Throat:      Mouth: Mucous membranes are moist.      Tongue: No lesions. Pharynx: Oropharynx is clear. No oropharyngeal exudate or posterior oropharyngeal erythema. Eyes:      General: No scleral icterus.      Conjunctiva/sclera: Conjunctivae normal. Pupils: Pupils are equal, round, and reactive to light. Neck:      Thyroid: No thyromegaly. Cardiovascular:      Rate and Rhythm: Normal rate and regular rhythm. Heart sounds: Normal heart sounds. No murmur heard. Pulmonary:      Effort: Pulmonary effort is normal. No respiratory distress. Breath sounds: Normal breath sounds. No wheezing or rales. Abdominal:      General: Bowel sounds are normal. There is no distension. Palpations: Abdomen is soft. Tenderness: There is no abdominal tenderness. Musculoskeletal:         General: No tenderness. Normal range of motion. Cervical back: Normal range of motion and neck supple. No rigidity. No muscular tenderness. Lymphadenopathy:      Cervical: No cervical adenopathy. Skin:     General: Skin is warm and dry. Findings: No erythema or rash. Neurological:      General: No focal deficit present. Mental Status: She is alert and oriented to person, place, and time. Cranial Nerves: No cranial nerve deficit. Deep Tendon Reflexes: Reflexes are normal and symmetric.  Reflexes normal.   Psychiatric:         Mood and Affect: Mood normal.                                 ASSESSMENT/PLAN:    Patient Active Problem List   Diagnosis    Type 2 diabetes mellitus without complication, without long-term current use of insulin ([facility])    Acquired hypothyroidism    Peripheral neuropathy    Carpal tunnel syndrome of left wrist    Spinal stenosis of lumbar region with neurogenic claudication    Acute exacerbation of chronic low back pain    Hyperlipidemia    Spondylolisthesis of lumbar region    Lumbar radiculopathy    Postoperative hypotension    Postoperative anemia due to acute blood loss    Venous insufficiency of both lower extremities    Impingement syndrome of right shoulder    DDD (degenerative disc disease), lumbar    Lumbosacral spondylosis without myelopathy    S/P lumbar fusion    Sacroiliac dysfunction  Postlaminectomy syndrome, lumbar    Class 3 severe obesity due to excess calories with serious comorbidity and body mass index (BMI) of 45.0 to 49.9 in [facility])       Michael Dickerson was seen today for check-up, diabetes, hypothyroidism, hypertension and hyperlipidemia. Diagnoses and all orders for this visit:    Acquired hypothyroidism  -     TSH without Reflex; Future    Type 2 diabetes mellitus without complication, without long-term current use of insulin (HCC)  -     CBC; Future  -     Comprehensive Metabolic Panel; Future  -     Hemoglobin A1C; Future  -     Lipid Panel; Future  -     TSH without Reflex; Future    Other hyperlipidemia  -     Comprehensive Metabolic Panel; Future  -     Lipid Panel; Future    S/P lumbar fusion    Postlaminectomy syndrome, lumbar    Class 3 severe obesity due to excess calories with serious comorbidity and body mass index (BMI) of 45.0 to 49.9 in [facility])    Other orders  -     vitamin D (ERGOCALCIFEROL) 1.25 MG (67531 UT) CAPS capsule; TAKE 1 CAPSULE BY MOUTH ONE TIME PER WEEK  -     levothyroxine (SYNTHROID) 150 MCG tablet; TAKE 1 TABLET BY MOUTH EVERY DAY  -     cyclobenzaprine (FLEXERIL) 10 MG tablet; Take 1 tablet by mouth 3 times daily as needed for Muscle spasms  -     vitamin B-12 (CYANOCOBALAMIN) 100 MCG tablet; Take 1 tablet by mouth daily          Return in about 6 months (around 7/18/2022) for Follow up DM, Follow up Lipids, Recheck labs, Recheck Meds. I spent 30 minutes with this patient. I spent greater than 50% of the time counseling this patient.         Pretty Monk DO  1/18/2022  9:30 AM
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
## 2022-01-19 DIAGNOSIS — E03.9 ACQUIRED HYPOTHYROIDISM: Primary | ICD-10-CM

## 2022-01-19 DIAGNOSIS — E78.49 OTHER HYPERLIPIDEMIA: ICD-10-CM

## 2022-01-19 DIAGNOSIS — E11.9 TYPE 2 DIABETES MELLITUS WITHOUT COMPLICATION, WITHOUT LONG-TERM CURRENT USE OF INSULIN (HCC): ICD-10-CM

## 2022-01-19 RX ORDER — LEVOTHYROXINE SODIUM 175 UG/1
175 TABLET ORAL DAILY
Qty: 90 TABLET | Refills: 1 | Status: SHIPPED
Start: 2022-01-19 | End: 2022-07-11

## 2022-01-20 ENCOUNTER — OFFICE VISIT (OUTPATIENT)
Dept: BARIATRICS/WEIGHT MGMT | Age: 62
End: 2022-01-20
Payer: COMMERCIAL

## 2022-01-20 VITALS
BODY MASS INDEX: 47.27 KG/M2 | WEIGHT: 266.8 LBS | HEART RATE: 90 BPM | HEIGHT: 63 IN | SYSTOLIC BLOOD PRESSURE: 116 MMHG | DIASTOLIC BLOOD PRESSURE: 63 MMHG | TEMPERATURE: 97.2 F

## 2022-01-20 DIAGNOSIS — R53.82 CHRONIC FATIGUE: Primary | ICD-10-CM

## 2022-01-20 DIAGNOSIS — E66.01 MORBID OBESITY WITH BMI OF 45.0-49.9, ADULT (HCC): ICD-10-CM

## 2022-01-20 PROCEDURE — 99202 OFFICE O/P NEW SF 15 MIN: CPT

## 2022-01-20 PROCEDURE — 99205 OFFICE O/P NEW HI 60 MIN: CPT | Performed by: INTERNAL MEDICINE

## 2022-01-20 RX ORDER — PHENTERMINE HYDROCHLORIDE 37.5 MG/1
37.5 TABLET ORAL
Qty: 30 TABLET | Refills: 0 | Status: SHIPPED
Start: 2022-01-20 | End: 2022-02-17

## 2022-01-20 NOTE — PROGRESS NOTES
CC -   Weight gain, fatigue    BACKGROUND -     First visit: 1/20/2022     Obesity (all weight in lbs)  Began years, ago, worse since 5/2021 after back surgery  Initial BMI 47.26, Wt 266.75  HS Grad wt 180   Lowest   wt 165 (20 years ago)   Highest  wt >300 ([id number])  Pattern of wt gain: gradual, but fluctuating in the last 3-4 years  Wt change past yr: -4 lbs (went down to 252 then back up again to 266)  Most wt lost: 34 lbs  Other diets attempted: used to walk    Desire to lose weight: 8/10  Problem posed by appetite: 5/10    Initial Diet:    Number of meals per day - 3    Number of snacks per day - 1    Meal volume - 9\" plate, no seconds    Fast food/convenience store - 1-2x/week    Restaurants (not fast food) - 0-1x/week   Sweets - 2d/week   Chips - 1d/week   Crackers/pretzels - 0d/week   Nuts - 0-1d/week   Peanut Butter - 0-1d/week   Popcorn - 0-1d/week   Dried fruit - 0-1d/week   Whole fruit - 1-2d/week   Breakfast cereal - 2d/week   Granola/Protein/Energy bar - 0-1d/week   Sugar sweetened beverages - orange juice once a month, rarely coffee, rarely tea   Protein - No supplements   Fiber - No supplements     Breakfast: eggs, leftover, toast, english muffins (1/wk). Lunch: soup, stuffed peppers, hamburger. Dinner: spaghetti, vegetable soup, stuffed pepper soup etc.      Exercise:    Gym membership - no    Walking - no    Running - no    Resistance - no    Aerobic class - leg lifts/arm lifts with physical therapy        Sleep: Bedtime: 10 pm, wake up time: 5-6 am, daytime naps: no. Feels rested in the morning usually.     Weight scale at home: yes, takes weight: rarely  Food scale: no  ______________________    [facility] -  Past Medical History:   Diagnosis Date    Back pain     COVID-19 09/2020    mild per patient    Diabetes mellitus ([address]     Hyperlipidemia     Hypertension     Hypothyroidism     IBS (irritable bowel syndrome)     Obesity     Osteoarthritis     PONV (postoperative nausea and vomiting) Past Surgical History:   Procedure Laterality Date    BACK SURGERY      CHOLECYSTECTOMY      COLONOSCOPY      HYSTERECTOMY, TOTAL ABDOMINAL      INCONTINENCE SURGERY      BLADDER SLING    KNEE SURGERY Left     LUMBAR SPINE SURGERY N/A 5/25/2021    L3- L5 DECOMPRESSION AND FUSION , L4- L5 TRANSFORAMINAL LUMBAR INTERBODY FUSION --OARM, PATY TABLE, AUDIOLOGY, PLATES ,SCREWS, --NUVASIVE performed by Emilia Ruiz MD at . [address] Bilateral 10/11/2021    BILATERAL SACROILIAC JOINT INJECTION UNDER FLUOROSCOPY (CPT 73027) performed by Fredo Cid MD at . [address] Bilateral 11/8/2021    BILATERAL LUMBAR FACET INJECTION AT L5-S 1 FACETS BLOCK UNDER FLUOROSCOPIC GUIDANCE performed by Fredo Cid MD at . [address] Bilateral 12/21/2021    BILATERAL S1, S2, S3 BRANCH & L5 [facility] NERVE BLOCK UNDER XRAY GUIDANCE (26964) (SEDATION) performed by Fredo Cid MD at [address] [place] [address]   thyroid surgery (goiter)    Current Outpatient Medications   Medication Sig Dispense Refill    levothyroxine (SYNTHROID) 175 MCG tablet Take 1 tablet by mouth daily 90 tablet 1    vitamin D (ERGOCALCIFEROL) 1.25 MG (38905 UT) CAPS capsule TAKE 1 CAPSULE BY MOUTH ONE TIME PER WEEK 12 capsule 1    cyclobenzaprine (FLEXERIL) 10 MG tablet Take 1 tablet by mouth 3 times daily as needed for Muscle spasms 90 tablet 2    vitamin B-12 (CYANOCOBALAMIN) 100 MCG tablet Take 1 tablet by mouth daily 90 tablet 3    diclofenac sodium (VOLTAREN) 1 % GEL Apply 4 g topically 4 times daily as needed for Pain 150 g 3    gabapentin (NEURONTIN) 400 MG capsule take 2 capsules by mouth three times a day 180 capsule 2    torsemide (DEMADEX) 10 MG tablet take 1 tablet by mouth once daily 30 tablet 3    DULoxetine (CYMBALTA) 60 MG extended release capsule Take 1 capsule by mouth daily 30 capsule 2    Thiamine HCl (B-1) 100 MG TABS Take 1 pill daily 90 tablet 1    gemfibrozil (LOPID) 600 MG tablet TAKE 1 TABLET BY MOUTH EVERY 12 HOURS 180 tablet 3    metFORMIN (GLUCOPHAGE) 500 MG tablet TAKE 1 TABLET BY MOUTH EVERY DAY WITH BREAKFAST 90 tablet 3    omeprazole (PRILOSEC) 20 MG delayed release capsule TAKE 1 CAPSULE BY MOUTH EVERY DAY 90 capsule 3    linaclotide (LINZESS) 145 MCG capsule TAKE 1 CAPSULE BY MOUTH EVERY DAY IN THE MORNING BEFORE BREAKFAST 90 capsule 3    valsartan (DIOVAN) 80 MG tablet TAKE 1 TABLET BY MOUTH EVERY DAY 90 tablet 1    omega-3 acid ethyl esters (LOVAZA) 1 g capsule Take 1 capsule by mouth 2 times daily 180 capsule 1    OZEMPIC, 1 MG/DOSE, 2 MG/1.5ML SOPN Inject 1 mg into the skin once a week 6 pen 3    acetaminophen (TYLENOL) 500 MG tablet Take 500 mg by mouth every 6 hours as needed for Pain      pravastatin (PRAVACHOL) 10 MG tablet Take 1 tablet by mouth every other day 45 tablet 1    lidocaine (LIDODERM) 5 % PLACE 1 PATCH ONTO THE SKIN DAILY 12 HOURS ON, 12 HOURS OFF.  polyethylene glycol (GLYCOLAX) 17 g packet Take 17 g by mouth daily as needed      Handicap Placard MISC by Does not apply route Patient cannot walk 200 ft without stopping to rest.    Expiration 1 yr 1 each 0     No current facility-administered medications for this visit. Allergies: Allergies   Allergen Reactions    Bactrim [Sulfamethoxazole-Trimethoprim] Nausea Only    Ceftin [Cefuroxime] Rash    Keflex [Cephalexin] Rash       Family history: DM: mother, Heart disease: no.    Social history: smoking: never ; Alcohol: never. No stairs other than steps to go in the house and kitchen. ROS -  Card - no CP, sometimes shortness  Of breath. GI - no N/V/D/C    PE -  Gen : /63 (Site: Left Upper Arm, Position: Sitting, Cuff Size: Thigh)   Pulse 90   Temp 97.2 °F (36.2 °C) (Temporal)   Ht 5' 3\" (1.6 m)   Wt 266 lb 12.8 oz (121 kg)   BMI 47.26 kg/m²    WN, WD, NAD  Lung: Nml resp effort  Psych: Normal mood   Full affect  Neuro:  Moves all ext no well  ______________________    HISTORY & ASSESSMENT/PLAN -     Problem 1  - Fatigue   HPI   - Ongoing, gradually progressing which pt feels like due to weight gain, complicated by joint and back pain  Assessment  - Uncontrolled   Plan   - May need sleep study. Weight reduction per plan below    Problem 2  - Obesity   HPI   - See above Background for description    Weight  Date    266.75 lbs 1/20/2022  DEN = ~1886 Chai/d = ~15420 Chai/wk  Wt effect of HR foods = 125 Chai/d = 875 Chai/wk = 13 lb/year. Assessment  - Uncontrolled  Plan   - Talked about various options. Would like to know about bariatric surgery, and would like referral to surgeon. Also would like to start low calorie diet with calorie counting. Would like medication to help suppress appetite. Talked about medication options, would like Phentermine. No h/o glaucoma. BP today is 116/63. Talked about the medication can increase BP and/or heart rate, anxiety, palpitation, lightheadedness, dry mouth etc. Detailed plan as follows:  Patient Instructions   Rules:  · Count every calorie every day  · Limit sweets to one day per month  · Limit chips/crackers/pretzels/nuts/popcorn to 100 chai/day  · Eliminate all sugar sweetened beverages (including fruit juice)  · Limit restaurants (including fast food and food from a convenience store) to one time every two weeks while in town    Requirements:  · Make sure protein intake is at least 60 grams per day (do not count protein every day; instead spot check your intake every 2-3 weeks and make sure what you think you are getting is close to accurate; consider using a protein shake if needed; these are in the pharmacy section of the stores, not the grocery section; Premier, Pure Protein and Fairlife are relatively inexpensive and taste good to most patients; other options are Nectar, Boost Max, Ensure Max, BeneProtein and GNC lean (which is lactose-free);    Nectar fruit, Premier Protein Clear, IsoPure Protein Drink, and Protein 2 O are water-based options; Quest (or Cosco, which is cheaper and is ordered on [address] [facility] [address]) and the Oh Yeah 1 protein bars can also be used, but have less protein in them )  (Disclaimer: Dietary supplements rarely have their listed ingredients and the amount of each verified by a third party other. Sometimes they give verification for their claims to be GMO and gluten free and to be organic. However, even such verifications as these may still be untrustworthy.)    · Make sure that fiber intake is at least 22 grams per day. Do this by either eating 12 tablespoons of the original, plain Fiber One cereal every day or 4 tablespoons of wheat dextrin powder (Benefiber or a generic brand) every day. Work up to this amount slowly by starting with only one-eighth to one-fourth of the target amount and then adding another one-eighth to one-fourth every one or two weeks until reaching the target. · Take one multivitamin every day    Targets:  · Limit calorie intake to 1400 calories/day  · Walk 30 minutes daily  · Avoid eating 2 hours within bedtime. Tips:  · Do not eat outside of the dining room or the kitchen  · Do not eat while watching TV, videos, working on the computer or using a smart phone  · Do not eat food out of a multi-serving bag or container. · Establish 6 hours of food-free \"time-out\" periods (times you don't eat) each day. No period can be less than 1 hour long. The periods need to be the same every day for days that are the same (for example, workdays would have one set of food free periods and weekends would have another set of days). These six hours are in addition to the two hours before bedtime and the time spent sleeping. Phentermine:  Take phentermine 37.5 mg, one-half to one tablet daily as needed for appetite suppression. Take each dose 30-90 min before effect will be needed. While taking phentermine, check the Blood Pressure every morning and every evening.      If the systolic BP is >155 mmHg, the diastolic BP is >13 mm/Hg or the heart rate is > 100 beats per minute, do not take phentermine that day. If the systolic BP is consistently >155 mmHg, the diastolic BP is consistently above 90 mm/Hg or the heart rate is consistently > 100 beats per minute, then stop taking phentermine altogether. If the systolic BP >700 mmHg or the diastolic BP is >051 mmHg (even if it is only once), then phentermine should be stopped altogether without proving that any of these are consistently elevated. Return to see me in 4 week. Orders Placed This Encounter   Medications    phentermine (ADIPEX-P) 37.5 MG tablet     Sig: Take 1 tablet by mouth every morning (before breakfast) for 30 days. Dispense:  30 tablet     Refill:  0       Total time spent on encounter: 60 min    Zenobia Rae MD  Internal Medicine/Obesity Medicine  1/20/2022.

## 2022-01-20 NOTE — PATIENT INSTRUCTIONS
Rules:  · Count every calorie every day  · Limit sweets to one day per month  · Limit chips/crackers/pretzels/nuts/popcorn to 100 marbin/day  · Eliminate all sugar sweetened beverages (including fruit juice)  · Limit restaurants (including fast food and food from a convenience store) to one time every two weeks while in town    Requirements:  · Make sure protein intake is at least 60 grams per day (do not count protein every day; instead spot check your intake every 2-3 weeks and make sure what you think you are getting is close to accurate; consider using a protein shake if needed; these are in the pharmacy section of the stores, not the grocery section; Premier, Pure Protein and Fairlife are relatively inexpensive and taste good to most patients; other options are Nectar, Boost Max, Ensure Max, BeneProtein and GNC lean (which is lactose-free); Nectar fruit, Premier Protein Clear, IsoPure Protein Drink, and Protein 2 O are water-based options; Quest (or Cosco, which is cheaper and is ordered on [employer]) and the [employer] protein bars can also be used, but have less protein in them )  (Disclaimer: Dietary supplements rarely have their listed ingredients and the amount of each verified by a third party other. Sometimes they give verification for their claims to be GMO and gluten free and to be organic. However, even such verifications as these may still be untrustworthy.)    · Make sure that fiber intake is at least 22 grams per day. Do this by either eating 12 tablespoons of the original, plain Fiber One cereal every day or 4 tablespoons of wheat dextrin powder (Benefiber or a generic brand) every day. Work up to this amount slowly by starting with only one-eighth to one-fourth of the target amount and then adding another one-eighth to one-fourth every one or two weeks until reaching the target.     · Take one multivitamin every day    Targets:  · Limit calorie intake to 1400 calories/day  · Walk 30 minutes daily  · Avoid eating 2 hours within bedtime. Tips:  · Do not eat outside of the dining room or the kitchen  · Do not eat while watching TV, videos, working on the computer or using a smart phone  · Do not eat food out of a multi-serving bag or container. · Establish 6 hours of food-free \"time-out\" periods (times you don't eat) each day. No period can be less than 1 hour long. The periods need to be the same every day for days that are the same (for example, workdays would have one set of food free periods and weekends would have another set of days). These six hours are in addition to the two hours before bedtime and the time spent sleeping. Phentermine:  Take phentermine 37.5 mg, one-half to one tablet daily as needed for appetite suppression. Take each dose 30-90 min before effect will be needed. While taking phentermine, check the Blood Pressure every morning and every evening. If the systolic BP is >149 mmHg, the diastolic BP is >84 mm/Hg or the heart rate is > 100 beats per minute, do not take phentermine that day. If the systolic BP is consistently >155 mmHg, the diastolic BP is consistently above 90 mm/Hg or the heart rate is consistently > 100 beats per minute, then stop taking phentermine altogether. If the systolic BP >599 mmHg or the diastolic BP is >897 mmHg (even if it is only once), then phentermine should be stopped altogether without proving that any of these are consistently elevated. Return to see me in 4 week.

## 2022-01-24 ENCOUNTER — OFFICE VISIT (OUTPATIENT)
Dept: PHYSICAL MEDICINE AND REHAB | Age: 62
End: 2022-01-24
Payer: COMMERCIAL

## 2022-01-24 ENCOUNTER — PREP FOR PROCEDURE (OUTPATIENT)
Dept: PAIN MANAGEMENT | Age: 62
End: 2022-01-24

## 2022-01-24 ENCOUNTER — OFFICE VISIT (OUTPATIENT)
Dept: PAIN MANAGEMENT | Age: 62
End: 2022-01-24
Payer: COMMERCIAL

## 2022-01-24 VITALS
TEMPERATURE: 97.2 F | BODY MASS INDEX: 47.13 KG/M2 | HEART RATE: 92 BPM | RESPIRATION RATE: 16 BRPM | HEIGHT: 63 IN | WEIGHT: 266 LBS | SYSTOLIC BLOOD PRESSURE: 138 MMHG | OXYGEN SATURATION: 98 % | DIASTOLIC BLOOD PRESSURE: 88 MMHG

## 2022-01-24 VITALS
HEIGHT: 63 IN | DIASTOLIC BLOOD PRESSURE: 62 MMHG | WEIGHT: 266 LBS | TEMPERATURE: 97.9 F | HEART RATE: 85 BPM | BODY MASS INDEX: 47.13 KG/M2 | OXYGEN SATURATION: 97 % | SYSTOLIC BLOOD PRESSURE: 134 MMHG

## 2022-01-24 DIAGNOSIS — M17.11 PRIMARY OSTEOARTHRITIS OF RIGHT KNEE: Primary | ICD-10-CM

## 2022-01-24 DIAGNOSIS — Z98.1 S/P LUMBAR FUSION: ICD-10-CM

## 2022-01-24 DIAGNOSIS — M96.1 POSTLAMINECTOMY SYNDROME, LUMBAR: Primary | ICD-10-CM

## 2022-01-24 DIAGNOSIS — E66.9 OBESITY, UNSPECIFIED CLASSIFICATION, UNSPECIFIED OBESITY TYPE, UNSPECIFIED WHETHER SERIOUS COMORBIDITY PRESENT: ICD-10-CM

## 2022-01-24 DIAGNOSIS — M51.36 DDD (DEGENERATIVE DISC DISEASE), LUMBAR: Primary | ICD-10-CM

## 2022-01-24 DIAGNOSIS — M96.1 POSTLAMINECTOMY SYNDROME, LUMBAR: ICD-10-CM

## 2022-01-24 DIAGNOSIS — M54.16 LUMBAR RADICULOPATHY: ICD-10-CM

## 2022-01-24 PROCEDURE — 20610 DRAIN/INJ JOINT/BURSA W/O US: CPT | Performed by: PHYSICAL MEDICINE & REHABILITATION

## 2022-01-24 PROCEDURE — 99212 OFFICE O/P EST SF 10 MIN: CPT | Performed by: PHYSICAL MEDICINE & REHABILITATION

## 2022-01-24 PROCEDURE — 99213 OFFICE O/P EST LOW 20 MIN: CPT | Performed by: ANESTHESIOLOGY

## 2022-01-24 PROCEDURE — 99213 OFFICE O/P EST LOW 20 MIN: CPT

## 2022-01-24 RX ORDER — OXYCODONE HYDROCHLORIDE AND ACETAMINOPHEN 5; 325 MG/1; MG/1
1 TABLET ORAL 2 TIMES DAILY PRN
Qty: 60 TABLET | Refills: 0 | Status: SHIPPED | OUTPATIENT
Start: 2022-01-24 | End: 2022-02-23

## 2022-01-24 NOTE — PROGRESS NOTES
Jermaine Gates M.D. [address] [facility] PHYSICAL MEDICINE AND REHABILITAION  [address]  [facility] [address]  Dept: [phone]  Dept Fax: [phone]    PCP: Azeb Mariee DO  Date of visit: 1/24/22      Chief Complaint   Patient presents with    Knee Pain     right knee pain, here for Durolane injection today. Martha Cagle is a 62 y.o. woman who presents for follow up of chronic right knee pain. She was recently seen in the office with plan for right knee viscosupplementation due to lack of improvement with prior steroid injections. She is here today for Durolane injection. Right knee pain is constant. Pain is described as aching and sharp. Pain is located primarily at the medial joint line. No swelling. She reports that the knee occasionally gives out on her. No cracking or popping. No specific injury. Pain is worse with walking and worse toward the end of the day. Pain is better with Percocet. She had a right knee xray completed that showed arthritic changes. She received a right knee steroid injection on 10/18/2021 and reports minimal relief. She is using topical Voltaren gel and this is helping with knee pain. No other changes. She is also followed for low back and right shoulder pain, which were not further addressed at today's visit. She is waiting for shoulder MRI to be completed and is scheduled for US guided right subacromial injection.          The prior workup has included:   -Right / Left knee xray 9/16/2021:     Impression   1.  Moderate right patellofemoral and medial compartment joint space   narrowing, with very minimal secondary osteoarthritic degenerative disease,   not significantly changed since 12/30/2020.       2.  Incidental left medial compartment joint space narrowing, osseous   degenerative disease, and slight genu-varus configuration, as described.         -Right shoulder xray 9/16/2021     Impression   1.  A previously-existing right-internal-jugular-approach single-lumen   central venous catheter has been removed over the interval. Negative for   gross pneumothorax, bilaterally, within the limits of this study.       2.  Mild irregular cortical exostosis involves the undersurface ease of the   right acromion and lateral right clavicular head, slightly hook-like in   configuration at the level of the right acromion.       3.  Osseous degenerative disease, as described.           -Lumbar MRI 3/27/2021:  FINDINGS:   BONES/ALIGNMENT: There is normal alignment of the spine. The vertebral body   heights are maintained. The bone marrow signal appears unremarkable.       SPINAL CORD: The conus terminates normally.       SOFT TISSUES: No paraspinal mass identified.       L1-L2: There is no significant disc herniation, spinal canal stenosis or   neural foraminal narrowing.       L2-L3: Disc bulge causes mild right foraminal stenosis.       L3-L4: Disc bulge with superimposed right foraminal disc protrusion causes   mild thecal sac, moderate right foraminal and mild left foraminal stenosis.       L4-L5: Large central cranially migrated disc extrusion measuring 25 x 12 x 13   mm (craniocaudal by anteroposterior by transverse) causes severe spinal canal   stenosis and may compress the cauda equina.  This is slightly worse on the   left side.  A disc bulge also causes mild bilateral foraminal stenosis.       L5-S1: Disc bulge and facet hypertrophy without stenosis.         Impression   L4-L5 disc extrusion causing severe spinal canal stenosis worse on the left   side. Prior lumbar MRI 2019 [facility] -- not available to review at time of office visit, will attempt to obtain      The prior treatment has included:  PT: PT x 2 courses without relief prior to surgery. Currently just started PT post operatively. Modalities: Heat with partial relief. Thinks she used a TENS in the past, unsure of relief.    Topicals: \"hot and cold patches\" over the counter with minimal relief. Lidoderm patch with initial improvement, now not helping. Voltaren gel with improvement. OTC Tylenol: Takes with minimal relief   NSAIDS: Tried mobic and diclofenac without relief   Opioids: Tried Norco for acute exacerbation, did not like how it made her feel, no longer taking. Took Oxycodone post operatively. Now taking Percocet PRN with relief, followed by Pain mgmt. Membrane stabilizers: Taking gabapentin 800mg TID with partial relief. Taking Cymbalta with improvement. Muscle relaxers: tizanidine - states helps her relax, but not with pain. Now taking flexeril post operatively, Rx'd by TAMMIE  Previous injections: Multiple epidurals at [facility], most recent on 2/18/2021. She reports set of 3 epidurals helped in the past, but the most recent set of epidurals did not help with most recent exacerbation of pain prior to surgery. Bilateral SIJ injections on 10/11/2021 without relief. Bilateral L5-S1 facet blocks on 11/8/2021without reported relief. Bilateral L5, S1, S2, S3 DR branch nerve blocks on 12/21/2021 without reported relief. Right knee steroid injection 10/18/2021 with minimal relief. Previous surgery at this site: L3-L5 PLIF on  5/25/2021 with improvement in left lower extremity radiating pain.          Past Medical History:   Diagnosis Date    Back pain     Chronic fatigue     COVID-19 09/2020    mild per patient    Diabetes mellitus ([facility] [address]     Hyperlipidemia     Hypothyroidism     IBS (irritable bowel syndrome)     Obesity     Osteoarthritis     PONV (postoperative nausea and vomiting)        Past Surgical History:   Procedure Laterality Date    BACK SURGERY      CHOLECYSTECTOMY      COLONOSCOPY      HYSTERECTOMY, TOTAL ABDOMINAL      INCONTINENCE SURGERY      BLADDER SLING    KNEE SURGERY Left     LUMBAR SPINE SURGERY N/A 5/25/2021    L3- L5 DECOMPRESSION AND FUSION , L4- L5 TRANSFORAMINAL LUMBAR INTERBODY FUSION --Jorge A Holli TABLE, AUDIOLOGY, PLATES ,SCREWS, --NUVASIVE performed by Deuce Paulson MD at [address] Bilateral 10/11/2021    BILATERAL SACROILIAC JOINT INJECTION UNDER FLUOROSCOPY (CPT 92473) [facility] B[facility] 11/8/2021    BILATERAL LUMBAR FACET INJECTION AT L5-S 1 FACETS BLOCK UNDER FLUOROSCOPIC GUIDANCE performed [facility] Bilateral 12/21/2021    BILATERAL S1, S2, S3 BRANCH & L5 [facility] NERVE BLOCK UNDER XRAY GUIDANCE (19905) (SEDATION) performed by Guerita Mckinney MD at [address] Marital status:      Spouse name: Not on file    Number of children: Not on file    Years of education: Not on file    Highest education level: Not on file   Occupational History     Employer: [employer]   Tobacco Use    Smoking status: Never Smoker    Smokeless tobacco: Never Used   Vaping Use    Vaping Use: Never used   Substance and Sexual Activity    Alcohol use: Never    Drug use: Never    Sexual activity: Not on file   Other Topics Concern    Not on file   Social History Narrative    Not on file     Social Determinants of Health     Financial Resource Strain: Low Risk     Difficulty of Paying Living Expenses: Not hard at all   Food Insecurity: No Food Insecurity    Worried About [address] in the Last Year: Never true    [address] in the Last Year: Never true   Transportation Needs:     Lack of Transportation (Medical): Not on file    Lack of Transportation (Non-Medical):  Not on file   Physical Activity:     Days of Exercise per Week: Not on file    Minutes of Exercise per Session: Not on file   Stress:     Feeling of Stress : Not on file   Social Connections:     Frequency of Communication with Friends and Family: Not on file    Frequency of Social Gatherings with Friends and Family: Not on file   Abner Seymour Attends Congregation Services: Not on file    Active Member of Clubs or Organizations: Not on file    Attends Club or Organization Meetings: Not on file    Marital Status: Not on file   Intimate Partner Violence:     Fear of Current or Ex-Partner: Not on file    Emotionally Abused: Not on file    Physically Abused: Not on file    Sexually Abused: Not on file   Housing Stability:     Unable to Pay for Housing in the Last Year: Not on file    Number of [place] in the Last Year: Not on file    Unstable Housing in the Last Year: Not on file          Family History   Problem Relation Age of Onset    Diabetes Mother     Diabetes Father    [facility] Rheum Arthritis Sister     Cancer Maternal Grandfather        Allergies   Allergen Reactions    Bactrim [Sulfamethoxazole-Trimethoprim] Nausea Only    Ceftin [Cefuroxime] Rash    Keflex [Cephalexin] Rash       Current Outpatient Medications   Medication Sig Dispense Refill    phentermine (ADIPEX-P) 37.5 MG tablet Take 1 tablet by mouth every morning (before breakfast) for 30 days.  30 tablet 0    levothyroxine (SYNTHROID) 175 MCG tablet Take 1 tablet by mouth daily 90 tablet 1    vitamin D (ERGOCALCIFEROL) 1.25 MG (31714 UT) CAPS capsule TAKE 1 CAPSULE BY MOUTH ONE TIME PER WEEK 12 capsule 1    cyclobenzaprine (FLEXERIL) 10 MG tablet Take 1 tablet by mouth 3 times daily as needed for Muscle spasms 90 tablet 2    vitamin B-12 (CYANOCOBALAMIN) 100 MCG tablet Take 1 tablet by mouth daily 90 tablet 3    diclofenac sodium (VOLTAREN) 1 % GEL Apply 4 g topically 4 times daily as needed for Pain 150 g 3    gabapentin (NEURONTIN) 400 MG capsule take 2 capsules by mouth three times a day 180 capsule 2    torsemide (DEMADEX) 10 MG tablet take 1 tablet by mouth once daily 30 tablet 3    DULoxetine (CYMBALTA) 60 MG extended release capsule Take 1 capsule by mouth daily 30 capsule 2    Thiamine HCl (B-1) 100 MG TABS Take 1 pill daily 90 tablet 1    gemfibrozil (LOPID) 600 MG tablet TAKE 1 TABLET BY MOUTH EVERY 12 HOURS 180 tablet 3    metFORMIN (GLUCOPHAGE) 500 MG tablet TAKE 1 TABLET BY MOUTH EVERY DAY WITH BREAKFAST 90 tablet 3    omeprazole (PRILOSEC) 20 MG delayed release capsule TAKE 1 CAPSULE BY MOUTH EVERY DAY 90 capsule 3    linaclotide (LINZESS) 145 MCG capsule TAKE 1 CAPSULE BY MOUTH EVERY DAY IN THE MORNING BEFORE BREAKFAST 90 capsule 3    valsartan (DIOVAN) 80 MG tablet TAKE 1 TABLET BY MOUTH EVERY DAY 90 tablet 1    omega-3 acid ethyl esters (LOVAZA) 1 g capsule Take 1 capsule by mouth 2 times daily 180 capsule 1    OZEMPIC, 1 MG/DOSE, 2 MG/1.5ML SOPN Inject 1 mg into the skin once a week 6 pen 3    acetaminophen (TYLENOL) 500 MG tablet Take 500 mg by mouth every 6 hours as needed for Pain      pravastatin (PRAVACHOL) 10 MG tablet Take 1 tablet by mouth every other day 45 tablet 1    lidocaine (LIDODERM) 5 % PLACE 1 PATCH ONTO THE SKIN DAILY 12 HOURS ON, 12 HOURS OFF.  polyethylene glycol (GLYCOLAX) 17 g packet Take 17 g by mouth daily as needed      Handicap Placard MISC by Does not apply route Patient cannot walk 200 ft without stopping to rest.    Expiration 1 yr 1 each 0     No current facility-administered medications for this visit. Review of Systems  General: No chills, fatigue, fever, malaise, night sweats, weight gain,  weight loss. Psychological: No anxiety, depression, suicidal ideation   Ophthalmic: No blurry vision, decreased vision, double vision, loss of vision  Ear Nose Throat: No hearing loss, tinnitus, phonophobia, sensitivity to smells, vertigo, or vocal changes. Allergy/Immunology: No watery eyes, itchy eyes, frequent infections. Hematological and Lymphatic: No bleeding problems, blood clots, bruising  Endocrine:  No polydypsia, polyuria, temperature intolerance. Respiratory: No cough, shortness of breath, wheezing. Cardiovascular: No syncope, chest pain, dyspnea on exertion,palpitations.    Gastrointestinal: No abdominal pain, hematemesis, melena, nausea, vomiting, stool incontinence  Genito-Urinary: No dysuria, hematuria, incontinence   Musculoskeletal: Per HPI  Neurological: Per HPI  Dermatological:  No rash      Physical Exam:   Vitals:    01/24/22 1146   BP: 134/62   Pulse: 85   Temp: 97.9 °F (36.6 °C)   SpO2: 97%     General: The patient is in no apparent distress. Body habitus is morbidly obese  HEENT: No rhinorrhea, sneezing, yawning, or lacrimation. No scleral icterus or conjunctival injection. SKIN: No piloerection. No track marks. No rash. Normal turgor. No erythema or ecchymosis. Psychological: Mood and affect are appropriate. Hygiene is appropriate. Cardiovascular:  Heart is regular rate. Peripheral pulses are 2+ and symmetric. Respiratory: Respirations are regular and unlabored. There is no cyanosis. Gastrointestinal: No abdominal distension   Genitourinary: No costovertebral angle tenderness. MSK:   Right knee:  No edema, warmth, erythema, ecchymosis, effusion, instability, mass or deformity. Full AROM. No crepitus. There is tenderness to palpation of the medial joint line. No significant tenderness over the lateral joint line. Patellofemoral grind with pain. No medial or lateral collateral ligament tenderness. Negative Lachman. No varus or valgus instability. Neurologic: Awake, alert and oriented in three planes. Speech is fluent. No facial weakness. Hearing is intact for conversation. Pupils are equal and round. Extraocular muscles are intact. Shoulder shrug symmetric. Strength:   R  L  Hip Flex  4+  4+  Knee Ext  4+  4+  Ankle dorsi  5  4+  EHL   5 5  Ankle Plantar  5  5      Sensory:  Intact for light touch in all lower extremity dermatomes. SILT throughout [address].        Reflexes:   R  L  Patellar  2 2   Ankle Jerk  2 2    No Babinski     Gait is antalgic       Knee Durolane injection procedure: Right  After explaining the indications, risks, benefits and alternatives of a right knee joint Durolane injection, the patient agreed to proceed. A permit was signed and scanned into the media. The patient was placed in the seated position. The skin was prepared with chloraprep. Using an aseptic, no touch technique, a 22 gauge, 1.5\" needle with 3 mL of Durolane 60mg/3mL was directed into the knee joint. After negative aspiration the medication was injected. Adequate hemostasis was achieved and a bandage was placed over the injection site. The patient was monitored clinically after the injection and left the clinic without incident. The patient tolerated the procedure well and was educated in post injection care. Impression:   1. Primary osteoarthritis of right knee          Plan:   · Right knee Durolane injection today  · Follow up for US guided right subacromial injection as scheduled. Right shoulder MRI pending. The patient was educated about the diagnosis, prognosis, indications, risks and benefits of treatment. An opportunity to ask questions was given to the patient and questions were answered. The patient agreed to proceed with the recommended treatment as described above. Follow up as scheduled           Anne-Marie Tan M.D.   Physical Medicine and Rehabilitation

## 2022-01-24 NOTE — PROGRESS NOTES
Do you currently have any of the following:    Fever: No  Headache:  No  Cough: No  Shortness of breath: No  Exposed to anyone with these symptoms: No         Mullens Pae presents to the [facility] on 1/24/2022. Luciano Foy is complaining of pain lower back /rt. Calf  The pain is constant. The pain is described as aching, throbbing, shooting and stabbing. Pain is rated on her best day at a 3, on her worst day at a 8, and on average at a 5 on the VAS scale. She took her last dose of Neurontin     Any procedures since your last visit:     Pacemaker or defibrillator: No managed by     She is not on NSAIDS and is not on anticoagulation medications to include none and is managed by     Medication Contract and Consent for Opioid Use Documents Filed     Patient Documents     Type of Document Status Date Received Received By Description    Medication Contract Received 10/6/2021 10:43 AM SHANE CRENSHAW pain management                /88   Pulse 92   Temp 97.2 °F (36.2 °C) (Infrared)   Resp 16   Ht 5' 3\" (1.6 m)   Wt 266 lb (120.7 kg)   SpO2 98%   BMI 47.12 kg/m²      No LMP recorded. Patient has had a hysterectomy.

## 2022-01-24 NOTE — PROGRESS NOTES
[facility]  [place] [employer]  Dept: [phone]      Follow up Note      Maryanne Fox     Date of Visit:  1/24/2022    CC:  Patient presents for follow up   Chief Complaint   Patient presents with    Follow-up     post procedure        HPI:  Low back pain. S/P L3-5 fusion and L4-5 TLIF on May 25 2021- helped the left LE pain significantly.     Has noticed right LE pain after the surgery. Has been followed by NSG had X-ray done. Intact fusion.     Low back pain and right LE pain- Has tingling of the foot (h/o DM).    Also has right shoulder pain and right knee pain for which she had X-rays. Pain causes functional limitations/ limits Adl's : Yes     Nursing notes and details of the pain history reviewed. Please see intake notes for details. SIJ interventions - did not provide significant long term relief.     Previous treatments:   Physical Therapy : yes, currently in PT      Medications: - NSAID's : yes                        - Membrane stabilizers : yes - gabapentin                       - Opioids : yes, post op use                       - Adjuvants or Others : yes,      Surgeries: yes, L3-5 fusion in May 2021     She has not been on anticoagulation medications      She has not been on herbal supplements.       She is diabetic.     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  No fracture or   dislocation.  Normal soft tissues.       T-spine: Mild multilevel degenerative disc disease.  No fracture or   dislocation.  Normal thoracic soft tissues.  Mild thoracic dextroscoliosis.       Lumbar spine: Intact hardware associated with posterior fusion L3-L5.  Normal   alignment.   L4-5 disc is augmented.  Degenerative disc disease is present at   multiple levels and is greatest at L5-S1 where it is severe. Pam Finders is a   decompressive laminectomy at L3 and L4.  Normal soft tissues.           Impression   Degenerative spondylosis at the C-spine, T-spine and L-spine.  Lumbar fusion   with intact hardware and normal alignment.            X-ray LS spine: 9/14/2021:      FINDINGS:   Posterior spinal fusion L3-L5 with normal alignment.  Degenerative disc   disease is present at multiple levels and is greatest at L5-S1 where it is   moderate to severe.  Normal soft tissues.           Impression   Intact lumbar fusion hardware with normal alignment.  Stable degenerative   disc disease.             Xray Right knee: 9/16/2021:      Impression   1.  Moderate right patellofemoral and medial compartment joint space   narrowing, with very minimal secondary osteoarthritic degenerative disease,   not significantly changed since 12/30/2020.       2.  Incidental left medial compartment joint space narrowing, osseous   degenerative disease, and slight genu-varus configuration, as described.       .      Xray right shoulder: 9/16/2021:  Impression   1.  A previously-existing right-internal-jugular-approach single-lumen   central venous catheter has been removed over the interval. Negative for   gross pneumothorax, bilaterally, within the limits of this study.       2.  Mild irregular cortical exostosis involves the undersurface ease of the   right acromion and lateral right clavicular head, slightly hook-like in   configuration at the level of the right acromion.       3.  Osseous degenerative disease, as described.        Past Medical History:   Diagnosis Date    Back pain     Chronic fatigue     COVID-19 09/2020    mild per patient    Diabetes mellitus ([address]     Hyperlipidemia     Hypothyroidism     IBS (irritable bowel syndrome)     Obesity     Osteoarthritis     PONV (postoperative nausea and vomiting)        Past Surgical History:   Procedure Laterality Date    BACK SURGERY      CHOLECYSTECTOMY      COLONOSCOPY      HYSTERECTOMY, TOTAL ABDOMINAL      INCONTINENCE SURGERY      BLADDER SLING    KNEE SURGERY Left     LUMBAR SPINE SURGERY N/A 5/25/2021    L3- L5 DECOMPRESSION AND FUSION , L4- L5 TRANSFORAMINAL LUMBAR INTERBODY FUSION --OARM, PATY TABLE, AUDIOLOGY, PLATES ,SCREWS, --NUVASIVE performed by Gil Wynn MD at [address] Bilateral 10/11/2021    BILATERAL SACROILIAC JOINT INJECTION UNDER FLUOROSCOPY (CPT 47501) performed by Tom King MD at [address] Bilateral 11/8/2021    BILATERAL LUMBAR FACET INJECTION AT L5-S 1 FACETS BLOCK UNDER FLUOROSCOPIC GUIDANCE performed by Tom King MD at [address] Bilateral 12/21/2021    BILATERAL S1, S2, S3 BRANCH & L5 [facility] NERVE BLOCK UNDER XRAY GUIDANCE (19007) (SEDATION) performed by Tom King MD at [address] [place] [address]       Prior to Admission medications    Medication Sig Start Date End Date Taking? Authorizing Provider   phentermine (ADIPEX-P) 37.5 MG tablet Take 1 tablet by mouth every morning (before breakfast) for 30 days.  1/20/22 2/19/22  Jose Ramon Martines MD   levothyroxine (SYNTHROID) 175 MCG tablet Take 1 tablet by mouth daily 1/19/22   Elizabeth Whatley DO   vitamin D (ERGOCALCIFEROL) 1.25 MG (45820 UT) CAPS capsule TAKE 1 CAPSULE BY MOUTH ONE TIME PER WEEK 1/18/22   Elizabeth Whatley DO   cyclobenzaprine (FLEXERIL) 10 MG tablet Take 1 tablet by mouth 3 times daily as needed for Muscle spasms 1/18/22   Elizabeth Whatley DO   vitamin B-12 (CYANOCOBALAMIN) 100 MCG tablet Take 1 tablet by mouth daily 1/18/22   Elizabeth Whatley DO   diclofenac sodium (VOLTAREN) 1 % GEL Apply 4 g topically 4 times daily as needed for Pain 1/18/22 4/18/22  Francine Rosado MD   gabapentin (NEURONTIN) 400 MG capsule take 2 capsules by mouth three times a day 1/15/22 2/14/22  Francine Rosado MD   torsemide (DEMADEX) 10 MG tablet take 1 tablet by mouth once daily 12/16/21   Prieto Katherine Tavarez, DO   DULoxetine (CYMBALTA) 60 MG extended release capsule Take 1 capsule by mouth daily 12/13/21   Rosey Urena MD   Thiamine HCl (B-1) 100 MG TABS Take 1 pill daily 10/18/21   [facility] Sharda, DO   gemfibrozil (LOPID) 600 MG tablet TAKE 1 TABLET BY MOUTH EVERY 12 HOURS 10/18/21   [facility] Sharda, DO   metFORMIN (GLUCOPHAGE) 500 MG tablet TAKE 1 TABLET BY MOUTH EVERY DAY WITH BREAKFAST 10/18/21   [facility] Sharda, DO   omeprazole (PRILOSEC) 20 MG delayed release capsule TAKE 1 CAPSULE BY MOUTH EVERY DAY 10/18/21   Freda Fink DO   linaclotide (LINZESS) 145 MCG capsule TAKE 1 CAPSULE BY MOUTH EVERY DAY IN THE MORNING BEFORE BREAKFAST 10/18/21   [facility] Sharda, DO   valsartan (DIOVAN) 80 MG tablet TAKE 1 TABLET BY MOUTH EVERY DAY 10/18/21   [facility] Katherine Corby, DO   omega-3 acid ethyl esters (LOVAZA) 1 g capsule Take 1 capsule by mouth 2 times daily 10/18/21   [facility] Sharda, DO   OZEMPIC, 1 MG/DOSE, 2 MG/1.5ML SOPN Inject 1 mg into the skin once a week 10/7/21   Freda Fink DO   acetaminophen (TYLENOL) 500 MG tablet Take 500 mg by mouth every 6 hours as needed for Pain    Historical Provider, MD   pravastatin (PRAVACHOL) 10 MG tablet Take 1 tablet by mouth every other day 8/30/21   [facility] Sharda, DO   lidocaine (LIDODERM) 5 % PLACE 1 PATCH ONTO THE SKIN DAILY 12 HOURS ON, 12 HOURS OFF. 6/2/21   Historical Provider, MD   polyethylene glycol (GLYCOLAX) 17 g packet Take 17 g by mouth daily as needed    Historical Provider, MD   Handicap Placard MISC by Does not apply route Patient cannot walk 200 ft without stopping to rest.    Expiration 1 yr 5/3/21   Freda Fink DO   levothyroxine (SYNTHROID) 150 MCG tablet Take 1 tablet by mouth Daily 1/12/21   Freda Fink DO   rosuvastatin (CRESTOR) 5 MG tablet Take 1 tablet by mouth daily 1/12/21   Christinia Canny, DO   meloxicam (MOBIC) 15 MG tablet Take 1 tablet by mouth daily as needed for Pain 1/11/21   Freda Fink,  Allergies   Allergen Reactions    Bactrim [Sulfamethoxazole-Trimethoprim] Nausea Only    Ceftin [Cefuroxime] Rash    Keflex [Cephalexin] Rash       Social History     Socioeconomic History    Marital status:      Spouse name: Not on file    Number of children: Not on file    Years of education: Not on file    Highest education level: Not on file   Occupational History     Employer: [employer]   Tobacco Use    Smoking status: Never Smoker    Smokeless tobacco: Never Used   Vaping Use    Vaping Use: Never used   Substance and Sexual Activity    Alcohol use: Never    Drug use: Never    Sexual activity: Not on file   Other Topics Concern    Not on file   Social History Narrative    Not on file     Social Determinants of Health     Financial Resource Strain: Low Risk     Difficulty of Paying Living Expenses: Not hard at all   Food Insecurity: No Food Insecurity    Worried About [address] in the Last Year: Never true    [address] [facility] backstitch in the Last Year: Never true   Transportation Needs:     Lack of Transportation (Medical): Not on file    Lack of Transportation (Non-Medical):  Not on file   Physical Activity:     Days of Exercise per Week: Not on file    Minutes of Exercise per Session: Not on file   Stress:     Feeling of Stress : Not on file   Social Connections:     Frequency of Communication with Friends and Family: Not on file    Frequency of Social Gatherings with Friends and Family: Not on file    Attends Sikh Services: Not on file    Active Member of Clubs or Organizations: Not on file    Attends Club or Organization Meetings: Not on file    Marital Status: Not on file   Intimate Partner Violence:     Fear of Current or Ex-Partner: Not on file    Emotionally Abused: Not on file    Physically Abused: Not on file    Sexually Abused: Not on file   Housing Stability:     Unable to Pay for Housing in the Last Year: Not on file    Number of Places Lived in the Last Year: Not on file    Unstable Housing in the Last Year: Not on file       Family History   Problem Relation Age of Onset    Diabetes Mother     Diabetes Father    Rosario Rheum Arthritis Sister     Cancer Maternal Grandfather        REVIEW OF SYSTEMS:     Brenda Mary denies fever/chills, chest pain, shortness of breath, new bowel or bladder complaints. All other review of systems was negative. PHYSICAL EXAMINATION:      /88   Pulse 92   Temp 97.2 °F (36.2 °C) (Infrared)   Resp 16   Ht 5' 3\" (1.6 m)   Wt 266 lb (120.7 kg)   SpO2 98%   BMI 47.12 kg/m²   General:       General appearance:  Pleasant and well-hydrated, in no distress and A & O x 3  Build:Obese  Function: Rises from seated position easily and Moves about room without difficulty     HEENT:     Head:normocephalic, atraumatic    Lungs:     Breathing:normal breathing pattern      CVS:     RRR     Abdomen:     Shape:obese, non-distended and normal     Cervical spine:     Inspection:normal  Palpation:tenderness paravertebral muscles, tenderness trapezium, left, right -no  Range of motion:Normal flexion, extension, rotation bilaterally and is not painful. Spurling's: negative bilaterally     Thoracic spine:                Spine inspection:normal   Palpation:No tenderness over the midline and paraspinal area, bilaterally  Range of motion:normal in flexion, extension rotation bilateral and is not painful.     Lumbar spine:     Spine inspection: scar from the prior surgery noted- healed well  Palpation: Tenderness paravertebral muscles Yes bilaterally  Range of motion: Decreased, flexion Decreased, Lateral bending, extension and rotation bilaterally reduced is somewhat painful.   Lower lumbar facet tenderness +  Sacroiliac joint tenderness   Piriformis tenderness: negative bilaterally  SLR : negative bilaterally     Musculoskeletal:     Trigger points no     Extremities:     Tremors:None bilaterally upper and lower  Edema:none x all 4 extremities  Distal LE Peripheral pulses felt     Knee Right:     ROM : pain +   Joint line tenderness : yes      Neurological:     Sensory: Normal to light touch      Motor:   Right Hip flexors: 4/5  Left hip flexors 4/5               Right Quadriceps 4+/5          Left Quadriceps 4+/5           Right Gastrocnemius 5/5    Left Gastrocnemius 5/5  Right Ant Tibialis 5/5  Left Ant Tibialis 5/5     Gait: uses a cane to assist in ambulation.     Dermatology:     Skin:no rashes or lesions noted    Assessment/Plan:    Diagnosis Orders   1. DDD (degenerative disc disease), lumbar      2. Lumbosacral spondylosis without myelopathy      3. Lumbar radiculopathy      4. Sacroiliac dysfunction      5. Obesity, unspecified classification, unspecified obesity type, unspecified whether serious comorbidity present      6. Type 2 diabetes mellitus without complication, without long-term current use of insulin (HCC)      7. S/P lumbar fusion            62 y.o.  female with H/o L3-5 Lumbar fusion in May 2021 by Dr. Pineda Party. Helped left LE pain - but noticed low back and right LE pain.     Has been evaluated by [facility]- recommended conservative treatment.     Needing pain meds- percocet/ gabapentin.     Exam: Facet tenderness +, LE weakness +    S/P Facet injection - no significant pain relief. SIJ interventions- no significant pain relief.      Has chronic right shoulder pain and knee pain. Images reviewed. Follows with Dr. Frederick Fernandez.     Plan:  PT / Matt Sarmiento for prn use to help with PT/ HEP and exercise- will prescribe Percocet 5/325 po upto bid prn. Last filled # 60 on 12/15/2021. OARRS reviewed. Refill given. # 60. To take once or twice day as needed. Recommended to take only for severe pain. To help with pain and functionality and do HEP/ PT. No signs of misuse abuse or diversion, no side effects, compliant with recommendations.     Continue Gabapentin.     Cymbalta 60 mg Q hs.  Refill

## 2022-01-28 ENCOUNTER — TELEPHONE (OUTPATIENT)
Dept: PAIN MANAGEMENT | Age: 62
End: 2022-01-28

## 2022-01-28 NOTE — TELEPHONE ENCOUNTER
Call to Maryanne Fox that procedure was approved for 2/3/2022 and that the surgery center should call her a few days before for the pre op call and after 3:00 PM the business day before with the arrival time. Instructed Conchis to hold ibuprofen for 24 hours, naprosyn for 4 days and any aspirin containing products or fish oil for 7 days. Instructed to call office back if any questions. Cecil Medel verbalized understanding.
shortness of breath

## 2022-02-02 ENCOUNTER — PREP FOR PROCEDURE (OUTPATIENT)
Dept: PAIN MANAGEMENT | Age: 62
End: 2022-02-02

## 2022-02-02 RX ORDER — M-VIT,TX,IRON,MINS/CALC/FOLIC 27MG-0.4MG
1 TABLET ORAL DAILY
COMMUNITY
End: 2022-08-24

## 2022-02-02 NOTE — PROGRESS NOTES
Have you been tested for COVID  Yes           Have you been told you were positive for COVID Yes 09/2020  Have you had any known exposure to someone that is positive for COVID No  Do you have a cough                   No              Do you have shortness of breath No                 Do you have a sore throat            No                Are you having chills                    No                Are you having muscle aches. No                    Please come to the hospital wearing a mask and have your significant other wear a mask as well. Both of you should check your temperature before leaving to come here,  if it is 100 or higher please call [phone] for instruction. [facility] PRE-ADMISSION TESTING INSTRUCTIONS    The Preadmission Testing patient is instructed accordingly using the following criteria (check applicable):    ARRIVAL INSTRUCTIONS:  [x] Parking the day of Surgery is located in the Main Entrance lot. Upon entering the door, make an immediate right to the surgery reception desk    [x] Bring photo ID and insurance card    [] Bring in a copy of Living will or Durable Power of  papers.     [x] Please be sure to arrange for responsible adult to provide transportation to and from the hospital    [x] Please arrange for responsible adult to be with you for the 24 hour period post procedure due to having anesthesia      GENERAL INSTRUCTIONS:    [x] Nothing by mouth after midnight, including gum, candy, mints or water    [x] You may brush your teeth, but do not swallow any water    [x] Take medications as instructed with 1-2 oz of water    [x] Stop herbal supplements and vitamins 5 days prior to procedure    [x] Follow preop dosing of blood thinners per physician instructions    [] Take 1/2 dose of evening insulin, but no insulin after midnight    [x] No oral diabetic medications after midnight    [x] If diabetic and have low blood sugar or feel symptomatic, take 1-2oz apple juice only    [] Bring inhalers day of surgery    [] Bring C-PAP/ Bi-Pap day of surgery    [] Bring urine specimen day of surgery    [x] Shower or bath with soap, lather and rinse well, AM of Surgery, no lotion, powders or creams to surgical site    [] Follow bowel prep as instructed per surgeon    [x] No tobacco products within 24 hours of surgery     [x] No alcohol or illegal drug use within 24 hours of surgery.     [x] Jewelry, body piercing's, eyeglasses, contact lenses and dentures are not permitted into surgery (bring cases)      [x] Please do not wear any nail polish, make up or hair products on the day of surgery    [x] You can expect a call the business day prior to procedure to notify you if your arrival time changes    [x] If you receive a survey after surgery we would greatly appreciate your comments    [] Parent/guardian of a minor must accompany their child and remain on the premises  the entire time they are under our care     [] Pediatric patients may bring favorite toy, blanket or comfort item with them    [] A caregiver or family member must remain with the patient during their stay if they are mentally handicapped, have dementia, disoriented or unable to use a call light or would be a safety concern if left unattended    [x] Please notify surgeon if you develop any illness between now and time of surgery (cold, cough, sore throat, fever, nausea, vomiting) or any signs of infections  including skin, wounds, and dental.    [x]  The Outpatient Pharmacy is available to fill your prescription here on your day of surgery, ask your preop nurse for details    [] Other instructions    EDUCATIONAL MATERIALS PROVIDED:    [] PAT Preoperative Education Packet/Booklet     [] Medication List    [] Transfusion bracelet applied with instructions    [] Shower with soap, lather and rinse well, and use CHG wipes provided the evening before surgery as instructed    [] Incentive spirometer with instructions

## 2022-02-07 ENCOUNTER — HOSPITAL ENCOUNTER (OUTPATIENT)
Age: 62
Setting detail: OUTPATIENT SURGERY
Discharge: HOME | End: 2022-02-07
Attending: ANESTHESIOLOGY | Admitting: ANESTHESIOLOGY
Payer: COMMERCIAL

## 2022-02-07 ENCOUNTER — HOSPITAL ENCOUNTER (OUTPATIENT)
Dept: GENERAL RADIOLOGY | Age: 62
Setting detail: OUTPATIENT SURGERY
Discharge: HOME | End: 2022-02-09
Attending: ANESTHESIOLOGY
Payer: COMMERCIAL

## 2022-02-07 VITALS
TEMPERATURE: 97.1 F | RESPIRATION RATE: 18 BRPM | DIASTOLIC BLOOD PRESSURE: 77 MMHG | WEIGHT: 266 LBS | OXYGEN SATURATION: 96 % | HEIGHT: 63 IN | BODY MASS INDEX: 47.13 KG/M2 | SYSTOLIC BLOOD PRESSURE: 134 MMHG | HEART RATE: 86 BPM

## 2022-02-07 DIAGNOSIS — R52 PAIN MANAGEMENT: ICD-10-CM

## 2022-02-07 LAB — METER GLUCOSE: 102 MG/DL (ref 74–99)

## 2022-02-07 PROCEDURE — 6360000004 HC RX CONTRAST MEDICATION: Performed by: ANESTHESIOLOGY

## 2022-02-07 PROCEDURE — 82962 GLUCOSE BLOOD TEST: CPT

## 2022-02-07 PROCEDURE — 62323 NJX INTERLAMINAR LMBR/SAC: CPT | Performed by: ANESTHESIOLOGY

## 2022-02-07 PROCEDURE — 7100000010 HC PHASE II RECOVERY - FIRST 15 MIN: Performed by: ANESTHESIOLOGY

## 2022-02-07 PROCEDURE — 7100000011 HC PHASE II RECOVERY - ADDTL 15 MIN: Performed by: ANESTHESIOLOGY

## 2022-02-07 PROCEDURE — 2709999900 HC NON-CHARGEABLE SUPPLY: Performed by: ANESTHESIOLOGY

## 2022-02-07 PROCEDURE — 3600000002 HC SURGERY LEVEL 2 BASE: Performed by: ANESTHESIOLOGY

## 2022-02-07 PROCEDURE — 6360000002 HC RX W HCPCS: Performed by: ANESTHESIOLOGY

## 2022-02-07 PROCEDURE — 3209999900 FLUORO FOR SURGICAL PROCEDURES

## 2022-02-07 PROCEDURE — 2500000003 HC RX 250 WO HCPCS: Performed by: ANESTHESIOLOGY

## 2022-02-07 RX ORDER — LIDOCAINE HYDROCHLORIDE 5 MG/ML
INJECTION, SOLUTION INFILTRATION; INTRAVENOUS PRN
Status: DISCONTINUED | OUTPATIENT
Start: 2022-02-07 | End: 2022-02-07 | Stop reason: ALTCHOICE

## 2022-02-07 RX ORDER — LEVOTHYROXINE SODIUM 0.15 MG/1
TABLET ORAL
Qty: 90 TABLET | Refills: 1 | Status: SHIPPED
Start: 2022-02-07 | End: 2022-02-17

## 2022-02-07 RX ORDER — METHYLPREDNISOLONE ACETATE 40 MG/ML
INJECTION, SUSPENSION INTRA-ARTICULAR; INTRALESIONAL; INTRAMUSCULAR; SOFT TISSUE PRN
Status: DISCONTINUED | OUTPATIENT
Start: 2022-02-07 | End: 2022-02-07 | Stop reason: ALTCHOICE

## 2022-02-07 NOTE — H&P
[facility]  [place], [address]  Dept: [phone]    Procedure History & Physical      Ebb Hay     HPI:    Patient  is here for LESi for low back/ LE pain. Labs/imaging studies reviewed   All question and concerns addressed including R/B/A associated with the procedure    Past Medical History:   Diagnosis Date    Back pain     Chronic fatigue     COVID-19 09/2020    mild per patient    Diabetes mellitus ([address]     Hyperlipidemia     Hypothyroidism     IBS (irritable bowel syndrome)     Obesity     Osteoarthritis     PONV (postoperative nausea and vomiting)        Past Surgical History:   Procedure Laterality Date    BACK SURGERY      CHOLECYSTECTOMY      COLONOSCOPY      HYSTERECTOMY, TOTAL ABDOMINAL      INCONTINENCE SURGERY      BLADDER SLING    KNEE SURGERY Left     LUMBAR SPINE SURGERY N/A 5/25/2021    L3- L5 DECOMPRESSION AND FUSION , L4- L5 TRANSFORAMINAL LUMBAR INTERBODY FUSION --OARM, PATY TABLE, AUDIOLOGY, PLATES ,SCREWS, --NUVASIVE performed by Naun Ceron MD at [facility] Bilateral 10/11/2021    BILATERAL [address] (CPT 34799) performed by Kathy Duque MD at [facility] Bilateral 11/8/2021    BILATERAL LUMBAR FACET INJECTION AT L5-S 1 FACETS BLOCK UNDER FLUOROSCOPIC GUIDANCE performed by Kathy Duque MD at [facility] Bilateral 12/21/2021    BILATERAL S1, S2, S3 BRANCH & L5 [facility] NERVE BLOCK UNDER XRAY GUIDANCE (57048) (SEDATION) performed by Kathy Duque MD at [address] [place] [address]       Prior to Admission medications    Medication Sig Start Date End Date Taking?  Authorizing Provider   levothyroxine (SYNTHROID) 150 MCG tablet TAKE 1 TABLET BY MOUTH EVERY DAY 2/7/22  Yes Prieto Fierror, DO   Multiple Vitamins-Minerals (THERAPEUTIC MULTIVITAMIN-MINERALS) tablet Take 1 tablet by mouth daily   Yes Historical Provider, MD   oxyCODONE-acetaminophen (PERCOCET) 5-325 MG per tablet Take 1 tablet by mouth 2 times daily as needed for Pain (once or twice a day as needed for severe pain) for up to 30 days. 1/24/22 2/23/22 Yes Che Herman MD   phentermine (ADIPEX-P) 37.5 MG tablet Take 1 tablet by mouth every morning (before breakfast) for 30 days.  1/20/22 2/19/22 Yes Jose Ramon Martines MD   levothyroxine (SYNTHROID) 175 MCG tablet Take 1 tablet by mouth daily 1/19/22  Yes Prieto Robin DO   vitamin D (ERGOCALCIFEROL) 1.25 MG (42732 UT) CAPS capsule TAKE 1 CAPSULE BY MOUTH ONE TIME PER WEEK 1/18/22  Yes Prieto Robin DO   cyclobenzaprine (FLEXERIL) 10 MG tablet Take 1 tablet by mouth 3 times daily as needed for Muscle spasms 1/18/22  Yes Prieto Robin DO   vitamin B-12 (CYANOCOBALAMIN) 100 MCG tablet Take 1 tablet by mouth daily 1/18/22  Yes Prieto Robin DO   diclofenac sodium (VOLTAREN) 1 % GEL Apply 4 g topically 4 times daily as needed for Pain 1/18/22 4/18/22 Yes Lorrie Cockayne, MD   gabapentin (NEURONTIN) 400 MG capsule take 2 capsules by mouth three times a day 1/15/22 2/14/22 Yes Lorrie Cockayne, MD   torsemide (DEMADEX) 10 MG tablet take 1 tablet by mouth once daily 12/16/21  Yes Prieto Robin DO   DULoxetine (CYMBALTA) 60 MG extended release capsule Take 1 capsule by mouth daily 12/13/21  Yes Che Herman MD   Thiamine HCl (B-1) 100 MG TABS Take 1 pill daily 10/18/21  Yes Edmon Peabody, DO   gemfibrozil (LOPID) 600 MG tablet TAKE 1 TABLET BY MOUTH EVERY 12 HOURS 10/18/21  Yes Edmon Peabody, DO   metFORMIN (GLUCOPHAGE) 500 MG tablet TAKE 1 TABLET BY MOUTH EVERY DAY WITH BREAKFAST 10/18/21  Yes Edmon Peabody, DO   omeprazole (PRILOSEC) 20 MG delayed release capsule TAKE 1 CAPSULE BY MOUTH EVERY DAY 10/18/21  Yes Prieto Robin DO   linaclotide (LINZESS) 145 MCG capsule TAKE 1 CAPSULE BY MOUTH EVERY DAY IN THE MORNING BEFORE BREAKFAST 10/18/21  Yes Prieto Robin DO   valsartan (DIOVAN) 80 MG tablet TAKE 1 TABLET BY MOUTH EVERY DAY 10/18/21  Yes Kendall Chou DO   omega-3 acid ethyl esters (LOVAZA) 1 g capsule Take 1 capsule by mouth 2 times daily 10/18/21  Yes Kendall Chou DO   OZEMPIC, 1 MG/DOSE, 2 MG/1.5ML SOPN Inject 1 mg into the skin once a week 10/7/21  Yes Kendall Chou DO   acetaminophen (TYLENOL) 500 MG tablet Take 500 mg by mouth every 6 hours as needed for Pain   Yes Historical Provider, MD   pravastatin (PRAVACHOL) 10 MG tablet Take 1 tablet by mouth every other day 8/30/21  Yes Kendall Chou DO   lidocaine (LIDODERM) 5 % PLACE 1 PATCH ONTO THE SKIN DAILY 12 HOURS ON, 12 HOURS OFF. 6/2/21  Yes Historical Provider, MD   polyethylene glycol (GLYCOLAX) 17 g packet Take 17 g by mouth daily as needed   Yes Historical Provider, MD   Handicap Placard MISC by Does not apply route Patient cannot walk 200 ft without stopping to rest.    Expiration 1 yr 5/3/21   Kendall Chou DO   levothyroxine (SYNTHROID) 150 MCG tablet Take 1 tablet by mouth Daily 1/12/21   Kendall Chou, DO   rosuvastatin (CRESTOR) 5 MG tablet Take 1 tablet by mouth daily 1/12/21   Kendall Chou, DO   meloxicam (MOBIC) 15 MG tablet Take 1 tablet by mouth daily as needed for Pain 1/11/21   Kendall Chou DO       Allergies   Allergen Reactions    Bactrim [Sulfamethoxazole-Trimethoprim] Nausea Only    Ceftin [Cefuroxime] Rash    Keflex [Cephalexin] Rash       Social History     Socioeconomic History    Marital status:      Spouse name: Not on file    Number of children: Not on file    Years of education: Not on file    Highest education level: Not on file   Occupational History     Employer: [employer]   Tobacco Use    Smoking status: Never Smoker    Smokeless tobacco: Never Used   Vaping Use    Vaping Use: Never used   Substance and Sexual Activity    Alcohol use: Never    Drug use: Never    Sexual activity: Not on file   Other Topics Concern    Not on file   Social History Narrative    Not on file Social Determinants of Health     Financial Resource Strain: Low Risk     Difficulty of Paying Living Expenses: Not hard at all   Food Insecurity: No Food Insecurity    Worried About Running Out of Food in the Last Year: Never true    Stephen of Food in the Last Year: Never true   Transportation Needs:     Lack of Transportation (Medical): Not on file    Lack of Transportation (Non-Medical): Not on file   Physical Activity:     Days of Exercise per Week: Not on file    Minutes of Exercise per Session: Not on file   Stress:     Feeling of Stress : Not on file   Social Connections:     Frequency of Communication with Friends and Family: Not on file    Frequency of Social Gatherings with Friends and Family: Not on file    Attends Mu-ism Services: Not on file    Active Member of [address] [place] [address] We or Organizations: Not on file    Attends Club or Organization Meetings: Not on file    Marital Status: Not on file   Intimate Partner Violence:     Fear of Current or Ex-Partner: Not on file    Emotionally Abused: Not on file    Physically Abused: Not on file    Sexually Abused: Not on file   Housing Stability:     Unable to Pay for Housing in the Last Year: Not on file    Number of [place] in the Last Year: Not on file    Unstable Housing in the Last Year: Not on file       Family History   Problem Relation Age of Onset    Diabetes Mother     Diabetes Father     Rheum Arthritis Sister     Cancer Maternal Grandfather          REVIEW OF SYSTEMS:    CONSTITUTIONAL:  negative for  fevers, chills, sweats and fatigue    RESPIRATORY:  negative for  dry cough, cough with sputum, dyspnea, wheezing and chest pain    CARDIOVASCULAR:  negative for chest pain, dyspnea, palpitations, syncope    GASTROINTESTINAL:  negative for nausea, vomiting, change in bowel habits, diarrhea, constipation and abdominal pain    MUSCULOSKELETAL: negative for muscle weakness    SKIN: negative for itching or rashes.     BEHAVIOR/PSYCH: negative for poor appetite, increased appetite, decreased sleep and poor concentration    All other systems negative      PHYSICAL EXAM:    VITALS:  /77   Pulse 86   Temp 97.1 °F (36.2 °C) (Temporal)   Resp 18   Ht 5' 3\" (1.6 m)   Wt 266 lb (120.7 kg)   SpO2 96%   BMI 47.12 kg/m²     CONSTITUTIONAL:  awake, alert, cooperative, no apparent distress, and appears stated age    EYES: PERRLA, EOMI    LUNGS:  No increased work of breathing, no audible wheezing    CARDIOVASCULAR:  regular rate and rhythm    ABDOMEN:  Soft non tender non distended     EXTREMITIES: no signs of clubbing or cyanosis. MUSCULOSKELETAL: negative for flaccid muscle tone or spastic movements. SKIN: gross examination reveals no signs of rashes, or diaphoresis. NEURO: Cranial nerves II-XII grossly intact. No signs of agitated mood. Assessment/Plan:    Patient  is here for LESi for low back/ LE pain. The patient was counseled at length about the risks of tom Covid-19 during their perioperative period and any recovery window from their procedure. The patient was made aware that tom Covid-19  may worsen their prognosis for recovering from their procedure  and lend to a higher morbidity and/or mortality risk. All material risks, benefits, and reasonable alternatives including postponing the procedure were discussed. The patient does wish to proceed with the procedure at this time.       Nikolas Jessica MD

## 2022-02-07 NOTE — OP NOTE
Operative Note      Patient: Mary Mancia  YOB: 1960  MRN: [id number]    Date of Procedure: 2022    Pre-Op Diagnosis: POSTLAMINECTOMY SYNDROME, LUMBAR RADICULOPATHY, lumbar DDD    Post-Op Diagnosis: Same       Procedure(s):  LUMBAR EPIDURAL STEROID INJECTION UNDER FLUOROSCOPIC GUIDANCE AT L5-S1 PARAMEDIAN    Surgeon(s):  Fredo Cid MD    Assistant:   * No surgical staff found *    Anesthesia: Local    Estimated Blood Loss (mL): Minimal    Complications: None    Specimens:   * No specimens in log *    Implants:  * No implants in log *      Drains: * No LDAs found *    Findings: good needle placement    Detailed Description of Procedure:   2022    Patient: Mary Mancia  :  1960  Age:  62 y.o. Sex:  female     PRE-OPERATIVE DIAGNOSIS: Lumbar disc degeneration, lumbar radiculopathy. POST-OPERATIVE DIAGNOSIS: Same. PROCEDURE: Fluoroscopic guided therapeutic lumbar epidural steroid injection at the L5-S1 level. SURGEON: Fredo Cid MD    ANESTHESIA: Local    ESTIMATED BLOOD LOSS: None.  ______________________________________________________________________    BRIEF HISTORY:  Mary Mancia comes in today for  lumbar epidural injection at L5-S1 level. The potential complications of this procedure were discussed with her again today. She has elected to undergo the aforementioned procedure. Bridgett complete History & Physical examination were reviewed in depth, a copy of which is in the chart. DESCRIPTION OF PROCEDURE:    After confirming written and informed consent, a time-out was performed and Bridgett name and date of birth, the procedure to be performed as well as the plan for the location of the needle insertion were confirmed. The patient was brought into the procedure room and placed in the prone position on the fluoroscopy table. A pillow was placed under the patient's lower abdomen/upper pelvis to increase lumbar interlaminar space.  Standard monitors were placed, and vital signs were observed throughout the procedure. The area of the lumbar spine was prepped with chloraprep and draped in a sterile manner. The L5-S1 interspace was identified and marked under AP fluoroscopy. The skin and subcutaneous tissues at the above level were anesthestized with 0.5% lidocaine. With intermittent fluoroscopy, an # 18 gauge 6 inch tuohy epidural needle was inserted and directed toward the interlaminar space. The needle was slowly advanced using loss of resistance technique and 5 cc glass syringe  until the tip of the epidural needle has passed through the ligamentum flavum and entered the epidural space. AP and lateral fluoroscopic imaging is performed to verify that the epidural needle is properly placed. Negative aspiration of blood and CSF was confirmed. 0.5 ml of omnipaque 240 was used for confirmation of even epidural spread under both live and AP fluoroscopy. After negative aspiration, a solution of 0.5 % Lidocaine 3 ml and 40 mg DepoMedrol was easily injected. The needle was gently removed intact . The patient back was cleaned and a Band-Aid was placed over the needle insertion point. Disposition the patient tolerated the procedure well and there were no complications . Vital signs remained stable throughout the procedure. The patient was escorted to the recovery area where they remained until discharge and written discharge instructions for the procedure were given. Plan: Summer Burnham will return to our pain management center as scheduled.      Shamar Pillai MD

## 2022-02-08 ENCOUNTER — HOSPITAL ENCOUNTER (OUTPATIENT)
Dept: MRI IMAGING | Age: 62
Discharge: HOME | End: 2022-02-10
Payer: COMMERCIAL

## 2022-02-08 ENCOUNTER — PATIENT MESSAGE (OUTPATIENT)
Dept: PHYSICAL MEDICINE AND REHAB | Age: 62
End: 2022-02-08

## 2022-02-08 DIAGNOSIS — M25.511 RIGHT SHOULDER PAIN, UNSPECIFIED CHRONICITY: ICD-10-CM

## 2022-02-08 PROCEDURE — 73221 MRI JOINT UPR EXTREM W/O DYE: CPT

## 2022-02-08 NOTE — TELEPHONE ENCOUNTER
She is scheduled for US guided injection this month. Would proceed with injection as previously discussed if she is agreeable. Additional options can be discussed at her office visit in more detail, and in part will depend on her response to injection. Will further discuss at appt. Thank you!

## 2022-02-08 NOTE — TELEPHONE ENCOUNTER
From: Maria E Villagran  To: Dr. Castellanos Rota: 2/8/2022 3:45 PM EST  Subject: Question regarding MRI Shoulder Right Without Contrast    What are treatment options?

## 2022-02-10 ENCOUNTER — TELEPHONE (OUTPATIENT)
Dept: PRIMARY CARE CLINIC | Age: 62
End: 2022-02-10

## 2022-02-17 ENCOUNTER — TELEPHONE (OUTPATIENT)
Dept: BARIATRICS/WEIGHT MGMT | Age: 62
End: 2022-02-17

## 2022-02-17 ENCOUNTER — OFFICE VISIT (OUTPATIENT)
Dept: BARIATRICS/WEIGHT MGMT | Age: 62
End: 2022-02-17
Payer: COMMERCIAL

## 2022-02-17 ENCOUNTER — INITIAL CONSULT (OUTPATIENT)
Dept: BARIATRICS/WEIGHT MGMT | Age: 62
End: 2022-02-17
Payer: COMMERCIAL

## 2022-02-17 VITALS
HEIGHT: 63 IN | RESPIRATION RATE: 20 BRPM | WEIGHT: 249 LBS | DIASTOLIC BLOOD PRESSURE: 84 MMHG | BODY MASS INDEX: 44.12 KG/M2 | SYSTOLIC BLOOD PRESSURE: 125 MMHG | HEART RATE: 95 BPM | TEMPERATURE: 97.3 F

## 2022-02-17 VITALS
HEIGHT: 63 IN | DIASTOLIC BLOOD PRESSURE: 78 MMHG | WEIGHT: 250 LBS | BODY MASS INDEX: 44.3 KG/M2 | TEMPERATURE: 97.3 F | SYSTOLIC BLOOD PRESSURE: 129 MMHG | HEART RATE: 89 BPM

## 2022-02-17 DIAGNOSIS — E66.01 MORBID OBESITY DUE TO EXCESS CALORIES (HCC): Primary | ICD-10-CM

## 2022-02-17 DIAGNOSIS — E66.01 CLASS 3 SEVERE OBESITY DUE TO EXCESS CALORIES WITH SERIOUS COMORBIDITY AND BODY MASS INDEX (BMI) OF 40.0 TO 44.9 IN ADULT (HCC): ICD-10-CM

## 2022-02-17 DIAGNOSIS — R53.82 CHRONIC FATIGUE: Primary | ICD-10-CM

## 2022-02-17 DIAGNOSIS — E03.9 ACQUIRED HYPOTHYROIDISM: Chronic | ICD-10-CM

## 2022-02-17 DIAGNOSIS — K21.9 GASTROESOPHAGEAL REFLUX DISEASE WITHOUT ESOPHAGITIS: ICD-10-CM

## 2022-02-17 DIAGNOSIS — E78.2 MIXED HYPERLIPIDEMIA: Chronic | ICD-10-CM

## 2022-02-17 PROCEDURE — 99202 OFFICE O/P NEW SF 15 MIN: CPT

## 2022-02-17 PROCEDURE — 99214 OFFICE O/P EST MOD 30 MIN: CPT | Performed by: INTERNAL MEDICINE

## 2022-02-17 PROCEDURE — 99211 OFF/OP EST MAY X REQ PHY/QHP: CPT

## 2022-02-17 PROCEDURE — 99204 OFFICE O/P NEW MOD 45 MIN: CPT | Performed by: SURGERY

## 2022-02-17 RX ORDER — PHENTERMINE HYDROCHLORIDE 37.5 MG/1
37.5 TABLET ORAL
Qty: 30 TABLET | Refills: 0 | Status: SHIPPED | OUTPATIENT
Start: 2022-02-17 | End: 2022-03-18 | Stop reason: SDUPTHER

## 2022-02-17 RX ORDER — SODIUM CHLORIDE, SODIUM LACTATE, POTASSIUM CHLORIDE, CALCIUM CHLORIDE 600; 310; 30; 20 MG/100ML; MG/100ML; MG/100ML; MG/100ML
INJECTION, SOLUTION INTRAVENOUS CONTINUOUS
Status: CANCELLED | OUTPATIENT
Start: 2022-02-17

## 2022-02-17 NOTE — PATIENT INSTRUCTIONS
What is the next step to proceed with weight loss surgery? Please be aware that any co-pays or deductibles may be requested prior to testing and / or procedures. You will need to schedule a psychological evaluation for weight loss surgery. Patients will be required to complete all psychological testing as required by the mental health provider. Patients must also follow all of the provider's recommendations before weight loss surgery can be scheduled. The evaluation must be done a standard way for weight loss surgery. We strongly recommend that you contact one of our preferred providers listed below to arrange this:      Truong Wagoner, [facility]  [address] [place], [place]   [phone]    Milady Griggs and [address]   [phone]    Dr. Rob Gr, PhD    [facility]. [place], [place]    [phone]      You will also need to plan on attending a 2 hour nutrition class at [facility] prior to your surgery. We will schedule this for you when we schedule your surgery. Please remember to have your labs drawn 10 days prior to your first scheduled dietary appointment. Please remember, that while we will submit your case to insurance for surgery authorization, it is your responsibility to know if your plan covers weight loss surgery and keep up-to-date with changes to your insurance coverage. We will do everything possible to help you get approved for weight loss surgery, but cannot guarantee an approval.     Please note that you will not be submitted to your insurance company until all pre-operative testing requirements are met.

## 2022-02-17 NOTE — PROGRESS NOTES
CC -   Follow up of: Weight gain, fatigue    BACKGROUND -   Last visit: 1/20/22  First visit: 1/20/22     Obesity (all weight in lbs)  Began years, ago, worse since 5/2021 after back surgery  Initial BMI 47.26, Wt 266.75  HS Grad wt 180   Lowest   wt 165 (20 years ago)   Highest  wt >300 (4909-2301)  Pattern of wt gain: gradual, but fluctuating in the last 3-4 years  Wt change past yr: -4 lbs (went down to 252 then back up again to 266)  Most wt lost: 34 lbs  Other diets attempted: used to walk    Desire to lose weight: 8/10  Problem posed by appetite: 5/10    Initial Diet:    Number of meals per day - 3    Number of snacks per day - 1    Meal volume - 9\" plate, no seconds    Fast food/convenience store - 1-2x/week    Restaurants (not fast food) - 0-1x/week   Sweets - 2d/week   Chips - 1d/week   Crackers/pretzels - 0d/week   Nuts - 0-1d/week   Peanut Butter - 0-1d/week   Popcorn - 0-1d/week   Dried fruit - 0-1d/week   Whole fruit - 1-2d/week   Breakfast cereal - 2d/week   Granola/Protein/Energy bar - 0-1d/week   Sugar sweetened beverages - orange juice once a month, rarely coffee, rarely tea   Protein - No supplements   Fiber - No supplements     Breakfast: eggs, leftover, toast, english muffins (1/wk). Lunch: soup, stuffed peppers, hamburger. Dinner: spaghetti, vegetable soup, stuffed pepper soup etc.      Initial exercise:    [employer] - no    Walking - no    Running - no    Resistance - no    Aerobic class - leg lifts/arm lifts with physical therapy        Initial sleep: Bedtime: 10 pm, wake up time: 5-6 am, daytime naps: no. Feels rested in the morning usually.     Weight scale at home: yes, takes weight: rarely  Food scale: no  ______________________    [facility] -  Past Medical History:   Diagnosis Date    Back pain     Chronic fatigue     COVID-19 09/2020    mild per patient    Diabetes mellitus ([address]     Hyperlipidemia     Hypothyroidism     IBS (irritable bowel syndrome)     Morbid obesity due to excess calories (HCC)     Obesity     Osteoarthritis     PONV (postoperative nausea and vomiting)      Past Surgical History:   Procedure Laterality Date    BACK SURGERY      CHOLECYSTECTOMY      COLONOSCOPY      HYSTERECTOMY, TOTAL ABDOMINAL      INCONTINENCE SURGERY      BLADDER SLING    KNEE SURGERY Left     LUMBAR SPINE SURGERY N/A 5/25/2021    L3- L5 DECOMPRESSION AND FUSION , L4- L5 TRANSFORAMINAL LUMBAR INTERBODY FUSION --OARM, PATY TABLE, AUDIOLOGY, PLATES ,SCREWS, --NUVASIVE performed by Vin Landers MD at [address] Bilateral 10/11/2021    BILATERAL SACROILIAC JOINT INJECTION UNDER FLUOROSCOPY (CPT 71770) performed by Leida Vital MD at [address] Bilateral 11/8/2021    BILATERAL LUMBAR FACET INJECTION AT L5-S 1 FACETS BLOCK UNDER FLUOROSCOPIC GUIDANCE performed by Leida Vital MD at [address] Bilateral 12/21/2021    BILATERAL S1, S2, S3 BRANCH & L5 [facility] NERVE BLOCK UNDER XRAY GUIDANCE (10645) (SEDATION) performed by Leida Vital MD at [address] N/A 2/7/2022    LUMBAR EPIDURAL STEROID INJECTION UNDER FLUOROSCOPIC GUIDANCE AT L5-S1 PARAMEDIAN performed by Leida Vital MD at [facility] OR   thyroid surgery (goiter)    Current Outpatient Medications   Medication Sig Dispense Refill    Handicap Placard MISC by Does not apply route Patient cannot walk 200 ft without stopping to rest.    Expiration 5 yrs 1 each 0    Multiple Vitamins-Minerals (THERAPEUTIC MULTIVITAMIN-MINERALS) tablet Take 1 tablet by mouth daily      oxyCODONE-acetaminophen (PERCOCET) 5-325 MG per tablet Take 1 tablet by mouth 2 times daily as needed for Pain (once or twice a day as needed for severe pain) for up to 30 days. 60 tablet 0    phentermine (ADIPEX-P) 37.5 MG tablet Take 1 tablet by mouth every morning (before breakfast) for 30 days.  30 tablet 0    levothyroxine (SYNTHROID) 175 MCG tablet Take 1 tablet by Rash    Keflex [Cephalexin] Rash       Family history: DM: mother, Heart disease: no.    Social history: smoking: never ; Alcohol: never. No stairs other than steps to go in the house and kitchen. ROS -  Card - no CP, sometimes shortness  Of breath. GI - no N/V/D/C    PE -  Gen : /78 (Site: Left Upper Arm, Position: Sitting, Cuff Size: Large Adult)   Pulse 89   Temp 97.3 °F (36.3 °C) (Temporal)   Ht 5' 3\" (1.6 m)   Wt 250 lb (113.4 kg)   BMI 44.29 kg/m²    WN, WD, NAD  Lung: Nml resp effort  Psych: Normal mood. Full affect  Neuro: Moves all ext well  ______________________    HISTORY & ASSESSMENT/PLAN -     Problem 1  - Fatigue   HPI   - Ongoing, gradually progressing which pt feels like due to weight gain, complicated by joint and back pain  Assessment  - Uncontrolled   Plan   - May need sleep study. Weight reduction per plan below    Problem 2  - Obesity   HPI   - See above Background for description    Weight  Date    266.8 lbs 1/20/2022    250.0 lbs 2/16/22  Total weight change to date: -16.8 lbs  Average daily energy variance:   1/20/22 - 2/17/22: -16.8 lbs (58,800 Chai)/28 days = -2100 Chai/day deficit. DEN = ~1886 Chai/d    Assessment  - uncontrolled but improving  Plan   - She is doing better. Denies having any problem with medication. BP/HR has been stable. Denies dry mouth or constipation. Doing well with Diet, taking fiber one cereal, tuna/chicken for protein. Recommended low calorie non-starchy vegetables for snacks if needed. She does leg and arm lifts for exercise whenever she can. She feels Phentermine is helping, and would like second script, and was done in this visit. Patient Instructions     Continue current diet plan. Can use low calorie non-starchy vegetables as snacks if needed. Phentermine:  Take phentermine 37.5 mg, one-half to one tablet daily as needed for appetite suppression. Take each dose 30-90 min before effect will be needed.     While taking phentermine, check the Blood Pressure every morning and every evening. If the systolic BP is >944 mmHg, the diastolic BP is >62 mm/Hg or the heart rate is > 100 beats per minute, do not take phentermine that day. If the systolic BP is consistently >155 mmHg, the diastolic BP is consistently above 90 mm/Hg or the heart rate is consistently > 100 beats per minute, then stop taking phentermine altogether. If the systolic BP >276 mmHg or the diastolic BP is >994 mmHg (even if it is only once), then phentermine should be stopped altogether without proving that any of these are consistently elevated. Problem 3 - Hypothyroidism  HPI  - Fatigue+ but overall feeling better. Recent tsh was 6.6, levothyroxine was increased to 175 from 150 mcg by Dr. Seema Angela. Assessment - uncontrolled, dose changed appropriately  Plan  - She will continue levothyroxine and will be following up with Dr. Seema Angela. Problem 4 - Dyslipidemia  HPI  - Last , , , HDL 27, on pravachol and lovaza, tolerating well. Assessment - Uncontrolled. Plan  - Continue pravachol and lovaza, weight reduction can help. Monitor. Orders Placed This Encounter   Medications    phentermine (ADIPEX-P) 37.5 MG tablet     Sig: Take 1 tablet by mouth every morning (before breakfast) for 30 days. Dispense:  30 tablet     Refill:  0       Nancy Kiser MD  Internal Medicine/Obesity Medicine  2/17/2022.

## 2022-02-17 NOTE — TELEPHONE ENCOUNTER
Prior Authorization Form  DEMOGRAPHICS:    Patient Name:  Padmini Young  Patient :  1960            Insurance:  Payor: Kristina Martinez 150 / Plan: Kristina Martinez 150 - [place] PPO / Product Type: *No Product type* /   Insurance ID Number:    Payor/Plan Subscr  Sex Relation Sub. Ins. ID Effective Group Num   1.  [address] Boy[place] Nirmala A 1960 Female Self [id number]                                    [id number]         DIAGNOSIS & PROCEDURE:    Procedure/Operation: egd           CPT Code: 23976    Diagnosis:  gerd    ICD10 Code: k21.9    [facility]    Surgeon:  Milly Busch INFORMATION:    Date: 3/9/22    Time:               Anesthesia:  MAC/TIVA                                                       Status:  Outpatient        Special Comments:         Electronically signed by Fco Cortez MA on 2022 at 11:56 AM

## 2022-02-17 NOTE — PROGRESS NOTES
Kel Kyle  2/17/2022  [facility]    Initial Evaluation  History and Physical   EGD       CHIEF COMPLAINT: Morbid obesity, malnutrition, Type 2 Diabetes Mellitus, Hyperlipidemia, Hypothyroidism, irritable bowel and GERD on Omeprazole    HISTORY OF PRESENT ILLNESS: Kel Kyle is a morbidly-obese 62 y.o.  female, who weighs 249 lb (112.9 kg). She is 102 pounds over her ideal body weight. The Body mass index is 44.11 kg/m². She has multiple medical problems aggravated by her obesity. She wishes to have bariatric surgery so that she can lose a large amount of weight and keep the weight off. [facility] have [facility] with [facility] where we discussed the surgery in great detail and went over the risks and benefits. She has watched our informational video so she understands all of the extensive risks involved. She states that she understands all of the risks and wishes to proceed with the evaluation.     Weight:  249 lb 2/17/2022  initial    Past medical history:  Patient Active Problem List:     Type 2 diabetes mellitus without complication, without long-term current use of insulin (HCC)     Acquired hypothyroidism     Peripheral neuropathy     Carpal tunnel syndrome of left wrist     Spinal stenosis of lumbar region with neurogenic claudication     Acute exacerbation of chronic low back pain     Hyperlipidemia     Spondylolisthesis of lumbar region     Lumbar radiculopathy     Postoperative hypotension     Postoperative anemia due to acute blood loss     Venous insufficiency of both lower extremities     Impingement syndrome of right shoulder     DDD (degenerative disc disease), lumbar     Lumbosacral spondylosis without myelopathy     S/P lumbar fusion     Sacroiliac dysfunction     Postlaminectomy syndrome, lumbar     Class 3 severe obesity due to excess calories with serious comorbidity and body mass index (BMI) of 45.0 to 49.9 in [facility])     Chronic fatigue    Past Surgical History:   Procedure Laterality Date    BACK SURGERY      CHOLECYSTECTOMY      COLONOSCOPY      HYSTERECTOMY, TOTAL ABDOMINAL      INCONTINENCE SURGERY      BLADDER SLING    KNEE SURGERY Left     LUMBAR SPINE SURGERY N/A 5/25/2021    L3- L5 DECOMPRESSION AND FUSION , L4- L5 TRANSFORAMINAL LUMBAR INTERBODY FUSION --OARM, PATY TABLE, AUDIOLOGY, PLATES ,SCREWS, --NUVASIVE performed by Vin Landers MD at [address] Bilateral 10/11/2021    BILATERAL SACROILIAC JOINT INJECTION UNDER FLUOROSCOPY (CPT 02031) performed by Leida Vital MD at [address] Bilateral 11/8/2021    BILATERAL LUMBAR FACET INJECTION AT L5-S 1 FACETS BLOCK UNDER FLUOROSCOPIC GUIDANCE performed by Leida Vital MD at [address] Bilateral 12/21/2021    BILATERAL S1, S2, S3 BRANCH & L5 [facility] NERVE BLOCK UNDER XRAY GUIDANCE (01727) (SEDATION) [facility] [address] [facility] [place] [address] N/A 2/7/2022    LUMBAR EPIDURAL STEROID INJECTION UNDER FLUOROSCOPIC GUIDANCE AT L5-S1 PARAMEDIAN performed by Leida Vital MD at [facility]      Allergies: Bactrim [sulfamethoxazole-trimethoprim], Ceftin [cefuroxime], and Keflex [cephalexin]     Home Medications  Prior to Visit Medications    Medication Sig Taking? Authorizing Provider   Handicap Placard MISC by Does not apply route Patient cannot walk 200 ft without stopping to rest.    Expiration 5 yrs Yes Prieto F Sharda, DO   Multiple Vitamins-Minerals (THERAPEUTIC MULTIVITAMIN-MINERALS) tablet Take 1 tablet by mouth daily Yes Historical Provider, MD   oxyCODONE-acetaminophen (PERCOCET) 5-325 MG per tablet Take 1 tablet by mouth 2 times daily as needed for Pain (once or twice a day as needed for severe pain) for up to 30 days. Yes Leida Vital MD   phentermine (ADIPEX-P) 37.5 MG tablet Take 1 tablet by mouth every morning (before breakfast) for 30 days.  Yes Jose Ramon MD Bobbi   levothyroxine (SYNTHROID) 175 MCG tablet Take 1 tablet by mouth daily Yes Galdino Hollins, DO   vitamin D (ERGOCALCIFEROL) 1.25 MG (00578 UT) CAPS capsule TAKE 1 CAPSULE BY MOUTH ONE TIME PER WEEK Yes Prieto F Sharda, DO   cyclobenzaprine (FLEXERIL) 10 MG tablet Take 1 tablet by mouth 3 times daily as needed for Muscle spasms Yes Prieto F Sharda, DO   vitamin B-12 (CYANOCOBALAMIN) 100 MCG tablet Take 1 tablet by mouth daily Yes Prieto F Sharda, DO   diclofenac sodium (VOLTAREN) 1 % GEL Apply 4 g topically 4 times daily as needed for Pain Yes Holley Mosher MD   torsemide (DEMADEX) 10 MG tablet take 1 tablet by mouth once daily Yes Prieto F Sharda, DO   DULoxetine (CYMBALTA) 60 MG extended release capsule Take 1 capsule by mouth daily Yes Tima Berrios MD   Thiamine HCl (B-1) 100 MG TABS Take 1 pill daily Yes Prieto F Sharda, DO   gemfibrozil (LOPID) 600 MG tablet TAKE 1 TABLET BY MOUTH EVERY 12 HOURS Yes Prieto F Sharda, DO   metFORMIN (GLUCOPHAGE) 500 MG tablet TAKE 1 TABLET BY MOUTH EVERY DAY WITH BREAKFAST Yes Prieto F Sharda, DO   omeprazole (PRILOSEC) 20 MG delayed release capsule TAKE 1 CAPSULE BY MOUTH EVERY DAY Yes Prieto F Sharda, DO   linaclotide (LINZESS) 145 MCG capsule TAKE 1 CAPSULE BY MOUTH EVERY DAY IN THE MORNING BEFORE BREAKFAST Yes Prieto F Sharda, DO   valsartan (DIOVAN) 80 MG tablet TAKE 1 TABLET BY MOUTH EVERY DAY Yes Prieto F Sharda, DO   omega-3 acid ethyl esters (LOVAZA) 1 g capsule Take 1 capsule by mouth 2 times daily Yes Prieto F Sharda, DO   OZEMPIC, 1 MG/DOSE, 2 MG/1.5ML SOPN Inject 1 mg into the skin once a week Yes Prieto F Sharda, DO   acetaminophen (TYLENOL) 500 MG tablet Take 500 mg by mouth every 6 hours as needed for Pain Yes Historical Provider, MD   pravastatin (PRAVACHOL) 10 MG tablet Take 1 tablet by mouth every other day Yes Prieto F Sharda, DO   lidocaine (LIDODERM) 5 % PLACE 1 PATCH ONTO THE SKIN DAILY 12 HOURS ON, 12 HOURS OFF.  Yes Historical Provider, MD   polyethylene glycol (GLYCOLAX) 17 g packet Take 17 g by mouth daily as needed Yes Historical Provider, MD   levothyroxine (SYNTHROID) 150 MCG tablet Take 1 tablet by mouth Daily  Prieto Robin DO   rosuvastatin (CRESTOR) 5 MG tablet Take 1 tablet by mouth daily  Prieto Robin DO   meloxicam (MOBIC) 15 MG tablet Take 1 tablet by mouth daily as needed for Pain  Rocco Garcia DO     Social History:   TOBACCO:   reports that she has never smoked. She has never used smokeless tobacco.  All smokers must join the free smoking cessation program and stop smoking for 3 months before having any Bariatric surgery. ETOH:    reports no history of alcohol use. Family History   Problem Relation Age of Onset    Diabetes Mother     Diabetes Father     Rheum Arthritis Sister     Cancer Maternal Grandfather      Review of Systems:  Psychiatric: denies depression and anxiety  Respiratory: negative  Cardiovascular: negative  Gastrointestinal: negative  Musculoskeletal:negative  All others reviewed, negative    Physical Exam:   VITALS: Blood pressure 125/84, pulse 95, temperature 97.3 °F (36.3 °C), temperature source Temporal, resp. rate 20, height 5' 3\" (1.6 m), weight 249 lb (112.9 kg). General appearance: alert, appears stated age and cooperative, does ambulate easily  Head: Normocephalic, without obvious abnormality, atraumatic  Eyes: PERRL  Ears/mouth/throat:  Ears clear, mouth normal, throat no redness  Neck: no adenopathy, no JVD, supple, symmetrical, trachea midline and thyroid not enlarged  Lungs: clear to auscultation bilaterally  Heart: regular rate and rhythm  Abdomen: soft, non-tender; bowel sounds normal; no masses,  no organomegaly  Extremities: extremities normal, atraumatic, no cyanosis or edema  Skin: no open wounds    Assessment:  Morbid obesity with inability to keep the weight off with diet and exercise. She is interested in Laparoscopic Ashu-en- Y Gastric Bypass or Sleeve Gastrectomy. We discussed that our goal is to ameliorate the medical problems and not to obtain a specific body mass index. She understands the risks and benefits, and wishes to proceed with the evaluation. Plan:  Psych evaluation,  medical and cardiac clearance. These patients often have vitamin deficiencies so I will do screening labs for malnutrition. I will do an upper endoscopy to check for hiatal hernias, ulcers and H. Pylori while we wait for insurance approval for the surgery.     Physician Signature: Electronically signed by Dr. Francisco Javier Rose MD    Send copy of H&P to PCP, Mandy Hoffman DO

## 2022-02-17 NOTE — PATIENT INSTRUCTIONS
Continue current diet plan. Can use low calorie non-starchy vegetables as snacks if needed. Low calorie non-starchy vegetables:  Food (per 100 g) Calories Fibers T. Carbohydrates Protein Fat Sodium (mg) Potassium (mg)   Green Bell Peppers 10 1.7 4.6 0.9 0.2 3 175   Cucumbers 15 0.5 3.6 0.7 0.1 2 147   Celery 16 1.6 3 0.7 0.2 80 260   Tomatoes 18 1.2 3.9 0.9 0.2 5 237   Carrots 41 2.8 10 0.9 0.2 69 320   Cabbage 25 2.5 6 1.3 0.1 18 170   Broccoli 35 3.3 7.2 2.4 0.4 41 293   Cauliflower 23 2.3 4.1 1.8 0.5 15 142   Iceberg Lettuce 14 1.2 3 0.9 0.1 10 141   Kale 28 2 5.6 1.9 0.4 23 228   Brussel Sprouts 43 3.8 9 3.4 0.3 25 389   Spinach 23 2.4 3.8 3 0.3 70 466   Zucchini 15 1 2.7 1.1 0.4 3 264   Mushroom 28 2.2 5.3 2.2 0.5 2 356   Asparagus 22 2 4.1 2.4 0.2 14 224   Green Beans 35 3.2 7.9 1.9 0.3 1 146   Eggplant 35 2.5 8.7 0.8 0.2 1 123     Phentermine:  Take phentermine 37.5 mg, one-half to one tablet daily as needed for appetite suppression. Take each dose 30-90 min before effect will be needed. While taking phentermine, check the Blood Pressure every morning and every evening. If the systolic BP is >475 mmHg, the diastolic BP is >81 mm/Hg or the heart rate is > 100 beats per minute, do not take phentermine that day. If the systolic BP is consistently >155 mmHg, the diastolic BP is consistently above 90 mm/Hg or the heart rate is consistently > 100 beats per minute, then stop taking phentermine altogether. If the systolic BP >579 mmHg or the diastolic BP is >335 mmHg (even if it is only once), then phentermine should be stopped altogether without proving that any of these are consistently elevated.

## 2022-02-24 RX ORDER — PRAVASTATIN SODIUM 10 MG
10 TABLET ORAL EVERY OTHER DAY
Qty: 45 TABLET | Refills: 1 | Status: SHIPPED
Start: 2022-02-24 | End: 2022-08-18

## 2022-02-28 ENCOUNTER — PROCEDURE VISIT (OUTPATIENT)
Dept: PHYSICAL MEDICINE AND REHAB | Age: 62
End: 2022-02-28
Payer: COMMERCIAL

## 2022-02-28 VITALS
DIASTOLIC BLOOD PRESSURE: 66 MMHG | WEIGHT: 251.5 LBS | HEIGHT: 63 IN | HEART RATE: 85 BPM | OXYGEN SATURATION: 99 % | SYSTOLIC BLOOD PRESSURE: 134 MMHG | BODY MASS INDEX: 44.56 KG/M2 | TEMPERATURE: 97.8 F

## 2022-02-28 DIAGNOSIS — M25.511 CHRONIC RIGHT SHOULDER PAIN: ICD-10-CM

## 2022-02-28 DIAGNOSIS — M17.11 PRIMARY OSTEOARTHRITIS OF RIGHT KNEE: ICD-10-CM

## 2022-02-28 DIAGNOSIS — M75.111 INCOMPLETE TEAR OF RIGHT ROTATOR CUFF, UNSPECIFIED WHETHER TRAUMATIC: ICD-10-CM

## 2022-02-28 DIAGNOSIS — M48.062 SPINAL STENOSIS, LUMBAR REGION WITH NEUROGENIC CLAUDICATION: Primary | ICD-10-CM

## 2022-02-28 DIAGNOSIS — Z98.1 S/P LUMBAR FUSION: ICD-10-CM

## 2022-02-28 DIAGNOSIS — M54.17 RADICULOPATHY, LUMBOSACRAL REGION: ICD-10-CM

## 2022-02-28 DIAGNOSIS — G89.29 CHRONIC RIGHT SHOULDER PAIN: ICD-10-CM

## 2022-02-28 PROCEDURE — 20611 DRAIN/INJ JOINT/BURSA W/US: CPT | Performed by: PHYSICAL MEDICINE & REHABILITATION

## 2022-02-28 PROCEDURE — 99214 OFFICE O/P EST MOD 30 MIN: CPT | Performed by: PHYSICAL MEDICINE & REHABILITATION

## 2022-02-28 RX ORDER — BUPIVACAINE HYDROCHLORIDE 2.5 MG/ML
4 INJECTION, SOLUTION EPIDURAL; INFILTRATION; INTRACAUDAL ONCE
Status: COMPLETED | OUTPATIENT
Start: 2022-02-28 | End: 2022-02-28

## 2022-02-28 RX ORDER — TRIAMCINOLONE ACETONIDE 40 MG/ML
40 INJECTION, SUSPENSION INTRA-ARTICULAR; INTRAMUSCULAR ONCE
Status: COMPLETED | OUTPATIENT
Start: 2022-02-28 | End: 2022-02-28

## 2022-02-28 RX ORDER — GABAPENTIN 400 MG/1
CAPSULE ORAL
COMMUNITY
Start: 2022-02-17 | End: 2022-04-11 | Stop reason: SDUPTHER

## 2022-02-28 RX ADMIN — TRIAMCINOLONE ACETONIDE 40 MG: 40 INJECTION, SUSPENSION INTRA-ARTICULAR; INTRAMUSCULAR at 12:39

## 2022-02-28 RX ADMIN — BUPIVACAINE HYDROCHLORIDE 10 MG: 2.5 INJECTION, SOLUTION EPIDURAL; INFILTRATION; INTRACAUDAL at 12:38

## 2022-02-28 NOTE — PROGRESS NOTES
Minda Arenas M.D. [address] [facility] PHYSICAL MEDICINE AND REHABILITAION  [address]  Carlene Ho [address] [facility]  Dept: [phone]  Dept Fax: [phone]    PCP: Kayla Cannon DO  Date of visit: 2/28/22      Chief Complaint   Patient presents with    Shoulder Pain     here for us guided right shoulder injection today, still having pain in right shoulder.  Follow-up     f/u right knee after Durolane injection, pt states that is helped some, 4/10 on the right knee today. Sohma Shelby is a 62 y.o. woman who presents for follow up of multiple pain complaints. Her primary complaint is low back pain. She had reported gradual onset of low back pain after no known injury years ago. She reports back and leg pain was worse since December 2020. She underwent L3-L5 PLIF on 5/25/2021. She states that since surgery the radiating left lower extremity pain has improved significantly, however has been having pain radiating to the right lower extremity. No new or worsening weakness in the legs, she feels leg weakness is about the same as it was prior to surgery. No incontinence. She has had PT. She saw Pain management and underwent bilateral SIJ injections on 10/11/2021, bilateral L5-S1 facet blocks on 11/8/2021, and bilateral L5, S1, S2, S3 DR branch nerve blocks on 12/21/2021. She does not feel she has had much relief from the injections. Most recently she underwent L5-S1 lumbar epidural on 2/7/2022. She is taking Percocet PRN (followed by Pain mgmt) with reports relief. She also continues gabapentin 800mg TID with partial relief. Cymbalta was added by Pain management and she is unsure of benefit. Now, the pain is constant. The pain is described as \"painful, burning\", and is located all across the low back with radiation to the right lower extremity. She previously had pain radiating primarily to the LLE, but states this has improved since surgery. She endorses tingling/burning in the right foot. She reports weakness in both legs that is unchanged since prior to surgery. She did not report significant spasms today. She denies recent falls. She has used a cane to ambulate since December 2020. She denies bowel/bladder incontinence or saddle anesthesia. She also reports chronic right knee pain. Pain is constant. Pain is described as aching and sharp. Pain is located primarily at the medial joint line. No swelling. She reports that the knee occasionally gives out on her. No cracking or popping. No specific injury. Pain is worse with walking and worse toward the end of the day. Pain is better with Percocet. She had a right knee xray completed that showed arthritic changes. She received a right knee steroid injection 10/18/2021 and reported minimal relief. She is using topical Voltaren gel and this is helping with knee pain. She had right knee Durolane injection performed at her last visit on 1/30/2022 and reports she is noticing some improvement in right knee pain. Patient has additional complaint of right shoulder pain. She has reported chronic right shoulder pain, that has been worse since her back surgery. Right shoulder pain is described as stiff and aching. Right shoulder pain is worse with overhead activity. No upper extremity weakness or numbness reported. She did not have improvement with PT. She has tried voltaren gel on her shoulder with partial relief. She had an MRI of the right shoulder completed on 2/8/2022 which shows partial thickness tear of the supraspinatus, subacromial/subdeltoid bursitis, and rotator cuff tendinopathy. The prior workup has included:   -Right shoulder MRI 2/8/2022  FINDINGS:   Mild volume loss of supraspinatus.  Moderate fluid in the   subacromial/subdeltoid space.  Focal high-grade partial-thickness articular   surface tear of supraspinatus measures 6 mm medial to lateral and 6 mm   anterior to posterior.  Moderate tendinopathy of supraspinatus and   infraspinatus.  Moderate tendinopathy of subscapularis.  Biceps tendons are   intact.       Moderate degenerative changes of the acromioclavicular joint with mild mass   effect on the underlying supraspinatus.  Mild degenerative change of the   glenohumeral joint.  Minimal degenerative irregularity and fraying of the   labrum without identified focal tear.  Labrum could be further assessed with   intra-articular contrast as indicated.       No marrow edema, marrow replacement or evidence of acute fracture.           Impression   Focal high-grade partial-thickness articular surface tear of supraspinatus.       Moderate rotator cuff tendinopathy.       Fluid in the subacromial/subdeltoid space likely related to moderate bursitis.       Degenerative changes most evident in the acromioclavicular joint.           -Right / Left knee xray 9/16/2021:     Impression   1.  Moderate right patellofemoral and medial compartment joint space   narrowing, with very minimal secondary osteoarthritic degenerative disease,   not significantly changed since 12/30/2020.       2.  Incidental left medial compartment joint space narrowing, osseous   degenerative disease, and slight genu-varus configuration, as described.         -Right shoulder xray 9/16/2021     Impression   1.  A previously-existing right-internal-jugular-approach single-lumen   central venous catheter has been removed over the interval. Negative for   gross pneumothorax, bilaterally, within the limits of this study.       2.  Mild irregular cortical exostosis involves the undersurface ease of the   right acromion and lateral right clavicular head, slightly hook-like in   configuration at the level of the right acromion.       3.  Osseous degenerative disease, as described.           -Lumbar MRI 3/27/2021:  FINDINGS:   BONES/ALIGNMENT: There is normal alignment of the spine. The vertebral body   heights are maintained.  The bone marrow signal appears unremarkable.       SPINAL CORD: The conus terminates normally.       SOFT TISSUES: No paraspinal mass identified.       L1-L2: There is no significant disc herniation, spinal canal stenosis or   neural foraminal narrowing.       L2-L3: Disc bulge causes mild right foraminal stenosis.       L3-L4: Disc bulge with superimposed right foraminal disc protrusion causes   mild thecal sac, moderate right foraminal and mild left foraminal stenosis.       L4-L5: Large central cranially migrated disc extrusion measuring 25 x 12 x 13   mm (craniocaudal by anteroposterior by transverse) causes severe spinal canal   stenosis and may compress the cauda equina.  This is slightly worse on the   left side.  A disc bulge also causes mild bilateral foraminal stenosis.       L5-S1: Disc bulge and facet hypertrophy without stenosis.         Impression   L4-L5 disc extrusion causing severe spinal canal stenosis worse on the left   side. Prior lumbar MRI 2019 [facility] -- not available to review at time of office visit, will attempt to obtain      The prior treatment has included:  PT: PT x 2 courses without relief prior to surgery. PT post operatively with minimal relief. Modalities: Heat with partial relief. Thinks she used a TENS in the past, unsure of relief. Topicals: \"hot and cold patches\" over the counter with minimal relief. Lidoderm patch with initial improvement, now not helping. Voltaren gel with improvement. OTC Tylenol: Takes with minimal relief   NSAIDS: Tried mobic and diclofenac without relief   Opioids: Tried Norco for acute exacerbation, did not like how it made her feel, no longer taking. Took Oxycodone post operatively. Now taking Percocet PRN with relief, followed by Pain mgmt. Membrane stabilizers: Taking gabapentin 800mg TID with partial relief. Taking Cymbalta with improvement. Muscle relaxers: tizanidine - states helps her relax, but not with pain.  Now taking flexeril post operatively, Rx'd by NSKATARINA  Previous injections: Multiple epidurals at [facility], most recent on 2/18/2021. She reports set of 3 epidurals helped in the past, but the most recent set of epidurals did not help with most recent exacerbation of pain prior to surgery. Bilateral SIJ injections on 10/11/2021 without relief. Bilateral L5-S1 facet blocks on 11/8/2021without reported relief. Bilateral L5, S1, S2, S3 DR branch nerve blocks on 12/21/2021 without reported relief. Right knee Durolane injection 1/24/2022 with partial relief of knee pain. L5-S1 lumbar epidural on 2/7/2022. Previous surgery at this site: L3-L5 PLIF on  5/25/2021 with improvement in left lower extremity radiating pain.          Past Medical History:   Diagnosis Date    Back pain     Chronic fatigue     COVID-19 09/2020    mild per patient    Diabetes mellitus ([address]     Hyperlipidemia     Hypothyroidism     IBS (irritable bowel syndrome)     Morbid obesity due to excess calories (HCC)     Obesity     Osteoarthritis     PONV (postoperative nausea and vomiting)        Past Surgical History:   Procedure Laterality Date    BACK SURGERY      CHOLECYSTECTOMY      COLONOSCOPY      HYSTERECTOMY, TOTAL ABDOMINAL      INCONTINENCE SURGERY      BLADDER SLING    KNEE SURGERY Left     LUMBAR SPINE SURGERY N/A 5/25/2021    L3- L5 DECOMPRESSION AND FUSION , L4- L5 TRANSFORAMINAL LUMBAR INTERBODY FUSION --OARM, PATY TABLE, AUDIOLOGY, PLATES ,SCREWS, --NUVASIVE performed by Isaiah Khan MD at [facility] Bilateral 10/11/2021    BILATERAL SACROILIAC JOINT INJECTION UNDER FLUOROSCOPY (CPT [id number]) performed by Katerina Woo MD at [facility] [facility] [address] Bilateral 11/8/2021    BILATERAL LUMBAR FACET INJECTION AT L5-S 1 FACETS BLOCK UNDER FLUOROSCOPIC GUIDANCE performed by Katerina Woo MD at [facility] [address] Bilateral 12/21/2021    BILATERAL S1, S2, S3 BRANCH & L5 DR BRANCH NERVE BLOCK UNDER XRAY GUIDANCE (65098) (SEDATION) [facility] [address] [address] N/A 2/7/2022    LUMBAR EPIDURAL STEROID INJECTION UNDER FLUOROSCOPIC GUIDANCE AT L5-S1 PARAMEDIAN performed by Shamar Pillai MD at [facility] OR       Social History     Socioeconomic History    Marital status:      Spouse name: Not on file    Number of children: Not on file    Years of education: Not on file    Highest education level: Not on file   Occupational History     Employer: [employer]   Tobacco Use    Smoking status: Never Smoker    Smokeless tobacco: Never Used   Vaping Use    Vaping Use: Never used   Substance and Sexual Activity    Alcohol use: Never    Drug use: Never    Sexual activity: Not on file   Other Topics Concern    Not on file   Social History Narrative    Not on file     Social Determinants of Health     Financial Resource Strain: Low Risk     Difficulty of Paying Living Expenses: Not hard at all   Food Insecurity: No Food Insecurity    Worried About [address] in the Last Year: Never true    [address] in the Last Year: Never true   Transportation Needs:     Lack of Transportation (Medical): Not on file    Lack of Transportation (Non-Medical):  Not on file   Physical Activity:     Days of Exercise per Week: Not on file    Minutes of Exercise per Session: Not on file   Stress:     Feeling of Stress : Not on file   Social Connections:     Frequency of Communication with Friends and Family: Not on file    Frequency of Social Gatherings with Friends and Family: Not on file    Attends Anabaptism Services: Not on file    Active Member of Clubs or Organizations: Not on file    Attends Club or Organization Meetings: Not on file    Marital Status: Not on file   Intimate Partner Violence:     Fear of Current or Ex-Partner: Not on file    Emotionally Abused: Not on file    Physically Abused: Not on file    Sexually Abused: Not on file   Housing Stability:     Unable to Pay for Housing in the Last Year: Not on file    Number of Places Lived in the Last Year: Not on file    Unstable Housing in the Last Year: Not on file          Family History   Problem Relation Age of Onset    Diabetes Mother     Diabetes Father    [facility] Rheum Arthritis Sister     Cancer Maternal Grandfather        Allergies   Allergen Reactions    Bactrim [Sulfamethoxazole-Trimethoprim] Nausea Only    Ceftin [Cefuroxime] Rash    Keflex [Cephalexin] Rash       Current Outpatient Medications   Medication Sig Dispense Refill    gabapentin (NEURONTIN) 400 MG capsule take 2 capsules by mouth three times a day      pravastatin (PRAVACHOL) 10 MG tablet TAKE 1 TABLET BY MOUTH EVERY OTHER DAY 45 tablet 1    phentermine (ADIPEX-P) 37.5 MG tablet Take 1 tablet by mouth every morning (before breakfast) for 30 days.  30 tablet 0    Handicap Placard MISC by Does not apply route Patient cannot walk 200 ft without stopping to rest.    Expiration 5 yrs 1 each 0    Multiple Vitamins-Minerals (THERAPEUTIC MULTIVITAMIN-MINERALS) tablet Take 1 tablet by mouth daily      levothyroxine (SYNTHROID) 175 MCG tablet Take 1 tablet by mouth daily 90 tablet 1    vitamin D (ERGOCALCIFEROL) 1.25 MG (02847 UT) CAPS capsule TAKE 1 CAPSULE BY MOUTH ONE TIME PER WEEK 12 capsule 1    cyclobenzaprine (FLEXERIL) 10 MG tablet Take 1 tablet by mouth 3 times daily as needed for Muscle spasms 90 tablet 2    vitamin B-12 (CYANOCOBALAMIN) 100 MCG tablet Take 1 tablet by mouth daily 90 tablet 3    diclofenac sodium (VOLTAREN) 1 % GEL Apply 4 g topically 4 times daily as needed for Pain 150 g 3    torsemide (DEMADEX) 10 MG tablet take 1 tablet by mouth once daily 30 tablet 3    DULoxetine (CYMBALTA) 60 MG extended release capsule Take 1 capsule by mouth daily 30 capsule 2    Thiamine HCl (B-1) 100 MG TABS Take 1 pill daily 90 tablet 1    gemfibrozil (LOPID) 600 MG tablet TAKE 1 TABLET BY MOUTH EVERY 12 HOURS 180 tablet 3    metFORMIN (GLUCOPHAGE) 500 MG tablet TAKE 1 TABLET BY MOUTH EVERY DAY WITH BREAKFAST 90 tablet 3    omeprazole (PRILOSEC) 20 MG delayed release capsule TAKE 1 CAPSULE BY MOUTH EVERY DAY 90 capsule 3    linaclotide (LINZESS) 145 MCG capsule TAKE 1 CAPSULE BY MOUTH EVERY DAY IN THE MORNING BEFORE BREAKFAST 90 capsule 3    valsartan (DIOVAN) 80 MG tablet TAKE 1 TABLET BY MOUTH EVERY DAY 90 tablet 1    omega-3 acid ethyl esters (LOVAZA) 1 g capsule Take 1 capsule by mouth 2 times daily 180 capsule 1    OZEMPIC, 1 MG/DOSE, 2 MG/1.5ML SOPN Inject 1 mg into the skin once a week 6 pen 3    acetaminophen (TYLENOL) 500 MG tablet Take 500 mg by mouth every 6 hours as needed for Pain      lidocaine (LIDODERM) 5 % PLACE 1 PATCH ONTO THE SKIN DAILY 12 HOURS ON, 12 HOURS OFF.  polyethylene glycol (GLYCOLAX) 17 g packet Take 17 g by mouth daily as needed       No current facility-administered medications for this visit. Review of Systems  General: No chills, fatigue, fever, malaise, night sweats, weight gain,  weight loss. Psychological: No anxiety, depression, suicidal ideation   Ophthalmic: No blurry vision, decreased vision, double vision, loss of vision  Ear Nose Throat: No hearing loss, tinnitus, phonophobia, sensitivity to smells, vertigo, or vocal changes. Allergy/Immunology: No watery eyes, itchy eyes, frequent infections. Hematological and Lymphatic: No bleeding problems, blood clots, bruising  Endocrine:  No polydypsia, polyuria, temperature intolerance. Respiratory: No cough, shortness of breath, wheezing. Cardiovascular: No syncope, chest pain, dyspnea on exertion,palpitations.    Gastrointestinal: No abdominal pain, hematemesis, melena, nausea, vomiting, stool incontinence  Genito-Urinary: No dysuria, hematuria, incontinence   Musculoskeletal: Per HPI  Neurological: Per HPI  Dermatological:  No rash      Physical Exam:   Vitals:    02/28/22 1129   BP: 134/66 Pulse: 85   Temp: 97.8 °F (36.6 °C)   SpO2: 99%     General: The patient is in no apparent distress. Body habitus is morbidly obese  HEENT: No rhinorrhea, sneezing, yawning, or lacrimation. No scleral icterus or conjunctival injection. SKIN: No piloerection. No track marks. No rash. Normal turgor. No erythema or ecchymosis. Psychological: Mood and affect are appropriate. Hygiene is appropriate. Cardiovascular:  Heart is regular rate. Peripheral pulses are 2+ and symmetric. Respiratory: Respirations are regular and unlabored. There is no cyanosis. Gastrointestinal: No abdominal distension   Genitourinary: No costovertebral angle tenderness. MSK: No deformity. No apparent bruising or other signs of injury. No tenderness over the cervical, thoracic, or lumbar spine. No other bony tenderness. Lumbar:  Thoracic kyphosis increased and lumbar lordosis decreased. Pelvis level. There is no step off deformity. No superficial or bony tenderness. Lumbar AROM impaired in all planes. There is tenderness to palpation over the bilateral thoracic and lumbar paraspinals. No tenderness to palpation at bilateral SIJ, gluteal muscles, PSIS, ischial tuberosity, greater trochanter, ASIS, iliac crest.  There is mild spasm of the bilateral lumbar paraspinals. No edema, erythema, ecchymosis,  mass or deformity. Hip: Full painless AROM bilateral hip. Right shoulder:  No significant tenderness. No deformity. Active abduction to 85-90 degrees which was limited by discomfort, passive flexion/abduction to 170 degrees. No specific impingement signs, pt states it feels stiff in general. Cervical AROM intact. Negative Spurling's. Right knee:  No edema, warmth, erythema, ecchymosis, effusion, instability, mass or deformity. Full AROM. No crepitus. There is tenderness to palpation of the medial joint line. No significant tenderness over the lateral joint line. Patellofemoral grind with pain.    No medial or lateral collateral ligament tenderness. Negative Lachman. No varus or valgus instability. Neurologic: Awake, alert and oriented in three planes. Speech is fluent. No facial weakness. Hearing is intact for conversation. Pupils are equal and round. Extraocular muscles are intact. Shoulder shrug symmetric. Strength:   R  L  Hip Flex  4+  4+  Knee Ext  4+  4+  Ankle dorsi  5  4+  EHL   5 5  Ankle Plantar  5  5    Right shoulder abduction 2-3/5, limited by pain. RUE otherwise 5/5 in upper extremities. Sensory:  Intact for light touch in all lower extremity dermatomes. SILT throughout [address]. Reflexes:   R  L  Patellar  2 2   Ankle Jerk  2 2    No Babinski     Gait is antalgic       US Guided Subacromial Injection Procedure: Right  After explaining the indications, risks, benefits and alternatives of a right subacromial injection, the patient agreed to proceed. A permit was signed and scanned to the media. The patient was placed in the seated position. Chloraprep was used to sterilize the skin. Using an aseptic, no touch technique, a 22 gauge, 1.5\" needle with 1 cc of Kenalog 40mg/cc and 4 cc of Xylocaine 1% was directed to the subacromial space using ultrasound guidance. After negative aspiration the medication was injected. Adequate hemostasis was achieved and the injection site was covered with a bandage. The patient was observed for complication and left the office without incident. The patient tolerated the procedure well and was educated in post injection care. Ultrasound images are scanned in the electronic medical record separately. Impression:   1. Spinal stenosis, lumbar region with neurogenic claudication    2. Radiculopathy, lumbosacral region    3. S/P lumbar fusion    4. Primary osteoarthritis of right knee    5. Chronic right shoulder pain    6.  Incomplete tear of right rotator cuff, unspecified whether traumatic          Plan:   · MRI right shoulder reviewed with patient  · US guided right subacromial injection today   · Right knee Durolane injection providing some relief of knee pain, continue to monitor  · Continue gabapentin 800mg TID. · Patient will continue Cymbalta and Percocet as prescribed by Pain management  · Continue Voltaren gel for right knee and right shoulder  · Patient following with Pain Management for lumbar injections  The patient was educated about the diagnosis, prognosis, indications, risks and benefits of treatment. An opportunity to ask questions was given to the patient and questions were answered. The patient agreed to proceed with the recommended treatment as described above. Follow up 6 weeks to review response to today's injection. Ilene Mccarthy M.D.   Physical Medicine and Rehabilitation

## 2022-03-03 ENCOUNTER — TELEPHONE (OUTPATIENT)
Dept: PRIMARY CARE CLINIC | Age: 62
End: 2022-03-03

## 2022-03-04 ENCOUNTER — HOSPITAL ENCOUNTER (OUTPATIENT)
Age: 62
Discharge: HOME | End: 2022-03-06
Payer: COMMERCIAL

## 2022-03-04 DIAGNOSIS — Z01.818 PREOP TESTING: ICD-10-CM

## 2022-03-04 PROCEDURE — U0003 INFECTIOUS AGENT DETECTION BY NUCLEIC ACID (DNA OR RNA); SEVERE ACUTE RESPIRATORY SYNDROME CORONAVIRUS 2 (SARS-COV-2) (CORONAVIRUS DISEASE [COVID-19]), AMPLIFIED PROBE TECHNIQUE, MAKING USE OF HIGH THROUGHPUT TECHNOLOGIES AS DESCRIBED BY CMS-2020-01-R: HCPCS

## 2022-03-04 RX ORDER — DULOXETIN HYDROCHLORIDE 60 MG/1
CAPSULE, DELAYED RELEASE ORAL
Qty: 30 CAPSULE | Refills: 2 | OUTPATIENT
Start: 2022-03-04

## 2022-03-07 ENCOUNTER — OFFICE VISIT (OUTPATIENT)
Dept: PAIN MANAGEMENT | Age: 62
End: 2022-03-07
Payer: COMMERCIAL

## 2022-03-07 VITALS
OXYGEN SATURATION: 98 % | SYSTOLIC BLOOD PRESSURE: 120 MMHG | TEMPERATURE: 97 F | HEART RATE: 88 BPM | DIASTOLIC BLOOD PRESSURE: 60 MMHG | RESPIRATION RATE: 16 BRPM

## 2022-03-07 DIAGNOSIS — M54.16 LUMBAR RADICULAR PAIN: ICD-10-CM

## 2022-03-07 DIAGNOSIS — M47.817 LUMBOSACRAL SPONDYLOSIS WITHOUT MYELOPATHY: ICD-10-CM

## 2022-03-07 DIAGNOSIS — M96.1 POSTLAMINECTOMY SYNDROME, LUMBAR: Primary | ICD-10-CM

## 2022-03-07 DIAGNOSIS — M51.36 DDD (DEGENERATIVE DISC DISEASE), LUMBAR: ICD-10-CM

## 2022-03-07 DIAGNOSIS — E66.9 OBESITY, UNSPECIFIED CLASSIFICATION, UNSPECIFIED OBESITY TYPE, UNSPECIFIED WHETHER SERIOUS COMORBIDITY PRESENT: ICD-10-CM

## 2022-03-07 LAB
SARS-COV-2: NOT DETECTED
SOURCE: NORMAL

## 2022-03-07 PROCEDURE — 99213 OFFICE O/P EST LOW 20 MIN: CPT | Performed by: ANESTHESIOLOGY

## 2022-03-07 RX ORDER — DULOXETIN HYDROCHLORIDE 60 MG/1
60 CAPSULE, DELAYED RELEASE ORAL DAILY
Qty: 90 CAPSULE | Refills: 1 | Status: SHIPPED
Start: 2022-03-07 | End: 2022-09-12 | Stop reason: SDUPTHER

## 2022-03-07 RX ORDER — OXYCODONE HYDROCHLORIDE AND ACETAMINOPHEN 5; 325 MG/1; MG/1
1 TABLET ORAL 2 TIMES DAILY PRN
Qty: 60 TABLET | Refills: 0 | Status: SHIPPED | OUTPATIENT
Start: 2022-03-07 | End: 2022-04-06

## 2022-03-07 NOTE — PROGRESS NOTES
Do you currently have any of the following:    Fever: No  Headache:  No  Cough: No  Shortness of breath: No  Exposed to anyone with these symptoms: No         Damaso Baron presents to [facility] on 3/7/2022. Merlene Kraft is complaining of pain back  The pain is intermittent. The pain is described as aching. Pain is rated on her best day at a 3, on her worst day at a 7, and on average at a 5 on the VAS scale. She took her last dose of      Any procedures since your last visit:     Pacemaker or defibrillator: No managed by     She is not on NSAIDS and is not on anticoagulation medications to include none and is managed by      Medication Contract and Consent for Opioid Use Documents Filed     Patient Documents     Type of Document Status Date Received Received By Description    Medication Contract Received 10/6/2021 10:43 AM SHANE CRENSHAW pain management                /60   Pulse 88   Temp 97 °F (36.1 °C) (Infrared)   Resp 16   SpO2 98%      No LMP recorded. Patient has had a hysterectomy.

## 2022-03-07 NOTE — PROGRESS NOTES
[facility] Pain Management  [place], [address] [facility]  Dept: [phone]      Follow up Note      Nelson Kuhn     Date of Visit:  3/7/2022    CC:  Patient presents for follow up   Chief Complaint   Patient presents with    Follow-up     post procedure        HPI:  Low back pain. S/P L3-5 fusion and L4-5 TLIF on May 25 2021- helped the left LE pain significantly.     Has noticed right LE pain after the surgery. Has been followed by NSG had X-ray done. Intact fusion.     Low back pain and right LE pain- Has tingling of the foot (h/o DM).    Also has right shoulder pain and right knee pain - followed by PMR. Pain causes functional limitations/ limits Adl's : Yes     Nursing notes and details of the pain history reviewed. Please see intake notes for details.     Previous treatments:   Physical Therapy : yes, currently in PT      Medications: - NSAID's : yes                        - Membrane stabilizers : yes - gabapentin                       - Opioids : yes, post op use                       - Adjuvants or Others : yes,      Surgeries: yes, L3-5 fusion in May 2021     She has not been on anticoagulation medications      She has not been on herbal supplements.       She is diabetic.     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  No fracture or   dislocation.  Normal soft tissues.       T-spine: Mild multilevel degenerative disc disease.  No fracture or   dislocation.  Normal thoracic soft tissues.  Mild thoracic dextroscoliosis.       Lumbar spine: Intact hardware associated with posterior fusion L3-L5.  Normal   alignment.   L4-5 disc is augmented.  Degenerative disc disease is present at   multiple levels and is greatest at L5-S1 where it is severe. Satira Cones is a   decompressive laminectomy at L3 and L4.  Normal soft tissues.           Impression   Degenerative spondylosis at the C-spine, T-spine and L-spine.  Lumbar fusion   with intact hardware and normal alignment.            X-ray LS spine: 9/14/2021:      FINDINGS:   Posterior spinal fusion L3-L5 with normal alignment.  Degenerative disc   disease is present at multiple levels and is greatest at L5-S1 where it is   moderate to severe.  Normal soft tissues.           Impression   Intact lumbar fusion hardware with normal alignment.  Stable degenerative   disc disease.             Xray Right knee: 9/16/2021:      Impression   1.  Moderate right patellofemoral and medial compartment joint space   narrowing, with very minimal secondary osteoarthritic degenerative disease,   not significantly changed since 12/30/2020.       2.  Incidental left medial compartment joint space narrowing, osseous   degenerative disease, and slight genu-varus configuration, as described.       .      Xray right shoulder: 9/16/2021:  Impression   1.  A previously-existing right-internal-jugular-approach single-lumen   central venous catheter has been removed over the interval. Negative for   gross pneumothorax, bilaterally, within the limits of this study.       2.  Mild irregular cortical exostosis involves the undersurface ease of the   right acromion and lateral right clavicular head, slightly hook-like in   configuration at the level of the right acromion.       3.  Osseous degenerative disease, as described.        Past Medical History:   Diagnosis Date    Back pain     Chronic fatigue     COVID-19 09/2020    mild per patient    Diabetes mellitus ([address]     Hyperlipidemia     Hypothyroidism     IBS (irritable bowel syndrome)     Morbid obesity due to excess calories (HCC)     Obesity     Osteoarthritis     PONV (postoperative nausea and vomiting)        Past Surgical History:   Procedure Laterality Date    BACK SURGERY      CHOLECYSTECTOMY      COLONOSCOPY      HYSTERECTOMY, TOTAL ABDOMINAL      INCONTINENCE SURGERY      BLADDER SLING    KNEE SURGERY Left     LUMBAR SPINE SURGERY N/A 5/25/2021    L3- L5 DECOMPRESSION AND FUSION , L4- L5 TRANSFORAMINAL LUMBAR INTERBODY FUSION --OARM, PATY TABLE, AUDIOLOGY, PLATES ,SCREWS, --NUVASIVE performed by Maura Bashir MD at [address] Bilateral 10/11/2021    BILATERAL SACROILIAC JOINT INJECTION UNDER FLUOROSCOPY (CPT 02251) performed by Mirna Washington MD at [address] Bilateral 11/8/2021    BILATERAL LUMBAR FACET INJECTION AT L5-S 1 FACETS BLOCK UNDER FLUOROSCOPIC GUIDANCE performed by Mirna Washington MD at [address] Bilateral 12/21/2021    BILATERAL S1, S2, S3 BRANCH & L5 [facility] NERVE BLOCK UNDER XRAY GUIDANCE (07822) (SEDATION) performed by Mirna Washington MD at [address] [place] [address] S N/A 2/7/2022    LUMBAR EPIDURAL STEROID INJECTION UNDER FLUOROSCOPIC GUIDANCE AT L5-S1 PARAMEDIAN performed by Mirna Washington MD at [facility] OR       Prior to Admission medications    Medication Sig Start Date End Date Taking? Authorizing Provider   gabapentin (NEURONTIN) 400 MG capsule take 2 capsules by mouth three times a day 2/17/22  Yes Historical Provider, MD   pravastatin (PRAVACHOL) 10 MG tablet TAKE 1 TABLET BY MOUTH EVERY OTHER DAY 2/24/22  Yes Iker Burrell,    phentermine (ADIPEX-P) 37.5 MG tablet Take 1 tablet by mouth every morning (before breakfast) for 30 days.  2/17/22 3/19/22 Yes Jose Ramon Martines MD   Handicap Placard MISC by Does not apply route Patient cannot walk 200 ft without stopping to rest.    Expiration 5 yrs 2/11/22  Yes Iker Burrell DO   Multiple Vitamins-Minerals (THERAPEUTIC MULTIVITAMIN-MINERALS) tablet Take 1 tablet by mouth daily   Yes Historical Provider, MD   levothyroxine (SYNTHROID) 175 MCG tablet Take 1 tablet by mouth daily 1/19/22  Yes Iker Burrell DO   vitamin D (ERGOCALCIFEROL) 1.25 MG (55722 UT) CAPS capsule TAKE 1 CAPSULE BY MOUTH ONE TIME PER WEEK 1/18/22  Yes Prieto Robin DO   cyclobenzaprine (FLEXERIL) 10 MG tablet Take 1 tablet by mouth 3 times daily as needed for Muscle spasms 1/18/22  Yes Prieto Olguin DO   vitamin B-12 (CYANOCOBALAMIN) 100 MCG tablet Take 1 tablet by mouth daily 1/18/22  Yes Prieto Robin DO   diclofenac sodium (VOLTAREN) 1 % GEL Apply 4 g topically 4 times daily as needed for Pain 1/18/22 4/18/22 Yes Fred Loco MD   torsemide (DEMADEX) 10 MG tablet take 1 tablet by mouth once daily 12/16/21  Yes Prieto Robin DO   DULoxetine (CYMBALTA) 60 MG extended release capsule Take 1 capsule by mouth daily 12/13/21  Yes Ty Fox MD   Thiamine HCl (B-1) 100 MG TABS Take 1 pill daily 10/18/21  Yes Merline Boor, DO   gemfibrozil (LOPID) 600 MG tablet TAKE 1 TABLET BY MOUTH EVERY 12 HOURS 10/18/21  Yes Merline Boor, DO   metFORMIN (GLUCOPHAGE) 500 MG tablet TAKE 1 TABLET BY MOUTH EVERY DAY WITH BREAKFAST 10/18/21  Yes Merline Boor, DO   omeprazole (PRILOSEC) 20 MG delayed release capsule TAKE 1 CAPSULE BY MOUTH EVERY DAY 10/18/21  Yes Prieto Hurley DO   linaclotide (LINZESS) 145 MCG capsule TAKE 1 CAPSULE BY MOUTH EVERY DAY IN THE MORNING BEFORE BREAKFAST 10/18/21  Yes Prieto Robin DO   valsartan (DIOVAN) 80 MG tablet TAKE 1 TABLET BY MOUTH EVERY DAY 10/18/21  Yes Prieto Robin DO   omega-3 acid ethyl esters (LOVAZA) 1 g capsule Take 1 capsule by mouth 2 times daily 10/18/21  Yes Prieto Robin DO   OZEMPIC, 1 MG/DOSE, 2 MG/1.5ML SOPN Inject 1 mg into the skin once a week 10/7/21  Yes Merline Boor, DO   acetaminophen (TYLENOL) 500 MG tablet Take 500 mg by mouth every 6 hours as needed for Pain   Yes Historical Provider, MD   lidocaine (LIDODERM) 5 % PLACE 1 PATCH ONTO THE SKIN DAILY 12 HOURS ON, 12 HOURS OFF. 6/2/21  Yes Historical Provider, MD   polyethylene glycol (GLYCOLAX) 17 g packet Take 17 g by mouth daily as needed   Yes Historical Provider, MD   levothyroxine (SYNTHROID) 150 MCG tablet Take 1 tablet by mouth Daily 1/12/21   Clarene Bench, DO   rosuvastatin (CRESTOR) 5 MG tablet Take 1 tablet by mouth daily 1/12/21   Clarene Bench, DO   meloxicam (MOBIC) 15 MG tablet Take 1 tablet by mouth daily as needed for Pain 1/11/21   Clarene Bench, DO       Allergies   Allergen Reactions    Bactrim [Sulfamethoxazole-Trimethoprim] Nausea Only    Ceftin [Cefuroxime] Rash    Keflex [Cephalexin] Rash       Social History     Socioeconomic History    Marital status:      Spouse name: Not on file    Number of children: Not on file    Years of education: Not on file    Highest education level: Not on file   Occupational History     Employer: [employer]   Tobacco Use    Smoking status: Never Smoker    Smokeless tobacco: Never Used   Vaping Use    Vaping Use: Never used   Substance and Sexual Activity    Alcohol use: Never    Drug use: Never    Sexual activity: Not on file   Other Topics Concern    Not on file   Social History Narrative    Not on file     Social Determinants of Health     Financial Resource Strain: Low Risk     Difficulty of Paying Living Expenses: Not hard at all   Food Insecurity: No Food Insecurity    Worried About [address] [employer] in the Last Year: Never true    [address] in the Last Year: Never true   Transportation Needs:     Lack of Transportation (Medical): Not on file    Lack of Transportation (Non-Medical):  Not on file   Physical Activity:     Days of Exercise per Week: Not on file    Minutes of Exercise per Session: Not on file   Stress:     Feeling of Stress : Not on file   Social Connections:     Frequency of Communication with Friends and Family: Not on file    Frequency of Social Gatherings with Friends and Family: Not on file    Attends Spiritism Services: Not on file    Active Member of Clubs or Organizations: Not on file    Attends Club or Organization Meetings: Not on file    Marital Status: Not on file   Intimate Partner Violence:     Fear of Current or Ex-Partner: Not on file    Emotionally Abused: Not on file    Physically Abused: Not on file    Sexually Abused: Not on file   Housing Stability:     Unable to Pay for Housing in the Last Year: Not on file    Number of [place] in the Last Year: Not on file    Unstable Housing in the Last Year: Not on file       Family History   Problem Relation Age of Onset    Diabetes Mother     Diabetes Father    Corrie Lancaster Rheum Arthritis Sister     Cancer Maternal Grandfather        REVIEW OF SYSTEMS:     Cecil Medel denies fever/chills, chest pain, shortness of breath, new bowel or bladder complaints. All other review of systems was negative. PHYSICAL EXAMINATION:      /60   Pulse 88   Temp 97 °F (36.1 °C) (Infrared)   Resp 16   SpO2 98%   General:       General appearance:  Pleasant and well-hydrated, in no distress and A & O x 3  Build:Obese  Function: Rises from seated position easily and Moves about room without difficulty     HEENT:     Head:normocephalic, atraumatic    Lungs:     Breathing:normal breathing pattern      CVS:     RRR     Abdomen:     Shape:obese, non-distended and normal     Cervical spine:     Inspection:normal     Thoracic spine:                Spine inspection:normal   Palpation:No tenderness over the midline and paraspinal area, bilaterally  Range of motion:normal in flexion, extension rotation bilateral and is not painful.     Lumbar spine:     Spine inspection: scar from the prior surgery noted- healed well  Palpation: Tenderness paravertebral muscles Yes bilaterally  Range of motion: Decreased, flexion Decreased, Lateral bending, extension and rotation bilaterally reduced is somewhat painful.   Lower lumbar facet tenderness +  Sacroiliac joint tenderness   Piriformis tenderness: negative bilaterally  SLR : negative bilaterally     Musculoskeletal:     Trigger points no     Extremities:     Tremors:None bilaterally upper and lower  Edema:none x all 4 extremities  Distal LE Peripheral pulses felt     Knee Right:     ROM : pain +   Joint line tenderness : yes      Neurological:     Sensory: Normal to light touch      Motor:   Right Hip flexors: 4/5  Left hip flexors 4/5               Right Quadriceps 4+/5          Left Quadriceps 4+/5           Right Gastrocnemius 5/5    Left Gastrocnemius 5/5  Right Ant Tibialis 5/5  Left Ant Tibialis 5/5     Gait: uses a cane to assist in ambulation.     Dermatology:     Skin:no rashes or lesions noted    Assessment/Plan:    Diagnosis Orders   1. DDD (degenerative disc disease), lumbar      2. Lumbosacral spondylosis without myelopathy      3. Lumbar radiculopathy      4. Sacroiliac dysfunction      5. Obesity, unspecified classification, unspecified obesity type, unspecified whether serious comorbidity present      6. Type 2 diabetes mellitus without complication, without long-term current use of insulin (HCC)      7. S/P lumbar fusion            62 y.o.  female with H/o L3-5 Lumbar fusion in May 2021 by Dr. Brendan Phan. Helped left LE pain - but noticed low back and right LE pain.     Has been evaluated by NSG- recommended conservative treatment.     Needing pain meds- percocet/ gabapentin.     Exam: Facet tenderness +, LE weakness +    S/P Facet injection - no significant pain relief. SIJ interventions- no significant pain relief. S/P ZAK- moderate relief.      Has chronic right shoulder pain and knee pain. Follows with Dr. Peter Clancy.     Plan:  PT / Pallavi Eduardo for prn use to help with PT/ HEP and exercise- will prescribe Percocet 5/325 po upto bid prn. Last filled # 60 on 1/24/2021. [place] reviewed- consistent. Refill given # 61 today 3/7/2022. To take once or twice day as needed. ( she usually takes 1/2 tab bid prn). Recommended to take only for severe pain. To help with pain and functionality and do HEP/ PT.  No signs of misuse abuse or diversion, no side effects, compliant with recommendations.     Continue Gabapentin.     Cymbalta 60 mg Q hs. Refill given # 80  With X 1 refill.     If continues significant pain, will do LS spine MRI and NSG reeval.    Obesity: Consult Bariatric medicine. F/U in 3 months.     Opioid agreement- signed  10/6/2021. Salient features of opioid agreement discussed. Discussed issues with chronic opioid therapy including the phenomenon of tolerance/ dependence.     Oral drug screen -10/6/2021-  result reviewed consistent.     [place]AR[place] reviewed- today: Consistent     She had knee injection / right shoulder injection with Dr Sree Cotter.      Counseling : Patient encouraged to stay active, to continue Regular home exercise program and to watch/lose weight     Treatment plan discussed with the patient including medication and procedure side effects.     Controlled Substances Monitoring:   [place] reviewed.     We discussed with the patient that combining opioids, benzodiazepines, alcohol, illicit drugs or sleep aids increases the risk of respiratory depression including death. We discussed that these medications may cause drowsiness, sedation or dizziness and have counseled the patient not to drive or operate machinery. We have discussed that these medications will be prescribed only by one provider. We have discussed with the patient about age related risk factors and have thoroughly discussed the importance of taking these medications as prescribed.  The patient verbalizes understanding.     Neil Velasco MD    CC:  René Max DO

## 2022-03-08 NOTE — PROGRESS NOTES
[address]                                                                                                                     PRE OP INSTRUCTIONS FOR  Nelson Kuhn        Date: 3/8/2022    Date and time of surgery: 3/9/22   Arrival Time: [facility] will call with time between 4:30-5. 1. Do not eat or drink anything after 12 midnight prior to surgery. This includes no water, chewing gum, mints or ice chips. 2. Take the following pills with a small sip of water on the morning of Surgery: Levothyroxine and Cymbalta     3. Diabetics may take evening dose of insulin but none after midnight. If you feel symptomatic or low blood sugar take 1-2 ounces of apple juice only. 4. Aspirin, Ibuprofen, Advil, Naproxen, Vitamin E and other Anti-inflammatory products should be stopped  before surgery  as directed by your physician. 5. Check with your Doctor regarding stopping Plavix, Coumadin, Lovenox, Fragmin or other blood thinners. 6. Do not smoke,use illicit drugs and do not drink any alcoholic beverages 24 hours prior to surgery. 7. You may brush your teeth and gargle the morning of surgery. DO NOT SWALLOW WATER    8. You MUST make arrangements for a responsible adult to take you home after your surgery. You will not be allowed to leave alone or drive yourself home. It is strongly suggested someone stay with you the first 24 hrs. Your surgery will be cancelled if you do not have a ride home. 9. A parent/legal guardian must accompany a child scheduled for surgery and plan to stay at the hospital until the child is discharged. Please do not bring other children with you. 10. Please wear simple, loose fitting clothing to the hospital.  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 11. DO NOT wear any jewelry or piercings on day of surgery.   All body piercing jewelry must be removed. 12. Shower the night before surgery with _X__Antibacterial soap ___Hibiclens. 15. Remember to bring Blood Bank bracelet to the hospital on the day of surgery. 14. If you have a Living Will and Durable Power of  for Healthcare, please bring in a copy. 15. If appropriate bring crutches, inspirex, etc... 12. Notify your Surgeon if you develop any illness between now and surgery time, cough, cold, fever, sore throat, nausea, vomiting, etc.  Please notify your surgeon if you experience dizziness, shortness of breath or blurred vision between now & the time of your surgery. 17. If you have ___dentures, they will be removed before going to the OR; we will provide you a container. If you wear ___contact lenses or ___glasses, they will be removed; please bring a case for them. 18. To provide excellent care visitors will be limited to one in the room at any given time. 19. Please bring picture ID and insurance card. 20. Sleep apnea patients need to bring CPAP to hospital on day of surgery. 21. Visit our web site for additional information: [email or web address]    22. During flu season no children under the age of 15 are permitted in the hospital for the safety of all patients. 23. Other Come in through main lobby, go to information desk. Please call pre admission testing if you have any further questions.    [facility]     [address]

## 2022-03-09 ENCOUNTER — ANESTHESIA EVENT (OUTPATIENT)
Dept: ENDOSCOPY | Age: 62
End: 2022-03-09
Payer: COMMERCIAL

## 2022-03-09 ENCOUNTER — ANESTHESIA (OUTPATIENT)
Dept: ENDOSCOPY | Age: 62
End: 2022-03-09
Payer: COMMERCIAL

## 2022-03-09 ENCOUNTER — HOSPITAL ENCOUNTER (OUTPATIENT)
Age: 62
Setting detail: OUTPATIENT SURGERY
Discharge: HOME | End: 2022-03-09
Attending: SURGERY | Admitting: SURGERY
Payer: COMMERCIAL

## 2022-03-09 VITALS
RESPIRATION RATE: 18 BRPM | HEIGHT: 63 IN | BODY MASS INDEX: 43.59 KG/M2 | SYSTOLIC BLOOD PRESSURE: 122 MMHG | DIASTOLIC BLOOD PRESSURE: 68 MMHG | WEIGHT: 246 LBS | HEART RATE: 65 BPM | TEMPERATURE: 97 F | OXYGEN SATURATION: 97 %

## 2022-03-09 VITALS
RESPIRATION RATE: 21 BRPM | OXYGEN SATURATION: 98 % | SYSTOLIC BLOOD PRESSURE: 121 MMHG | DIASTOLIC BLOOD PRESSURE: 85 MMHG

## 2022-03-09 DIAGNOSIS — Z01.818 PREOP TESTING: Primary | ICD-10-CM

## 2022-03-09 PROBLEM — K21.9 GERD (GASTROESOPHAGEAL REFLUX DISEASE): Status: ACTIVE | Noted: 2022-03-09

## 2022-03-09 LAB
ALBUMIN SERPL-MCNC: 4.4 G/DL (ref 3.5–5.2)
ALP BLD-CCNC: 91 U/L (ref 35–104)
ALT SERPL-CCNC: 28 U/L (ref 0–32)
ANION GAP SERPL CALCULATED.3IONS-SCNC: 13 MMOL/L (ref 7–16)
AST SERPL-CCNC: 18 U/L (ref 0–31)
BILIRUB SERPL-MCNC: 0.7 MG/DL (ref 0–1.2)
BUN BLDV-MCNC: 12 MG/DL (ref 6–23)
CALCIUM SERPL-MCNC: 9.6 MG/DL (ref 8.6–10.2)
CHLORIDE BLD-SCNC: 102 MMOL/L (ref 98–107)
CHOLESTEROL, TOTAL: 185 MG/DL (ref 0–199)
CO2: 26 MMOL/L (ref 22–29)
CREAT SERPL-MCNC: 0.8 MG/DL (ref 0.5–1)
FERRITIN: 132 NG/ML
FOLATE: 7.9 NG/ML (ref 4.8–24.2)
GFR AFRICAN AMERICAN: >60
GFR NON-AFRICAN AMERICAN: >60 ML/MIN/1.73
GLUCOSE BLD-MCNC: 101 MG/DL (ref 74–99)
HBA1C MFR BLD: 6.4 % (ref 4–5.6)
HCT VFR BLD CALC: 50.6 % (ref 34–48)
HEMOGLOBIN: 16.4 G/DL (ref 11.5–15.5)
MCH RBC QN AUTO: 28.8 PG (ref 26–35)
MCHC RBC AUTO-ENTMCNC: 32.4 % (ref 32–34.5)
MCV RBC AUTO: 88.9 FL (ref 80–99.9)
PDW BLD-RTO: 13.4 FL (ref 11.5–15)
PLATELET # BLD: 236 E9/L (ref 130–450)
PMV BLD AUTO: 9.9 FL (ref 7–12)
POTASSIUM SERPL-SCNC: 4.3 MMOL/L (ref 3.5–5)
RBC # BLD: 5.69 E12/L (ref 3.5–5.5)
SODIUM BLD-SCNC: 141 MMOL/L (ref 132–146)
TOTAL PROTEIN: 7.5 G/DL (ref 6.4–8.3)
TRIGL SERPL-MCNC: 152 MG/DL (ref 0–149)
VITAMIN B-12: 735 PG/ML (ref 211–946)
WBC # BLD: 7.3 E9/L (ref 4.5–11.5)

## 2022-03-09 PROCEDURE — 84630 ASSAY OF ZINC: CPT

## 2022-03-09 PROCEDURE — 84478 ASSAY OF TRIGLYCERIDES: CPT

## 2022-03-09 PROCEDURE — 84425 ASSAY OF VITAMIN B-1: CPT

## 2022-03-09 PROCEDURE — 7100000010 HC PHASE II RECOVERY - FIRST 15 MIN: Performed by: SURGERY

## 2022-03-09 PROCEDURE — 85027 COMPLETE CBC AUTOMATED: CPT

## 2022-03-09 PROCEDURE — 87081 CULTURE SCREEN ONLY: CPT

## 2022-03-09 PROCEDURE — 2709999900 HC NON-CHARGEABLE SUPPLY: Performed by: SURGERY

## 2022-03-09 PROCEDURE — 80053 COMPREHEN METABOLIC PANEL: CPT

## 2022-03-09 PROCEDURE — 7100000011 HC PHASE II RECOVERY - ADDTL 15 MIN: Performed by: SURGERY

## 2022-03-09 PROCEDURE — 43239 EGD BIOPSY SINGLE/MULTIPLE: CPT | Performed by: SURGERY

## 2022-03-09 PROCEDURE — 3700000000 HC ANESTHESIA ATTENDED CARE: Performed by: SURGERY

## 2022-03-09 PROCEDURE — 82465 ASSAY BLD/SERUM CHOLESTEROL: CPT

## 2022-03-09 PROCEDURE — 6360000002 HC RX W HCPCS: Performed by: NURSE ANESTHETIST, CERTIFIED REGISTERED

## 2022-03-09 PROCEDURE — 3609012400 HC EGD TRANSORAL BIOPSY SINGLE/MULTIPLE: Performed by: SURGERY

## 2022-03-09 PROCEDURE — 82746 ASSAY OF FOLIC ACID SERUM: CPT

## 2022-03-09 PROCEDURE — 36415 COLL VENOUS BLD VENIPUNCTURE: CPT

## 2022-03-09 PROCEDURE — 82607 VITAMIN B-12: CPT

## 2022-03-09 PROCEDURE — 2580000003 HC RX 258: Performed by: NURSE ANESTHETIST, CERTIFIED REGISTERED

## 2022-03-09 PROCEDURE — 83036 HEMOGLOBIN GLYCOSYLATED A1C: CPT

## 2022-03-09 PROCEDURE — 82728 ASSAY OF FERRITIN: CPT

## 2022-03-09 RX ORDER — PROPOFOL 10 MG/ML
INJECTION, EMULSION INTRAVENOUS PRN
Status: DISCONTINUED | OUTPATIENT
Start: 2022-03-09 | End: 2022-03-09 | Stop reason: SDUPTHER

## 2022-03-09 RX ORDER — SODIUM CHLORIDE, SODIUM LACTATE, POTASSIUM CHLORIDE, CALCIUM CHLORIDE 600; 310; 30; 20 MG/100ML; MG/100ML; MG/100ML; MG/100ML
INJECTION, SOLUTION INTRAVENOUS CONTINUOUS PRN
Status: DISCONTINUED | OUTPATIENT
Start: 2022-03-09 | End: 2022-03-09 | Stop reason: SDUPTHER

## 2022-03-09 RX ORDER — SODIUM CHLORIDE, SODIUM LACTATE, POTASSIUM CHLORIDE, CALCIUM CHLORIDE 600; 310; 30; 20 MG/100ML; MG/100ML; MG/100ML; MG/100ML
INJECTION, SOLUTION INTRAVENOUS CONTINUOUS
Status: DISCONTINUED | OUTPATIENT
Start: 2022-03-09 | End: 2022-03-09 | Stop reason: HOSPADM

## 2022-03-09 RX ADMIN — PROPOFOL 200 MG: 10 INJECTION, EMULSION INTRAVENOUS at 12:11

## 2022-03-09 RX ADMIN — SODIUM CHLORIDE, POTASSIUM CHLORIDE, SODIUM LACTATE AND CALCIUM CHLORIDE: 600; 310; 30; 20 INJECTION, SOLUTION INTRAVENOUS at 12:09

## 2022-03-09 ASSESSMENT — PAIN SCALES - GENERAL
PAINLEVEL_OUTOF10: 0
PAINLEVEL_OUTOF10: 3

## 2022-03-09 ASSESSMENT — LIFESTYLE VARIABLES: SMOKING_STATUS: 0

## 2022-03-09 ASSESSMENT — PAIN DESCRIPTION - LOCATION: LOCATION: BACK

## 2022-03-09 ASSESSMENT — PAIN DESCRIPTION - PAIN TYPE: TYPE: CHRONIC PAIN

## 2022-03-09 NOTE — ANESTHESIA PRE PROCEDURE
Department of Anesthesiology  Preprocedure Note       Name:  Maria E Villagran   Age:  62 y.o.  :  1960                                          MRN:  [id number]         Date:  3/9/2022      Surgeon: Claudia Becker):  Bernie Meza MD    Procedure: Procedure(s):  EGD ESOPHAGOGASTRODUODENOSCOPY    Medications prior to admission:   Prior to Admission medications    Medication Sig Start Date End Date Taking? Authorizing Provider   DULoxetine (CYMBALTA) 60 MG extended release capsule Take 1 capsule by mouth daily 3/7/22 9/3/22  Ty Fox MD   oxyCODONE-acetaminophen (PERCOCET) 5-325 MG per tablet Take 1 tablet by mouth 2 times daily as needed for Pain (once or twice a day as needed) for up to 30 days. 3/7/22 4/6/22  Ty Fox MD   gabapentin (NEURONTIN) 400 MG capsule take 2 capsules by mouth three times a day 22   Historical Provider, MD   pravastatin (PRAVACHOL) 10 MG tablet TAKE 1 TABLET BY MOUTH EVERY OTHER DAY 22   Prieto Caroing,    phentermine (ADIPEX-P) 37.5 MG tablet Take 1 tablet by mouth every morning (before breakfast) for 30 days.  2/17/22 3/19/22  Jose Ramon Martines MD   Handicap Placard MISC by Does not apply route Patient cannot walk 200 ft without stopping to rest.    Expiration 5 yrs 22   Merline Boor, DO   Multiple Vitamins-Minerals (THERAPEUTIC MULTIVITAMIN-MINERALS) tablet Take 1 tablet by mouth daily    Historical Provider, MD   levothyroxine (SYNTHROID) 175 MCG tablet Take 1 tablet by mouth daily 22   Merline Boor, DO   vitamin D (ERGOCALCIFEROL) 1.25 MG (10152 UT) CAPS capsule TAKE 1 CAPSULE BY MOUTH ONE TIME PER WEEK 22   Merline Boor, DO   cyclobenzaprine (FLEXERIL) 10 MG tablet Take 1 tablet by mouth 3 times daily as needed for Muscle spasms 22   Merline Boor, DO   vitamin B-12 (CYANOCOBALAMIN) 100 MCG tablet Take 1 tablet by mouth daily 22   Merline Boor, DO   diclofenac sodium (VOLTAREN) 1 % GEL Apply 4 g topically 4 times daily as needed for Pain 1/18/22 4/18/22  Michelle Galo MD   torsemide (DEMADEX) 10 MG tablet take 1 tablet by mouth once daily 12/16/21   Adriana Branham DO   Thiamine HCl (B-1) 100 MG TABS Take 1 pill daily 10/18/21   Prieto F Sharda, DO   gemfibrozil (LOPID) 600 MG tablet TAKE 1 TABLET BY MOUTH EVERY 12 HOURS 10/18/21   Prieto F Sharda, DO   metFORMIN (GLUCOPHAGE) 500 MG tablet TAKE 1 TABLET BY MOUTH EVERY DAY WITH BREAKFAST 10/18/21   Prieto F Sharda, DO   omeprazole (PRILOSEC) 20 MG delayed release capsule TAKE 1 CAPSULE BY MOUTH EVERY DAY 10/18/21   Adriana Branham DO   linaclotide (LINZESS) 145 MCG capsule TAKE 1 CAPSULE BY MOUTH EVERY DAY IN THE MORNING BEFORE BREAKFAST 10/18/21   Prieto F Sharda, DO   valsartan (DIOVAN) 80 MG tablet TAKE 1 TABLET BY MOUTH EVERY DAY 10/18/21   Prieto Lezama,    omega-3 acid ethyl esters (LOVAZA) 1 g capsule Take 1 capsule by mouth 2 times daily 10/18/21   Prieto  Sharda,    OZEMPIC, 1 MG/DOSE, 2 MG/1.5ML SOPN Inject 1 mg into the skin once a week 10/7/21   Adriana Branham DO   acetaminophen (TYLENOL) 500 MG tablet Take 500 mg by mouth every 6 hours as needed for Pain    Historical Provider, MD   lidocaine (LIDODERM) 5 % PLACE 1 PATCH ONTO THE SKIN DAILY 12 HOURS ON, 12 HOURS OFF. 6/2/21   Historical Provider, MD   polyethylene glycol (GLYCOLAX) 17 g packet Take 17 g by mouth daily as needed    Historical Provider, MD   levothyroxine (SYNTHROID) 150 MCG tablet Take 1 tablet by mouth Daily 1/12/21   Adriana Branham DO   rosuvastatin (CRESTOR) 5 MG tablet Take 1 tablet by mouth daily 1/12/21   Adriana Branham DO   meloxicam (MOBIC) 15 MG tablet Take 1 tablet by mouth daily as needed for Pain 1/11/21   Adriana Branham DO       Current medications:    No current facility-administered medications for this visit. No current outpatient medications on file.      Facility-Administered Medications Ordered in Other Visits   Medication Dose Route Frequency Provider Last Rate Last Admin    lactated ringers infusion   IntraVENous Continuous Sabino Ramires MD           Allergies:     Allergies   Allergen Reactions    Bactrim [Sulfamethoxazole-Trimethoprim] Nausea Only    Ceftin [Cefuroxime] Rash    Keflex [Cephalexin] Rash       Problem List:    Patient Active Problem List   Diagnosis Code    Type 2 diabetes mellitus without complication, without long-term current use of insulin ([facility]) E11.9    Acquired hypothyroidism E03.9    Peripheral neuropathy G62.9    Carpal tunnel syndrome of left wrist G56.02    Spinal stenosis of lumbar region with neurogenic claudication M48.062    Acute exacerbation of chronic low back pain M54.50, G89.29    Hyperlipidemia E78.5    Spondylolisthesis of lumbar region M43.16    Lumbar radiculopathy M54.16    Postoperative hypotension I95.89    Postoperative anemia due to acute blood loss D62    Venous insufficiency of both lower extremities I87.2    Impingement syndrome of right shoulder M75.41    DDD (degenerative disc disease), lumbar M51.36    Lumbosacral spondylosis without myelopathy M47.817    S/P lumbar fusion Z98.1    Sacroiliac dysfunction M53.3    Postlaminectomy syndrome, lumbar M96.1    Class 3 severe obesity due to excess calories with serious comorbidity and body mass index (BMI) of 45.0 to 49.9 in adult ([facility]) E66.01, Z68.42    Chronic fatigue R53.82    GERD (gastroesophageal reflux disease) K21.9       Past Medical History:        Diagnosis Date    Back pain     Chronic fatigue     COVID-19 09/2020    mild per patient    Diabetes mellitus ([facility] [address]     History of blood transfusion     Hyperlipidemia     Hypothyroidism     IBS (irritable bowel syndrome)     Morbid obesity due to excess calories ([facility])     Obesity     Osteoarthritis     PONV (postoperative nausea and vomiting)        Past Surgical History:        Procedure Laterality Date    BACK SURGERY      CHOLECYSTECTOMY      COLONOSCOPY      HYSTERECTOMY, TOTAL ABDOMINAL      INCONTINENCE SURGERY      BLADDER SLING    KNEE SURGERY Left     LUMBAR SPINE SURGERY N/A 5/25/2021    L3- L5 DECOMPRESSION AND FUSION , L4- L5 TRANSFORAMINAL LUMBAR INTERBODY FUSION --OARM, PATY TABLE, AUDIOLOGY, PLATES ,SCREWS, --NUVASIVE performed by Rin Sutton MD at [facility] 21 Bilateral 10/11/2021    BILATERAL SACROILIAC JOINT INJECTION UNDER FLUOROSCOPY (CPT 52854) performed by Joseph Perry MD at [facility] 21 Bilateral 11/8/2021    BILATERAL LUMBAR FACET INJECTION AT L5-S 1 FACETS BLOCK UNDER FLUOROSCOPIC GUIDANCE performed by Joseph Perry MD at [facility] 21 Bilateral 12/21/2021    BILATERAL S1, S2, S3 BRANCH & L5 [facility] NERVE BLOCK UNDER XRAY GUIDANCE (39410) (SEDATION) performed by Joseph Perry MD at [address] [place] [address] N/A 2/7/2022    LUMBAR EPIDURAL STEROID INJECTION UNDER FLUOROSCOPIC GUIDANCE AT L5-S1 PARAMEDIAN performed by Joseph Perry MD at [facility]       Social History:    Social History     Tobacco Use    Smoking status: Never Smoker    Smokeless tobacco: Never Used   Substance Use Topics    Alcohol use: Never                                Counseling given: Not Answered      Vital Signs (Current): There were no vitals filed for this visit.                                            BP Readings from Last 3 Encounters:   03/09/22 113/73   03/07/22 120/60   02/28/22 134/66       NPO Status:  >8.H                                                                               BMI:   Wt Readings from Last 3 Encounters:   03/09/22 246 lb (111.6 kg)   02/28/22 251 lb 8 oz (114.1 kg)   02/17/22 250 lb (113.4 kg)     There is no height or weight on file to calculate BMI.    CBC:   Lab Results   Component Value Date    WBC 7.3 03/09/2022    RBC 5.69 03/09/2022    HGB 16.4 03/09/2022    HCT 50.6 03/09/2022    MCV 88.9 03/09/2022    RDW 13.4 03/09/2022     03/09/2022       CMP:   Lab Results   Component Value Date     03/09/2022    K 4.3 03/09/2022    K 4.2 05/20/2021     03/09/2022    CO2 26 03/09/2022    BUN 12 03/09/2022    CREATININE 0.8 03/09/2022    GFRAA >60 03/09/2022    LABGLOM >60 03/09/2022    GLUCOSE 101 03/09/2022    PROT 7.5 03/09/2022    CALCIUM 9.6 03/09/2022    BILITOT 0.7 03/09/2022    ALKPHOS 91 03/09/2022    AST 18 03/09/2022    ALT 28 03/09/2022       POC Tests: No results for input(s): POCGLU, POCNA, POCK, POCCL, POCBUN, POCHEMO, POCHCT in the last 72 hours. Coags:   Lab Results   Component Value Date    PROTIME 11.8 05/20/2021    INR 1.1 05/20/2021       HCG (If Applicable): No results found for: PREGTESTUR, PREGSERUM, HCG, HCGQUANT     ABGs: No results found for: PHART, PO2ART, GHW6GVO, MCF6MKE, BEART, O0UFTBGC     Type & Screen (If Applicable):  No results found for: LABABO, LABRH    Drug/Infectious Status (If Applicable):  No results found for: HIV, HEPCAB    COVID-19 Screening (If Applicable):   Lab Results   Component Value Date    COVID19 Not Detected 03/04/2022    COVID19 Not Detected 12/16/2021           Anesthesia Evaluation  Patient summary reviewed   history of anesthetic complications (postop \"hypotension\"): PONV.   Airway: Mallampati: III  TM distance: >3 FB   Neck ROM: full  Mouth opening: > = 3 FB Dental:    (+) upper dentures      Pulmonary: breath sounds clear to auscultation      (-) not a current smoker                           Cardiovascular:    (+) hypertension:, hyperlipidemia      ECG reviewed  Rhythm: regular  Rate: normal  Echocardiogram reviewed         Beta Blocker:  Not on Beta Blocker      ROS comment: EKG: Sinus tachycardia 102, with premature supraventricular complexes, Low voltage QRS  Nonspecific T wave abnormality  Abnormal ECG  When compared with ECG of 26-MAY-2021 07:56,  Significant changes have occurred  Confirmed by Padma Green ([id number]) on 5/27/2021 6:58:14 PM    ECHO:  EF 56 %,  Mild Concentric Ventricular Hypertrophy and Mild left atrial Enlargement. Neuro/Psych:   (+) neuromuscular disease:,              ROS comment: L Spine surgery GI/Hepatic/Renal:   (+) morbid obesity          Endo/Other:    (+) DiabetesType II DM, , hypothyroidism::., .                 Abdominal:   (+) obese,           Vascular: Other Findings:               Anesthesia Plan      MAC     ASA 3     (Hx PONV!!)  Induction: intravenous. MIPS: Prophylactic antiemetics administered. Anesthetic plan and risks discussed with patient. Plan discussed with CRNA.     Attending anesthesiologist reviewed and agrees with Yuridia Lucero MD   3/9/2022

## 2022-03-09 NOTE — H&P
Ciarra Morales  3/9/2022  [facility]    History and Physical   EGD       CHIEF COMPLAINT: Morbid obesity, malnutrition, Type 2 Diabetes Mellitus, Hyperlipidemia, Hypothyroidism, irritable bowel and GERD on Omeprazole    HISTORY OF PRESENT ILLNESS: Ciarra Morales is a morbidly-obese 62 y.o.  female, who weighs 246 lb (111.6 kg). She is 102 pounds over her ideal body weight. The Body mass index is 43.58 kg/m². She has multiple medical problems aggravated by her obesity. She wishes to have bariatric surgery so that she can lose a large amount of weight and keep the weight off. [facility] have [facility] with [facility] where we discussed the surgery in great detail and went over the risks and benefits. She has watched our informational video so she understands all of the extensive risks involved. She states that she understands all of the risks and wishes to proceed with the evaluation.     Weight:  249 lb 2/17/2022  initial    Past medical history:  Patient Active Problem List:     Type 2 diabetes mellitus without complication, without long-term current use of insulin ([facility])     Acquired hypothyroidism     Peripheral neuropathy     Carpal tunnel syndrome of left wrist     Spinal stenosis of lumbar region with neurogenic claudication     Acute exacerbation of chronic low back pain     Hyperlipidemia     Spondylolisthesis of lumbar region     Lumbar radiculopathy     Postoperative hypotension     Postoperative anemia due to acute blood loss     Venous insufficiency of both lower extremities     Impingement syndrome of right shoulder     DDD (degenerative disc disease), lumbar     Lumbosacral spondylosis without myelopathy     S/P lumbar fusion     Sacroiliac dysfunction     Postlaminectomy syndrome, lumbar     Class 3 severe obesity due to excess calories with serious comorbidity and body mass index (BMI) of 45.0 to 49.9 in [facility])     Chronic fatigue    Past Surgical History:   Procedure Laterality Date    BACK SURGERY      CHOLECYSTECTOMY      COLONOSCOPY      HYSTERECTOMY, TOTAL ABDOMINAL      INCONTINENCE SURGERY      BLADDER SLING    KNEE SURGERY Left     LUMBAR SPINE SURGERY N/A 5/25/2021    L3- L5 DECOMPRESSION AND FUSION , L4- L5 TRANSFORAMINAL LUMBAR INTERBODY FUSION --OARM, PATY TABLE, AUDIOLOGY, PLATES ,SCREWS, --NUVASIVE performed by Antony Dodd MD at [facility] Bilateral 10/11/2021    BILATERAL SACROILIAC JOINT INJECTION UNDER FLUOROSCOPY (CPT 78810) performed by Louie Diaz MD at [facility] Bilateral 11/8/2021    BILATERAL LUMBAR FACET INJECTION AT L5-S 1 FACETS BLOCK UNDER FLUOROSCOPIC GUIDANCE performed by Louie Diaz MD at [facility] Bilateral 12/21/2021    BILATERAL S1, S2, S3 BRANCH & L5 [facility] NERVE BLOCK UNDER XRAY GUIDANCE (68226) (SEDATION) performed by Louie Diaz MD at [address] [place] [address] [address] N/A 2/7/2022    LUMBAR EPIDURAL STEROID INJECTION UNDER FLUOROSCOPIC GUIDANCE AT L5-S1 PARAMEDIAN performed by Louie Diaz MD at [facility] OR      Allergies: Bactrim [sulfamethoxazole-trimethoprim], Ceftin [cefuroxime], and Keflex [cephalexin]     Home Medications  Prior to Visit Medications    Medication Sig Taking? Authorizing Provider   DULoxetine (CYMBALTA) 60 MG extended release capsule Take 1 capsule by mouth daily Yes Louie Diaz MD   levothyroxine (SYNTHROID) 175 MCG tablet Take 1 tablet by mouth daily Yes Liliana Briseno DO   oxyCODONE-acetaminophen (PERCOCET) 5-325 MG per tablet Take 1 tablet by mouth 2 times daily as needed for Pain (once or twice a day as needed) for up to 30 days.   Louie Diaz MD   gabapentin (NEURONTIN) 400 MG capsule take 2 capsules by mouth three times a day  Historical Provider, MD   pravastatin (PRAVACHOL) 10 MG tablet TAKE 1 TABLET BY MOUTH EVERY OTHER DAY  Prieto Robin,    phentermine (ADIPEX-P) 37.5 MG tablet Take 1 tablet by mouth every morning (before breakfast) for 30 days. Michi Malhotra MD   [facility] by Does not apply route Patient cannot walk 200 ft without stopping to rest.    Expiration 5 yrs  Prieto F Sharda, DO   Multiple Vitamins-Minerals (THERAPEUTIC MULTIVITAMIN-MINERALS) tablet Take 1 tablet by mouth daily  Historical Provider, MD   vitamin D (ERGOCALCIFEROL) 1.25 MG (71057 UT) CAPS capsule TAKE 1 CAPSULE BY MOUTH ONE TIME PER WEEK  Prieto F Sharda, DO   cyclobenzaprine (FLEXERIL) 10 MG tablet Take 1 tablet by mouth 3 times daily as needed for Muscle spasms  Prieto F Sharda, DO   vitamin B-12 (CYANOCOBALAMIN) 100 MCG tablet Take 1 tablet by mouth daily  Prieto F Sharda, DO   diclofenac sodium (VOLTAREN) 1 % GEL Apply 4 g topically 4 times daily as needed for Pain  Krissy Anderson MD   torsemide (DEMADEX) 10 MG tablet take 1 tablet by mouth once daily  Prieto F Sharda, DO   Thiamine HCl (B-1) 100 MG TABS Take 1 pill daily  Prieto F Sharda, DO   gemfibrozil (LOPID) 600 MG tablet TAKE 1 TABLET BY MOUTH EVERY 12 HOURS  Prieto F Sharda, DO   metFORMIN (GLUCOPHAGE) 500 MG tablet TAKE 1 TABLET BY MOUTH EVERY DAY WITH BREAKFAST  Prieto F Sharda, DO   omeprazole (PRILOSEC) 20 MG delayed release capsule TAKE 1 CAPSULE BY MOUTH EVERY DAY  Prieto F Sharda, DO   linaclotide (LINZESS) 145 MCG capsule TAKE 1 CAPSULE BY MOUTH EVERY DAY IN THE MORNING BEFORE BREAKFAST  Prieto F Sharda, DO   valsartan (DIOVAN) 80 MG tablet TAKE 1 TABLET BY MOUTH EVERY DAY  Prieto F Sharda, DO   omega-3 acid ethyl esters (LOVAZA) 1 g capsule Take 1 capsule by mouth 2 times daily  Prieto F Sharda, DO   OZEMPIC, 1 MG/DOSE, 2 MG/1.5ML SOPN Inject 1 mg into the skin once a week  Prieto F Sharda, DO   acetaminophen (TYLENOL) 500 MG tablet Take 500 mg by mouth every 6 hours as needed for Pain  Historical Provider, MD   lidocaine (LIDODERM) 5 % PLACE 1 PATCH ONTO THE SKIN DAILY 12 HOURS ON, 12 HOURS OFF. Historical Provider, MD   polyethylene glycol (GLYCOLAX) 17 g packet Take 17 g by mouth daily as needed  Historical Provider, MD   levothyroxine (SYNTHROID) 150 MCG tablet Take 1 tablet by mouth Daily  Prieto Robin,    rosuvastatin (CRESTOR) 5 MG tablet Take 1 tablet by mouth daily  Prieto Robin DO   meloxicam (MOBIC) 15 MG tablet Take 1 tablet by mouth daily as needed for Pain  Clarene Bench, DO     Social History:   TOBACCO:   reports that she has never smoked. She has never used smokeless tobacco.  All smokers must join the free smoking cessation program and stop smoking for 3 months before having any Bariatric surgery. ETOH:    reports no history of alcohol use. Family History   Problem Relation Age of Onset    Diabetes Mother     Diabetes Father     Rheum Arthritis Sister     Cancer Maternal Grandfather      Review of Systems:  Psychiatric: denies depression and anxiety  Respiratory: negative  Cardiovascular: negative  Gastrointestinal: negative  Musculoskeletal:negative  All others reviewed, negative    Physical Exam:   VITALS: Blood pressure 113/73, pulse 70, temperature 97.5 °F (36.4 °C), temperature source Temporal, resp. rate 16, height 5' 3\" (1.6 m), weight 246 lb (111.6 kg), SpO2 98 %.   General appearance: alert, appears stated age and cooperative, does ambulate easily  Head: Normocephalic, without obvious abnormality, atraumatic  Eyes: PERRL  Ears/mouth/throat:  Ears clear, mouth normal, throat no redness  Neck: no adenopathy, no JVD, supple, symmetrical, trachea midline and thyroid not enlarged  Lungs: clear to auscultation bilaterally  Heart: regular rate and rhythm  Abdomen: soft, non-tender; bowel sounds normal; no masses,  no organomegaly  Extremities: extremities normal, atraumatic, no cyanosis or edema  Skin: no open wounds    Assessment:  Morbid obesity, GERD on Omeprazole    Plan:  EGD    Physician Signature: Electronically signed by Dr. Sabino Ramires MD    Send copy of H&P to PCP, Enedina Oro, DO

## 2022-03-09 NOTE — OP NOTE
Melinda Mendiola    Operative Report      SURGEON: Dr. Vipin Bautisat MD    Surgical Assistant: Gal Cobian RN     PRE-OP DIAGNOSIS:     GERD      POSTOPERATIVE DIAGNOSES:    EGD 3/9/2022 showed no esophagitis, no hiatal hernia, there was food filling the stomach from gastroparesis but no ulcer or gastritis, biopsy done to check for H.pylori, she does complain of acid reflux on Omeprazole, and the gallbladder is out. OPERATION:    EGD esophagogastroduodenoscopy    with biopsy                 [id number]     ANESTHESIA: LMAC. BLOOD LOSS: none  SPECIMEN: gastric biopsy for DEBORAH    CONSENT AND INDICATIONS:  Martha Cagle is a 62y.o. year old female who weighs 246 lb (111.6 kg) who needs to have an EGD as part of the work up for bariatric surgery. I have discussed with the patient the indication, alternatives, and the possible risks and/or complications of the planned procedure and the anesthesia methods. The patient and/or patient representative appear to understand and agree to proceed. Monitoring and Safety: Anaesthesia monitored vital signs, BP cuff, pulse oximetry, cardiac monitor and level of consciousness until recovered. Operation: The patient was placed on the table and sedated by Anaesthesia. Bite block was placed. A lubricated scope was easily passed into the upper esophagus and distal esophagus which had no esophagitis. The Z line was measured at 36 cm. The scope was passed into the stomach which was full of food and medications. The antral mucosa was examined and there was no gastritis. Biopsy was taken to check for H. pylori. The scope was passed through the pylorus into the duodenal bulb and distal duodenum which also had bile fluid inside but the mucosa looked normal. The scope was pulled back to the stomach and retroflexed. There was no hiatal hernia, picture was taken. The scope was withdrawn.  The patient tolerated the procedure well.    Complications: none    Plan:   She may proceed with bariatric surgery. Electronically signed Dr. Jessica Cruz M.D.

## 2022-03-10 LAB — CLOTEST: NORMAL

## 2022-03-13 LAB — ZINC: 88.1 UG/DL (ref 60–120)

## 2022-03-15 LAB — VITAMIN B1 WHOLE BLOOD: 191 NMOL/L (ref 70–180)

## 2022-03-18 ENCOUNTER — OFFICE VISIT (OUTPATIENT)
Dept: BARIATRICS/WEIGHT MGMT | Age: 62
End: 2022-03-18
Payer: COMMERCIAL

## 2022-03-18 VITALS
HEART RATE: 80 BPM | WEIGHT: 244.2 LBS | DIASTOLIC BLOOD PRESSURE: 66 MMHG | SYSTOLIC BLOOD PRESSURE: 109 MMHG | TEMPERATURE: 97.3 F | BODY MASS INDEX: 43.27 KG/M2 | HEIGHT: 63 IN

## 2022-03-18 DIAGNOSIS — E03.9 ACQUIRED HYPOTHYROIDISM: Chronic | ICD-10-CM

## 2022-03-18 DIAGNOSIS — E78.2 MIXED HYPERLIPIDEMIA: Chronic | ICD-10-CM

## 2022-03-18 DIAGNOSIS — E66.01 CLASS 3 SEVERE OBESITY DUE TO EXCESS CALORIES WITH SERIOUS COMORBIDITY AND BODY MASS INDEX (BMI) OF 40.0 TO 44.9 IN ADULT (HCC): ICD-10-CM

## 2022-03-18 DIAGNOSIS — R53.82 CHRONIC FATIGUE: Primary | ICD-10-CM

## 2022-03-18 PROCEDURE — 99214 OFFICE O/P EST MOD 30 MIN: CPT | Performed by: INTERNAL MEDICINE

## 2022-03-18 PROCEDURE — 99211 OFF/OP EST MAY X REQ PHY/QHP: CPT

## 2022-03-18 RX ORDER — PHENTERMINE HYDROCHLORIDE 37.5 MG/1
37.5 TABLET ORAL
Qty: 30 TABLET | Refills: 0 | Status: SHIPPED | OUTPATIENT
Start: 2022-03-18 | End: 2022-04-17

## 2022-03-18 NOTE — PATIENT INSTRUCTIONS
Continue current diet plan. Protein: >=65 gm/day. Fiber: >=25 gm/day. Water: >=96 oz/day. Protein shakes: <200 Chai, >=25 grams protein. Phentermine:  Take phentermine 37.5 mg, one-half to one tablet daily as needed for appetite suppression. Take each dose 30-90 min before effect will be needed. While taking phentermine, check the Blood Pressure every morning and every evening. If the systolic BP is >709 mmHg, the diastolic BP is >28 mm/Hg or the heart rate is > 100 beats per minute, do not take phentermine that day. If the systolic BP is consistently >155 mmHg, the diastolic BP is consistently above 90 mm/Hg or the heart rate is consistently > 100 beats per minute, then stop taking phentermine altogether. If the systolic BP >429 mmHg or the diastolic BP is >200 mmHg (even if it is only once), then phentermine should be stopped altogether without proving that any of these are consistently elevated. Follow up in 4-6 weeks.

## 2022-03-18 NOTE — PROGRESS NOTES
CC -   Follow up of: Weight gain, fatigue    BACKGROUND -   Last visit: 2/17/22  First visit: 1/20/22     Obesity (all weight in lbs)  Began years, ago, worse since 5/2021 after back surgery  Initial BMI 47.26, Wt 266.75  HS Grad wt 180   Lowest   wt 165 (20 years ago)   Highest  wt >300 (5770-6150)  Pattern of wt gain: gradual, but fluctuating in the last 3-4 years  Wt change past yr: -4 lbs (went down to 252 then back up again to 266)  Most wt lost: 34 lbs  Other diets attempted: used to walk    Desire to lose weight: 8/10  Problem posed by appetite: 5/10    Initial Diet:    Number of meals per day - 3    Number of snacks per day - 1    Meal volume - 9\" plate, no seconds    Fast food/convenience store - 1-2x/week    Restaurants (not fast food) - 0-1x/week   Sweets - 2d/week   Chips - 1d/week   Crackers/pretzels - 0d/week   Nuts - 0-1d/week   Peanut Butter - 0-1d/week   Popcorn - 0-1d/week   Dried fruit - 0-1d/week   Whole fruit - 1-2d/week   Breakfast cereal - 2d/week   Granola/Protein/Energy bar - 0-1d/week   Sugar sweetened beverages - orange juice once a month, rarely coffee, rarely tea   Protein - No supplements   Fiber - No supplements     Breakfast: eggs, leftover, toast, english muffins (1/wk). Lunch: soup, stuffed peppers, hamburger. Dinner: spaghetti, vegetable soup, stuffed pepper soup etc.      Initial exercise:    [employer] - no    Walking - no    Running - no    Resistance - no    Aerobic class - leg lifts/arm lifts with physical therapy        Initial sleep: Bedtime: 10 pm, wake up time: 5-6 am, daytime naps: no. Feels rested in the morning usually. Weight scale at home: yes, takes weight: rarely  Food scale: no    Follow up 3/18/22:  1. Fatigue: No fatigue or tiredness. 2. Diet: Fiber one cereal for fibers. Tune/chicken for protein. No hypoglycemia - fasting BGL in 100s. Not able to take enough water (eugenia after back surgery per pt).     3. Exercise: besides stretches - using fast-track, trying to increase time wise (10-20 min currently), walking as much as she can. 4. Sleep: now she is able able to sleep in bed, doing better. 5. Medications: Tolerating phentermine. I reviewed her current medications, she has not had any medication changes.  Neuropathy is improving and she is now able to sleep in bed instead of recliner.    ______________________    [facility] -  Past Medical History:   Diagnosis Date    Back pain     Chronic fatigue     COVID-19 09/2020    mild per patient    Diabetes mellitus ([facility] [address]     History of blood transfusion     Hyperlipidemia     Hypothyroidism     IBS (irritable bowel syndrome)     Morbid obesity due to excess calories (HCC)     Obesity     Osteoarthritis     PONV (postoperative nausea and vomiting)      Past Surgical History:   Procedure Laterality Date    BACK SURGERY      CHOLECYSTECTOMY      COLONOSCOPY      HYSTERECTOMY, TOTAL ABDOMINAL      INCONTINENCE SURGERY      BLADDER SLING    KNEE SURGERY Left     LUMBAR SPINE SURGERY N/A 5/25/2021    L3- L5 DECOMPRESSION AND FUSION , L4- L5 TRANSFORAMINAL LUMBAR INTERBODY FUSION --OARM, PATY TABLE, AUDIOLOGY, PLATES ,SCREWS, --NUVASIVE performed by Emilia Ruiz MD at [facility] 21 Bilateral 10/11/2021    BILATERAL SACROILIAC JOINT INJECTION UNDER FLUOROSCOPY (CPT 74214) performed by Fredo Cid MD at [facility] 21 Bilateral 11/8/2021    BILATERAL LUMBAR FACET INJECTION AT L5-S 1 FACETS BLOCK UNDER FLUOROSCOPIC GUIDANCE performed by Fredo Cid MD at [facility] 21 Bilateral 12/21/2021    BILATERAL S1, S2, S3 BRANCH & L5 [facility] NERVE BLOCK UNDER XRAY GUIDANCE (80035) (SEDATION) performed by Fredo Cid MD at [address] [facility] [address] N/A 2/7/2022    LUMBAR EPIDURAL STEROID INJECTION UNDER FLUOROSCOPIC GUIDANCE AT L5-S1 PARAMEDIAN performed by Fredo Cid MD at [address] 3/9/2022    EGD BIOPSY performed by Saida Childers MD at [place] [address]   thyroid surgery (goiter)    Current Outpatient Medications   Medication Sig Dispense Refill    DULoxetine (CYMBALTA) 60 MG extended release capsule Take 1 capsule by mouth daily 90 capsule 1    oxyCODONE-acetaminophen (PERCOCET) 5-325 MG per tablet Take 1 tablet by mouth 2 times daily as needed for Pain (once or twice a day as needed) for up to 30 days. 60 tablet 0    gabapentin (NEURONTIN) 400 MG capsule take 2 capsules by mouth three times a day      pravastatin (PRAVACHOL) 10 MG tablet TAKE 1 TABLET BY MOUTH EVERY OTHER DAY 45 tablet 1    phentermine (ADIPEX-P) 37.5 MG tablet Take 1 tablet by mouth every morning (before breakfast) for 30 days.  30 tablet 0    Handicap Placard MISC by Does not apply route Patient cannot walk 200 ft without stopping to rest.    Expiration 5 yrs 1 each 0    Multiple Vitamins-Minerals (THERAPEUTIC MULTIVITAMIN-MINERALS) tablet Take 1 tablet by mouth daily      levothyroxine (SYNTHROID) 175 MCG tablet Take 1 tablet by mouth daily 90 tablet 1    vitamin D (ERGOCALCIFEROL) 1.25 MG (83859 UT) CAPS capsule TAKE 1 CAPSULE BY MOUTH ONE TIME PER WEEK 12 capsule 1    cyclobenzaprine (FLEXERIL) 10 MG tablet Take 1 tablet by mouth 3 times daily as needed for Muscle spasms 90 tablet 2    vitamin B-12 (CYANOCOBALAMIN) 100 MCG tablet Take 1 tablet by mouth daily 90 tablet 3    diclofenac sodium (VOLTAREN) 1 % GEL Apply 4 g topically 4 times daily as needed for Pain 150 g 3    torsemide (DEMADEX) 10 MG tablet take 1 tablet by mouth once daily 30 tablet 3    Thiamine HCl (B-1) 100 MG TABS Take 1 pill daily 90 tablet 1    gemfibrozil (LOPID) 600 MG tablet TAKE 1 TABLET BY MOUTH EVERY 12 HOURS 180 tablet 3    metFORMIN (GLUCOPHAGE) 500 MG tablet TAKE 1 TABLET BY MOUTH EVERY DAY WITH BREAKFAST 90 tablet 3    omeprazole (PRILOSEC) 20 MG delayed release capsule TAKE 1 CAPSULE BY MOUTH EVERY DAY 90 capsule 3    linaclotide (LINZESS) 145 MCG capsule TAKE 1 CAPSULE BY MOUTH EVERY DAY IN THE MORNING BEFORE BREAKFAST 90 capsule 3    valsartan (DIOVAN) 80 MG tablet TAKE 1 TABLET BY MOUTH EVERY DAY 90 tablet 1    omega-3 acid ethyl esters (LOVAZA) 1 g capsule Take 1 capsule by mouth 2 times daily 180 capsule 1    OZEMPIC, 1 MG/DOSE, 2 MG/1.5ML SOPN Inject 1 mg into the skin once a week 6 pen 3    acetaminophen (TYLENOL) 500 MG tablet Take 500 mg by mouth every 6 hours as needed for Pain      lidocaine (LIDODERM) 5 % PLACE 1 PATCH ONTO THE SKIN DAILY 12 HOURS ON, 12 HOURS OFF.  polyethylene glycol (GLYCOLAX) 17 g packet Take 17 g by mouth daily as needed       No current facility-administered medications for this visit. Allergies: Allergies   Allergen Reactions    Bactrim [Sulfamethoxazole-Trimethoprim] Nausea Only    Ceftin [Cefuroxime] Rash    Keflex [Cephalexin] Rash       Family history: DM: mother, Heart disease: no.    Social history: smoking: never ; Alcohol: never. No stairs other than steps to go in the house and kitchen. ROS -  Card - no CP  GI - no N/V/D/C - uses miralax. PE -  Gen : /66 (Site: Left Upper Arm, Position: Sitting, Cuff Size: Thigh)   Pulse 80   Temp 97.3 °F (36.3 °C) (Temporal)   Ht 5' 3\" (1.6 m)   Wt 244 lb 3.2 oz (110.8 kg)   BMI 43.26 kg/m²    WN, WD, NAD  Lung: Nml resp effort  Psych: Normal mood. Full affect  Neuro:  Moves all ext well  ______________________    HISTORY & ASSESSMENT/PLAN -     Problem 1  - Fatigue   HPI   - She feels she is feeling better with fatigue, able to sleep better, joint pain is improving, still has neuropathic pain but overall she feels fatigue is improving  Assessment  - Improving   Plan   - Continue current, continue weight reduction per plan below    Problem 2  - Obesity   HPI   - See above Background for description    Weight  Date    266.8 lbs 1/20/22 Phentermine #1    250.0 lbs 2/16/22 Phentermine #2    244.2 lbs 3/18/22 Phentermine #3  Total weight change to date: -22.6 lbs  Average daily energy variance:   1/20/22 - 2/17/22: -16.8 lbs (58,800 Chai)/28 days = -2100 Chai/day deficit. 2/17/2022 - 3/18/2022: -5.8 lbs (22729 Chai)/28 days = -725 Chai/day deficit. DEN = ~1886 Chai/d    Assessment  - improving  Plan   - She is doing better. Denies having any problem with medication. BP/HR has been stable. Denies dry mouth or constipation. Doing well with Diet, taking fiber one cereal, tuna/chicken for protein. Recommended low calorie non-starchy vegetables for snacks if needed. She does leg and arm lifts for exercise whenever she can. She feels Phentermine is helping, and would like second script, and was done in this visit. Patient Instructions   Continue current diet plan. Protein: >=65 gm/day. Fiber: >=25 gm/day. Water: >=96 oz/day. Protein shakes: <200 Chai, >=25 grams protein. Phentermine:  Take phentermine 37.5 mg, one-half to one tablet daily as needed for appetite suppression. Take each dose 30-90 min before effect will be needed. While taking phentermine, check the Blood Pressure every morning and every evening. If the systolic BP is >134 mmHg, the diastolic BP is >71 mm/Hg or the heart rate is > 100 beats per minute, do not take phentermine that day. If the systolic BP is consistently >155 mmHg, the diastolic BP is consistently above 90 mm/Hg or the heart rate is consistently > 100 beats per minute, then stop taking phentermine altogether. If the systolic BP >539 mmHg or the diastolic BP is >547 mmHg (even if it is only once), then phentermine should be stopped altogether without proving that any of these are consistently elevated. Follow up in 4-6 weeks. Problem 3 - Hypothyroidism  HPI  - Fatigue is improving. Doing well on 175 mcg of levothyroxine no issues. Assessment - clinically improving  Plan  - She will continue levothyroxine and will be following up with Dr. Carlene Keith.     Problem 4 - Dyslipidemia  HPI  - Last ,  on 3/9/22, on pravachol and lovaza, tolerating well. Assessment - much improved. Plan  - Continue pravachol and lovaza. Orders Placed This Encounter   Medications    phentermine (ADIPEX-P) 37.5 MG tablet     Sig: Take 1 tablet by mouth every morning (before breakfast) for 30 days. Dispense:  30 tablet     Refill:  0       Latia Smallwood MD  Internal Medicine/Obesity Medicine  3/18/2022.

## 2022-03-22 ENCOUNTER — TELEPHONE (OUTPATIENT)
Dept: PRIMARY CARE CLINIC | Age: 62
End: 2022-03-22

## 2022-03-24 ENCOUNTER — TELEPHONE (OUTPATIENT)
Dept: BARIATRICS/WEIGHT MGMT | Age: 62
End: 2022-03-24

## 2022-03-24 ENCOUNTER — INITIAL CONSULT (OUTPATIENT)
Dept: BARIATRICS/WEIGHT MGMT | Age: 62
End: 2022-03-24

## 2022-03-24 ENCOUNTER — OFFICE VISIT (OUTPATIENT)
Dept: BARIATRICS/WEIGHT MGMT | Age: 62
End: 2022-03-24
Payer: COMMERCIAL

## 2022-03-24 VITALS
RESPIRATION RATE: 20 BRPM | SYSTOLIC BLOOD PRESSURE: 148 MMHG | HEIGHT: 63 IN | HEART RATE: 86 BPM | BODY MASS INDEX: 42.52 KG/M2 | TEMPERATURE: 97.2 F | WEIGHT: 240 LBS | DIASTOLIC BLOOD PRESSURE: 86 MMHG

## 2022-03-24 VITALS — BODY MASS INDEX: 42.52 KG/M2 | HEIGHT: 63 IN | WEIGHT: 240 LBS

## 2022-03-24 DIAGNOSIS — K21.9 GASTROESOPHAGEAL REFLUX DISEASE WITHOUT ESOPHAGITIS: Primary | ICD-10-CM

## 2022-03-24 DIAGNOSIS — Z00.8 NUTRITIONAL ASSESSMENT: Primary | ICD-10-CM

## 2022-03-24 DIAGNOSIS — Z01.818 PRE-OP EVALUATION: Primary | ICD-10-CM

## 2022-03-24 DIAGNOSIS — Z71.3 NUTRITIONAL COUNSELING: ICD-10-CM

## 2022-03-24 PROCEDURE — 99213 OFFICE O/P EST LOW 20 MIN: CPT | Performed by: SURGERY

## 2022-03-24 PROCEDURE — 99211 OFF/OP EST MAY X REQ PHY/QHP: CPT

## 2022-03-24 PROCEDURE — 99999 PR OFFICE/OUTPT VISIT,PROCEDURE ONLY: CPT | Performed by: SURGERY

## 2022-03-24 NOTE — PROGRESS NOTES
Tamela Kayser and [address] [facility]:  Initial Nutrition Consultation    Today's Date: 3/24/2022  Patients Name:Conchis Vogel     Height: 5' 3\" (1.6 m)   Weight: 240 lb (108.9 kg)   IBW: 147 lbs  %IBW: 163%  Excess Weight: 93 lbs  BMI: Body mass index is 42.51 kg/m². Subjective Information:   Patient has tried multiple means of weight loss including diet and exercise in the past and has been unable to maintain a healthy weight. Pt states he / she has not tried any certain diet just self imposed health eating and Phen Phen. Patients overall goal is to be able to lose weight with diet and exercise by changing lifestyle, habits and maintain a healthy weight for a lifetime. Are your currently Diabetic - Yes  Type 2 Diabetic - Yes  Medication to Control Diabetes  - Metformin, Ozempic  Last Blood Glucose Reading - 3/9/22 - glucose 101H  Last HGBA1C - 3/9/22 - HGBA1C 6.4H    Patient states the following will be the most difficult change after weight loss surgery? Pt states following a Bariatric Liquid Diet would be difficult. Most successful diet in the past? Self-Imposed health eating with Phe Phen   Length of time patient was on the diet listed above? 6 Month's  Weight lost on the diet listed above? 21.5 lbs  Patient stated he / she maintained his / her weight for the following time? Pt states she is still losing    Scale of 1 (Least Likely) to 10 ( Most Likely  - What is your readiness to change and adhere to your new diet and lifestyle change we reviewed - 10  At a level 10 How do you foresee yourself being successful with the change - Pt states following the diet after weight loss sx. Patient takes the following vitamins, minerals and dietary supplements? Yes - I currently take a Vitamin D 50,000iu once a week, Vitamin B1 100 mgs, Vitamin B12 100 mcgs, Women's MVI, Omega 3 - No dose. Rd / Ld recommends the patient continue the following supplements from now until surgery.     Patient consumes the following beverage daily? Water    Pre-Op Labs - 3/9/22 - Glucose 101H, HGB 16.4H, HCT 50.6H, HGBA1C 6.4H, Vitamin B1 191H, Tri 152H, Ferritin WNL    Patient states he / she has the following problems:  no - Eating  no - Chewing  no - Swallowing  no - Nausea  no - Vomiting  no - Diarrhea  yes - Constipation - Etiology D/T IBS  no - Hair Changes  no - Skin Changes  no - Tongue Texture    Food Allergies: no  -   Food Intolerances: no -     Yes - Past medically assisted weight loss history reviewed. Yes - Provided education regarding the basic role of nutrition in junction with Bariatric Surgery. Yes - Provided overview of required dietary and behavioral changes pre and post operatively. Yes - Stated / reinforced that changes in dietary behaviors are critical to successful, long term weight Loss. Patient is aware that in order to proceed with bariatric surgery he / she will need to follow a low-fat diet prior to surgery. Patient is aware that bariatric surgery is a lifetime change that will require the patient to follow a low-fat, low-calorie, low-sugar, portion controlled diet with at least 30 minutes of physical activity daily. Patient is aware that certain foods may not be tolerated after bariatric surgery and certain foods will need avoided all together. [address] / LD feels that this patient knowledge / readiness to make appropriate diet / lifestyle changes assessed to be? - Good    RD / LD feels this patients expected adherence for post surgical diet? - Good    Patient was instructed and signed consents on a low-fat diet and required supplementation at initial consult. Comments: Patient is able to verbalize diet concepts for bariatric surgery. Patient is aware diet concepts will be reinforced at 2-hour nutrition education consult. RD / LD will monitor progress.

## 2022-03-24 NOTE — PATIENT INSTRUCTIONS
What is the next step to proceed with weight loss surgery? Please be aware that any co-pays or deductibles may be requested prior to testing and / or procedures. You will need to schedule a psychological evaluation for weight loss surgery. Patients will be required to complete all psychological testing as required by the mental health provider. Patients must also follow all of the provider's recommendations before weight loss surgery can be scheduled. The evaluation must be done a standard way for weight loss surgery. We strongly recommend that you contact one of our preferred providers listed below to arrange this:      Truong Cummings, [facility]  [address] [place], [place]   [phone]    [facility] and [address]   [phone]    Dr. Dana Romero, PhD    [facility]. [place], [place]    [phone]      You will also need to plan on attending a 2 hour nutrition class at the [facility] prior to your surgery. We will schedule this for you when we schedule your surgery. Please remember to have your labs drawn 10 days prior to your first scheduled dietary appointment. Please remember, that while we will submit your case to insurance for surgery authorization, it is your responsibility to know if your plan covers weight loss surgery and keep up-to-date with changes to your insurance coverage. We will do everything possible to help you get approved for weight loss surgery, but cannot guarantee an approval.     Please note that you will not be submitted to your insurance company until all pre-operative testing requirements are met.

## 2022-03-24 NOTE — PATIENT INSTRUCTIONS
[facility] Gallegos and [facility]  Dietary Initial Appointment Patient Instructions    By: Arielle Whaley RD / LD  [phone]      At your initial dietary appointment you have received the following information packets:    Please be aware at each visit you have been instructed that in order for your insurance company to approve your surgery you must show a consistent weight loss of 2 lbs or greater at each visit. We can not guarantee an approval by your insurance company we can only provide the information given to us it is up to you the patient to show compliancy to your insurance company. If you do gain weight during your supervised weight loss counseling sessions insurance companies starting in 2018 are denying patients for not showing consistent weight loss results when part of a supervised weight loss counseling program. Pt was instructed on 3/24/22. Pt verbalized understanding. · Low-Fat Diet  - Please be sure to begin your low-fat diet from today's date until your scheduled surgery date. If you are not at goal weight by your history and physical appointment with your surgeon your surgery will be cancelled. · Goal Weight: 239 lbs BMI: 42.3  · Low-Fat Diet Contract - Patient Acknowledge and Signed  · Supplement List - Please be sure to be saving to purchase your 3 month supply of supplements. If you do not bring supplements to your history and physical appointment your surgery will be cancelled. · Supplement Contract - Patient Acknowledge and Signed  · Please be sure to take a daily Multi-vitamin from now until surgery to reduce your incidence of infection. · If your insurance company requires additional dietary counseling you will be scheduled to see the dietitian the following month. ·  If your psychological evaluation is completed and all your dietary requirements for your individual insurance company have been completed you will be submitted to insurance.  Please make sure to check with us to see if we have received your psychological evaluation. Please be aware that any co-pays or deductibles may be requested prior to testing and / or procedures. You will need to schedule a psychological evaluation for weight loss surgery. Patients will be required to complete all psychological testing as required by the psychologist. Patients must follow all of the psychologist's recommendations before weight loss surgery can be scheduled. The evaluation must be done a standard way for weight loss surgery. We strongly recommend that you contact one of our preferred providers listed below to arrange this:    Marco Johnson, [address] [facility]                                                              [address] [place] [address]                                                                                      [phone]     Jefe Damon [address] [place] [address]                                                                                                [phone]        Dr. Willian Smallwood, PhD                         [facility]. [place], [place]                                                                                              [phone]     You will also need to plan on attending a 2 hour nutrition class at the [facility] prior to your surgery. We will schedule this for you when we schedule your surgery. Please remember to have your labs drawn prior to your scheduled appointment to review the results of your testing. Please remember, that while we will submit your case to insurance for surgery authorization, it is your responsibility to know if your plan covers weight loss surgery and keep up-to-date with changes to your insurance coverage.   We will do everything possible to help you get approved for weight loss surgery, but cannot guarantee an approval. Please note that you will not be submitted to your insurance company until all   pre-operative testing requirements are met. · Please remember you need to schedule to attend one Support Group meeting held at the [facility] before surgery. This one meeting is mandatory. You can attend as many support group meetings before and after surgery. Support group meetings are held at the [facility] from 6:00 - 8:00pm 1st, and 3rd Tuesday of every month. See dates listed below. · Each individual person has his / her own medical requirements that need fulfilled before being able to schedule bariatric surgery . Please finalize these requirements by contacting our Bariatric Nurse at [phone].

## 2022-03-24 NOTE — PROGRESS NOTES
Alissa Ulrich  3/24/2022  [facility] 621 Follow-up. Alissa Ulrich is a 62 y.o. female post EGD 3/9/2022 showed no esophagitis, no hiatal hernia, there was food filling the stomach from gastroparesis but no ulcer or gastritis, biopsy negative for H.pylori, she does complain of acid reflux on Omeprazole, and the gallbladder is out. Labs normal other than dehydration. Weight: 240 lb (108.9 kg) 3/24/2022.     Past Medical History:   Diagnosis Date    Back pain     Chronic fatigue     COVID-19 09/2020    mild per patient    Diabetes mellitus ([facility] [address]     History of blood transfusion     Hyperlipidemia     Hypothyroidism     IBS (irritable bowel syndrome)     Morbid obesity due to excess calories ([facility])     Obesity     Osteoarthritis     PONV (postoperative nausea and vomiting)      Past Surgical History:   Procedure Laterality Date    BACK SURGERY      CHOLECYSTECTOMY      COLONOSCOPY      HYSTERECTOMY, TOTAL ABDOMINAL      INCONTINENCE SURGERY      BLADDER SLING    KNEE SURGERY Left     LUMBAR SPINE SURGERY N/A 5/25/2021    L3- L5 DECOMPRESSION AND FUSION , L4- L5 TRANSFORAMINAL LUMBAR INTERBODY FUSION --OARM, PATY TABLE, AUDIOLOGY, PLATES ,SCREWS, --NUVASIVE performed by Kassidy Lake MD at [facility] 21 Bilateral 10/11/2021    BILATERAL [address] ([address] [address]) performed by Cecilia Paul MD at [facility] 21 Bilateral 11/8/2021    BILATERAL LUMBAR FACET INJECTION AT L5-S 1 FACETS BLOCK UNDER FLUOROSCOPIC GUIDANCE performed by Cecilia Paul MD at [facility] 21 Bilateral 12/21/2021    BILATERAL S1, S2, S3 BRANCH & L5 [facility] NERVE BLOCK UNDER XRAY GUIDANCE (18399) (SEDATION) performed by Cecilia Paul MD at [address] [facility] 51 S N/A 2/7/2022    LUMBAR EPIDURAL STEROID INJECTION UNDER FLUOROSCOPIC GUIDANCE AT L5-S1 PARAMEDIAN performed by Tesfaye Lima Lori Vieira MD at [facility] 26 N/A 3/9/2022    EGD BIOPSY performed by Sabino Ramires MD at [facility] 23     Prior to Visit Medications    Medication Sig Taking? Authorizing Provider   phentermine (ADIPEX-P) 37.5 MG tablet Take 1 tablet by mouth every morning (before breakfast) for 30 days. Yes Jose Ramon Martines MD   DULoxetine (CYMBALTA) 60 MG extended release capsule Take 1 capsule by mouth daily Yes Louie Diaz MD   oxyCODONE-acetaminophen (PERCOCET) 5-325 MG per tablet Take 1 tablet by mouth 2 times daily as needed for Pain (once or twice a day as needed) for up to 30 days.  Yes Louie Diaz MD   gabapentin (NEURONTIN) 400 MG capsule take 2 capsules by mouth three times a day Yes Historical Provider, MD   pravastatin (PRAVACHOL) 10 MG tablet TAKE 1 TABLET BY MOUTH EVERY OTHER DAY Yes Prieto Mabryi, DO   Handicap [facility] [address] [facility] by Does not apply route Patient cannot walk 200 ft without stopping to rest.    Expiration 5 yrs Yes Prieto F Sharda, DO   Multiple Vitamins-Minerals (THERAPEUTIC MULTIVITAMIN-MINERALS) tablet Take 1 tablet by mouth daily Yes Historical Provider, MD   levothyroxine (SYNTHROID) 175 MCG tablet Take 1 tablet by mouth daily Yes Prieto F Sharda, DO   vitamin D (ERGOCALCIFEROL) 1.25 MG (85493 UT) CAPS capsule TAKE 1 CAPSULE BY MOUTH ONE TIME PER WEEK Yes Prieto F Sharda, DO   cyclobenzaprine (FLEXERIL) 10 MG tablet Take 1 tablet by mouth 3 times daily as needed for Muscle spasms Yes Prieto F Sharda, DO   vitamin B-12 (CYANOCOBALAMIN) 100 MCG tablet Take 1 tablet by mouth daily Yes Prieto F Sharda, DO   diclofenac sodium (VOLTAREN) 1 % GEL Apply 4 g topically 4 times daily as needed for Pain Yes Alanna Arredondo MD   torsemide (DEMADEX) 10 MG tablet take 1 tablet by mouth once daily Yes Prieto F Sharda, DO   Thiamine HCl (B-1) 100 MG TABS Take 1 pill daily Yes Prieto F Sharda, DO   gemfibrozil (LOPID) 600 MG tablet TAKE 1 TABLET BY MOUTH EVERY

## 2022-03-24 NOTE — TELEPHONE ENCOUNTER
Pt has [address] [facility] [address] [address] BC BS but in a month she states she may be losing this insurance d/t being off work for the last year and not being able to go back. Pt is aware she will need insurance coverage to proceed with WLS. Pt was instructed to f/u with her HR Department. Pt was instructed if she is obtaining [employer] she would need to talk with the manager at the [facility] before proceeding. Chart marked.

## 2022-03-25 ENCOUNTER — TELEPHONE (OUTPATIENT)
Dept: ADMINISTRATIVE | Age: 62
End: 2022-03-25

## 2022-03-25 NOTE — TELEPHONE ENCOUNTER
Patient Appointment Form:      PCP: Josiah Villar  Referring: Josiah Villar    Has the Patient:    Seen a Cardiologist? no    Had a heart catheterization? no    Had heart surgery? no    Had a stress test or nuclear stress test? no    Had an echocardiogram? yes   date: 5/14/19   facility [facility]:  [facility] ([facility]    Had a vascular ultrasound? no    Had a 24/48 heart monitor or extended cardiac event monitor? no    Had recent blood work in the last 6 months? yes    date: recent    ordering physician: Silke Burnette    Had a pacemaker/ICD/ILR implant? no    Seen an Electrophysiologist? no        Will send records via: in [facility]      Date & time of appointment:  [email or web address]

## 2022-04-05 ENCOUNTER — TELEPHONE (OUTPATIENT)
Dept: PRIMARY CARE CLINIC | Age: 62
End: 2022-04-05

## 2022-04-05 ENCOUNTER — INITIAL CONSULT (OUTPATIENT)
Dept: BARIATRICS/WEIGHT MGMT | Age: 62
End: 2022-04-05

## 2022-04-05 DIAGNOSIS — Z71.3 DIETARY COUNSELING: Primary | ICD-10-CM

## 2022-04-05 DIAGNOSIS — E66.01 MORBID OBESITY DUE TO EXCESS CALORIES (HCC): ICD-10-CM

## 2022-04-05 PROCEDURE — 99999 PR OFFICE/OUTPT VISIT,PROCEDURE ONLY: CPT

## 2022-04-05 NOTE — LETTER
[facility]  [address]  [place]  [place] [address]  Phone: [phone]  Fax: [phone], [place]        April 5, 2022     Patient: Yefri Birch   YOB: 1960   Date of Visit: 4/5/2022       To Whom It May Concern: It is my medical opinion that Brittany Blake is unable to perform jury duty at this time due to her medical conditions and is recovering from recent back surgery at this time. If you have any questions or concerns, please don't hesitate to call.     Sincerely,        Bianca Soler, DO

## 2022-04-05 NOTE — TELEPHONE ENCOUNTER
Patient asking for jury duty excuse. Still recovering for back surgery and has chronic pain in legs and back.    Patient would like to  and will need called once its ready

## 2022-04-06 VITALS — BODY MASS INDEX: 42.52 KG/M2 | HEIGHT: 63 IN | WEIGHT: 240 LBS

## 2022-04-06 NOTE — PROGRESS NOTES
WEIGHT:  240 lb (108.9 kg)    Pt was in th office for his/her 2nd weight Loss appointment. Pt is aware he/she must meet his/her pre-op weight loss goal in order to proceed with weight loss surgery. Juwan / Kam faxed a copy of what was reviewed with the pt to her PCP. Pt verbalized understanding. Rd// Ld enc the following at today's visit for successful pre/post surgical weight loss. Education:  Pt has been educated on the dangers of dining out when trying to lose weight as part of a medically / surgically supervised weight loss program.  Juwan Humphrey reviewed how restaurants add extra calories, fat and sugars in food preparation to make the food more palatable and enjoyable to the consumer. Juwan Humphrey reviewed strategies and coping skills for preparing meals at home and avoiding dinning out all together. 1. Weigh yourself daily and record it. 2. Keep documented food records daily. 3. Just be more active in day to day routine. 4. Higher protein intake and a higher fiber intake. Not a high protein or a high fiber diet     just a higher intake. 5.Eliminate empty calorie consumption. Juwan Humphrey addressed the following with the pt:  - Juwan Humphrey encouraged pt to comply with nutrition recommendations.  - Juwan / Kam encouraged participation in support group meetings.  - Juwan Humphrey encouraged pt to go back to maintenance of food records and weight monitoring   records. - Juwan Humphrey reviewed the importance of adequate sleep and stress management.  - Juwan Humphrey reviewed non-food strategies to cope with emotions and stress.  - Juwan Humphrey encouraged pt to practice the following: Mindful eating: Eating slowly: Focusing     on the eating experience without distraction.  - Juwan Humphrey encouraged pt to pay attention to hunger and fullness cues.   - Juwan / Ld encouraged meal planning.  - Juwan / Ld  encouraged pt to chose nutrient dense whole foods instead of soft, high     calorie foods.  - Juwan / Ld encouraged not drinking large amounts of fluids with or immediately after      meals and avoiding high caloric empty beverage consumption. Portion control ,meal planning and avoiding empty calorie consumption was reviewed. Pt was encouraged to practice this daily for weight loss success. Juwan / Kam reviewed insurance company weight loss requirement on 4/5/22. Pt verbalized understanding. Please be aware at each visit you have been instructed that in order for your insurance company to approve your surgery you must show a consistent weight loss of 2 lbs or greater at each visit. We can not guarantee an approval by your insurance company we can only provide the information given to us it is up to you the patient to show compliancy to your insurance company. If you do gain weight during your supervised weight loss counseling sessions insurance companies starting in 2018 are denying patients for not showing consistent weight loss results when part of a supervised weight loss counseling program.     Pt spent 120 minutes with the Juwan Humphrey today preparing the patient for pre/post surgical dietary changes.

## 2022-04-11 ENCOUNTER — OFFICE VISIT (OUTPATIENT)
Dept: PHYSICAL MEDICINE AND REHAB | Age: 62
End: 2022-04-11
Payer: COMMERCIAL

## 2022-04-11 VITALS
OXYGEN SATURATION: 98 % | HEART RATE: 80 BPM | SYSTOLIC BLOOD PRESSURE: 132 MMHG | DIASTOLIC BLOOD PRESSURE: 64 MMHG | HEIGHT: 63 IN | BODY MASS INDEX: 47.13 KG/M2 | WEIGHT: 266 LBS

## 2022-04-11 DIAGNOSIS — G89.29 CHRONIC RIGHT SHOULDER PAIN: ICD-10-CM

## 2022-04-11 DIAGNOSIS — M75.111 INCOMPLETE TEAR OF RIGHT ROTATOR CUFF, UNSPECIFIED WHETHER TRAUMATIC: ICD-10-CM

## 2022-04-11 DIAGNOSIS — M54.17 RADICULOPATHY, LUMBOSACRAL REGION: ICD-10-CM

## 2022-04-11 DIAGNOSIS — M48.062 SPINAL STENOSIS, LUMBAR REGION WITH NEUROGENIC CLAUDICATION: Primary | ICD-10-CM

## 2022-04-11 DIAGNOSIS — Z98.1 S/P LUMBAR FUSION: ICD-10-CM

## 2022-04-11 DIAGNOSIS — M17.11 PRIMARY OSTEOARTHRITIS OF RIGHT KNEE: ICD-10-CM

## 2022-04-11 DIAGNOSIS — M25.511 CHRONIC RIGHT SHOULDER PAIN: ICD-10-CM

## 2022-04-11 PROCEDURE — 99214 OFFICE O/P EST MOD 30 MIN: CPT | Performed by: PHYSICAL MEDICINE & REHABILITATION

## 2022-04-11 RX ORDER — GABAPENTIN 800 MG/1
800 TABLET ORAL 3 TIMES DAILY
Qty: 270 TABLET | Refills: 0 | Status: SHIPPED | OUTPATIENT
Start: 2022-04-11 | End: 2022-09-08

## 2022-04-11 RX ORDER — SEMAGLUTIDE 1.34 MG/ML
INJECTION, SOLUTION SUBCUTANEOUS
Qty: 9 ML | Refills: 2 | Status: SHIPPED
Start: 2022-04-11 | End: 2022-08-29

## 2022-04-11 NOTE — PROGRESS NOTES
Von Woods M.D. [address] [facility] PHYSICAL MEDICINE AND REHABILITAION  [address]  [address] [address] [facility]  Dept: [phone]  Dept Fax: [phone]    PCP: Lobito Colindres,   Date of visit: 4/11/22      Chief Complaint   Patient presents with    Follow-up     follow up after right shoulder injection, patient reports no relief. Emma Dangelo is a 62 y.o. woman who presents for follow up of multiple pain complaints. Her primary complaint is low back pain. She had reported gradual onset of low back pain after no known injury years ago. She reports back and leg pain was worse since December 2020. She underwent L3-L5 PLIF on 5/25/2021. She states that since surgery the radiating left lower extremity pain has improved significantly, however has been having pain radiating to the right lower extremity. No new or worsening weakness in the legs, she feels leg weakness is about the same as it was prior to surgery. No incontinence. She has had PT. She saw Pain management and underwent bilateral SIJ injections on 10/11/2021, bilateral L5-S1 facet blocks on 11/8/2021, and bilateral L5, S1, S2, S3 DR branch nerve blocks on 12/21/2021. She does not feel she has had much relief from the injections. Most recently she underwent L5-S1 lumbar epidural on 2/7/2022. She is taking Percocet PRN (followed by Pain mgmt) and reports relief. She also continues gabapentin 800mg TID with partial relief. Cymbalta was added by Pain management and she is unsure of benefit. Now, the pain is constant. The pain is described as \"painful, burning\", and is located all across the low back with radiation to the right lower extremity. She previously had pain radiating primarily to the LLE, but states this has improved since surgery. She endorses tingling/burning in the right foot. She reports weakness in both legs that is unchanged since prior to surgery.  She did not report significant spasms today. She denies recent falls. She has used a cane to ambulate since December 2020. She denies bowel/bladder incontinence or saddle anesthesia. Patient also reports chronic right knee pain. Pain is constant. Pain is described as aching and sharp. Pain is located primarily at the medial joint line. No swelling. She reports that the knee occasionally gives out on her. No cracking or popping. No specific injury. Pain is worse with walking and worse toward the end of the day. Pain is better with Percocet. She had a right knee xray completed that showed arthritic changes. She received a right knee steroid injection 10/18/2021 and reported minimal relief. She is using topical Voltaren gel and this is helping with knee pain. She had right knee Durolane injection performed on 1/30/2022 and reports improvement in knee pain after the Durolane injection, with ongoing relief. Patient has additional complaint of right shoulder pain. She has reported chronic right shoulder pain, that has been worse since her back surgery. Right shoulder pain is described as stiff and aching. Right shoulder pain is worse with overhead activity. No upper extremity weakness or numbness reported. She did not have improvement with PT. She has tried voltaren gel on her shoulder with partial relief. She had an MRI of the right shoulder completed on 2/8/2022 which shows partial thickness tear of the supraspinatus, subacromial/subdeltoid bursitis, and rotator cuff tendinopathy. She underwent an US guided right shoulder injection on 2/28/2022 and states she did not have any significant relief after the injection.          The prior workup has included:   -Right shoulder MRI 2/8/2022  FINDINGS:   Mild volume loss of supraspinatus.  Moderate fluid in the   subacromial/subdeltoid space.  Focal high-grade partial-thickness articular   surface tear of supraspinatus measures 6 mm medial to lateral and 6 mm   anterior to posterior.  Moderate tendinopathy of supraspinatus and   infraspinatus.  Moderate tendinopathy of subscapularis.  Biceps tendons are   intact.       Moderate degenerative changes of the acromioclavicular joint with mild mass   effect on the underlying supraspinatus.  Mild degenerative change of the   glenohumeral joint.  Minimal degenerative irregularity and fraying of the   labrum without identified focal tear.  Labrum could be further assessed with   intra-articular contrast as indicated.       No marrow edema, marrow replacement or evidence of acute fracture.           Impression   Focal high-grade partial-thickness articular surface tear of supraspinatus.       Moderate rotator cuff tendinopathy.       Fluid in the subacromial/subdeltoid space likely related to moderate bursitis.       Degenerative changes most evident in the acromioclavicular joint.           -Right / Left knee xray 9/16/2021:     Impression   1.  Moderate right patellofemoral and medial compartment joint space   narrowing, with very minimal secondary osteoarthritic degenerative disease,   not significantly changed since 12/30/2020.       2.  Incidental left medial compartment joint space narrowing, osseous   degenerative disease, and slight genu-varus configuration, as described.         -Right shoulder xray 9/16/2021     Impression   1.  A previously-existing right-internal-jugular-approach single-lumen   central venous catheter has been removed over the interval. Negative for   gross pneumothorax, bilaterally, within the limits of this study.       2.  Mild irregular cortical exostosis involves the undersurface ease of the   right acromion and lateral right clavicular head, slightly hook-like in   configuration at the level of the right acromion.       3.  Osseous degenerative disease, as described.           -Lumbar MRI 3/27/2021:  FINDINGS:   BONES/ALIGNMENT: There is normal alignment of the spine.  The vertebral body   heights are maintained. The bone marrow signal appears unremarkable.       SPINAL CORD: The conus terminates normally.       SOFT TISSUES: No paraspinal mass identified.       L1-L2: There is no significant disc herniation, spinal canal stenosis or   neural foraminal narrowing.       L2-L3: Disc bulge causes mild right foraminal stenosis.       L3-L4: Disc bulge with superimposed right foraminal disc protrusion causes   mild thecal sac, moderate right foraminal and mild left foraminal stenosis.       L4-L5: Large central cranially migrated disc extrusion measuring 25 x 12 x 13   mm (craniocaudal by anteroposterior by transverse) causes severe spinal canal   stenosis and may compress the cauda equina.  This is slightly worse on the   left side.  A disc bulge also causes mild bilateral foraminal stenosis.       L5-S1: Disc bulge and facet hypertrophy without stenosis.         Impression   L4-L5 disc extrusion causing severe spinal canal stenosis worse on the left   side. Prior lumbar MRI 2019 [facility] -- not available to review at time of office visit, will attempt to obtain      The prior treatment has included:  PT: PT x 2 courses without relief prior to surgery. PT post operatively with minimal relief. Modalities: Heat with partial relief. Thinks she used a TENS in the past, unsure of relief. Topicals: \"hot and cold patches\" over the counter with minimal relief. Lidoderm patch with initial improvement, now not helping. Voltaren gel with improvement. OTC Tylenol: Takes with minimal relief   NSAIDS: Tried mobic and diclofenac without relief   Opioids: Tried Norco for acute exacerbation, did not like how it made her feel, no longer taking. Took Oxycodone post operatively. Now taking Percocet PRN with relief, followed by Pain mgmt. Membrane stabilizers: Taking gabapentin 800mg TID with partial relief. Taking Cymbalta with improvement. Muscle relaxers: tizanidine - states helps her relax, but not with pain.  Now taking flexeril post operatively, Rx'd by TAMMIE  Previous injections: Multiple epidurals at [facility], most recent on 2/18/2021. She reports set of 3 epidurals helped in the past, but the most recent set of epidurals did not help with most recent exacerbation of pain prior to surgery. Bilateral SIJ injections on 10/11/2021 without relief. Bilateral L5-S1 facet blocks on 11/8/2021without reported relief. Bilateral L5, S1, S2, S3 DR branch nerve blocks on 12/21/2021 without reported relief. Right knee Durolane injection 1/24/2022 with relief of knee pain. L5-S1 lumbar epidural on 2/7/2022. Right shoulder US guided steroid injection 2/28/2022 without relief. Previous surgery at this site: L3-L5 PLIF on  5/25/2021 with improvement in left lower extremity radiating pain.          Past Medical History:   Diagnosis Date    Back pain     Chronic fatigue     COVID-19 09/2020    mild per patient    Diabetes mellitus ([facility] [address]     History of blood transfusion     Hyperlipidemia     Hypothyroidism     IBS (irritable bowel syndrome)     Morbid obesity due to excess calories (HCC)     Obesity     Osteoarthritis     PONV (postoperative nausea and vomiting)        Past Surgical History:   Procedure Laterality Date    BACK SURGERY      CHOLECYSTECTOMY      COLONOSCOPY      HYSTERECTOMY, TOTAL ABDOMINAL      INCONTINENCE SURGERY      BLADDER SLING    KNEE SURGERY Left     LUMBAR SPINE SURGERY N/A 5/25/2021    L3- L5 DECOMPRESSION AND FUSION , L4- L5 TRANSFORAMINAL LUMBAR INTERBODY FUSION --OARM, PATY TABLE, AUDIOLOGY, PLATES ,SCREWS, --NUVASIVE performed by Irais Martinez MD at [facility] 21 Bilateral 10/11/2021    BILATERAL [address] (CPT [id number]) performed by Daysi Kamara MD at [facility] 21 Bilateral 11/8/2021    BILATERAL LUMBAR FACET INJECTION AT L5-S 1 FACETS BLOCK UNDER FLUOROSCOPIC GUIDANCE performed by Daysi Kamara MD at [place] [address] NERVE BLOCK Bilateral 12/21/2021    BILATERAL S1, S2, S3 BRANCH & L5 [facility] NERVE BLOCK UNDER XRAY GUIDANCE (81393) (SEDATION) performed by Yumiko Drummond MD at [address] N/A 2/7/2022    LUMBAR EPIDURAL STEROID INJECTION UNDER FLUOROSCOPIC GUIDANCE AT L5-S1 PARAMEDIAN performed by Yumiko Drummond MD at [address] [facility] N/A 3/9/2022    EGD BIOPSY performed by Zoraida Garcia MD at [address] History     Socioeconomic History    Marital status:      Spouse name: Not on file    Number of children: Not on file    Years of education: Not on file    Highest education level: Not on file   Occupational History     Employer: [employer]   Tobacco Use    Smoking status: Never Smoker    Smokeless tobacco: Never Used   Vaping Use    Vaping Use: Never used   Substance and Sexual Activity    Alcohol use: Never    Drug use: Never    Sexual activity: Not on file   Other Topics Concern    Not on file   Social History Narrative    Not on file     Social Determinants of Health     Financial Resource Strain: Low Risk     Difficulty of Paying Living Expenses: Not hard at all   Food Insecurity: No Food Insecurity    Worried About [address] in the Last Year: Never true    [address] [facility] [address] in the Last Year: Never true   Transportation Needs:     Lack of Transportation (Medical): Not on file    Lack of Transportation (Non-Medical):  Not on file   Physical Activity:     Days of Exercise per Week: Not on file    Minutes of Exercise per Session: Not on file   Stress:     Feeling of Stress : Not on file   Social Connections:     Frequency of Communication with Friends and Family: Not on file    Frequency of Social Gatherings with Friends and Family: Not on file    Attends Adventism Services: Not on file    Active Member of Clubs or Organizations: Not on file    Attends Club or Organization Meetings: Not on file    Marital Status: Not on file   Intimate Partner Violence:     Fear of Current or Ex-Partner: Not on file    Emotionally Abused: Not on file    Physically Abused: Not on file    Sexually Abused: Not on file   Housing Stability:     Unable to Pay for Housing in the Last Year: Not on file    Number of Jessica in the Last Year: Not on file    Unstable Housing in the Last Year: Not on file          Family History   Problem Relation Age of Onset    Diabetes Mother     Diabetes Father    Avril Medal Rheum Arthritis Sister     Cancer Maternal Grandfather        Allergies   Allergen Reactions    Bactrim [Sulfamethoxazole-Trimethoprim] Nausea Only    Ceftin [Cefuroxime] Rash    Keflex [Cephalexin] Rash       Current Outpatient Medications   Medication Sig Dispense Refill    OZEMPIC, 1 MG/DOSE, 4 MG/3ML SOPN INJECT 1 MG INTO THE SKIN ONCE A WEEK 9 mL 2    phentermine (ADIPEX-P) 37.5 MG tablet Take 1 tablet by mouth every morning (before breakfast) for 30 days.  30 tablet 0    DULoxetine (CYMBALTA) 60 MG extended release capsule Take 1 capsule by mouth daily 90 capsule 1    gabapentin (NEURONTIN) 400 MG capsule take 2 capsules by mouth three times a day      pravastatin (PRAVACHOL) 10 MG tablet TAKE 1 TABLET BY MOUTH EVERY OTHER DAY 45 tablet 1    Handicap Placard MISC by Does not apply route Patient cannot walk 200 ft without stopping to rest.    Expiration 5 yrs 1 each 0    Multiple Vitamins-Minerals (THERAPEUTIC MULTIVITAMIN-MINERALS) tablet Take 1 tablet by mouth daily      levothyroxine (SYNTHROID) 175 MCG tablet Take 1 tablet by mouth daily 90 tablet 1    vitamin D (ERGOCALCIFEROL) 1.25 MG (69576 UT) CAPS capsule TAKE 1 CAPSULE BY MOUTH ONE TIME PER WEEK 12 capsule 1    cyclobenzaprine (FLEXERIL) 10 MG tablet Take 1 tablet by mouth 3 times daily as needed for Muscle spasms 90 tablet 2    vitamin B-12 (CYANOCOBALAMIN) 100 MCG tablet Take 1 tablet by mouth daily 90 tablet 3    diclofenac sodium (VOLTAREN) 1 % GEL Apply 4 g topically 4 times daily as needed for Pain 150 g 3    torsemide (DEMADEX) 10 MG tablet take 1 tablet by mouth once daily 30 tablet 3    Thiamine HCl (B-1) 100 MG TABS Take 1 pill daily 90 tablet 1    gemfibrozil (LOPID) 600 MG tablet TAKE 1 TABLET BY MOUTH EVERY 12 HOURS 180 tablet 3    metFORMIN (GLUCOPHAGE) 500 MG tablet TAKE 1 TABLET BY MOUTH EVERY DAY WITH BREAKFAST 90 tablet 3    omeprazole (PRILOSEC) 20 MG delayed release capsule TAKE 1 CAPSULE BY MOUTH EVERY DAY 90 capsule 3    linaclotide (LINZESS) 145 MCG capsule TAKE 1 CAPSULE BY MOUTH EVERY DAY IN THE MORNING BEFORE BREAKFAST 90 capsule 3    valsartan (DIOVAN) 80 MG tablet TAKE 1 TABLET BY MOUTH EVERY DAY 90 tablet 1    omega-3 acid ethyl esters (LOVAZA) 1 g capsule Take 1 capsule by mouth 2 times daily 180 capsule 1    acetaminophen (TYLENOL) 500 MG tablet Take 500 mg by mouth every 6 hours as needed for Pain      lidocaine (LIDODERM) 5 % PLACE 1 PATCH ONTO THE SKIN DAILY 12 HOURS ON, 12 HOURS OFF.  polyethylene glycol (GLYCOLAX) 17 g packet Take 17 g by mouth daily as needed       No current facility-administered medications for this visit. Review of Systems  General: No chills, fatigue, fever, malaise, night sweats, weight gain,  weight loss. Psychological: No anxiety, depression, suicidal ideation   Ophthalmic: No blurry vision, decreased vision, double vision, loss of vision  Ear Nose Throat: No hearing loss, tinnitus, phonophobia, sensitivity to smells, vertigo, or vocal changes. Allergy/Immunology: No watery eyes, itchy eyes, frequent infections. Hematological and Lymphatic: No bleeding problems, blood clots, bruising  Endocrine:  No polydypsia, polyuria, temperature intolerance. Respiratory: No cough, shortness of breath, wheezing. Cardiovascular: No syncope, chest pain, dyspnea on exertion,palpitations.    Gastrointestinal: No abdominal pain, hematemesis, deformity. Full AROM. No crepitus. There is tenderness to palpation of the medial joint line. No significant tenderness over the lateral joint line. Patellofemoral grind with pain. No medial or lateral collateral ligament tenderness. Negative Lachman. No varus or valgus instability. Neurologic: Awake, alert and oriented in three planes. Speech is fluent. No facial weakness. Hearing is intact for conversation. Pupils are equal and round. Extraocular muscles are intact. Shoulder shrug symmetric. Strength:   R  L  Hip Flex  4+  4+  Knee Ext  4+  4+  Ankle dorsi  5  4+  EHL   5 5  Ankle Plantar  5  5    Right shoulder abduction 2-3/5, limited by pain. RUE otherwise 5/5 in upper extremities. Sensory:  Intact for light touch in all lower extremity dermatomes. SILT throughout [address]. Reflexes:   R  L  Patellar  2 2   Ankle Jerk  2 2    No Babinski     Gait is antalgic         Impression:   Spinal stenosis, lumbar region with neurogenic claudication    Radiculopathy, lumbosacral region    S/P lumbar fusion    Primary osteoarthritis of right knee    Chronic right shoulder pain    Incomplete tear of right rotator cuff, unspecified whether traumatic          Plan:   · Patient has failed PT, oral medications, topicals, and steroid injection for right shoulder pain. Will refer to Ortho for further evaluation. · Patient with ongoing relief of right knee pain after Durolane injection. Can repeat Durolane injection in the future if needed  · Continue gabapentin 800mg TID, refilled. · Patient will continue Cymbalta and Percocet as prescribed by Pain management  · Continue Voltaren gel for right knee and right shoulder  · Patient following with Pain Management for lumbar injections. If significant low back pain persists will pursue NSGY re-evaluation. The patient was educated about the diagnosis, prognosis, indications, risks and benefits of treatment.  An opportunity to ask questions was given to the patient and questions were answered. The patient agreed to proceed with the recommended treatment as described above. Follow up as scheduled. Patient will call if issues in the meantime. Ghislaine Thomas M.D.   Physical Medicine and Rehabilitation

## 2022-04-21 DIAGNOSIS — M25.511 CHRONIC RIGHT SHOULDER PAIN: ICD-10-CM

## 2022-04-21 DIAGNOSIS — G89.29 CHRONIC RIGHT SHOULDER PAIN: ICD-10-CM

## 2022-04-21 DIAGNOSIS — Z98.1 S/P LUMBAR FUSION: ICD-10-CM

## 2022-04-21 DIAGNOSIS — M17.11 PRIMARY OSTEOARTHRITIS OF RIGHT KNEE: ICD-10-CM

## 2022-04-21 DIAGNOSIS — M48.062 SPINAL STENOSIS, LUMBAR REGION WITH NEUROGENIC CLAUDICATION: ICD-10-CM

## 2022-04-21 DIAGNOSIS — M54.17 RADICULOPATHY, LUMBOSACRAL REGION: ICD-10-CM

## 2022-04-21 DIAGNOSIS — M25.511 RIGHT SHOULDER PAIN, UNSPECIFIED CHRONICITY: ICD-10-CM

## 2022-04-26 RX ORDER — CYCLOBENZAPRINE HCL 10 MG
TABLET ORAL
Qty: 90 TABLET | Refills: 2 | Status: SHIPPED
Start: 2022-04-26 | End: 2022-07-19 | Stop reason: SDUPTHER

## 2022-04-29 ENCOUNTER — OFFICE VISIT (OUTPATIENT)
Dept: BARIATRICS/WEIGHT MGMT | Age: 62
End: 2022-04-29
Payer: COMMERCIAL

## 2022-04-29 VITALS
DIASTOLIC BLOOD PRESSURE: 61 MMHG | WEIGHT: 241.6 LBS | SYSTOLIC BLOOD PRESSURE: 123 MMHG | HEART RATE: 73 BPM | TEMPERATURE: 98.1 F | BODY MASS INDEX: 42.81 KG/M2 | HEIGHT: 63 IN

## 2022-04-29 DIAGNOSIS — E03.9 ACQUIRED HYPOTHYROIDISM: Chronic | ICD-10-CM

## 2022-04-29 DIAGNOSIS — E66.01 CLASS 3 SEVERE OBESITY DUE TO EXCESS CALORIES WITH SERIOUS COMORBIDITY AND BODY MASS INDEX (BMI) OF 40.0 TO 44.9 IN ADULT (HCC): Primary | ICD-10-CM

## 2022-04-29 DIAGNOSIS — R53.82 CHRONIC FATIGUE: ICD-10-CM

## 2022-04-29 DIAGNOSIS — E78.2 MIXED HYPERLIPIDEMIA: Chronic | ICD-10-CM

## 2022-04-29 PROCEDURE — 99211 OFF/OP EST MAY X REQ PHY/QHP: CPT

## 2022-04-29 PROCEDURE — 99214 OFFICE O/P EST MOD 30 MIN: CPT | Performed by: INTERNAL MEDICINE

## 2022-04-29 RX ORDER — TOPIRAMATE 25 MG/1
25 TABLET ORAL 2 TIMES DAILY
Qty: 180 TABLET | Refills: 1 | Status: SHIPPED | OUTPATIENT
Start: 2022-04-29

## 2022-04-29 NOTE — PROGRESS NOTES
CC -   Follow up of: Weight gain, fatigue    BACKGROUND -   Last visit: 3/1822  First visit: 1/20/22     Obesity (all weight in lbs)  Began years, ago, worse since 5/2021 after back surgery  Initial BMI 47.26, Wt 266.75  HS Grad wt 180   Lowest   wt 165 (20 years ago)   Highest  wt >300 ([id number])  Pattern of wt gain: gradual, but fluctuating in the last 3-4 years  Wt change past yr: -4 lbs (went down to 252 then back up again to 266)  Most wt lost: 34 lbs  Other diets attempted: used to walk    Desire to lose weight: 8/10  Problem posed by appetite: 5/10    Initial Diet:    Number of meals per day - 3    Number of snacks per day - 1    Meal volume - 9\" plate, no seconds    Fast food/convenience store - 1-2x/week    Restaurants (not fast food) - 0-1x/week   Sweets - 2d/week   Chips - 1d/week   Crackers/pretzels - 0d/week   Nuts - 0-1d/week   Peanut Butter - 0-1d/week   Popcorn - 0-1d/week   Dried fruit - 0-1d/week   Whole fruit - 1-2d/week   Breakfast cereal - 2d/week   Granola/Protein/Energy bar - 0-1d/week   Sugar sweetened beverages - orange juice once a month, rarely coffee, rarely tea   Protein - No supplements   Fiber - No supplements     Breakfast: eggs, leftover, toast, english muffins (1/wk). Lunch: soup, stuffed peppers, hamburger. Dinner: spaghetti, vegetable soup, stuffed pepper soup etc.      Initial exercise:    [employer] - no    Walking - no    Running - no    Resistance - no    Aerobic class - leg lifts/arm lifts with physical therapy        Initial sleep: Bedtime: 10 pm, wake up time: 5-6 am, daytime naps: no. Feels rested in the morning usually. Weight scale at home: yes, takes weight: rarely  Food scale: no    Follow up 3/18/22:  1. Fatigue: No fatigue or tiredness. 2. Diet: Fiber one cereal for fibers. Tuna/chicken for protein. No hypoglycemia - fasting BGL in 100s. Not able to take enough water (eugenia after back surgery per pt).     3. Exercise: besides stretches - using fast-track, trying to increase time wise (10-20 min currently), walking as much as she can. 4. Sleep: now she is able able to sleep in bed, doing better. 5. Medications: Tolerating phentermine. I reviewed her current medications, she has not had any medication changes. Neuropathy is improving and she is now able to sleep in bed instead of recliner. Follow up 4/29/22:  1. Fatigue: Sometimes a little tired with activities. 2. Diet: continued fiber one cereal. Tuna/chicken for protein, did not like protein shake in the past. Trying to drink more, about 5 cups of water on a good day. 3. Exercise: back stretches, fast-track ~13 min, limited due to busy with mom (in hospice), nursing home visits etc.    4. Sleep: sleeping in bed. Sleeping fairly well like prior. 5. Medications: tolerated Phentermine. MVI daily, no new change in medication, as listed below.     ______________________    [facility] -  Past Medical History:   Diagnosis Date    Back pain     Chronic fatigue     COVID-19 09/2020    mild per patient    Diabetes mellitus ([facility]     History of blood transfusion     Hyperlipidemia     Hypothyroidism     IBS (irritable bowel syndrome)     Morbid obesity due to excess calories (HCC)     Obesity     Osteoarthritis     PONV (postoperative nausea and vomiting)      Past Surgical History:   Procedure Laterality Date    BACK SURGERY      CHOLECYSTECTOMY      COLONOSCOPY      HYSTERECTOMY, TOTAL ABDOMINAL      INCONTINENCE SURGERY      BLADDER SLING    KNEE SURGERY Left     LUMBAR SPINE SURGERY N/A 5/25/2021    L3- L5 DECOMPRESSION AND FUSION , L4- L5 TRANSFORAMINAL LUMBAR INTERBODY FUSION --OARM, PATY TABLE, AUDIOLOGY, PLATES ,SCREWS, --NUVASIVE performed by Maryjo Herzog MD at [address] Bilateral 10/11/2021    BILATERAL [address] (CPT [id number]) performed by Starla Chacon MD at [address] Bilateral 11/8/2021    BILATERAL LUMBAR FACET INJECTION AT L5-S 1 FACETS BLOCK UNDER FLUOROSCOPIC GUIDANCE performed by Reese Thurman MD at [address] Bilateral 12/21/2021    BILATERAL S1, S2, S3 BRANCH & L5 [facility] NERVE BLOCK UNDER XRAY GUIDANCE (58704) (SEDATION) performed by Reese Thurman MD at [address] [facility] [address] N/A 2/7/2022    LUMBAR EPIDURAL STEROID INJECTION UNDER FLUOROSCOPIC GUIDANCE AT L5-S1 PARAMEDIAN performed by Reese Thurman MD at [address] [facility] N/A 3/9/2022    EGD BIOPSY performed by Genevieve Ledezma MD at [address] [facility] ENDOSCOPY   thyroid surgery (goiter)    Current Outpatient Medications   Medication Sig Dispense Refill    diclofenac sodium (VOLTAREN) 1 % GEL APPLY 4 GRAMS TOPICALLY 4 TIMES DAILY AS NEEDED FOR PAIN 100 g 3    cyclobenzaprine (FLEXERIL) 10 MG tablet TAKE 1 TABLET BY MOUTH THREE TIMES A DAY AS NEEDED FOR MUSCLE SPASMS 90 tablet 2    OZEMPIC, 1 MG/DOSE, 4 MG/3ML SOPN INJECT 1 MG INTO THE SKIN ONCE A WEEK 9 mL 2    gabapentin (NEURONTIN) 800 MG tablet Take 1 tablet by mouth 3 times daily for 90 days.  270 tablet 0    DULoxetine (CYMBALTA) 60 MG extended release capsule Take 1 capsule by mouth daily 90 capsule 1    pravastatin (PRAVACHOL) 10 MG tablet TAKE 1 TABLET BY MOUTH EVERY OTHER DAY 45 tablet 1    Handicap Placard MISC by Does not apply route Patient cannot walk 200 ft without stopping to rest.    Expiration 5 yrs 1 each 0    Multiple Vitamins-Minerals (THERAPEUTIC MULTIVITAMIN-MINERALS) tablet Take 1 tablet by mouth daily      levothyroxine (SYNTHROID) 175 MCG tablet Take 1 tablet by mouth daily 90 tablet 1    vitamin D (ERGOCALCIFEROL) 1.25 MG (28287 UT) CAPS capsule TAKE 1 CAPSULE BY MOUTH ONE TIME PER WEEK 12 capsule 1    vitamin B-12 (CYANOCOBALAMIN) 100 MCG tablet Take 1 tablet by mouth daily 90 tablet 3    torsemide (DEMADEX) 10 MG tablet take 1 tablet by mouth once daily 30 tablet 3    Thiamine HCl (B-1) 100 MG TABS Take 1 pill daily 90 tablet 1    gemfibrozil (LOPID) 600 MG tablet TAKE 1 TABLET BY MOUTH EVERY 12 HOURS 180 tablet 3    metFORMIN (GLUCOPHAGE) 500 MG tablet TAKE 1 TABLET BY MOUTH EVERY DAY WITH BREAKFAST 90 tablet 3    omeprazole (PRILOSEC) 20 MG delayed release capsule TAKE 1 CAPSULE BY MOUTH EVERY DAY 90 capsule 3    linaclotide (LINZESS) 145 MCG capsule TAKE 1 CAPSULE BY MOUTH EVERY DAY IN THE MORNING BEFORE BREAKFAST 90 capsule 3    valsartan (DIOVAN) 80 MG tablet TAKE 1 TABLET BY MOUTH EVERY DAY 90 tablet 1    omega-3 acid ethyl esters (LOVAZA) 1 g capsule Take 1 capsule by mouth 2 times daily 180 capsule 1    acetaminophen (TYLENOL) 500 MG tablet Take 500 mg by mouth every 6 hours as needed for Pain      lidocaine (LIDODERM) 5 % PLACE 1 PATCH ONTO THE SKIN DAILY 12 HOURS ON, 12 HOURS OFF.  polyethylene glycol (GLYCOLAX) 17 g packet Take 17 g by mouth daily as needed       No current facility-administered medications for this visit. Allergies: Allergies   Allergen Reactions    Bactrim [Sulfamethoxazole-Trimethoprim] Nausea Only    Ceftin [Cefuroxime] Rash    Keflex [Cephalexin] Rash       Family history: DM: mother, Heart disease: no.    Social history: smoking: never ; Alcohol: never. No stairs other than steps to go in the house and kitchen. ROS -  Card - no CP  GI - no N/V/D/C - uses miralax. PE -  Gen : /61 (Site: Left Upper Arm, Position: Sitting, Cuff Size: Thigh)   Pulse 73   Temp 98.1 °F (36.7 °C) (Temporal)   Ht 5' 3\" (1.6 m)   Wt 241 lb 9.6 oz (109.6 kg)   BMI 42.80 kg/m²    WN, WD, NAD  Lung: Nml resp effort  Psych: Normal mood. Full affect  Neuro:  Moves all ext well  ______________________    HISTORY & ASSESSMENT/PLAN -     Problem 1  - Fatigue   HPI   - She feels she is feeling better with fatigue, able to sleep better, joint pain is improving, still has neuropathic pain but overall she feels fatigue is improving  Assessment  - Improving   Plan   - Continue current, continue weight reduction per plan below    Problem 2  - Obesity   HPI   - See above Background for description    Weight  Date    266.8 lbs 1/20/22 Phentermine #1    250.0 lbs 2/16/22 Phentermine #2    244.2 lbs 3/18/22 Phentermine #3    241.6 lbs 4/29/22 Topiramate    Total weight change to date: -25.2 lbs  Average daily energy variance:   1/20/22 - 2/17/22: -16.8 lbs (58,800 Chai)/28 days = -2100 Chai/day deficit. 2/17/2022 - 3/18/2022: -5.8 lbs (17958 Chai)/28 days = -725 Chai/day deficit. 3/18/2022 - 4/29/2022: -2.6 lbs (9100 Chai)/42 days = -217 Chai/day deficit. DEN = ~1886 Chai/d    Assessment  - improving  Plan   - She is doing well with current plan. Talked about fiber, protein and water intake. Had Phentermine for 3 months. Feels she needs something for appetite suppression, and after talking about options and adverse effects, would like Topiramate. Planned as follows:  Patient Instructions   Continue current diet plan. Topiramate:  Start Topiramate 25 mg. Take one tablet twice each day for one week. If the appetite suppression is insufficient, then increase to two tablets twice daily. Follow up with me as needed. Problem 3 - Hypothyroidism  HPI  - Fatigue is improving. Doing well on 175 mcg of levothyroxine no issues. Assessment - clinically improving  Plan  - She will continue levothyroxine and will be following up with Dr. Luis Eduardo Crow. Problem 4 - Dyslipidemia  HPI  - Last ,  on 3/9/22, on pravachol and lovaza, tolerating well. Assessment - much improved. Plan  - Continue pravachol and lovaza. Orders Placed This Encounter   Medications    topiramate (TOPAMAX) 25 MG tablet     Sig: Take 1 tablet by mouth 2 times daily     Dispense:  180 tablet     Refill:  1       Robby More MD  Internal Medicine/Obesity Medicine  4/29/2022.

## 2022-04-29 NOTE — PATIENT INSTRUCTIONS
Continue current diet plan. Topiramate:  Start Topiramate 25 mg. Take one tablet twice each day for one week. If the appetite suppression is insufficient, then increase to two tablets twice daily. Follow up with me as needed.

## 2022-05-10 ENCOUNTER — INITIAL CONSULT (OUTPATIENT)
Dept: BARIATRICS/WEIGHT MGMT | Age: 62
End: 2022-05-10

## 2022-05-10 VITALS — BODY MASS INDEX: 41.64 KG/M2 | WEIGHT: 235 LBS | HEIGHT: 63 IN

## 2022-05-10 DIAGNOSIS — Z71.3 DIETARY COUNSELING: Primary | ICD-10-CM

## 2022-05-10 DIAGNOSIS — E66.01 MORBID OBESITY DUE TO EXCESS CALORIES (HCC): ICD-10-CM

## 2022-05-10 PROCEDURE — 99999 PR OFFICE/OUTPT VISIT,PROCEDURE ONLY: CPT

## 2022-05-10 NOTE — PROGRESS NOTES
WEIGHT:  235 lb (106.6 kg)    Pt was in th office for his/her 3rd weight Loss appointment. Pt is aware he/she must meet his/her pre-op weight loss goal in order to proceed with weight loss surgery. Juwan / Kam faxed a copy of what was reviewed with the pt to her PCP. Pt verbalized understanding. Rd// Ld enc the following at today's visit for successful pre/post surgical weight loss. Education:  Pt has been educated on the importance of weighing and measuring food for adequate portion control and to avoid extra calorie consumption from eating large portions. Juwan / Ld instructed the pt on the use and the importance of using a scale and measuring cups in order to achieve adequate measurements of common portion sizes both pre-op and post-op. Juwan Humphrey used actual food model replica's to show what an actual portion and serving size would look like 3 month's post-surgical after weight loss sx. Juwan Humphrey completed an exercise in which they had to weigh and measure foods for adequate portion control. Juwan / Ld also reviewed the use of a portion controlled plate after weight loss surgery. 1. Weigh yourself daily and record it. 2. Keep documented food records daily. 3. Just be more active in day to day routine. 4. Higher protein intake and a higher fiber intake. Not a high protein or a high fiber diet     just a higher intake. 5.Eliminate empty calorie consumption. Juwan Humphrey addressed the following with the pt:  - Juwan Humphrey encouraged pt to comply with nutrition recommendations.  - Juwan / Kam encouraged participation in support group meetings.  - Juwan Humphrey encouraged pt to go back to maintenance of food records and weight monitoring   records. - Juwan Humphrey reviewed the importance of adequate sleep and stress management.  - Juwan Humphrey reviewed non-food strategies to cope with emotions and stress.  - Juwan Humphrey encouraged pt to practice the following: Mindful eating: Eating slowly: Focusing     on the eating experience without distraction.   - Juwan Humphrey encouraged pt to pay attention to hunger and fullness cues. - Juwan / Ld encouraged meal planning.  - Juwan / Ld  encouraged pt to chose nutrient dense whole foods instead of soft, high     calorie foods.  - Rd / Ld encouraged not drinking large amounts of fluids with or immediately after      meals and avoiding high caloric empty beverage consumption. Portion control ,meal planning and avoiding empty calorie consumption was reviewed. Pt was encouraged to practice this daily for weight loss success. Juwan / Kam reviewed insurance company weight loss requirement on 5/10/22. Pt verbalized understanding. Please be aware at each visit you have been instructed that in order for your insurance company to approve your surgery you must show a consistent weight loss of 2 lbs or greater at each visit. We can not guarantee an approval by your insurance company we can only provide the information given to us it is up to you the patient to show compliancy to your insurance company. If you do gain weight during your supervised weight loss counseling sessions insurance companies starting in 2018 are denying patients for not showing consistent weight loss results when part of a supervised weight loss counseling program.     Pt spent 120 minutes with the Juwan Humphrey today preparing the patient for pre/post surgical dietary changes.

## 2022-05-11 ENCOUNTER — OFFICE VISIT (OUTPATIENT)
Dept: CARDIOLOGY CLINIC | Age: 62
End: 2022-05-11
Payer: COMMERCIAL

## 2022-05-11 VITALS
HEIGHT: 63 IN | BODY MASS INDEX: 42.77 KG/M2 | RESPIRATION RATE: 18 BRPM | HEART RATE: 75 BPM | SYSTOLIC BLOOD PRESSURE: 120 MMHG | WEIGHT: 241.4 LBS | DIASTOLIC BLOOD PRESSURE: 80 MMHG

## 2022-05-11 DIAGNOSIS — Z01.810 PREOP CARDIOVASCULAR EXAM: Primary | ICD-10-CM

## 2022-05-11 DIAGNOSIS — E78.00 PURE HYPERCHOLESTEROLEMIA: ICD-10-CM

## 2022-05-11 PROCEDURE — 93000 ELECTROCARDIOGRAM COMPLETE: CPT | Performed by: INTERNAL MEDICINE

## 2022-05-11 PROCEDURE — 99204 OFFICE O/P NEW MOD 45 MIN: CPT | Performed by: INTERNAL MEDICINE

## 2022-05-11 RX ORDER — OXYCODONE HYDROCHLORIDE AND ACETAMINOPHEN 5; 325 MG/1; MG/1
1 TABLET ORAL PRN
COMMUNITY
End: 2022-06-09 | Stop reason: SDUPTHER

## 2022-05-11 NOTE — PROGRESS NOTES
Patient Active Problem List   Diagnosis    Type 2 diabetes mellitus without complication, without long-term current use of insulin ([facility] [address]    Acquired hypothyroidism    Peripheral neuropathy    Carpal tunnel syndrome of left wrist    Spinal stenosis of lumbar region with neurogenic claudication    Acute exacerbation of chronic low back pain    Hyperlipidemia    Spondylolisthesis of lumbar region    Lumbar radiculopathy    Postoperative hypotension    Postoperative anemia due to acute blood loss    Venous insufficiency of both lower extremities    Impingement syndrome of right shoulder    DDD (degenerative disc disease), lumbar    Lumbosacral spondylosis without myelopathy    S/P lumbar fusion    Sacroiliac dysfunction    Postlaminectomy syndrome, lumbar    Class 3 severe obesity due to excess calories with serious comorbidity and body mass index (BMI) of 45.0 to 49.9 in adult (HCC)    Chronic fatigue    GERD (gastroesophageal reflux disease)       Current Outpatient Medications   Medication Sig Dispense Refill    oxyCODONE-acetaminophen (PERCOCET) 5-325 MG per tablet Take 1 tablet by mouth as needed for Pain.  topiramate (TOPAMAX) 25 MG tablet Take 1 tablet by mouth 2 times daily 180 tablet 1    diclofenac sodium (VOLTAREN) 1 % GEL APPLY 4 GRAMS TOPICALLY 4 TIMES DAILY AS NEEDED FOR PAIN 100 g 3    cyclobenzaprine (FLEXERIL) 10 MG tablet TAKE 1 TABLET BY MOUTH THREE TIMES A DAY AS NEEDED FOR MUSCLE SPASMS 90 tablet 2    OZEMPIC, 1 MG/DOSE, 4 MG/3ML SOPN INJECT 1 MG INTO THE SKIN ONCE A WEEK 9 mL 2    gabapentin (NEURONTIN) 800 MG tablet Take 1 tablet by mouth 3 times daily for 90 days.  270 tablet 0    DULoxetine (CYMBALTA) 60 MG extended release capsule Take 1 capsule by mouth daily 90 capsule 1    pravastatin (PRAVACHOL) 10 MG tablet TAKE 1 TABLET BY MOUTH EVERY OTHER DAY 45 tablet 1    Handicap Placard MISC by Does not apply route Patient cannot walk 200 ft without stopping to rest. Expiration 5 yrs 1 each 0    Multiple Vitamins-Minerals (THERAPEUTIC MULTIVITAMIN-MINERALS) tablet Take 1 tablet by mouth daily      levothyroxine (SYNTHROID) 175 MCG tablet Take 1 tablet by mouth daily 90 tablet 1    vitamin D (ERGOCALCIFEROL) 1.25 MG (23855 UT) CAPS capsule TAKE 1 CAPSULE BY MOUTH ONE TIME PER WEEK 12 capsule 1    vitamin B-12 (CYANOCOBALAMIN) 100 MCG tablet Take 1 tablet by mouth daily 90 tablet 3    torsemide (DEMADEX) 10 MG tablet take 1 tablet by mouth once daily (Patient taking differently: as needed ) 30 tablet 3    Thiamine HCl (B-1) 100 MG TABS Take 1 pill daily 90 tablet 1    gemfibrozil (LOPID) 600 MG tablet TAKE 1 TABLET BY MOUTH EVERY 12 HOURS 180 tablet 3    metFORMIN (GLUCOPHAGE) 500 MG tablet TAKE 1 TABLET BY MOUTH EVERY DAY WITH BREAKFAST 90 tablet 3    omeprazole (PRILOSEC) 20 MG delayed release capsule TAKE 1 CAPSULE BY MOUTH EVERY DAY 90 capsule 3    linaclotide (LINZESS) 145 MCG capsule TAKE 1 CAPSULE BY MOUTH EVERY DAY IN THE MORNING BEFORE BREAKFAST 90 capsule 3    valsartan (DIOVAN) 80 MG tablet TAKE 1 TABLET BY MOUTH EVERY DAY 90 tablet 1    omega-3 acid ethyl esters (LOVAZA) 1 g capsule Take 1 capsule by mouth 2 times daily 180 capsule 1    acetaminophen (TYLENOL) 500 MG tablet Take 500 mg by mouth every 6 hours as needed for Pain      lidocaine (LIDODERM) 5 % PLACE 1 PATCH ONTO THE SKIN DAILY 12 HOURS ON, 12 HOURS OFF.  polyethylene glycol (GLYCOLAX) 17 g packet Take 17 g by mouth daily as needed       No current facility-administered medications for this visit. CC:    Patient is seen in Initial Evaluation for:  1. Preop cardiovascular exam    2. Pure hypercholesterolemia        HPI:  Patient is doing well without any specific cardiac problems. Patient denies any shortness of breath, chest pain, lightheadedness or dizziness. Patient is tolerating medications well without side effects. She is being evaluated for bariatric surgery.     ROS: General: No unusual weight gain, no change in exercise tolerance  Skin: No rash or itching  EENT: No vision changes or nosebleeds  Cardiovascular: No orthopnea or paroxysmal nocturnal dyspnea  Respiratory: No cough or hemoptysis  Gastrointestinal: No hematemesis or recent changes in bowel habits  Genitourinary: No hematuria, urgency or frequency  Musculoskeletal: No muscular weakness or joint swelling. Uses cane to aid in ambulation. Neurologic / Psychiatric: No incoordination or convulsions  Allergic / Immunologic/ Lymphatic / Endocrine: No anemia or bleeding tendency    Social History     Socioeconomic History    Marital status:      Spouse name: Not on file    Number of children: Not on file    Years of education: Not on file    Highest education level: Not on file   Occupational History     Employer: [employer]   Tobacco Use    Smoking status: Never Smoker    Smokeless tobacco: Never Used   Vaping Use    Vaping Use: Never used   Substance and Sexual Activity    Alcohol use: Never    Drug use: Never    Sexual activity: Not on file   Other Topics Concern    Not on file   Social History Narrative    Not on file     Social Determinants of Health     Financial Resource Strain: Low Risk     Difficulty of Paying Living Expenses: Not hard at all   Food Insecurity: No Food Insecurity    Worried About [address] [facility] in the Last Year: Never true    [address] [facility] [address] in the Last Year: Never true   Transportation Needs:     Lack of Transportation (Medical): Not on file    Lack of Transportation (Non-Medical):  Not on file   Physical Activity:     Days of Exercise per Week: Not on file    Minutes of Exercise per Session: Not on file   Stress:     Feeling of Stress : Not on file   Social Connections:     Frequency of Communication with Friends and Family: Not on file    Frequency of Social Gatherings with Friends and Family: Not on file    Attends Mosque Services: Not on file    Active Member of Clubs or Organizations: Not on file    Attends Club or Organization Meetings: Not on file    Marital Status: Not on file   Intimate Partner Violence:     Fear of Current or Ex-Partner: Not on file    Emotionally Abused: Not on file    Physically Abused: Not on file    Sexually Abused: Not on file   Housing Stability:     Unable to Pay for Housing in the Last Year: Not on file    Number of [place] in the Last Year: Not on file    Unstable Housing in the Last Year: Not on file       Family History   Problem Relation Age of Onset    Diabetes Mother     Diabetes Father    [employer]thritis Sister     Cancer Maternal Grandfather        Past Medical History:   Diagnosis Date    Back pain     Chronic fatigue     COVID-19 09/2020    mild per patient    Diabetes mellitus ([address]     History of blood transfusion     Hyperlipidemia     Hypothyroidism     IBS (irritable bowel syndrome)     Morbid obesity due to excess calories ([address]     Obesity     Osteoarthritis     PONV (postoperative nausea and vomiting)        PHYSICAL EXAM:  CONSTITUTIONAL:  Well developed, well nourished    Vitals:    05/11/22 1002   BP: 120/80   Pulse: 75   Resp: 18   Weight: 241 lb 6.4 oz (109.5 kg)   Height: 5' 3\" (1.6 m)     HEAD & FACE: Normocephalic. Symmetric. EYES: No xanthelasma. Conjunctivae not injected. EARS, NOSE, MOUTH & THROAT: Good dentition. No oral pallor or cyanosis. NECK: No JVD at 30 degrees. No thyromegaly. RESPIRATORY: Clear to auscultation and percussion in all fields. No use of accessory muscle or intercostal retractions. CARDIOVASCULAR: Regular rate and rhythm. No lifts or thrills on palpitation. Auscultation with normal S1-S2 in intensity and splitting. No carotid bruits. Abdominal aorta not enlarged. Femoral arteries without bruits. Pedal pulses 2+. No edema. ABDOMEN: Soft without hepatic or splenic enlargement. No tenderness.     MUSCULOSKELETAL: No kyphosis or scoliosis of the back. Good muscle strength and tone. No muscle atrophy. Normal gait and ability to undergo exercise stress testing. EXTREMITIES: No clubbing or cyanosis. SKIN: No Xanthomas or ulcerations. NEUROLOGIC: Oriented to time, place and person. Normal mood and affect. LYMPHATIC:  No palpable neck or supraclavicular nodes. No splenomegaly. EKG: the EKG tracing was reviewed and found to reveal: Normal sinus rhythm. Poor R wave progression. No change compared to prior tracing. ASSESSMENT:                                                     ORDERS:       Diagnosis Orders   1. Preop cardiovascular exam  EKG 12 Lead   2. Pure hypercholesterolemia       Above assessment cardiac issues stable. PLAN:   See above orders. Medication reconciliation completed. Old records were reviewed and found to reveal: Total cholesterol 208 on 1/18/2022. Echocardiogram dated 9/24/2018 unremarkable. Discussed risk of surgery in detail with patient. She is RCRI Class II with a 0.9% risk of major adverse cardiac event. No further evaluation indicated. Same medications.   Letter of preop restratification dictated to Dr. Ethel Jensen  Follow-up office visit prn

## 2022-05-13 RX ORDER — VALSARTAN 80 MG/1
TABLET ORAL
Qty: 90 TABLET | Refills: 1 | Status: SHIPPED
Start: 2022-05-13 | End: 2022-08-24

## 2022-05-17 RX ORDER — OMEGA-3-ACID ETHYL ESTERS 1 G/1
CAPSULE, LIQUID FILLED ORAL
Qty: 180 CAPSULE | Refills: 1 | Status: SHIPPED
Start: 2022-05-17 | End: 2022-07-25

## 2022-05-18 ENCOUNTER — OFFICE VISIT (OUTPATIENT)
Dept: ORTHOPEDIC SURGERY | Age: 62
End: 2022-05-18
Payer: COMMERCIAL

## 2022-05-18 VITALS — HEIGHT: 63 IN | WEIGHT: 237 LBS | BODY MASS INDEX: 41.99 KG/M2

## 2022-05-18 DIAGNOSIS — M25.511 RIGHT SHOULDER PAIN, UNSPECIFIED CHRONICITY: Primary | ICD-10-CM

## 2022-05-18 PROCEDURE — 99203 OFFICE O/P NEW LOW 30 MIN: CPT | Performed by: ORTHOPAEDIC SURGERY

## 2022-05-18 NOTE — PROGRESS NOTES
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        HPI:      Deborah Stephens is a 62y.o. year old female who is seen today  for evaluation of right shoulder pain. She reports the pain has been ongoing for the past {NUMBERS 1-10:53061} {days/wks/mos/yrs:753026}. She   {DOES:19736} recall a specific injury which started the pain.  ***. She reports the pain is worse with ***, better with ***. The patient {DOES/DOES NOT:38571} have mechanical symptoms. She {DOES:19736} have night pain. She denies a feeling of instability. The prior treatments have been {prev rx:711409}. The patient   {HAS/HAS NOT:441885700} responded to the treatment. The patient is right hand dominant. The patient is not working. The patient is retired.   ***    PAST MEDICAL HISTORY  Past Medical History:   Diagnosis Date    Back pain     Chronic fatigue     COVID-19 09/2020    mild per patient    Diabetes mellitus ([facility] [address]     History of blood transfusion     Hyperlipidemia     Hypothyroidism     IBS (irritable bowel syndrome)     Morbid obesity due to excess calories (HCC)     Obesity     Osteoarthritis     PONV (postoperative nausea and vomiting)        PAST SURGICAL HISTORY  Past Surgical History:   Procedure Laterality Date    BACK SURGERY      CHOLECYSTECTOMY      COLONOSCOPY      HYSTERECTOMY, TOTAL ABDOMINAL      INCONTINENCE SURGERY      BLADDER SLING    KNEE SURGERY Left     LUMBAR SPINE SURGERY N/A 5/25/2021    L3- L5 DECOMPRESSION AND FUSION , L4- L5 TRANSFORAMINAL LUMBAR INTERBODY FUSION --OARM, PATY TABLE, AUDIOLOGY, PLATES ,SCREWS, --NUVASIVE performed by Erick Kelley MD at [address] Bilateral 10/11/2021    BILATERAL SACROILIAC JOINT INJECTION UNDER FLUOROSCOPY (CPT 59643) performed by Debbe Lennox, MD at [address] Bilateral 11/8/2021    BILATERAL LUMBAR FACET INJECTION AT L5-S 1 FACETS BLOCK UNDER FLUOROSCOPIC GUIDANCE performed by Debbe Lennox, MD at [address] Bilateral 12/21/2021    BILATERAL S1, S2, S3 BRANCH & L5 [facility] NERVE BLOCK UNDER XRAY GUIDANCE (29740) (SEDATION) performed by Debbe Lennox, MD at [address] [place] [address] N/A 2/7/2022    LUMBAR EPIDURAL STEROID INJECTION UNDER FLUOROSCOPIC GUIDANCE AT L5-S1 PARAMEDIAN performed by Debbe Lennox, MD at [address] [facility] N/A 3/9/2022    EGD BIOPSY performed by Barbara Hector MD at [place] [address]   Family History   Problem Relation Age of Onset    Diabetes Mother     Diabetes Father    Josephine Lucernemines Rheum Arthritis Sister     Cancer Maternal Grandfather        SOCIAL HISTORY  Social History     Occupational History     Employer: [employer]   Tobacco Use    Smoking status: Never Smoker    Smokeless tobacco: Never Used   Vaping Use    Vaping Use: Never used   Substance and Sexual Activity    Alcohol use: Never    Drug use: Never    Sexual activity: Not on file       CURRENT MEDICATIONS     Current Outpatient Medications:     omega-3 acid ethyl esters (LOVAZA) 1 g capsule, TAKE 1 CAPSULE BY MOUTH TWICE A DAY, Disp: 180 capsule, Rfl: 1    valsartan (DIOVAN) 80 MG tablet, TAKE 1 TABLET BY MOUTH EVERY DAY, Disp: 90 tablet, Rfl: 1    oxyCODONE-acetaminophen (PERCOCET) 5-325 MG per tablet, Take 1 tablet by mouth as needed for Pain., Disp: , Rfl:     topiramate (TOPAMAX) 25 MG tablet, Take 1 tablet by mouth 2 times daily, Disp: 180 tablet, Rfl: 1    diclofenac sodium (VOLTAREN) 1 % GEL, APPLY 4 GRAMS TOPICALLY 4 TIMES DAILY AS NEEDED FOR PAIN, Disp: 100 g, Rfl: 3    cyclobenzaprine (FLEXERIL) 10 MG tablet, TAKE 1 TABLET BY MOUTH THREE TIMES A DAY AS NEEDED FOR MUSCLE SPASMS, Disp: 90 tablet, Rfl: 2    OZEMPIC, 1 MG/DOSE, 4 MG/3ML SOPN, INJECT 1 MG INTO THE SKIN ONCE A WEEK, Disp: 9 mL, Rfl: 2    gabapentin (NEURONTIN) 800 MG tablet, Take 1 tablet by mouth 3 times daily for 90 days. , Disp: 270 tablet, Rfl: 0    DULoxetine (CYMBALTA) 60 MG extended release capsule, Take 1 capsule by mouth daily, Disp: 90 capsule, Rfl: 1    pravastatin (PRAVACHOL) 10 MG tablet, TAKE 1 TABLET BY MOUTH EVERY OTHER DAY, Disp: 45 tablet, Rfl: 1    Handicap Placard MISC, by Does not apply route Patient cannot walk 200 ft without stopping to rest.   Expiration 5 yrs, Disp: 1 each, Rfl: 0    Multiple Vitamins-Minerals (THERAPEUTIC MULTIVITAMIN-MINERALS) tablet, Take 1 tablet by mouth daily, Disp: , Rfl:     levothyroxine (SYNTHROID) 175 MCG tablet, Take 1 tablet by mouth daily, Disp: 90 tablet, Rfl: 1    vitamin D (ERGOCALCIFEROL) 1.25 MG (95884 UT) CAPS capsule, TAKE 1 CAPSULE BY MOUTH ONE TIME PER WEEK, Disp: 12 capsule, Rfl: 1    vitamin B-12 (CYANOCOBALAMIN) 100 MCG tablet, Take 1 tablet by mouth daily, Disp: 90 tablet, Rfl: 3    torsemide (DEMADEX) 10 MG tablet, take 1 tablet by mouth once daily (Patient taking differently: as needed ), Disp: 30 tablet, Rfl: 3    Thiamine HCl (B-1) 100 MG TABS, Take 1 pill daily, Disp: 90 tablet, Rfl: 1    gemfibrozil (LOPID) 600 MG tablet, TAKE 1 TABLET BY MOUTH EVERY 12 HOURS, Disp: 180 tablet, Rfl: 3    metFORMIN (GLUCOPHAGE) 500 MG tablet, TAKE 1 TABLET BY MOUTH EVERY DAY WITH BREAKFAST, Disp: 90 tablet, Rfl: 3    omeprazole (PRILOSEC) 20 MG delayed release capsule, TAKE 1 CAPSULE BY MOUTH EVERY DAY, Disp: 90 capsule, Rfl: 3    linaclotide (LINZESS) 145 MCG capsule, TAKE 1 CAPSULE BY MOUTH EVERY DAY IN THE MORNING BEFORE BREAKFAST, Disp: 90 capsule, Rfl: 3    acetaminophen (TYLENOL) 500 MG tablet, Take 500 mg by mouth every 6 hours as needed for Pain, Disp: , Rfl:     lidocaine (LIDODERM) 5 %, PLACE 1 PATCH ONTO THE SKIN DAILY 12 HOURS ON, 12 HOURS OFF., Disp: , Rfl:     polyethylene glycol (GLYCOLAX) 17 g packet, Take 17 g by mouth daily as needed, Disp: , Rfl:     ALLERGIES  Allergies   Allergen Reactions    Bactrim [Sulfamethoxazole-Trimethoprim] Nausea Only    Ceftin [Cefuroxime] Rash    Keflex [Cephalexin] Rash       Controlled Substances Monitoring:        REVIEW OF SYSTEMS:     Constitutional:  Negative for weight loss, fevers, chills, fatigue  Cardiovascular: Negative for chest pain, palpitations  Pulmonary: Negative for shortness of breath, labored breathing, cough  GI: negative for abdominal pain, nausea, vomitting   MSK: per HPI  Skin: negative for rash, open wounds    All other systems reviewed and are negative           PHYSICAL EXAM     Vitals:    05/18/22 1047   Weight: 237 lb (107.5 kg)   Height: 5' 3\" (1.6 m)       Height: 5' 3\" (1.6 m)  Weight: 237 lb per pt BMI:  Body mass index is 41.98 kg/m². General: The patient is alert and oriented x 3, appears to be stated age and in no distress. HEENT: head is normocephalic, atraumatic. EOMI. Neck: supple, trachea midline, no thyromegaly   Cardiovascular: peripheral pulses palpable. Normal Capillary refill   Respiratory: breathing unlabored, chest expansion symmetric   Skin: no rash, no open wounds, no erythema  Psych: normal affect; mood stable  Neurologic: gait normal, sensation grossly intact in extremities  MSK:    Cervical Spine: There is no tenderness to palpation along the cervical spine.   Range of motion is normal.  Spurling's is negative    Shoulder Exam:   ***    IMAGING:     XRAY:  3 views of the right shoulder show ***    Radiographic findings reviewed with patient    ASSESSMENT   Right shoulder ***      PLAN  ***        Irene Hui MD  Orthopaedic Surgery   5/18/22  10:52 AM

## 2022-05-18 NOTE — PROGRESS NOTES
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        HPI:      Jori Amador is a 62y.o. year old female who is seen today  for evaluation of right shoulder pain. She reports the pain has been ongoing for the past 1 year. She   does not recall a specific injury which started the pain. She  reports the pain is worse with movement, better with rest.  The patient does have mechanical symptoms. She denies a feeling of instability. The prior treatments have been physical therapy and steroid injection. The patient   has not responded to the treatment. The patient is right hand dominant. The patient is not working. 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 Patient states her pain is worse with forward flexion above 90 degrees. Her pain is circumferentially located around her shoulder. She denies numbness or paresthesias in the right arm. She denies associated neck pain. She states the pain has been worsening within the last year. Imaging was ordered of her right shoulder and patient was instructed to follow-up for further management.     PAST MEDICAL HISTORY  Past Medical History:   Diagnosis Date    Back pain     Chronic fatigue     COVID-19 09/2020    mild per patient    Diabetes mellitus ([address]     History of blood transfusion     Hyperlipidemia     Hypothyroidism     IBS (irritable bowel syndrome)     Morbid obesity due to excess calories ([address]  Obesity     Osteoarthritis     PONV (postoperative nausea and vomiting)        PAST SURGICAL HISTORY  Past Surgical History:   Procedure Laterality Date    BACK SURGERY      CHOLECYSTECTOMY      COLONOSCOPY      HYSTERECTOMY, TOTAL ABDOMINAL      INCONTINENCE SURGERY      BLADDER SLING    KNEE SURGERY Left     LUMBAR SPINE SURGERY N/A 5/25/2021    L3- L5 DECOMPRESSION AND FUSION , L4- L5 TRANSFORAMINAL LUMBAR INTERBODY FUSION --OARM, PATY TABLE, AUDIOLOGY, PLATES ,SCREWS, --NUVASIVE performed by Manfred Vora MD at [facility] Bilateral 10/11/2021    BILATERAL SACROILIAC JOINT INJECTION UNDER FLUOROSCOPY (CPT 65146) performed by Gissell Garcia MD [address] Bilateral 11/8/2021    BILATERAL LUMBAR FACET INJECTION AT L5-S 1 FACETS BLOCK UNDER FLUOROSCOPIC GUIDANCE performed by Gissell Garcia MD at [facility] Bilateral 12/21/2021    BILATERAL S1, S2, S3 BRANCH & L5 [facility] NERVE BLOCK UNDER XRAY GUIDANCE (84752) (SEDATION) performed by Gissell Garcia MD at [address] N/A 2/7/2022    LUMBAR EPIDURAL STEROID INJECTION UNDER FLUOROSCOPIC GUIDANCE AT L5-S1 PARAMEDIAN performed by Gissell Garcia MD at [facility] OR    UPPER GASTROINTESTINAL ENDOSCOPY N/A 3/9/2022    EGD BIOPSY performed by Torie Dela Cruz MD at [address] HISTORY   Family History   Problem Relation Age of Onset    Diabetes Mother     Diabetes Father    Rosario Rheum Arthritis Sister     Cancer Maternal Grandfather        SOCIAL HISTORY  Social History     Occupational History     Employer: [employer]   Tobacco Use    Smoking status: Never Smoker    Smokeless tobacco: Never Used   Vaping Use    Vaping Use: Never used   Substance and Sexual Activity    Alcohol use: Never    Drug use: Never    Sexual activity: Not on file       CURRENT MEDICATIONS     Current Outpatient Medications:     omega-3 acid ethyl esters (LOVAZA) 1 g capsule, TAKE 1 CAPSULE BY MOUTH TWICE A DAY, Disp: 180 capsule, Rfl: 1    valsartan (DIOVAN) 80 MG tablet, TAKE 1 TABLET BY MOUTH EVERY DAY, Disp: 90 tablet, Rfl: 1    oxyCODONE-acetaminophen (PERCOCET) 5-325 MG per tablet, Take 1 tablet by mouth as needed for Pain., Disp: , Rfl:     topiramate (TOPAMAX) 25 MG tablet, Take 1 tablet by mouth 2 times daily, Disp: 180 tablet, Rfl: 1    diclofenac sodium (VOLTAREN) 1 % GEL, APPLY 4 GRAMS TOPICALLY 4 TIMES DAILY AS NEEDED FOR PAIN, Disp: 100 g, Rfl: 3    cyclobenzaprine (FLEXERIL) 10 MG tablet, TAKE 1 TABLET BY MOUTH THREE TIMES A DAY AS NEEDED FOR MUSCLE SPASMS, Disp: 90 tablet, Rfl: 2    OZEMPIC, 1 MG/DOSE, 4 MG/3ML SOPN, INJECT 1 MG INTO THE SKIN ONCE A WEEK, Disp: 9 mL, Rfl: 2    gabapentin (NEURONTIN) 800 MG tablet, Take 1 tablet by mouth 3 times daily for 90 days. , Disp: 270 tablet, Rfl: 0    DULoxetine (CYMBALTA) 60 MG extended release capsule, Take 1 capsule by mouth daily, Disp: 90 capsule, Rfl: 1    pravastatin (PRAVACHOL) 10 MG tablet, TAKE 1 TABLET BY MOUTH EVERY OTHER DAY, Disp: 45 tablet, Rfl: 1    Handicap Placard MISC, by Does not apply route Patient cannot walk 200 ft without stopping to rest.   Expiration 5 yrs, Disp: 1 each, Rfl: 0    Multiple Vitamins-Minerals (THERAPEUTIC MULTIVITAMIN-MINERALS) tablet, Take 1 tablet by mouth daily, Disp: , Rfl:     levothyroxine (SYNTHROID) 175 MCG tablet, Take 1 tablet by mouth daily, Disp: 90 tablet, Rfl: 1    vitamin D (ERGOCALCIFEROL) 1.25 MG (00374 UT) CAPS capsule, TAKE 1 CAPSULE BY MOUTH ONE TIME PER WEEK, Disp: 12 capsule, Rfl: 1    vitamin B-12 (CYANOCOBALAMIN) 100 MCG tablet, Take 1 tablet by mouth daily, Disp: 90 tablet, Rfl: 3    torsemide (DEMADEX) 10 MG tablet, take 1 tablet by mouth once daily (Patient taking differently: as needed ), Disp: 30 tablet, Rfl: 3    Thiamine HCl (B-1) 100 MG TABS, Take 1 pill daily, Disp: 90 tablet, Rfl: 1    gemfibrozil (LOPID) 600 MG tablet, TAKE 1 TABLET BY MOUTH EVERY 12 HOURS, Disp: 180 tablet, Rfl: 3    metFORMIN (GLUCOPHAGE) 500 MG tablet, TAKE 1 TABLET BY MOUTH EVERY DAY WITH BREAKFAST, Disp: 90 tablet, Rfl: 3    omeprazole (PRILOSEC) 20 MG delayed release capsule, TAKE 1 CAPSULE BY MOUTH EVERY DAY, Disp: 90 capsule, Rfl: 3    linaclotide (LINZESS) 145 MCG capsule, TAKE 1 CAPSULE BY MOUTH EVERY DAY IN THE MORNING BEFORE BREAKFAST, Disp: 90 capsule, Rfl: 3    acetaminophen (TYLENOL) 500 MG tablet, Take 500 mg by mouth every 6 hours as needed for Pain, Disp: , Rfl:     lidocaine (LIDODERM) 5 %, PLACE 1 PATCH ONTO THE SKIN DAILY 12 HOURS ON, 12 HOURS OFF., Disp: , Rfl:     polyethylene glycol (GLYCOLAX) 17 g packet, Take 17 g by mouth daily as needed, Disp: , Rfl:     ALLERGIES  Allergies   Allergen Reactions    Bactrim [Sulfamethoxazole-Trimethoprim] Nausea Only    Ceftin [Cefuroxime] Rash    Keflex [Cephalexin] Rash       Controlled Substances Monitoring:        REVIEW OF SYSTEMS:     Constitutional:  Negative for weight loss, fevers, chills, fatigue  Cardiovascular: Negative for chest pain, palpitations  Pulmonary: Negative for shortness of breath, labored breathing, cough  GI: negative for abdominal pain, nausea, vomitting   MSK: per HPI  Skin: negative for rash, open wounds    All other systems reviewed and are negative           PHYSICAL EXAM     Vitals:    05/18/22 1047   Weight: 237 lb (107.5 kg)   Height: 5' 3\" (1.6 m)       Height: 5' 3\" (1.6 m)  Weight: [unfilled]  BMI:  Body mass index is 41.98 kg/m². General: The patient is alert and oriented x 3, appears to be stated age and in no distress. HEENT: head is normocephalic, atraumatic. EOMI. Neck: supple, trachea midline, no thyromegaly   Cardiovascular: peripheral pulses palpable.   Normal Capillary refill   Respiratory: breathing unlabored, chest expansion symmetric   Skin: no rash, no open wounds, no erythema  Psych: normal affect; mood stable  Neurologic: gait normal, sensation grossly intact in extremities  MSK:    Cervical Spine: There is no tenderness to palpation along the cervical spine. Range of motion is normal.  Spurling's is negative    Shoulder Exam:   Skin is intact about the right shoulder. No obvious deformity or effusion palpable. There is some crepitus noted with motion. Active and passive motion is limited in forward flexion at 100 degrees. Internal rotation to L5. External rotation 25 degrees. Tenderness to palpation about the right AC joint. Tenderness about the biceps tendon anteriorly. Marcia's test positive for pain and slight weakness. Equivocal speeds test.  Positive Zelienople's test.  Negative ER lag sign. Negative belly press. Sensation is intact to light touch distally about the right upper extremity. Right upper extremity is warm and well-perfused. IMAGING:     X-ray right shoulder reviewed from 1/18/2022 demonstrating arthritic changes about the undersurface of the acromion. There is no obvious superior migration of the humeral head or fractures noted on radiographs. MRI right shoulder reviewed from 2/8/2022 demonstrating partial fraying of the articular surface of the supraspinatus tendon. There are some arthritic degenerative changes noted about the inferior aspect of the acromioclavicular joint. Radiographic findings reviewed with patient    ASSESSMENT   Right shoulder rotator cuff and biceps tendinosis      PLAN  Patient was seen and examined in office today. We reviewed patient's history, exam, imaging in detail. Based on information provided, patient likely to have multiple pathologies including rotator cuff and biceps tendinosis. Patient has tried formal physical therapy as well as subacromial steroid injection to date. Neither intervention has provided much relief. We did discuss other conservative treatment options at this time as well as possible surgical intervention.   Patient states her pain is not at a point where she would like to proceed with further intervention. She is okay to continue with home exercises for now. We will plan on seeing patient back in office in 3 months for repeat evaluation. At that time, if patient continues to have persistent symptomatology, we may consider shoulder arthroscopy. Gen Cuevas,   Orthopedic surgery resident  PGY-3    Staff Addendum    I have seen and evaluated the patient and agree with the assessment and plan as documented by the orthopaedic surgery resident. I have performed the key components of the history and physical examination and concur with the findings and plan, and have made changes where appropriate/necessary.           Leobardo Monterroso MD  [address] [place] [address]

## 2022-05-19 DIAGNOSIS — Z98.1 S/P LUMBAR FUSION: Primary | ICD-10-CM

## 2022-05-20 ENCOUNTER — INITIAL CONSULT (OUTPATIENT)
Dept: BARIATRICS/WEIGHT MGMT | Age: 62
End: 2022-05-20
Payer: COMMERCIAL

## 2022-05-20 DIAGNOSIS — Z71.89 ENCOUNTER FOR PSYCHOLOGICAL ASSESSMENT PRIOR TO BARIATRIC SURGERY: Primary | ICD-10-CM

## 2022-05-20 DIAGNOSIS — F50.9 COMPULSIVE EATING PATTERNS: ICD-10-CM

## 2022-05-20 PROCEDURE — 90791 PSYCH DIAGNOSTIC EVALUATION: CPT | Performed by: SOCIAL WORKER

## 2022-05-20 NOTE — PROGRESS NOTES
Diagnostic Evaluation without Medical Services. Lisa Arroyo is a 62 y.o. ,   female, referred by Pain Management for evaluation and treatment. Patient identify verified by Name and . Those attending session : patient      Chief Complaint   Patient presents with    Consultation     Evaluation for baritatric surgery         Motivation for surgery: Be happier, lose some eight. Understanding of procedure: The patient has researched the procedure and understands the possibility of potential weight gain. , The patient  has not talked with other people who have undergone the procedure. and The patient  has not attended a gastric bypas surgery group. Reported Current weight 235. Wt Readings from Last 3 Encounters:   22 237 lb (107.5 kg)   22 241 lb 6.4 oz (109.5 kg)   05/10/22 235 lb (106.6 kg)       Expectations: The patient expects to loose 40 lbs following surgery over 12 months. Goal weight post surgery: 135 lbs. Other expectations: just lose weight    HISTORY OF PRESENT ILLNESS       EAT/WEIGHT HISTORY    How old were you when you first became concerned with your weight? Different times  Most successful diet in the past? exercise and eating better  Weight lost on the diet listed above? 35  Patient stated he / she maintained his / her weight for the following time? 3 or 4 yrs  How much control over your eating do you feel you Have? \"Im pretty good\"       Cravings: For what types of food: chicken, popcorn, vegistick                  Strategies used to deal with cravings: find something else to eat. Eating habits: Generally eats three meals and some snack, dex crakers or popcorn. drinks unsweet tea or splenda. Emotional eating: could not identify      BINGE EATING    Recurrent episodes of binge eating: An episode is characterized by:  1.  Eating a larger amount of food than normal during a short period of time (within any two hour period): No  2. Lack of control over eating during the binge episode (i.e. The feeling that one cannot stop eating): No  Both Symptoms Met: No    Binge eating episodes are associated with three or more of the followin. Eating until feeling uncomfortably full: No  2. Eating large amounts of food when not physically hungary: No  3. Eating much more rapidly than normal: No  4. Eating alone because you are embarrassed by how much you are eating; No  5. Feeling disgusted, depressed, or guilty after eating: No  THREE ASSOCIATED SYMPTOMS MET:No    Marked distress regarding binge eating is present: No    Binge eating occurs at least once a week for 3 months: No  The patient reports at least NA binge episodes per week for the past NA months. COMPULSIVE EATING PATTERN    1. Compulsive overeating without thoughts to harmful consequences (weight gain, diabetes, chronic pain, low self esteem); Yes  2. Inability to reduce or change continuous patterns of food intake (snacking/grazing, overeating foods that are high in simple carbs and/or fats); Yes  3. Continued compulsive eating in spite of negative consequences. (Obesity, diabetes complications, others);  Yes    The binge eating is not associated with the regular use of inappropriate compensatory behavior (i.e. Purging, excessive exercise etc) and does not occur exclusively during the course of bulimia nervosa or anorexia nervosa: No    PATIENT MEETS ABOVE CRITERIA FOR  EATING DISORDER: Yes    What lifestyle changes started: Cutting back on carbs, eating less candy and ice cream,        MEDICAL PROBLEMS    Medical History:  Past Medical History:   Diagnosis Date    Back pain     Chronic fatigue     COVID-19 2020    mild per patient    Diabetes mellitus ([address]     History of blood transfusion     Hyperlipidemia     Hypothyroidism     IBS (irritable bowel syndrome)     Morbid obesity due to excess calories (HCC)     Obesity     Osteoarthritis     PONV (postoperative nausea and vomiting)         Current Outpatient Medications   Medication Sig Dispense Refill    omega-3 acid ethyl esters (LOVAZA) 1 g capsule TAKE 1 CAPSULE BY MOUTH TWICE A  capsule 1    valsartan (DIOVAN) 80 MG tablet TAKE 1 TABLET BY MOUTH EVERY DAY 90 tablet 1    oxyCODONE-acetaminophen (PERCOCET) 5-325 MG per tablet Take 1 tablet by mouth as needed for Pain.  topiramate (TOPAMAX) 25 MG tablet Take 1 tablet by mouth 2 times daily 180 tablet 1    diclofenac sodium (VOLTAREN) 1 % GEL APPLY 4 GRAMS TOPICALLY 4 TIMES DAILY AS NEEDED FOR PAIN 100 g 3    cyclobenzaprine (FLEXERIL) 10 MG tablet TAKE 1 TABLET BY MOUTH THREE TIMES A DAY AS NEEDED FOR MUSCLE SPASMS 90 tablet 2    OZEMPIC, 1 MG/DOSE, 4 MG/3ML SOPN INJECT 1 MG INTO THE SKIN ONCE A WEEK 9 mL 2    gabapentin (NEURONTIN) 800 MG tablet Take 1 tablet by mouth 3 times daily for 90 days.  270 tablet 0    DULoxetine (CYMBALTA) 60 MG extended release capsule Take 1 capsule by mouth daily 90 capsule 1    pravastatin (PRAVACHOL) 10 MG tablet TAKE 1 TABLET BY MOUTH EVERY OTHER DAY 45 tablet 1    Handicap Placard MISC by Does not apply route Patient cannot walk 200 ft without stopping to rest.    Expiration 5 yrs 1 each 0    Multiple Vitamins-Minerals (THERAPEUTIC MULTIVITAMIN-MINERALS) tablet Take 1 tablet by mouth daily      levothyroxine (SYNTHROID) 175 MCG tablet Take 1 tablet by mouth daily 90 tablet 1    vitamin D (ERGOCALCIFEROL) 1.25 MG (65398 UT) CAPS capsule TAKE 1 CAPSULE BY MOUTH ONE TIME PER WEEK 12 capsule 1    vitamin B-12 (CYANOCOBALAMIN) 100 MCG tablet Take 1 tablet by mouth daily 90 tablet 3    torsemide (DEMADEX) 10 MG tablet take 1 tablet by mouth once daily (Patient taking differently: as needed ) 30 tablet 3    Thiamine HCl (B-1) 100 MG TABS Take 1 pill daily 90 tablet 1    gemfibrozil (LOPID) 600 MG tablet TAKE 1 TABLET BY MOUTH EVERY 12 HOURS 180 tablet 3    metFORMIN (GLUCOPHAGE) 500 MG tablet TAKE 1 TABLET BY MOUTH EVERY DAY WITH BREAKFAST 90 tablet 3    omeprazole (PRILOSEC) 20 MG delayed release capsule TAKE 1 CAPSULE BY MOUTH EVERY DAY 90 capsule 3    linaclotide (LINZESS) 145 MCG capsule TAKE 1 CAPSULE BY MOUTH EVERY DAY IN THE MORNING BEFORE BREAKFAST 90 capsule 3    acetaminophen (TYLENOL) 500 MG tablet Take 500 mg by mouth every 6 hours as needed for Pain      lidocaine (LIDODERM) 5 % PLACE 1 PATCH ONTO THE SKIN DAILY 12 HOURS ON, 12 HOURS OFF.  polyethylene glycol (GLYCOLAX) 17 g packet Take 17 g by mouth daily as needed       No current facility-administered medications for this visit. PSYCHIATRIC HISTORY    Past Psychiatric History: MH: none    Family Psychiatric History: No report family hx of mental health problem, some excssive alcohol use in her fathers family. Family History of Obesity? Yes Other family members with weight problems: sisters and mother. CURRENT/RECENT CHEMICAL USE: PT stated that she has never used alcohol. tobacco:  never used  caffeine:  occasionally drinks ice tea or coffee. PSYCHOSOCIAL STRESSORS    Living Arrangements: Lives in her own home with her (Doug) and her son  Children: Daysi Medrano 27, Radha Nava 28, Jr 45,    Employment: Unemployed, not seeking work  Financial Savings and tax returns,   Legal none  Domestic Assessment   none reported  The patient reports the following stressors: none    PSYCHOSOCIAL HISTORY    The patient was born and raised in [facility]. The patient raised in an 2 parent home  Describes childhood as: PT reported having a happy childhood. Siblings: Rod King 61 and Robert Ardon  Family relationships: Mother passed away May 13, PT reported having good relationships with both parents and siblings. She also reported having good relationships with  and children.    Sexual orientation/gender identification: Heterosexual   history:none  Spiritual/ Uatsdin orientation: Mu-ism Congregational  Cultural beliefs: Being kind   Educational history: PT reported being an average student. \"School was OK\" No reported learning disabilities or delays. Did not have collage but did have Nurses aid classes and worked as a nurses aid for 40 years. Abuse History: No  Trauma: no    The patient reports the following strengths: Being kind, cooking, [place] and [place] friends. Mental Status Exam: appearance:  appropriately dressed and appropriately groomed, behavior:  normal, attitude:  cooperative, speech:  appropriate, mood:  euthymic, affect:  congruent with mood, thought content:  no evidence of psychosis, thought process:  logical and coherent, orientation:  oriented in all spheres, memory:  recent:  fair and remote:  good, insight:  fair , judgment:  fair  and cognitive:  intact and intelligent    RISK ASSESSMENT    Suicide screen: denies current suicidal ideation, plan and intent    Protective factors are NA     Self Injurious Behavior: denies    Homicide screen: denies current homicidal ideation, plan and intent    History of Violence: denies    Access to Guns/Weapons: no    CLINICAL ASSESSMENT    Major Psychiatric Contraindications for surgery: none        The patient appeared to have reasonable expectations regarding surgery. Patient was fairly informed about the surgery and changes needed post surgery. The patient appears to be motivated to make lifestyle changes as evidenced by  meal plan changes. The patient appears to have fair social support as evidenced by family supporting    Partner's and Family's evidence of level of support for surgery: Changed own dietary habilts  agree with decision for surgery     Other social supports: PT has not told her friends. DIAGNOSIS:   Encounter Diagnoses   Name Primary?  Encounter for psychological assessment prior to bariatric surgery Yes    Compulsive eating patterns         TREATMENT PLAN    Patient Goals:  Increased understanding of role of emotional factors contributing to issues with food and obesity and strategies to cope with these. Interventions in session: Explored emotional, behavioral, cognitive and environmental factors contributing to issues with food and obesity, with goal of promoting optimal post bariatric surgery outcome. Discussed the importance of attending support group and reinforced the benefits of attending. Provided pt. with hand out \"Why We Eat\", \"Reality Journal\" and \"Identifying and Handling Cravings\"     Safety Plan: not applicable     Assignments/Recommendations: 1-2 follow-up sessions with SW for further education/evaluation. Complete entries in \"Why We Eat\", \"Reality Journal\" and \"Identifying and Handling Cravings\" to discuss next session. Att[place] support group. Follow-up with: referrals/present providers/all scheduled appointments at [place]. Handouts provided  In office. Next steps: Schedule follow up with me for  30MIN and 60MIN in 8 weeks    Bariatric Surgery: Based on the information gathered through the interview process - there is no current evidence of mental health or substance abuse issues that would impact on the patient receiving bariatric surgery.     Patient and/or family/guardian verbalizes understanding of and agreement with treatment recommendations and plan: yes    Start time: 1:00 pm          End time: 2:00 pm     Visit Time: [address], [place]

## 2022-05-23 ENCOUNTER — OFFICE VISIT (OUTPATIENT)
Dept: NEUROSURGERY | Age: 62
End: 2022-05-23
Payer: COMMERCIAL

## 2022-05-23 ENCOUNTER — TELEPHONE (OUTPATIENT)
Dept: NEUROSURGERY | Age: 62
End: 2022-05-23

## 2022-05-23 ENCOUNTER — HOSPITAL ENCOUNTER (OUTPATIENT)
Dept: GENERAL RADIOLOGY | Age: 62
Discharge: HOME | End: 2022-05-25
Payer: COMMERCIAL

## 2022-05-23 ENCOUNTER — HOSPITAL ENCOUNTER (OUTPATIENT)
Age: 62
Discharge: HOME | End: 2022-05-25
Payer: COMMERCIAL

## 2022-05-23 VITALS
HEIGHT: 63 IN | WEIGHT: 237 LBS | SYSTOLIC BLOOD PRESSURE: 129 MMHG | DIASTOLIC BLOOD PRESSURE: 79 MMHG | HEART RATE: 73 BPM | TEMPERATURE: 98.1 F | OXYGEN SATURATION: 99 % | RESPIRATION RATE: 20 BRPM | BODY MASS INDEX: 41.99 KG/M2

## 2022-05-23 DIAGNOSIS — Z98.1 S/P LUMBAR FUSION: ICD-10-CM

## 2022-05-23 DIAGNOSIS — M54.16 LUMBAR RADICULOPATHY: Primary | ICD-10-CM

## 2022-05-23 PROCEDURE — 72100 X-RAY EXAM L-S SPINE 2/3 VWS: CPT

## 2022-05-23 PROCEDURE — 99213 OFFICE O/P EST LOW 20 MIN: CPT | Performed by: PHYSICIAN ASSISTANT

## 2022-05-23 NOTE — PROGRESS NOTES
Post Operative Follow-up     This is a 62year old female who presents to the office for a 1 year follow-up s/p L3-L5 PLIF     Subjective: Patient states she has been doing [place]. \" She is c/o intermittent pain and numbness down her legs, right greater than left. Also having low back pain. Following up with pain management for injections. Denies new pain, weakness, numbness, or tingling. Lumbar x-rays reviewed.      Physical Exam:              WDWN, no apparent distress              Non-labored breathing               Vitals Stable              Alert and oriented x3              CN 3-12 intact              PERRL              EOMI              ORTIZ well              Motor strength symmetric              Sensation to LT intact bilaterally   Incision healed well with no signs of infection                 Imagin22 lumbar x-rays:   Impression   Intact lumbar fusion hardware with stable alignment.  Degenerative disc   disease with pronounced findings at L5-S1 as before.              Assessment: This is a 62 y.o.  female presenting for a 1 year follow-up s/p L3-L5 PLIF. Stable.      Plan:  -Lumbar CT myelogram ordered to further evaluate fusion  -OARRS report reviewed  -Follow-up in neurosurgery clinic after completion of imaging to discuss results and further treatment plan  -Call or return to neurosurgery office sooner if symptoms worsen or if new issues arise in the interim.     Electronically signed by Víctor Mandujano PA-C on 2022 at 1:49 PM

## 2022-05-23 NOTE — TELEPHONE ENCOUNTER
Authorization Number:[id number]  Case Number: [id number]     Status: Approved  P2P Status:   Approval Date: 5/23/2022 3:21:17 PM  Service Code: [id number]  Service Description: [address] [place] [address] W/ CONTRAST  Site Name: Renan Thao  Expiration Date: 7/22/2022  Date Last Updated: 5/23/2022 3:21:48 PM  Correspondence:    Procedures  Procedure Description Sangita Harry Requested Qty Approved Modifier(s)  65821  Computed Tomography (CT) Lumbar Spine with Contrast, a special picture study of the lower part of the spine using injected dye 1 1

## 2022-05-24 NOTE — TELEPHONE ENCOUNTER
CT/MYELOGRAM Lumbar   Date:6/16/22  Facility:[facility]  Arrival Time:9:00am    NPO after Midnight  Bring   Will be at facility [employer] ID, insurance cards, covid vaccine card and list of current medications. ASA/Ibuprofen/Blood Thinners to be held for 7days prior  Antidepressants:Cymbalta hold for 3 days prior    Labs: PT/INR, Platelets  Covid test 5 days prior if not vaccinated      Pt informed of appt date, time, location and instructions. Pt acknowledged.

## 2022-06-06 ENCOUNTER — INITIAL CONSULT (OUTPATIENT)
Dept: BARIATRICS/WEIGHT MGMT | Age: 62
End: 2022-06-06

## 2022-06-06 ENCOUNTER — TELEPHONE (OUTPATIENT)
Dept: BARIATRICS/WEIGHT MGMT | Age: 62
End: 2022-06-06

## 2022-06-06 VITALS — WEIGHT: 233 LBS | HEIGHT: 63 IN | BODY MASS INDEX: 41.29 KG/M2

## 2022-06-06 DIAGNOSIS — Z78.9 NONSMOKER: Primary | ICD-10-CM

## 2022-06-06 DIAGNOSIS — Z78.9 NONSMOKER: ICD-10-CM

## 2022-06-06 DIAGNOSIS — Z00.8 NUTRITIONAL ASSESSMENT: Primary | ICD-10-CM

## 2022-06-06 DIAGNOSIS — Z71.3 NUTRITIONAL COUNSELING: ICD-10-CM

## 2022-06-06 DIAGNOSIS — Z02.6 ENCOUNTER FOR EXAMINATION FOR INSURANCE PURPOSES: ICD-10-CM

## 2022-06-06 PROCEDURE — 99999 PR OFFICE/OUTPT VISIT,PROCEDURE ONLY: CPT | Performed by: SURGERY

## 2022-06-06 NOTE — TELEPHONE ENCOUNTER
Last Dietary Appointment Notes: 22    Conchis SCHMITT [address] [facility] [address]: ILIANA Gonzalez [address]    Surgery Requested by Patient: As of 22 - RYGB    Date: 2 Hour Nutrition Class: Once all testing is complete    Rd / Ld reviewed the following with the patient:    Rd / Ld at the [facility] reviewed with the patient that he / she has not completed the following in order to proceed with bariatric surgery:     - Initial Appt with Surgeon was 22. Initial Appt is only good until 23. Testing will be required again after this date per your insurance company policy. Please remember just because you finished all of your requirements if you did not finish the requirements in a timely manner they can  and you can be required to complete these requirements over again. Each [employer] has its own set of requirements with its own set of deadlines. Once everything listed below is completed you will need to complete the following to proceed with sx. The [facility] will contact you to complete this process. 1. You will be called in order to select your surgery date for insurance submission. 2. You will be called and scheduled to attend a 3 Hour Nutrition Class on the type of surgery you are having completed. These are always scheduled on  from 10:00 am to 1:00 pm and dates vary depending on the type of surgery you are having completed. You will need to purchase your bariatric supplements at this appointment cost is $240.00 to $290.00. Failure to purchase supplements or attend the class will lead to your surgery being cancelled. 3. You will need final Medical Clearance from your Primary Care Physician. Failure to complete will lead to your surgery being cancelled. 4. You will be scheduled for a H&P appointment with your surgeon . It is at this appointment you will need to make goal weight. Failure to complete will lead to your surgery being cancelled.     5. You will be scheduled for PAT at [address] [place] [address] Hospital usually the week before your scheduled surgery. Failure to complete will lead to your surgery being cancelled. Remember after all testing that is required it is your responsibility as a patient to call [facility] to review that we received any testing results or requirements that you had completed. [facility] is not responsible for tracking of results and testing. Your phone call will help facilitate if what is required was received and completed. - Nicotine Script Given - 6/6/22 //  Draw Date ASAP // Pt instructed to call 10 day's after to review results. - Pt is a Class II with Cardiac Clearance - Surgeon See Note  - Pt is working on getting her Psych Eval completed with Kenny Melendez / Kam at the [facility] reviewed with the patient that he / she is at his / her goal weight for surgery and can not gain weight from now until surgery:  Yes. Patient is aware weight gain at H&P will cancel the patients surgery date. Pre-Op Weight Loss Goal: 239 lbs. Pt currently weighs 233 lbs. Juwan / Ld reviewed with the patient that he / she must purchase a 3 month supply of supplements before his / her surgery or at the time of his / her H&P appointment and the patient states he / she is going to purchase the 3 month supply of supplements on H&P. Patient is aware that failure to purchase the supplements at this appointment will cancel the patients surgery date. Patient states at this time from the time of his / her initial consult here at the [facility] there has been no changes in his / her medical history. Patient is aware failure to disclose information can lead to his / her surgery being cancelled. Patient received a copy of this at the time of his / her final dietary consult.

## 2022-06-06 NOTE — PROGRESS NOTES
Weight Loss Assessment: Completed by Nutrition Services RD/SOLOMON Certified in Adult Weight Management:  Phone Number:  [phone]  Fax Number:   [phone]    Raquel Padron   6/6/22  Weight Loss Appointment: 4th Weight Loss Appointment      Wt Readings from Last 3 Encounters:   06/06/22 233 lb (105.7 kg)   05/23/22 237 lb (107.5 kg)   05/18/22 237 lb (107.5 kg)        233 lb (105.7 kg)  IBW: 147 lbs         % IBW: 159%       % EBWL: 16%           ABW: 169 lbs  % ABW: 138%       BMI: Body mass index is 41.27 kg/m². Patient's 24 Hour Recall:  Breakfast: Fiber One Cereal  and 1/2c of Milk, 2 Strawberries  Snack: None  Lunch: Salad with [employer]  Snack: None  Dinner: Salad   Snack: None  Water Intake: Few Cup's  Other Beverages: Cup of Coffee  Exercise: ADL' s - Fast Track - Occasionally 10 minutes at a time. Education:    1. Weigh yourself daily and record it. 2. Keep documented food records daily   3. 220-225 minutes a week of moderate physical activity   4. Just be more active in day to day routine   5. Higher protein intake and a higher fiber intake. Not a high protein or a high fiber diet just a higher intake. Juwan Humphrey addressed the following with the pt:  - Juwan Humphrey enc pt to comply with nutrition recommendations  - Juwan Humphrey enc to go back to maintenance of regular physical activity  - Periodic assessment to prevent and treat eating or other psychiatric disorders   - Juwan / Solomon enc participation in support group meetings  - Juwan Humphrey enc pt to go back to maintenance of daily food records and weight monitoring records   - Juwan Humphrey reviewed the importance of adequate sleep and stress management  - Juwan Humphrey reviewed nonfood strategies to cope with emotions and stress  - Juwan Humphrey encouraged pt to practice the following: Mindful eating: Eating slowly:  Focusing on the eating experience without distraction  - Juwan Humphrey enc. pt to pay attention to hunger and fullness cues  - Juwan / Solomon enc meal planning  - Juwan / Solomon  enc pt to chose nutrient dense whole foods instead of soft, high calorie foods  - Rd / Ld enc not drinking large amounts of fluids with or immediately after meals    Portion control, meal planning and avoiding empty calorie consumption. Weight loss goal for next follow-up appointment: 5 lbs    Patient has established the following three goals for the next follow-up appointment. 1.Pt states she wants to increase water intake. Patient was instructed on the importance of increasing water intake to 48 - 64 oz. of water total daily. Pt. was also instructed he / she is allowed an additional 30 oz. of sugar free caffeine free clear liquid beverages for a total of 90 oz. of fluid total daily. Pt. was able to verbalize how he / she can get more water and fluids within the diet. Pt. verbalized understanding. 2. Pt states she wants to increase exercise and PA. 3. Pt states she wants to work on portion control. Pt states he / she wants to work on decreasing the volume of food he / she is consuming daily. Pt states he / she is going to purchase a scale and measuring cups to start to weigh and measure all foods he / she is consuming. Pt is going to prepare meals and snacks ahead of time that are portion controlled. Pt is going to reduce his / her plate size to a saucer for meals to hep with head hunger and portion distortion. Pt states if he / she is being served a meal pt is going to cut the meals in half in places where he / she is not able to use a scale in order to reduce the volume of food he / she is consuming. Pt is encouraged to eat the meal slowly and chew all bites 30 - 35 times per bite in order to increase his / her feeling of fullness and satiety. Pt is also encouraged to use smaller silverware and serving utensils to reduce the volume of food he / she is consuming. Pt states he / she is going to keep accurate food records to help with accurate portion consumption.  Portions before surgery pt can review the following reference - Serving Size versus Portion Size Is There a Difference - Academy of Nutrition and Dietetics. Portions after Weight Loss Surgery pt can review the following reference - Sample menus offered in the [facility] Diet Manual. Pt verbalized understanding. Patient has established the following exercise goal for next follow-up appointment:  ADL' s - Fast Track - 4 Time's a week -  10 minutes at a time. Did the patient keep food records:Yes    Pt. is aware if they do not comply with [facility] Guidelines that this can lead to the patient being dismissed from the program.    The registered dietitian spent the following time 60 minutes educating the patient and providing the patient with nutritional handouts to follow. __________________________________________________________________________________  Primary Care Physician Follow-up:  Pt. was seen by Aurelia Aponte RD/SOLOMON regarding weight loss education and follow-up on 6/6/22. This was the patients 4th appointment with the registered dietitian. The registered dietitian spent the following amount of time with the patient 60 minutes. Please [place] the following: The Primary Care Physician reviewed the above nutrition assessment and patient education and agrees with current diet plan. The Primary Care Physician wants the current diet plan changed to the following:_____________________________________________________________________________________________________________________________________________________________________________________________________________. Physician Signature:__________________________ Date:______________  Once signed please fax back to the [facility] [phone]. We thank you for allowing us to participate in your patients care.

## 2022-06-09 ENCOUNTER — OFFICE VISIT (OUTPATIENT)
Dept: PAIN MANAGEMENT | Age: 62
End: 2022-06-09
Payer: COMMERCIAL

## 2022-06-09 VITALS
HEART RATE: 49 BPM | RESPIRATION RATE: 16 BRPM | TEMPERATURE: 95.5 F | OXYGEN SATURATION: 94 % | DIASTOLIC BLOOD PRESSURE: 85 MMHG | SYSTOLIC BLOOD PRESSURE: 131 MMHG

## 2022-06-09 DIAGNOSIS — M96.1 POSTLAMINECTOMY SYNDROME, LUMBAR: ICD-10-CM

## 2022-06-09 DIAGNOSIS — M47.817 LUMBOSACRAL SPONDYLOSIS WITHOUT MYELOPATHY: Primary | ICD-10-CM

## 2022-06-09 DIAGNOSIS — E66.9 OBESITY, UNSPECIFIED CLASSIFICATION, UNSPECIFIED OBESITY TYPE, UNSPECIFIED WHETHER SERIOUS COMORBIDITY PRESENT: ICD-10-CM

## 2022-06-09 DIAGNOSIS — M53.3 SACROILIAC DYSFUNCTION: ICD-10-CM

## 2022-06-09 DIAGNOSIS — M51.36 DDD (DEGENERATIVE DISC DISEASE), LUMBAR: ICD-10-CM

## 2022-06-09 DIAGNOSIS — Z98.1 S/P LUMBAR FUSION: ICD-10-CM

## 2022-06-09 DIAGNOSIS — M54.16 LUMBAR RADICULOPATHY: ICD-10-CM

## 2022-06-09 PROCEDURE — 99213 OFFICE O/P EST LOW 20 MIN: CPT | Performed by: ANESTHESIOLOGY

## 2022-06-09 RX ORDER — OXYCODONE HYDROCHLORIDE AND ACETAMINOPHEN 5; 325 MG/1; MG/1
1 TABLET ORAL 2 TIMES DAILY PRN
Qty: 60 TABLET | Refills: 0 | Status: SHIPPED | OUTPATIENT
Start: 2022-06-09 | End: 2022-07-09

## 2022-06-09 NOTE — PROGRESS NOTES
[facility] - BEHAVIORAL HEALTH SERVICES Pain Management  Pedro, Hipolito Curiel  Dept: [phone]      Follow up Note      Rob Mcneal     Date of Visit:  6/9/2022    CC:  Patient presents for follow up   Chief Complaint   Patient presents with    Follow-up       HPI:  Low back pain. S/P L3-5 fusion and L4-5 TLIF on May 25 2021- helped the left LE pain significantly.     Has noticed right LE pain after the surgery. Has been followed by NSG had X-ray done. Intact fusion.     Low back pain and right LE pain- Has tingling of the foot (h/o DM).    Also has right shoulder pain and right knee pain - followed by PMR. Pain causes functional limitations/ limits Adl's : Yes     Nursing notes and details of the pain history reviewed. Please see intake notes for details.     Previous treatments:   Physical Therapy : yes, currently in PT      Medications: - NSAID's : yes                        - Membrane stabilizers : yes - gabapentin                       - Opioids : yes, post op use                       - Adjuvants or Others : yes,      Surgeries: yes, L3-5 fusion in May 2021     She has not been on anticoagulation medications      She has not been on herbal supplements.       She is diabetic.     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  No fracture or   dislocation.  Normal soft tissues.       T-spine: Mild multilevel degenerative disc disease.  No fracture or   dislocation.  Normal thoracic soft tissues.  Mild thoracic dextroscoliosis.       Lumbar spine: Intact hardware associated with posterior fusion L3-L5.  Normal   alignment.   L4-5 disc is augmented.  Degenerative disc disease is present at   multiple levels and is greatest at L5-S1 where it is severe. Tiffany Nasra is a   decompressive laminectomy at L3 and L4.  Normal soft tissues.           Impression   Degenerative spondylosis at the C-spine, T-spine and L-spine.  Lumbar fusion   with intact hardware and normal alignment.            X-ray LS spine: 9/14/2021:      FINDINGS:   Posterior spinal fusion L3-L5 with normal alignment.  Degenerative disc   disease is present at multiple levels and is greatest at L5-S1 where it is   moderate to severe.  Normal soft tissues.           Impression   Intact lumbar fusion hardware with normal alignment.  Stable degenerative   disc disease.             Xray Right knee: 9/16/2021:      Impression   1.  Moderate right patellofemoral and medial compartment joint space   narrowing, with very minimal secondary osteoarthritic degenerative disease,   not significantly changed since 12/30/2020.       2.  Incidental left medial compartment joint space narrowing, osseous   degenerative disease, and slight genu-varus configuration, as described.       .      Xray right shoulder: 9/16/2021:  Impression   1.  A previously-existing right-internal-jugular-approach single-lumen   central venous catheter has been removed over the interval. Negative for   gross pneumothorax, bilaterally, within the limits of this study.       2.  Mild irregular cortical exostosis involves the undersurface ease of the   right acromion and lateral right clavicular head, slightly hook-like in   configuration at the level of the right acromion.       3.  Osseous degenerative disease, as described.        Past Medical History:   Diagnosis Date    Back pain     Chronic fatigue     COVID-19 09/2020    mild per patient    Diabetes mellitus ([address]     History of blood transfusion     Hyperlipidemia     Hypothyroidism     IBS (irritable bowel syndrome)     Morbid obesity due to excess calories (HCC)     Obesity     Osteoarthritis     PONV (postoperative nausea and vomiting)        Past Surgical History:   Procedure Laterality Date    BACK SURGERY      CHOLECYSTECTOMY      COLONOSCOPY      HYSTERECTOMY, TOTAL ABDOMINAL (CERVIX REMOVED)      INCONTINENCE SURGERY      BLADDER SLING    KNEE SURGERY Left     LUMBAR SPINE SURGERY N/A 5/25/2021    L3- L5 DECOMPRESSION AND FUSION , L4- L5 TRANSFORAMINAL LUMBAR INTERBODY FUSION --OARM, PATY TABLE, AUDIOLOGY, PLATES ,SCREWS, --NUVASIVE performed by Jl Grajeda MD at [address] Bilateral 10/11/2021    BILATERAL SACROILIAC JOINT INJECTION UNDER FLUOROSCOPY (CPT 89113) performed by Xin Herrera MD at [address] Bilateral 11/8/2021    BILATERAL LUMBAR FACET INJECTION AT L5-S 1 FACETS BLOCK UNDER FLUOROSCOPIC GUIDANCE performed by Xin Herrera MD at [address] Bilateral 12/21/2021    BILATERAL S1, S2, S3 BRANCH & L5 [facility] NERVE BLOCK UNDER XRAY GUIDANCE (15146) (SEDATION) performed by Xin Herrera MD at [address] N/A 2/7/2022    LUMBAR EPIDURAL STEROID INJECTION UNDER FLUOROSCOPIC GUIDANCE AT L5-S1 PARAMEDIAN performed by Xin Herrera MD at [address] N/A 3/9/2022    EGD BIOPSY performed by Angela Brown MD at [facility]       Prior to Admission medications    Medication Sig Start Date End Date Taking? Authorizing Provider   omega-3 acid ethyl esters (LOVAZA) 1 g capsule TAKE 1 CAPSULE BY MOUTH TWICE A DAY 5/17/22  Yes Mo Castro,    valsartan (DIOVAN) 80 MG tablet TAKE 1 TABLET BY MOUTH EVERY DAY 5/13/22  Yes Prieto Robin,    oxyCODONE-acetaminophen (PERCOCET) 5-325 MG per tablet Take 1 tablet by mouth as needed for Pain.    Yes Historical Provider, MD   topiramate (TOPAMAX) 25 MG tablet Take 1 tablet by mouth 2 times daily 4/29/22  Yes Jose Ramon Martines MD   diclofenac sodium (VOLTAREN) 1 % GEL APPLY 4 GRAMS TOPICALLY 4 TIMES DAILY AS NEEDED FOR PAIN 4/26/22  Yes James Alvarez MD   cyclobenzaprine (FLEXERIL) 10 MG tablet TAKE 1 TABLET BY MOUTH THREE TIMES A DAY AS NEEDED FOR MUSCLE SPASMS 4/26/22  Yes Prieto Robin, DO   OZEMPIC, 1 MG/DOSE, 4 MG/3ML SOPN INJECT 1 MG INTO THE SKIN ONCE A WEEK 4/11/22  Yes Priteo Robin, DO   gabapentin (NEURONTIN) 800 MG tablet Take 1 tablet by mouth 3 times daily for 90 days.  4/11/22 7/10/22 Yes Khurram Kerr MD   DULoxetine (CYMBALTA) 60 MG extended release capsule Take 1 capsule by mouth daily 3/7/22 9/3/22 Yes Corby Queen MD   pravastatin (PRAVACHOL) 10 MG tablet TAKE 1 TABLET BY MOUTH EVERY OTHER DAY 2/24/22  Yes Shaista Hager, DO   Handicap Placfiona [address] [facility] by Does not apply route Patient cannot walk 200 ft without stopping to rest.    Expiration 5 yrs 2/11/22  Yes Shaista Hager, DO   Multiple Vitamins-Minerals (THERAPEUTIC MULTIVITAMIN-MINERALS) tablet Take 1 tablet by mouth daily   Yes Historical Provider, MD   levothyroxine (SYNTHROID) 175 MCG tablet Take 1 tablet by mouth daily 1/19/22  Yes Prieto Robin, DO   vitamin D (ERGOCALCIFEROL) 1.25 MG (75756 UT) CAPS capsule TAKE 1 CAPSULE BY MOUTH ONE TIME PER WEEK 1/18/22  Yes Prieto Robin, DO   vitamin B-12 (CYANOCOBALAMIN) 100 MCG tablet Take 1 tablet by mouth daily 1/18/22  Yes Prieto Robin, DO   torsemide (DEMADEX) 10 MG tablet take 1 tablet by mouth once daily  Patient taking differently: as needed  12/16/21  Yes Shaista Hager, DO   Thiamine HCl (B-1) 100 MG TABS Take 1 pill daily 10/18/21  Yes Shaista Hager, DO   gemfibrozil (LOPID) 600 MG tablet TAKE 1 TABLET BY MOUTH EVERY 12 HOURS 10/18/21  Yes Shaista Hager, DO   metFORMIN (GLUCOPHAGE) 500 MG tablet TAKE 1 TABLET BY MOUTH EVERY DAY WITH BREAKFAST 10/18/21  Yes Shaista Hager, DO   omeprazole (PRILOSEC) 20 MG delayed release capsule TAKE 1 CAPSULE BY MOUTH EVERY DAY 10/18/21  Yes Prieto Robin DO   linaclotide (LINZESS) 145 MCG capsule TAKE 1 CAPSULE BY MOUTH EVERY DAY IN THE MORNING BEFORE BREAKFAST 10/18/21  Yes Prieto Robin DO   acetaminophen (TYLENOL) 500 MG tablet Take 500 mg by mouth every 6 hours as needed for Pain   Yes Historical Provider, MD   lidocaine (LIDODERM) 5 % PLACE 1 PATCH ONTO THE SKIN DAILY 12 HOURS ON, 12 HOURS OFF. 6/2/21  Yes Historical Provider, MD   polyethylene glycol (GLYCOLAX) 17 g packet Take 17 g by mouth daily as needed   Yes Historical Provider, MD   levothyroxine (SYNTHROID) 150 MCG tablet Take 1 tablet by mouth Daily 1/12/21   Braydon Rodriguez DO   rosuvastatin (CRESTOR) 5 MG tablet Take 1 tablet by mouth daily 1/12/21   Braydon Rodriguez DO   meloxicam (MOBIC) 15 MG tablet Take 1 tablet by mouth daily as needed for Pain 1/11/21   Braydon Rodriguez DO       Allergies   Allergen Reactions    Bactrim [Sulfamethoxazole-Trimethoprim] Nausea Only    Ceftin [Cefuroxime] Rash    Keflex [Cephalexin] Rash       Social History     Socioeconomic History    Marital status:      Spouse name: Not on file    Number of children: Not on file    Years of education: Not on file    Highest education level: Not on file   Occupational History     Employer: [employer]   Tobacco Use    Smoking status: Never Smoker    Smokeless tobacco: Never Used   Vaping Use    Vaping Use: Never used   Substance and Sexual Activity    Alcohol use: Never    Drug use: Never    Sexual activity: Not on file   Other Topics Concern    Not on file   Social History Narrative    Not on file     Social Determinants of Health     Financial Resource Strain: Low Risk     Difficulty of Paying Living Expenses: Not hard at all   Food Insecurity: No Food Insecurity    Worried About [address] in the Last Year: Never true    [address] in the Last Year: Never true   Transportation Needs:     Lack of Transportation (Medical): Not on file    Lack of Transportation (Non-Medical):  Not on file   Physical Activity:     Days of Exercise per Week: Not on file    Minutes of Exercise per Session: Not on file   Stress:     Feeling of Stress : Not on file   Social Connections:     Frequency of Communication with Friends and Family: Not on file    Frequency of Social Gatherings with Friends and Family: Not on file    Attends Druze Services: Not on file    Active Member of [address] [place] [address] [employer] or Organizations: Not on file    Attends Club or Organization Meetings: Not on file    Marital Status: Not on file   Intimate Partner Violence:     Fear of Current or Ex-Partner: Not on file    Emotionally Abused: Not on file    Physically Abused: Not on file    Sexually Abused: Not on file   Housing Stability:     Unable to Pay for Housing in the Last Year: Not on file    Number of [place] in the Last Year: Not on file    Unstable Housing in the Last Year: Not on file       Family History   Problem Relation Age of Onset    Diabetes Mother     Diabetes Father     Rheum Arthritis Sister     Cancer Maternal Grandfather        REVIEW OF SYSTEMS:     [facility] fever/chills, chest pain, shortness of breath, new bowel or bladder complaints. All other review of systems was negative.     PHYSICAL EXAMINATION:      /85   Pulse (!) 49   Temp (!) 95.5 °F (35.3 °C) (Infrared)   Resp 16   SpO2 94%   General:       General appearance:  Pleasant and well-hydrated, in no distress and A & O x 3  Build:Obese  Function: Rises from seated position easily and Moves about room without difficulty     HEENT:     Head:normocephalic, atraumatic    Lungs:     Breathing:normal breathing pattern      CVS:     RRR     Abdomen:     Shape:obese, non-distended and normal     Cervical spine:     Inspection:normal     Thoracic spine:                Spine inspection:normal   Palpation:No tenderness over the midline and paraspinal area, bilaterally  Range of motion:normal in flexion, extension rotation bilateral and is not painful.     Lumbar spine:     Spine inspection: scar from the prior surgery noted- healed well  Palpation: Tenderness paravertebral muscles Yes bilaterally  Range of motion: Decreased, flexion Decreased, Lateral bending, extension and rotation bilaterally reduced is somewhat painful. Lower lumbar facet tenderness +  Sacroiliac joint tenderness   Piriformis tenderness: negative bilaterally  SLR : negative bilaterally     Musculoskeletal:     Trigger points no     Extremities:     Tremors:None bilaterally upper and lower  Edema:none x all 4 extremities  Distal LE Peripheral pulses felt     Knee Right:     ROM : pain +   Joint line tenderness : yes      Neurological:     Sensory: Normal to light touch      Motor:   Right Hip flexors: 4/5  Left hip flexors 4/5               Right Quadriceps 4+/5          Left Quadriceps 4+/5           Right Gastrocnemius 5/5    Left Gastrocnemius 5/5  Right Ant Tibialis 5/5  Left Ant Tibialis 5/5     Gait: uses a cane to assist in ambulation.     Dermatology:     Skin:no rashes or lesions noted    Assessment/Plan:    Diagnosis Orders   1. DDD (degenerative disc disease), lumbar      2. Lumbosacral spondylosis without myelopathy      3. Lumbar radiculopathy      4. Sacroiliac dysfunction      5. Obesity, unspecified classification, unspecified obesity type, unspecified whether serious comorbidity present      6. Type 2 diabetes mellitus without complication, without long-term current use of insulin (HCC)      7. S/P lumbar fusion            62 y.o.  female with H/o L3-5 Lumbar fusion in May 2021 by Dr. Allen Barger. Helped left LE pain - but noticed low back and right LE pain.     Has been evaluated by NS- recommended conservative treatment.     Needing pain meds- percocet/ gabapentin.     Exam: Facet tenderness +, LE weakness +    S/P Facet injection - no significant pain relief. SIJ interventions- no significant pain relief. S/P ZAK- moderate relief.      Has chronic right shoulder pain and knee pain. Follows with Dr. Rayne Patel.  Has consulted ortho.     Has been evaluated by [facility] recently - recommended CT myelogram.    Plan:  PT / HEP     Percocet for prn use to help with PT/ HEP and exercise- will prescribe Percocet 5/325 po upto bid prn. Last filled # 60 on 3/7/2022. OARRS reviewed- consistent. Refill given # 61 today 6/9/2022. To take once or twice day as needed. ( she usually takes 1/2 tab bid prn). Recommended to take only for severe pain. To help with pain and functionality and do HEP/ PT. No signs of misuse abuse or diversion, no side effects, compliant with recommendations.     Continue Gabapentin.     Cymbalta 60 mg Q hs. Refill given # 80  With X 1 refill.     Obesity: Follows with Bariatric medicine. F/U in 3 months.     Opioid agreement- signed  10/6/2021. Salient features of opioid agreement discussed. Discussed issues with chronic opioid therapy including the phenomenon of tolerance/ dependence.     Oral drug screen -10/6/2021-  result reviewed consistent.     OARRS reviewed- today: Consistent     She had knee injection / right shoulder injection with Dr Pal Srivastava.      Counseling : Patient encouraged to stay active, to continue Regular home exercise program and to watch/lose weight     Treatment plan discussed with the patient including medication and procedure side effects.     Controlled Substances Monitoring:   OARRS reviewed.     We discussed with the patient that combining opioids, benzodiazepines, alcohol, illicit drugs or sleep aids increases the risk of respiratory depression including death. We discussed that these medications may cause drowsiness, sedation or dizziness and have counseled the patient not to drive or operate machinery. We have discussed that these medications will be prescribed only by one provider. We have discussed with the patient about age related risk factors and have thoroughly discussed the importance of taking these medications as prescribed.  The patient verbalizes understanding.     Fang Hirsch MD    CC:  Cheryl Toribio,

## 2022-06-09 NOTE — PROGRESS NOTES
Do you currently have any of the following:    Fever: No  Headache:  No  Cough: No  Shortness of breath: No  Exposed to anyone with these symptoms: No         Silvano Hsu presents to the [facility] on 6/9/2022. Fara Mendiola is complaining of pain in her low back, right knee, and right shoulder. The pain is intermittent. The pain is described as throbbing, dull, sharp and nagging. Pain is rated on her best day at a 3, on her worst day at a 7, and on average at a 4 on the VAS scale. She took her last dose of Percocet last night. Any procedures since your last visit: No    Pacemaker or defibrillator: No     She is  on NSAIDS and is not on anticoagulation medications. Medication Contract and Consent for Opioid Use Documents Filed     Patient Documents     Type of Document Status Date Received Received By Description    Medication Contract Received 10/6/2021 10:43 AM SHANE CRENSHAW pain management                /85   Pulse (!) 49   Temp (!) 95.5 °F (35.3 °C) (Infrared)   Resp 16   SpO2 94%      No LMP recorded. Patient has had a hysterectomy.

## 2022-06-11 LAB
3-OH-COTININE: <2 NG/ML
COTININE: <2 NG/ML
NICOTINE: <2 NG/ML

## 2022-06-16 ENCOUNTER — HOSPITAL ENCOUNTER (OUTPATIENT)
Dept: GENERAL RADIOLOGY | Age: 62
Discharge: HOME | End: 2022-06-18
Payer: COMMERCIAL

## 2022-06-16 ENCOUNTER — HOSPITAL ENCOUNTER (OUTPATIENT)
Age: 62
Discharge: HOME | End: 2022-06-16
Payer: COMMERCIAL

## 2022-06-16 ENCOUNTER — HOSPITAL ENCOUNTER (OUTPATIENT)
Dept: CT IMAGING | Age: 62
Discharge: HOME | End: 2022-06-18
Payer: COMMERCIAL

## 2022-06-16 VITALS
HEART RATE: 67 BPM | WEIGHT: 236 LBS | OXYGEN SATURATION: 97 % | HEIGHT: 63 IN | BODY MASS INDEX: 41.82 KG/M2 | DIASTOLIC BLOOD PRESSURE: 66 MMHG | RESPIRATION RATE: 16 BRPM | SYSTOLIC BLOOD PRESSURE: 107 MMHG

## 2022-06-16 DIAGNOSIS — Z98.1 S/P LUMBAR FUSION: ICD-10-CM

## 2022-06-16 DIAGNOSIS — M54.16 LUMBAR RADICULOPATHY: ICD-10-CM

## 2022-06-16 LAB
INR BLD: 1.2
PLATELET # BLD: 225 E9/L (ref 130–450)
PROTHROMBIN TIME: 12.8 SEC (ref 9.3–12.4)

## 2022-06-16 PROCEDURE — 7100000011 HC PHASE II RECOVERY - ADDTL 15 MIN

## 2022-06-16 PROCEDURE — 72132 CT LUMBAR SPINE W/DYE: CPT

## 2022-06-16 PROCEDURE — 7100000010 HC PHASE II RECOVERY - FIRST 15 MIN

## 2022-06-16 PROCEDURE — 2500000003 HC RX 250 WO HCPCS: Performed by: RADIOLOGY

## 2022-06-16 PROCEDURE — 36415 COLL VENOUS BLD VENIPUNCTURE: CPT

## 2022-06-16 PROCEDURE — 85610 PROTHROMBIN TIME: CPT

## 2022-06-16 PROCEDURE — 62304 MYELOGRAPHY LUMBAR INJECTION: CPT

## 2022-06-16 PROCEDURE — 6360000004 HC RX CONTRAST MEDICATION: Performed by: RADIOLOGY

## 2022-06-16 PROCEDURE — 85049 AUTOMATED PLATELET COUNT: CPT

## 2022-06-16 RX ORDER — ACETAMINOPHEN 325 MG/1
650 TABLET ORAL EVERY 4 HOURS PRN
Status: DISCONTINUED | OUTPATIENT
Start: 2022-06-16 | End: 2022-06-19 | Stop reason: HOSPADM

## 2022-06-16 RX ORDER — LIDOCAINE HYDROCHLORIDE 10 MG/ML
INJECTION, SOLUTION INFILTRATION; PERINEURAL
Status: COMPLETED | OUTPATIENT
Start: 2022-06-16 | End: 2022-06-16

## 2022-06-16 RX ADMIN — IOPAMIDOL 11 ML: 408 INJECTION, SOLUTION INTRATHECAL at 11:08

## 2022-06-16 RX ADMIN — LIDOCAINE HYDROCHLORIDE 10 ML: 10 INJECTION, SOLUTION INFILTRATION; PERINEURAL at 10:34

## 2022-06-16 ASSESSMENT — PAIN - FUNCTIONAL ASSESSMENT: PAIN_FUNCTIONAL_ASSESSMENT: NONE - DENIES PAIN

## 2022-06-16 NOTE — PROGRESS NOTES
Discharge instructions provided. Verbalized understanding. Vs stable. Denies pain. Adhesive bandage dry and intact.

## 2022-06-16 NOTE — OR NURSING
Patient arrival from home to radiology for lumbar myelogram. Vitals taken, consent signed, and Dr. Beverly Mins in to speak with the patient about the procedure. Patient placed prone on Fluoroscopy table, patient prepped and draped. Using fluoroscopy imaging, needle placed to lower back by Dr. Beverly Mins @ [phone]. Contrast injected. Needle removed, site cleansed, and band aid applied to site. Patient placed on cart and taken to CT. Report called to stage II recovery, spoke with Adilia Hill RN.

## 2022-06-27 ENCOUNTER — INITIAL CONSULT (OUTPATIENT)
Dept: BARIATRICS/WEIGHT MGMT | Age: 62
End: 2022-06-27
Payer: COMMERCIAL

## 2022-06-27 DIAGNOSIS — F50.9 COMPULSIVE EATING PATTERNS: Primary | ICD-10-CM

## 2022-06-27 PROCEDURE — 90837 PSYTX W PT 60 MINUTES: CPT | Performed by: SOCIAL WORKER

## 2022-06-27 NOTE — PATIENT INSTRUCTIONS
Assignments/Recommendations: Follow-up with SW as needed. Attend [facility] ZOOM support group Aug 2 at 5:00pm. Follow up with present providers/all scheduled appointment at [facility].

## 2022-06-27 NOTE — PROGRESS NOTES
INDIVIDUAL SESSION:  SUMMARY/PSYCH CLEARANCE     Geovanny Whitaker is a 62 y.o. ,   female, referred by Pain Management  for evaluation and treatment. Patient identify verified by Name and . Those attending session : patient    DX:   Encounter Diagnosis   Name Primary?  Compulsive eating patterns Yes       Chief Complaint   Patient presents with    Consultation     2nd visit final clearance          Wt Readings from Last 3 Encounters:   22 236 lb (107 kg)   22 233 lb (105.7 kg)   22 237 lb (107.5 kg)            Narrative:  Mendel Benton stated that she did complete the homework without any problems. She was able to identify problems with emotional eating patterns including a tendency to eat too much bread and some problems resisting some foods on at celebrations/holidays. She was also able to identify things that triggers cravings for food including sounds, smells, things she sees, places, people and times of the day. Mendel Benton stated that she has made some progress with compulsive eating patterns. She shared that when she did have cravings she substituted something healthier, limited when she would certain foods and practiced deep breathing to manage cravings. She also stated that she has a good support system. She stated that her  is supportive of her and is also making some dietary changes for himself.           Mental Status Exam: appearance:  appropriately dressed and appropriately groomed, behavior:  normal, attitude:  cooperative, speech:  appropriate, mood:  euthymic, affect:  congruent with mood, thought content:  no evidence of psychosis, thought process:  logical and coherent, orientation:  oriented in all spheres, memory:  recent:  fair and remote:  good and fair, insight:  fair , judgment:  fair  and cognitive:  intact and intelligent    RISK ASSESSMENT    Suicide screen: denies current suicidal ideation, plan and intent    Self Injurious Behavior: denies    Homicide screen: denies current homicidal ideation, plan and intent    TREATMENT PLAN:  Goal: Increase understanding of role of emotional factors contributing to issues with food and obesity and strategies to cope. Interventions in session:  Reviewed previous information provided to the patient and reinforced the need for continued engagement in support group and other resources of the [facility]. Provided Psychoeducational regarding physical, emotional, and behavioral changes that might occur for the patient post WLS. Assignments/Recommendations: Follow-up with SW as needed. Attend [facility] ZOOM support group Aug 2 at 5:00pm. Follow up with present providers/all scheduled appointment at [facility]. Next steps: Follow up with SW post surgery as needed    Bariatric Surgery: Final visit:  The patient has completed a Biopsychosocial assessment and 1 educational sessions to evaluate her appropriateness for bariatric surgery. This patient has been compliant with all scheduled sessions and completed all assignments/recommendations including attendance at least one support group meeting. She does not present with mental health issues and appears to be able to adapt to the EBC changes that will be necessary for success. The patient appears well motivated to make necessary changes and achieve weight loss goals. Based on the information gathered during assessment and subsequent session it appears that she is a fair candidate for Bariatric surgery.       Patient and/or family/guardian verbalizes understanding of and agreement with treatment recommendations and plan: yes    Start time: 11:10 am         End time: 12:05 pm     Visit Time: Gloria Short 38, LOLI

## 2022-06-29 ENCOUNTER — TELEPHONE (OUTPATIENT)
Dept: BARIATRICS/WEIGHT MGMT | Age: 62
End: 2022-06-29

## 2022-06-29 ENCOUNTER — SCHEDULED TELEPHONE ENCOUNTER (OUTPATIENT)
Dept: BARIATRICS/WEIGHT MGMT | Age: 62
End: 2022-06-29

## 2022-06-29 DIAGNOSIS — Z00.8 NUTRITIONAL ASSESSMENT: Primary | ICD-10-CM

## 2022-06-29 DIAGNOSIS — Z71.3 NUTRITIONAL COUNSELING: ICD-10-CM

## 2022-06-29 PROCEDURE — 99999 PR OFFICE/OUTPT VISIT,PROCEDURE ONLY: CPT | Performed by: DIETITIAN, REGISTERED

## 2022-06-29 NOTE — LETTER
Date: 6-    [facility]:  Attention Prior Authorization    Regarding:  Silvano Hsu  Member ID:  [id number]    Request:     Authorization for CPT 29549  (Laparoscopic Ashu-en-Y)                      Diagnosis Codes:  E66.01; E11.9; E78.5    Physician: Ishmael Cruz M.D.   ([facility] [id number])                (NPI [id number])    Facility: [address] [place] [address] [id number] (NPI [id number])    Laila Ellis      Dear Peyton Lara or :     Pre-determination of insurance coverage, and authorization for hospitalization and surgical treatment are requested on behalf of your annuitant Silvano Hsu for diagnoses of morbid obesity, diabetes and hyperlipidemia. Silvano Hsu is 5'3, weighs 233 lb., and has a BMI of 41.2. She has been severely obese for a number of years despite many years of dietary efforts. She has lost weight through these efforts, however has been unable to maintain satisfactory weight loss. Silvano Hsu has been evaluated in our bariatric program and is felt to be an excellent candidate  for surgery. The patient has undergone extensive pre-operative education and understands all the risks, benefits and possible complications of surgery. She has also undergone thorough nutritional evaluation and counseling with our registered dietician. Our program provides long term nutritional counseling with unlimited consults with the dietician. All female patients have been educated on the importance of using reliable birth control and avoiding pregnancy for the first 2 years following bariatric surgery. All patients are nicotine tested and require a negative nicotine level prior to surgery. Patient has been educated about the risks associated with substance use, as well as the risks of using nicotine and alcohol after surgery.  Patient has been educated that these substances need to be avoided lifelong after surgery to reduce the risk of complications and sub-optimal weight loss. I am requesting authorization for Laparoscopic Ashu-en-Y Gastric Bypass, procedure code 00819, with a hospital stay of 1 day, to be performed for the treatment of the patients severe and life threatening diseases. This procedure will be performed at [address] [place] [address]. I appreciate your consideration in this matter and your timely response. Supporting clinical documents are included with this request.    Electronically signed by Dr. Zoraida Garcia M.D.   Bariatric Surgery

## 2022-06-29 NOTE — TELEPHONE ENCOUNTER
Case prepared and submitted by fax to [employer]re Bingham with request for [facility] and [facility] 8-. Fax confirmation received and patient notified of insurance submission. I explained in detail to patient that [employer]re Bingham will only approve bariatric surgery as outpatient with up to 48 hours observation allowed. If she requires inpatient admission, the hospital will have to get additional time approved, and that [employer]re Bingham doesn't always approve an inpatient admission. She voiced understanding of this. Also discussed need for medical clearance. Request faxed to PCP and patient was instructed to call office and ask if appointment is needed.

## 2022-06-29 NOTE — LETTER
Medical Clearance / Medical Necessity for Ashu-en-Y Gastric Bypass    Name: Jono Saucedo                                     Date of Birth: [birth year]    I have been caring for the above patient for _____ years. He/she suffers from the following medical conditions:  __________________________________________________________________________________________________________________________________________________________________________________________________________________    The above medical conditions are either caused by or worsened by the patients morbid obesity. Yes_________ No__________    The above medical conditions are currently stable:      Yes_________ No__________    The following medical conditions are difficult to manage/require multiple medications to achieve control due to the patients weight: ______________________________________________________________________  ______________________________________________________________________                    The above patient has participated in the following medical weight loss efforts without long-term weight reduction:  ____________________________________________________________________________________________________________________________________________    I am in support of my patient undergoing a weight loss surgical procedure with [address] [place] [address] [facility] and the patient understands the risks and benefits of weight loss surgery. The patient has reasonable expectations and I believe the patient will be compliant with all post-surgical requirements. I understand the program is comprehensive with dedicated and specially trained staff.  In the event that my patient is over the age of 61, I would like to state that the patients physiological age and co-morbid conditions result in a positive risk to benefit ratio.        ________  In my medical opinion, there are no medical contraindications to proceed with gastric bypass surgery and the patients benefits of surgery outweigh the risks of surgery.       Physician Name (Please Print): __________________________________    Primary Care Physicians Signature: __________________________________    Date: ______/______/______

## 2022-06-30 NOTE — PROGRESS NOTES
Juwan Humphrey called the pt and scheduled the pt for the following. Pt is aware he/she needs to be down to their pre-op weight loss goal when they come in for their weight check or their sx will be cx. Pt verbalized understanding. Pre-op weight loss goal reviewed. Pt scheduled for the following see below. Pt is aware supplements are cash, check, credit or debt card. SX Type: RYGB  SX Date: 8/23/22  H&P Appt: Dr. Jenny Young ???  Weight Check Appt: This will occur after the 3 hour class at the [facility]  3 Hour Class: At the [facility] From 10:00 - 1:00 pm on - 7/20/22 - RYGB Class  Supplements: Pt was provided a handout on approved protein supplements for use after WLS. (Handout - Emailed 6/29/22)Pt was instructed he / she will need to bring his / her protein supplements to the class in order to move forward with sx or sx can be cx. Handouts reviewed and provided. Pt verbalized understanding. Pt will purchase the Bariatric approved MVI, Fe+, Ca+ and Vit D at the [facility]. 2 Hour Pt Education Book: Pt needs      Pt was instructed he / she can call any time with questions. Goal Weight 239 lbs - Pt has copy of pre-op diet. Enc pt to follow pre-op diet to get down to pre-op weight loss goal. Pt verbalized understanding.   Pt is aware sx can be cx by his / her surgeon at H&P appt if he / she feels pt has not achieved pre-op weight loss goal.

## 2022-07-01 ENCOUNTER — TELEPHONE (OUTPATIENT)
Dept: BARIATRICS/WEIGHT MGMT | Age: 62
End: 2022-07-01

## 2022-07-01 DIAGNOSIS — Z71.3 NUTRITIONAL COUNSELING: ICD-10-CM

## 2022-07-01 DIAGNOSIS — Z00.8 NUTRITIONAL ASSESSMENT: Primary | ICD-10-CM

## 2022-07-01 NOTE — TELEPHONE ENCOUNTER
Pt received the protein supplement handout and called with questions. Juwan Humphrey reviewed all the different protein supplements with the pt and how to mix the supplements after sx. All questions answered. Pt is aware she needs to bring what she purchases for a protein supplement to her 3 hour nutrition class. Pt verbalized understanding.

## 2022-07-07 ENCOUNTER — TELEPHONE (OUTPATIENT)
Dept: BARIATRICS/WEIGHT MGMT | Age: 62
End: 2022-07-07

## 2022-07-07 NOTE — TELEPHONE ENCOUNTER
Received fax back from [facility] stating that procedure code 99388 does not require authorization. I called [employer] and spoke to Zakia Reece who was able to check benefits and confirmed she does have benefits for bariatric surgery and that her particular plan does not require authorization for outpatient procedures ([facility] will not pre-approve bariatric surgery as inpatient). Call ref number [id number]. I called and discussed with Ana Rodriguez. I submitted the auth request again for inpatient to see if they will approve. Fax confirmation received.

## 2022-07-11 RX ORDER — LEVOTHYROXINE SODIUM 175 UG/1
TABLET ORAL
Qty: 90 TABLET | Refills: 1 | Status: SHIPPED
Start: 2022-07-11 | End: 2022-07-20

## 2022-07-12 ENCOUNTER — OFFICE VISIT (OUTPATIENT)
Dept: NEUROSURGERY | Age: 62
End: 2022-07-12
Payer: COMMERCIAL

## 2022-07-12 VITALS
OXYGEN SATURATION: 96 % | SYSTOLIC BLOOD PRESSURE: 132 MMHG | HEART RATE: 78 BPM | DIASTOLIC BLOOD PRESSURE: 82 MMHG | WEIGHT: 236 LBS | BODY MASS INDEX: 41.82 KG/M2 | HEIGHT: 63 IN | RESPIRATION RATE: 24 BRPM | TEMPERATURE: 98.4 F

## 2022-07-12 DIAGNOSIS — M48.062 SPINAL STENOSIS OF LUMBAR REGION WITH NEUROGENIC CLAUDICATION: Primary | ICD-10-CM

## 2022-07-12 PROCEDURE — 99212 OFFICE O/P EST SF 10 MIN: CPT

## 2022-07-12 PROCEDURE — 99214 OFFICE O/P EST MOD 30 MIN: CPT | Performed by: NEUROLOGICAL SURGERY

## 2022-07-19 ENCOUNTER — OFFICE VISIT (OUTPATIENT)
Dept: PRIMARY CARE CLINIC | Age: 62
End: 2022-07-19
Payer: COMMERCIAL

## 2022-07-19 VITALS
RESPIRATION RATE: 16 BRPM | DIASTOLIC BLOOD PRESSURE: 74 MMHG | WEIGHT: 229 LBS | HEART RATE: 88 BPM | HEIGHT: 63 IN | BODY MASS INDEX: 40.57 KG/M2 | OXYGEN SATURATION: 98 % | SYSTOLIC BLOOD PRESSURE: 128 MMHG

## 2022-07-19 DIAGNOSIS — E66.01 CLASS 3 SEVERE OBESITY DUE TO EXCESS CALORIES WITH SERIOUS COMORBIDITY AND BODY MASS INDEX (BMI) OF 45.0 TO 49.9 IN ADULT (HCC): ICD-10-CM

## 2022-07-19 DIAGNOSIS — E03.9 ACQUIRED HYPOTHYROIDISM: ICD-10-CM

## 2022-07-19 DIAGNOSIS — E11.9 TYPE 2 DIABETES MELLITUS WITHOUT COMPLICATION, WITHOUT LONG-TERM CURRENT USE OF INSULIN (HCC): ICD-10-CM

## 2022-07-19 DIAGNOSIS — E78.49 OTHER HYPERLIPIDEMIA: ICD-10-CM

## 2022-07-19 DIAGNOSIS — R26.9 GAIT DIFFICULTY: ICD-10-CM

## 2022-07-19 DIAGNOSIS — M48.062 SPINAL STENOSIS OF LUMBAR REGION WITH NEUROGENIC CLAUDICATION: ICD-10-CM

## 2022-07-19 DIAGNOSIS — G62.9 PERIPHERAL POLYNEUROPATHY: ICD-10-CM

## 2022-07-19 DIAGNOSIS — E78.49 OTHER HYPERLIPIDEMIA: Primary | ICD-10-CM

## 2022-07-19 LAB
ALBUMIN SERPL-MCNC: 4.7 G/DL (ref 3.5–5.2)
ALP BLD-CCNC: 88 U/L (ref 35–104)
ALT SERPL-CCNC: 16 U/L (ref 0–32)
ANION GAP SERPL CALCULATED.3IONS-SCNC: 20 MMOL/L (ref 7–16)
AST SERPL-CCNC: 20 U/L (ref 0–31)
BILIRUB SERPL-MCNC: 1.1 MG/DL (ref 0–1.2)
BUN BLDV-MCNC: 12 MG/DL (ref 6–23)
CALCIUM SERPL-MCNC: 9.8 MG/DL (ref 8.6–10.2)
CHLORIDE BLD-SCNC: 102 MMOL/L (ref 98–107)
CHOLESTEROL, TOTAL: 210 MG/DL (ref 0–199)
CO2: 18 MMOL/L (ref 22–29)
CREAT SERPL-MCNC: 0.8 MG/DL (ref 0.5–1)
CREATININE URINE: 185 MG/DL (ref 29–226)
GFR AFRICAN AMERICAN: >60
GFR NON-AFRICAN AMERICAN: >60 ML/MIN/1.73
GLUCOSE BLD-MCNC: 91 MG/DL (ref 74–99)
HBA1C MFR BLD: 4.9 % (ref 4–5.6)
HCT VFR BLD CALC: 48 % (ref 34–48)
HDLC SERPL-MCNC: 31 MG/DL
HEMOGLOBIN: 15.7 G/DL (ref 11.5–15.5)
LDL CHOLESTEROL CALCULATED: 148 MG/DL (ref 0–99)
MCH RBC QN AUTO: 29.2 PG (ref 26–35)
MCHC RBC AUTO-ENTMCNC: 32.7 % (ref 32–34.5)
MCV RBC AUTO: 89.2 FL (ref 80–99.9)
MICROALBUMIN UR-MCNC: 24.3 MG/L
MICROALBUMIN/CREAT UR-RTO: 13.1 (ref 0–30)
PDW BLD-RTO: 13.7 FL (ref 11.5–15)
PLATELET # BLD: 236 E9/L (ref 130–450)
PMV BLD AUTO: 10.4 FL (ref 7–12)
POTASSIUM SERPL-SCNC: 5.1 MMOL/L (ref 3.5–5)
RBC # BLD: 5.38 E12/L (ref 3.5–5.5)
SODIUM BLD-SCNC: 140 MMOL/L (ref 132–146)
TOTAL PROTEIN: 8.2 G/DL (ref 6.4–8.3)
TRIGL SERPL-MCNC: 156 MG/DL (ref 0–149)
TSH SERPL DL<=0.05 MIU/L-ACNC: 6.17 UIU/ML (ref 0.27–4.2)
VLDLC SERPL CALC-MCNC: 31 MG/DL
WBC # BLD: 8.2 E9/L (ref 4.5–11.5)

## 2022-07-19 PROCEDURE — 99214 OFFICE O/P EST MOD 30 MIN: CPT | Performed by: FAMILY MEDICINE

## 2022-07-19 PROCEDURE — 3044F HG A1C LEVEL LT 7.0%: CPT | Performed by: FAMILY MEDICINE

## 2022-07-19 RX ORDER — UBIDECARENONE 75 MG
100 CAPSULE ORAL DAILY
Qty: 90 TABLET | Refills: 3 | Status: SHIPPED
Start: 2022-07-19 | End: 2022-08-24

## 2022-07-19 RX ORDER — CYCLOBENZAPRINE HCL 10 MG
TABLET ORAL
Qty: 90 TABLET | Refills: 2 | Status: SHIPPED | OUTPATIENT
Start: 2022-07-19

## 2022-07-19 ASSESSMENT — ENCOUNTER SYMPTOMS
BLOOD IN STOOL: 0
VOMITING: 0
SINUS PRESSURE: 0
APNEA: 0
SORE THROAT: 0
BACK PAIN: 1
WHEEZING: 0
VISUAL CHANGE: 0
RHINORRHEA: 0
ABDOMINAL PAIN: 0
EYE PAIN: 0
EYE ITCHING: 0
CONSTIPATION: 0
SHORTNESS OF BREATH: 0
DIARRHEA: 0
EYE REDNESS: 0
CHEST TIGHTNESS: 0
NAUSEA: 0
COLOR CHANGE: 0
COUGH: 0

## 2022-07-19 NOTE — PROGRESS NOTES
Chief Complaint:     Chief Complaint   Patient presents with    Hypothyroidism    Hyperlipidemia    Diabetes         Hyperlipidemia  This is a chronic problem. The current episode started more than 1 year ago. The problem is controlled. Recent lipid tests were reviewed and are normal. Exacerbating diseases include diabetes and obesity. Associated symptoms include leg pain and myalgias. Pertinent negatives include no chest pain or shortness of breath. Current antihyperlipidemic treatment includes statins. The current treatment provides significant improvement of lipids. Compliance problems include medication side effects. Risk factors for coronary artery disease include diabetes mellitus, dyslipidemia, obesity and post-menopausal.   Diabetes  She presents for her follow-up diabetic visit. She has type 2 diabetes mellitus. The initial diagnosis of diabetes was made 1 month ago. Her disease course has been stable. There are no hypoglycemic associated symptoms. Pertinent negatives for hypoglycemia include no dizziness, headaches or nervousness/anxiousness. Associated symptoms include fatigue. Pertinent negatives for diabetes include no chest pain, no visual change and no weakness. There are no hypoglycemic complications. Symptoms are stable. There are no diabetic complications. Risk factors for coronary artery disease include dyslipidemia and obesity. Current diabetic treatment includes oral agent (monotherapy). She is compliant with treatment all of the time. She is following a generally healthy diet. When asked about meal planning, she reported none. She has not had a previous visit with a dietitian. There is no change in her home blood glucose trend. An ACE inhibitor/angiotensin II receptor blocker is not being taken. She does not see a podiatrist.Eye exam is not current. Back Pain  This is a recurrent problem. The current episode started more than 1 month ago. The problem occurs intermittently.  The problem has been waxing and waning since onset. The pain is present in the lumbar spine. The quality of the pain is described as aching. The pain is moderate. Associated symptoms include leg pain. Pertinent negatives include no abdominal pain, chest pain, dysuria, fever, headaches, numbness or weakness. She has tried nothing for the symptoms. The treatment provided no relief. Fatigue  This is a chronic problem. The problem occurs intermittently. The problem has been waxing and waning. Associated symptoms include fatigue, myalgias and a rash. Pertinent negatives include no abdominal pain, arthralgias, chest pain, congestion, coughing, diaphoresis, fever, headaches, nausea, neck pain, numbness, sore throat, visual change, vomiting or weakness. Nothing aggravates the symptoms. She has tried nothing for the symptoms. The treatment provided no relief.      Patient Active Problem List   Diagnosis    Type 2 diabetes mellitus without complication, without long-term current use of insulin (HCC)    Acquired hypothyroidism    Peripheral neuropathy    Carpal tunnel syndrome of left wrist    Spinal stenosis of lumbar region with neurogenic claudication    Acute exacerbation of chronic low back pain    Hyperlipidemia    Spondylolisthesis of lumbar region    Lumbar radiculopathy    Postoperative hypotension    Postoperative anemia due to acute blood loss    Venous insufficiency of both lower extremities    Impingement syndrome of right shoulder    DDD (degenerative disc disease), lumbar    Lumbosacral spondylosis without myelopathy    S/P lumbar fusion    Sacroiliac dysfunction    Postlaminectomy syndrome, lumbar    Class 3 severe obesity due to excess calories with serious comorbidity and body mass index (BMI) of 45.0 to 49.9 in [facility])    Chronic fatigue    GERD (gastroesophageal reflux disease)       Past Medical History:   Diagnosis Date    Back pain     Chronic fatigue     COVID-19 09/2020    mild per patient    Diabetes mellitus ([facility] [address]     History of blood transfusion     Hyperlipidemia     Hypothyroidism     IBS (irritable bowel syndrome)     Morbid obesity due to excess calories (HCC)     Obesity     Osteoarthritis     PONV (postoperative nausea and vomiting)        Past Surgical History:   Procedure Laterality Date    BACK SURGERY      CHOLECYSTECTOMY      COLONOSCOPY      HYSTERECTOMY, TOTAL ABDOMINAL (CERVIX REMOVED)      INCONTINENCE SURGERY      BLADDER SLING    KNEE SURGERY Left     LUMBAR SPINE SURGERY N/A 5/25/2021    L3- L5 DECOMPRESSION AND FUSION , L4- L5 TRANSFORAMINAL LUMBAR INTERBODY FUSION --OARM, PATY TABLE, AUDIOLOGY, PLATES ,SCREWS, --NUVASIVE performed by Ilda Bolivar MD at [facility] Bilateral 10/11/2021    BILATERAL [address] (CPT [address]) performed by Rob Kiser MD at [address] [place] [address] Bilateral 11/8/2021    BILATERAL LUMBAR FACET INJECTION AT L5-S 1 FACETS BLOCK UNDER FLUOROSCOPIC GUIDANCE performed by Rob iKser MD at [address] [place] [address] Bilateral 12/21/2021    BILATERAL S1, S2, S3 BRANCH & L5 [facility] NERVE BLOCK UNDER XRAY GUIDANCE ([address]) (SEDATION) performed by Rob Kiser MD at [facility] N/A 2/7/2022    LUMBAR EPIDURAL STEROID INJECTION UNDER FLUOROSCOPIC GUIDANCE AT L5-S1 PARAMEDIAN performed by Rob Kiser MD at [address] [place] [address] N/A 3/9/2022    EGD BIOPSY performed by Bill Monk MD at [place] [address]       Current Outpatient Medications   Medication Sig Dispense Refill    vitamin B-12 (CYANOCOBALAMIN) 100 MCG tablet Take 1 tablet by mouth in the morning.  90 tablet 3    cyclobenzaprine (FLEXERIL) 10 MG tablet TAKE 1 TABLET BY MOUTH THREE TIMES A DAY AS NEEDED FOR MUSCLE SPASMS 90 tablet 2    levothyroxine (SYNTHROID) 175 MCG tablet TAKE 1 TABLET BY MOUTH EVERY DAY 90 tablet 1    omega-3 acid ethyl esters (LOVAZA) 1 g capsule TAKE 1 CAPSULE BY for this visit. Allergies   Allergen Reactions    Bactrim [Sulfamethoxazole-Trimethoprim] Nausea Only    Ceftin [Cefuroxime] Rash    Keflex [Cephalexin] Rash       Social History     Socioeconomic History    Marital status:      Spouse name: None    Number of children: None    Years of education: None    Highest education level: None   Occupational History     Employer: [employer]   Tobacco Use    Smoking status: Never    Smokeless tobacco: Never   Vaping Use    Vaping Use: Never used   Substance and Sexual Activity    Alcohol use: Never    Drug use: Never       Family History   Problem Relation Age of Onset    Diabetes Mother     Diabetes Father     Rheum Arthritis Sister     Cancer Maternal Grandfather           Review of Systems   Constitutional:  Positive for fatigue. Negative for activity change, appetite change, diaphoresis and fever. HENT:  Negative for congestion, ear pain, hearing loss, nosebleeds, rhinorrhea, sinus pressure and sore throat. Eyes:  Negative for pain, redness, itching and visual disturbance. Respiratory:  Negative for apnea, cough, chest tightness, shortness of breath and wheezing. Cardiovascular:  Negative for chest pain, palpitations and leg swelling. Gastrointestinal:  Negative for abdominal pain, blood in stool, constipation, diarrhea, nausea and vomiting. Endocrine: Negative. Genitourinary:  Negative for decreased urine volume, difficulty urinating, dysuria, frequency, hematuria and urgency. Musculoskeletal:  Positive for back pain and myalgias. Negative for arthralgias, gait problem and neck pain. Skin:  Positive for rash. Negative for color change. Allergic/Immunologic: Negative for environmental allergies and food allergies. Neurological:  Negative for dizziness, weakness, light-headedness, numbness and headaches. Hematological:  Negative for adenopathy. Does not bruise/bleed easily.    Psychiatric/Behavioral:  Negative for behavioral problems, dysphoric mood and sleep disturbance. The patient is not nervous/anxious and is not hyperactive. All other systems reviewed and are negative. /74   Pulse 88   Resp 16   Ht 5' 3\" (1.6 m)   Wt 229 lb (103.9 kg)   SpO2 98%   BMI 40.57 kg/m²     Physical Exam  Vitals and nursing note reviewed. Constitutional:       General: She is not in acute distress. Appearance: Normal appearance. She is well-developed. HENT:      Head: Normocephalic and atraumatic. Right Ear: Hearing, tympanic membrane and external ear normal. No tenderness. No middle ear effusion. Left Ear: Hearing, tympanic membrane and external ear normal. No tenderness. No middle ear effusion. Nose: Nose normal. No congestion or rhinorrhea. Right Turbinates: Not enlarged. Left Turbinates: Not enlarged. Mouth/Throat:      Mouth: Mucous membranes are moist.      Tongue: No lesions. Pharynx: Oropharynx is clear. No oropharyngeal exudate or posterior oropharyngeal erythema. Eyes:      General: No scleral icterus. Conjunctiva/sclera: Conjunctivae normal.      Pupils: Pupils are equal, round, and reactive to light. Neck:      Thyroid: No thyromegaly. Cardiovascular:      Rate and Rhythm: Normal rate and regular rhythm. Heart sounds: Normal heart sounds. No murmur heard. Pulmonary:      Effort: Pulmonary effort is normal. No respiratory distress. Breath sounds: Normal breath sounds. No wheezing or rales. Abdominal:      General: Bowel sounds are normal. There is no distension. Palpations: Abdomen is soft. Tenderness: There is no abdominal tenderness. Musculoskeletal:         General: No tenderness. Normal range of motion. Cervical back: Normal range of motion and neck supple. No rigidity. No muscular tenderness. Lymphadenopathy:      Cervical: No cervical adenopathy. Skin:     General: Skin is warm and dry.       Findings: No erythema or rash.   Neurological:      General: No focal deficit present. Mental Status: She is alert and oriented to person, place, and time. Cranial Nerves: No cranial nerve deficit. Deep Tendon Reflexes: Reflexes are normal and symmetric. Reflexes normal.   Psychiatric:         Mood and Affect: Mood normal.                               ASSESSMENT/PLAN:    Patient Active Problem List   Diagnosis    Type 2 diabetes mellitus without complication, without long-term current use of insulin (HCC)    Acquired hypothyroidism    Peripheral neuropathy    Carpal tunnel syndrome of left wrist    Spinal stenosis of lumbar region with neurogenic claudication    Acute exacerbation of chronic low back pain    Hyperlipidemia    Spondylolisthesis of lumbar region    Lumbar radiculopathy    Postoperative hypotension    Postoperative anemia due to acute blood loss    Venous insufficiency of both lower extremities    Impingement syndrome of right shoulder    DDD (degenerative disc disease), lumbar    Lumbosacral spondylosis without myelopathy    S/P lumbar fusion    Sacroiliac dysfunction    Postlaminectomy syndrome, lumbar    Class 3 severe obesity due to excess calories with serious comorbidity and body mass index (BMI) of 45.0 to 49.9 in [facility])    Chronic fatigue    GERD (gastroesophageal reflux disease)       Marie Senior was seen today for hypothyroidism, hyperlipidemia and diabetes. Diagnoses and all orders for this visit:    Other hyperlipidemia  -     Comprehensive Metabolic Panel; Future  -     Lipid Panel; Future  -     TSH; Future    Type 2 diabetes mellitus without complication, without long-term current use of insulin (HCC)  -     CBC; Future  -     Comprehensive Metabolic Panel; Future  -     Hemoglobin A1C; Future  -     Lipid Panel; Future  -     TSH; Future  -     Microalbumin / Creatinine Urine Ratio; Future    Acquired hypothyroidism  -     TSH;  Future    Class 3 severe obesity due to excess calories with

## 2022-07-20 ENCOUNTER — INITIAL CONSULT (OUTPATIENT)
Dept: BARIATRICS/WEIGHT MGMT | Age: 62
End: 2022-07-20

## 2022-07-20 ENCOUNTER — TELEPHONE (OUTPATIENT)
Dept: BARIATRICS/WEIGHT MGMT | Age: 62
End: 2022-07-20

## 2022-07-20 VITALS — BODY MASS INDEX: 39.69 KG/M2 | WEIGHT: 224 LBS | HEIGHT: 63 IN

## 2022-07-20 DIAGNOSIS — Z71.3 NUTRITIONAL COUNSELING: ICD-10-CM

## 2022-07-20 DIAGNOSIS — Z00.8 NUTRITIONAL ASSESSMENT: Primary | ICD-10-CM

## 2022-07-20 PROCEDURE — 99999 PR OFFICE/OUTPT VISIT,PROCEDURE ONLY: CPT | Performed by: DIETITIAN, REGISTERED

## 2022-07-20 RX ORDER — LEVOTHYROXINE SODIUM 0.2 MG/1
200 TABLET ORAL DAILY
Qty: 90 TABLET | Refills: 1 | Status: SHIPPED | OUTPATIENT
Start: 2022-07-20

## 2022-07-20 NOTE — PROGRESS NOTES
Patient attended a 3 Hour Initial Nutrition Consult Class for RYGB on 7/20/22. Patient was able to verbalize understanding of the class. NATALI REINA [facility]  Nutrition History and Physical     Brain Phillip    Surgery Type: RYGB  Today's Date: 7/20/22    yes  Patient attended a 3 hour nutrition education class on 7/20/22, and was given written educational material.  Patient signed and verbalized understanding of nutrition therapy for Bariatric Surgery. Assessment:              Vitals:    07/20/22 1314   Weight: 224 lb (101.6 kg)   Height: 5' 3\" (1.6 m)    Height: 5' 3\" (1.6 m) Weight: 224 lb (101.6 kg)   BMI:Body mass index is 39.68 kg/m². Food Allergies and Allergies: No    Food Intolerances: No   Eating Problems:No  Chewing Problems:No  Swallowing Problems:No    Current Vitamins/Supplements and Medications:  Current Outpatient Medications:     levothyroxine (SYNTHROID) 200 MCG tablet, Take 1 tablet by mouth in the morning., Disp: 90 tablet, Rfl: 1    vitamin B-12 (CYANOCOBALAMIN) 100 MCG tablet, Take 1 tablet by mouth in the morning., Disp: 90 tablet, Rfl: 3    cyclobenzaprine (FLEXERIL) 10 MG tablet, TAKE 1 TABLET BY MOUTH THREE TIMES A DAY AS NEEDED FOR MUSCLE SPASMS, Disp: 90 tablet, Rfl: 2    omega-3 acid ethyl esters (LOVAZA) 1 g capsule, TAKE 1 CAPSULE BY MOUTH TWICE A DAY, Disp: 180 capsule, Rfl: 1    valsartan (DIOVAN) 80 MG tablet, TAKE 1 TABLET BY MOUTH EVERY DAY, Disp: 90 tablet, Rfl: 1    topiramate (TOPAMAX) 25 MG tablet, Take 1 tablet by mouth 2 times daily, Disp: 180 tablet, Rfl: 1    diclofenac sodium (VOLTAREN) 1 % GEL, APPLY 4 GRAMS TOPICALLY 4 TIMES DAILY AS NEEDED FOR PAIN, Disp: 100 g, Rfl: 3    OZEMPIC, 1 MG/DOSE, 4 MG/3ML SOPN, INJECT 1 MG INTO THE SKIN ONCE A WEEK, Disp: 9 mL, Rfl: 2    gabapentin (NEURONTIN) 800 MG tablet, Take 1 tablet by mouth 3 times daily for 90 days. , Disp: 270 tablet, Rfl: 0    DULoxetine (CYMBALTA) 60 MG extended release capsule, Take 1 capsule by mouth daily, Disp: 90 capsule, Rfl: 1    pravastatin (PRAVACHOL) 10 MG tablet, TAKE 1 TABLET BY MOUTH EVERY OTHER DAY, Disp: 45 tablet, Rfl: 1    Handicap Placard MISC, by Does not apply route Patient cannot walk 200 ft without stopping to rest.   Expiration 5 yrs, Disp: 1 each, Rfl: 0    Multiple Vitamins-Minerals (THERAPEUTIC MULTIVITAMIN-MINERALS) tablet, Take 1 tablet by mouth daily, Disp: , Rfl:     vitamin D (ERGOCALCIFEROL) 1.25 MG (62945 UT) CAPS capsule, TAKE 1 CAPSULE BY MOUTH ONE TIME PER WEEK, Disp: 12 capsule, Rfl: 1    torsemide (DEMADEX) 10 MG tablet, take 1 tablet by mouth once daily (Patient taking differently: as needed), Disp: 30 tablet, Rfl: 3    Thiamine HCl (B-1) 100 MG TABS, Take 1 pill daily, Disp: 90 tablet, Rfl: 1    gemfibrozil (LOPID) 600 MG tablet, TAKE 1 TABLET BY MOUTH EVERY 12 HOURS, Disp: 180 tablet, Rfl: 3    metFORMIN (GLUCOPHAGE) 500 MG tablet, TAKE 1 TABLET BY MOUTH EVERY DAY WITH BREAKFAST, Disp: 90 tablet, Rfl: 3    omeprazole (PRILOSEC) 20 MG delayed release capsule, TAKE 1 CAPSULE BY MOUTH EVERY DAY, Disp: 90 capsule, Rfl: 3    linaclotide (LINZESS) 145 MCG capsule, TAKE 1 CAPSULE BY MOUTH EVERY DAY IN THE MORNING BEFORE BREAKFAST, Disp: 90 capsule, Rfl: 3    acetaminophen (TYLENOL) 500 MG tablet, Take 500 mg by mouth every 6 hours as needed for Pain, Disp: , Rfl:     lidocaine (LIDODERM) 5 %, PLACE 1 PATCH ONTO THE SKIN DAILY 12 HOURS ON, 12 HOURS OFF., Disp: , Rfl:     polyethylene glycol (GLYCOLAX) 17 g packet, Take 17 g by mouth daily as needed, Disp: , Rfl:   _    Please check all that apply:  Yes - Patient lost 10% of excess body weight prior to surgery  Yes - Patient  is able to verbalize a Bariatric Full Liquid Diet. Yes - Patient is able to verbalize the usage & importance of Protein Supplements. Yes- Patient purchased 3 month supply of protein vitamins and calcium.   YES - Patient is instructed to follow a low-fat diet from now until surgery date. YES - Patient is instructed to take 30 grams of a protein supplement from  now until surgery date in addition to low-fat diet guidelines. YES - Patient is instructed to consume 64 ounces of water daily from now until surgery date.  ________________________________________________________________________  Yes - Patient did lose 10% of excess body weight prior to surgery    ________________________________________________________________________  Yes - Patient did purchase 3 month supply of protein, vitamins and Calcium.     Comments:   [facility] Supplements

## 2022-07-20 NOTE — LETTER
[facility]  [address] [facility]  Phone: [phone]  Fax: [phone]    Bev Montoya RD, SOLOMON        July 21, 2022    [place] [address]      Dear Angelito Wiley:        I personally wanted to thank you for selecting [facility] and [facility] as your center of choice for surgery. I wanted to take the time to let you know it means a lot to me to be able to work with you both before and after surgery and I am so glad that you will become part of our surgical family. It has been a privilege to get to know you at your 3 Hour Nutrition Class and become part of your new journey on life. I look forward to working with you in the future and helping you achieve your new goals. I can't wait to see the growth and transformation that occurs for you after your surgery. If at any time you have any questions you can always contact me [phone].      Respectfully,          Bev Montoya RDN/ SOLOMON  Bariatric Dietitian  [facility] and [facility]  Certified In Adult Weight Management I and II  Certified In Pediatric and Adolescent Weight Management      Bev Montoya RD, SOLOMON

## 2022-07-21 NOTE — PATIENT INSTRUCTIONS
[facility]  Dietary Follow-up Appointment Instructions    Fco Munozu   Date: 7/21/2022     The RD / LD reviewed the following instructions with the patient and handouts have been given. Pt. is able to verbalize instruction and has been instructed to call with any problems or complications. If patient is not able to comply with the dietary or supplement instructions given pt. is instructed to call the [facility] at [phone]. Patient has been instructed to continue to follow a low-fat diet from today's date until the day before surgery. Patient has been instructed to drink 64 oz. of water daily eliminating carbonated and caffeinated beverages.     __________________________________________________________________________________

## 2022-07-25 RX ORDER — OMEGA-3-ACID ETHYL ESTERS 1 G/1
CAPSULE, LIQUID FILLED ORAL
Qty: 90 CAPSULE | Refills: 3 | Status: SHIPPED
Start: 2022-07-25 | End: 2022-08-24

## 2022-07-28 ENCOUNTER — TELEPHONE (OUTPATIENT)
Dept: BARIATRICS/WEIGHT MGMT | Age: 62
End: 2022-07-28

## 2022-07-28 NOTE — TELEPHONE ENCOUNTER
Call placed to [facility] to check status of auth request. Spoke to Rafael Rivers in Utilization Management, she said case is still pending in the Benefits department to determine if Teryl Gess is required. I explained that I had originally submitted the case as OP request as I know [facility] does not prior auth bariatric surgery for inpatient. I received a fax back stating patient's plan does not require authorization, however we need at least an outpatient auth before we can schedule the surgery. Osiel Peralta said she can over-ride and provide us with an auth for outpatient as long as the case meets medical necessity. Per Osiel Peralta, she will review the case today and hopefully be able to have an auth to us by tomorrow. Call ref number is [id number].

## 2022-07-29 ENCOUNTER — TELEPHONE (OUTPATIENT)
Dept: BARIATRICS/WEIGHT MGMT | Age: 62
End: 2022-07-29

## 2022-07-29 NOTE — TELEPHONE ENCOUNTER
Per order of Dr. Dionicio Mary patient is ready to be scheduled for LRYGB. Call placed to patient and she is in agreement with surgery on 8-23-22. Pre-op class completed and patient knows she will need to purchase supplements at that visit. Pre-op letter will be mailed. She is working towards pre-op weight loss goal.   She does not smoke and will refrain from alcohol use. We reviewed at length all meds to avoid 2 weeks prior to surgery and patient voiced understanding. This list is in the pre-op letter which will be mailed to patient. She does not use a CPAP. She has final medical clearance. Patient placed on Amitive. Patient placed in [place]. No latex allergy.   Has history of PONV

## 2022-08-11 ENCOUNTER — OFFICE VISIT (OUTPATIENT)
Dept: BARIATRICS/WEIGHT MGMT | Age: 62
End: 2022-08-11
Payer: COMMERCIAL

## 2022-08-11 VITALS
SYSTOLIC BLOOD PRESSURE: 132 MMHG | BODY MASS INDEX: 38.98 KG/M2 | DIASTOLIC BLOOD PRESSURE: 76 MMHG | WEIGHT: 220 LBS | HEART RATE: 78 BPM | HEIGHT: 63 IN | TEMPERATURE: 97.7 F | RESPIRATION RATE: 20 BRPM

## 2022-08-11 DIAGNOSIS — K21.9 GASTROESOPHAGEAL REFLUX DISEASE WITHOUT ESOPHAGITIS: ICD-10-CM

## 2022-08-11 DIAGNOSIS — R11.2 PONV (POSTOPERATIVE NAUSEA AND VOMITING): ICD-10-CM

## 2022-08-11 DIAGNOSIS — K91.2 MALNUTRITION FOLLOWING GASTROINTESTINAL SURGERY: Primary | ICD-10-CM

## 2022-08-11 DIAGNOSIS — Z98.890 PONV (POSTOPERATIVE NAUSEA AND VOMITING): ICD-10-CM

## 2022-08-11 PROCEDURE — 99214 OFFICE O/P EST MOD 30 MIN: CPT | Performed by: SURGERY

## 2022-08-11 PROCEDURE — 99213 OFFICE O/P EST LOW 20 MIN: CPT

## 2022-08-11 RX ORDER — ONDANSETRON 4 MG/1
4 TABLET, ORALLY DISINTEGRATING ORAL EVERY 8 HOURS PRN
Qty: 15 TABLET | Refills: 0 | Status: SHIPPED | OUTPATIENT
Start: 2022-08-11

## 2022-08-11 RX ORDER — SUCRALFATE 1 G/1
0.5 TABLET ORAL 4 TIMES DAILY
Qty: 120 TABLET | Refills: 1 | Status: SHIPPED | OUTPATIENT
Start: 2022-08-11

## 2022-08-11 RX ORDER — SODIUM CHLORIDE 0.9 % (FLUSH) 0.9 %
5-40 SYRINGE (ML) INJECTION EVERY 12 HOURS SCHEDULED
Status: CANCELLED | OUTPATIENT
Start: 2022-08-11

## 2022-08-11 RX ORDER — SODIUM CHLORIDE 0.9 % (FLUSH) 0.9 %
5-40 SYRINGE (ML) INJECTION PRN
Status: CANCELLED | OUTPATIENT
Start: 2022-08-11

## 2022-08-11 RX ORDER — SODIUM CHLORIDE 9 MG/ML
INJECTION, SOLUTION INTRAVENOUS PRN
Status: CANCELLED | OUTPATIENT
Start: 2022-08-11

## 2022-08-11 NOTE — PATIENT INSTRUCTIONS
Please follow the instruction sheets you received today for your pre-surgical skin and bowel prep. It is very important that your abdomen is properly cleaned and your bowel is cleansed of stool prior to surgery to decrease the chance of any complications. Make sure that you do not eat food or drink fluids after midnight prior to your surgery. If you eat or drink within 8 hours of your surgery, your procedure will be cancelled. Hold your medications as well unless you receive special instruction from either your surgeon or the anesthesiologist to take one of your medications with a small sip of water. Please be advised that most patients are now released from the hospital the day after surgery, regardless of procedure (Band, Bypass, or Sleeve), if they are progressing well and feel ready for discharge. You will see your surgeon prior to your hospital release so that he can examine you and discuss your discharge needs. Please call the [facility] with any questions you may have [phone]. Skin Prep    Home Instruction for Preoperative Antibacterial Dial Soap    Reason for using this soap before surgery:    - To decrease the potential for wound infection after surgery    How to use:    - Obtain Antibacterial Dial soap, either in bar or liquid form from your local store. The soap must be the Antibacterial type of Dial.    Unless you are allergic to this product or notice that it is causing skin irritation, wash with this soap from now until surgery. Make sure to shower with this soap the morning of your surgery before departing for the hospital.    Use a clean wash cloth or new body sponge. Wash from the neck down, paying particular attention to the abdominal area and belly button. Be gentle. DO NOT irritate or scrub the skin until red. Rinse thoroughly and pat dry with a clean towel. Dress with clean clothing.     Do not apply lotions or powders the morning of surgery.

## 2022-08-11 NOTE — PROGRESS NOTES
Fidelina Franklin  8/11/2022  [facility]               History and Physical  Gastric Bypass (31601)     CHIEF COMPLAINT: Morbid obesity, Type 2 Diabetes Mellitus, Hypertension, Hyperlipidemia, and GERD    HISTORY OF PRESENT ILLNESS: Fidelina Franklin is a morbidly-obese 62 y.o.  female who weighs 220 lb (99.8 kg), Body mass index is 38.97 kg/m². She has multiple medical problems aggravated by her obesity. She wishes to have a gastric bypass so that she can lose more weight and keep the weight off. I have met with her on 3 different occasions in the [facility], where we discussed the surgery in great detail and went over the risks and benefits. She has watched our informational video so she understands all of the extensive risks involved. She states that she understands all of these risks and wishes to proceed.     Weights:  220 lb 8/11/2022  bypass  249 lb 2/2022  initial    Past Medical History:     Type 2 diabetes mellitus         Hypothyroidism     Peripheral neuropathy     Carpal tunnel syndrome of left wrist     Back pain     Hyperlipidemia     Venous insufficiency of both lower extremities     Morbid obesity      GERD (gastroesophageal reflux disease)    Past Surgical History:   Procedure Laterality Date    BACK SURGERY      CHOLECYSTECTOMY      COLONOSCOPY      HYSTERECTOMY, TOTAL ABDOMINAL (CERVIX REMOVED)      INCONTINENCE SURGERY      BLADDER SLING    KNEE SURGERY Left     LUMBAR SPINE SURGERY N/A 5/25/2021    L3- L5 DECOMPRESSION AND FUSION , L4- L5 TRANSFORAMINAL LUMBAR INTERBODY FUSION --OARM, PATY TABLE, AUDIOLOGY, PLATES ,SCREWS, --NUVASIVE performed by Terrence Grimes MD at . [address] Bilateral 10/11/2021    BILATERAL SACROILIAC JOINT INJECTION UNDER FLUOROSCOPY (CPT [id number]) performed by Asuncion Sommers MD at . [address] Bilateral 11/8/2021    BILATERAL LUMBAR FACET INJECTION AT L5-S 1 FACETS BLOCK UNDER FLUOROSCOPIC GUIDANCE performed by Luke Bunch MD at [address] Bilateral 12/21/2021    BILATERAL S1, S2, S3 BRANCH & L5 [facility] NERVE BLOCK UNDER XRAY GUIDANCE (56553) (SEDATION) performed by Luke Bunch MD at [address] [facility] N/A 2/7/2022    LUMBAR EPIDURAL STEROID INJECTION UNDER FLUOROSCOPIC GUIDANCE AT L5-S1 PARAMEDIAN performed by Luke Bunch MD at [address] N/A 3/9/2022    EGD BIOPSY performed by Kirit Romero MD at [address] [facility] ENDOSCOPY      Allergies: Bactrim [sulfamethoxazole-trimethoprim], Ceftin [cefuroxime], and Keflex [cephalexin]     Home Medications  Prior to Visit Medications    Medication Sig Taking? Authorizing Provider   sucralfate (CARAFATE) 1 GM tablet Take 0.5 tablets by mouth in the morning and 0.5 tablets at noon and 0.5 tablets in the evening and 0.5 tablets before bedtime. Yes Kiirt Romero MD   ondansetron (ZOFRAN-ODT) 4 MG disintegrating tablet Take 1 tablet by mouth every 8 hours as needed for Nausea or Vomiting Yes Kirit Romero MD   omega-3 acid ethyl esters (LOVAZA) 1 g capsule TAKE 1 CAPSULE BY MOUTH EVERY DAY Yes Prieto Robin DO   levothyroxine (SYNTHROID) 200 MCG tablet Take 1 tablet by mouth in the morning. Yes Prieto Caballero, DO   vitamin B-12 (CYANOCOBALAMIN) 100 MCG tablet Take 1 tablet by mouth in the morning.  Yes Prieto Robin, DO   cyclobenzaprine (FLEXERIL) 10 MG tablet TAKE 1 TABLET BY MOUTH THREE TIMES A DAY AS NEEDED FOR MUSCLE SPASMS Yes Prieto Robin, DO   valsartan (DIOVAN) 80 MG tablet TAKE 1 TABLET BY MOUTH EVERY DAY Yes Prieto Robin, DO   topiramate (TOPAMAX) 25 MG tablet Take 1 tablet by mouth 2 times daily Yes Jose Ramon Martines MD   diclofenac sodium (VOLTAREN) 1 % GEL APPLY 4 GRAMS TOPICALLY 4 TIMES DAILY AS NEEDED FOR PAIN Yes Brigida Olszewski, MD   OZEMPIC, 1 MG/DOSE, 4 MG/3ML SOPN INJECT 1 MG INTO THE SKIN ONCE A WEEK Yes Prieto F Sharda, DO   gabapentin she has never smoked. She has never used smokeless tobacco.  All smokers must join the free smoking cessation program and stop smoking for 3 months before having any Bariatric surgery. ETOH:    reports no history of alcohol use. Family History   Problem Relation Age of Onset    Diabetes Mother     Diabetes Father     Rheum Arthritis Sister     Cancer Maternal Grandfather      Review of Systems:  Psychiatric:  depression and anxiety  Respiratory: negative  Cardiovascular: negative  Gastrointestinal: negative  Musculoskeletal:negative  All others reviewed, negative    Physical Exam:   VITALS: Blood pressure 132/76, pulse 78, temperature 97.7 °F (36.5 °C), temperature source Temporal, resp. rate 20, height 5' 3\" (1.6 m), weight 220 lb (99.8 kg). General appearance: alert, appears stated age and cooperative, does ambulate easily  Head: Normocephalic, without obvious abnormality, atraumatic  Eyes: PERRL  Ears/mouth/throat:  Ears clear, mouth normal, throat no redness  Neck: no adenopathy, no JVD, supple, symmetrical, trachea midline and thyroid not enlarged  Lungs: clear to auscultation bilaterally  Heart: regular rate and rhythm  Abdomen: soft, non-tender; bowel sounds normal; no masses,  no organomegaly  Extremities: extremities normal, atraumatic, no cyanosis or edema  Skin: no open wounds    Assessment:  Morbid obesity with failure of conservative therapy. Patient has been cleared psychologically and medically. EGD 3/9/2022 showed no esophagitis, no hiatal hernia, there was food filling the stomach from gastroparesis but no ulcer or gastritis, biopsy done to check for H.pylori, she does complain of acid reflux on Omeprazole, and the gallbladder is out. The patient was informed that risks include, but are not limited to: death, anastomotic leak, bowel obstruction, bleeding, and sepsis. Any of these could require further surgery.  Other risks include heart attack, DVT, PE, pneumonia, hernia, wound infection, the need for dilatations of the gastrojejunostomy, and the inability to lose appropriate weight and keep it off. We may not be able to do a Gastic Bypass due to unforseen scar tissue, in that case we might do a Sleeve Gastrectomy. We discussed that our goal is to ameliorate the medical problems and not to obtain a specific body mass index. She understands the risks and benefits and wishes to proceed with the procedure and has signed the consent form. She will go home with Acetaminophen 500 mg qid for 3 days, Carafate 1/2 tablet every 4 hours for ulcer prophylaxis and Zofran for nausea. Plan:  (34146) Ashu-en-Y Gastric Bypass, robotic, possible open. She does not need Lovenox at home post-op. Drink two 24 ounce bottles of G 2 the night before surgery and another 6 hours pre-op.     Physician Signature: Electronically signed by Dr. Eugenie Rodriguez MD    Send copy of H&P to PCP, Jackie Plata DO

## 2022-08-15 ENCOUNTER — HOSPITAL ENCOUNTER (OUTPATIENT)
Dept: PREADMISSION TESTING | Age: 62
Discharge: HOME | End: 2022-08-15
Payer: COMMERCIAL

## 2022-08-15 VITALS
SYSTOLIC BLOOD PRESSURE: 123 MMHG | TEMPERATURE: 96.8 F | RESPIRATION RATE: 20 BRPM | HEIGHT: 63 IN | HEART RATE: 79 BPM | DIASTOLIC BLOOD PRESSURE: 78 MMHG | WEIGHT: 218 LBS | BODY MASS INDEX: 38.62 KG/M2

## 2022-08-15 DIAGNOSIS — Z01.818 PRE-OP TESTING: Primary | ICD-10-CM

## 2022-08-15 LAB
ALBUMIN SERPL-MCNC: 4.7 G/DL (ref 3.5–5.2)
ALP BLD-CCNC: 98 U/L (ref 35–104)
ALT SERPL-CCNC: 33 U/L (ref 0–32)
ANION GAP SERPL CALCULATED.3IONS-SCNC: 10 MMOL/L (ref 7–16)
AST SERPL-CCNC: 27 U/L (ref 0–31)
BILIRUB SERPL-MCNC: 0.8 MG/DL (ref 0–1.2)
BUN BLDV-MCNC: 20 MG/DL (ref 6–23)
CALCIUM SERPL-MCNC: 10.2 MG/DL (ref 8.6–10.2)
CHLORIDE BLD-SCNC: 102 MMOL/L (ref 98–107)
CO2: 26 MMOL/L (ref 22–29)
CREAT SERPL-MCNC: 0.7 MG/DL (ref 0.5–1)
GFR AFRICAN AMERICAN: >60
GFR NON-AFRICAN AMERICAN: >60 ML/MIN/1.73
GLUCOSE BLD-MCNC: 101 MG/DL (ref 74–99)
HCT VFR BLD CALC: 48.5 % (ref 34–48)
HEMOGLOBIN: 16 G/DL (ref 11.5–15.5)
MCH RBC QN AUTO: 29.5 PG (ref 26–35)
MCHC RBC AUTO-ENTMCNC: 33 % (ref 32–34.5)
MCV RBC AUTO: 89.3 FL (ref 80–99.9)
PDW BLD-RTO: 13.3 FL (ref 11.5–15)
PLATELET # BLD: 232 E9/L (ref 130–450)
PMV BLD AUTO: 9.9 FL (ref 7–12)
POTASSIUM REFLEX MAGNESIUM: 4.7 MMOL/L (ref 3.5–5)
RBC # BLD: 5.43 E12/L (ref 3.5–5.5)
SODIUM BLD-SCNC: 138 MMOL/L (ref 132–146)
TOTAL PROTEIN: 7.8 G/DL (ref 6.4–8.3)
WBC # BLD: 5.2 E9/L (ref 4.5–11.5)

## 2022-08-15 PROCEDURE — 80053 COMPREHEN METABOLIC PANEL: CPT

## 2022-08-15 PROCEDURE — 36415 COLL VENOUS BLD VENIPUNCTURE: CPT

## 2022-08-15 PROCEDURE — 85027 COMPLETE CBC AUTOMATED: CPT

## 2022-08-15 RX ORDER — OXYCODONE HYDROCHLORIDE AND ACETAMINOPHEN 5; 325 MG/1; MG/1
1 TABLET ORAL EVERY 4 HOURS PRN
Status: ON HOLD | COMMUNITY
End: 2022-08-24 | Stop reason: HOSPADM

## 2022-08-15 ASSESSMENT — PAIN DESCRIPTION - PAIN TYPE: TYPE: CHRONIC PAIN

## 2022-08-15 ASSESSMENT — PAIN DESCRIPTION - DESCRIPTORS: DESCRIPTORS: ACHING

## 2022-08-15 ASSESSMENT — PAIN DESCRIPTION - LOCATION: LOCATION: LEG

## 2022-08-15 ASSESSMENT — PAIN SCALES - GENERAL: PAINLEVEL_OUTOF10: 2

## 2022-08-15 ASSESSMENT — PAIN DESCRIPTION - ORIENTATION: ORIENTATION: RIGHT

## 2022-08-15 NOTE — PROGRESS NOTES
[address] [facility]                                                                                                                    PRE OP INSTRUCTIONS FOR  Edward Wade        Date: 8/15/2022    Date of surgery: 08/23/22   Arrival Time: Hospital will call you between 5pm and 7pm with your final arrival time for surgery    Do not eat or drink anything after midnight prior to surgery. This includes no water, chewing gum, mints or ice chips. Gastric bypass patients- The night before surgery drink 2- 24 oz bottles of regular Gatorade at bedtime. Drink both within 10-15 minutes. (Insulin dependent patients drink 2- 24 oz bottles of sugar free Gatorade.) May have liquids up to 6 hours prior to surgery. Take the following medications with a small sip of water on the morning of Surgery: gabapentin, topiramate, duloxetine, omeprazole, synthroid     Diabetics may take evening dose of insulin but none after midnight. If you feel symptomatic or low blood sugar morning of surgery drink 1-2 ounces of apple juice only. Aspirin, Ibuprofen, Advil, Naproxen, Vitamin E and other Anti-inflammatory products should be stopped  before surgery  as directed by your physician. Take Tylenol only unless instructed otherwise by your surgeon. Do not smoke,use illicit drugs and do not drink any alcoholic beverages 24 hours prior to surgery. You may brush your teeth the morning of surgery. DO NOT SWALLOW WATER    Please wear simple, loose fitting clothing to the hospital.  Neli Skeans not bring valuables (money, credit cards, checkbooks, etc.) Do not wear any makeup (including no eye makeup) or nail polish on your fingers or toes. DO NOT wear any jewelry or piercings on day of surgery. All body piercing jewelry must be removed.     Shower the night before surgery with _x__Antibacterial soap /ELDER WIPES________      If you have a Living Will and Durable Power of  for Healthcare, please bring in a copy.    If appropriate bring crutches, inspirex, WALKER, CANE etc... Notify your Surgeon if you develop any illness between now and surgery time, cough, cold, fever, sore throat, nausea, vomiting, etc.  Please notify your surgeon if you experience dizziness, shortness of breath or blurred vision between now & the time of your surgery. If you have _x__dentures, they will be removed before going to the OR; we will provide you a container. If you wear ___contact lenses or _x__glasses, they will be removed; please bring a case for them. To provide excellent care visitors will be limited to 1 in the room at any given time. Please bring picture ID and insurance card. During flu season no children under the age of 15 are permitted in the hospital for the safety of all patients. Other On day of surgery please come to the main lobby and stop at the information desk. Mask required. Please call AMBULATORY CARE if you have any further questions.    [address]     [address]

## 2022-08-18 RX ORDER — PRAVASTATIN SODIUM 10 MG
10 TABLET ORAL EVERY OTHER DAY
Qty: 45 TABLET | Refills: 1 | Status: SHIPPED
Start: 2022-08-18 | End: 2022-08-24

## 2022-08-19 ENCOUNTER — OFFICE VISIT (OUTPATIENT)
Dept: ORTHOPEDIC SURGERY | Age: 62
End: 2022-08-19
Payer: COMMERCIAL

## 2022-08-19 VITALS — HEIGHT: 63 IN | BODY MASS INDEX: 38.62 KG/M2 | WEIGHT: 218 LBS

## 2022-08-19 DIAGNOSIS — M67.813 BICEPS TENDINOSIS OF RIGHT SHOULDER: ICD-10-CM

## 2022-08-19 DIAGNOSIS — M12.811 ROTATOR CUFF ARTHROPATHY OF RIGHT SHOULDER: Primary | ICD-10-CM

## 2022-08-19 PROCEDURE — 99213 OFFICE O/P EST LOW 20 MIN: CPT | Performed by: ORTHOPAEDIC SURGERY

## 2022-08-19 NOTE — PROGRESS NOTES
Follow Up Visit     Kyung Cast returns today for follow up visit regarding Right shoulder rotator cuff and biceps tendinosis. Treatment thus far has included conservative treatment. She reports she has about the same level of pain    REVIEW OF SYSTEMS:     Constitutional:  Negative for weight loss, fevers, chills, fatigue  Cardiovascular: Negative for chest pain, palpitations  Pulmonary: Negative for shortness of breath, labored breathing, cough  GI: negative for abdominal pain, nausea, vomitting   MSK: per HPI  Skin: negative for rash, open wounds    All other systems reviewed and are negative       Physical Exam:     No data recorded    General: Alert and oriented x3, no acute distress  Cardiovascular/pulmonary: No labored breathing, peripheral perfusion intact  Musculoskeletal:    Exam of the shoulder today shows pain with elevation above 90 degrees, she has mild pain with Marcia test.  Belly press test is intact. She has pain and weakness with Speed and [place]'s tests    Controlled Substances Monitoring:      Imaging:  MRI reviewed showing rotator cuff tendinosis with partial tearing. Also noted is probable degenerative tearing of the biceps labral complex      Assessment: Right shoulder rotator cuff tendinosis, biceps labral related degenerative changes with pain      Plan:   We discussed her shoulder today. She is not interested in further treatment at this time and will continue with conservative treatment despite minimal improvement thus far. We discussed steroid injection, also discussed potential surgery.   She will see us back in about 3 months    Lynn Graf MD  Orthopaedic Surgery   8/19/22  10:24 AM

## 2022-08-22 ENCOUNTER — ANESTHESIA EVENT (OUTPATIENT)
Dept: OPERATING ROOM | Age: 62
DRG: 621 | End: 2022-08-22
Payer: COMMERCIAL

## 2022-08-22 DIAGNOSIS — M17.11 PRIMARY OSTEOARTHRITIS OF RIGHT KNEE: ICD-10-CM

## 2022-08-22 DIAGNOSIS — M48.062 SPINAL STENOSIS, LUMBAR REGION WITH NEUROGENIC CLAUDICATION: ICD-10-CM

## 2022-08-22 DIAGNOSIS — M25.511 RIGHT SHOULDER PAIN, UNSPECIFIED CHRONICITY: ICD-10-CM

## 2022-08-22 DIAGNOSIS — G89.29 CHRONIC RIGHT SHOULDER PAIN: ICD-10-CM

## 2022-08-22 DIAGNOSIS — Z98.1 S/P LUMBAR FUSION: ICD-10-CM

## 2022-08-22 DIAGNOSIS — M25.511 CHRONIC RIGHT SHOULDER PAIN: ICD-10-CM

## 2022-08-22 DIAGNOSIS — M54.17 RADICULOPATHY, LUMBOSACRAL REGION: ICD-10-CM

## 2022-08-22 ASSESSMENT — LIFESTYLE VARIABLES: SMOKING_STATUS: 0

## 2022-08-22 NOTE — TELEPHONE ENCOUNTER
Spoke with patient she has enough diclofenac sodium gel until next week when Dr. Nichole Causey returns. Will pend rx next wk.

## 2022-08-23 ENCOUNTER — ANESTHESIA (OUTPATIENT)
Dept: OPERATING ROOM | Age: 62
DRG: 621 | End: 2022-08-23
Payer: COMMERCIAL

## 2022-08-23 ENCOUNTER — HOSPITAL ENCOUNTER (INPATIENT)
Age: 62
LOS: 1 days | Discharge: HOME | DRG: 621 | End: 2022-08-24
Attending: SURGERY | Admitting: SURGERY
Payer: COMMERCIAL

## 2022-08-23 DIAGNOSIS — E66.01 MORBID OBESITY (HCC): ICD-10-CM

## 2022-08-23 LAB — METER GLUCOSE: 105 MG/DL (ref 74–99)

## 2022-08-23 PROCEDURE — 6360000002 HC RX W HCPCS: Performed by: SURGERY

## 2022-08-23 PROCEDURE — 2500000003 HC RX 250 WO HCPCS: Performed by: SURGERY

## 2022-08-23 PROCEDURE — 2580000003 HC RX 258: Performed by: NURSE ANESTHETIST, CERTIFIED REGISTERED

## 2022-08-23 PROCEDURE — 2580000003 HC RX 258: Performed by: SURGERY

## 2022-08-23 PROCEDURE — 2709999900 HC NON-CHARGEABLE SUPPLY: Performed by: SURGERY

## 2022-08-23 PROCEDURE — S2900 ROBOTIC SURGICAL SYSTEM: HCPCS | Performed by: SURGERY

## 2022-08-23 PROCEDURE — 7100000001 HC PACU RECOVERY - ADDTL 15 MIN: Performed by: SURGERY

## 2022-08-23 PROCEDURE — 3700000000 HC ANESTHESIA ATTENDED CARE: Performed by: SURGERY

## 2022-08-23 PROCEDURE — 3600000009 HC SURGERY ROBOT BASE: Performed by: SURGERY

## 2022-08-23 PROCEDURE — 3600000019 HC SURGERY ROBOT ADDTL 15MIN: Performed by: SURGERY

## 2022-08-23 PROCEDURE — 0WUF47Z SUPPLEMENT ABDOMINAL WALL WITH AUTOLOGOUS TISSUE SUBSTITUTE, PERCUTANEOUS ENDOSCOPIC APPROACH: ICD-10-PCS | Performed by: SURGERY

## 2022-08-23 PROCEDURE — 88305 TISSUE EXAM BY PATHOLOGIST: CPT

## 2022-08-23 PROCEDURE — 6370000000 HC RX 637 (ALT 250 FOR IP): Performed by: SURGERY

## 2022-08-23 PROCEDURE — 3700000001 HC ADD 15 MINUTES (ANESTHESIA): Performed by: SURGERY

## 2022-08-23 PROCEDURE — 0DJ08ZZ INSPECTION OF UPPER INTESTINAL TRACT, VIA NATURAL OR ARTIFICIAL OPENING ENDOSCOPIC: ICD-10-PCS | Performed by: SURGERY

## 2022-08-23 PROCEDURE — 2720000010 HC SURG SUPPLY STERILE: Performed by: SURGERY

## 2022-08-23 PROCEDURE — 49329 UNLSTD LAPS PX ABD PERTM&OMN: CPT | Performed by: SURGERY

## 2022-08-23 PROCEDURE — 8E0W4CZ ROBOTIC ASSISTED PROCEDURE OF TRUNK REGION, PERCUTANEOUS ENDOSCOPIC APPROACH: ICD-10-PCS | Performed by: SURGERY

## 2022-08-23 PROCEDURE — 2500000003 HC RX 250 WO HCPCS: Performed by: NURSE ANESTHETIST, CERTIFIED REGISTERED

## 2022-08-23 PROCEDURE — 0D164ZA BYPASS STOMACH TO JEJUNUM, PERCUTANEOUS ENDOSCOPIC APPROACH: ICD-10-PCS | Performed by: SURGERY

## 2022-08-23 PROCEDURE — 1200000000 HC SEMI PRIVATE

## 2022-08-23 PROCEDURE — 6360000002 HC RX W HCPCS: Performed by: NURSE ANESTHETIST, CERTIFIED REGISTERED

## 2022-08-23 PROCEDURE — 82962 GLUCOSE BLOOD TEST: CPT

## 2022-08-23 PROCEDURE — 6360000002 HC RX W HCPCS

## 2022-08-23 PROCEDURE — 7100000000 HC PACU RECOVERY - FIRST 15 MIN: Performed by: SURGERY

## 2022-08-23 PROCEDURE — 43644 LAP GASTRIC BYPASS/ROUX-EN-Y: CPT | Performed by: SURGERY

## 2022-08-23 RX ORDER — NEOSTIGMINE METHYLSULFATE 1 MG/ML
INJECTION, SOLUTION INTRAVENOUS PRN
Status: DISCONTINUED | OUTPATIENT
Start: 2022-08-23 | End: 2022-08-23 | Stop reason: SDUPTHER

## 2022-08-23 RX ORDER — LIDOCAINE HYDROCHLORIDE 20 MG/ML
INJECTION, SOLUTION EPIDURAL; INFILTRATION; INTRACAUDAL; PERINEURAL PRN
Status: DISCONTINUED | OUTPATIENT
Start: 2022-08-23 | End: 2022-08-23 | Stop reason: SDUPTHER

## 2022-08-23 RX ORDER — MORPHINE SULFATE 4 MG/ML
4 INJECTION, SOLUTION INTRAMUSCULAR; INTRAVENOUS
Status: DISCONTINUED | OUTPATIENT
Start: 2022-08-23 | End: 2022-08-24 | Stop reason: HOSPADM

## 2022-08-23 RX ORDER — SODIUM CHLORIDE 0.9 % (FLUSH) 0.9 %
5-40 SYRINGE (ML) INJECTION EVERY 12 HOURS SCHEDULED
Status: DISCONTINUED | OUTPATIENT
Start: 2022-08-23 | End: 2022-08-23 | Stop reason: HOSPADM

## 2022-08-23 RX ORDER — KETOROLAC TROMETHAMINE 30 MG/ML
30 INJECTION, SOLUTION INTRAMUSCULAR; INTRAVENOUS
Status: DISCONTINUED | OUTPATIENT
Start: 2022-08-23 | End: 2022-08-23 | Stop reason: HOSPADM

## 2022-08-23 RX ORDER — OXYCODONE HYDROCHLORIDE 5 MG/1
5 TABLET ORAL EVERY 4 HOURS PRN
Status: DISCONTINUED | OUTPATIENT
Start: 2022-08-23 | End: 2022-08-24 | Stop reason: HOSPADM

## 2022-08-23 RX ORDER — MIDAZOLAM HYDROCHLORIDE 1 MG/ML
INJECTION INTRAMUSCULAR; INTRAVENOUS PRN
Status: DISCONTINUED | OUTPATIENT
Start: 2022-08-23 | End: 2022-08-23 | Stop reason: SDUPTHER

## 2022-08-23 RX ORDER — DIPHENHYDRAMINE HYDROCHLORIDE 50 MG/ML
12.5 INJECTION INTRAMUSCULAR; INTRAVENOUS
Status: DISCONTINUED | OUTPATIENT
Start: 2022-08-23 | End: 2022-08-23 | Stop reason: HOSPADM

## 2022-08-23 RX ORDER — SODIUM CHLORIDE 0.9 % (FLUSH) 0.9 %
5-40 SYRINGE (ML) INJECTION PRN
Status: DISCONTINUED | OUTPATIENT
Start: 2022-08-23 | End: 2022-08-23 | Stop reason: HOSPADM

## 2022-08-23 RX ORDER — ENOXAPARIN SODIUM 100 MG/ML
40 INJECTION SUBCUTANEOUS DAILY
Status: DISCONTINUED | OUTPATIENT
Start: 2022-08-24 | End: 2022-08-24 | Stop reason: HOSPADM

## 2022-08-23 RX ORDER — SODIUM CHLORIDE, SODIUM LACTATE, POTASSIUM CHLORIDE, CALCIUM CHLORIDE 600; 310; 30; 20 MG/100ML; MG/100ML; MG/100ML; MG/100ML
INJECTION, SOLUTION INTRAVENOUS CONTINUOUS
Status: DISCONTINUED | OUTPATIENT
Start: 2022-08-23 | End: 2022-08-23 | Stop reason: SDUPTHER

## 2022-08-23 RX ORDER — ROCURONIUM BROMIDE 10 MG/ML
INJECTION, SOLUTION INTRAVENOUS PRN
Status: DISCONTINUED | OUTPATIENT
Start: 2022-08-23 | End: 2022-08-23 | Stop reason: SDUPTHER

## 2022-08-23 RX ORDER — IPRATROPIUM BROMIDE AND ALBUTEROL SULFATE 2.5; .5 MG/3ML; MG/3ML
1 SOLUTION RESPIRATORY (INHALATION)
Status: DISCONTINUED | OUTPATIENT
Start: 2022-08-23 | End: 2022-08-23 | Stop reason: HOSPADM

## 2022-08-23 RX ORDER — SODIUM CHLORIDE 9 MG/ML
INJECTION, SOLUTION INTRAVENOUS CONTINUOUS PRN
Status: DISCONTINUED | OUTPATIENT
Start: 2022-08-23 | End: 2022-08-23 | Stop reason: SDUPTHER

## 2022-08-23 RX ORDER — PROCHLORPERAZINE EDISYLATE 5 MG/ML
5 INJECTION INTRAMUSCULAR; INTRAVENOUS
Status: DISCONTINUED | OUTPATIENT
Start: 2022-08-23 | End: 2022-08-23 | Stop reason: HOSPADM

## 2022-08-23 RX ORDER — DEXAMETHASONE SODIUM PHOSPHATE 10 MG/ML
INJECTION, SOLUTION INTRAMUSCULAR; INTRAVENOUS PRN
Status: DISCONTINUED | OUTPATIENT
Start: 2022-08-23 | End: 2022-08-23 | Stop reason: SDUPTHER

## 2022-08-23 RX ORDER — ENOXAPARIN SODIUM 100 MG/ML
40 INJECTION SUBCUTANEOUS ONCE
Status: COMPLETED | OUTPATIENT
Start: 2022-08-23 | End: 2022-08-23

## 2022-08-23 RX ORDER — MIDAZOLAM HYDROCHLORIDE 1 MG/ML
2 INJECTION INTRAMUSCULAR; INTRAVENOUS
Status: DISCONTINUED | OUTPATIENT
Start: 2022-08-23 | End: 2022-08-23 | Stop reason: HOSPADM

## 2022-08-23 RX ORDER — SODIUM CHLORIDE 0.9 % (FLUSH) 0.9 %
5-40 SYRINGE (ML) INJECTION PRN
Status: DISCONTINUED | OUTPATIENT
Start: 2022-08-23 | End: 2022-08-24 | Stop reason: HOSPADM

## 2022-08-23 RX ORDER — GABAPENTIN 400 MG/1
800 CAPSULE ORAL 3 TIMES DAILY
Status: DISCONTINUED | OUTPATIENT
Start: 2022-08-23 | End: 2022-08-24 | Stop reason: HOSPADM

## 2022-08-23 RX ORDER — CYCLOBENZAPRINE HCL 10 MG
10 TABLET ORAL 3 TIMES DAILY PRN
Status: DISCONTINUED | OUTPATIENT
Start: 2022-08-23 | End: 2022-08-24 | Stop reason: HOSPADM

## 2022-08-23 RX ORDER — DULOXETIN HYDROCHLORIDE 60 MG/1
60 CAPSULE, DELAYED RELEASE ORAL DAILY
Status: DISCONTINUED | OUTPATIENT
Start: 2022-08-24 | End: 2022-08-24 | Stop reason: HOSPADM

## 2022-08-23 RX ORDER — GLYCOPYRROLATE 0.2 MG/ML
INJECTION INTRAMUSCULAR; INTRAVENOUS PRN
Status: DISCONTINUED | OUTPATIENT
Start: 2022-08-23 | End: 2022-08-23 | Stop reason: SDUPTHER

## 2022-08-23 RX ORDER — SODIUM CHLORIDE 9 MG/ML
INJECTION, SOLUTION INTRAVENOUS PRN
Status: DISCONTINUED | OUTPATIENT
Start: 2022-08-23 | End: 2022-08-23 | Stop reason: HOSPADM

## 2022-08-23 RX ORDER — PANTOPRAZOLE SODIUM 40 MG/1
40 TABLET, DELAYED RELEASE ORAL
Status: DISCONTINUED | OUTPATIENT
Start: 2022-08-24 | End: 2022-08-24 | Stop reason: HOSPADM

## 2022-08-23 RX ORDER — FENTANYL CITRATE 50 UG/ML
INJECTION, SOLUTION INTRAMUSCULAR; INTRAVENOUS PRN
Status: DISCONTINUED | OUTPATIENT
Start: 2022-08-23 | End: 2022-08-23 | Stop reason: SDUPTHER

## 2022-08-23 RX ORDER — SODIUM CHLORIDE 9 MG/ML
INJECTION, SOLUTION INTRAVENOUS PRN
Status: DISCONTINUED | OUTPATIENT
Start: 2022-08-23 | End: 2022-08-24 | Stop reason: HOSPADM

## 2022-08-23 RX ORDER — LEVOFLOXACIN 5 MG/ML
500 INJECTION, SOLUTION INTRAVENOUS
Status: DISCONTINUED | OUTPATIENT
Start: 2022-08-23 | End: 2022-08-23 | Stop reason: HOSPADM

## 2022-08-23 RX ORDER — SCOLOPAMINE TRANSDERMAL SYSTEM 1 MG/1
1 PATCH, EXTENDED RELEASE TRANSDERMAL
Status: DISCONTINUED | OUTPATIENT
Start: 2022-08-26 | End: 2022-08-24 | Stop reason: HOSPADM

## 2022-08-23 RX ORDER — LABETALOL HYDROCHLORIDE 5 MG/ML
INJECTION, SOLUTION INTRAVENOUS PRN
Status: DISCONTINUED | OUTPATIENT
Start: 2022-08-23 | End: 2022-08-23 | Stop reason: SDUPTHER

## 2022-08-23 RX ORDER — ONDANSETRON 2 MG/ML
4 INJECTION INTRAMUSCULAR; INTRAVENOUS
Status: DISCONTINUED | OUTPATIENT
Start: 2022-08-23 | End: 2022-08-23 | Stop reason: HOSPADM

## 2022-08-23 RX ORDER — MORPHINE SULFATE 2 MG/ML
2 INJECTION, SOLUTION INTRAMUSCULAR; INTRAVENOUS EVERY 5 MIN PRN
Status: DISCONTINUED | OUTPATIENT
Start: 2022-08-23 | End: 2022-08-23 | Stop reason: HOSPADM

## 2022-08-23 RX ORDER — KETOROLAC TROMETHAMINE 15 MG/ML
15 INJECTION, SOLUTION INTRAMUSCULAR; INTRAVENOUS EVERY 6 HOURS
Status: DISCONTINUED | OUTPATIENT
Start: 2022-08-23 | End: 2022-08-24 | Stop reason: HOSPADM

## 2022-08-23 RX ORDER — LIDOCAINE HYDROCHLORIDE AND EPINEPHRINE BITARTRATE 20; .01 MG/ML; MG/ML
INJECTION, SOLUTION SUBCUTANEOUS PRN
Status: DISCONTINUED | OUTPATIENT
Start: 2022-08-23 | End: 2022-08-23 | Stop reason: ALTCHOICE

## 2022-08-23 RX ORDER — PROMETHAZINE HYDROCHLORIDE 25 MG/1
25 SUPPOSITORY RECTAL EVERY 6 HOURS PRN
Status: DISCONTINUED | OUTPATIENT
Start: 2022-08-23 | End: 2022-08-24 | Stop reason: HOSPADM

## 2022-08-23 RX ORDER — MEPERIDINE HYDROCHLORIDE 25 MG/ML
12.5 INJECTION INTRAMUSCULAR; INTRAVENOUS; SUBCUTANEOUS EVERY 5 MIN PRN
Status: DISCONTINUED | OUTPATIENT
Start: 2022-08-23 | End: 2022-08-23 | Stop reason: HOSPADM

## 2022-08-23 RX ORDER — PROPOFOL 10 MG/ML
INJECTION, EMULSION INTRAVENOUS PRN
Status: DISCONTINUED | OUTPATIENT
Start: 2022-08-23 | End: 2022-08-23 | Stop reason: SDUPTHER

## 2022-08-23 RX ORDER — ACETAMINOPHEN 160 MG/5ML
650 SUSPENSION, ORAL (FINAL DOSE FORM) ORAL EVERY 6 HOURS
Status: DISCONTINUED | OUTPATIENT
Start: 2022-08-23 | End: 2022-08-24 | Stop reason: HOSPADM

## 2022-08-23 RX ORDER — SODIUM CHLORIDE 0.9 % (FLUSH) 0.9 %
5-40 SYRINGE (ML) INJECTION EVERY 12 HOURS SCHEDULED
Status: DISCONTINUED | OUTPATIENT
Start: 2022-08-23 | End: 2022-08-24 | Stop reason: HOSPADM

## 2022-08-23 RX ORDER — SODIUM CHLORIDE 9 MG/ML
INJECTION, SOLUTION INTRAVENOUS CONTINUOUS
Status: DISCONTINUED | OUTPATIENT
Start: 2022-08-23 | End: 2022-08-23 | Stop reason: HOSPADM

## 2022-08-23 RX ORDER — METRONIDAZOLE 500 MG/100ML
500 INJECTION, SOLUTION INTRAVENOUS
Status: DISCONTINUED | OUTPATIENT
Start: 2022-08-23 | End: 2022-08-23 | Stop reason: HOSPADM

## 2022-08-23 RX ORDER — ALBUTEROL SULFATE 2.5 MG/3ML
2.5 SOLUTION RESPIRATORY (INHALATION) EVERY 4 HOURS PRN
Status: DISCONTINUED | OUTPATIENT
Start: 2022-08-23 | End: 2022-08-24 | Stop reason: HOSPADM

## 2022-08-23 RX ORDER — OXYCODONE HYDROCHLORIDE 5 MG/1
10 TABLET ORAL EVERY 4 HOURS PRN
Status: DISCONTINUED | OUTPATIENT
Start: 2022-08-23 | End: 2022-08-24 | Stop reason: HOSPADM

## 2022-08-23 RX ORDER — SODIUM CHLORIDE, SODIUM LACTATE, POTASSIUM CHLORIDE, CALCIUM CHLORIDE 600; 310; 30; 20 MG/100ML; MG/100ML; MG/100ML; MG/100ML
INJECTION, SOLUTION INTRAVENOUS CONTINUOUS
Status: DISCONTINUED | OUTPATIENT
Start: 2022-08-23 | End: 2022-08-23 | Stop reason: HOSPADM

## 2022-08-23 RX ORDER — MAGNESIUM HYDROXIDE/ALUMINUM HYDROXICE/SIMETHICONE 120; 1200; 1200 MG/30ML; MG/30ML; MG/30ML
10 SUSPENSION ORAL EVERY 4 HOURS PRN
Status: DISCONTINUED | OUTPATIENT
Start: 2022-08-23 | End: 2022-08-24 | Stop reason: HOSPADM

## 2022-08-23 RX ORDER — KETOROLAC TROMETHAMINE 30 MG/ML
INJECTION, SOLUTION INTRAMUSCULAR; INTRAVENOUS PRN
Status: DISCONTINUED | OUTPATIENT
Start: 2022-08-23 | End: 2022-08-23 | Stop reason: SDUPTHER

## 2022-08-23 RX ORDER — ONDANSETRON 2 MG/ML
4 INJECTION INTRAMUSCULAR; INTRAVENOUS EVERY 6 HOURS PRN
Status: DISCONTINUED | OUTPATIENT
Start: 2022-08-23 | End: 2022-08-24 | Stop reason: HOSPADM

## 2022-08-23 RX ORDER — KETOROLAC TROMETHAMINE 30 MG/ML
30 INJECTION, SOLUTION INTRAMUSCULAR; INTRAVENOUS ONCE
Status: COMPLETED | OUTPATIENT
Start: 2022-08-23 | End: 2022-08-23

## 2022-08-23 RX ORDER — HYDRALAZINE HYDROCHLORIDE 20 MG/ML
10 INJECTION INTRAMUSCULAR; INTRAVENOUS
Status: DISCONTINUED | OUTPATIENT
Start: 2022-08-23 | End: 2022-08-23 | Stop reason: HOSPADM

## 2022-08-23 RX ORDER — LABETALOL HYDROCHLORIDE 5 MG/ML
10 INJECTION, SOLUTION INTRAVENOUS
Status: DISCONTINUED | OUTPATIENT
Start: 2022-08-23 | End: 2022-08-23 | Stop reason: HOSPADM

## 2022-08-23 RX ORDER — ACETAMINOPHEN 500 MG
1000 TABLET ORAL ONCE
Status: COMPLETED | OUTPATIENT
Start: 2022-08-23 | End: 2022-08-23

## 2022-08-23 RX ORDER — ONDANSETRON 2 MG/ML
INJECTION INTRAMUSCULAR; INTRAVENOUS PRN
Status: DISCONTINUED | OUTPATIENT
Start: 2022-08-23 | End: 2022-08-23 | Stop reason: SDUPTHER

## 2022-08-23 RX ORDER — PROCHLORPERAZINE EDISYLATE 5 MG/ML
10 INJECTION INTRAMUSCULAR; INTRAVENOUS EVERY 6 HOURS PRN
Status: DISCONTINUED | OUTPATIENT
Start: 2022-08-23 | End: 2022-08-24 | Stop reason: HOSPADM

## 2022-08-23 RX ORDER — SCOLOPAMINE TRANSDERMAL SYSTEM 1 MG/1
1 PATCH, EXTENDED RELEASE TRANSDERMAL
Status: DISCONTINUED | OUTPATIENT
Start: 2022-08-23 | End: 2022-08-24 | Stop reason: HOSPADM

## 2022-08-23 RX ORDER — SUCRALFATE 1 G/1
0.5 TABLET ORAL 4 TIMES DAILY
Status: DISCONTINUED | OUTPATIENT
Start: 2022-08-23 | End: 2022-08-24 | Stop reason: HOSPADM

## 2022-08-23 RX ORDER — MORPHINE SULFATE 2 MG/ML
2 INJECTION, SOLUTION INTRAMUSCULAR; INTRAVENOUS
Status: DISCONTINUED | OUTPATIENT
Start: 2022-08-23 | End: 2022-08-24 | Stop reason: HOSPADM

## 2022-08-23 RX ADMIN — SUCRALFATE 0.5 G: 1 TABLET ORAL at 21:20

## 2022-08-23 RX ADMIN — ACETAMINOPHEN 650 MG: 160 SUSPENSION ORAL at 12:10

## 2022-08-23 RX ADMIN — KETOROLAC TROMETHAMINE 30 MG: 30 INJECTION, SOLUTION INTRAMUSCULAR at 07:16

## 2022-08-23 RX ADMIN — PROPOFOL 180 MG: 10 INJECTION, EMULSION INTRAVENOUS at 07:58

## 2022-08-23 RX ADMIN — DEXAMETHASONE SODIUM PHOSPHATE 10 MG: 10 INJECTION INTRAMUSCULAR; INTRAVENOUS at 08:03

## 2022-08-23 RX ADMIN — ACETAMINOPHEN 650 MG: 160 SUSPENSION ORAL at 18:24

## 2022-08-23 RX ADMIN — MIDAZOLAM 2 MG: 1 INJECTION INTRAMUSCULAR; INTRAVENOUS at 07:53

## 2022-08-23 RX ADMIN — LABETALOL HYDROCHLORIDE 2.5 MG: 5 INJECTION INTRAVENOUS at 08:40

## 2022-08-23 RX ADMIN — FENTANYL CITRATE 100 MCG: 50 INJECTION, SOLUTION INTRAMUSCULAR; INTRAVENOUS at 07:58

## 2022-08-23 RX ADMIN — SUCRALFATE 0.5 G: 1 TABLET ORAL at 12:09

## 2022-08-23 RX ADMIN — Medication 80 MG: at 07:58

## 2022-08-23 RX ADMIN — ACETAMINOPHEN 1000 MG: 500 TABLET ORAL at 07:15

## 2022-08-23 RX ADMIN — ROCURONIUM BROMIDE 50 MG: 10 INJECTION INTRAVENOUS at 07:58

## 2022-08-23 RX ADMIN — SODIUM CHLORIDE, POTASSIUM CHLORIDE, SODIUM LACTATE AND CALCIUM CHLORIDE: 600; 310; 30; 20 INJECTION, SOLUTION INTRAVENOUS at 07:16

## 2022-08-23 RX ADMIN — KETOROLAC TROMETHAMINE 15 MG: 15 INJECTION, SOLUTION INTRAMUSCULAR; INTRAVENOUS at 16:33

## 2022-08-23 RX ADMIN — ENOXAPARIN SODIUM 40 MG: 100 INJECTION SUBCUTANEOUS at 12:09

## 2022-08-23 RX ADMIN — HYDROMORPHONE HYDROCHLORIDE 0.5 MG: 1 INJECTION, SOLUTION INTRAMUSCULAR; INTRAVENOUS; SUBCUTANEOUS at 10:20

## 2022-08-23 RX ADMIN — ACETAMINOPHEN 650 MG: 160 SUSPENSION ORAL at 23:37

## 2022-08-23 RX ADMIN — POTASSIUM CHLORIDE: 2 INJECTION, SOLUTION, CONCENTRATE INTRAVENOUS at 23:05

## 2022-08-23 RX ADMIN — FENTANYL CITRATE 50 MCG: 50 INJECTION, SOLUTION INTRAMUSCULAR; INTRAVENOUS at 08:18

## 2022-08-23 RX ADMIN — FENTANYL CITRATE 50 MCG: 50 INJECTION, SOLUTION INTRAMUSCULAR; INTRAVENOUS at 08:30

## 2022-08-23 RX ADMIN — KETOROLAC TROMETHAMINE 15 MG: 15 INJECTION, SOLUTION INTRAMUSCULAR; INTRAVENOUS at 21:19

## 2022-08-23 RX ADMIN — Medication 3 MG: at 09:34

## 2022-08-23 RX ADMIN — ONDANSETRON 4 MG: 2 INJECTION INTRAMUSCULAR; INTRAVENOUS at 09:29

## 2022-08-23 RX ADMIN — SUCRALFATE 0.5 G: 1 TABLET ORAL at 16:48

## 2022-08-23 RX ADMIN — SODIUM CHLORIDE: 900 INJECTION, SOLUTION INTRAVENOUS at 08:41

## 2022-08-23 RX ADMIN — POTASSIUM CHLORIDE: 2 INJECTION, SOLUTION, CONCENTRATE INTRAVENOUS at 13:23

## 2022-08-23 RX ADMIN — KETOROLAC TROMETHAMINE 30 MG: 30 INJECTION, SOLUTION INTRAMUSCULAR at 09:35

## 2022-08-23 RX ADMIN — Medication 0.5 MG: at 10:20

## 2022-08-23 RX ADMIN — GLYCOPYRROLATE 0.6 MG: 0.2 INJECTION INTRAMUSCULAR; INTRAVENOUS at 09:34

## 2022-08-23 RX ADMIN — GABAPENTIN 800 MG: 400 CAPSULE ORAL at 21:21

## 2022-08-23 RX ADMIN — FENTANYL CITRATE 50 MCG: 50 INJECTION, SOLUTION INTRAMUSCULAR; INTRAVENOUS at 08:34

## 2022-08-23 ASSESSMENT — PAIN DESCRIPTION - PAIN TYPE
TYPE: SURGICAL PAIN

## 2022-08-23 ASSESSMENT — PAIN SCALES - GENERAL
PAINLEVEL_OUTOF10: 3
PAINLEVEL_OUTOF10: 5
PAINLEVEL_OUTOF10: 1
PAINLEVEL_OUTOF10: 7
PAINLEVEL_OUTOF10: 1
PAINLEVEL_OUTOF10: 1
PAINLEVEL_OUTOF10: 5
PAINLEVEL_OUTOF10: 5
PAINLEVEL_OUTOF10: 0
PAINLEVEL_OUTOF10: 6
PAINLEVEL_OUTOF10: 5
PAINLEVEL_OUTOF10: 1

## 2022-08-23 ASSESSMENT — PAIN - FUNCTIONAL ASSESSMENT
PAIN_FUNCTIONAL_ASSESSMENT: PREVENTS OR INTERFERES SOME ACTIVE ACTIVITIES AND ADLS
PAIN_FUNCTIONAL_ASSESSMENT: ACTIVITIES ARE NOT PREVENTED
PAIN_FUNCTIONAL_ASSESSMENT: PREVENTS OR INTERFERES SOME ACTIVE ACTIVITIES AND ADLS
PAIN_FUNCTIONAL_ASSESSMENT: ACTIVITIES ARE NOT PREVENTED
PAIN_FUNCTIONAL_ASSESSMENT: 0-10

## 2022-08-23 ASSESSMENT — PAIN DESCRIPTION - ORIENTATION
ORIENTATION: RIGHT;LEFT
ORIENTATION: RIGHT;LEFT
ORIENTATION: MID;UPPER
ORIENTATION: MID

## 2022-08-23 ASSESSMENT — PAIN DESCRIPTION - LOCATION
LOCATION: ABDOMEN

## 2022-08-23 ASSESSMENT — PAIN DESCRIPTION - FREQUENCY
FREQUENCY: CONTINUOUS

## 2022-08-23 ASSESSMENT — PAIN DESCRIPTION - DESCRIPTORS
DESCRIPTORS: ACHING;TIGHTNESS
DESCRIPTORS: ACHING
DESCRIPTORS: ACHING;THROBBING
DESCRIPTORS: DISCOMFORT
DESCRIPTORS: ACHING;DULL

## 2022-08-23 ASSESSMENT — PAIN DESCRIPTION - ONSET: ONSET: ON-GOING

## 2022-08-23 ASSESSMENT — LIFESTYLE VARIABLES: HOW OFTEN DO YOU HAVE A DRINK CONTAINING ALCOHOL: NEVER

## 2022-08-23 NOTE — OP NOTE
Herminio Mcardle    Operative Report    DATE OF PROCEDURE: 8/23/2022  SURGEON: Dr. Heidi Villa MD, M.D. First Assistant: [address]:    Type 2 diabetes mellitus  ([facility] E11.9    Hypothyroidism E03.9    Hyperlipidemia E78.5    GERD  K21.9    Morbid obesity ([facility] E66.01       POSTOPERATIVE DIAGNOSES:   Same      OPERATION:   1) Robotic charline-en-Y gastric bypass  2) Omental flap  3) EGD    ANESTHESIA: General endotracheal.   ESTIMATED BLOOD LOSS: 4 mL. SPECIMEN: jejunal tissue  COMPLICATIONS: None. HISTORY: Darren Mares is a morbidly-obese 65 y.o.  female, who weighs 218 lb (98.9 kg). The Body mass index is 38.62 kg/m². She has multiple medical problems aggravated by her obesity. She wishes to have a gastric bypass so that she can lose more weight and keep the weight off. We discussed the extensive risks involved in the surgery including the risk of bleeding, infection, and needing further procedures. We also discussed wound infections, hernias and the risks of general anesthetic including MI, CVA, sudden death or reactions to anesthetic medications. The patient understood the risks. All questions were answered to the patient's satisfaction and they freely signed the consent and wished to proceed. PROCEDURE: The patient was placed on the table in the supine position and placed under general endotracheal anesthesia. She had SCDs on the legs as DVT prophylaxis. She had Levaquin and Flagyl IV. Orogastric tube placed in the stomach, then removed. The abdomen was shaved, then prepped with ChloroPrep and draped in the usual sterile fashion. 0.25% Marcaine plus epinephrine was injected into the skin and muscle of each incision to help with postoperative pain control. An 8 mm incision was made right of midline 30 cm below the xyphoid.   A varies needle was used to insufflate the abdomen and an 8 mm robotic trocar was placed and an 8 mm 30 degree angled 3-D robot scope was used. A 12-mm incision was made in the right mid abdomen and a 12 mm robotic trocar was placed angled through the rectus sheath. 8-mm trocars were placed in the left mid and lateral abdomen and the table rotated right. The Tiffanie retractor was placed below the xiphoid, the liver was not distended, the left lobe was retracted. The head was elevated and the robot was docked. Cadiere grasper on the right, camera above the umbilicus, vessel sealer on the left and Cadiere on the far left. The anterior fat pad was grasped and the angle of His dissected free. Pars flaccida clear area was opened with the vessel sealer and the lesser omental vessels were ligated over to the lesser curvature of the stomach. The robotic 60 mm linear blue cartridge was fired across the lower stomach to start the pouch. A 36 F bougie was placed by Anaesthesia down to the staple line. Vessel sealer was used to dissect the tunnel behind the stomach through the angle and HIS. Two more firings were taken vertically out through an opening at the angle of HIS completely  the small pouch from the bypassed stomach. A 30 cm double armed Stratafix Monocryl absorbable locking suture was placed. The omentum was retracted superiorly with the Cadiere. The ligament of Treitz was identified and measured 150 cm and the Omega loop of jejunum was brought up anti colic anti gastric to the gastric pouch for an end to side anastomosis. The absorbable locking suture was used to sew the jejunum to the gastric pouch forming the posterior wall of the anastomosis. The biliary limb was divided laterally with the robotic 60 mm white cartridge leaving the mesenteric vessels to the anastomosis. The hook cautery was placed through the far left trocar and a 10 mm hole was made with the hook cautery for the anastomosis in the jejunum and on the bougie in the gastric pouch.   The bougie was advanced through the anastomosis into the jejunum. The anterior wall of the anastomosis was then run with an absorbable locking suture from the right and met from the left. The bougie was removed. Pouch size approximately 30 mL. The hook cautery was used to make a small hole in the divided jejunum. The small bowel was run 100 cm from the gastrojejunal anastomosis and a loop of jejunum was brought up for the distal anastomosis using the triple staple technique. The hook cautery was used to make a small opening in the other limb of jejunum. The robotic 60 mm white cartridge was fired with one arm inside each lumen first proximally, then distally. The enterotomy was closed transversely with the 60 mm white cartridge, finishing the distal anastomosis. The mesenteric defect was closed with a 6 inch running 0 V-lock absorbable locking suture to prevent internal hernias. The Vessel sealer was used as a clamp on the jejunum distal to the gastrojejunal anastomosis. Endoscope was passed down through the mouth. Old blood was removed with suction. The pouch was inflated with air. The irrigated was used to cover the pouch and anastomosis with saline. There was no bubbling from the suture or staple lines indicating they were airtight and watertight. The anastomosis was open approximately 8 mm. The scope was easily passed through the anastomosis into the jejunum. All of the mucosa looked healthy. The anastomosis looked good so the scope was withdrawn. Omentum was sewn over the gastro-jejunal anastomosis as a gram patch. All of the fluid in the abdomen was removed with suction. The robot was undocked. The liver retractor was removed. All of the CO2 was released. The trocars were removed. Skin wounds were closed with 4-0 Monocryl dermal stitches. Skin-Afix glue was applied to each incision, no dressing. The needle and sponge count were correct. The patient tolerated the procedure well and went to recovery in stable condition. Plan:  Will

## 2022-08-23 NOTE — H&P
Chele Quintero  8/23/2022  [facility]               History and Physical  Gastric Bypass (98744)     CHIEF COMPLAINT: Morbid obesity, Type 2 Diabetes Mellitus, Hypertension, Hyperlipidemia, and GERD    HISTORY OF PRESENT ILLNESS: Chele Quintero is a morbidly-obese 65 y.o.  female who weighs 218 lb (98.9 kg), Body mass index is 38.62 kg/m². She has multiple medical problems aggravated by her obesity. She wishes to have a gastric bypass so that she can lose more weight and keep the weight off. I have met with her on 3 different occasions in the [facility], where we discussed the surgery in great detail and went over the risks and benefits. She has watched our informational video so she understands all of the extensive risks involved. She states that she understands all of these risks and wishes to proceed.     Weights:  218 lb 8/23/2022  bypass  249 lb 2/2022  initial    Past Medical History:     Type 2 diabetes mellitus         Hypothyroidism     Peripheral neuropathy     Carpal tunnel syndrome of left wrist     Back pain     Hyperlipidemia     Venous insufficiency of both lower extremities     Morbid obesity      GERD (gastroesophageal reflux disease)    Past Surgical History:   Procedure Laterality Date    BACK SURGERY      CHOLECYSTECTOMY      COLONOSCOPY      HYSTERECTOMY, TOTAL ABDOMINAL (CERVIX REMOVED)      INCONTINENCE SURGERY      BLADDER SLING    KNEE SURGERY Left     LUMBAR SPINE SURGERY N/A 5/25/2021    L3- L5 DECOMPRESSION AND FUSION , L4- L5 TRANSFORAMINAL LUMBAR INTERBODY FUSION --OARM, PATY TABLE, AUDIOLOGY, PLATES ,SCREWS, --NUVASIVE performed by Mary Grace Meade MD [address] Bilateral 10/11/2021    BILATERAL SACROILIAC JOINT INJECTION UNDER FLUOROSCOPY (CPT [id number]) performed by Kenna Hammonds MD at [address] Bilateral 11/8/2021    BILATERAL LUMBAR FACET INJECTION AT L5-S 1 FACETS BLOCK UNDER FLUOROSCOPIC GUIDANCE performed by Savanna Hernandez MD at [address] Bilateral 12/21/2021    BILATERAL S1, S2, S3 BRANCH & L5 [facility] NERVE BLOCK UNDER XRAY GUIDANCE (06638) (SEDATION) performed by Savanna Hernandez MD at [address] [facility] N/A 2/7/2022    LUMBAR EPIDURAL STEROID INJECTION UNDER FLUOROSCOPIC GUIDANCE AT L5-S1 PARAMEDIAN performed by Savanna Hernandez MD at [address] [facility] N/A 3/9/2022    EGD BIOPSY performed by Mo Dumas MD at [facility] ENDOSCOPY      Allergies: Bactrim [sulfamethoxazole-trimethoprim], Ceftin [cefuroxime], and Keflex [cephalexin]     Home Medications  Prior to Visit Medications    Medication Sig Taking? Authorizing Provider   pravastatin (PRAVACHOL) 10 MG tablet TAKE 1 TABLET BY MOUTH EVERY OTHER DAY  Manuel Wakefield DO   oxyCODONE-acetaminophen (PERCOCET) 5-325 MG per tablet Take 1 tablet by mouth every 4 hours as needed for Pain. Historical Provider, MD   sucralfate (CARAFATE) 1 GM tablet Take 0.5 tablets by mouth in the morning and 0.5 tablets at noon and 0.5 tablets in the evening and 0.5 tablets before bedtime. Mo Dumas MD   ondansetron (ZOFRAN-ODT) 4 MG disintegrating tablet Take 1 tablet by mouth every 8 hours as needed for Nausea or Vomiting  Mo Dumas MD   omega-3 acid ethyl esters (LOVAZA) 1 g capsule TAKE 1 CAPSULE BY MOUTH EVERY DAY  Prieto Robin DO   levothyroxine (SYNTHROID) 200 MCG tablet Take 1 tablet by mouth in the morning. Ishan Block DO   vitamin B-12 (CYANOCOBALAMIN) 100 MCG tablet Take 1 tablet by mouth in the morning.   Prieto Allan DO   cyclobenzaprine (FLEXERIL) 10 MG tablet TAKE 1 TABLET BY MOUTH THREE TIMES A DAY AS NEEDED FOR MUSCLE SPASMS  Prieto Robin DO   valsartan (DIOVAN) 80 MG tablet TAKE 1 TABLET BY MOUTH EVERY DAY  Prieto Robin DO   topiramate (TOPAMAX) 25 MG tablet Take 1 tablet by mouth 2 times daily  Feliberto Son MD   diclofenac sodium (VOLTAREN) 1 % GEL APPLY 4 GRAMS TOPICALLY 4 TIMES DAILY AS NEEDED FOR PAIN  Redd Hernandez MD   OZEMPIC, 1 MG/DOSE, 4 MG/3ML SOPN INJECT 1 MG INTO THE SKIN ONCE A WEEK  Prieto F Sharda, DO   gabapentin (NEURONTIN) 800 MG tablet Take 1 tablet by mouth 3 times daily for 90 days. Zadie Brunner, MD   DULoxetine (CYMBALTA) 60 MG extended release capsule Take 1 capsule by mouth daily  Malissa Newsome MD   Handicap Placard MISC by Does not apply route Patient cannot walk 200 ft without stopping to rest.    Expiration 5 yrs  Prieto F Sharda, DO   Multiple Vitamins-Minerals (THERAPEUTIC MULTIVITAMIN-MINERALS) tablet Take 1 tablet by mouth daily  Historical Provider, MD   vitamin D (ERGOCALCIFEROL) 1.25 MG (38056 UT) CAPS capsule TAKE 1 CAPSULE BY MOUTH ONE TIME PER WEEK  Prieto F Sharda, DO   torsemide (DEMADEX) 10 MG tablet take 1 tablet by mouth once daily  Patient taking differently: as needed  Altamease Punch, DO   Thiamine HCl (B-1) 100 MG TABS Take 1 pill daily  Prieto F Sharda, DO   gemfibrozil (LOPID) 600 MG tablet TAKE 1 TABLET BY MOUTH EVERY 12 HOURS  Prieto F Sharda, DO   omeprazole (PRILOSEC) 20 MG delayed release capsule TAKE 1 CAPSULE BY MOUTH EVERY DAY  Prieto F Sharda, DO   linaclotide (LINZESS) 145 MCG capsule TAKE 1 CAPSULE BY MOUTH EVERY DAY IN THE MORNING BEFORE BREAKFAST  Prieto F Sharda, DO   acetaminophen (TYLENOL) 500 MG tablet Take 500 mg by mouth every 6 hours as needed for Pain  Historical Provider, MD   lidocaine (LIDODERM) 5 % PLACE 1 PATCH ONTO THE SKIN DAILY 12 HOURS ON, 12 HOURS OFF.   Historical Provider, MD   polyethylene glycol (GLYCOLAX) 17 g packet Take 17 g by mouth daily as needed  Historical Provider, MD   levothyroxine (SYNTHROID) 150 MCG tablet Take 1 tablet by mouth Daily  Prieto F Sharda, DO   rosuvastatin (CRESTOR) 5 MG tablet Take 1 tablet by mouth daily  Prieto F Sharda, DO   meloxicam (MOBIC) 15 MG tablet Take 1 tablet by mouth daily as needed for Pain  [facility] [place],      Social History:   TOBACCO:   reports that she has never smoked. She has never used smokeless tobacco.  All smokers must join the free smoking cessation program and stop smoking for 3 months before having any Bariatric surgery. ETOH:    reports no history of alcohol use. Family History   Problem Relation Age of Onset    Diabetes Mother     Diabetes Father     Rheum Arthritis Sister     Cancer Maternal Grandfather      Review of Systems:  Psychiatric:  depression and anxiety  Respiratory: negative  Cardiovascular: negative  Gastrointestinal: negative  Musculoskeletal:negative  All others reviewed, negative    Physical Exam:   VITALS: Blood pressure 122/69, pulse 69, temperature 98 °F (36.7 °C), temperature source Temporal, resp. rate 16, height 5' 3\" (1.6 m), weight 218 lb (98.9 kg), SpO2 98 %. General appearance: alert, appears stated age and cooperative, does ambulate easily  Head: Normocephalic, without obvious abnormality, atraumatic  Eyes: PERRL  Ears/mouth/throat:  Ears clear, mouth normal, throat no redness  Neck: no adenopathy, no JVD, supple, symmetrical, trachea midline and thyroid not enlarged  Lungs: clear to auscultation bilaterally  Heart: regular rate and rhythm  Abdomen: soft, non-tender; bowel sounds normal; no masses,  no organomegaly  Extremities: extremities normal, atraumatic, no cyanosis or edema  Skin: no open wounds    Assessment:  Morbid obesity with failure of conservative therapy. Patient has been cleared psychologically and medically. EGD 3/9/2022 showed no esophagitis, no hiatal hernia, there was food filling the stomach from gastroparesis but no ulcer or gastritis, biopsy done to check for H.pylori, she does complain of acid reflux on Omeprazole, and the gallbladder is out. The patient was informed that risks include, but are not limited to: death, anastomotic leak, bowel obstruction, bleeding, and sepsis. Any of these could require further surgery.  Other risks include heart attack, DVT, PE, pneumonia, hernia, wound infection, the need for dilatations of the gastrojejunostomy, and the inability to lose appropriate weight and keep it off. We may not be able to do a Gastic Bypass due to unforseen scar tissue, in that case we might do a Sleeve Gastrectomy. We discussed that our goal is to ameliorate the medical problems and not to obtain a specific body mass index. She understands the risks and benefits and wishes to proceed with the procedure and has signed the consent form. She will go home with Acetaminophen 500 mg qid for 3 days, Carafate 1/2 tablet every 4 hours for ulcer prophylaxis and Zofran for nausea. Plan:  ([id number]) Ashu-en-Y Gastric Bypass, robotic, possible open. She does not need Lovenox at home post-op.     Physician Signature: Electronically signed by Dr. Truman Wong MD    Send copy of H&P to PCP, Pancho Humphrey DO

## 2022-08-23 NOTE — ANESTHESIA POSTPROCEDURE EVALUATION
Department of Anesthesiology  Postprocedure Note    Patient: Marty Syed  MRN: [id number]  YOB: 1960  Date of evaluation: 8/23/2022      Procedure Summary     Date: 08/23/22 Room / Location: [address] [place] [address] / [facility]    Anesthesia Start: 4322 Anesthesia Stop: 5141    Procedure: GASTRIC BYPASS ANNA MARIE-EN-Y LAPAROSCOPIC ROBOTIC XI (Abdomen) Diagnosis:       Morbid obesity ([address]      (Morbid obesity ([address] [E66.01])    Surgeons: Jagruti Jean MD Responsible Provider: Hailey Sarmiento MD    Anesthesia Type: general ASA Status: 3          Anesthesia Type: No value filed.     Miranda Phase I: Miranda Score: 9    Miranda Phase II:        Anesthesia Post Evaluation    Patient location during evaluation: PACU  Patient participation: complete - patient participated  Level of consciousness: awake  Airway patency: patent  Nausea & Vomiting: no nausea and no vomiting  Complications: no  Cardiovascular status: hemodynamically stable  Respiratory status: acceptable  Hydration status: euvolemic

## 2022-08-23 NOTE — DISCHARGE INSTRUCTIONS
Dr. Zoe Nguyen  Discharge Instructions   for Gastric Bypass       Home Care    The glue on the incision is waterproof so it is ok to shower. Do not remove the surgical glue for 2 weeks. Leave the incisions open to air, only cover if drainage occurs. Place ice on painful incisions for 1-2 days. Diet    Low calorie, high protein full liquid diet for 10 days drinking approximately 1 oz every 15 minutes while awake so the fluid slowly passes through the pouch. Aim for 600 calories, 60 gm Protein and 60 oz of liquids per day. Slow down if there is chest pressure, acid or fullness. Crush all large pills for the first 2 weeks. You may notice increased gas or changes in your bowel habits during the first month after surgery. Keep the bowels soft and moving daily with stool softeners or laxatives to prevent constipation pains. Physical Activity    Walk frequently and keep legs elevated when sitting to prevent blood clots in the legs. Do not do anything that causes pain in the incisions. Breath deeply every hour to prevent pneumonia. Medications    The Scopolamine patch will help nausea for 3 days but may make the eyes blurry  Restart blood thinners in 24 hours if no sign of bleeding  Take Tylenol and Advil for pain. Crush all large pills. If you have diabetes, check blood sugars frequently and treat as needed. Do not take diuretics. Hold most blood pressure pills other than beta-blockers. Make sure you take any medicines you were on for depression and anxiety. Follow-up   Schedule a follow-up appointment for 10 days, [phone]. Call Your Doctor If Any of the Following Occurs   Signs of infection, redness, swelling, increasing pain, excessive bleeding, or discharge at the incision site   Cough, shortness of breath, chest pain   Increased abdominal pain   Nausea and/or vomiting that does not resolve off narcotics.   Pain, burning, urgency or frequency of urination, or blood in the urine Pain and/or swelling in your feet, calves, or legs     In case of an emergency,  CALL 911  immediately. The following personal items were collected during your admission and were returned to you:    Belongings  Dental Appliances: Uppers  Vision - Corrective Lenses: None  Hearing Aid: None  Clothing: At home  Jewelry: None  Body Piercings Removed: N/A  Electronic Devices: None  Weapons (Notify Protective Services/Security): None  Other Valuables: Cane  Home Medications: None  Valuables Given To: Patient  Provide Name(s) of Who Valuable(s) Were Given To: veronica  Responsible person(s) in the waiting room: ernestina  Patient approves for provider to speak to responsible person post operatively: Yes    Information obtained by:  By signing below, I understand that if any problems occur once I leave the hospital I am to contact Dr. Jewels Barone. I understand and acknowledge receipt of the instructions indicated above.

## 2022-08-23 NOTE — ANESTHESIA PRE PROCEDURE
Department of Anesthesiology  Preprocedure Note       Name:  Lior Marina   Age:  65 y.o.  :  1960                                          MRN:  [id number]         Date:  2022      Surgeon: Mary William):  Trista Zamora MD    Procedure: Procedure(s):  GASTRIC BYPASS ANNA MARIE-EN-Y LAPAROSCOPIC ROBOTIC XI    Medications prior to admission:   Prior to Admission medications    Medication Sig Start Date End Date Taking? Authorizing Provider   pravastatin (PRAVACHOL) 10 MG tablet TAKE 1 TABLET BY MOUTH EVERY OTHER DAY 22   Ester Miranda DO   oxyCODONE-acetaminophen (PERCOCET) 5-325 MG per tablet Take 1 tablet by mouth every 4 hours as needed for Pain. Historical Provider, MD   sucralfate (CARAFATE) 1 GM tablet Take 0.5 tablets by mouth in the morning and 0.5 tablets at noon and 0.5 tablets in the evening and 0.5 tablets before bedtime. 22   Trista Zamora MD   ondansetron (ZOFRAN-ODT) 4 MG disintegrating tablet Take 1 tablet by mouth every 8 hours as needed for Nausea or Vomiting 22   Trista Zamora MD   omega-3 acid ethyl esters (LOVAZA) 1 g capsule TAKE 1 CAPSULE BY MOUTH EVERY DAY 22   Alysia Rucker DO   levothyroxine (SYNTHROID) 200 MCG tablet Take 1 tablet by mouth in the morning. 22   Prieto Brooks DO   vitamin B-12 (CYANOCOBALAMIN) 100 MCG tablet Take 1 tablet by mouth in the morning.  22   Prieto Brooks DO   cyclobenzaprine (FLEXERIL) 10 MG tablet TAKE 1 TABLET BY MOUTH THREE TIMES A DAY AS NEEDED FOR MUSCLE SPASMS 22   Prieto Robin DO   valsartan (DIOVAN) 80 MG tablet TAKE 1 TABLET BY MOUTH EVERY DAY 22   Prieto Brooks DO   topiramate (TOPAMAX) 25 MG tablet Take 1 tablet by mouth 2 times daily 22   Jose Ramon Martines MD   diclofenac sodium (VOLTAREN) 1 % GEL APPLY 4 GRAMS TOPICALLY 4 TIMES DAILY AS NEEDED FOR PAIN 22   Ap Bowman MD   OZEMPIC, 1 MG/DOSE, 4 MG/3ML SOPN INJECT 1 MG INTO THE SKIN ONCE A WEEK 22   Prieto PEREZ Sharda, DO   gabapentin (NEURONTIN) 800 MG tablet Take 1 tablet by mouth 3 times daily for 90 days.  4/11/22 8/11/22  Any Duval MD   DULoxetine (CYMBALTA) 60 MG extended release capsule Take 1 capsule by mouth daily 3/7/22 9/3/22  Nimisha Melo MD   Handicap Placard MISC by Does not apply route Patient cannot walk 200 ft without stopping to rest.    Expiration 5 yrs 2/11/22   Jessi Staples DO   Multiple Vitamins-Minerals (THERAPEUTIC MULTIVITAMIN-MINERALS) tablet Take 1 tablet by mouth daily    Historical Provider, MD   vitamin D (ERGOCALCIFEROL) 1.25 MG (45420 UT) CAPS capsule TAKE 1 CAPSULE BY MOUTH ONE TIME PER WEEK 1/18/22   Jessi Staples DO   torsemide (DEMADEX) 10 MG tablet take 1 tablet by mouth once daily  Patient taking differently: as needed 12/16/21   Jessi Staples DO   Thiamine HCl (B-1) 100 MG TABS Take 1 pill daily 10/18/21   Prieto F Sharda, DO   gemfibrozil (LOPID) 600 MG tablet TAKE 1 TABLET BY MOUTH EVERY 12 HOURS 10/18/21   Prieto F Sharad, DO   omeprazole (PRILOSEC) 20 MG delayed release capsule TAKE 1 CAPSULE BY MOUTH EVERY DAY 10/18/21   Prieto Unknown Certain, DO   linaclotide (LINZESS) 145 MCG capsule TAKE 1 CAPSULE BY MOUTH EVERY DAY IN THE MORNING BEFORE BREAKFAST 10/18/21   Prieto Unknown Certain, DO   acetaminophen (TYLENOL) 500 MG tablet Take 500 mg by mouth every 6 hours as needed for Pain    Historical Provider, MD   lidocaine (LIDODERM) 5 % PLACE 1 PATCH ONTO THE SKIN DAILY 12 HOURS ON, 12 HOURS OFF. 6/2/21   Historical Provider, MD   polyethylene glycol (GLYCOLAX) 17 g packet Take 17 g by mouth daily as needed    Historical Provider, MD   levothyroxine (SYNTHROID) 150 MCG tablet Take 1 tablet by mouth Daily 1/12/21   Jessi Staples DO   rosuvastatin (CRESTOR) 5 MG tablet Take 1 tablet by mouth daily 1/12/21   Jessi Staples DO   meloxicam (MOBIC) 15 MG tablet Take 1 tablet by mouth daily as needed for Pain 1/11/21   Jessi Staples DO       Current medications: Current Facility-Administered Medications   Medication Dose Route Frequency Provider Last Rate Last Admin    0.9 % sodium chloride infusion   IntraVENous Continuous Ekaterina Lombardi MD        lactated ringers infusion   IntraVENous Continuous Yanira Tello  mL/hr at 08/23/22 0716 New Bag at 08/23/22 0716    sodium chloride flush 0.9 % injection 5-40 mL  5-40 mL IntraVENous 2 times per day Yanira Tello MD        sodium chloride flush 0.9 % injection 5-40 mL  5-40 mL IntraVENous PRN Yanira Tello MD        0.9 % sodium chloride infusion   IntraVENous PRN Yanira Tello MD        scopolamine (TRANSDERM-SCOP) transdermal patch 1 patch  1 patch TransDERmal Q72H Yanira Tello MD   1 patch at 08/23/22 0716    metronidazole (FLAGYL) 500 mg in 0.9% NaCl 100 mL IVPB premix  500 mg IntraVENous On Call to [address] [place] [address] MD Angela        And    levoFLOXacin (LEVAQUIN) 500 MG/100ML infusion 500 mg  500 mg IntraVENous On Call to [address] [place] [address] MD Angela        sodium chloride flush 0.9 % injection 5-40 mL  5-40 mL IntraVENous 2 times per day Lorna Rosario MD        sodium chloride flush 0.9 % injection 5-40 mL  5-40 mL IntraVENous PRN Lorna Rosario MD        0.9 % sodium chloride infusion   IntraVENous PRN Lorna Rosario MD           Allergies:     Allergies   Allergen Reactions    Bactrim [Sulfamethoxazole-Trimethoprim] Nausea Only    Ceftin [Cefuroxime] Rash    Keflex [Cephalexin] Rash       Problem List:    Patient Active Problem List   Diagnosis Code    Type 2 diabetes mellitus without complication, without long-term current use of insulin ([facility]) E11.9    Acquired hypothyroidism E03.9    Peripheral neuropathy G62.9    Carpal tunnel syndrome of left wrist G56.02    Spinal stenosis of lumbar region with neurogenic claudication M48.062    Acute exacerbation of chronic low back pain M54.50, G89.29    Hyperlipidemia E78.5    Spondylolisthesis of lumbar region M43.16    Lumbar radiculopathy M54.16    Postoperative hypotension I95.89    Postoperative anemia due to acute blood loss D62    Venous insufficiency of both lower extremities I87.2    Impingement syndrome of right shoulder M75.41    DDD (degenerative disc disease), lumbar M51.36    Lumbosacral spondylosis without myelopathy M47.817    S/P lumbar fusion Z98.1    Sacroiliac dysfunction M53.3    Postlaminectomy syndrome, lumbar M96.1    Class 3 severe obesity due to excess calories with serious comorbidity and body mass index (BMI) of 45.0 to 49.9 in adult ([facility]) E66.01, Z68.42    Chronic fatigue R53.82    GERD (gastroesophageal reflux disease) K21.9    Morbid obesity ([facility]) E66.01       Past Medical History:        Diagnosis Date    Back pain     Chronic fatigue     COVID-19 09/2020    mild per patient    Diabetes mellitus ([facility]     History of blood transfusion     Hyperlipidemia     Hypothyroidism     IBS (irritable bowel syndrome)     Morbid obesity due to excess calories ([facility])     Obesity     Osteoarthritis     PONV (postoperative nausea and vomiting)        Past Surgical History:        Procedure Laterality Date    BACK SURGERY      CHOLECYSTECTOMY      COLONOSCOPY      HYSTERECTOMY, TOTAL ABDOMINAL (CERVIX REMOVED)      INCONTINENCE SURGERY      BLADDER SLING    KNEE SURGERY Left     LUMBAR SPINE SURGERY N/A 5/25/2021    L3- L5 DECOMPRESSION AND FUSION , L4- L5 TRANSFORAMINAL LUMBAR INTERBODY FUSION --OARM, PATY TABLE, AUDIOLOGY, PLATES ,SCREWS, --NUVASIVE performed by Herb Boggs MD at [facility] 21 Bilateral 10/11/2021    BILATERAL SACROILIAC JOINT INJECTION UNDER FLUOROSCOPY (CPT 72509) performed by Carlos Eduardo Gibson MD at [facility] Bilateral 11/8/2021    BILATERAL LUMBAR FACET INJECTION AT L5-S 1 FACETS BLOCK UNDER FLUOROSCOPIC GUIDANCE performed by Carlos Eduardo Gibson MD at [facility] Bilateral 12/21/2021    BILATERAL S1, S2, S3 BRANCH & L5  BRANCH NERVE BLOCK UNDER XRAY GUIDANCE (86545) (SEDATION) performed by Lindsey Parikh MD at [address] N/A 2/7/2022    LUMBAR EPIDURAL STEROID INJECTION UNDER FLUOROSCOPIC GUIDANCE AT L5-S1 PARAMEDIAN performed by Lindsey Parikh MD at [place] [address] N/A 3/9/2022    EGD BIOPSY performed by Karen Godinez MD at [address] [facility] History:    Social History     Tobacco Use    Smoking status: Never    Smokeless tobacco: Never   Substance Use Topics    Alcohol use: Never                                Counseling given: Not Answered      Vital Signs (Current):   Vitals:    08/23/22 0654   BP: 122/69   Pulse: 69   Resp: 16   Temp: 98 °F (36.7 °C)   TempSrc: Temporal   SpO2: 98%   Weight: 218 lb (98.9 kg)   Height: 5' 3\" (1.6 m)                                              BP Readings from Last 3 Encounters:   08/23/22 122/69   08/15/22 123/78   08/11/22 132/76       NPO Status: Time of last liquid consumption: 2200 (2 sips of water with pill in AM)>8. H                        Time of last solid consumption: 1200                        Date of last liquid consumption: 08/22/22                        Date of last solid food consumption: 08/21/22    BMI:   Wt Readings from Last 3 Encounters:   08/23/22 218 lb (98.9 kg)   08/19/22 218 lb (98.9 kg)   08/15/22 218 lb (98.9 kg)     Body mass index is 38.62 kg/m².     CBC:   Lab Results   Component Value Date/Time    WBC 5.2 08/15/2022 09:32 AM    RBC 5.43 08/15/2022 09:32 AM    HGB 16.0 08/15/2022 09:32 AM    HCT 48.5 08/15/2022 09:32 AM    MCV 89.3 08/15/2022 09:32 AM    RDW 13.3 08/15/2022 09:32 AM     08/15/2022 09:32 AM       CMP:   Lab Results   Component Value Date/Time     08/15/2022 09:32 AM    K 4.7 08/15/2022 09:32 AM     08/15/2022 09:32 AM    CO2 26 08/15/2022 09:32 AM    BUN 20 08/15/2022 09:32 AM    CREATININE 0.7 08/15/2022 09:32 AM    GFRAA >60 08/15/2022 disease:,              ROS comment: L Spine surgery GI/Hepatic/Renal:   (+) GERD:,      (-) no morbid obesity       Endo/Other:    (+) DiabetesType II DM, , hypothyroidism::., .                 Abdominal:   (+) obese,           Vascular: negative vascular ROS. Other Findings:             Anesthesia Plan      general     ASA 3       Induction: intravenous. MIPS: Postoperative opioids intended and Prophylactic antiemetics administered. Anesthetic plan and risks discussed with patient. Plan discussed with CRNA. DOS STAFF ADDENDUM:    Pt seen and examined, chart reviewed (including anesthesia, drug and allergy history). Anesthetic plan, risks, benefits, alternatives, and personnel involved discussed with patient. Patient verbalized an understanding and agrees to proceed. Plan discussed with care team members and agreed upon.     Alana Phan MD  Staff Anesthesiologist  7:24 AM    Alana Phan MD   8/23/2022

## 2022-08-24 ENCOUNTER — TELEPHONE (OUTPATIENT)
Dept: BARIATRICS/WEIGHT MGMT | Age: 62
End: 2022-08-24

## 2022-08-24 VITALS
TEMPERATURE: 98.4 F | WEIGHT: 218 LBS | DIASTOLIC BLOOD PRESSURE: 66 MMHG | HEIGHT: 63 IN | HEART RATE: 94 BPM | OXYGEN SATURATION: 97 % | RESPIRATION RATE: 16 BRPM | BODY MASS INDEX: 38.62 KG/M2 | SYSTOLIC BLOOD PRESSURE: 125 MMHG

## 2022-08-24 PROBLEM — Z98.84 GASTRIC BYPASS STATUS FOR OBESITY: Status: ACTIVE | Noted: 2022-08-24

## 2022-08-24 PROCEDURE — 6370000000 HC RX 637 (ALT 250 FOR IP): Performed by: SURGERY

## 2022-08-24 PROCEDURE — 6360000002 HC RX W HCPCS: Performed by: SURGERY

## 2022-08-24 RX ORDER — TAMSULOSIN HYDROCHLORIDE 0.4 MG/1
0.4 CAPSULE ORAL DAILY
Status: DISCONTINUED | OUTPATIENT
Start: 2022-08-24 | End: 2022-08-24 | Stop reason: HOSPADM

## 2022-08-24 RX ADMIN — SUCRALFATE 0.5 G: 1 TABLET ORAL at 13:12

## 2022-08-24 RX ADMIN — KETOROLAC TROMETHAMINE 15 MG: 15 INJECTION, SOLUTION INTRAMUSCULAR; INTRAVENOUS at 06:08

## 2022-08-24 RX ADMIN — KETOROLAC TROMETHAMINE 15 MG: 15 INJECTION, SOLUTION INTRAMUSCULAR; INTRAVENOUS at 10:00

## 2022-08-24 RX ADMIN — PANTOPRAZOLE SODIUM 40 MG: 40 TABLET, DELAYED RELEASE ORAL at 06:08

## 2022-08-24 RX ADMIN — TAMSULOSIN HYDROCHLORIDE 0.4 MG: 0.4 CAPSULE ORAL at 11:05

## 2022-08-24 RX ADMIN — GABAPENTIN 800 MG: 400 CAPSULE ORAL at 10:18

## 2022-08-24 RX ADMIN — SUCRALFATE 0.5 G: 1 TABLET ORAL at 09:00

## 2022-08-24 RX ADMIN — ACETAMINOPHEN 650 MG: 160 SUSPENSION ORAL at 13:16

## 2022-08-24 RX ADMIN — ACETAMINOPHEN 650 MG: 160 SUSPENSION ORAL at 06:09

## 2022-08-24 RX ADMIN — ENOXAPARIN SODIUM 40 MG: 100 INJECTION SUBCUTANEOUS at 10:35

## 2022-08-24 ASSESSMENT — PAIN DESCRIPTION - LOCATION
LOCATION: ABDOMEN

## 2022-08-24 ASSESSMENT — PAIN SCALES - GENERAL
PAINLEVEL_OUTOF10: 4
PAINLEVEL_OUTOF10: 1
PAINLEVEL_OUTOF10: 4
PAINLEVEL_OUTOF10: 1

## 2022-08-24 ASSESSMENT — PAIN DESCRIPTION - DESCRIPTORS
DESCRIPTORS: ACHING;DISCOMFORT
DESCRIPTORS: ACHING;DULL
DESCRIPTORS: ACHING

## 2022-08-24 ASSESSMENT — PAIN DESCRIPTION - PAIN TYPE
TYPE: SURGICAL PAIN
TYPE: SURGICAL PAIN

## 2022-08-24 ASSESSMENT — PAIN DESCRIPTION - ORIENTATION
ORIENTATION: RIGHT;LEFT
ORIENTATION: RIGHT;LEFT

## 2022-08-24 ASSESSMENT — PAIN DESCRIPTION - ONSET
ONSET: ON-GOING
ONSET: ON-GOING

## 2022-08-24 ASSESSMENT — PAIN DESCRIPTION - FREQUENCY
FREQUENCY: CONTINUOUS
FREQUENCY: CONTINUOUS

## 2022-08-24 ASSESSMENT — PAIN - FUNCTIONAL ASSESSMENT
PAIN_FUNCTIONAL_ASSESSMENT: ACTIVITIES ARE NOT PREVENTED
PAIN_FUNCTIONAL_ASSESSMENT: ACTIVITIES ARE NOT PREVENTED
PAIN_FUNCTIONAL_ASSESSMENT: PREVENTS OR INTERFERES SOME ACTIVE ACTIVITIES AND ADLS

## 2022-08-24 ASSESSMENT — PAIN SCALES - WONG BAKER: WONGBAKER_NUMERICALRESPONSE: 0

## 2022-08-24 NOTE — PROGRESS NOTES
GENERAL SURGERY  DAILY PROGRESS NOTE  8/24/2022      SUBJECTIVE:  Issues with urinary retention overnight, stephenson was placed  Feels well otherwise  Tolerating liquid diet    OBJECTIVE:  /66   Pulse 94   Temp 98.4 °F (36.9 °C) (Oral)   Resp 16   Ht 5' 3\" (1.6 m)   Wt 218 lb (98.9 kg)   SpO2 97%   BMI 38.62 kg/m²     GENERAL:  NAD. A&Ox3. LUNGS:  No increased work of breathing. CARDIOVASCULAR: RR  ABDOMEN:  Soft, non-distended, non-tender. Incisions c/d/i. No guarding, rigidity, rebound.     ASSESSMENT/PLAN:  65 y.o. female s/p robotic RYGB 8/23    Post-op urinary retention -- trial stephenson removal  Continue clears  Ambulate, SMI, DVT PPx  Pain control PRN  Void trial if ok likely ok for discharge today    Tyshawn Bird DO  Surgery Resident PGY-5  8/24/2022  10:13 AM

## 2022-08-24 NOTE — PLAN OF CARE
Problem: Chronic Conditions and Co-morbidities  Goal: Patient's chronic conditions and co-morbidity symptoms are monitored and maintained or improved  Outcome: Completed     Problem: Discharge Planning  Goal: Discharge to home or other facility with appropriate resources  Outcome: Completed     Problem: Pain  Goal: Verbalizes/displays adequate comfort level or baseline comfort level  Outcome: Completed     Problem: ABCDS Injury Assessment  Goal: Absence of physical injury  Outcome: Completed     Problem: Safety - Adult  Goal: Free from fall injury  Outcome: Completed

## 2022-08-24 NOTE — DISCHARGE SUMMARY
Discharge Summary  Javier Eddy  [id number]    Admit date: 8/23/2022    Discharge date and time: 8/24/2022    Admitting Physician: Byron Gomez MD    Patient Active Problem List   Diagnosis    Type 2 diabetes mellitus without complication, without long-term current use of insulin ([facility]    Acquired hypothyroidism    Peripheral neuropathy    Carpal tunnel syndrome of left wrist    Spinal stenosis of lumbar region with neurogenic claudication    Acute exacerbation of chronic low back pain    Hyperlipidemia    Spondylolisthesis of lumbar region    Lumbar radiculopathy    Postoperative hypotension    Postoperative anemia due to acute blood loss    Venous insufficiency of both lower extremities    Impingement syndrome of right shoulder    DDD (degenerative disc disease), lumbar    Lumbosacral spondylosis without myelopathy    S/P lumbar fusion    Sacroiliac dysfunction    Postlaminectomy syndrome, lumbar    Class 3 severe obesity due to excess calories with serious comorbidity and body mass index (BMI) of 45.0 to 49.9 in [facility])    Chronic fatigue    GERD (gastroesophageal reflux disease)    Morbid obesity ([facility] [address]    Gastric bypass        Hospital Course: Jvaier Eddy is a 65 y.o. female post robotic Gastric Bypass 8/23/22. She did well with surgery, pain has been well controlled over night, walking well in the halls. Labs not ordered. She could not void during the night so a stephenson catheter was placed, good urine output through the stephenson. She says this happened in the distant past after a bladder surgery. She is tolerating the Bariatric clear liquid diet with no nausea. Incisions all clean and dry, surgical glue in place. Discharge Exam:   VITALS: Blood pressure 125/66, pulse 94, temperature 98.4 °F (36.9 °C), temperature source Oral, resp. rate 16, height 5' 3\" (1.6 m), weight 218 lb (98.9 kg), SpO2 97 %.   General appearance: alert, appears stated age and cooperative  Neck: no adenopathy, no carotid bruit, no JVD, supple, symmetrical, trachea midline and thyroid not enlarged, symmetric, no tenderness/mass/nodules  Lungs: clear to auscultation bilaterally  Heart: regular rate and rhythm,   Abdomen: Incisions clean and dry, surgical glue in place. Extremities: extremities normal, atraumatic, no cyanosis or edema       Medication List        CONTINUE taking these medications      acetaminophen 500 MG tablet  Commonly known as: TYLENOL     cyclobenzaprine 10 MG tablet  Commonly known as: FLEXERIL  TAKE 1 TABLET BY MOUTH THREE TIMES A DAY AS NEEDED FOR MUSCLE SPASMS     DULoxetine 60 MG extended release capsule  Commonly known as: CYMBALTA  Take 1 capsule by mouth daily     gabapentin 800 MG tablet  Commonly known as: NEURONTIN  Take 1 tablet by mouth 3 times daily for 90 days. gemfibrozil 600 MG tablet  Commonly known as: LOPID  TAKE 1 TABLET BY MOUTH EVERY 12 HOURS     Handicap Placard Misc  by Does not apply route Patient cannot walk 200 ft without stopping to rest.    Expiration 5 yrs     levothyroxine 200 MCG tablet  Commonly known as: SYNTHROID  Take 1 tablet by mouth in the morning. lidocaine 5 %  Commonly known as: LIDODERM     linaclotide 145 MCG capsule  Commonly known as: Linzess  TAKE 1 CAPSULE BY MOUTH EVERY DAY IN THE MORNING BEFORE BREAKFAST     omeprazole 20 MG delayed release capsule  Commonly known as: PRILOSEC  TAKE 1 CAPSULE BY MOUTH EVERY DAY     ondansetron 4 MG disintegrating tablet  Commonly known as: ZOFRAN-ODT  Take 1 tablet by mouth every 8 hours as needed for Nausea or Vomiting     Ozempic (1 MG/DOSE) 4 MG/3ML Sopn  Generic drug: Semaglutide (1 MG/DOSE)  INJECT 1 MG INTO THE SKIN ONCE A WEEK     polyethylene glycol 17 g packet  Commonly known as: GLYCOLAX     sucralfate 1 GM tablet  Commonly known as: Carafate  Take 0.5 tablets by mouth in the morning and 0.5 tablets at noon and 0.5 tablets in the evening and 0.5 tablets before bedtime.      topiramate 25 MG tablet  Commonly known as: TOPAMAX  Take 1 tablet by mouth 2 times daily            STOP taking these medications      B-1 100 MG Tabs     diclofenac sodium 1 % Gel  Commonly known as: VOLTAREN     omega-3 acid ethyl esters 1 g capsule  Commonly known as: LOVAZA     oxyCODONE-acetaminophen 5-325 MG per tablet  Commonly known as: PERCOCET     pravastatin 10 MG tablet  Commonly known as: PRAVACHOL     therapeutic multivitamin-minerals tablet     valsartan 80 MG tablet  Commonly known as: DIOVAN     vitamin B-12 100 MCG tablet  Commonly known as: CYANOCOBALAMIN     vitamin D 1.25 MG (22479 UT) Caps capsule  Commonly known as: ERGOCALCIFEROL            ASK your doctor about these medications      torsemide 10 MG tablet  Commonly known as: DEMADEX  take 1 tablet by mouth once daily              Patient Instructions: The Scopolamine patch may help with the nausea but can cause blurry vision and dry mouth. Use Tylenol and Ibuprofen for pain. Ok to restart Aspirin and other blood thinners. Activity:  no lifting, or strenuous exercise for one or two weeks. No driving until off narcotics and nausea medications. Diet:  Bariatric clear liquid sips yesterday and today, then starting tomorrow, bariatric full liquids for 10 days, 1 oz every 15 minutes. Wound Care: shower and then leave the incisions open to air, do not rub off the surgical glue. Make sure the bowels move daily by using fiber such as Metamucil, stool softeners or laxatives to prevent constipation pains. Condition at discharge:  Stable  Disposition: home    Follow-up with Dr. Alpesh Le in 2 weeks.     Signed:  León Zapata MD  8/24/2022  10:36 AM

## 2022-08-24 NOTE — TELEPHONE ENCOUNTER
[facility]       Discharge Review for     Ashu-en-Y Gastric Bypass                    And         Sleeve Gastrectomy     Reviewed with patient the following for discharge home:    Incision care- yes  Walking- yes  Elevate Head of Bed- yes  Infection Control- yes  Post op medications (PPI, Carafate and medication for nausea, Zofran) - yes  Review of Clear Liquid Diet- yes  Review of Full Liquid Diet- yes  Use of Emergency Room- yes  How and When to Call Bariatric Office- yes  Reminder of PCP Follow Up Appointment and Lab Draw- yes  Discuss Post-Op Call Schedule- yes  Any other issues- yes    Completed by Niranjan Marques RN

## 2022-08-25 ENCOUNTER — TELEPHONE (OUTPATIENT)
Dept: BARIATRICS/WEIGHT MGMT | Age: 62
End: 2022-08-25

## 2022-08-26 ENCOUNTER — TELEPHONE (OUTPATIENT)
Dept: BARIATRICS/WEIGHT MGMT | Age: 62
End: 2022-08-26

## 2022-08-26 DIAGNOSIS — Z71.3 NUTRITIONAL COUNSELING: ICD-10-CM

## 2022-08-26 DIAGNOSIS — Z00.8 NUTRITIONAL ASSESSMENT: Primary | ICD-10-CM

## 2022-08-26 NOTE — TELEPHONE ENCOUNTER
[facility]:  RYGB or LSG - Dietary Phone Follow-up Form:  Sx Date:8/23/22  rs/p RYGB  Current Diet:___Bar Full Liquid Diet___________________________       Patient's symptoms include the following:     Pt states  he / she has the following symptoms:    Nausea -  no  Vomiting -  No  Diarrhea -  no  Abdominal Cramping -  no  Gas -  no       Dizziness -  no   Fever - no //  Pt physically took his / her temperature today  - No,    Constipation -  no  Bloody Stools - no  Tarry Stools - no  Shakiness -  no   Low Blood Sugar -  no  Feels Faint -  no   Excessive Salvia -  no  Other  - None  Pt started his / her Colace 100 mgs twice daily -  Colace 100 mgs twice daily pt is taking  Pt is taking PPI Medications or Carafate as instructed -  Pt is taking her Carafate     Hunger during the liquid phase yes, Just a little per pt. Reports no restriction during the liquid phase no  Reports moderate restriction during liquid phase no  Reports severe restriction during liquid phase no  Can't tolerate liquids no  Food gets stuck frequently no    Reflux Symptoms no       24 Hour Recall: Bar Clear Liquid Diet - 1 oz of Fluid every 15 minutes  Breakfast: Chicken Broth and Diet Jell-o  Snack: Diet Jell-o  Lunch: Chicken Broth  Snack: Diet Jell-o  Dinner: Chicken Broth  Snack: Diet Jell-O  Water Intake:4 - cups. Pt states she knows she has to do better. Enc pt to drink 4 to 5 - 16.9 oz bottles  Other Beverage: None  Pt  states denies dehydration on today's date  - 8/26/22    Protein Supplement: Juwan Humphrey reviewed pt can start his / her protein supplement on 8/26/22. Juwan Humphrey reviewed how pt can mix his / her protein supplement - 2 scoops Unjury mixed in 12 oz Fat Free Fair Life Milk broken down into 4 meals 3 oz in size. Pt verbalized understanding. How many times a day do you take your Protein Supplement: 4    Instructed per Standing Orders: yes, 8/26/22  Bariatric Full Liquid Diet .   Juwan Humphrey reviewed Bar Full Liquid Diet and reinforced diet concepts. Pt has education book and handouts and states he/ she is following the instruction given. Use of protein supplement was reviewed with how to mix his / her protein supplement. Pt verbalized understanding. Pt instructed to call with any dietary questions. POC D/T problem noted above: None    Surgeon Notified:no      PCP Appt - Monday  Labs drawn Wed. Pt has questions about her pain patch Dr. Tracee Keller prescribes and switching to Voltaren Gel. Juwan Ld instructed pt to f/u with Dr. Jaden Araujo office since Dr. Kisha Krishnamurthy is not prescribing pain medication.      Patient Response:  Verbalized understanding of the above instruction: yes,   Will keep detailed food records for 2 week f/u appt: yes,   Will return to [facility] for his her 2 week f/u appointment - Reminded pt to have labs drawn

## 2022-08-29 ENCOUNTER — OFFICE VISIT (OUTPATIENT)
Dept: PRIMARY CARE CLINIC | Age: 62
End: 2022-08-29
Payer: COMMERCIAL

## 2022-08-29 ENCOUNTER — TELEPHONE (OUTPATIENT)
Dept: BARIATRICS/WEIGHT MGMT | Age: 62
End: 2022-08-29

## 2022-08-29 VITALS
HEIGHT: 63 IN | HEART RATE: 102 BPM | SYSTOLIC BLOOD PRESSURE: 132 MMHG | WEIGHT: 216 LBS | BODY MASS INDEX: 38.27 KG/M2 | OXYGEN SATURATION: 96 % | DIASTOLIC BLOOD PRESSURE: 80 MMHG | TEMPERATURE: 97.3 F

## 2022-08-29 DIAGNOSIS — M25.511 CHRONIC RIGHT SHOULDER PAIN: ICD-10-CM

## 2022-08-29 DIAGNOSIS — E11.9 TYPE 2 DIABETES MELLITUS WITHOUT COMPLICATION, WITHOUT LONG-TERM CURRENT USE OF INSULIN (HCC): Chronic | ICD-10-CM

## 2022-08-29 DIAGNOSIS — Z98.84 S/P BARIATRIC SURGERY: Primary | ICD-10-CM

## 2022-08-29 DIAGNOSIS — M54.17 RADICULOPATHY, LUMBOSACRAL REGION: ICD-10-CM

## 2022-08-29 DIAGNOSIS — M25.511 RIGHT SHOULDER PAIN, UNSPECIFIED CHRONICITY: ICD-10-CM

## 2022-08-29 DIAGNOSIS — E66.01 CLASS 3 SEVERE OBESITY DUE TO EXCESS CALORIES WITH SERIOUS COMORBIDITY AND BODY MASS INDEX (BMI) OF 45.0 TO 49.9 IN ADULT (HCC): ICD-10-CM

## 2022-08-29 DIAGNOSIS — M17.11 PRIMARY OSTEOARTHRITIS OF RIGHT KNEE: ICD-10-CM

## 2022-08-29 DIAGNOSIS — E78.49 OTHER HYPERLIPIDEMIA: ICD-10-CM

## 2022-08-29 DIAGNOSIS — M48.062 SPINAL STENOSIS, LUMBAR REGION WITH NEUROGENIC CLAUDICATION: ICD-10-CM

## 2022-08-29 DIAGNOSIS — Z98.1 S/P LUMBAR FUSION: ICD-10-CM

## 2022-08-29 DIAGNOSIS — E03.9 ACQUIRED HYPOTHYROIDISM: Chronic | ICD-10-CM

## 2022-08-29 DIAGNOSIS — K21.9 GASTROESOPHAGEAL REFLUX DISEASE WITHOUT ESOPHAGITIS: ICD-10-CM

## 2022-08-29 DIAGNOSIS — I87.2 VENOUS INSUFFICIENCY OF BOTH LOWER EXTREMITIES: ICD-10-CM

## 2022-08-29 DIAGNOSIS — G89.29 CHRONIC RIGHT SHOULDER PAIN: ICD-10-CM

## 2022-08-29 PROBLEM — I95.81 POSTOPERATIVE HYPOTENSION: Status: RESOLVED | Noted: 2021-05-26 | Resolved: 2022-08-29

## 2022-08-29 PROBLEM — D62 POSTOPERATIVE ANEMIA DUE TO ACUTE BLOOD LOSS: Status: RESOLVED | Noted: 2021-05-26 | Resolved: 2022-08-29

## 2022-08-29 PROCEDURE — 3044F HG A1C LEVEL LT 7.0%: CPT | Performed by: FAMILY MEDICINE

## 2022-08-29 PROCEDURE — 99214 OFFICE O/P EST MOD 30 MIN: CPT | Performed by: FAMILY MEDICINE

## 2022-08-29 ASSESSMENT — ENCOUNTER SYMPTOMS
COLOR CHANGE: 0
EYE PAIN: 0
NAUSEA: 0
VISUAL CHANGE: 0
CHEST TIGHTNESS: 0
EYE REDNESS: 0
SINUS PRESSURE: 0
VOMITING: 0
DIARRHEA: 0
EYE ITCHING: 0
RHINORRHEA: 0
ABDOMINAL PAIN: 0
CONSTIPATION: 0
BLOOD IN STOOL: 0
SORE THROAT: 0
COUGH: 0
SHORTNESS OF BREATH: 0
BACK PAIN: 1
WHEEZING: 0
APNEA: 0

## 2022-08-29 NOTE — TELEPHONE ENCOUNTER
Patient phone the office stating she had gastric bypass surgery last week and Dr. Shira Mims took her off the Voltaren Sodium Gel. She would rather cut down on Lidocaine patches and have order for Voltaren Sodium Gel. Rx pending if ok.

## 2022-08-29 NOTE — TELEPHONE ENCOUNTER
Post op questions:    Nausea/vomiting present (YES/NO):no    Fever > 101/chills present (YES/NO):no    Tolerating liquids (YES/NO):yes  Ounces per day: 45  Protein shakes:yes    Taking post op medications, Carafate/Omeprazole & Zofran (YES/NO):yes     Is patient up and walking (YES/NO):yes    Having bowel movements (YES/NO):yes    Are incisions healing well (YES/NO):yes    Having any problems or concerns that need to be addressed:  none

## 2022-08-29 NOTE — PROGRESS NOTES
Chief Complaint:     Chief Complaint   Patient presents with    Post-Op Check         Hyperlipidemia  This is a chronic problem. The current episode started more than 1 year ago. The problem is controlled. Recent lipid tests were reviewed and are normal. Exacerbating diseases include diabetes and obesity. Associated symptoms include leg pain and myalgias. Pertinent negatives include no chest pain or shortness of breath. Current antihyperlipidemic treatment includes statins. The current treatment provides significant improvement of lipids. Compliance problems include medication side effects. Risk factors for coronary artery disease include diabetes mellitus, dyslipidemia, obesity and post-menopausal.   Diabetes  She presents for her follow-up diabetic visit. She has type 2 diabetes mellitus. The initial diagnosis of diabetes was made 1 month ago. Her disease course has been stable. There are no hypoglycemic associated symptoms. Pertinent negatives for hypoglycemia include no dizziness, headaches or nervousness/anxiousness. Associated symptoms include fatigue. Pertinent negatives for diabetes include no chest pain, no visual change and no weakness. There are no hypoglycemic complications. Symptoms are stable. There are no diabetic complications. Risk factors for coronary artery disease include dyslipidemia and obesity. Current diabetic treatment includes oral agent (monotherapy). She is compliant with treatment all of the time. She is following a generally healthy diet. When asked about meal planning, she reported none. She has not had a previous visit with a dietitian. There is no change in her home blood glucose trend. An ACE inhibitor/angiotensin II receptor blocker is not being taken. She does not see a podiatrist.Eye exam is not current. Other  This is a new (post op bariatric surgery) problem. The current episode started in the past 7 days. Associated symptoms include fatigue, myalgias and a rash.  Pertinent negatives include no abdominal pain, arthralgias, chest pain, congestion, coughing, diaphoresis, fever, headaches, nausea, neck pain, numbness, sore throat, visual change, vomiting or weakness. Nothing aggravates the symptoms. She has tried nothing for the symptoms. The treatment provided no relief.      Patient Active Problem List   Diagnosis    Type 2 diabetes mellitus without complication, without long-term current use of insulin ([facility]CC)    Acquired hypothyroidism    Peripheral neuropathy    Carpal tunnel syndrome of left wrist    Spinal stenosis of lumbar region with neurogenic claudication    Acute exacerbation of chronic low back pain    Hyperlipidemia    Spondylolisthesis of lumbar region    Lumbar radiculopathy    Venous insufficiency of both lower extremities    Impingement syndrome of right shoulder    DDD (degenerative disc disease), lumbar    Lumbosacral spondylosis without myelopathy    S/P lumbar fusion    Sacroiliac dysfunction    Postlaminectomy syndrome, lumbar    Class 3 severe obesity due to excess calories with serious comorbidity and body mass index (BMI) of 45.0 to 49.9 in [facility])    Chronic fatigue    GERD (gastroesophageal reflux disease)    Morbid obesity ([address]    S/P bariatric surgery       Past Medical History:   Diagnosis Date    Back pain     Chronic fatigue     COVID-19 09/2020    mild per patient    Diabetes mellitus ([facility] [address]     History of blood transfusion     Hyperlipidemia     Hypothyroidism     IBS (irritable bowel syndrome)     Morbid obesity due to excess calories (HCC)     Obesity     Osteoarthritis     PONV (postoperative nausea and vomiting)        Past Surgical History:   Procedure Laterality Date    BACK SURGERY      CHOLECYSTECTOMY      COLONOSCOPY      HYSTERECTOMY, TOTAL ABDOMINAL (CERVIX REMOVED)      INCONTINENCE SURGERY      BLADDER SLING    KNEE SURGERY Left     LUMBAR SPINE SURGERY N/A 5/25/2021    L3- L5 DECOMPRESSION AND FUSION , L4- L5 TRANSFORAMINAL LUMBAR rest.    Expiration 5 yrs 1 each 0    torsemide (DEMADEX) 10 MG tablet take 1 tablet by mouth once daily (Patient taking differently: as needed) 30 tablet 3    omeprazole (PRILOSEC) 20 MG delayed release capsule TAKE 1 CAPSULE BY MOUTH EVERY DAY 90 capsule 3    linaclotide (LINZESS) 145 MCG capsule TAKE 1 CAPSULE BY MOUTH EVERY DAY IN THE MORNING BEFORE BREAKFAST 90 capsule 3    acetaminophen (TYLENOL) 500 MG tablet Take 500 mg by mouth every 6 hours as needed for Pain      lidocaine (LIDODERM) 5 % PLACE 1 PATCH ONTO THE SKIN DAILY 12 HOURS ON, 12 HOURS OFF.      polyethylene glycol (GLYCOLAX) 17 g packet Take 17 g by mouth daily as needed      gabapentin (NEURONTIN) 800 MG tablet Take 1 tablet by mouth 3 times daily for 90 days. 270 tablet 0     No current facility-administered medications for this visit. Allergies   Allergen Reactions    Bactrim [Sulfamethoxazole-Trimethoprim] Nausea Only    Ceftin [Cefuroxime] Rash    Keflex [Cephalexin] Rash       Social History     Socioeconomic History    Marital status:      Spouse name: None    Number of children: None    Years of education: None    Highest education level: None   Occupational History     Employer: [employer]   Tobacco Use    Smoking status: Never    Smokeless tobacco: Never   Vaping Use    Vaping Use: Never used   Substance and Sexual Activity    Alcohol use: Never    Drug use: Never       Family History   Problem Relation Age of Onset    Diabetes Mother     Diabetes Father     Rheum Arthritis Sister     Cancer Maternal Grandfather           Review of Systems   Constitutional:  Positive for fatigue. Negative for activity change, appetite change, diaphoresis and fever. HENT:  Negative for congestion, ear pain, hearing loss, nosebleeds, rhinorrhea, sinus pressure and sore throat. Eyes:  Negative for pain, redness, itching and visual disturbance.    Respiratory:  Negative for apnea, cough, chest tightness, shortness of breath and wheezing. Cardiovascular:  Negative for chest pain, palpitations and leg swelling. Gastrointestinal:  Negative for abdominal pain, blood in stool, constipation, diarrhea, nausea and vomiting. Endocrine: Negative. Genitourinary:  Negative for decreased urine volume, difficulty urinating, dysuria, frequency, hematuria and urgency. Musculoskeletal:  Positive for back pain and myalgias. Negative for arthralgias, gait problem and neck pain. Skin:  Positive for rash. Negative for color change. Allergic/Immunologic: Negative for environmental allergies and food allergies. Neurological:  Negative for dizziness, weakness, light-headedness, numbness and headaches. Hematological:  Negative for adenopathy. Does not bruise/bleed easily. Psychiatric/Behavioral:  Negative for behavioral problems, dysphoric mood and sleep disturbance. The patient is not nervous/anxious and is not hyperactive. All other systems reviewed and are negative. /80   Pulse (!) 102   Temp 97.3 °F (36.3 °C)   Ht 5' 3\" (1.6 m)   Wt 216 lb (98 kg)   SpO2 96%   BMI 38.26 kg/m²     Physical Exam  Vitals and nursing note reviewed. Constitutional:       General: She is not in acute distress. Appearance: Normal appearance. She is well-developed. HENT:      Head: Normocephalic and atraumatic. Right Ear: Hearing, tympanic membrane and external ear normal. No tenderness. No middle ear effusion. Left Ear: Hearing, tympanic membrane and external ear normal. No tenderness. No middle ear effusion. Nose: Nose normal. No congestion or rhinorrhea. Right Turbinates: Not enlarged. Left Turbinates: Not enlarged. Mouth/Throat:      Mouth: Mucous membranes are moist.      Tongue: No lesions. Pharynx: Oropharynx is clear. No oropharyngeal exudate or posterior oropharyngeal erythema. Eyes:      General: No scleral icterus.      Conjunctiva/sclera: Conjunctivae normal.      Pupils: Pupils are equal, round, and reactive to light. Neck:      Thyroid: No thyromegaly. Cardiovascular:      Rate and Rhythm: Normal rate and regular rhythm. Heart sounds: Normal heart sounds. No murmur heard. Pulmonary:      Effort: Pulmonary effort is normal. No respiratory distress. Breath sounds: Normal breath sounds. No wheezing or rales. Abdominal:      General: Bowel sounds are normal. There is no distension. Palpations: Abdomen is soft. Tenderness: There is no abdominal tenderness. Musculoskeletal:         General: No tenderness. Normal range of motion. Cervical back: Normal range of motion and neck supple. No rigidity. No muscular tenderness. Lymphadenopathy:      Cervical: No cervical adenopathy. Skin:     General: Skin is warm and dry. Findings: No erythema or rash. Neurological:      General: No focal deficit present. Mental Status: She is alert and oriented to person, place, and time. Cranial Nerves: No cranial nerve deficit. Deep Tendon Reflexes: Reflexes are normal and symmetric.  Reflexes normal.   Psychiatric:         Mood and Affect: Mood normal.                               ASSESSMENT/PLAN:    Patient Active Problem List   Diagnosis    Type 2 diabetes mellitus without complication, without long-term current use of insulin ([facility]    Acquired hypothyroidism    Peripheral neuropathy    Carpal tunnel syndrome of left wrist    Spinal stenosis of lumbar region with neurogenic claudication    Acute exacerbation of chronic low back pain    Hyperlipidemia    Spondylolisthesis of lumbar region    Lumbar radiculopathy    Venous insufficiency of both lower extremities    Impingement syndrome of right shoulder    DDD (degenerative disc disease), lumbar    Lumbosacral spondylosis without myelopathy    S/P lumbar fusion    Sacroiliac dysfunction    Postlaminectomy syndrome, lumbar    Class 3 severe obesity due to excess calories with serious comorbidity and body mass index (BMI) of 45.0 to 49.9 in adult [facility])    Chronic fatigue    GERD (gastroesophageal reflux disease)    Morbid obesity ([facility] [address]    S/P bariatric surgery       Coral Thorpe was seen today for post-op check. Diagnoses and all orders for this visit:    S/P bariatric surgery    Class 3 severe obesity due to excess calories with serious comorbidity and body mass index (BMI) of 45.0 to 49.9 in [facility])    Type 2 diabetes mellitus without complication, without long-term current use of insulin ([facility] [address]  -     Lipid Panel; Future  -     Comprehensive Metabolic Panel; Future  -     TSH; Future  -     Hemoglobin A1C; Future    Acquired hypothyroidism  -     TSH; Future    Gastroesophageal reflux disease without esophagitis    Venous insufficiency of both lower extremities    Other hyperlipidemia  -     Lipid Panel; Future  -     Comprehensive Metabolic Panel; Future  -     TSH; Future    Labs reviewed  Recent Hospital notes reviewed  Doing well post op  Meds reviewed  Hold Ozempic and Gemfibrizol for now    Return in about 3 months (around 11/29/2022) for Follow up DM, Follow up Lipids, Follow up HTN, Recheck labs, Recheck Meds. I spent 30 minutes with this patient. I spent greater than 50% of the time counseling this patient.         Gamal Ndiaye DO  8/29/2022  10:29 AM

## 2022-08-30 ENCOUNTER — TELEPHONE (OUTPATIENT)
Dept: BARIATRICS/WEIGHT MGMT | Age: 62
End: 2022-08-30

## 2022-08-30 NOTE — TELEPHONE ENCOUNTER
Received message from [facility] at Dr. Seymour Friedman office regarding patient taking voltaren gel. Patient is asking for a refill but states that Dr. Alpesh Le told her to stop taking it. I placed call to [facility] and received voicemail. Left message that if Dr. Alpesh Le instructed her to stop taking it then she should stop it. On discharge instructions from the hospital Dr. Alpesh Le does have listed to stop voltaren gel.

## 2022-08-30 NOTE — TELEPHONE ENCOUNTER
Arabella from Dr. Maxwell Tierney office left voice message stating the doctors discharge note states to stop Voltaren Sodium Gel. Notified patient until Dr. Thakkar Neighbor allows her to use the Voltaren Dr. Joy Rodriguez cannot refill. Patient sees  next week and will discuss with him then.

## 2022-08-30 NOTE — TELEPHONE ENCOUNTER
Left voice message with Arabella Hilliard nurse regarding if and when patient can take Voltaren Sodium Gel. Patient is requesting a refill from Dr. Ольга Cohen but mentioned Dr. Farrukh Escobedo would like her to \"cut down the Voltaren\" since she had gastric bypass 8/23/22.

## 2022-08-31 RX ORDER — DULOXETIN HYDROCHLORIDE 60 MG/1
CAPSULE, DELAYED RELEASE ORAL
Qty: 90 CAPSULE | Refills: 1 | OUTPATIENT
Start: 2022-08-31

## 2022-09-07 ENCOUNTER — HOSPITAL ENCOUNTER (OUTPATIENT)
Age: 62
Discharge: HOME | End: 2022-09-07
Payer: COMMERCIAL

## 2022-09-07 DIAGNOSIS — K91.2 MALNUTRITION FOLLOWING GASTROINTESTINAL SURGERY: ICD-10-CM

## 2022-09-07 LAB
ALBUMIN SERPL-MCNC: 3.6 G/DL (ref 3.5–5.2)
ALP BLD-CCNC: 100 U/L (ref 35–104)
ALT SERPL-CCNC: 16 U/L (ref 0–32)
ANION GAP SERPL CALCULATED.3IONS-SCNC: 14 MMOL/L (ref 7–16)
AST SERPL-CCNC: 13 U/L (ref 0–31)
BILIRUB SERPL-MCNC: 1.5 MG/DL (ref 0–1.2)
BUN BLDV-MCNC: 10 MG/DL (ref 6–23)
CALCIUM SERPL-MCNC: 9.1 MG/DL (ref 8.6–10.2)
CHLORIDE BLD-SCNC: 104 MMOL/L (ref 98–107)
CO2: 22 MMOL/L (ref 22–29)
CREAT SERPL-MCNC: 0.5 MG/DL (ref 0.5–1)
GFR AFRICAN AMERICAN: >60
GFR NON-AFRICAN AMERICAN: >60 ML/MIN/1.73
GLUCOSE BLD-MCNC: 108 MG/DL (ref 74–99)
HCT VFR BLD CALC: 39.4 % (ref 34–48)
HEMOGLOBIN: 12.8 G/DL (ref 11.5–15.5)
MCH RBC QN AUTO: 28.6 PG (ref 26–35)
MCHC RBC AUTO-ENTMCNC: 32.5 % (ref 32–34.5)
MCV RBC AUTO: 87.9 FL (ref 80–99.9)
PDW BLD-RTO: 13.3 FL (ref 11.5–15)
PLATELET # BLD: 296 E9/L (ref 130–450)
PMV BLD AUTO: 9.8 FL (ref 7–12)
POTASSIUM SERPL-SCNC: 3.8 MMOL/L (ref 3.5–5)
RBC # BLD: 4.48 E12/L (ref 3.5–5.5)
SODIUM BLD-SCNC: 140 MMOL/L (ref 132–146)
TOTAL PROTEIN: 6.8 G/DL (ref 6.4–8.3)
WBC # BLD: 6.5 E9/L (ref 4.5–11.5)

## 2022-09-07 PROCEDURE — 80053 COMPREHEN METABOLIC PANEL: CPT

## 2022-09-07 PROCEDURE — 36415 COLL VENOUS BLD VENIPUNCTURE: CPT

## 2022-09-07 PROCEDURE — 85027 COMPLETE CBC AUTOMATED: CPT

## 2022-09-08 ENCOUNTER — INITIAL CONSULT (OUTPATIENT)
Dept: BARIATRICS/WEIGHT MGMT | Age: 62
End: 2022-09-08

## 2022-09-08 ENCOUNTER — OFFICE VISIT (OUTPATIENT)
Dept: BARIATRICS/WEIGHT MGMT | Age: 62
End: 2022-09-08
Payer: COMMERCIAL

## 2022-09-08 VITALS
WEIGHT: 203 LBS | SYSTOLIC BLOOD PRESSURE: 152 MMHG | TEMPERATURE: 97.2 F | HEIGHT: 63 IN | BODY MASS INDEX: 35.97 KG/M2 | RESPIRATION RATE: 20 BRPM | DIASTOLIC BLOOD PRESSURE: 82 MMHG | HEART RATE: 88 BPM

## 2022-09-08 VITALS — HEIGHT: 63 IN | WEIGHT: 203 LBS | BODY MASS INDEX: 35.97 KG/M2

## 2022-09-08 DIAGNOSIS — Z71.3 NUTRITIONAL COUNSELING: ICD-10-CM

## 2022-09-08 DIAGNOSIS — K21.9 GASTROESOPHAGEAL REFLUX DISEASE WITHOUT ESOPHAGITIS: Primary | ICD-10-CM

## 2022-09-08 DIAGNOSIS — Z00.8 NUTRITIONAL ASSESSMENT: Primary | ICD-10-CM

## 2022-09-08 PROCEDURE — 99212 OFFICE O/P EST SF 10 MIN: CPT

## 2022-09-08 PROCEDURE — 99024 POSTOP FOLLOW-UP VISIT: CPT | Performed by: SURGERY

## 2022-09-08 PROCEDURE — 99999 PR OFFICE/OUTPT VISIT,PROCEDURE ONLY: CPT | Performed by: DIETITIAN, REGISTERED

## 2022-09-08 NOTE — PATIENT INSTRUCTIONS
Please continue to take your vitamin and mineral supplements as instructed. If you received a blood work prescription today for laboratory monitoring due prior to your next routine follow-up visit, please have this blood work obtained 10 to 14 days prior to your next visit. It is important to fast for 12 hours prior to routine weight loss surgery blood work, EXCEPT for drinking water, to ensure accuracy of results. Please report nausea, vomiting, abdominal pain, or any other problems you experience to your surgeon. For problems related to weight loss surgery, it is best to go to [address] [place] [address] Emergency Department and have your surgeon paged.

## 2022-09-08 NOTE — PROGRESS NOTES
Medical Nutrition Therapy (MNT) Assessment   Pt is aware this phone call conversation will be documented and is in agreement to the documentation: Pt verbalized - Yes    Pt. Name: Chuy Mart   Date:9/8/2022  F/U Appt: Sx Type 2 week [facility]GB F/U Appointment      Ht 5' 3\" (1.6 m)   Wt 203 lb (92.1 kg)   BMI 35.96 kg/m²  Height: 5' 3\" (1.6 m) Weight: 203 lb (92.1 kg)   IBW:ideal body weight   147 lbs % EBWL: 23%     Wt Readings from Last 3 Encounters:   09/08/22 203 lb (92.1 kg)   09/08/22 203 lb (92.1 kg)   08/29/22 216 lb (98 kg)                     Protein supplements: Pt. is currently using the following protein supplement [place] and consuming the following grams of protein 61 grams from her protein supplement plus protein foods. Rd / Ld reviewed with patient based on 1.0 gram per kg of IBW patient needs 67 - 77 grams of protein total daily. Subjective:                   Current MNT:       Bariatric full liquid diet with MVI, Calcium & Protein Supplements     MNT Advanced to:     Bariatric puree diet with MVI, Calcium & Protein Supplements      Allergies and Food Allergies and Food Intolerances: - None    Nutritional Data  No - Poor appetite more than 5 days     No - NPO or clear liquid more than 3 days     No - Problem Chewing      No - Problem Swallowing      No - Problem Mouth Pain      No - Problem Denture  No - Missing Teeth         No - Pressure Sore    No - Open Wound    No - Surgical Wound  No - Documented: Sepsis or Infection    No - Nausea  No - Vomiting    [facility] Bariatric Surgeons Bowel Protocol:  Patient states he / she has the following bowel movements per week  - Pt states 3 - 5 day's a week. Pt states it just depends.   no - Diarrhea  No - Steatorrhea  No - Constipation - Denies  When was your last bowel movement  - Today    How much plain water are you drinking daily 4 - 5 Cup's - 8 oz   What other beverages / fluids are you drinking daily and the amount  - CF Green Tea    Yes - Are you taking Colace daily  What amount of Colace are you taking daily  - Colace - 100 mgs PRN    No - Are you taking Sugar Free Chewable Fiber Gummies / Supplements  What amount of Sugar Free Chewable Fiber Gummies are you taking daily  - Pt was instructed on the use of  - Fiber Gummies - see AVS    How much water were you instructed to take daily? Juwan Carbajal reviewed today -  Patient was instructed by Juwan Carbajal on the importance of increasing water intake to 48 - 64 oz. of water total daily. Pt. was also instructed he / she is allowed an additional 30 oz. of sugar free caffeine free clear liquid beverages for a total of 90 oz. of fluid total daily. Pt. was able to verbalize how he / she can get more water and fluids within the diet. Pt. verbalized understanding. How much Colace did your surgeon instruct you to take? Juwan Carbajal reviewed today - Per the surgeons protocol you should be taking since the day of your surgery Colace - 100 mgs 1 tablet 2 times daily until your 6 week F/U appointment. Juwan CARBAJAL reviewed. See After Visit Summary. How much Fiber Gummies did your surgeon instruct you to take? Juwan Carbajal reviewed today Per the surgeons protocol you should be taking a fiber supplement lifelong since your two week follow-up appointment. Your surgeon recommends a daily approved Fiber supplement 15 grams total daily. If you are just introducing a Fiber supplement the 15 grams should be introduced 5 grams of a fiber supplement daily the 1st week, 10 grams of a fiber supplement daily the 2nd week and 15 grams of a fiber supplement daily the third week. Pt is instructed to continue the 15 grams of a fiber supplement daily. Juwan Carbajal reviewed. See After Visits Summary. Did your surgeon discuss any other medications / supplements to take daily to move your bowels and avoid constipation? N/A    Yes -  Patient was provided today the Constipation Handout - Juwan Carbajal reviewed Handout - Pt. verbalized understanding.  See AVS    Yes Juwan Carbajal reinforced pt needs to consume the following - Water Intake should be 64 - 90 oz, Fiber Chews  - 15 grams,        No - Hair loss   No - Weight regain       No - Acid Reflux  No - Dumping Syndrome      No - Food gets stuck  No - Are you eating solids - should not be eating solids until 6 weeks post-op. not applicable - Night Time Coughing  not applicable -  TAMMI King Noted  Yes - Are you chewing thoroughly  - Does not take effect until 6 weeks post-op  No - Are you hungry after eating     How many meals a day 4 to 6 / Portions Sizes of Meals are  - 3 oz         Labs:  - 9/7/22 - Glucose 108H, Bilirubin - 1.5H    Supplements: Pt was instructed on starting - Bariatric Advantage Multi-Vitamin 1 tablet 2 times Daily, Bariatric Advantage 29 mgs Iron 1 tablet Daily, Bariatric Advanatge Calcium Lozenges 1 tablet 3 times Daily , Dry Vitamin D3 5,000iu every day     Estimated Daily Nutritional Needs: Based on Bariatric procedure for Wt. Loss  Energy: Will be calculated at Maintenance Stage    Protein: Average 60 - 80 gms Daily - See individual needs listed above based on IBW    MNT Plan and Additional Education: RD/LD reviewed Bariatric Puree Diet and how to puree food. Encouraged pt to meet protein and fluid needs daily. Pt. verbalized understanding. Handouts given. Pt. Instructed to call with questions. Yes 1. Pt is consuming his / her recommended amount of protein within the diet based on patients Ideal Body Weight. .    Yes 2. Pt is using the recommended Bariatric approved protein supplement and taking in the correct amount of protein daily. Pt is able to verbalize how to mix his / her protein supplement correctly to meet pts needs. Pt verbalized understanding. Yes Pt instructed on starting [place]. Pt is using the recommended Bariatric approved Multi-Vitamin, Bariatric approved Calcium, Bariatric approved Iron supplement or Bariatric approved Vitamin D and taking the correct dose.  Pt was educated per his / her Bariatric Surgeons protocol he / she needs the following daily -  Bariatric Advantage Multi-Vitamin 1 tablet 2 times Daily, Bariatric Advantage 29 mgs Iron 1 tablet Daily, Bariatric Advantage Calcium Lozenges 1 tablet 3 times Daily , Dry Vitamin D3 5,000iu every day. Pt verbalized understanding. Yes 4. Pt kept daily food records. Pt is aware food records need to be kept life-long daily and brought to all follow-up appointments in order to show compliancy after weight loss surgery. Pt verbalized understanding. Yes 5. Pt is taking the correct stool softener for the first 6 weeks with the correct dose. Pt is also taking the correct fiber supplement with the correct dose starting at his / her 2 week f/u appointment. Pt verbalized understanding. See above instruction and AVS.     No 6. Pt is drinking 64 - 90 oz of plain water daily. Patient was instructed on the importance of increasing water intake to 48 - 64 oz. of water total daily. Pt. was also instructed he / she is allowed an additional 30 oz. of sugar free caffeine free clear liquid beverages for a total of 90 oz. of fluid total daily. Pt. was able to verbalize how he / she can get more water and fluids within the diet. Per bariatric surgeons protocol after weight loss surgery. Pt. verbalized understanding. Yes 7. Pt achieved his / her percentage of excess body weight loss at his / her f/u appointments and is on track to reach his / her weight loss goal.    Yes 8. Pt is weighing and measuring all foods using a food scale and measuring cups. Pt reports portion sizes are 3 to 4 oz in size. Portion sizes are not exceeding 6 ounces total per meal lifelong. Pt verbalized understanding. .     yes 9. Pt is not experiencing Dumping Syndrome from food / beverage consumption. Yes 10. Pt is complying with all stages and consistencies of the bariatric diet and is not eating or drinking foods / beverages that is not listed on the diet at this stage.        Yes (Staff FYI) - Pt is not drinking carbonated beverages, alcoholic beverages or juices at this time. Compliancy Scale  - After Weight Loss Surgery :  9 Good - Dietary Compliance - This is based on patient following the Medical Nutrition Therapy protocol after Weight Loss Surgery  7 to 10 Points - Good (70% - to 100%) -  Pt is following what he / she has been instructed on at the time of this visit. 4 to 7 Points - Fair (40% to 70%) - Pt has areas that he / she needs to work on in order to be compliant with the bariatric protocol after weight loss surgery. 0 to 4 Points - Poor (0% - 40%) - Pt is failing to follow the instructions given to the pt. Juwan Humphrey has concerns pt may be creating harm. Pt was offered additional dietary counseling appointments to help pt make the necessary lifestyle changes to show compliancy after weight loss surgery. MEDICAL NUTRITION THERAPY (MNT) - Nutrition Care Plan   (The patient has been educated and given written education material that reinforces the following dietary guidelines for Bariatric Surgery)  Goal:  Patient able to verbalize the following dietary concepts:  3 to 4 ounce portions per meal     6 small meals daily      60 to 80 grams of protein on average see individual protein needs   48 to 64 ounces of fluid daily (water)     Always consume protein item first with all meals   Pt is aware wt loss is pt controlled. Slow Meals - 30-35 chews per bite / Meals 30 - 45 minutes long            The RD / LD reviewed with the patient that the dietary goals of the bariatric patient is to ensure that patient is able to meet established nutritional needs for a Bariatric Surgery Patient at this time in order to promote healing, prevent significant weight changes, prevent skin breakdown and abnormal lab values, prevent complications, and tolerance to diet and texture of foods. Pt is able to identify proper food choices and needed changes and is able to explain proper food preparation.   JUWAN / LD reviewed compliance with assigned diet stages, volume of food and drink consumed, timing of meals, amount of protein and energy intake, daily vitamin and mineral supplementation, identify food cravings and any adverse reactions associated with intake of food and drink. RD / LD uses behavioral tools such as goal setting, MI, and self-monitoring, to reinforce the anatomic and physiologic effects of the surgery, so that the described behaviors become more of a habit not simply a reaction to the altered anatomy. Care Plan:   Yes - Rd/Ld Addressed Food Records or 24-Hour Recall  Yes - Rd / Ld encouraged patient to exercise at least 30 minutes daily  Yes - Rd / Ld instructed patient on how to increase oral protein intake within the diet. Pt. can verbalize his / her individual protein needs at this visit. Yes - Rd / Ld instructed the patient on how to increase the use of protein supplements within the diet if appropriate at this time. Yes - Pt. was instructed on how to increase water intake. Patient will need to consume 48 - 64 oz. of just plain water in addition to 30 oz. of non-caloric beverages. Yes - Handouts given to patient - Also see After Visit Summary in addition to paper copy diet instruction. Yes - RD / LD encouraged oral intake    Yes -  RD / LD encouraged pt. to keep food records daily.       Yes - Pt. is able to verbalize diet concepts      Yes - Constipation Handout Given and Reviewed - Part of surgeons Bowel Protocol - See After Visit Summary    Yes - High Fiber Handout Given and Reviewed - Part of surgeons Bowel Protocol - See After Visit Summary        No - Ulcer Handout Given and Reviewed          No - Pt. was instructed to continue current MNT   Yes - Pt. diet was advanced to the following stage ( See above)   Yes - Supplements: initiated (See list below  - Pt. given instruction)     No - Supplemental foods:______________________  No - Pt. was placed on a altered meal schedule

## 2022-09-08 NOTE — PATIENT INSTRUCTIONS
[facility]  Dietary Follow-up Appointment Instructions    Lewis Mitchell   Date: 9/8/2022     The RD / LD reviewed the following instructions with the patient and handouts have been given. Pt. is able to verbalize instruction and has been instructed to call with any problems or complications. If patient is not able to comply with the dietary advancement or dietary or supplement instructions given pt. is instructed to call the [facility] at [phone]. If [facility] is closed and problems occur patient is instructed to go to [id number] [facility] Emergency Department and have his / her surgeon paged. Current Diet Instruction: Handouts Given  Pt instructed he / she must keep daily food records and bring to all follow-up appointments. Pt. has been instructed to slow down eating habits and chew food 30 - 35 times per bite. Rd / Ld reviewed with patient that eating too fast can cause food to get stuck or create nausea and vomiting after bariatric surgery. Rd / Ld highly encouraged patient to set a timer and really count bites to help with slowing down eating habits this will also create more of a sensation of fullness and satiety. Patient was instructed on the importance of increasing water intake to 48 - 64 oz. of water total daily. Pt. was also instructed he / she is allowed an additional 30 oz. of sugar free caffeine free clear liquid beverages for a total of 90 oz. of fluid total daily. Pt. was able to verbalize how he / she can get more water and fluids within the diet. Pt. verbalized understanding.      Bariatric puree diet with MVI, Calcium & Protein Supplements        Care Plan:  [id number]   Stool Softener / Fiber Supplement  / Surgeons Bowel Protocol After Weight Loss Surgery  Written By: Romel Barger  Definition:  Constipation is the passage of small amounts of hard, dry bowel movements, usually fewer than three times a week. People who are constipated may find it difficult and painful to have a bowel movement. Other symptoms of constipation include feeling bloated, uncomfortable, and sluggish. What Causes Constipation? To understand constipation, it helps to know how the colon (large intestine) works. As food moves through it, the colon absorbs water while forming waste products, or stool. Muscle contractions in the colon push the stool toward the rectum. By the time stool reaches the rectum, it is solid because most of the water has been absorbed. The hard and dry stools of constipation occur when the colon absorbs too much water. This happens because the colon's muscle contractions are slow or sluggish causing the stool to move through the colon too slowly causing too much water being able to be absorbed and a hard stool forming. Why Does This Happen After Bariatric Surgery? Most patients do not drink enough Water. That's right. You need to be drinking at the minimum 64 ounces of just plain water a day and additional 30 oz. of other non-caloric beverages. All other beverages do not count as water intake and will cause constipation. Adding water to the diet adds fluid to the colon and bulk to the stools, making bowel movements softer and easier to pass. Avoid all other beverages besides water especially coffee and tea when you are constipated. Most patients do not get enough fiber in the diet. We recommend at least 25 - 35 grams of fiber daily from your diet starting 3 months post-op. The most common cause of constipation is a diet low in fiber found in vegetables, fruits, and whole grains and high in fats found in cheese, eggs, and meats. People who eat plenty of high-fiber foods are less likely to become constipated. Refer to your high fiber food's handout - this is based on what stage of the diet you may be in and what foods are tolerated at this stage.   Lack of exercise can lead to constipation - Regular activity especially walking helps push food through the intestines and helps to push waste through the colon. Please be sure to be following your exercise guidelines. If constipated eliminate Cheese, Eggs, Applesauce, Apples, Bananas, Rice and Bread from the diet you can resume in small amounts once the constipation resolves and these foods have been incorporated into your diet stage. If you are 2 -6 weeks post bariatric surgery you may add high fiber foods such as oatmeal, oat bran at this time. If your diet has progressed and you are 12 weeks post bariatric surgery you may add other high fiber foods such as fresh fruits, fresh vegetables, and whole grains. Track your fiber intake daily the goal is to have 25 - 35 grams of fiber total daily starting at your 3 month follow-up appointment. Remember half the fiber intake can come from your fiber supplement the other half should come from dietary intake. Refer to the High Fiber Foods Handout. Surgeon Bowel Instruction:    Your surgeon has instructed you on the following:    Start by following the [address] [place] [address] [facility] recommendations of taking a stool softener -  Colace or Docusate 100mg gel tablet once at breakfast and once before bed. The day you are discharged from the hospital until your 6 week follow-up appointment. Start by adding a Sugar Free Chewable Fiber Supplement at your 2 week follow-up appointment lifelong -  Per the surgeons protocol you should be taking a fiber supplement lifelong since your two week follow-up appointment. Your surgeon recommends a daily approved Fiber supplement 15 grams total daily. If you are just introducing a Fiber supplement the 15 grams should be introduced gradually - 5 grams of a fiber supplement daily the 1st week, 10 grams of a fiber supplement daily the 2nd week and 15 grams of a fiber supplement daily the third week.   Pt is instructed to continue the 15 grams of a fiber supplement daily. Juwan Humphrey reviewed. Pt verbalized understanding. Select One from Below     Vitafusion Sugar Free Fiber Well Chewable Fiber Gummies - Follow the [facility] instructions of taking 2 Fiber Gummies 3 times Daily. (15 grams)    -or-    Benefiber Original - Follow the [facility] instructions of taking 1tablespoon 3 times daily mixed in water or sugar free beverage of choice. (13.5 grams)     -or-    Metamucil Sugar Free Original - Follow the [facility] instruction of taking 1 rounded tablespoon 2 times daily mixed in water or sugar free beverage of choice. (18 grams)    You need to be drinking at the minimum 64 ounces of just plain water a day and an additional 30 oz. of other non-caloric, non-carbonated, non-caffeinated beverages. This will make a total of 90oz or greater daily. Unless you are on a fluid restriction. You can add a Probiotic. The [address] [place] [address] [facility]  recommends a Probiotic that contains 15 billion cfu's in one capsule and the first  ingredient needs to be Lactobacillus Acidophilus. The instructions are to take 1 capsule daily if you have no results increase to 2 capsules daily. Increase your activity and walk as tolerated daily. Follow your exercise guidelines. If you find that you are still having trouble with constipation after trying the  suggestions mentioned above please contact the [address] [place] [address] [facility] [phone].                                                                   [facility] Staff Stool Softener and Fiber Instruction Education for Patients:       D/C from Hospital 2-week F/U Appt: 6-week F/U Appt: 3 Month F/U Appt: 6 -Month - On   Water Intake 48 - 64 oz Water 64 oz of water plus 30 oz other SF / FF/ NC / CF Beverages 64 oz of water plus 30 oz other SF / FF/ NC / CF Beverages 64 oz of water plus 30 oz other SF / FF/ NC / CF Beverages 64 oz of water plus 30 oz other SF / FF/ NC / CF Beverages   Colace 100 mgs 2 times daily 100 mgs 2 times daily 100 mgs 2 times daily. Pt should be instructed to stop the Colace Pt should be off the Colace Pt should be off the Colace   Fiber Supplement   ____________ Add Fiber Supplement - Pt needs educated at 2 weeks Appt Continue Fiber Supplement -Continue Pt Education Continue Fiber Supplement -Continue Pt Education Continue Fiber Supplement -Continue Pt Education   Fiber Foods   ____________     ____________     ____________   Goal is 25 - 35 grams of Fiber Total Daily - Pt needs Educated Goal is 25 - 35 grams of Fiber Total Daily - Pt needs Educated                                     Vitamin and Supplement Instruction:  Vitamin and Mineral Supplementation   After Gastric Bypass and Sleeve Gastrectomy Surgery      Patient is able to verbalize the above supplements must be taken daily in order to insure proper health and nutrition, and prevent nutrient deficiencies. The patient is aware that not complying can lead to the patient placing him/her self at risk for complications. RD / LD reviewed with patient the importance of dietary supplements. Pt. verbalized understanding. Vitamins:   Bariatric Advantage Chewable Multi-Formula (1 tablet 2 times daily) - and - Bariatric Advantage Chewable Iron 29 mg (1 tablet daily)    Other Vitamin Deficiencies:    Vitamin D - Dry Vitamin D3 5,000iu every day. Calcium Supplements:  Calcium Supplement: Total daily intake should be 1500 mg from calcium citrate and 800 - 1000 iu Vitamin D daily. Plus three servings of low-fat dairy for a total daily intake of 1800 - 2200 mg of Calcium. Bariatric Advantage Calcium 500 Chews (1 tablet 3 times daily) - contains Calories    Pt. given copy.

## 2022-09-08 NOTE — PROGRESS NOTES
Marty Syed  9/8/2022  Post-Op Follow-up       Marty Syed is a 65 y.o. female who weighs 203 lb (92.1 kg) post robotic Ashu-en-Y Gastric Bypass 8/23/22. Didn't have any pain or nausea when she went home, no problems. She is drinking 60 ounces per day and is meeting protein recommendations. Exercise: walking.     Weights:  203 lb 9/8/2022  10 days  218 lb 8/23/2022         bypass  249 lb 2/2022              initial    Past Medical History:   Diagnosis Date    Back pain     Chronic fatigue     COVID-19 09/2020    mild per patient    Diabetes mellitus ([address]     History of blood transfusion     Hyperlipidemia     Hypothyroidism     IBS (irritable bowel syndrome)     Morbid obesity due to excess calories (HCC)     Obesity     Osteoarthritis     PONV (postoperative nausea and vomiting)      Past Surgical History:   Procedure Laterality Date    BACK SURGERY      CHOLECYSTECTOMY      COLONOSCOPY      HYSTERECTOMY, TOTAL ABDOMINAL (CERVIX REMOVED)      INCONTINENCE SURGERY      BLADDER SLING    KNEE SURGERY Left     LUMBAR SPINE SURGERY N/A 5/25/2021    L3- L5 DECOMPRESSION AND FUSION , L4- L5 TRANSFORAMINAL LUMBAR INTERBODY FUSION --OARM, PATY TABLE, AUDIOLOGY, PLATES ,SCREWS, --NUVASIVE performed by Dylon Concepcion MD at . [address] Bilateral 10/11/2021    BILATERAL [address] ([place]T [address]) performed by Nataliya Crystal MD at . [address] Bilateral 11/8/2021    BILATERAL LUMBAR FACET INJECTION AT L5-S 1 FACETS BLOCK UNDER FLUOROSCOPIC GUIDANCE performed by Nataliya Crystal MD at . [address] Bilateral 12/21/2021    BILATERAL S1, S2, S3 BRANCH & L5 [facility] NERVE BLOCK UNDER XRAY GUIDANCE ([address]) (SEDATION) performed by Nataliya Crystal MD at [address] N/A 2/7/2022    LUMBAR EPIDURAL STEROID INJECTION UNDER FLUOROSCOPIC GUIDANCE AT L5-S1 PARAMEDIAN performed by Nataliya Crystal MD at [address] ANNA MARIE-EN-Y GASTRIC BYPASS N/A 8/23/2022    GASTRIC BYPASS ANNA MARIE-EN-Y LAPAROSCOPIC ROBOTIC XI performed by Mehul Wayne MD at [facility] [address] 3/9/2022    EGD BIOPSY performed by Mehul Wayne MD at [address] [place] [address] Medications  Prior to Visit Medications    Medication Sig Taking? Authorizing Provider   pravastatin (PRAVACHOL) 10 MG tablet TAKE 1 TABLET BY MOUTH EVERY OTHER DAY Yes Lawrence Lunch, DO   sucralfate (CARAFATE) 1 GM tablet Take 0.5 tablets by mouth in the morning and 0.5 tablets at noon and 0.5 tablets in the evening and 0.5 tablets before bedtime. Yes Mehul Wayne MD   ondansetron (ZOFRAN-ODT) 4 MG disintegrating tablet Take 1 tablet by mouth every 8 hours as needed for Nausea or Vomiting Yes Mehul Wayne MD   omega-3 acid ethyl esters (LOVAZA) 1 g capsule TAKE 1 CAPSULE BY MOUTH EVERY DAY Yes Prieto F Sharda, DO   levothyroxine (SYNTHROID) 200 MCG tablet Take 1 tablet by mouth in the morning. Yes Prieto F Sharda, DO   cyclobenzaprine (FLEXERIL) 10 MG tablet TAKE 1 TABLET BY MOUTH THREE TIMES A DAY AS NEEDED FOR MUSCLE SPASMS Yes Prieto F Sharda, DO   vitamin B-12 (CYANOCOBALAMIN) 100 MCG tablet Take 1 tablet by mouth in the morning. Yes Shanell Cheung, DO   valsartan (DIOVAN) 80 MG tablet TAKE 1 TABLET BY MOUTH EVERY DAY Yes Prieto F Sharda, DO   topiramate (TOPAMAX) 25 MG tablet Take 1 tablet by mouth 2 times daily Yes Jose Ramon Martines MD   diclofenac sodium (VOLTAREN) 1 % GEL APPLY 4 GRAMS TOPICALLY 4 TIMES DAILY AS NEEDED FOR PAIN Yes Julio C Esparza MD   gabapentin (NEURONTIN) 800 MG tablet Take 1 tablet by mouth 3 times daily for 90 days.  Yes Julio C Esparza MD   DULoxetine (CYMBALTA) 60 MG extended release capsule Take 1 capsule by mouth daily Yes Omid De Jesus MD   Handicap Placard MISC by Does not apply route Patient cannot walk 200 ft without stopping to rest.    Expiration 5 yrs Yes Shanell Cheung, DO   Multiple Vitamins-Minerals (THERAPEUTIC MULTIVITAMIN-MINERALS) tablet Take 1 tablet by mouth daily Yes Historical Provider, MD   vitamin D (ERGOCALCIFEROL) 1.25 MG (68347 UT) CAPS capsule TAKE 1 CAPSULE BY MOUTH ONE TIME PER WEEK Yes Prieto Robin DO   torsemide (DEMADEX) 10 MG tablet take 1 tablet by mouth once daily  Patient taking differently: as needed Yes Lynn Cagle,    omeprazole (PRILOSEC) 20 MG delayed release capsule TAKE 1 CAPSULE BY MOUTH EVERY DAY Yes Prieto Robin DO   linaclotide (LINZESS) 145 MCG capsule TAKE 1 CAPSULE BY MOUTH EVERY DAY IN THE MORNING BEFORE BREAKFAST Yes Prieto Robin DO   Thiamine HCl (B-1) 100 MG TABS Take 1 pill daily Yes Prieto Robin DO   acetaminophen (TYLENOL) 500 MG tablet Take 500 mg by mouth every 6 hours as needed for Pain Yes Historical Provider, MD   lidocaine (LIDODERM) 5 % PLACE 1 PATCH ONTO THE SKIN DAILY 12 HOURS ON, 12 HOURS OFF. Yes Historical Provider, MD   polyethylene glycol (GLYCOLAX) 17 g packet Take 17 g by mouth daily as needed Yes Historical Provider, MD   levothyroxine (SYNTHROID) 150 MCG tablet Take 1 tablet by mouth Daily Yes Prieto Robin DO   rosuvastatin (CRESTOR) 5 MG tablet Take 1 tablet by mouth daily Yes Prieto Robin DO   meloxicam (MOBIC) 15 MG tablet Take 1 tablet by mouth daily as needed for Pain Yes Prieto Rboin DO       Allergies: Bactrim [sulfamethoxazole-trimethoprim], Ceftin [cefuroxime], and Keflex [cephalexin]   Social History:   TOBACCO:   reports that she has never smoked. She has never used smokeless tobacco.  All smokers must join the free smoking cessation program and stop smoking for 3 months before having any Bariatric surgery. ETOH:    reports no history of alcohol use.   Family History   Problem Relation Age of Onset    Diabetes Mother     Diabetes Father     Rheum Arthritis Sister     Cancer Maternal Grandfather    Review of Systems:  Psychiatric:  depression and anxiety  Respiratory: negative  Cardiovascular: negative  Gastrointestinal: negative  Musculoskeletal:negative  All others reviewed, negative    Physical Exam:   VITALS: Blood pressure (!) 152/82, pulse 88, temperature 97.2 °F (36.2 °C), temperature source Temporal, resp. rate 20, height 5' 3\" (1.6 m), weight 203 lb (92.1 kg). General appearance: alert, appears stated age and cooperative, does ambulate easily  Head: Normocephalic, without obvious abnormality, atraumatic  Eyes: PERRL  Ears/mouth/throat:  Ears clear, mouth normal, throat no redness  Neck: no adenopathy, no JVD, supple, symmetrical, trachea midline and thyroid not enlarged  Lungs: clear to auscultation bilaterally  Heart: regular rate and rhythm  Abdomen: wounds healing well, glue in place. No cellulitis. Mild bruising and pain. Extremities: extremities normal, atraumatic, no cyanosis or edema  Skin: no open wounds    Assessment:    Doing well post gastric bypass. Morbid obesity: weight coming down well  no Diabetes Mellitus off medications,   Hypertension stable off medications,   Hyperlipidemia held the medications  No acid reflux but still on Prilosec. Plan:   Ok to use the Voltaren cream.  Stop Prilosec, use Carafate as needed. Start the MVI and Vit D as instructed. Walk as much as possible but keep your legs elevated when sitting. Continue deep breathing exercizes. Transition to the high protein Pureed-liquid diet. Continue drinking slowly every 10-15 minutes to prevent dehydration. Slowly increase this amount as tolerated. Aim for 60 gm Protein and 90 oz of liquids per day. Slow down if you feel chest pressure, acid or fullness. Repeat labs before the next visit. Make sure the bowel move daily by taking fiber such as Metamucil. Follow up in 4 weeks.     Physician Signature: Electronically signed by Dr. María Hernandez MD

## 2022-09-12 ENCOUNTER — OFFICE VISIT (OUTPATIENT)
Dept: PAIN MANAGEMENT | Age: 62
End: 2022-09-12
Payer: COMMERCIAL

## 2022-09-12 VITALS
SYSTOLIC BLOOD PRESSURE: 98 MMHG | HEIGHT: 63 IN | DIASTOLIC BLOOD PRESSURE: 65 MMHG | HEART RATE: 78 BPM | RESPIRATION RATE: 16 BRPM | BODY MASS INDEX: 35.97 KG/M2 | WEIGHT: 203 LBS | OXYGEN SATURATION: 97 % | TEMPERATURE: 97.5 F

## 2022-09-12 DIAGNOSIS — M47.817 LUMBOSACRAL SPONDYLOSIS WITHOUT MYELOPATHY: ICD-10-CM

## 2022-09-12 DIAGNOSIS — M54.16 LUMBAR RADICULOPATHY: Primary | ICD-10-CM

## 2022-09-12 DIAGNOSIS — Z98.84 S/P BARIATRIC SURGERY: ICD-10-CM

## 2022-09-12 DIAGNOSIS — M51.36 DDD (DEGENERATIVE DISC DISEASE), LUMBAR: ICD-10-CM

## 2022-09-12 DIAGNOSIS — Z98.1 S/P LUMBAR FUSION: ICD-10-CM

## 2022-09-12 PROCEDURE — 99214 OFFICE O/P EST MOD 30 MIN: CPT | Performed by: ANESTHESIOLOGY

## 2022-09-12 PROCEDURE — 99213 OFFICE O/P EST LOW 20 MIN: CPT | Performed by: ANESTHESIOLOGY

## 2022-09-12 RX ORDER — DULOXETIN HYDROCHLORIDE 60 MG/1
60 CAPSULE, DELAYED RELEASE ORAL DAILY
Qty: 90 CAPSULE | Refills: 1 | Status: SHIPPED | OUTPATIENT
Start: 2022-09-12 | End: 2023-03-11

## 2022-09-12 RX ORDER — OXYCODONE HYDROCHLORIDE AND ACETAMINOPHEN 5; 325 MG/1; MG/1
0.5 TABLET ORAL 2 TIMES DAILY PRN
Qty: 30 TABLET | Refills: 0 | Status: SHIPPED | OUTPATIENT
Start: 2022-09-12 | End: 2022-10-12

## 2022-09-12 NOTE — PROGRESS NOTES
Do you currently have any of the following:    Fever: No  Headache:  No  Cough: No  Shortness of breath: No  Exposed to anyone with these symptoms: No         Kimmie Shemar presents to the [address] [place] [address] on 9/12/2022. Ailyn Schafer is complaining of pain lower back and right leg. The pain is constant. The pain is described as aching. Pain is rated on her best day at a 2, on her worst day at a 8, and on average at a 4 on the VAS scale. She took her last dose of Neurontin and flexeril  2 days ago. Any procedures since your last visit: No    Pacemaker or defibrillator: No .    She is not on NSAIDS and is not on anticoagulation medications. Medication Contract and Consent for Opioid Use Documents Filed       Patient Documents       Type of Document Status Date Received Received By Description    Medication Contract Received 10/6/2021 10:43 AM [facility] pain management                    BP 98/65   Pulse 78   Temp 97.5 °F (36.4 °C) (Infrared)   Resp 16   Ht 5' 3\" (1.6 m)   Wt 203 lb (92.1 kg)   SpO2 97%   BMI 35.96 kg/m²      No LMP recorded. Patient has had a hysterectomy.

## 2022-09-12 NOTE — PROGRESS NOTES
[address] [facility]  [place], [address]  Dept: [phone]      Follow up Note      Chuy Mart     Date of Visit:  9/12/2022    CC:  Patient presents for follow up   Chief Complaint   Patient presents with    Follow-up    Lower Back Pain    Leg Pain     Right leg       HPI:  Low back pain. S/P L3-5 fusion and L4-5 TLIF on May 25 2021- helped the left LE pain significantly. Has noticed right LE pain after the surgery. Has been followed by NSG - imaging showed Intact fusion. Low back pain and right LE pain- Has tingling of the foot (h/o DM). Also has right shoulder pain and right knee pain - followed by PMR. Pain causes functional limitations/ limits Adl's : Yes     Nursing notes and details of the pain history reviewed. Please see intake notes for details. Previous treatments:   Physical Therapy : yes, currently in PT      Medications: - NSAID's : yes                        - Membrane stabilizers : yes - gabapentin                       - Opioids : yes, post op use                       - Adjuvants or Others : yes,      Surgeries: yes, L3-5 fusion in May 2021     She has not been on anticoagulation medications      She has not been on herbal supplements. She is diabetic. 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 Degenerative and postsurgical changes as described above. There has been   prior bilateral laminectomy and posterior fusion at L3 through L5.   2. No evidence of significant central canal stenosis or disc herniation. 3. Mild bilateral L5-S1 neural foraminal stenosis. X- ray cervical, thoracic and lumbar spine: 1/18/2022:  FINDINGS:   C-spine: Mild degenerative disc disease primarily C5-6. No fracture or   dislocation. Normal soft tissues. T-spine: Mild multilevel degenerative disc disease.   No fracture or   dislocation. Normal thoracic soft tissues. Mild thoracic dextroscoliosis. Lumbar spine: Intact hardware associated with posterior fusion L3-L5. Normal   alignment. L4-5 disc is augmented. Degenerative disc disease is present at   multiple levels and is greatest at L5-S1 where it is severe. There is a   decompressive laminectomy at L3 and L4. Normal soft tissues. Impression   Degenerative spondylosis at the C-spine, T-spine and L-spine. Lumbar fusion   with intact hardware and normal alignment. X-ray LS spine: 9/14/2021:      FINDINGS:   Posterior spinal fusion L3-L5 with normal alignment. Degenerative disc   disease is present at multiple levels and is greatest at L5-S1 where it is   moderate to severe. Normal soft tissues. Impression   Intact lumbar fusion hardware with normal alignment. Stable degenerative   disc disease. Xray Right knee: 9/16/2021:      Impression   1. Moderate right patellofemoral and medial compartment joint space   narrowing, with very minimal secondary osteoarthritic degenerative disease,   not significantly changed since 12/30/2020.       2. Incidental left medial compartment joint space narrowing, osseous   degenerative disease, and slight genu-varus configuration, as described. .      Xray right shoulder: 9/16/2021:  Impression   1. A previously-existing right-internal-jugular-approach single-lumen   central venous catheter has been removed over the interval. Negative for   gross pneumothorax, bilaterally, within the limits of this study. 2.  Mild irregular cortical exostosis involves the undersurface ease of the   right acromion and lateral right clavicular head, slightly hook-like in   configuration at the level of the right acromion. 3.  Osseous degenerative disease, as described.        Past Medical History:   Diagnosis Date    Back pain     Chronic fatigue     COVID-19 09/2020    mild per Darrell Thao MD   ondansetron (ZOFRAN-ODT) 4 MG disintegrating tablet Take 1 tablet by mouth every 8 hours as needed for Nausea or Vomiting 8/11/22  Yes Jenn Ng MD   levothyroxine (SYNTHROID) 200 MCG tablet Take 1 tablet by mouth in the morning. 7/20/22  Yes Jaylene Grain, DO   cyclobenzaprine (FLEXERIL) 10 MG tablet TAKE 1 TABLET BY MOUTH THREE TIMES A DAY AS NEEDED FOR MUSCLE SPASMS 7/19/22  Yes Jaylene Grain, DO   topiramate (TOPAMAX) 25 MG tablet Take 1 tablet by mouth 2 times daily 4/29/22  Yes Jose Ramon Martines MD   Handicap Placard MISC by Does not apply route Patient cannot walk 200 ft without stopping to rest.    Expiration 5 yrs 2/11/22  Yes Jaylene Grain, DO   omeprazole (PRILOSEC) 20 MG delayed release capsule TAKE 1 CAPSULE BY MOUTH EVERY DAY 10/18/21  Yes Prieto Robin,    linaclotide (LINZESS) 145 MCG capsule TAKE 1 CAPSULE BY MOUTH EVERY DAY IN THE MORNING BEFORE BREAKFAST 10/18/21  Yes Jaylene Grain, DO   acetaminophen (TYLENOL) 500 MG tablet Take 500 mg by mouth every 6 hours as needed for Pain   Yes Historical Provider, MD   polyethylene glycol (GLYCOLAX) 17 g packet Take 17 g by mouth daily as needed   Yes Historical Provider, MD   pravastatin (PRAVACHOL) 10 MG tablet TAKE 1 TABLET BY MOUTH EVERY OTHER DAY 8/18/22 8/24/22  Elena Gaspar,    omega-3 acid ethyl esters (LOVAZA) 1 g capsule TAKE 1 CAPSULE BY MOUTH EVERY DAY 7/25/22 8/24/22  Prieto Mazariegos DO   vitamin B-12 (CYANOCOBALAMIN) 100 MCG tablet Take 1 tablet by mouth in the morning. 7/19/22 8/24/22  Prieto Mazariegos DO   valsartan (DIOVAN) 80 MG tablet TAKE 1 TABLET BY MOUTH EVERY DAY 5/13/22 8/24/22  Prieto Mazariegos DO   diclofenac sodium (VOLTAREN) 1 % GEL APPLY 4 GRAMS TOPICALLY 4 TIMES DAILY AS NEEDED FOR PAIN 4/26/22 8/24/22  Roseann Christian MD   gabapentin (NEURONTIN) 800 MG tablet Take 1 tablet by mouth 3 times daily for 90 days.  4/11/22 9/8/22  Roseann Christian MD   DULoxetine (CYMBALTA) 60 MG extended release capsule Take 1 capsule by mouth daily 3/7/22 9/8/22  Hattie Marcelino MD   Multiple Vitamins-Minerals (THERAPEUTIC MULTIVITAMIN-MINERALS) tablet Take 1 tablet by mouth daily  8/24/22  Historical Provider, MD   vitamin D (ERGOCALCIFEROL) 1.25 MG (09376 UT) CAPS capsule TAKE 1 CAPSULE BY MOUTH ONE TIME PER WEEK 1/18/22 8/24/22  [facility] Sheree,    torsemide (DEMADEX) 10 MG tablet take 1 tablet by mouth once daily  Patient not taking: Reported on 9/12/2022 12/16/21   [facility] DO Sheree   Thiamine HCl (B-1) 100 MG TABS Take 1 pill daily 10/18/21 8/24/22  Prieto Robin DO   lidocaine (LIDODERM) 5 % PLACE 1 PATCH ONTO THE SKIN DAILY 12 HOURS ON, 12 HOURS OFF.   Patient not taking: Reported on 9/12/2022 6/2/21   Historical Provider, MD   levothyroxine (SYNTHROID) 150 MCG tablet Take 1 tablet by mouth Daily 1/12/21   [facility], DO   rosuvastatin (CRESTOR) 5 MG tablet Take 1 tablet by mouth daily 1/12/21   [facility], DO   meloxicam (MOBIC) 15 MG tablet Take 1 tablet by mouth daily as needed for Pain 1/11/21   [facility], DO       Allergies   Allergen Reactions    Bactrim [Sulfamethoxazole-Trimethoprim] Nausea Only    Ceftin [Cefuroxime] Rash    Keflex [Cephalexin] Rash       Social History     Socioeconomic History    Marital status:      Spouse name: Not on file    Number of children: Not on file    Years of education: Not on file    Highest education level: Not on file   Occupational History     Employer: [employer]   Tobacco Use    Smoking status: Never    Smokeless tobacco: Never   Vaping Use    Vaping Use: Never used   Substance and Sexual Activity    Alcohol use: Never    Drug use: Never    Sexual activity: Not on file   Other Topics Concern    Not on file   Social History Narrative    Not on file     Social Determinants of Health     Financial Resource Strain: Not on file   Food Insecurity: Not on file   Transportation Needs: Not on file   Physical Activity: Not on file   Stress: Not on file   Social Connections: Not on file   Intimate Partner Violence: Not on file   Housing Stability: Not on file       Family History   Problem Relation Age of Onset    Diabetes Mother     Diabetes Father     Rheum Arthritis Sister     Cancer Maternal Grandfather        REVIEW OF SYSTEMS:     Rickie Lira denies fever/chills, chest pain, shortness of breath, new bowel or bladder complaints. All other review of systems was negative. PHYSICAL EXAMINATION:      BP 98/65   Pulse 78   Temp 97.5 °F (36.4 °C) (Infrared)   Resp 16   Ht 5' 3\" (1.6 m)   Wt 203 lb (92.1 kg)   SpO2 97%   BMI 35.96 kg/m²   General:       General appearance:  Pleasant and well-hydrated, in no distress and A & O x 3  Build:Obese  Function: Rises from seated position easily and Moves about room without difficulty     HEENT:     Head:normocephalic, atraumatic    Lungs:     Breathing:normal breathing pattern      CVS:     RRR     Abdomen:     Shape:obese, non-distended and normal     Cervical spine:     Inspection:normal     Thoracic spine:                Spine inspection:normal      Lumbar spine:     Spine inspection: scar from the prior surgery noted- healed well  Palpation: Tenderness paravertebral muscles Yes bilaterally  Range of motion: Decreased, flexion Decreased, Lateral bending, extension and rotation bilaterally reduced is somewhat painful. Lower lumbar facet tenderness +  Sacroiliac joint tenderness   Piriformis tenderness: negative bilaterally  SLR : negative bilaterally     Musculoskeletal:     Trigger points no     Extremities:     Tremors:None bilaterally upper and lower  Edema:none x all 4 extremities  Distal LE Peripheral pulses felt     Knee Right:     ROM : pain +   Joint line tenderness : yes      Neurological:     Sensory: Normal to light touch      Motor:   No new changes     Gait: uses a cane to assist in ambulation.      Dermatology:     Skin:no rashes or lesions noted    Assessment/Plan:   Diagnosis Orders   1. Lumbar radiculopathy [M54.16 (ICD-10-CM)]        2. DDD (degenerative disc disease), lumbar        3. Lumbosacral spondylosis without myelopathy        4. S/P lumbar fusion        5. S/P bariatric surgery             65 y.o.  female with H/o L3-5 Lumbar fusion in May 2021 by Dr. Tim Esparza. Helped left LE pain - but noticed low back and right LE pain. Has been evaluated by NSG- recommended conservative treatment- no surgical interventions. CT milligrams in June reviewed-no significant stenosis    S/P  Spine interventions- moderate relief. Has chronic right shoulder pain and knee pain. Follows with Dr. Prema Ashley. Has consulted ortho. Obesity: Follows with Bariatric medicine. S/P Bariatric surgery in Aug 2022- recovering well. Follow-up notes from neurosurgery, orthopedics, bariatrics, PMR reviewed. Plan:  PT / Karina Ano for prn use only to help with PT/ HEP and exercise- will prescribe Percocet 5/325 po prn 0.5 tab once or twice a day. Refill given # 30 . For prn use for severe pain only. Last filled # 60 on 6/9//2022. [place] reviewed- consistent. Recommended to take only for severe pain. To help with pain and functionality and do HEP/ PT. No signs of misuse abuse or diversion, no side effects, compliant with recommendations. RBA of chronic opioid use discussed. Continue Gabapentin. Cymbalta 60 mg Q hs. Refill given # 90  With X 1 refill. F/U in 4-6 months. Opioid agreement- signed  10/6/2021. Salient features of opioid agreement discussed. Discussed issues with chronic opioid therapy including the phenomenon of tolerance/ dependence. Oral drug screen -10/6/2021-  result reviewed consistent.      [place] reviewed- today: Consistent      Counseling : Patient encouraged to stay active, to continue Regular home exercise program and to watch/lose weight     Treatment plan discussed with the patient including medication and procedure side effects. Controlled Substances Monitoring:   [place] reviewed. We discussed with the patient that combining opioids, benzodiazepines, alcohol, illicit drugs or sleep aids increases the risk of respiratory depression including death. We discussed that these medications may cause drowsiness, sedation or dizziness and have counseled the patient not to drive or operate machinery. We have discussed that these medications will be prescribed only by one provider. We have discussed with the patient about age related risk factors and have thoroughly discussed the importance of taking these medications as prescribed. The patient verbalizes understanding.      Ramesh Lopes MD    CC:  Jaylene Rothman DO

## 2022-09-26 ENCOUNTER — TELEPHONE (OUTPATIENT)
Dept: BARIATRICS/WEIGHT MGMT | Age: 62
End: 2022-09-26

## 2022-09-26 NOTE — TELEPHONE ENCOUNTER
Pt adrianam asking how to get the records for her surgery for documentation for her insurance. I called pt back and it went to her vm. I left her the phone number for medical records and advised her that they would be able to help.

## 2022-10-06 ENCOUNTER — INITIAL CONSULT (OUTPATIENT)
Dept: BARIATRICS/WEIGHT MGMT | Age: 62
End: 2022-10-06

## 2022-10-06 VITALS — BODY MASS INDEX: 34.55 KG/M2 | WEIGHT: 195 LBS | HEIGHT: 63 IN

## 2022-10-06 DIAGNOSIS — K91.2 MALNUTRITION FOLLOWING GASTROINTESTINAL SURGERY: Primary | ICD-10-CM

## 2022-10-06 DIAGNOSIS — Z71.3 DIETARY COUNSELING: ICD-10-CM

## 2022-10-06 PROCEDURE — 99999 PR OFFICE/OUTPT VISIT,PROCEDURE ONLY: CPT

## 2022-10-06 NOTE — PROGRESS NOTES
Medical Nutrition Therapy (MNT) Assessment     Pt. Name: Paola Ross   Date:10/6/2022  F/U Appt: Sx Type 6 week rygb      Ht 5' 3\" (1.6 m)   Wt 195 lb (88.5 kg)   BMI 34.54 kg/m²  Height: 5' 3\" (1.6 m) Weight: 195 lb (88.5 kg)   IBW:ideal body weight   147 lbs  % EBWL: 34%     Wt Readings from Last 3 Encounters:   10/06/22 195 lb (88.5 kg)   09/12/22 203 lb (92.1 kg)   09/08/22 203 lb (92.1 kg)                     Protein supplements: Pt. is currently using the following protein supplement Unjury chocolate and consuming the following grams of protein 75. Rd / Ld reviewed with patient based on 1.0 gram per kg of IBW patient needs 67-77 grams of protein total daily. Subjective:                   Current MNT:       Bariatric puree diet with MVI, Calcium & Protein Supplements     MNT Advanced to:     Bariatric soft diet with MVI, Calcium & Protein Supplements      Allergies and Food Allergies and Food Intolerances:  did not tolerate canned chicken well due to flavor    [place] Bowel Protocol:  no - Diarrhea  Yes - Constipation  at times due to hx of IBS  How much plain water are you drinking daily 32-40 oz   Flavored water occasionally, Body [place]   Yes - Are you taking Colace daily  What amount of Colace are you taking daily     -   prn   Yes - Are you taking Sugar Free Chewable Fiber Gummies  What amount of Sugar Free Chewable Fiber Gummies are you taking daily 6       No - Any pain or problems you are currently experiencing? How many meals a day 6 / Portions Sizes of Meals are 3 oz         Labs: none for this appt    Supplements: Bariatric Advantage Multi-Vitamin 1 tablet 2 times Daily, Bariatric Advantage 29 mgs Iron 1 tablet Daily, Bariatric Advanatge Calcium Lozenges 1 tablet 3 times Daily , Dry Vitamin D3 5,000iu every day     Estimated Daily Nutritional Needs: Based on Bariatric procedure for Wt.  Loss  Energy: Will be calculated at Maintenance Stage    Protein: 60 - 80 gms Daily    MNT Plan and Additional Education: RD/LD reviewed Bariatric Soft Diet. Encouraged pt to meet protein and fluid needs daily. Pt. verbalized understanding. Handouts given. Pt. instructed to call with questions. MEDICAL NUTRITION THERAPY (MNT) - Nutrition Care Plan   (The patient has been educated and given written education material that reinforces the following dietary guidelines for Bariatric Surgery)  Goal:  Patient able to verbalize the following dietary concepts:  3 to 4 ounce portions per meal     6 small meals daily      60 to 80 grams of protein   48 to 64 ounces of fluid daily (water)     Always consume protein item first with all meals   Pt is aware wt loss is pt controlled. Slow Meals - 30-35 chews per bite / Meals 30 - 45 minutes long            The RD / LD reviewed with the patient that the dietary goals of the bariatric patient is to ensure that patient is able to meet established nutritional needs for a Bariatric Surgery  Patient at this time in order to promote healing, prevent significant weight changes, prevent skin breakdown and abnormal lab values, prevent complications, and tolerance to diet and texture of foods. Pt is able to identify proper food choices and needed changes and is able to explain proper food preparation. RD / LD reviewed compliance with assigned diet stages, volume of food and drink consumed, timing of meals, amount of protein and energy intake, daily vitamin and mineral supplementation, identify food cravings and any adverse reactions associated with intake of food and drink. RD / LD uses behavioral tools such as goal setting, MI, and self-monitoring, to reinforce the anatomic and physiologic effects of the surgery, so that the described behaviors become more of a habit not simply a reaction to the altered anatomy.      Care Plan:   Rd/Ld Addressed Food Records or 24-Hour Recall  Rd / Ld encouraged patient to exercise at least 30 minutes daily  Rd / Ld instructed patient on how to increase oral protein intake within the diet. Pt. can verbalize he / she will need to consume 60 - 80 grams. Rd / Ld instructed the patient on how to increase the use of protein supplements within the diet. Pt. was instructed on how to increase water intake. Patient will need to consume 48 - 64 oz. of just plain water in addition to 30 oz. of non-caloric beverages. Handouts given to patient  RD / LD encouraged oral intake    RD / LD encouraged pt. to keep food records.       Pt. is able to verbalize diet concepts         Yes - Pt. diet was advanced to the following stage ( See above)     Good - Dietary Compliance

## 2022-10-12 RX ORDER — CALCIUM CARBONATE/VITAMIN D3 600 MG-10
TABLET ORAL
Qty: 90 TABLET | Refills: 1 | Status: SHIPPED | OUTPATIENT
Start: 2022-10-12

## 2022-11-15 DIAGNOSIS — E78.49 OTHER HYPERLIPIDEMIA: ICD-10-CM

## 2022-11-15 DIAGNOSIS — E03.9 ACQUIRED HYPOTHYROIDISM: Chronic | ICD-10-CM

## 2022-11-15 DIAGNOSIS — E11.9 TYPE 2 DIABETES MELLITUS WITHOUT COMPLICATION, WITHOUT LONG-TERM CURRENT USE OF INSULIN (HCC): Chronic | ICD-10-CM

## 2022-11-15 DIAGNOSIS — K91.2 MALNUTRITION FOLLOWING GASTROINTESTINAL SURGERY: ICD-10-CM

## 2022-11-15 LAB
ALBUMIN SERPL-MCNC: 3.7 G/DL (ref 3.5–5.2)
ALBUMIN SERPL-MCNC: 3.8 G/DL (ref 3.5–5.2)
ALP BLD-CCNC: 100 U/L (ref 35–104)
ALP BLD-CCNC: 104 U/L (ref 35–104)
ALT SERPL-CCNC: 12 U/L (ref 0–32)
ALT SERPL-CCNC: 12 U/L (ref 0–32)
ANION GAP SERPL CALCULATED.3IONS-SCNC: 10 MMOL/L (ref 7–16)
ANION GAP SERPL CALCULATED.3IONS-SCNC: 11 MMOL/L (ref 7–16)
AST SERPL-CCNC: 14 U/L (ref 0–31)
AST SERPL-CCNC: 16 U/L (ref 0–31)
BILIRUB SERPL-MCNC: 0.7 MG/DL (ref 0–1.2)
BILIRUB SERPL-MCNC: 0.7 MG/DL (ref 0–1.2)
BUN BLDV-MCNC: 8 MG/DL (ref 6–23)
BUN BLDV-MCNC: 9 MG/DL (ref 6–23)
CALCIUM SERPL-MCNC: 9.4 MG/DL (ref 8.6–10.2)
CALCIUM SERPL-MCNC: 9.6 MG/DL (ref 8.6–10.2)
CHLORIDE BLD-SCNC: 105 MMOL/L (ref 98–107)
CHLORIDE BLD-SCNC: 107 MMOL/L (ref 98–107)
CHOLESTEROL, TOTAL: 145 MG/DL (ref 0–199)
CHOLESTEROL, TOTAL: 147 MG/DL (ref 0–199)
CO2: 25 MMOL/L (ref 22–29)
CO2: 27 MMOL/L (ref 22–29)
CREAT SERPL-MCNC: 0.6 MG/DL (ref 0.5–1)
CREAT SERPL-MCNC: 0.6 MG/DL (ref 0.5–1)
FERRITIN: 131 NG/ML
FOLATE: 8.2 NG/ML (ref 4.8–24.2)
GFR SERPL CREATININE-BSD FRML MDRD: >60 ML/MIN/1.73
GFR SERPL CREATININE-BSD FRML MDRD: >60 ML/MIN/1.73
GLUCOSE BLD-MCNC: 87 MG/DL (ref 74–99)
GLUCOSE BLD-MCNC: 89 MG/DL (ref 74–99)
HBA1C MFR BLD: 5.1 % (ref 4–5.6)
HCT VFR BLD CALC: 47 % (ref 34–48)
HDLC SERPL-MCNC: 26 MG/DL
HEMOGLOBIN: 15 G/DL (ref 11.5–15.5)
LDL CHOLESTEROL CALCULATED: 98 MG/DL (ref 0–99)
MCH RBC QN AUTO: 27.5 PG (ref 26–35)
MCHC RBC AUTO-ENTMCNC: 31.9 % (ref 32–34.5)
MCV RBC AUTO: 86.2 FL (ref 80–99.9)
PDW BLD-RTO: 14 FL (ref 11.5–15)
PLATELET # BLD: 239 E9/L (ref 130–450)
PMV BLD AUTO: 10.7 FL (ref 7–12)
POTASSIUM SERPL-SCNC: 4.5 MMOL/L (ref 3.5–5)
POTASSIUM SERPL-SCNC: 4.6 MMOL/L (ref 3.5–5)
PREALBUMIN: 16 MG/DL (ref 20–40)
RBC # BLD: 5.45 E12/L (ref 3.5–5.5)
SODIUM BLD-SCNC: 142 MMOL/L (ref 132–146)
SODIUM BLD-SCNC: 143 MMOL/L (ref 132–146)
TOTAL PROTEIN: 6.6 G/DL (ref 6.4–8.3)
TOTAL PROTEIN: 6.6 G/DL (ref 6.4–8.3)
TRIGL SERPL-MCNC: 115 MG/DL (ref 0–149)
TRIGL SERPL-MCNC: 120 MG/DL (ref 0–149)
TSH SERPL DL<=0.05 MIU/L-ACNC: 0.38 UIU/ML (ref 0.27–4.2)
VITAMIN B-12: 641 PG/ML (ref 211–946)
VLDLC SERPL CALC-MCNC: 23 MG/DL
WBC # BLD: 6.2 E9/L (ref 4.5–11.5)

## 2022-11-19 LAB — ZINC: 66.5 UG/DL (ref 60–120)

## 2022-11-21 LAB — VITAMIN B1 WHOLE BLOOD: 113 NMOL/L (ref 70–180)

## 2022-11-28 ENCOUNTER — OFFICE VISIT (OUTPATIENT)
Dept: PRIMARY CARE CLINIC | Age: 62
End: 2022-11-28
Payer: MEDICAID

## 2022-11-28 VITALS
HEART RATE: 70 BPM | SYSTOLIC BLOOD PRESSURE: 120 MMHG | HEIGHT: 63 IN | BODY MASS INDEX: 32.6 KG/M2 | OXYGEN SATURATION: 98 % | DIASTOLIC BLOOD PRESSURE: 68 MMHG | WEIGHT: 184 LBS | RESPIRATION RATE: 16 BRPM

## 2022-11-28 DIAGNOSIS — E78.49 OTHER HYPERLIPIDEMIA: ICD-10-CM

## 2022-11-28 DIAGNOSIS — E03.9 ACQUIRED HYPOTHYROIDISM: ICD-10-CM

## 2022-11-28 DIAGNOSIS — Z98.1 S/P LUMBAR FUSION: ICD-10-CM

## 2022-11-28 DIAGNOSIS — K21.9 GASTROESOPHAGEAL REFLUX DISEASE WITHOUT ESOPHAGITIS: ICD-10-CM

## 2022-11-28 DIAGNOSIS — Z98.84 S/P BARIATRIC SURGERY: Primary | ICD-10-CM

## 2022-11-28 DIAGNOSIS — Z23 NEED FOR IMMUNIZATION AGAINST INFLUENZA: ICD-10-CM

## 2022-11-28 DIAGNOSIS — L60.3 NAIL DYSTROPHY: ICD-10-CM

## 2022-11-28 DIAGNOSIS — E11.9 TYPE 2 DIABETES MELLITUS WITHOUT COMPLICATION, WITHOUT LONG-TERM CURRENT USE OF INSULIN (HCC): ICD-10-CM

## 2022-11-28 PROCEDURE — 99214 OFFICE O/P EST MOD 30 MIN: CPT | Performed by: FAMILY MEDICINE

## 2022-11-28 PROCEDURE — 90674 CCIIV4 VAC NO PRSV 0.5 ML IM: CPT | Performed by: FAMILY MEDICINE

## 2022-11-28 PROCEDURE — G8482 FLU IMMUNIZE ORDER/ADMIN: HCPCS | Performed by: FAMILY MEDICINE

## 2022-11-28 PROCEDURE — 1036F TOBACCO NON-USER: CPT | Performed by: FAMILY MEDICINE

## 2022-11-28 PROCEDURE — G8427 DOCREV CUR MEDS BY ELIG CLIN: HCPCS | Performed by: FAMILY MEDICINE

## 2022-11-28 PROCEDURE — 2022F DILAT RTA XM EVC RTNOPTHY: CPT | Performed by: FAMILY MEDICINE

## 2022-11-28 PROCEDURE — 90471 IMMUNIZATION ADMIN: CPT | Performed by: FAMILY MEDICINE

## 2022-11-28 PROCEDURE — G8417 CALC BMI ABV UP PARAM F/U: HCPCS | Performed by: FAMILY MEDICINE

## 2022-11-28 PROCEDURE — 3044F HG A1C LEVEL LT 7.0%: CPT | Performed by: FAMILY MEDICINE

## 2022-11-28 PROCEDURE — 3017F COLORECTAL CA SCREEN DOC REV: CPT | Performed by: FAMILY MEDICINE

## 2022-11-28 ASSESSMENT — ENCOUNTER SYMPTOMS
VOMITING: 0
BLOOD IN STOOL: 0
SORE THROAT: 0
EYE PAIN: 0
COUGH: 0
SHORTNESS OF BREATH: 0
CONSTIPATION: 0
NAUSEA: 0
WHEEZING: 0
CHEST TIGHTNESS: 0
EYE ITCHING: 0
BACK PAIN: 0
ABDOMINAL PAIN: 0
COLOR CHANGE: 0
RHINORRHEA: 0
EYE REDNESS: 0
APNEA: 0
DIARRHEA: 0
VISUAL CHANGE: 0
SINUS PRESSURE: 0

## 2022-11-28 ASSESSMENT — PATIENT HEALTH QUESTIONNAIRE - PHQ9
SUM OF ALL RESPONSES TO PHQ QUESTIONS 1-9: 0
1. LITTLE INTEREST OR PLEASURE IN DOING THINGS: 0
2. FEELING DOWN, DEPRESSED OR HOPELESS: 0
SUM OF ALL RESPONSES TO PHQ9 QUESTIONS 1 & 2: 0
SUM OF ALL RESPONSES TO PHQ QUESTIONS 1-9: 0

## 2022-11-28 NOTE — PROGRESS NOTES
Chief Complaint:     Chief Complaint   Patient presents with    Hypothyroidism    Hyperlipidemia    Diabetes         Hyperlipidemia  This is a chronic problem. The current episode started more than 1 year ago. The problem is controlled. Recent lipid tests were reviewed and are normal. Exacerbating diseases include diabetes and obesity. Associated symptoms include leg pain and myalgias. Pertinent negatives include no chest pain or shortness of breath. Current antihyperlipidemic treatment includes statins. The current treatment provides significant improvement of lipids. Compliance problems include medication side effects. Risk factors for coronary artery disease include diabetes mellitus, dyslipidemia, obesity and post-menopausal.   Diabetes  She presents for her follow-up diabetic visit. She has type 2 diabetes mellitus. The initial diagnosis of diabetes was made 1 month ago. Her disease course has been stable. There are no hypoglycemic associated symptoms. Pertinent negatives for hypoglycemia include no dizziness, headaches or nervousness/anxiousness. Associated symptoms include fatigue. Pertinent negatives for diabetes include no chest pain, no visual change and no weakness. There are no hypoglycemic complications. Symptoms are stable. There are no diabetic complications. Risk factors for coronary artery disease include dyslipidemia and obesity. Current diabetic treatment includes oral agent (monotherapy). She is compliant with treatment all of the time. She is following a generally healthy diet. When asked about meal planning, she reported none. She has not had a previous visit with a dietitian. There is no change in her home blood glucose trend. An ACE inhibitor/angiotensin II receptor blocker is not being taken. She does not see a podiatrist.Eye exam is not current. Other  This is a new (post op bariatric surgery) problem. The current episode started in the past 7 days.  Associated symptoms include fatigue, myalgias and a rash. Pertinent negatives include no abdominal pain, arthralgias, chest pain, congestion, coughing, diaphoresis, fever, headaches, nausea, neck pain, numbness, sore throat, visual change, vomiting or weakness. Nothing aggravates the symptoms. She has tried nothing for the symptoms. The treatment provided no relief.      Patient Active Problem List   Diagnosis    Type 2 diabetes mellitus without complication, without long-term current use of insulin (HCC)    Acquired hypothyroidism    Peripheral neuropathy    Carpal tunnel syndrome of left wrist    Spinal stenosis of lumbar region with neurogenic claudication    Acute exacerbation of chronic low back pain    Hyperlipidemia    Spondylolisthesis of lumbar region    Lumbar radiculopathy    Venous insufficiency of both lower extremities    Impingement syndrome of right shoulder    DDD (degenerative disc disease), lumbar    Lumbosacral spondylosis without myelopathy    S/P lumbar fusion    Sacroiliac dysfunction    Postlaminectomy syndrome, lumbar    Class 3 severe obesity due to excess calories with serious comorbidity and body mass index (BMI) of 45.0 to 49.9 in adult [facility])    Chronic fatigue    GERD (gastroesophageal reflux disease)    Morbid obesity ([address]    S/P bariatric surgery       Past Medical History:   Diagnosis Date    Back pain     Chronic fatigue     COVID-19 09/2020    mild per patient    Diabetes mellitus ([facility] [address]     History of blood transfusion     Hyperlipidemia     Hypothyroidism     IBS (irritable bowel syndrome)     Morbid obesity due to excess calories (HCC)     Obesity     Osteoarthritis     PONV (postoperative nausea and vomiting)        Past Surgical History:   Procedure Laterality Date    BACK SURGERY      CHOLECYSTECTOMY      COLONOSCOPY      HYSTERECTOMY, TOTAL ABDOMINAL (CERVIX REMOVED)      INCONTINENCE SURGERY      BLADDER SLING    KNEE SURGERY Left     LUMBAR SPINE SURGERY N/A 5/25/2021    L3- L5 DECOMPRESSION AND FUSION , L4- L5 TRANSFORAMINAL LUMBAR INTERBODY FUSION --OARM, PATY TABLE, AUDIOLOGY, PLATES ,SCREWS, --NUVASIVE performed by Luis Enrique Joseph MD at . [address] Bilateral 10/11/2021    BILATERAL SACROILIAC JOINT INJECTION UNDER FLUOROSCOPY (CPT 20631) performed by Pee Mendoza MD at . [address] Bilateral 11/8/2021    BILATERAL LUMBAR FACET INJECTION AT L5-S 1 FACETS BLOCK UNDER FLUOROSCOPIC GUIDANCE performed by Pee Mendoza MD at . [address] Bilateral 12/21/2021    BILATERAL S1, S2, S3 BRANCH & L5 [facility] NERVE BLOCK UNDER XRAY GUIDANCE (72198) (SEDATION) performed by Pee Mendoza MD at [address] [facility] N/A 2/7/2022    LUMBAR EPIDURAL STEROID INJECTION UNDER FLUOROSCOPIC GUIDANCE AT L5-S1 PARAMEDIAN performed by Pee Mendoza MD at [address] [facility] N/A 8/23/2022    GASTRIC BYPASS ANNA MARIE-EN-Y LAPAROSCOPIC ROBOTIC XI performed by Lajune Duverney, MD at [address] [place] [address] 3/9/2022    EGD BIOPSY performed by Lajune Duverney, MD at [place] [id number]       Current Outpatient Medications   Medication Sig Dispense Refill    diclofenac sodium (VOLTAREN) 1 % GEL APPLY 4 GRAMS TOPICALLY 4 TIMES DAILY AS NEEDED FOR PAIN 100 g 3    levothyroxine (SYNTHROID) 200 MCG tablet Take 1 tablet by mouth in the morning.  90 tablet 1    cyclobenzaprine (FLEXERIL) 10 MG tablet TAKE 1 TABLET BY MOUTH THREE TIMES A DAY AS NEEDED FOR MUSCLE SPASMS 90 tablet 2    Handicap Placard MISC by Does not apply route Patient cannot walk 200 ft without stopping to rest.    Expiration 5 yrs 1 each 0    omeprazole (PRILOSEC) 20 MG delayed release capsule TAKE 1 CAPSULE BY MOUTH EVERY DAY 90 capsule 3    linaclotide (LINZESS) 145 MCG capsule TAKE 1 CAPSULE BY MOUTH EVERY DAY IN THE MORNING BEFORE BREAKFAST 90 capsule 3    DULoxetine (CYMBALTA) 60 MG extended release capsule Take 1 capsule by mouth daily (Patient not taking: Reported on 11/28/2022) 90 capsule 1    gabapentin (NEURONTIN) 800 MG tablet Take 1 tablet by mouth 3 times daily for 90 days. 270 tablet 0     No current facility-administered medications for this visit. Allergies   Allergen Reactions    Bactrim [Sulfamethoxazole-Trimethoprim] Nausea Only    Ceftin [Cefuroxime] Rash    Keflex [Cephalexin] Rash       Social History     Socioeconomic History    Marital status:      Spouse name: None    Number of children: None    Years of education: None    Highest education level: None   Occupational History     Employer: [employer]   Tobacco Use    Smoking status: Never    Smokeless tobacco: Never   Vaping Use    Vaping Use: Never used   Substance and Sexual Activity    Alcohol use: Never    Drug use: Never       Family History   Problem Relation Age of Onset    Diabetes Mother     Diabetes Father     Rheum Arthritis Sister     Cancer Maternal Grandfather           Review of Systems   Constitutional:  Positive for fatigue. Negative for activity change, appetite change, diaphoresis and fever. HENT:  Negative for congestion, ear pain, hearing loss, nosebleeds, rhinorrhea, sinus pressure and sore throat. Eyes:  Negative for pain, redness, itching and visual disturbance. Respiratory:  Negative for apnea, cough, chest tightness, shortness of breath and wheezing. Cardiovascular:  Negative for chest pain, palpitations and leg swelling. Gastrointestinal:  Negative for abdominal pain, blood in stool, constipation, diarrhea, nausea and vomiting. Endocrine: Negative. Genitourinary:  Negative for decreased urine volume, difficulty urinating, dysuria, frequency, hematuria and urgency. Musculoskeletal:  Positive for myalgias. Negative for arthralgias, back pain, gait problem and neck pain. Skin:  Positive for rash. Negative for color change. Allergic/Immunologic: Negative for environmental allergies and food allergies.    Neurological:  Negative for dizziness, weakness, light-headedness, numbness and headaches. Hematological:  Negative for adenopathy. Does not bruise/bleed easily. Psychiatric/Behavioral:  Negative for behavioral problems, dysphoric mood and sleep disturbance. The patient is not nervous/anxious and is not hyperactive. All other systems reviewed and are negative. /68   Pulse 70   Resp 16   Ht 5' 3\" (1.6 m)   Wt 184 lb (83.5 kg)   SpO2 98%   BMI 32.59 kg/m²     Physical Exam  Vitals and nursing note reviewed. Constitutional:       General: She is not in acute distress. Appearance: Normal appearance. She is well-developed. HENT:      Head: Normocephalic and atraumatic. Right Ear: Hearing, tympanic membrane and external ear normal. No tenderness. No middle ear effusion. Left Ear: Hearing, tympanic membrane and external ear normal. No tenderness. No middle ear effusion. Nose: Nose normal. No congestion or rhinorrhea. Right Turbinates: Not enlarged. Left Turbinates: Not enlarged. Mouth/Throat:      Mouth: Mucous membranes are moist.      Tongue: No lesions. Pharynx: Oropharynx is clear. No oropharyngeal exudate or posterior oropharyngeal erythema. Eyes:      General: No scleral icterus. Conjunctiva/sclera: Conjunctivae normal.      Pupils: Pupils are equal, round, and reactive to light. Neck:      Thyroid: No thyromegaly. Cardiovascular:      Rate and Rhythm: Normal rate and regular rhythm. Heart sounds: Normal heart sounds. No murmur heard. Pulmonary:      Effort: Pulmonary effort is normal. No respiratory distress. Breath sounds: Normal breath sounds. No wheezing or rales. Abdominal:      General: Bowel sounds are normal. There is no distension. Palpations: Abdomen is soft. Tenderness: There is no abdominal tenderness. Musculoskeletal:         General: No tenderness. Normal range of motion.       Cervical back: Normal range of motion and neck Future  -     Lipid Panel; Future  -     TSH; Future    Acquired hypothyroidism  -     TSH; Future    Gastroesophageal reflux disease without esophagitis    S/P lumbar fusion  -     diclofenac sodium (VOLTAREN) 1 % GEL; APPLY 4 GRAMS TOPICALLY 4 TIMES DAILY AS NEEDED FOR PAIN    Need for immunization against influenza    Nail dystrophy  -     Angella - Alma Ford DPM, Podiatry, [facility]    Other orders  -     Influenza, FLUCELVAX, (age 10 mo+), IM, PF, 0.5 mL        Return in about 6 months (around 5/28/2023) for Follow up DM, Follow up HTN, Recheck labs, Recheck Meds. I spent 30 minutes with this patient. I spent greater than 50% of the time counseling this patient.         Di Bell DO  11/28/2022  9:11 AM

## 2022-11-29 ENCOUNTER — OFFICE VISIT (OUTPATIENT)
Dept: FAMILY MEDICINE CLINIC | Age: 62
End: 2022-11-29
Payer: MEDICAID

## 2022-11-29 VITALS
OXYGEN SATURATION: 98 % | SYSTOLIC BLOOD PRESSURE: 124 MMHG | HEIGHT: 63 IN | BODY MASS INDEX: 32.6 KG/M2 | TEMPERATURE: 97.6 F | HEART RATE: 60 BPM | RESPIRATION RATE: 18 BRPM | WEIGHT: 184 LBS | DIASTOLIC BLOOD PRESSURE: 82 MMHG

## 2022-11-29 DIAGNOSIS — R35.0 URINARY FREQUENCY: Primary | ICD-10-CM

## 2022-11-29 DIAGNOSIS — R35.0 URINARY FREQUENCY: ICD-10-CM

## 2022-11-29 DIAGNOSIS — N39.0 URINARY TRACT INFECTION WITHOUT HEMATURIA, SITE UNSPECIFIED: ICD-10-CM

## 2022-11-29 LAB
BILIRUBIN, POC: NORMAL
BLOOD URINE, POC: NEGATIVE
CLARITY, POC: CLEAR
COLOR, POC: YELLOW
GLUCOSE URINE, POC: NEGATIVE
KETONES, POC: NORMAL
LEUKOCYTE EST, POC: NEGATIVE
NITRITE, POC: NEGATIVE
PH, POC: 6
PROTEIN, POC: NORMAL
SPECIFIC GRAVITY, POC: 1.02
UROBILINOGEN, POC: 2

## 2022-11-29 PROCEDURE — 3017F COLORECTAL CA SCREEN DOC REV: CPT | Performed by: FAMILY MEDICINE

## 2022-11-29 PROCEDURE — G8427 DOCREV CUR MEDS BY ELIG CLIN: HCPCS | Performed by: FAMILY MEDICINE

## 2022-11-29 PROCEDURE — 1036F TOBACCO NON-USER: CPT | Performed by: FAMILY MEDICINE

## 2022-11-29 PROCEDURE — 99213 OFFICE O/P EST LOW 20 MIN: CPT | Performed by: FAMILY MEDICINE

## 2022-11-29 PROCEDURE — G8417 CALC BMI ABV UP PARAM F/U: HCPCS | Performed by: FAMILY MEDICINE

## 2022-11-29 PROCEDURE — 81002 URINALYSIS NONAUTO W/O SCOPE: CPT | Performed by: FAMILY MEDICINE

## 2022-11-29 PROCEDURE — G8482 FLU IMMUNIZE ORDER/ADMIN: HCPCS | Performed by: FAMILY MEDICINE

## 2022-11-29 RX ORDER — NITROFURANTOIN 25; 75 MG/1; MG/1
100 CAPSULE ORAL 2 TIMES DAILY
Qty: 20 CAPSULE | Refills: 0 | Status: SHIPPED | OUTPATIENT
Start: 2022-11-29 | End: 2022-12-09

## 2022-11-29 ASSESSMENT — ENCOUNTER SYMPTOMS
APNEA: 0
BACK PAIN: 0
VOMITING: 0
SINUS PRESSURE: 0
NAUSEA: 0
EYE PAIN: 0
CHEST TIGHTNESS: 0
WHEEZING: 0
CONSTIPATION: 0
EYE ITCHING: 0
SORE THROAT: 0
RHINORRHEA: 0
DIARRHEA: 0
SHORTNESS OF BREATH: 0
COUGH: 0
EYE REDNESS: 0
COLOR CHANGE: 0
ABDOMINAL PAIN: 0
BLOOD IN STOOL: 0

## 2022-11-29 NOTE — PROGRESS NOTES
Chief Complaint:     Chief Complaint   Patient presents with    Urinary Pain     Started last night. Urinary Pain   This is a new problem. The current episode started yesterday. The problem has been unchanged. The quality of the pain is described as burning. The pain is mild. There has been no fever. There is No history of pyelonephritis. Associated symptoms include frequency and hesitancy. Pertinent negatives include no hematuria, nausea, urgency or vomiting. She has tried nothing for the symptoms. The treatment provided no relief.      Patient Active Problem List   Diagnosis    Type 2 diabetes mellitus without complication, without long-term current use of insulin (HCC)    Acquired hypothyroidism    Peripheral neuropathy    Carpal tunnel syndrome of left wrist    Spinal stenosis of lumbar region with neurogenic claudication    Acute exacerbation of chronic low back pain    Hyperlipidemia    Spondylolisthesis of lumbar region    Lumbar radiculopathy    Venous insufficiency of both lower extremities    Impingement syndrome of right shoulder    DDD (degenerative disc disease), lumbar    Lumbosacral spondylosis without myelopathy    S/P lumbar fusion    Sacroiliac dysfunction    Postlaminectomy syndrome, lumbar    Class 3 severe obesity due to excess calories with serious comorbidity and body mass index (BMI) of 45.0 to 49.9 in adult [facility])    Chronic fatigue    GERD (gastroesophageal reflux disease)    Morbid obesity ([facility] [address]    S/P bariatric surgery       Past Medical History:   Diagnosis Date    Back pain     Chronic fatigue     COVID-19 09/2020    mild per patient    Diabetes mellitus ([facility] [address]     History of blood transfusion     Hyperlipidemia     Hypothyroidism     IBS (irritable bowel syndrome)     Morbid obesity due to excess calories (HCC)     Obesity     Osteoarthritis     PONV (postoperative nausea and vomiting)        Past Surgical History:   Procedure Laterality Date    BACK SURGERY      CHOLECYSTECTOMY COLONOSCOPY      HYSTERECTOMY, TOTAL ABDOMINAL (CERVIX REMOVED)      INCONTINENCE SURGERY      BLADDER SLING    KNEE SURGERY Left     LUMBAR SPINE SURGERY N/A 5/25/2021    L3- L5 DECOMPRESSION AND FUSION , L4- L5 TRANSFORAMINAL LUMBAR INTERBODY FUSION --OARM, PATY TABLE, AUDIOLOGY, PLATES ,SCREWS, --NUVASIVE performed by Gutierrez Marcano MD at . [address] Bilateral 10/11/2021    BILATERAL SACROILIAC JOINT INJECTION UNDER FLUOROSCOPY (CPT 92117) performed by Shalini Orlando MD at . [address] Bilateral 11/8/2021    BILATERAL LUMBAR FACET INJECTION AT L5-S 1 FACETS BLOCK UNDER FLUOROSCOPIC GUIDANCE performed by Shalini Orlando MD at . [address] Bilateral 12/21/2021    BILATERAL S1, S2, S3 BRANCH & L5 [facility] NERVE BLOCK UNDER XRAY GUIDANCE (88336) (SEDATION) performed by Shalini Orlando MD at [address] [facility] N/A 2/7/2022    LUMBAR EPIDURAL STEROID INJECTION UNDER FLUOROSCOPIC GUIDANCE AT L5-S1 PARAMEDIAN performed by Shalini Orlando MD at [address] [facility] N/A 8/23/2022    GASTRIC BYPASS ANNA MARIE-EN-Y LAPAROSCOPIC ROBOTIC XI performed by Tyson Long MD at [address] [facility] 3/9/2022    EGD BIOPSY performed by Tyson Long MD at [place] [address]       Current Outpatient Medications   Medication Sig Dispense Refill    nitrofurantoin, macrocrystal-monohydrate, (MACROBID) 100 MG capsule Take 1 capsule by mouth 2 times daily for 10 days 20 capsule 0    diclofenac sodium (VOLTAREN) 1 % GEL APPLY 4 GRAMS TOPICALLY 4 TIMES DAILY AS NEEDED FOR PAIN 100 g 3    DULoxetine (CYMBALTA) 60 MG extended release capsule Take 1 capsule by mouth daily (Patient not taking: Reported on 11/28/2022) 90 capsule 1    levothyroxine (SYNTHROID) 200 MCG tablet Take 1 tablet by mouth in the morning.  90 tablet 1    cyclobenzaprine (FLEXERIL) 10 MG tablet TAKE 1 TABLET BY MOUTH THREE TIMES A DAY AS NEEDED FOR MUSCLE SPASMS 90 tablet 2    gabapentin (NEURONTIN) 800 MG tablet Take 1 tablet by mouth 3 times daily for 90 days. 270 tablet 0    Handicap Placard MISC by Does not apply route Patient cannot walk 200 ft without stopping to rest.    Expiration 5 yrs 1 each 0    omeprazole (PRILOSEC) 20 MG delayed release capsule TAKE 1 CAPSULE BY MOUTH EVERY DAY 90 capsule 3    linaclotide (LINZESS) 145 MCG capsule TAKE 1 CAPSULE BY MOUTH EVERY DAY IN THE MORNING BEFORE BREAKFAST 90 capsule 3     No current facility-administered medications for this visit. Allergies   Allergen Reactions    Azithromycin Other (See Comments)     Stomach pain    Bactrim [Sulfamethoxazole-Trimethoprim] Nausea Only    Ceftin [Cefuroxime] Rash    Keflex [Cephalexin] Rash       Social History     Socioeconomic History    Marital status:      Spouse name: None    Number of children: None    Years of education: None    Highest education level: None   Occupational History     Employer: [employer]   Tobacco Use    Smoking status: Never    Smokeless tobacco: Never   Vaping Use    Vaping Use: Never used   Substance and Sexual Activity    Alcohol use: Never    Drug use: Never       Family History   Problem Relation Age of Onset    Diabetes Mother     Diabetes Father     Rheum Arthritis Sister     Cancer Maternal Grandfather           Review of Systems   Constitutional:  Negative for activity change, appetite change, fatigue and fever. HENT:  Negative for congestion, ear pain, hearing loss, nosebleeds, rhinorrhea, sinus pressure and sore throat. Eyes:  Negative for pain, redness, itching and visual disturbance. Respiratory:  Negative for apnea, cough, chest tightness, shortness of breath and wheezing. Cardiovascular:  Negative for chest pain, palpitations and leg swelling. Gastrointestinal:  Negative for abdominal pain, blood in stool, constipation, diarrhea, nausea and vomiting. Endocrine: Negative. Genitourinary:  Positive for frequency and hesitancy. Negative for decreased urine volume, difficulty urinating, dysuria, hematuria and urgency. Musculoskeletal:  Negative for arthralgias, back pain, gait problem, myalgias and neck pain. Skin:  Negative for color change and rash. Allergic/Immunologic: Negative for environmental allergies and food allergies. Neurological:  Negative for dizziness, weakness, light-headedness, numbness and headaches. Hematological:  Negative for adenopathy. Does not bruise/bleed easily. Psychiatric/Behavioral:  Negative for behavioral problems, dysphoric mood and sleep disturbance. The patient is not nervous/anxious and is not hyperactive. All other systems reviewed and are negative. /82   Pulse 60   Temp 97.6 °F (36.4 °C)   Resp 18   Ht 5' 3\" (1.6 m)   Wt 184 lb (83.5 kg)   SpO2 98%   BMI 32.59 kg/m²     Physical Exam  Vitals and nursing note reviewed. Constitutional:       General: She is not in acute distress. Appearance: Normal appearance. She is well-developed. HENT:      Head: Normocephalic and atraumatic. Right Ear: Hearing, tympanic membrane and external ear normal. No tenderness. No middle ear effusion. Left Ear: Hearing, tympanic membrane and external ear normal. No tenderness. No middle ear effusion. Nose: Nose normal. No congestion or rhinorrhea. Right Turbinates: Not enlarged. Left Turbinates: Not enlarged. Mouth/Throat:      Mouth: Mucous membranes are moist.      Tongue: No lesions. Pharynx: Oropharynx is clear. No oropharyngeal exudate or posterior oropharyngeal erythema. Eyes:      General: No scleral icterus. Conjunctiva/sclera: Conjunctivae normal.      Pupils: Pupils are equal, round, and reactive to light. Neck:      Thyroid: No thyromegaly. Cardiovascular:      Rate and Rhythm: Normal rate and regular rhythm. Heart sounds: Normal heart sounds. No murmur heard.   Pulmonary: Effort: Pulmonary effort is normal. No respiratory distress. Breath sounds: Normal breath sounds. No wheezing or rales. Abdominal:      General: Bowel sounds are normal. There is no distension. Palpations: Abdomen is soft. Tenderness: There is no abdominal tenderness. Musculoskeletal:         General: No tenderness. Normal range of motion. Cervical back: Normal range of motion and neck supple. No rigidity. No muscular tenderness. Lymphadenopathy:      Cervical: No cervical adenopathy. Skin:     General: Skin is warm and dry. Findings: No erythema or rash. Neurological:      General: No focal deficit present. Mental Status: She is alert and oriented to person, place, and time. Cranial Nerves: No cranial nerve deficit. Deep Tendon Reflexes: Reflexes are normal and symmetric. Reflexes normal.   Psychiatric:         Mood and Affect: Mood normal.                               ASSESSMENT/PLAN:    Patient Active Problem List   Diagnosis    Type 2 diabetes mellitus without complication, without long-term current use of insulin (HCC)    Acquired hypothyroidism    Peripheral neuropathy    Carpal tunnel syndrome of left wrist    Spinal stenosis of lumbar region with neurogenic claudication    Acute exacerbation of chronic low back pain    Hyperlipidemia    Spondylolisthesis of lumbar region    Lumbar radiculopathy    Venous insufficiency of both lower extremities    Impingement syndrome of right shoulder    DDD (degenerative disc disease), lumbar    Lumbosacral spondylosis without myelopathy    S/P lumbar fusion    Sacroiliac dysfunction    Postlaminectomy syndrome, lumbar    Class 3 severe obesity due to excess calories with serious comorbidity and body mass index (BMI) of 45.0 to 49.9 in adult [facility])    Chronic fatigue    GERD (gastroesophageal reflux disease)    Morbid obesity ([address]    S/P bariatric surgery       Larance Side was seen today for urinary pain.     Diagnoses and all orders for this visit:    Urinary frequency  -     POCT Urinalysis no Micro  -     Culture, Urine; Future    Urinary tract infection without hematuria, site unspecified    Other orders  -     nitrofurantoin, macrocrystal-monohydrate, (MACROBID) 100 MG capsule; Take 1 capsule by mouth 2 times daily for 10 days        Return if symptoms worsen or fail to improve. I spent 20 minutes with this patient. I spent greater than 50% of the time counseling this patient.         Bridget Munoz DO  11/29/2022  12:51 PM

## 2022-12-01 ENCOUNTER — OFFICE VISIT (OUTPATIENT)
Dept: BARIATRICS/WEIGHT MGMT | Age: 62
End: 2022-12-01
Payer: MEDICAID

## 2022-12-01 ENCOUNTER — INITIAL CONSULT (OUTPATIENT)
Dept: BARIATRICS/WEIGHT MGMT | Age: 62
End: 2022-12-01

## 2022-12-01 VITALS
BODY MASS INDEX: 31.54 KG/M2 | TEMPERATURE: 96.7 F | RESPIRATION RATE: 20 BRPM | HEIGHT: 63 IN | HEART RATE: 102 BPM | WEIGHT: 178 LBS | SYSTOLIC BLOOD PRESSURE: 143 MMHG | DIASTOLIC BLOOD PRESSURE: 76 MMHG

## 2022-12-01 VITALS — WEIGHT: 178 LBS | HEIGHT: 63 IN | BODY MASS INDEX: 31.54 KG/M2

## 2022-12-01 DIAGNOSIS — E78.00 PURE HYPERCHOLESTEROLEMIA: Primary | ICD-10-CM

## 2022-12-01 DIAGNOSIS — E11.9 TYPE 2 DIABETES MELLITUS WITHOUT COMPLICATION, WITHOUT LONG-TERM CURRENT USE OF INSULIN (HCC): ICD-10-CM

## 2022-12-01 DIAGNOSIS — Z71.3 NUTRITIONAL COUNSELING: ICD-10-CM

## 2022-12-01 DIAGNOSIS — Z00.8 NUTRITIONAL ASSESSMENT: Primary | ICD-10-CM

## 2022-12-01 PROCEDURE — G8417 CALC BMI ABV UP PARAM F/U: HCPCS | Performed by: SURGERY

## 2022-12-01 PROCEDURE — 99999 PR OFFICE/OUTPT VISIT,PROCEDURE ONLY: CPT | Performed by: SURGERY

## 2022-12-01 PROCEDURE — 3044F HG A1C LEVEL LT 7.0%: CPT | Performed by: SURGERY

## 2022-12-01 PROCEDURE — 3017F COLORECTAL CA SCREEN DOC REV: CPT | Performed by: SURGERY

## 2022-12-01 PROCEDURE — 1036F TOBACCO NON-USER: CPT | Performed by: SURGERY

## 2022-12-01 PROCEDURE — G8482 FLU IMMUNIZE ORDER/ADMIN: HCPCS | Performed by: SURGERY

## 2022-12-01 PROCEDURE — G8427 DOCREV CUR MEDS BY ELIG CLIN: HCPCS | Performed by: SURGERY

## 2022-12-01 PROCEDURE — 2022F DILAT RTA XM EVC RTNOPTHY: CPT | Performed by: SURGERY

## 2022-12-01 PROCEDURE — 99212 OFFICE O/P EST SF 10 MIN: CPT

## 2022-12-01 PROCEDURE — 99213 OFFICE O/P EST LOW 20 MIN: CPT | Performed by: SURGERY

## 2022-12-01 NOTE — PROGRESS NOTES
Rosie Valenzuela  12/1/2022  Bariatric office visit       Rosie Valenzuela is a 65 y.o. female who weighs 178 lb (80.7 kg) post robotic Ashu-en-Y Gastric Bypass 8/23/22. Still having acid reflux if she stops the Omeprazole, feels thick sputum getting stuck in the throat. She is drinking 60 ounces per day and is meeting protein recommendations. Exercise: walking.     Weights:  178 lb 12/1/2022  12 wks  203 lb 9/8/2022  10 days  218 lb 8/23/2022         bypass  249 lb 2/2022              initial    Past Medical History:   Diagnosis Date    Back pain     Chronic fatigue     COVID-19 09/2020    mild per patient    Diabetes mellitus ([facility] [address]     History of blood transfusion     Hyperlipidemia     Hypothyroidism     IBS (irritable bowel syndrome)     Morbid obesity due to excess calories (HCC)     Obesity     Osteoarthritis     PONV (postoperative nausea and vomiting)      Past Surgical History:   Procedure Laterality Date    BACK SURGERY      CHOLECYSTECTOMY      COLONOSCOPY      HYSTERECTOMY, TOTAL ABDOMINAL (CERVIX REMOVED)      INCONTINENCE SURGERY      BLADDER SLING    KNEE SURGERY Left     LUMBAR SPINE SURGERY N/A 5/25/2021    L3- L5 DECOMPRESSION AND FUSION , L4- L5 TRANSFORAMINAL LUMBAR INTERBODY FUSION --OARM, PATY TABLE, AUDIOLOGY, PLATES ,SCREWS, --NUVASIVE performed by Fiona Burden MD at [facility] Bilateral 10/11/2021    BILATERAL [address] (CPT 85045) performed by Walter Nixon MD at [facility] [address] Bilateral 11/8/2021    BILATERAL LUMBAR FACET INJECTION AT L5-S 1 FACETS BLOCK UNDER FLUOROSCOPIC GUIDANCE performed by Walter Nixon MD at [facility] Bilateral 12/21/2021    BILATERAL S1, S2, S3 BRANCH & L5  [facility] NERVE BLOCK UNDER XRAY GUIDANCE (17068) (SEDATION) performed by Walter Nixon MD at [address] N/A 2/7/2022    LUMBAR EPIDURAL STEROID INJECTION UNDER FLUOROSCOPIC GUIDANCE AT L5-S1 PARAMEDIAN performed by Thalia William MD at [address] [facility] N/A 8/23/2022    GASTRIC BYPASS ANNA MARIE-EN-Y LAPAROSCOPIC ROBOTIC XI performed by Mary Grant MD at [address] [facility] 3/9/2022    EGD BIOPSY performed by Mary Grant MD at [address] [facility] Medications  Prior to Visit Medications    Medication Sig Taking? Authorizing Provider   nitrofurantoin, macrocrystal-monohydrate, (MACROBID) 100 MG capsule Take 1 capsule by mouth 2 times daily for 10 days  Prieto F Sharda, DO   diclofenac sodium (VOLTAREN) 1 % GEL APPLY 4 GRAMS TOPICALLY 4 TIMES DAILY AS NEEDED FOR PAIN  Prieto F Sharda, DO   DULoxetine (CYMBALTA) 60 MG extended release capsule Take 1 capsule by mouth daily  Patient not taking: Reported on 11/28/2022  Thalia William MD   pravastatin (PRAVACHOL) 10 MG tablet TAKE 1 TABLET BY MOUTH EVERY OTHER DAY  Boneta Never, DO   omega-3 acid ethyl esters (LOVAZA) 1 g capsule TAKE 1 CAPSULE BY MOUTH EVERY DAY  Prieto Mabryi, DO   levothyroxine (SYNTHROID) 200 MCG tablet Take 1 tablet by mouth in the morning. Prieto Marie, DO   cyclobenzaprine (FLEXERIL) 10 MG tablet TAKE 1 TABLET BY MOUTH THREE TIMES A DAY AS NEEDED FOR MUSCLE SPASMS  Prieto PEREZ Sharda, DO   vitamin B-12 (CYANOCOBALAMIN) 100 MCG tablet Take 1 tablet by mouth in the morning. Lili Young, DO   valsartan (DIOVAN) 80 MG tablet TAKE 1 TABLET BY MOUTH EVERY DAY  Prieto F Sharda, DO   gabapentin (NEURONTIN) 800 MG tablet Take 1 tablet by mouth 3 times daily for 90 days.   Yancy Dawson MD   Handicap Placard MISC by Does not apply route Patient cannot walk 200 ft without stopping to rest.    Expiration 5 yrs  Prieto F Sharda, DO   Multiple Vitamins-Minerals (THERAPEUTIC MULTIVITAMIN-MINERALS) tablet Take 1 tablet by mouth daily  Historical Provider, MD   vitamin D (ERGOCALCIFEROL) 1.25 MG (86138 UT) CAPS capsule TAKE 1 CAPSULE BY MOUTH ONE TIME PER WEEK  Prieto PEREZ Sharda, DO   omeprazole (PRILOSEC) 20 MG delayed release capsule TAKE 1 CAPSULE BY MOUTH EVERY DAY  Prieto F Sharda, DO   linaclotide (LINZESS) 145 MCG capsule TAKE 1 CAPSULE BY MOUTH EVERY DAY IN THE MORNING BEFORE BREAKFAST  Prieto F Sharda, DO   Thiamine HCl (B-1) 100 MG TABS Take 1 pill daily  Prieto F Sharda, DO   levothyroxine (SYNTHROID) 150 MCG tablet Take 1 tablet by mouth Daily  Prieto F Sharda, DO   rosuvastatin (CRESTOR) 5 MG tablet Take 1 tablet by mouth daily  Prieto F Sharda, DO   meloxicam (MOBIC) 15 MG tablet Take 1 tablet by mouth daily as needed for Pain  Prieto F Sharda, DO       Allergies: Azithromycin, Bactrim [sulfamethoxazole-trimethoprim], Ceftin [cefuroxime], and Keflex [cephalexin]   Social History:   TOBACCO:   reports that she has never smoked. She has never used smokeless tobacco.  All smokers must join the free smoking cessation program and stop smoking for 3 months before having any Bariatric surgery. ETOH:    reports no history of alcohol use. Family History   Problem Relation Age of Onset    Diabetes Mother     Diabetes Father     Rheum Arthritis Sister     Cancer Maternal Grandfather    Review of Systems:  Psychiatric:  depression and anxiety  Respiratory: negative  Cardiovascular: negative  Gastrointestinal: negative  Musculoskeletal:negative  All others reviewed, negative    Physical Exam:   VITALS: Blood pressure (!) 143/76, pulse (!) 102, temperature (!) 96.7 °F (35.9 °C), temperature source Temporal, resp. rate 20, height 5' 3\" (1.6 m), weight 178 lb (80.7 kg).   General appearance: alert, appears stated age and cooperative, does ambulate easily  Head: Normocephalic, without obvious abnormality, atraumatic  Eyes: PERRL  Ears/mouth/throat:  Ears clear, mouth normal, throat no redness  Neck: no adenopathy, no JVD, supple, symmetrical, trachea midline and thyroid not enlarged  Lungs: clear to auscultation bilaterally  Heart: regular rate and rhythm  Abdomen: wounds healing well, glue in place. No cellulitis. Mild bruising and pain. Extremities: extremities normal, atraumatic, no cyanosis or edema  Skin: no open wounds    Assessment:    Doing well post gastric bypass, labs all normal.  Morbid obesity: weight coming down well  No Diabetes Mellitus off medications,   Hypertension stable off medications,   Hyperlipidemia resolved. Some acid reflux so still on Prilosec. Arthritis still a problems, going to try Voltaren cream.    Plan:   Ok to use the Voltaren cream.  Ok to use Prilosec if need but try to use Carafate instead. Walk as much as possible. Aim for 60 gm Protein, 600 calories, 30 g fiber and 90 oz of liquids per day. Slow down if you feel chest pressure, acid or fullness. Repeat labs before the next visit. Make sure the bowel move daily by taking fiber such as Metamucil. Follow up in 12 weeks.     Physician Signature: Electronically signed by Dr. Zoe Machado MD

## 2022-12-01 NOTE — PROGRESS NOTES
Hulbert - 15 calories Per Bottle Peach Patrick, Fat Free Milk    No - Are you taking Colace daily  What amount of Colace are you taking daily   - N/A - Pt is using Miralax - IBSC and back problems with back sx    Yes - Are you taking Sugar Free Chewable Fiber Gummies / Supplements  What amount of Sugar Free Chewable Fiber Gummies are you taking daily  - 5 daily    How much water were you instructed to take daily? Juwan Carbajal reviewed today -  Patient was instructed by Juwan Carbajal on the importance of increasing water intake to 48 - 64 oz. of water total daily. Pt. was also instructed he / she is allowed an additional 30 oz. of sugar free caffeine free clear liquid beverages for a total of 90 oz. of fluid total daily. Pt. was able to verbalize how he / she can get more water and fluids within the diet. Pt. verbalized understanding. How much Colace did your surgeon instruct you to take? Juwan Carbajal reviewed today - Per the surgeons protocol you should be taking since the day of your surgery Colace - 100 mgs 1 tablet 2 times daily until your 6 week F/U appointment. Juwan CARBAJAL reviewed. See After Visit Summary. How much Fiber Gummies did your surgeon instruct you to take? Juwan Carbajal reviewed today Per the surgeons protocol you should be taking a fiber supplement lifelong since your two week follow-up appointment. Your surgeon recommends a daily approved Fiber supplement 15 grams total daily. If you are just introducing a Fiber supplement the 15 grams should be introduced 5 grams of a fiber supplement daily the 1st week, 10 grams of a fiber supplement daily the 2nd week and 15 grams of a fiber supplement daily the third week. Pt is instructed to continue the 15 grams of a fiber supplement daily. Juwan Carbajal reviewed. See After Visits Summary. Did your surgeon discuss any other medications / supplements to take daily to move your bowels and avoid constipation?  N/A    Yes -  Patient was provided today the Constipation Handout - Juwan Carbajal reviewed Handout - Pt. verbalized understanding. See AVS    Yes Rd Ld reinforced pt needs to consume the following - Water Intake should be 64 - 90 oz, Fiber Chews 15 grams       No - Hair loss   No - Weight regain       Yes - Acid Reflux - D/T Sinus Drainage  No - Dumping Syndrome      No - Food gets stuck  Yes - Are you eating solids - should not be eating solids until 6 weeks post-op. not applicable - Night Time Coughing  not applicable -  B Ping Noted  Yes - Are you chewing thoroughly  - Does not take effect until 6 weeks post-op  No - Are you hungry after eating     How many meals a day  - 6 / Portions Sizes of Meals are 4 to 5 oz         Labs: 11/15/22 - Prealbumin 16L    Supplements: Bariatric Advantage Multi-Vitamin 1 tablet 2 times Daily, Bariatric Advantage 29 mgs Iron 1 tablet Daily, Bariatric Advanatge Calcium Lozenges 1 tablet 3 times Daily , Dry Vitamin D3 5,000iu every day     Estimated Daily Nutritional Needs: Based on Bariatric procedure for Wt. Loss  Energy: Will be calculated at Maintenance Stage    Protein: Average 60 - 80 gms Daily - See individual needs listed above based on IBW    MNT Plan and Additional Education: RD/LD reviewed Bariatric Soft Diet incorporating in Raw Fruits, Raw Vegetables, Red Meat, and Rice. Encouraged pt to meet protein and fluid needs daily. Handouts given. Pt. verbalized understanding. Pt instructed to call with questions. Yes 1. Pt is consuming his / her recommended amount of protein within the diet based on patients Ideal Body Weight. .    Yes 2. Pt is using the recommended Bariatric approved protein supplement and taking in the correct amount of protein daily. Pt is able to verbalize how to mix his / her protein supplement correctly to meet pts needs. Pt verbalized understanding. Yes 3.  Pt is using the recommended Bariatric approved Multi-Vitamin, Bariatric approved Calcium, Bariatric approved Iron supplement or Bariatric approved Vitamin D and taking the correct dose. Pt was educated per his / her Bariatric Surgeons protocol he / she needs the following daily -  Bariatric Advantage Multi-Vitamin 1 tablet 2 times Daily, Bariatric Advantage 29 mgs Iron 1 tablet Daily, Bariatric Advantage Calcium Lozenges 1 tablet 3 times Daily , Dry Vitamin D3 5,000iu every day. Pt verbalized understanding. Yes 4. Pt kept daily food records. Pt is aware food records need to be kept life-long daily and brought to all follow-up appointments in order to show compliancy after weight loss surgery. Pt verbalized understanding. Yes 5. Pt is taking the correct stool softener for the first 6 weeks with the correct dose. Pt is also taking the correct fiber supplement with the correct dose starting at his / her 2 week f/u appointment. Pt verbalized understanding. See above instruction and AVS.     No 6. Pt is drinking 64 - 90 oz of plain water daily. Patient was instructed on the importance of increasing water intake to 48 - 64 oz. of water total daily. Pt. was also instructed he / she is allowed an additional 30 oz. of sugar free caffeine free clear liquid beverages for a total of 90 oz. of fluid total daily. Pt. was able to verbalize how he / she can get more water and fluids within the diet. Per bariatric surgeons protocol after weight loss surgery. Pt. verbalized understanding. Yes 7. Pt achieved his / her percentage of excess body weight loss at his / her f/u appointments and is on track to reach his / her weight loss goal.    Yes 8. Pt is weighing and measuring all foods using a food scale and measuring cups. Pt reports portion sizes are 3 to 4 oz in size. Portion sizes are not exceeding 6 ounces total per meal lifelong. Pt verbalized understanding. .     Yes 9. Pt is not experiencing Dumping Syndrome from food / beverage consumption. Yes 10.  Pt is complying with all stages and consistencies of the bariatric diet and is not eating or drinking foods / beverages that is not listed on the diet at this stage. Yes (Staff FYI) - Pt is not drinking carbonated beverages, alcoholic beverages or juices at this time. One dirn of pop ne day of her husbands    Compliancy Scale  - After Weight Loss Surgery :  9 Good - Dietary Compliance - This is based on patient following the Medical Nutrition Therapy protocol after Weight Loss Surgery  7 to 10 Points - Good (70% - to 100%) -  Pt is following what he / she has been instructed on at the time of this visit. 4 to 7 Points - Fair (40% to 70%) - Pt has areas that he / she needs to work on in order to be compliant with the bariatric protocol after weight loss surgery. 0 to 4 Points - Poor (0% - 40%) - Pt is failing to follow the instructions given to the pt. Juwan Ld has concerns pt may be creating harm. Pt was offered additional dietary counseling appointments to help pt make the necessary lifestyle changes to show compliancy after weight loss surgery. MEDICAL NUTRITION THERAPY (MNT) - Nutrition Care Plan   (The patient has been educated and given written education material that reinforces the following dietary guidelines for Bariatric Surgery)  Goal:  Patient able to verbalize the following dietary concepts:  3 to 4 ounce portions per meal     6 small meals daily      60 to 80 grams of protein on average see individual protein needs   48 to 64 ounces of fluid daily (water)     Always consume protein item first with all meals   Pt is aware wt loss is pt controlled. Slow Meals - 30-35 chews per bite / Meals 30 - 45 minutes long            The RD / LD reviewed with the patient that the dietary goals of the bariatric patient is to ensure that patient is able to meet established nutritional needs for a Bariatric Surgery Patient at this time in order to promote healing, prevent significant weight changes, prevent skin breakdown and abnormal lab values, prevent complications, and tolerance to diet and texture of foods.   Pt is able to identify proper food choices and needed changes and is able to explain proper food preparation. RD / LD reviewed compliance with assigned diet stages, volume of food and drink consumed, timing of meals, amount of protein and energy intake, daily vitamin and mineral supplementation, identify food cravings and any adverse reactions associated with intake of food and drink. RD / LD uses behavioral tools such as goal setting, MI, and self-monitoring, to reinforce the anatomic and physiologic effects of the surgery, so that the described behaviors become more of a habit not simply a reaction to the altered anatomy. Care Plan:   Yes - Rd/Ld Addressed Food Records or 24-Hour Recall  Yes - Rd / Ld encouraged patient to exercise at least 30 minutes daily  Yes - Rd / Ld instructed patient on how to increase oral protein intake within the diet. Pt. can verbalize his / her individual protein needs at this visit. Yes - Rd / Ld instructed the patient on how to increase the use of protein supplements within the diet if appropriate at this time. Yes - Pt. was instructed on how to increase water intake. Patient will need to consume 48 - 64 oz. of just plain water in addition to 30 oz. of non-caloric beverages. Yes - Handouts given to patient - Also see After Visit Summary in addition to paper copy diet instruction. Yes - RD / LD encouraged oral intake    Yes -  RD / LD encouraged pt. to keep food records daily.       Yes - Pt. is able to verbalize diet concepts      Yes - Constipation Handout Given and Reviewed - Part of surgeons Bowel Protocol - See After Visit Summary    Yes - High Fiber Handout Given and Reviewed - Part of surgeons Bowel Protocol - See After Visit Summary        No - Ulcer Handout Given and Reviewed          No - Pt. was instructed to continue current MNT   Yes - Pt. diet was advanced to the following stage ( See above)   No - Supplements: initiated (See list below - Pt. given instruction)     No - Supplemental foods:______________________  No - Pt. was placed on a altered meal schedule

## 2022-12-01 NOTE — PATIENT INSTRUCTIONS
Please continue to take your vitamin and mineral supplements as instructed. If you received a blood work prescription today for laboratory monitoring due prior to your next routine follow-up visit, please have this blood work obtained 10 to 14 days prior to your next visit. It is important to fast for 12 hours prior to routine weight loss surgery blood work, EXCEPT for drinking water, to ensure accuracy of results. Please report nausea, vomiting, abdominal pain, or any other problems you experience to your surgeon. For problems related to weight loss surgery, it is best to go to [address] [place] [address] Emergency Department and have your surgeon paged.

## 2022-12-02 ENCOUNTER — TELEPHONE (OUTPATIENT)
Dept: PRIMARY CARE CLINIC | Age: 62
End: 2022-12-02

## 2022-12-02 LAB — URINE CULTURE, ROUTINE: NORMAL

## 2022-12-02 RX ORDER — CIPROFLOXACIN 500 MG/1
500 TABLET, FILM COATED ORAL 2 TIMES DAILY
Qty: 20 TABLET | Refills: 0 | Status: SHIPPED | OUTPATIENT
Start: 2022-12-02 | End: 2022-12-12

## 2022-12-02 NOTE — TELEPHONE ENCOUNTER
Pt was started on Macrobid 11/29 for UTI and states she still has urinary pressure and frequency. She had chills yesterday.  I did advise her culture was neg

## 2022-12-20 RX ORDER — LEVOTHYROXINE SODIUM 0.2 MG/1
TABLET ORAL
Qty: 90 TABLET | Refills: 1 | Status: SHIPPED | OUTPATIENT
Start: 2022-12-20

## 2022-12-22 ENCOUNTER — TELEPHONE (OUTPATIENT)
Dept: PAIN MANAGEMENT | Age: 62
End: 2022-12-22

## 2022-12-22 NOTE — TELEPHONE ENCOUNTER
I called to reschedule her appointment in February and when on the phone she was requesting a refill on cymbalta. End date shows 12/01/22. Her next appointment is on 1/30/23. Please advise. Thanks.

## 2022-12-22 NOTE — TELEPHONE ENCOUNTER
Please note she asked to send it to the [facility] on [facility]. Thanks.

## 2022-12-23 NOTE — TELEPHONE ENCOUNTER
I spoke to patient and she should have a refill available, she was going to check and call our office if she needs anything else.

## 2022-12-27 ENCOUNTER — OFFICE VISIT (OUTPATIENT)
Dept: PODIATRY | Age: 62
End: 2022-12-27
Payer: MEDICAID

## 2022-12-27 VITALS
WEIGHT: 178 LBS | SYSTOLIC BLOOD PRESSURE: 118 MMHG | TEMPERATURE: 97.5 F | BODY MASS INDEX: 31.53 KG/M2 | DIASTOLIC BLOOD PRESSURE: 68 MMHG

## 2022-12-27 DIAGNOSIS — I73.9 PERIPHERAL VASCULAR DISEASE, UNSPECIFIED (HCC): ICD-10-CM

## 2022-12-27 DIAGNOSIS — M79.674 PAIN IN TOE OF RIGHT FOOT: ICD-10-CM

## 2022-12-27 DIAGNOSIS — E11.9 ENCOUNTER FOR DIABETIC FOOT EXAM (HCC): ICD-10-CM

## 2022-12-27 DIAGNOSIS — E11.9 TYPE 2 DIABETES MELLITUS WITHOUT COMPLICATION, WITHOUT LONG-TERM CURRENT USE OF INSULIN (HCC): Primary | ICD-10-CM

## 2022-12-27 DIAGNOSIS — M79.675 PAIN IN LEFT TOE(S): ICD-10-CM

## 2022-12-27 DIAGNOSIS — B35.1 TINEA UNGUIUM: ICD-10-CM

## 2022-12-27 PROCEDURE — G8417 CALC BMI ABV UP PARAM F/U: HCPCS | Performed by: PODIATRIST

## 2022-12-27 PROCEDURE — G8482 FLU IMMUNIZE ORDER/ADMIN: HCPCS | Performed by: PODIATRIST

## 2022-12-27 PROCEDURE — 1036F TOBACCO NON-USER: CPT | Performed by: PODIATRIST

## 2022-12-27 PROCEDURE — 3044F HG A1C LEVEL LT 7.0%: CPT | Performed by: PODIATRIST

## 2022-12-27 PROCEDURE — 2022F DILAT RTA XM EVC RTNOPTHY: CPT | Performed by: PODIATRIST

## 2022-12-27 PROCEDURE — 99203 OFFICE O/P NEW LOW 30 MIN: CPT | Performed by: PODIATRIST

## 2022-12-27 PROCEDURE — G8427 DOCREV CUR MEDS BY ELIG CLIN: HCPCS | Performed by: PODIATRIST

## 2022-12-27 PROCEDURE — 3017F COLORECTAL CA SCREEN DOC REV: CPT | Performed by: PODIATRIST

## 2022-12-27 NOTE — PROGRESS NOTES
[facility] PODI[facility]  [address]  [facility] [address]  Dept: [phone]  Dept Fax: [phone]  Loc: [phone]    DIABETIC NAIL PROGRESS NOTE  Date of patient's visit: 12/27/2022  Patient's Name:  Ying Hernandez YOB: 1960            Patient Care Team:  Gabrielle Griggs DO as PCP - General (Family Medicine)  Gabrielle Griggs DO as PCP - [facility] Empaneled Provider          Chief Complaint   Patient presents with    New Patient    Referral - General    Toe Pain    Diabetes     Saw pcp dr Dari luong 11/29/22       Subjective:   Ying Hernandez comes to clinic for New Patient, Referral - General, Toe Pain, and Diabetes (Saw pcp dr Dari luong 11/29/22)    she is a diabetic and states that foot exam .  Pt currently has complaint of thickened, elongated nails that they cannot manage by themselves. Pt's primary care physician is Gabrielle Griggs DO    . Lab Results   Component Value Date    LABA1C 5.1 11/15/2022      Complains of numbness in the feet bilat.   Past Medical History:   Diagnosis Date    Back pain     Chronic fatigue     COVID-19 09/2020    mild per patient    Diabetes mellitus ([address]     History of blood transfusion     Hyperlipidemia     Hypothyroidism     IBS (irritable bowel syndrome)     Morbid obesity due to excess calories (HCC)     Obesity     Osteoarthritis     PONV (postoperative nausea and vomiting)        Allergies   Allergen Reactions    Azithromycin Other (See Comments)     Stomach pain    Bactrim [Sulfamethoxazole-Trimethoprim] Nausea Only    Ceftin [Cefuroxime] Rash    Keflex [Cephalexin] Rash     Current Outpatient Medications on File Prior to Visit   Medication Sig Dispense Refill    levothyroxine (SYNTHROID) 200 MCG tablet take 1 tablet by mouth every morning 90 tablet 1    diclofenac sodium (VOLTAREN) 1 % GEL APPLY 4 GRAMS TOPICALLY 4 TIMES DAILY AS NEEDED FOR PAIN 100 g 3    cyclobenzaprine (FLEXERIL) 10 MG tablet TAKE 1 TABLET BY MOUTH THREE TIMES A DAY AS NEEDED FOR MUSCLE SPASMS 90 tablet 2    Handicap Placard MISC by Does not apply route Patient cannot walk 200 ft without stopping to rest.    Expiration 5 yrs 1 each 0    omeprazole (PRILOSEC) 20 MG delayed release capsule TAKE 1 CAPSULE BY MOUTH EVERY DAY 90 capsule 3    linaclotide (LINZESS) 145 MCG capsule TAKE 1 CAPSULE BY MOUTH EVERY DAY IN THE MORNING BEFORE BREAKFAST 90 capsule 3    [DISCONTINUED] pravastatin (PRAVACHOL) 10 MG tablet TAKE 1 TABLET BY MOUTH EVERY OTHER DAY 45 tablet 1    [DISCONTINUED] omega-3 acid ethyl esters (LOVAZA) 1 g capsule TAKE 1 CAPSULE BY MOUTH EVERY DAY 90 capsule 3    [DISCONTINUED] vitamin B-12 (CYANOCOBALAMIN) 100 MCG tablet Take 1 tablet by mouth in the morning. 90 tablet 3    [DISCONTINUED] valsartan (DIOVAN) 80 MG tablet TAKE 1 TABLET BY MOUTH EVERY DAY 90 tablet 1    gabapentin (NEURONTIN) 800 MG tablet Take 1 tablet by mouth 3 times daily for 90 days. 270 tablet 0    [DISCONTINUED] Multiple Vitamins-Minerals (THERAPEUTIC MULTIVITAMIN-MINERALS) tablet Take 1 tablet by mouth daily      [DISCONTINUED] vitamin D (ERGOCALCIFEROL) 1.25 MG (39097 UT) CAPS capsule TAKE 1 CAPSULE BY MOUTH ONE TIME PER WEEK 12 capsule 1    [DISCONTINUED] Thiamine HCl (B-1) 100 MG TABS Take 1 pill daily 90 tablet 1    [DISCONTINUED] levothyroxine (SYNTHROID) 150 MCG tablet Take 1 tablet by mouth Daily 90 tablet 1    [DISCONTINUED] rosuvastatin (CRESTOR) 5 MG tablet Take 1 tablet by mouth daily 90 tablet 1    [DISCONTINUED] meloxicam (MOBIC) 15 MG tablet Take 1 tablet by mouth daily as needed for Pain 30 tablet 5     No current facility-administered medications on file prior to visit.        Review of Systems    Review of Systems:   History obtained from chart review and the patient  General ROS: negative for - chills, fatigue, fever, night sweats or weight gain  Constitutional: Negative for chills, diaphoresis, fatigue, fever and unexpected weight change. Musculoskeletal: Positive for arthralgias, gait problem and joint swelling. Neurological ROS: negative for - behavioral changes, confusion, headaches or seizures. Negative for weakness and numbness. Dermatological ROS: negative for - mole changes, rash  Cardiovascular: Negative for leg swelling. Gastrointestinal: Negative for constipation, diarrhea, nausea and vomiting. Objective:  General: AAO x 3 in NAD. Derm  Toenail Description nails are thick and mycotic yellow incurvated causing pain with shoe gear  Sites of Onychomycosis Involvement (Check affected area)  [x] [x] [x] [x] [x] [x] [x] [x] [x] [x]  5 4 3 2 1 1 2 3 4 5                          Right                                        Left    Thickness  [x] [x] [x] [x] [x] [x] [x] [x] [x] [x]  5 4 3 2 1 1 2 3 4 5                         Right                                        Left    Dystrophic Changes   [x] [x] [x] [x] [x] [x] [x] [x] [x] [x]  5 4 3 2 1 1 2 3 4 5                         Right                                        Left    Color   [x] [x] [x] [x] [x] [x] [x] [x] [x] [x]  5 4 3 2 1 1 2 3 4 5                          Right                                        Left    Incurvation/Ingrowin   [x] [x] [x] [x] [x] [x] [x] [x] [x] [x]  5 4 3 2 1 1 2 3 4 5                         Right                                        Left    Inflammation/Pain   [x] [x] [x] [x] [x] [x] [x] [x] [x] [x]  5 4 3 2 1 1 2 3 4 5                         Right                                        Left      Dermatologic Exam:  Skin lesion/ulcerat.    Skin   Loss of hair  lower extremity      Musculoskeletal:     1st MPJ ROM decreased, Bilateral.  Muscle Bilateral.  Pain present upon palpation of toenails 1-5, Bilateral. decreased medial longitudinal arch, Bilateral.  Ankle ROM decreased,Bilateral.    Dorsally contracted digits     Vascular: DP and PT pulses   CFT < seconds, Bilateral.  Hair growth absent to the level of the digits, Bilateral.  Edema  Bilateral.  Varicosities  Bilateral.     Neurological: Sensation diminshed to light touch to level of digits, Bilateral.  Protective sensation  sites via 5.07/10g Tiger-Cameron Monofilament, Bilateral.  negative Tinel's, Bilateral.  negative Valleix sign, Bilateral.      Integument: nails   thickened > 3.0 mm, dystrophic and crumbly, discolored with subungual debris. Toes cool to touch    Visual inspection:  Deformity: hammertoe deformity rodrick feet  amputation:     Edema:   Sensory exam:  Monofilament sensation: abnormal - 6/10 via SW 5.07/10g monofilament to the plantar foot bilateral feet    Pulses:     Pinprick: Impaired  Proprioception: Impaired  Vibration (128 Hz): Impaired    Visual inspection:  Deformity/amputation: absent  Skin lesions/pre-ulcerative calluses: absent  Edema: right- negative, left- negative    Sensory exam:  Monofilament sensation: abnormal -   (minimum of 5 random plantar locations tested, avoiding callused areas - > 1 area with absence of sensation is + for neuropathy)    Plus at least one of the following:  Pulses: abnormal - , using Doppler DP pulses were absent PT pulses were absent  Pinprick: Impaired  Proprioception: Impaired and Absent  Vibration (128 Hz): Impaired    DM with PVD       [x]Yes    []no    Foot Exam     Ortho Exam      Assessment:  65 y.o. female with:  Piero Guzmán was seen today for new patient, referral - general, toe pain and diabetes. Diagnoses and all orders for this visit:    Type 2 diabetes mellitus without complication, without long-term current use of insulin ([facility])  -      DIABETES FOOT EXAM    Tinea unguium    Pain in left toe(s)    Pain in toe of right foot    Peripheral vascular disease, unspecified ([facility]    Encounter for diabetic foot exam [facility]           Q7   []Yes    []No                Q8   [x]Yes    []No                     Q9   []Yes    []No    Plan:   Pt was evaluated and examined.  Patient was given personalized discharge instructions. Nails 1-10 were debrided sharply in length and thickness with a nipper and , without incident. Pt will follow up in 3 months or sooner if any problems arise. Diagnosis was discussed with the pt and all of their questions were answered in detail. Proper foot hygiene and care was discussed with the pt. Informed patient on proper diabetic foot care and importance of tight glycemic control. Patient to check feet daily and contact the office with any questions/problems/concerns. Other comorbidity noted and will be managed by PCP. @ED   It was discussed in detail with the patient proper hygiene for the diabetic foot. They are to get into a habit of looking at their feet or have someone look at them. If they are unable to daily, they are to look for any signs of redness, blistering, cracking, swelling, drainage, open lesions, etc.  They are to dry in-between the toes after each bath or shower gently. The water should be tested with their elbow to prevent burns. The patient is to refrain from soaking their feet unless instructed by myself to do otherwise. They are to refrain from going barefoot. Shoe gear should be inspected for any foreign objects. Shoes should have a deep, wide toe box area. With any type of shoe, the feet should be inspected for any signs of pressure, i.e., redness, blistering, or open lesions. The patient was instructed to contact myself or other health care provider with any questions.    Electronically signed by Fanny Garcia DPM on 12/27/2022 at 11:21 AM  12/27/2022

## 2023-01-30 ENCOUNTER — OFFICE VISIT (OUTPATIENT)
Dept: PAIN MANAGEMENT | Age: 63
End: 2023-01-30
Payer: MEDICAID

## 2023-01-30 ENCOUNTER — PREP FOR PROCEDURE (OUTPATIENT)
Dept: PAIN MANAGEMENT | Age: 63
End: 2023-01-30

## 2023-01-30 ENCOUNTER — TELEPHONE (OUTPATIENT)
Dept: PAIN MANAGEMENT | Age: 63
End: 2023-01-30

## 2023-01-30 VITALS
SYSTOLIC BLOOD PRESSURE: 108 MMHG | WEIGHT: 178 LBS | RESPIRATION RATE: 18 BRPM | BODY MASS INDEX: 31.54 KG/M2 | TEMPERATURE: 97.3 F | HEIGHT: 63 IN | HEART RATE: 70 BPM | OXYGEN SATURATION: 95 % | DIASTOLIC BLOOD PRESSURE: 73 MMHG

## 2023-01-30 DIAGNOSIS — M96.1 POSTLAMINECTOMY SYNDROME, LUMBAR: Primary | ICD-10-CM

## 2023-01-30 DIAGNOSIS — Z98.84 S/P BARIATRIC SURGERY: ICD-10-CM

## 2023-01-30 DIAGNOSIS — M47.817 LUMBOSACRAL SPONDYLOSIS WITHOUT MYELOPATHY: Primary | ICD-10-CM

## 2023-01-30 DIAGNOSIS — M53.3 SACROILIAC DYSFUNCTION: ICD-10-CM

## 2023-01-30 DIAGNOSIS — M51.36 DDD (DEGENERATIVE DISC DISEASE), LUMBAR: ICD-10-CM

## 2023-01-30 DIAGNOSIS — M47.817 LUMBOSACRAL SPONDYLOSIS WITHOUT MYELOPATHY: ICD-10-CM

## 2023-01-30 PROBLEM — M47.816 LUMBAR SPONDYLOSIS: Status: ACTIVE | Noted: 2023-01-30

## 2023-01-30 PROCEDURE — G8427 DOCREV CUR MEDS BY ELIG CLIN: HCPCS | Performed by: ANESTHESIOLOGY

## 2023-01-30 PROCEDURE — G8417 CALC BMI ABV UP PARAM F/U: HCPCS | Performed by: ANESTHESIOLOGY

## 2023-01-30 PROCEDURE — 1036F TOBACCO NON-USER: CPT | Performed by: ANESTHESIOLOGY

## 2023-01-30 PROCEDURE — 3017F COLORECTAL CA SCREEN DOC REV: CPT | Performed by: ANESTHESIOLOGY

## 2023-01-30 PROCEDURE — 99213 OFFICE O/P EST LOW 20 MIN: CPT | Performed by: ANESTHESIOLOGY

## 2023-01-30 PROCEDURE — G8482 FLU IMMUNIZE ORDER/ADMIN: HCPCS | Performed by: ANESTHESIOLOGY

## 2023-01-30 PROCEDURE — 99214 OFFICE O/P EST MOD 30 MIN: CPT | Performed by: ANESTHESIOLOGY

## 2023-01-30 RX ORDER — SODIUM CHLORIDE 9 MG/ML
INJECTION, SOLUTION INTRAVENOUS PRN
Status: CANCELLED | OUTPATIENT
Start: 2023-01-30

## 2023-01-30 RX ORDER — OXYCODONE HYDROCHLORIDE AND ACETAMINOPHEN 5; 325 MG/1; MG/1
0.5 TABLET ORAL 2 TIMES DAILY PRN
Qty: 30 TABLET | Refills: 0 | Status: SHIPPED | OUTPATIENT
Start: 2023-01-30 | End: 2023-03-01

## 2023-01-30 RX ORDER — SODIUM CHLORIDE 0.9 % (FLUSH) 0.9 %
5-40 SYRINGE (ML) INJECTION PRN
Status: CANCELLED | OUTPATIENT
Start: 2023-01-30

## 2023-01-30 RX ORDER — SODIUM CHLORIDE 0.9 % (FLUSH) 0.9 %
5-40 SYRINGE (ML) INJECTION EVERY 12 HOURS SCHEDULED
Status: CANCELLED | OUTPATIENT
Start: 2023-01-30

## 2023-01-30 RX ORDER — SUCRALFATE 1 G/1
1 TABLET ORAL 4 TIMES DAILY
COMMUNITY

## 2023-01-30 NOTE — PROGRESS NOTES
Do you currently have any of the following:    Fever: No  Headache:  No  Cough: No  Shortness of breath: No  Exposed to anyone with these symptoms: No         Joycelyn Boggs presents to the [address] [place] [address] on 1/30/2023. Margi Rosario is complaining of pain low back . The pain is constant. The pain is described as aching, throbbing, and stabbing. Pain is rated on her best day at a 3, on her worst day at a 8, and on average at a 5 on the VAS scale. She took her last dose of Neurontin yesterday afternoon. Any procedures since your last visit: No,    Pacemaker or defibrillator: No   She is not on NSAIDS and is not on anticoagulation medications   Medication Contract and Consent for Opioid Use Documents Filed       Patient Documents       Type of Document Status Date Received Received By Description    Medication Contract Received 10/6/2021 10:43 AM SHANE CRENSHAW pain management                    /73   Pulse 70   Temp 97.3 °F (36.3 °C) (Temporal)   Resp 18   Ht 5' 3\" (1.6 m)   Wt 178 lb (80.7 kg)   SpO2 95%   BMI 31.53 kg/m²      No LMP recorded. Patient has had a hysterectomy.

## 2023-01-30 NOTE — PROGRESS NOTES
[facility]  [place], [address]  Dept: [phone]      Follow up Note      Maria Isabel Salinas     Date of Visit:  1/30/2023    CC:  Patient presents for follow up   Chief Complaint   Patient presents with    Follow-up     Lower  back pain        HPI:  Low back pain. S/P L3-5 fusion and L4-5 TLIF in May 2021. Has noticed right LE pain after the surgery. Has been followed by NSG - imaging showed Intact fusion. Low back pain and right LE pain- Has tingling of the foot (h/o DM). Pain causes functional limitations/ limits Adl's : Yes     Nursing notes and details of the pain history reviewed. Please see intake notes for details. Previous treatments:   Physical Therapy : yes, continues HEP. Medications: - NSAID's : yes                        - Membrane stabilizers : yes - gabapentin                       - Opioids : yes prn                       - Adjuvants or Others : yes,      Surgeries: yes, L3-5 fusion in May 2021     She has not been on anticoagulation medications      She has not been on herbal supplements. She is diabetic. 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 Degenerative and postsurgical changes as described above. There has been   prior bilateral laminectomy and posterior fusion at L3 through L5.   2. No evidence of significant central canal stenosis or disc herniation. 3. Mild bilateral L5-S1 neural foraminal stenosis. X- ray cervical, thoracic and lumbar spine: 1/19/2022:  FINDINGS:   C-spine: Mild degenerative disc disease primarily C5-6. No fracture or   dislocation. Normal soft tissues. T-spine: Mild multilevel degenerative disc disease. No fracture or   dislocation. Normal thoracic soft tissues. Mild thoracic dextroscoliosis.        Lumbar spine: Intact hardware associated with posterior fusion L3-L5. Normal   alignment. L4-5 disc is augmented. Degenerative disc disease is present at   multiple levels and is greatest at L5-S1 where it is severe. There is a   decompressive laminectomy at L3 and L4. Normal soft tissues. Impression   Degenerative spondylosis at the C-spine, T-spine and L-spine. Lumbar fusion   with intact hardware and normal alignment.            Past Medical History:   Diagnosis Date    Back pain     Chronic fatigue     COVID-19 09/2020    mild per patient    Diabetes mellitus ([address]     History of blood transfusion     Hyperlipidemia     Hypothyroidism     IBS (irritable bowel syndrome)     Morbid obesity due to excess calories (HCC)     Obesity     Osteoarthritis     PONV (postoperative nausea and vomiting)        Past Surgical History:   Procedure Laterality Date    BACK SURGERY      CHOLECYSTECTOMY      COLONOSCOPY      HYSTERECTOMY, TOTAL ABDOMINAL (CERVIX REMOVED)      INCONTINENCE SURGERY      BLADDER SLING    KNEE SURGERY Left     LUMBAR SPINE SURGERY N/A 5/25/2021    L3- L5 DECOMPRESSION AND FUSION , L4- L5 TRANSFORAMINAL LUMBAR INTERBODY FUSION --OARM, PATY TABLE, AUDIOLOGY, PLATES ,SCREWS, --NUVASIVE performed by Morgan Bernheim, MD at . [address] Bilateral 10/11/2021    BILATERAL [address] ([place] [address]) performed by Kandy Gr MD at . [address] Bilateral 11/8/2021    BILATERAL LUMBAR FACET INJECTION AT L5-S 1 FACETS BLOCK UNDER FLUOROSCOPIC GUIDANCE performed by Kandy Gr MD at . [address] Bilateral 12/21/2021    BILATERAL S1, S2, S3 BRANCH & L5 [facility] NERVE BLOCK UNDER XRAY GUIDANCE ([address]) (SEDATION) performed by Kandy Gr MD at [facility] N/A 2/7/2022    LUMBAR EPIDURAL STEROID INJECTION UNDER FLUOROSCOPIC GUIDANCE AT L5-S1 PARAMEDIAN performed by Kandy Gr MD at [facility] N/A 8/23/2022    GASTRIC BYPASS ANNA MARIE-EN-Y LAPAROSCOPIC ROBOTIC XI performed by Suman Justice MD at [facility] OR    UPPER GASTROINTESTINAL ENDOSCOPY N/A 3/9/2022    EGD BIOPSY performed by Suman Justice MD at [facility] ENDOSCOPY       Prior to Admission medications    Medication Sig Start Date End Date Taking? Authorizing Provider   sucralfate (CARAFATE) 1 GM tablet Take 1 g by mouth 4 times daily   Yes Historical Provider, MD   levothyroxine (SYNTHROID) 200 MCG tablet take 1 tablet by mouth every morning 12/20/22  Yes Prieto PEREZ Sharda, DO   diclofenac sodium (VOLTAREN) 1 % GEL APPLY 4 GRAMS TOPICALLY 4 TIMES DAILY AS NEEDED FOR PAIN 11/28/22  Yes Prieto F Sharda, DO   cyclobenzaprine (FLEXERIL) 10 MG tablet TAKE 1 TABLET BY MOUTH THREE TIMES A DAY AS NEEDED FOR MUSCLE SPASMS 7/19/22  Yes Prieto F Sharda, DO   gabapentin (NEURONTIN) 800 MG tablet Take 1 tablet by mouth 3 times daily for 90 days. 4/11/22 1/30/23 Yes Suzette Hernandez MD   [facility] Does not apply route Patient cannot walk 200 ft without stopping to rest.    Expiration 5 yrs 2/11/22  Yes Prieto F Sharda, DO   omeprazole (PRILOSEC) 20 MG delayed release capsule TAKE 1 CAPSULE BY MOUTH EVERY DAY 10/18/21  Yes Prieto F Sharda, DO   linaclotide (LINZESS) 145 MCG capsule TAKE 1 CAPSULE BY MOUTH EVERY DAY IN THE MORNING BEFORE BREAKFAST 10/18/21  Yes Prieto F Sharda, DO   pravastatin (PRAVACHOL) 10 MG tablet TAKE 1 TABLET BY MOUTH EVERY OTHER DAY 8/18/22 8/24/22  Eulogio Clarkrapeter, DO   omega-3 acid ethyl esters (LOVAZA) 1 g capsule TAKE 1 CAPSULE BY MOUTH EVERY DAY 7/25/22 8/24/22  Prieto F Sharda, DO   vitamin B-12 (CYANOCOBALAMIN) 100 MCG tablet Take 1 tablet by mouth in the morning. 7/19/22 8/24/22  Prieto F Sharda, DO   valsartan (DIOVAN) 80 MG tablet TAKE 1 TABLET BY MOUTH EVERY DAY 5/13/22 8/24/22  Prieto F Sharda, DO   Multiple Vitamins-Minerals (THERAPEUTIC MULTIVITAMIN-MINERALS) tablet Take 1 tablet by mouth daily  8/24/22  Historical  Provider, MD   vitamin D (ERGOCALCIFEROL) 1.25 MG (73041 UT) CAPS capsule TAKE 1 CAPSULE BY MOUTH ONE TIME PER WEEK 1/18/22 8/24/22  Prieto F Sharda, DO   Thiamine HCl (B-1) 100 MG TABS Take 1 pill daily 10/18/21 8/24/22  Prieto F Sharda, DO   levothyroxine (SYNTHROID) 150 MCG tablet Take 1 tablet by mouth Daily 1/12/21   Prieto F Sharda, DO   rosuvastatin (CRESTOR) 5 MG tablet Take 1 tablet by mouth daily 1/12/21   Prieto F Sharda, DO   meloxicam (MOBIC) 15 MG tablet Take 1 tablet by mouth daily as needed for Pain 1/11/21   Prieto F Sharda, DO       Allergies   Allergen Reactions    Azithromycin Other (See Comments)     Stomach pain    Bactrim [Sulfamethoxazole-Trimethoprim] Nausea Only    Ceftin [Cefuroxime] Rash    Keflex [Cephalexin] Rash       Social History     Socioeconomic History    Marital status:      Spouse name: Not on file    Number of children: Not on file    Years of education: Not on file    Highest education level: Not on file   Occupational History     Employer: [employer]   Tobacco Use    Smoking status: Never    Smokeless tobacco: Never   Vaping Use    Vaping Use: Never used   Substance and Sexual Activity    Alcohol use: Never    Drug use: Never    Sexual activity: Not on file   Other Topics Concern    Not on file   Social History Narrative    Not on file     Social Determinants of Health     Financial Resource Strain: Not on file   Food Insecurity: Not on file   Transportation Needs: Not on file   Physical Activity: Not on file   Stress: Not on file   Social Connections: Not on file   Intimate Partner Violence: Not on file   Housing Stability: Not on file       Family History   Problem Relation Age of Onset    Diabetes Mother     Diabetes Father     Rheum Arthritis Sister     Cancer Maternal Grandfather        REVIEW OF SYSTEMS:     Conchis denies fever/chills, chest pain, shortness of breath, new bowel or bladder complaints. All other review of systems was  negative. PHYSICAL EXAMINATION:      /73   Pulse 70   Temp 97.3 °F (36.3 °C) (Temporal)   Resp 18   Ht 5' 3\" (1.6 m)   Wt 178 lb (80.7 kg)   SpO2 95%   BMI 31.53 kg/m²   General:       General appearance:  Pleasant and well-hydrated, in no distress and A & O x 3  Build:Obese  Function: Rises from seated position easily and Moves about room without difficulty     HEENT:     Head:normocephalic, atraumatic    Lungs:     Breathing:normal breathing pattern      CVS:     RRR     Abdomen:     Shape:obese, non-distended and normal     Cervical spine:     Inspection:normal     Thoracic spine:                Spine inspection:normal      Lumbar spine:     Spine inspection: scar from the prior surgery noted- healed well  Palpation: Tenderness paravertebral muscles Yes bilaterally  Range of motion: Decreased, flexion Decreased, Lateral bending, extension and rotation bilaterally reduced is somewhat painful. Lower lumbar facet tenderness +  Sacroiliac joint tenderness   Piriformis tenderness: negative bilaterally  SLR : negative bilaterally     Musculoskeletal:     Trigger points no     Extremities:     Tremors:None bilaterally upper and lower  Edema:no  Distal LE Peripheral pulses felt     Knee Right:     ROM : pain +   Joint line tenderness : yes      Neurological:     Sensory: Normal to light touch      Motor:   No new changes     Gait: uses a cane to assist in ambulation. Dermatology:     Skin:no rashes or lesions noted    Assessment/Plan:   Diagnosis Orders   1. Postlaminectomy syndrome, lumbar        2. Lumbosacral spondylosis without myelopathy        3. DDD (degenerative disc disease), lumbar        4. Sacroiliac dysfunction        5. S/P bariatric surgery           65 y.o.  female with H/o L3-5 Lumbar fusion in May 2021 by Dr. Becky Duarte. Low back pain and intermittent right LE pain. Has been evaluated by NSG- recommended conservative treatment- no surgical interventions.     CT myelogram reviewed-no significant stenosis    S/P  Spine interventions- had helped significantly- last injection in Feb 2022      Has chronic right shoulder pain and knee pain. Has been evaluated by Dr. Marielena Ortiz. Obesity: Follows with Bariatric medicine. S/P Bariatric surgery in Aug 2022- continues to loose weight. Current pain predominantly axial in nature, Lumbar facet tenderness +    Plan:    Bilateral lumbar facet injection L5-S1 below the fusion Under fluoroscopic guidance. RBA discussed. PT / Tj Munoz for prn use only to help with PT/ HEP and exercise- will prescribe Percocet 5/325 po prn 0.5 tab once or twice a day. Refill given # 30 (One script lasts for > 3 months). For prn use for severe pain only. Last filled # 60 on 9/12/2022. [place] reviewed- consistent. Recommended to take only for severe pain. To help with pain and functionality and do HEP/ PT. No signs of misuse abuse or diversion, no side effects, compliant with recommendations. RBA of chronic opioid use discussed. Complaint with the treatment recommendations. Continue Gabapentin. Cymbalta 60 mg Q hs. Can call for refill. F/U in 4-6 months. Opioid agreement- signed  10/6/2021- renew opioid agreement 1/30/2023. Salient features of opioid agreement discussed. Discussed issues with chronic opioid therapy including the phenomenon of tolerance/ dependence. Oral drug screen -10/6/2021-  result reviewed consistent. Will redo today 1/30/2023 (last dose taken couple of weeks ago- takes only prn). [place] reviewed- today: Consistent      Counseling : Patient encouraged to stay active, to continue Regular home exercise program and to watch/lose weight     Treatment plan discussed with the patient including medication and procedure side effects. Controlled Substances Monitoring:   [place] reviewed.      We discussed with the patient that combining opioids, benzodiazepines, alcohol, illicit drugs or sleep aids increases the risk of respiratory depression including death. We discussed that these medications may cause drowsiness, sedation or dizziness and have counseled the patient not to drive or operate machinery. We have discussed that these medications will be prescribed only by one provider. We have discussed with the patient about age related risk factors and have thoroughly discussed the importance of taking these medications as prescribed. The patient verbalizes understanding.      Delmer Russell MD    CC:  Franck Sevilla,

## 2023-01-30 NOTE — TELEPHONE ENCOUNTER
Call to Dulce Bright left message that procedure was approved for 2/6/2023 and that Norman should call her a few days before for the pre op call and between 2:00 PM and 4:00 PM  the business day before with the arrival time. Instructed Conchis to hold ibuprofen for 24 hours, naprosyn for 4 days and any aspirin containing products or fish oil for 7 days. Instructed to call office back if any questions.      Electronically signed by Lyssa Smith RN on 1/30/2023 at 3:00 PM

## 2023-01-30 NOTE — H&P (VIEW-ONLY)
[facility]  [place], [address] [employer]  Dept: [phone]      Follow up Note      Shamir Chacon     Date of Visit:  1/30/2023    CC:  Patient presents for follow up   Chief Complaint   Patient presents with    Follow-up     Lower  back pain        HPI:  Low back pain. S/P L3-5 fusion and L4-5 TLIF in May 2021. Has noticed right LE pain after the surgery. Has been followed by NSG - imaging showed Intact fusion. Low back pain and right LE pain- Has tingling of the foot (h/o DM). Pain causes functional limitations/ limits Adl's : Yes     Nursing notes and details of the pain history reviewed. Please see intake notes for details. Previous treatments:   Physical Therapy : yes, continues HEP. Medications: - NSAID's : yes                        - Membrane stabilizers : yes - gabapentin                       - Opioids : yes prn                       - Adjuvants or Others : yes,      Surgeries: yes, L3-5 fusion in May 2021     She has not been on anticoagulation medications      She has not been on herbal supplements. She is diabetic. 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 Degenerative and postsurgical changes as described above. There has been   prior bilateral laminectomy and posterior fusion at L3 through L5.   2. No evidence of significant central canal stenosis or disc herniation. 3. Mild bilateral L5-S1 neural foraminal stenosis. X- ray cervical, thoracic and lumbar spine: 1/19/2022:  FINDINGS:   C-spine: Mild degenerative disc disease primarily C5-6. No fracture or   dislocation. Normal soft tissues. T-spine: Mild multilevel degenerative disc disease. No fracture or   dislocation. Normal thoracic soft tissues. Mild thoracic dextroscoliosis.        Lumbar spine: Intact hardware associated with posterior fusion L3-L5. Normal   alignment. L4-5 disc is augmented. Degenerative disc disease is present at   multiple levels and is greatest at L5-S1 where it is severe. There is a   decompressive laminectomy at L3 and L4. Normal soft tissues. Impression   Degenerative spondylosis at the C-spine, T-spine and L-spine. Lumbar fusion   with intact hardware and normal alignment.            Past Medical History:   Diagnosis Date    Back pain     Chronic fatigue     COVID-19 09/2020    mild per patient    Diabetes mellitus ([address]     History of blood transfusion     Hyperlipidemia     Hypothyroidism     IBS (irritable bowel syndrome)     Morbid obesity due to excess calories ([facility])     Obesity     Osteoarthritis     PONV (postoperative nausea and vomiting)        Past Surgical History:   Procedure Laterality Date    BACK SURGERY      CHOLECYSTECTOMY      COLONOSCOPY      HYSTERECTOMY, TOTAL ABDOMINAL (CERVIX REMOVED)      INCONTINENCE SURGERY      BLADDER SLING    KNEE SURGERY Left     LUMBAR SPINE SURGERY N/A 5/25/2021    L3- L5 DECOMPRESSION AND FUSION , L4- L5 TRANSFORAMINAL LUMBAR INTERBODY FUSION --OARM, PATY TABLE, AUDIOLOGY, PLATES ,SCREWS, --NUVASIVE performed by Karma Hensley MD at . [address] Bilateral 10/11/2021    BILATERAL [address] ([place] [address]) performed by Shilpa Mcclendon MD at . [address] Bilateral 11/8/2021    BILATERAL LUMBAR FACET INJECTION AT L5-S 1 FACETS BLOCK UNDER FLUOROSCOPIC GUIDANCE performed by Shilpa Mcclendon MD at . [address] Bilateral 12/21/2021    BILATERAL S1, S2, S3 BRANCH & L5 [facility] NERVE BLOCK UNDER XRAY GUIDANCE (1999[address]) (SEDATION) performed by Shilpa Mcclendon MD at [address] [facility] N/A 2/7/2022    LUMBAR EPIDURAL STEROID INJECTION UNDER FLUOROSCOPIC GUIDANCE AT L5-S1 PARAMEDIAN performed by Shilpa Mcclendon MD at [address] [facility] N/A 8/23/2022    GASTRIC BYPASS ANNA MARIE-EN-Y LAPAROSCOPIC ROBOTIC XI performed by Vikram Medrano MD at [address] [facility] 3/9/2022    EGD BIOPSY performed by Vikram Medrano MD at [facility]       Prior to Admission medications    Medication Sig Start Date End Date Taking? Authorizing Provider   sucralfate (CARAFATE) 1 GM tablet Take 1 g by mouth 4 times daily   Yes Historical Provider, MD   levothyroxine (SYNTHROID) 200 MCG tablet take 1 tablet by mouth every morning 12/20/22  Yes Arnaldo Williamson, DO   diclofenac sodium (VOLTAREN) 1 % GEL APPLY 4 GRAMS TOPICALLY 4 TIMES DAILY AS NEEDED FOR PAIN 11/28/22  Yes Prieto Robin, DO   cyclobenzaprine (FLEXERIL) 10 MG tablet TAKE 1 TABLET BY MOUTH THREE TIMES A DAY AS NEEDED FOR MUSCLE SPASMS 7/19/22  Yes Prieto Robin, DO   gabapentin (NEURONTIN) 800 MG tablet Take 1 tablet by mouth 3 times daily for 90 days. 4/11/22 1/30/23 Yes Stephie Escalante MD   [facility] [address] [facility] by Does not apply route Patient cannot walk 200 ft without stopping to rest.    Expiration 5 yrs 2/11/22  Yes Arnaldo Williamson, DO   omeprazole (PRILOSEC) 20 MG delayed release capsule TAKE 1 CAPSULE BY MOUTH EVERY DAY 10/18/21  Yes Prieto Burton, DO   linaclotide (LINZESS) 145 MCG capsule TAKE 1 CAPSULE BY MOUTH EVERY DAY IN THE MORNING BEFORE BREAKFAST 10/18/21  Yes Arnaldo Williamson, DO   pravastatin (PRAVACHOL) 10 MG tablet TAKE 1 TABLET BY MOUTH EVERY OTHER DAY 8/18/22 8/24/22  Lakhwinder Wise, DO   omega-3 acid ethyl esters (LOVAZA) 1 g capsule TAKE 1 CAPSULE BY MOUTH EVERY DAY 7/25/22 8/24/22  Prieto Burton, DO   vitamin B-12 (CYANOCOBALAMIN) 100 MCG tablet Take 1 tablet by mouth in the morning.  7/19/22 8/24/22  Prieto Burton, DO   valsartan (DIOVAN) 80 MG tablet TAKE 1 TABLET BY MOUTH EVERY DAY 5/13/22 8/24/22  Prieto Burton, DO   Multiple Vitamins-Minerals (THERAPEUTIC MULTIVITAMIN-MINERALS) tablet Take 1 tablet by mouth daily  8/24/22  Historical Provider, MD   vitamin D (ERGOCALCIFEROL) 1.25 MG (47450 UT) CAPS capsule TAKE 1 CAPSULE BY MOUTH ONE TIME PER WEEK 1/18/22 8/24/22  Prieto Robin,    Thiamine HCl (B-1) 100 MG TABS Take 1 pill daily 10/18/21 8/24/22  Cary Oreilly, DO   levothyroxine (SYNTHROID) 150 MCG tablet Take 1 tablet by mouth Daily 1/12/21   Cary Oreilly, DO   rosuvastatin (CRESTOR) 5 MG tablet Take 1 tablet by mouth daily 1/12/21   Cary Oreilly, DO   meloxicam (MOBIC) 15 MG tablet Take 1 tablet by mouth daily as needed for Pain 1/11/21   Cary Oreilly, DO       Allergies   Allergen Reactions    Azithromycin Other (See Comments)     Stomach pain    Bactrim [Sulfamethoxazole-Trimethoprim] Nausea Only    Ceftin [Cefuroxime] Rash    Keflex [Cephalexin] Rash       Social History     Socioeconomic History    Marital status:      Spouse name: Not on file    Number of children: Not on file    Years of education: Not on file    Highest education level: Not on file   Occupational History     Employer: [employer]   Tobacco Use    Smoking status: Never    Smokeless tobacco: Never   Vaping Use    Vaping Use: Never used   Substance and Sexual Activity    Alcohol use: Never    Drug use: Never    Sexual activity: Not on file   Other Topics Concern    Not on file   Social History Narrative    Not on file     Social Determinants of Health     Financial Resource Strain: Not on file   Food Insecurity: Not on file   Transportation Needs: Not on file   Physical Activity: Not on file   Stress: Not on file   Social Connections: Not on file   Intimate Partner Violence: Not on file   Housing Stability: Not on file       Family History   Problem Relation Age of Onset    Diabetes Mother     Diabetes Father     Rheum Arthritis Sister     Cancer Maternal Grandfather        REVIEW OF SYSTEMS:     Prashanth Ross denies fever/chills, chest pain, shortness of breath, new bowel or bladder complaints.  All other review of systems was negative. PHYSICAL EXAMINATION:      /73   Pulse 70   Temp 97.3 °F (36.3 °C) (Temporal)   Resp 18   Ht 5' 3\" (1.6 m)   Wt 178 lb (80.7 kg)   SpO2 95%   BMI 31.53 kg/m²   General:       General appearance:  Pleasant and well-hydrated, in no distress and A & O x 3  Build:Obese  Function: Rises from seated position easily and Moves about room without difficulty     HEENT:     Head:normocephalic, atraumatic    Lungs:     Breathing:normal breathing pattern      CVS:     RRR     Abdomen:     Shape:obese, non-distended and normal     Cervical spine:     Inspection:normal     Thoracic spine:                Spine inspection:normal      Lumbar spine:     Spine inspection: scar from the prior surgery noted- healed well  Palpation: Tenderness paravertebral muscles Yes bilaterally  Range of motion: Decreased, flexion Decreased, Lateral bending, extension and rotation bilaterally reduced is somewhat painful. Lower lumbar facet tenderness +  Sacroiliac joint tenderness   Piriformis tenderness: negative bilaterally  SLR : negative bilaterally     Musculoskeletal:     Trigger points no     Extremities:     Tremors:None bilaterally upper and lower  Edema:no  Distal LE Peripheral pulses felt     Knee Right:     ROM : pain +   Joint line tenderness : yes      Neurological:     Sensory: Normal to light touch      Motor:   No new changes     Gait: uses a cane to assist in ambulation. Dermatology:     Skin:no rashes or lesions noted    Assessment/Plan:   Diagnosis Orders   1. Postlaminectomy syndrome, lumbar        2. Lumbosacral spondylosis without myelopathy        3. DDD (degenerative disc disease), lumbar        4. Sacroiliac dysfunction        5. S/P bariatric surgery           65 y.o.  female with H/o L3-5 Lumbar fusion in May 2021 by Dr. Angelito Padilla. Low back pain and intermittent right LE pain. Has been evaluated by NSG- recommended conservative treatment- no surgical interventions.     CT myelogram reviewed-no significant stenosis    S/P  Spine interventions- had helped significantly- last injection in Feb 2022      Has chronic right shoulder pain and knee pain. Has been evaluated by Dr. Rajat Villatoro. Obesity: Follows with Bariatric medicine. S/P Bariatric surgery in Aug 2022- continues to loose weight. Current pain predominantly axial in nature, Lumbar facet tenderness +    Plan:    Bilateral lumbar facet injection L5-S1 below the fusion Under fluoroscopic guidance. RBA discussed. PT / Luna Cornea for prn use only to help with PT/ HEP and exercise- will prescribe Percocet 5/325 po prn 0.5 tab once or twice a day. Refill given # 30 (One script lasts for > 3 months). For prn use for severe pain only. Last filled # 60 on 9/12/2022. [place] reviewed- consistent. Recommended to take only for severe pain. To help with pain and functionality and do HEP/ PT. No signs of misuse abuse or diversion, no side effects, compliant with recommendations. RBA of chronic opioid use discussed. Complaint with the treatment recommendations. Continue Gabapentin. Cymbalta 60 mg Q hs. Can call for refill. F/U in 4-6 months. Opioid agreement- signed  10/6/2021- renew opioid agreement 1/30/2023. Salient features of opioid agreement discussed. Discussed issues with chronic opioid therapy including the phenomenon of tolerance/ dependence. Oral drug screen -10/6/2021-  result reviewed consistent. Will redo today 1/30/2023 (last dose taken couple of weeks ago- takes only prn). OARRS reviewed- today: Consistent      Counseling : Patient encouraged to stay active, to continue Regular home exercise program and to watch/lose weight     Treatment plan discussed with the patient including medication and procedure side effects. Controlled Substances Monitoring:   [place] reviewed.      We discussed with the patient that combining opioids, benzodiazepines, alcohol, illicit drugs or sleep aids increases the risk of respiratory depression including death. We discussed that these medications may cause drowsiness, sedation or dizziness and have counseled the patient not to drive or operate machinery. We have discussed that these medications will be prescribed only by one provider. We have discussed with the patient about age related risk factors and have thoroughly discussed the importance of taking these medications as prescribed. The patient verbalizes understanding.      Mark Lindsey MD    CC:  Chris Blunt,

## 2023-02-01 NOTE — PROGRESS NOTES
[facility]  INSTRUCTIONS  . .......................................................................................................................................... [x] Parking the day of Surgery is located in the [facility].   Upon entering the door, make immediate right into the surgery reception room    [x]  Bring photo ID and insurance card     [x] You may have a light breakfast day of procedure    [x]  Wear loose comfortable clothing    [x]  Please follow instructions for medications as given per Dr's office    [x] You can expect a call the business day prior to procedure to notify you of your arrival time     [x] Please arrange for     []  Other instructions

## 2023-02-06 ENCOUNTER — HOSPITAL ENCOUNTER (OUTPATIENT)
Age: 63
Setting detail: OUTPATIENT SURGERY
Discharge: HOME | End: 2023-02-06
Attending: ANESTHESIOLOGY | Admitting: ANESTHESIOLOGY
Payer: MEDICAID

## 2023-02-06 ENCOUNTER — HOSPITAL ENCOUNTER (OUTPATIENT)
Dept: GENERAL RADIOLOGY | Age: 63
Discharge: HOME | End: 2023-02-08
Attending: ANESTHESIOLOGY
Payer: MEDICAID

## 2023-02-06 VITALS
BODY MASS INDEX: 31.54 KG/M2 | OXYGEN SATURATION: 96 % | SYSTOLIC BLOOD PRESSURE: 114 MMHG | WEIGHT: 178 LBS | HEIGHT: 63 IN | DIASTOLIC BLOOD PRESSURE: 76 MMHG | HEART RATE: 68 BPM | RESPIRATION RATE: 18 BRPM | TEMPERATURE: 96.9 F

## 2023-02-06 DIAGNOSIS — R52 PAIN MANAGEMENT: ICD-10-CM

## 2023-02-06 DIAGNOSIS — M47.816 LUMBAR SPONDYLOSIS: ICD-10-CM

## 2023-02-06 PROCEDURE — 64493 INJ PARAVERT F JNT L/S 1 LEV: CPT | Performed by: ANESTHESIOLOGY

## 2023-02-06 PROCEDURE — 6360000004 HC RX CONTRAST MEDICATION: Performed by: ANESTHESIOLOGY

## 2023-02-06 PROCEDURE — 2500000003 HC RX 250 WO HCPCS: Performed by: ANESTHESIOLOGY

## 2023-02-06 PROCEDURE — 3600000012 HC SURGERY LEVEL 2 ADDTL 15MIN: Performed by: ANESTHESIOLOGY

## 2023-02-06 PROCEDURE — 2709999900 HC NON-CHARGEABLE SUPPLY: Performed by: ANESTHESIOLOGY

## 2023-02-06 PROCEDURE — 7100000011 HC PHASE II RECOVERY - ADDTL 15 MIN: Performed by: ANESTHESIOLOGY

## 2023-02-06 PROCEDURE — 6360000002 HC RX W HCPCS: Performed by: ANESTHESIOLOGY

## 2023-02-06 PROCEDURE — 3600000002 HC SURGERY LEVEL 2 BASE: Performed by: ANESTHESIOLOGY

## 2023-02-06 PROCEDURE — 7100000010 HC PHASE II RECOVERY - FIRST 15 MIN: Performed by: ANESTHESIOLOGY

## 2023-02-06 PROCEDURE — 3209999900 FLUORO FOR SURGICAL PROCEDURES

## 2023-02-06 RX ORDER — BUPIVACAINE HYDROCHLORIDE 5 MG/ML
INJECTION, SOLUTION EPIDURAL; INTRACAUDAL PRN
Status: DISCONTINUED | OUTPATIENT
Start: 2023-02-06 | End: 2023-02-06 | Stop reason: ALTCHOICE

## 2023-02-06 RX ORDER — SODIUM CHLORIDE 0.9 % (FLUSH) 0.9 %
5-40 SYRINGE (ML) INJECTION PRN
Status: DISCONTINUED | OUTPATIENT
Start: 2023-02-06 | End: 2023-02-06 | Stop reason: HOSPADM

## 2023-02-06 RX ORDER — SODIUM CHLORIDE 9 MG/ML
INJECTION, SOLUTION INTRAVENOUS PRN
Status: DISCONTINUED | OUTPATIENT
Start: 2023-02-06 | End: 2023-02-06 | Stop reason: HOSPADM

## 2023-02-06 RX ORDER — METHYLPREDNISOLONE ACETATE 40 MG/ML
INJECTION, SUSPENSION INTRA-ARTICULAR; INTRALESIONAL; INTRAMUSCULAR; SOFT TISSUE PRN
Status: DISCONTINUED | OUTPATIENT
Start: 2023-02-06 | End: 2023-02-06 | Stop reason: ALTCHOICE

## 2023-02-06 RX ORDER — SODIUM CHLORIDE 0.9 % (FLUSH) 0.9 %
5-40 SYRINGE (ML) INJECTION EVERY 12 HOURS SCHEDULED
Status: DISCONTINUED | OUTPATIENT
Start: 2023-02-06 | End: 2023-02-06 | Stop reason: HOSPADM

## 2023-02-06 RX ORDER — LIDOCAINE HYDROCHLORIDE 5 MG/ML
INJECTION, SOLUTION INFILTRATION; INTRAVENOUS PRN
Status: DISCONTINUED | OUTPATIENT
Start: 2023-02-06 | End: 2023-02-06 | Stop reason: ALTCHOICE

## 2023-02-06 ASSESSMENT — PAIN DESCRIPTION - FREQUENCY
FREQUENCY: CONTINUOUS

## 2023-02-06 ASSESSMENT — PAIN DESCRIPTION - DESCRIPTORS
DESCRIPTORS: DISCOMFORT
DESCRIPTORS: DISCOMFORT

## 2023-02-06 ASSESSMENT — PAIN DESCRIPTION - ORIENTATION
ORIENTATION: RIGHT

## 2023-02-06 ASSESSMENT — PAIN - FUNCTIONAL ASSESSMENT: PAIN_FUNCTIONAL_ASSESSMENT: 0-10

## 2023-02-06 ASSESSMENT — PAIN SCALES - GENERAL
PAINLEVEL_OUTOF10: 4

## 2023-02-06 ASSESSMENT — PAIN DESCRIPTION - LOCATION
LOCATION: BACK
LOCATION: BACK;LEG
LOCATION: BACK

## 2023-02-06 ASSESSMENT — PAIN DESCRIPTION - PAIN TYPE
TYPE: CHRONIC PAIN

## 2023-02-06 NOTE — DISCHARGE INSTRUCTIONS
Juan July  Dr. Malcolm Herrera Block/Radiofrequency  Home Going Instructions    1-Go home, rest for the remainder of the day  2-Please do not lift over 20 pounds the day of the injection  3-If you received sedation No: alcohol, driving, operating lawn mowers, plows, tractors or other dangerous equipment until next morning. Do not make important decisions or sign legal documents for 24 hours. You may experience light headedness, dizziness, nausea or sleepiness after sedation. Do not stay alone. A responsible adult must be with you for 24 hours. You could be nauseated from the medications you have received. Your IV site may be sore and bruised. 4-No dietary restrictions     5-Resume all medications the same day, blood thinners to be resumed 24 hours after injection if you were instructed to stop any. 6-Keep the surgical site clean and dry, you may shower the next morning and remove the      dressing. 7- No sitz baths, tub baths or hot tubs/swimming for 24 hours. 8- If you have any pain at the injection site(s), application of an ice pack to the area should be       helpful, 20 minutes on/20 minutes off for next 48 hours. 9- Call [facility] immediately at if you develop.   Fever greater than 100.4 F  Have bleeding or drainage from the puncture site  Have progressive Leg/arm numbness and or weakness  Loss of control of bowel and or bladder (wet/soil yourself)  Severe headache with inability to lift head  10-You may return to work the next day

## 2023-02-06 NOTE — INTERVAL H&P NOTE
Update History & Physical    The patient's History and Physical of January 30, 2023 was reviewed with the patient and I examined the patient. There was no change. The surgical site was confirmed by the patient and me. Plan: The risks, benefits, expected outcome, and alternative to the recommended procedure have been discussed with the patient. Patient understands and wants to proceed with the procedure.      Electronically signed by Jovan Torre MD on 2/6/2023

## 2023-02-06 NOTE — OP NOTE
Operative Note      Patient: Daksha Montana  YOB: 1960  MRN: [id number]    Date of Procedure: 2023    Pre-Op Diagnosis: Lumbar spondylosis [M47.816]    Post-Op Diagnosis: Same       Procedure(s):  BILATERAL LUMBAR FACET JOINT  BLOCK UNDER FLUOROSCOPIC GUIDANCE AT L5-S1    Surgeon(s):  Kwaku Sarkar MD    Assistant:   * No surgical staff found *    Anesthesia: Local    Estimated Blood Loss (mL): Minimal    Complications: None    Specimens:   * No specimens in log *    Implants:  * No implants in log *      Drains: * No LDAs found *    Findings: good needle placement    Detailed Description of Procedure:   2023    Patient: Daksha Montana  :  1960  Age:  65 y.o. Sex:  female     PRE-OPERATIVE DIAGNOSIS:  Lumbar spondylosis, lumbar facet syndrome. POST-OPERATIVE DIAGNOSIS: Same. PROCEDURE:   Fluoroscopic guided lumbar facet joint blocks Bilateral at Levels: L5-S1. SURGEON: Kwaku Sarkar MD    ANESTHESIA: Local    ESTIMATED BLOOD LOSS: None.  ______________________________________________________________________  BRIEF HISTORY:  Daksha Montana comes in today for  fluoroscopic guided lumbar facet joint  blocks  Bilateral  at Levels: L5-S1. The potential complications of this procedure were discussed with her again today. She has elected to undergo the aforementioned procedure. Bridgett complete History & Physical examination were reviewed in depth, a copy of which is in the chart. DESCRIPTION OF PROCEDURE:   After confirming written and informed consent, a time-out was performed and Bridgett name and date of birth, the procedure to be performed as well as the plan for the location of the needle insertion were confirmed. The patient was brought into the procedure room and placed in the prone position on the fluoroscopy table. Standard monitors were placed and vital signs were observed throughout the procedure.  The area of the lumbar spine was prepped with chloraprep and draped in a sterile manner. AP fluoroscopy was used to identify and osbaldo the facet joints at the targeted levels. The skin and subcutaneous tissues in these identified areas were anesthetized with 0.5%Lidocaine. TWO # 22 # gauge 3.5 inch spinal needle was advanced toward the planned facet joints. Satisfactory needle placement was confirmed by AP and oblique projections. Once bone was contacted and negative aspiration was confirmed 0.2 cc of Isovue M 300 was injected on each level with good local spread. A solution of 0.5% marcaine 0.5 cc with 20 DepoMedrol  was then injected at each level. Following the procedure Justin Steward noted improvement of previous pain symptoms. Disposition the patient tolerated the procedure well and there were no complications . Vital signs remained stable throughout the procedure. The patient was escorted to the recovery area where they remained until discharge and written discharge instructions for the procedure were given. Plan: Justin Steward will return to our pain management center as scheduled.      Onur Balderas MD

## 2023-02-20 ENCOUNTER — TELEPHONE (OUTPATIENT)
Dept: PAIN MANAGEMENT | Age: 63
End: 2023-02-20

## 2023-02-20 DIAGNOSIS — M96.1 POSTLAMINECTOMY SYNDROME, LUMBAR: ICD-10-CM

## 2023-02-20 NOTE — TELEPHONE ENCOUNTER
Jaime Cota will need another Rx for Percocet sent to [address] [place] [address] in [facility]. The original script cancelled out after two weeks according to the pharmacy. The pharmacy shut down briefly due to the train derailment and kayden was not able to get her medication.

## 2023-02-21 RX ORDER — OXYCODONE HYDROCHLORIDE AND ACETAMINOPHEN 5; 325 MG/1; MG/1
0.5 TABLET ORAL 2 TIMES DAILY PRN
Qty: 30 TABLET | Refills: 0 | Status: SHIPPED | OUTPATIENT
Start: 2023-02-21 | End: 2023-03-23

## 2023-03-06 ENCOUNTER — PROCEDURE VISIT (OUTPATIENT)
Dept: PODIATRY | Age: 63
End: 2023-03-06
Payer: MEDICAID

## 2023-03-06 VITALS
SYSTOLIC BLOOD PRESSURE: 122 MMHG | TEMPERATURE: 97.6 F | BODY MASS INDEX: 31.53 KG/M2 | WEIGHT: 178 LBS | DIASTOLIC BLOOD PRESSURE: 79 MMHG

## 2023-03-06 DIAGNOSIS — M79.675 PAIN IN LEFT TOE(S): ICD-10-CM

## 2023-03-06 DIAGNOSIS — E11.9 ENCOUNTER FOR DIABETIC FOOT EXAM (HCC): ICD-10-CM

## 2023-03-06 DIAGNOSIS — M79.674 PAIN IN TOE OF RIGHT FOOT: ICD-10-CM

## 2023-03-06 DIAGNOSIS — E11.9 TYPE 2 DIABETES MELLITUS WITHOUT COMPLICATION, WITHOUT LONG-TERM CURRENT USE OF INSULIN (HCC): ICD-10-CM

## 2023-03-06 DIAGNOSIS — I73.9 PERIPHERAL VASCULAR DISEASE, UNSPECIFIED (HCC): ICD-10-CM

## 2023-03-06 DIAGNOSIS — B35.1 TINEA UNGUIUM: Primary | ICD-10-CM

## 2023-03-06 PROCEDURE — 11721 DEBRIDE NAIL 6 OR MORE: CPT | Performed by: PODIATRIST

## 2023-03-06 ASSESSMENT — ENCOUNTER SYMPTOMS
GASTROINTESTINAL NEGATIVE: 1
RESPIRATORY NEGATIVE: 1

## 2023-03-06 NOTE — PROGRESS NOTES
1 [facility] PODIATRY  [address] Jonna Morales  [facility] [address]  Dept: [phone]  Dept Fax: [phone]  Loc: [phone]    DIABETIC NAIL PROGRESS NOTE  Date of patient's visit: 3/6/2023  Patient's Name:  Arben Arteaga YOB: 1960            Patient Care Team:  Naveed Shahid DO as PCP - General (Family Medicine)  Naveed Shahid DO as PCP - Empaneled Provider          Chief Complaint   Patient presents with    Diabetes    Toe Pain     Saw pcp Dr. Carlene Merritt 2/6/23       Subjective:   Arben Arteaga comes to clinic for Diabetes and Toe Pain (Saw pcp Dr. Carlene Merritt 2/6/23)    she is a diabetic and states that foot exam .  Pt currently has complaint of thickened, elongated nails that they cannot manage by themselves. Pt's primary care physician is Naveed Shahid DO    . Lab Results   Component Value Date    LABA1C 5.1 11/15/2022      Complains of numbness in the feet bilat. Past Medical History:   Diagnosis Date    Back pain     Chronic fatigue     COVID-19 09/2020    mild per patient    Diabetes mellitus ([address]     diet controlled    History of blood transfusion     Hyperlipidemia     Hypothyroidism     IBS (irritable bowel syndrome)     Morbid obesity due to excess calories (HCC)     Obesity     Osteoarthritis     PONV (postoperative nausea and vomiting)        Allergies   Allergen Reactions    Azithromycin Other (See Comments)     Stomach pain    Bactrim [Sulfamethoxazole-Trimethoprim] Nausea Only    Ceftin [Cefuroxime] Rash    Keflex [Cephalexin] Rash     Current Outpatient Medications on File Prior to Visit   Medication Sig Dispense Refill    oxyCODONE-acetaminophen (PERCOCET) 5-325 MG per tablet Take 0.5 tablets by mouth 2 times daily as needed for Pain for up to 30 days.  Max Daily Amount: 1 tablet 30 tablet 0    sucralfate (CARAFATE) 1 GM tablet Take 1 g by mouth 4 times daily      levothyroxine (SYNTHROID) 200 MCG tablet take 1 tablet by mouth every morning 90 tablet 1    diclofenac sodium (VOLTAREN) 1 % GEL APPLY 4 GRAMS TOPICALLY 4 TIMES DAILY AS NEEDED FOR PAIN 100 g 3    cyclobenzaprine (FLEXERIL) 10 MG tablet TAKE 1 TABLET BY MOUTH THREE TIMES A DAY AS NEEDED FOR MUSCLE SPASMS 90 tablet 2    gabapentin (NEURONTIN) 800 MG tablet Take 1 tablet by mouth 3 times daily for 90 days. 270 tablet 0    Handicap Placard MISC by Does not apply route Patient cannot walk 200 ft without stopping to rest.    Expiration 5 yrs 1 each 0    omeprazole (PRILOSEC) 20 MG delayed release capsule TAKE 1 CAPSULE BY MOUTH EVERY DAY 90 capsule 3    linaclotide (LINZESS) 145 MCG capsule TAKE 1 CAPSULE BY MOUTH EVERY DAY IN THE MORNING BEFORE BREAKFAST 90 capsule 3    [DISCONTINUED] pravastatin (PRAVACHOL) 10 MG tablet TAKE 1 TABLET BY MOUTH EVERY OTHER DAY 45 tablet 1    [DISCONTINUED] omega-3 acid ethyl esters (LOVAZA) 1 g capsule TAKE 1 CAPSULE BY MOUTH EVERY DAY 90 capsule 3    [DISCONTINUED] vitamin B-12 (CYANOCOBALAMIN) 100 MCG tablet Take 1 tablet by mouth in the morning. 90 tablet 3    [DISCONTINUED] valsartan (DIOVAN) 80 MG tablet TAKE 1 TABLET BY MOUTH EVERY DAY 90 tablet 1    [DISCONTINUED] Multiple Vitamins-Minerals (THERAPEUTIC MULTIVITAMIN-MINERALS) tablet Take 1 tablet by mouth daily      [DISCONTINUED] vitamin D (ERGOCALCIFEROL) 1.25 MG (25618 UT) CAPS capsule TAKE 1 CAPSULE BY MOUTH ONE TIME PER WEEK 12 capsule 1    [DISCONTINUED] Thiamine HCl (B-1) 100 MG TABS Take 1 pill daily 90 tablet 1    [DISCONTINUED] levothyroxine (SYNTHROID) 150 MCG tablet Take 1 tablet by mouth Daily 90 tablet 1    [DISCONTINUED] rosuvastatin (CRESTOR) 5 MG tablet Take 1 tablet by mouth daily 90 tablet 1    [DISCONTINUED] meloxicam (MOBIC) 15 MG tablet Take 1 tablet by mouth daily as needed for Pain 30 tablet 5     No current facility-administered medications on file prior to visit. Review of Systems   Respiratory: Negative.      Cardiovascular: Negative. Gastrointestinal: Negative. Review of Systems:   History obtained from chart review and the patient  General ROS: negative for - chills, fatigue, fever, night sweats or weight gain  Constitutional: Negative for chills, diaphoresis, fatigue, fever and unexpected weight change. Musculoskeletal: Positive for arthralgias, gait problem and joint swelling. Neurological ROS: negative for - behavioral changes, confusion, headaches or seizures. Negative for weakness and numbness. Dermatological ROS: negative for - mole changes, rash  Cardiovascular: Negative for leg swelling. Gastrointestinal: Negative for constipation, diarrhea, nausea and vomiting. Objective:  General: AAO x 3 in NAD. Derm  Toenail Description nails are thick and mycotic yellow incurvated causing pain with shoe gear  Sites of Onychomycosis Involvement (Check affected area)  [x] [x] [x] [x] [x] [x] [x] [x] [x] [x]  5 4 3 2 1 1 2 3 4 5                          Right                                        Left    Thickness  [x] [x] [x] [x] [x] [x] [x] [x] [x] [x]  5 4 3 2 1 1 2 3 4 5                         Right                                        Left    Dystrophic Changes   [x] [x] [x] [x] [x] [x] [x] [x] [x] [x]  5 4 3 2 1 1 2 3 4 5                         Right                                        Left    Color   [x] [x] [x] [x] [x] [x] [x] [x] [x] [x]  5 4 3 2 1 1 2 3 4 5                          Right                                        Left    Incurvation/Ingrowin   [x] [x] [x] [x] [x] [x] [x] [x] [x] [x]  5 4 3 2 1 1 2 3 4 5                         Right                                        Left    Inflammation/Pain   [x] [x] [x] [x] [x] [x] [x] [x] [x] [x]  5 4 3 2 1 1 2 3 4 5                         Right                                        Left      Dermatologic Exam:  Skin lesion/ulcerat.    Skin   Loss of hair  lower extremity      Musculoskeletal:     1st MPJ ROM decreased, Bilateral.  Muscle Bilateral.  Pain present upon palpation of toenails 1-5, Bilateral. decreased medial longitudinal arch, Bilateral.  Ankle ROM decreased,Bilateral.    Dorsally contracted digits     Vascular: DP and PT pulses   CFT < seconds, Bilateral.  Hair growth absent to the level of the digits, Bilateral.  Edema  Bilateral.  Varicosities  Bilateral.     Neurological: Sensation diminshed to light touch to level of digits, Bilateral.  Protective sensation  sites via 5.07/10g Zephyr-Cameron Monofilament, Bilateral.  negative Tinel's, Bilateral.  negative Valleix sign, Bilateral.      Integument: nails   thickened > 3.0 mm, dystrophic and crumbly, discolored with subungual debris. Toes cool to touch    Visual inspection:  Deformity: hammertoe deformity rodrick feet  amputation:     Edema:   Sensory exam:  Monofilament sensation: abnormal - 6/10 via SW 5.07/10g monofilament to the plantar foot bilateral feet    Pulses:     Pinprick: Impaired  Proprioception: Impaired  Vibration (128 Hz): Impaired    Visual inspection:  Deformity/amputation: absent  Skin lesions/pre-ulcerative calluses: absent  Edema: right- negative, left- negative    Sensory exam:  Monofilament sensation: abnormal -   (minimum of 5 random plantar locations tested, avoiding callused areas - > 1 area with absence of sensation is + for neuropathy)    Plus at least one of the following:  Pulses: abnormal - , using Doppler DP pulses were absent PT pulses were absent  Pinprick: Impaired  Proprioception: Impaired and Absent  Vibration (128 Hz): Impaired    DM with PVD       [x]Yes    []no    Foot Exam     Ortho Exam      Assessment:  65 y.o. female with:  Aleksandar Hill was seen today for diabetes and toe pain.     Diagnoses and all orders for this visit:    Tinea unguium    Type 2 diabetes mellitus without complication, without long-term current use of insulin (HCC)    Pain in left toe(s)    Pain in toe of right foot    Peripheral vascular disease, unspecified ([facility] [address]    Encounter for diabetic foot exam ([facility]           Q7   []Yes    []No                Q8   [x]Yes    []No                     Q9   []Yes    []No    Plan:   Pt was evaluated and examined. Patient was given personalized discharge instructions. Nails 1-10 were debrided sharply in length and thickness with a nipper and , without incident. Pt will follow up in 3 months or sooner if any problems arise. Diagnosis was discussed with the pt and all of their questions were answered in detail. Proper foot hygiene and care was discussed with the pt. Informed patient on proper diabetic foot care and importance of tight glycemic control. Patient to check feet daily and contact the office with any questions/problems/concerns. Other comorbidity noted and will be managed by PCP. @ED   It was discussed in detail with the patient proper hygiene for the diabetic foot. They are to get into a habit of looking at their feet or have someone look at them. If they are unable to daily, they are to look for any signs of redness, blistering, cracking, swelling, drainage, open lesions, etc.  They are to dry in-between the toes after each bath or shower gently. The water should be tested with their elbow to prevent burns. The patient is to refrain from soaking their feet unless instructed by myself to do otherwise. They are to refrain from going barefoot. Shoe gear should be inspected for any foreign objects. Shoes should have a deep, wide toe box area. With any type of shoe, the feet should be inspected for any signs of pressure, i.e., redness, blistering, or open lesions. The patient was instructed to contact myself or other health care provider with any questions.    Electronically signed by Nereyda Elder DPM on 3/6/2023 at 10:39 AM  3/6/2023

## 2023-03-08 RX ORDER — OMEPRAZOLE 20 MG/1
20 CAPSULE, DELAYED RELEASE ORAL DAILY
Qty: 30 CAPSULE | Refills: 3 | Status: SHIPPED | OUTPATIENT
Start: 2023-03-08

## 2023-03-14 RX ORDER — DULOXETIN HYDROCHLORIDE 60 MG/1
CAPSULE, DELAYED RELEASE ORAL
Qty: 90 CAPSULE | OUTPATIENT
Start: 2023-03-14

## 2023-03-15 NOTE — TELEPHONE ENCOUNTER
I left message regarding refill and need for any future refills to be addressed at her office appointments. Detail Level: Simple

## 2023-03-20 ENCOUNTER — SCHEDULED TELEPHONE ENCOUNTER (OUTPATIENT)
Dept: BARIATRICS/WEIGHT MGMT | Age: 63
End: 2023-03-20

## 2023-03-20 DIAGNOSIS — Z71.3 DIETARY COUNSELING: Primary | ICD-10-CM

## 2023-03-20 PROCEDURE — 99999 PR OFFICE/OUTPT VISIT,PROCEDURE ONLY: CPT

## 2023-03-20 NOTE — PROGRESS NOTES
records.       Pt. is able to verbalize diet concepts         Yes - Pt. diet was advanced to the following stage ( See above)     Good - Dietary Compliance   Encouraged increase in daily water/fluids intake

## 2023-03-21 ENCOUNTER — OFFICE VISIT (OUTPATIENT)
Dept: BARIATRICS/WEIGHT MGMT | Age: 63
End: 2023-03-21
Payer: MEDICAID

## 2023-03-21 VITALS
RESPIRATION RATE: 20 BRPM | SYSTOLIC BLOOD PRESSURE: 115 MMHG | TEMPERATURE: 97.7 F | HEART RATE: 67 BPM | WEIGHT: 168 LBS | HEIGHT: 63 IN | DIASTOLIC BLOOD PRESSURE: 69 MMHG | BODY MASS INDEX: 29.77 KG/M2

## 2023-03-21 DIAGNOSIS — E46 PROTEIN MALNUTRITION (HCC): Primary | ICD-10-CM

## 2023-03-21 PROCEDURE — 99212 OFFICE O/P EST SF 10 MIN: CPT

## 2023-03-21 PROCEDURE — 99213 OFFICE O/P EST LOW 20 MIN: CPT | Performed by: NURSE PRACTITIONER

## 2023-03-21 RX ORDER — OMEPRAZOLE 20 MG/1
20 CAPSULE, DELAYED RELEASE ORAL DAILY
COMMUNITY

## 2023-03-21 NOTE — PATIENT INSTRUCTIONS
Please continue to take your vitamin and mineral supplements as instructed. If you received a blood work prescription today for laboratory monitoring due prior to your next routine follow-up visit, please have this blood work obtained 10 to 14 days prior to your next visit. It is important to fast for 12 hours prior to routine weight loss surgery blood work, EXCEPT for drinking water, to ensure accuracy of results. Please report nausea, vomiting, abdominal pain, or any other problems you experience to your surgeon. For problems related to weight loss surgery, it is best to go to [address] [place] [address] Emergency Department and have your surgeon paged.

## 2023-03-21 NOTE — PROGRESS NOTES
6 month LRYGB. Patient drinking 64 oz daily and protein shakes. Taking vitamins and supplements. Regular BM.
PLATES ,SCREWS, --NUVASIVE performed by Irma Bush MD at . [address] Bilateral 10/11/2021    BILATERAL SACROILIAC JOINT INJECTION UNDER FLUOROSCOPY (CPT 66409) performed by Mary Sarmiento MD at . [address] Bilateral 11/8/2021    BILATERAL LUMBAR FACET INJECTION AT L5-S 1 FACETS BLOCK UNDER FLUOROSCOPIC GUIDANCE performed by Mary Sarmiento MD at . [address] Bilateral 12/21/2021    BILATERAL S1, S2, S3 BRANCH & L5 [facility] NERVE BLOCK UNDER XRAY GUIDANCE (85467) (SEDATION) performed by Mary Sarmiento MD at [address] [facility] Bilateral 2/6/2023    BILATERAL LUMBAR FACET JOINT  BLOCK UNDER FLUOROSCOPIC GUIDANCE AT L5-S1 performed by Mary Sarmiento MD at [address]  N/A 2/7/2022    LUMBAR EPIDURAL STEROID INJECTION UNDER FLUOROSCOPIC GUIDANCE AT L5-S1 PARAMEDIAN performed by Mary Sarmiento MD at [address] [facility] N/A 8/23/2022    GASTRIC BYPASS ANNA MARIE-EN-Y LAPAROSCOPIC ROBOTIC XI performed by Chris Jacques MD at [address] [facility] 3/9/2022    EGD BIOPSY performed by Chris Jacques MD at [place] [address]       Current Outpatient Medications   Medication Sig Dispense Refill    linaclotide (LINZESS) 145 MCG capsule Take 145 mcg by mouth every morning (before breakfast)      omeprazole (PRILOSEC) 20 MG delayed release capsule Take 20 mg by mouth daily      oxyCODONE-acetaminophen (PERCOCET) 5-325 MG per tablet Take 0.5 tablets by mouth 2 times daily as needed for Pain for up to 30 days. Max Daily Amount: 1 tablet 30 tablet 0    levothyroxine (SYNTHROID) 200 MCG tablet take 1 tablet by mouth every morning 90 tablet 1    diclofenac sodium (VOLTAREN) 1 % GEL APPLY 4 GRAMS TOPICALLY 4 TIMES DAILY AS NEEDED FOR PAIN 100 g 3     No current facility-administered medications for this visit.        Allergies   Allergen Reactions    Azithromycin Other (See Comments)     Stomach pain

## 2023-05-31 ENCOUNTER — PREP FOR PROCEDURE (OUTPATIENT)
Dept: PAIN MANAGEMENT | Age: 63
End: 2023-05-31

## 2023-05-31 ENCOUNTER — OFFICE VISIT (OUTPATIENT)
Dept: PAIN MANAGEMENT | Age: 63
End: 2023-05-31
Payer: MEDICAID

## 2023-05-31 VITALS
TEMPERATURE: 97.2 F | WEIGHT: 168 LBS | DIASTOLIC BLOOD PRESSURE: 76 MMHG | HEIGHT: 63 IN | OXYGEN SATURATION: 95 % | HEART RATE: 66 BPM | RESPIRATION RATE: 16 BRPM | BODY MASS INDEX: 29.77 KG/M2 | SYSTOLIC BLOOD PRESSURE: 120 MMHG

## 2023-05-31 DIAGNOSIS — M96.1 LUMBAR POSTLAMINECTOMY SYNDROME: Primary | ICD-10-CM

## 2023-05-31 DIAGNOSIS — M48.062 SPINAL STENOSIS OF LUMBAR REGION WITH NEUROGENIC CLAUDICATION: ICD-10-CM

## 2023-05-31 DIAGNOSIS — F11.90 CHRONIC, CONTINUOUS USE OF OPIOIDS: ICD-10-CM

## 2023-05-31 DIAGNOSIS — Z98.1 S/P LUMBAR FUSION: ICD-10-CM

## 2023-05-31 DIAGNOSIS — Z98.84 S/P BARIATRIC SURGERY: ICD-10-CM

## 2023-05-31 DIAGNOSIS — M51.36 DDD (DEGENERATIVE DISC DISEASE), LUMBAR: ICD-10-CM

## 2023-05-31 DIAGNOSIS — M47.817 LUMBOSACRAL SPONDYLOSIS WITHOUT MYELOPATHY: ICD-10-CM

## 2023-05-31 DIAGNOSIS — M53.3 SACROILIAC DYSFUNCTION: ICD-10-CM

## 2023-05-31 DIAGNOSIS — M53.3 SACROILIAC DYSFUNCTION: Primary | ICD-10-CM

## 2023-05-31 PROCEDURE — 99213 OFFICE O/P EST LOW 20 MIN: CPT | Performed by: ANESTHESIOLOGY

## 2023-05-31 PROCEDURE — 99214 OFFICE O/P EST MOD 30 MIN: CPT | Performed by: ANESTHESIOLOGY

## 2023-05-31 RX ORDER — SODIUM CHLORIDE 9 MG/ML
INJECTION, SOLUTION INTRAVENOUS PRN
Status: CANCELLED | OUTPATIENT
Start: 2023-05-31

## 2023-05-31 RX ORDER — SODIUM CHLORIDE 0.9 % (FLUSH) 0.9 %
5-40 SYRINGE (ML) INJECTION EVERY 12 HOURS SCHEDULED
Status: CANCELLED | OUTPATIENT
Start: 2023-05-31

## 2023-05-31 RX ORDER — SODIUM CHLORIDE 0.9 % (FLUSH) 0.9 %
5-40 SYRINGE (ML) INJECTION PRN
Status: CANCELLED | OUTPATIENT
Start: 2023-05-31

## 2023-05-31 RX ORDER — OXYCODONE HYDROCHLORIDE AND ACETAMINOPHEN 5; 325 MG/1; MG/1
0.5 TABLET ORAL 2 TIMES DAILY PRN
Qty: 30 TABLET | Refills: 0 | Status: SHIPPED | OUTPATIENT
Start: 2023-05-31 | End: 2023-06-30

## 2023-05-31 NOTE — PROGRESS NOTES
Harry Moulton presents to the [facility] on 5/31/2023. Diallo Ortega is complaining of pain in her low back. The pain is constant. The pain is described as aching, dull, and sharp. Pain is rated on her best day at a 2, on her worst day at a 9, and on average at a 6 on the VAS scale. Any procedures since your last visit: No    Pacemaker or defibrillator: No    She is not on NSAIDS and is not on anticoagulation medications. Medication Contract and Consent for Opioid Use Documents Filed       Patient Documents       Type of Document Status Date Received Received By Description    Medication Contract Received 10/6/2021 10:43 AM SHANE CRENSHAW pain management    Medication Contract Received 1/30/2023 10:41 AM JOSAFAT GARCIA pain mangement agreement                    /76   Pulse 66   Temp 97.2 °F (36.2 °C) (Infrared)   Resp 16   Ht 5' 3\" (1.6 m)   Wt 168 lb (76.2 kg)   SpO2 95%   BMI 29.76 kg/m²      No LMP recorded. Patient has had a hysterectomy.
Balint & ortho. Obesity: Follows with Bariatric medicine. S/P Bariatric surgery in Aug 2022- continues to loose weight. Current pain predominantly axial in nature, Lumbar facet tenderness +    S/P Lumbar facet injection on 2/6/2023- excellent pain relief of facet pain. Current pain over the left SIJ. + SIJ signs. Doing HEP    Plan:    Left SIJ injection Under fluoroscopic guidance. RBA discussed. PT / HEP    If LE bothers much, consider repeat ZAK in future. Percocet for prn use only to help with PT/ HEP and exercise- will prescribe Percocet 5/325 po prn 0.5 tab once or twice a day. Uses prn use for severe pain only. Last filled # 30 on 2/21/2023. OARRS reviewed- consistent. Refill given # 30  today 5/31/2023 (One script lasts for almost 3 months). Recommended to take only for severe pain. To help with pain and functionality and do HEP/ PT. No signs of misuse abuse or diversion, no side effects, compliant with recommendations. RBA of chronic opioid use discussed. Complaint with the treatment recommendations. Continue Gabapentin(does not take it regularly)- can call for refill. Cymbalta 60 mg Q hs. Can call for refill. F/U in 4-6 months. Opioid agreement- signed  10/6/2021- renew opioid agreement 1/30/2023. Salient features of opioid agreement discussed. Discussed issues with chronic opioid therapy including the phenomenon of tolerance/ dependence. Oral drug screen -10/6/2021-  result reviewed consistent. on 1/30/2023 (last dose taken couple of weeks ago- takes only prn)- result reviewed - consistent. OARRS reviewed- today: Consistent      Counseling : Patient encouraged to stay active, to continue Regular home exercise program and to watch/lose weight     Treatment plan discussed with the patient including medication and procedure side effects. Controlled Substances Monitoring:   OARRS reviewed.      We discussed with the patient that combining opioids,

## 2023-06-05 ENCOUNTER — OFFICE VISIT (OUTPATIENT)
Dept: PRIMARY CARE CLINIC | Age: 63
End: 2023-06-05
Payer: MEDICAID

## 2023-06-05 DIAGNOSIS — E03.9 ACQUIRED HYPOTHYROIDISM: ICD-10-CM

## 2023-06-05 DIAGNOSIS — E78.49 OTHER HYPERLIPIDEMIA: ICD-10-CM

## 2023-06-05 DIAGNOSIS — E11.9 TYPE 2 DIABETES MELLITUS WITHOUT COMPLICATION, WITHOUT LONG-TERM CURRENT USE OF INSULIN (HCC): ICD-10-CM

## 2023-06-05 DIAGNOSIS — Z98.84 S/P BARIATRIC SURGERY: ICD-10-CM

## 2023-06-05 DIAGNOSIS — E46 PROTEIN MALNUTRITION (HCC): ICD-10-CM

## 2023-06-05 DIAGNOSIS — E11.9 TYPE 2 DIABETES MELLITUS WITHOUT COMPLICATION, WITHOUT LONG-TERM CURRENT USE OF INSULIN (HCC): Primary | ICD-10-CM

## 2023-06-05 LAB
ALBUMIN SERPL-MCNC: 3.6 G/DL (ref 3.5–5.2)
ALP SERPL-CCNC: 121 U/L (ref 35–104)
ALT SERPL-CCNC: 18 U/L (ref 0–32)
ANION GAP SERPL CALCULATED.3IONS-SCNC: 12 MMOL/L (ref 7–16)
AST SERPL-CCNC: 22 U/L (ref 0–31)
BILIRUB SERPL-MCNC: 0.8 MG/DL (ref 0–1.2)
BUN SERPL-MCNC: 14 MG/DL (ref 6–23)
CALCIUM SERPL-MCNC: 9.3 MG/DL (ref 8.6–10.2)
CHLORIDE SERPL-SCNC: 105 MMOL/L (ref 98–107)
CHOLESTEROL, TOTAL: 123 MG/DL (ref 0–199)
CO2 SERPL-SCNC: 25 MMOL/L (ref 22–29)
CREAT SERPL-MCNC: 0.6 MG/DL (ref 0.5–1)
ERYTHROCYTE [DISTWIDTH] IN BLOOD BY AUTOMATED COUNT: 13.7 FL (ref 11.5–15)
FERRITIN SERPL-MCNC: 98 NG/ML
FOLATE SERPL-MCNC: 8.4 NG/ML (ref 4.8–24.2)
GLUCOSE SERPL-MCNC: 77 MG/DL (ref 74–99)
HBA1C MFR BLD: 5 % (ref 4–5.6)
HCT VFR BLD AUTO: 45 % (ref 34–48)
HDLC SERPL-MCNC: 30 MG/DL
HGB BLD-MCNC: 14 G/DL (ref 11.5–15.5)
LDLC SERPL CALC-MCNC: 78 MG/DL (ref 0–99)
MCH RBC QN AUTO: 28.9 PG (ref 26–35)
MCHC RBC AUTO-ENTMCNC: 31.1 % (ref 32–34.5)
MCV RBC AUTO: 93 FL (ref 80–99.9)
PLATELET # BLD AUTO: 188 E9/L (ref 130–450)
PMV BLD AUTO: 10.1 FL (ref 7–12)
POTASSIUM SERPL-SCNC: 3.9 MMOL/L (ref 3.5–5)
PROT SERPL-MCNC: 6.2 G/DL (ref 6.4–8.3)
RBC # BLD AUTO: 4.84 E12/L (ref 3.5–5.5)
SODIUM SERPL-SCNC: 142 MMOL/L (ref 132–146)
TRIGL SERPL-MCNC: 74 MG/DL (ref 0–149)
TSH SERPL-MCNC: 3.09 UIU/ML (ref 0.27–4.2)
VIT B12 SERPL-MCNC: 401 PG/ML (ref 211–946)
VITAMIN D 25-HYDROXY: 32 NG/ML (ref 30–100)
VLDLC SERPL CALC-MCNC: 15 MG/DL
WBC # BLD: 5 E9/L (ref 4.5–11.5)

## 2023-06-05 PROCEDURE — 99214 OFFICE O/P EST MOD 30 MIN: CPT | Performed by: FAMILY MEDICINE

## 2023-06-05 ASSESSMENT — ENCOUNTER SYMPTOMS
ABDOMINAL PAIN: 0
NAUSEA: 0
VOMITING: 0
DIARRHEA: 0
COLOR CHANGE: 0
RHINORRHEA: 0
EYE REDNESS: 0
EYE ITCHING: 0
APNEA: 0
COUGH: 0
SORE THROAT: 0
SHORTNESS OF BREATH: 0
EYE PAIN: 0
SINUS PRESSURE: 0
CHEST TIGHTNESS: 0
BACK PAIN: 0
WHEEZING: 0
CONSTIPATION: 0
BLOOD IN STOOL: 0
VISUAL CHANGE: 0

## 2023-06-05 ASSESSMENT — PATIENT HEALTH QUESTIONNAIRE - PHQ9
1. LITTLE INTEREST OR PLEASURE IN DOING THINGS: 0
SUM OF ALL RESPONSES TO PHQ9 QUESTIONS 1 & 2: 0
2. FEELING DOWN, DEPRESSED OR HOPELESS: 0
SUM OF ALL RESPONSES TO PHQ QUESTIONS 1-9: 0

## 2023-06-05 NOTE — PROGRESS NOTES
Class 3 severe obesity due to excess calories with serious comorbidity and body mass index (BMI) of 45.0 to 49.9 in adult [facility])    Chronic fatigue    GERD (gastroesophageal reflux disease)    Morbid obesity (HCC)    S/P bariatric surgery    Lumbar spondylosis       Past Medical History:   Diagnosis Date    Back pain     Chronic fatigue     COVID-19 09/2020    mild per patient    Diabetes mellitus ([address]     diet controlled    History of blood transfusion     Hyperlipidemia     Hypothyroidism     IBS (irritable bowel syndrome)     Morbid obesity due to excess calories (HCC)     Obesity     Osteoarthritis     PONV (postoperative nausea and vomiting)        Past Surgical History:   Procedure Laterality Date    BACK SURGERY      CHOLECYSTECTOMY      COLONOSCOPY      HYSTERECTOMY, TOTAL ABDOMINAL (CERVIX REMOVED)      INCONTINENCE SURGERY      BLADDER SLING    KNEE SURGERY Left     LUMBAR SPINE SURGERY N/A 5/25/2021    L3- L5 DECOMPRESSION AND FUSION , L4- L5 TRANSFORAMINAL LUMBAR INTERBODY FUSION --OARM, PATY TABLE, AUDIOLOGY, PLATES ,SCREWS, --NUVASIVE performed by Gutierrez Marcano MD at . [address] Bilateral 10/11/2021    BILATERAL SACROILIAC JOINT INJECTION UNDER FLUOROSCOPY (CPT 79746) performed by Shalini Orlando MD at . [address] Bilateral 11/8/2021    BILATERAL LUMBAR FACET INJECTION AT L5-S 1 FACETS BLOCK UNDER FLUOROSCOPIC GUIDANCE performed by Shalini Orlando MD at . [address] Bilateral 12/21/2021    BILATERAL S1, S2, S3 BRANCH & L5 [facility] NERVE BLOCK UNDER XRAY GUIDANCE (07278) (SEDATION) performed by Shalini Orlando MD at [address] [facility] Bilateral 2/6/2023    BILATERAL LUMBAR FACET JOINT  BLOCK UNDER FLUOROSCOPIC GUIDANCE AT L5-S1 performed by Shalini Orlando MD at [address] N/A 2/7/2022    LUMBAR EPIDURAL STEROID INJECTION UNDER FLUOROSCOPIC GUIDANCE AT L5-S1 PARAMEDIAN performed by Shalini Orlando MD at

## 2023-06-06 VITALS
HEIGHT: 63 IN | TEMPERATURE: 98.2 F | HEART RATE: 62 BPM | DIASTOLIC BLOOD PRESSURE: 74 MMHG | OXYGEN SATURATION: 98 % | SYSTOLIC BLOOD PRESSURE: 124 MMHG | WEIGHT: 172 LBS | BODY MASS INDEX: 30.48 KG/M2

## 2023-06-07 ENCOUNTER — PROCEDURE VISIT (OUTPATIENT)
Dept: PODIATRY | Age: 63
End: 2023-06-07
Payer: MEDICAID

## 2023-06-07 VITALS
WEIGHT: 172 LBS | TEMPERATURE: 97.8 F | BODY MASS INDEX: 30.47 KG/M2 | DIASTOLIC BLOOD PRESSURE: 76 MMHG | SYSTOLIC BLOOD PRESSURE: 130 MMHG

## 2023-06-07 DIAGNOSIS — E11.9 TYPE 2 DIABETES MELLITUS WITHOUT COMPLICATION, WITHOUT LONG-TERM CURRENT USE OF INSULIN (HCC): ICD-10-CM

## 2023-06-07 DIAGNOSIS — M79.675 PAIN IN LEFT TOE(S): ICD-10-CM

## 2023-06-07 DIAGNOSIS — I73.9 PERIPHERAL VASCULAR DISEASE, UNSPECIFIED (HCC): ICD-10-CM

## 2023-06-07 DIAGNOSIS — M79.674 PAIN IN TOE OF RIGHT FOOT: ICD-10-CM

## 2023-06-07 DIAGNOSIS — B35.1 TINEA UNGUIUM: Primary | ICD-10-CM

## 2023-06-07 DIAGNOSIS — E11.9 ENCOUNTER FOR DIABETIC FOOT EXAM (HCC): ICD-10-CM

## 2023-06-07 LAB — ZINC SERPL-MCNC: 69.7 UG/DL (ref 60–120)

## 2023-06-07 PROCEDURE — 11721 DEBRIDE NAIL 6 OR MORE: CPT | Performed by: PODIATRIST

## 2023-06-07 ASSESSMENT — ENCOUNTER SYMPTOMS
RESPIRATORY NEGATIVE: 1
GASTROINTESTINAL NEGATIVE: 1

## 2023-06-07 NOTE — PROGRESS NOTES
1 [facility] PODIATRY  [address]  [place] [facility] [address]  Dept: [phone]  Dept Fax: [phone]  Loc: [phone]    DIABETIC NAIL PROGRESS NOTE  Date of patient's visit: 6/7/2023  Patient's Name:  Renne Dandy YOB: 1960            Patient Care Team:  Albaro Maxwell DO as PCP - General (Family Medicine)  Albaro Maxwell DO as PCP - Empaneled Provider          Chief Complaint   Patient presents with    Diabetes    Toe Pain     Albaro Maxwell DO  6/5/23       Subjective:   Renne Dandy comes to clinic for Diabetes and Toe Pain (Prieto Lara, /6/5/23)    she is a diabetic and states that foot exam .  Pt currently has complaint of thickened, elongated nails that they cannot manage by themselves. Pt's primary care physician is Albaro Maxwell DO    . Lab Results   Component Value Date    LABA1C 5.0 06/05/2023      Complains of numbness in the feet bilat. Past Medical History:   Diagnosis Date    Back pain     Chronic fatigue     COVID-19 09/2020    mild per patient    Diabetes mellitus ([address]     diet controlled    History of blood transfusion     Hyperlipidemia     Hypothyroidism     IBS (irritable bowel syndrome)     Morbid obesity due to excess calories (HCC)     Obesity     Osteoarthritis     PONV (postoperative nausea and vomiting)        Allergies   Allergen Reactions    Azithromycin Other (See Comments)     Stomach pain    Bactrim [Sulfamethoxazole-Trimethoprim] Nausea Only    Ceftin [Cefuroxime] Rash    Keflex [Cephalexin] Rash     Current Outpatient Medications on File Prior to Visit   Medication Sig Dispense Refill    oxyCODONE-acetaminophen (PERCOCET) 5-325 MG per tablet Take 0.5 tablets by mouth 2 times daily as needed for Pain for up to 30 days.  Max Daily Amount: 1 tablet 30 tablet 0    linaclotide (LINZESS) 145 MCG capsule Take 1 capsule by mouth every morning (before breakfast)      omeprazole

## 2023-06-08 LAB
ANNOTATION COMMENT IMP: NORMAL
RETINYL PALMITATE SERPL-MCNC: <0.02 MG/L (ref 0–0.1)
VIT A SERPL-MCNC: 0.31 MG/L (ref 0.3–1.2)

## 2023-06-11 LAB — VIT B1 BLD-MCNC: 106 NMOL/L (ref 70–180)

## 2023-06-21 ENCOUNTER — OFFICE VISIT (OUTPATIENT)
Dept: BARIATRICS/WEIGHT MGMT | Age: 63
End: 2023-06-21
Payer: MEDICAID

## 2023-06-21 VITALS
BODY MASS INDEX: 28.7 KG/M2 | HEIGHT: 63 IN | TEMPERATURE: 97.1 F | HEART RATE: 53 BPM | DIASTOLIC BLOOD PRESSURE: 58 MMHG | WEIGHT: 162 LBS | RESPIRATION RATE: 20 BRPM | SYSTOLIC BLOOD PRESSURE: 117 MMHG

## 2023-06-21 DIAGNOSIS — K91.2 MALNUTRITION FOLLOWING GASTROINTESTINAL SURGERY: Primary | ICD-10-CM

## 2023-06-21 PROCEDURE — 99211 OFF/OP EST MAY X REQ PHY/QHP: CPT

## 2023-06-21 PROCEDURE — 99213 OFFICE O/P EST LOW 20 MIN: CPT | Performed by: NURSE PRACTITIONER

## 2023-06-21 NOTE — PATIENT INSTRUCTIONS
Please continue to take your vitamin and mineral supplements as instructed. If you received a blood work prescription today for laboratory monitoring due prior to your next routine follow-up visit, please have this blood work obtained 10 to 14 days prior to your next visit. It is important to fast for 12 hours prior to routine weight loss surgery blood work, EXCEPT for drinking water, to ensure accuracy of results. Please report nausea, vomiting, abdominal pain, or any other problems you experience to your surgeon. For problems related to weight loss surgery, it is best to go to [address] [place] [address] Emergency Department and have your surgeon paged.

## 2023-06-21 NOTE — PROGRESS NOTES
10 MG tablet Take 1 tablet by mouth 3 times daily as needed for Muscle spasms      DULoxetine (CYMBALTA) 60 MG extended release capsule Take 1 capsule by mouth daily      oxyCODONE-acetaminophen (PERCOCET) 5-325 MG per tablet Take 0.5 tablets by mouth 2 times daily as needed for Pain for up to 30 days. Max Daily Amount: 1 tablet 30 tablet 0    linaclotide (LINZESS) 145 MCG capsule Take 1 capsule by mouth every morning (before breakfast)      omeprazole (PRILOSEC) 20 MG delayed release capsule Take 1 capsule by mouth daily As needed for GERD       diclofenac sodium (VOLTAREN) 1 % GEL APPLY 4 GRAMS TOPICALLY 4 TIMES DAILY AS NEEDED FOR PAIN 100 g 3     No current facility-administered medications for this visit.        Allergies   Allergen Reactions    Azithromycin Other (See Comments)     Stomach pain    Bactrim [Sulfamethoxazole-Trimethoprim] Nausea Only    Ceftin [Cefuroxime] Rash    Keflex [Cephalexin] Rash       Family History   Problem Relation Age of Onset    Diabetes Mother     Diabetes Father     Rheum Arthritis Sister     Cancer Maternal Grandfather        Social History     Socioeconomic History    Marital status:      Spouse name: Not on file    Number of children: Not on file    Years of education: Not on file    Highest education level: Not on file   Occupational History     Employer: [employer]   Tobacco Use    Smoking status: Never    Smokeless tobacco: Never   Vaping Use    Vaping Use: Never used   Substance and Sexual Activity    Alcohol use: Never    Drug use: Never    Sexual activity: Not on file   Other Topics Concern    Not on file   Social History Narrative    Not on file     Social Determinants of Health     Financial Resource Strain: Not on file   Food Insecurity: Not on file   Transportation Needs: Not on file   Physical Activity: Not on file   Stress: Not on file   Social Connections: Not on file   Intimate Partner Violence: Not on file   Housing Stability: Not

## 2023-06-27 RX ORDER — OMEPRAZOLE 20 MG/1
CAPSULE, DELAYED RELEASE ORAL
Qty: 30 CAPSULE | Refills: 5 | Status: SHIPPED | OUTPATIENT
Start: 2023-06-27

## 2023-08-07 ENCOUNTER — TELEPHONE (OUTPATIENT)
Dept: PRIMARY CARE CLINIC | Age: 63
End: 2023-08-07

## 2023-08-07 DIAGNOSIS — Z98.1 S/P LUMBAR FUSION: Primary | ICD-10-CM

## 2023-08-07 DIAGNOSIS — M48.062 SPINAL STENOSIS OF LUMBAR REGION WITH NEUROGENIC CLAUDICATION: ICD-10-CM

## 2023-08-07 DIAGNOSIS — M96.1 POSTLAMINECTOMY SYNDROME, LUMBAR: ICD-10-CM

## 2023-10-09 ENCOUNTER — PROCEDURE VISIT (OUTPATIENT)
Dept: PODIATRY | Age: 63
End: 2023-10-09
Payer: MEDICARE

## 2023-10-09 ENCOUNTER — OFFICE VISIT (OUTPATIENT)
Dept: PAIN MANAGEMENT | Age: 63
End: 2023-10-09
Payer: MEDICARE

## 2023-10-09 ENCOUNTER — PREP FOR PROCEDURE (OUTPATIENT)
Dept: PAIN MANAGEMENT | Age: 63
End: 2023-10-09

## 2023-10-09 VITALS
HEIGHT: 63 IN | HEART RATE: 68 BPM | WEIGHT: 172 LBS | RESPIRATION RATE: 16 BRPM | DIASTOLIC BLOOD PRESSURE: 75 MMHG | OXYGEN SATURATION: 95 % | SYSTOLIC BLOOD PRESSURE: 123 MMHG | TEMPERATURE: 97.9 F | BODY MASS INDEX: 30.48 KG/M2

## 2023-10-09 VITALS
SYSTOLIC BLOOD PRESSURE: 124 MMHG | TEMPERATURE: 97.6 F | WEIGHT: 172 LBS | BODY MASS INDEX: 30.47 KG/M2 | DIASTOLIC BLOOD PRESSURE: 68 MMHG

## 2023-10-09 DIAGNOSIS — M79.675 PAIN IN LEFT TOE(S): ICD-10-CM

## 2023-10-09 DIAGNOSIS — M53.3 SACROILIAC DYSFUNCTION: ICD-10-CM

## 2023-10-09 DIAGNOSIS — I73.9 PERIPHERAL VASCULAR DISEASE, UNSPECIFIED (HCC): ICD-10-CM

## 2023-10-09 DIAGNOSIS — B35.1 TINEA UNGUIUM: Primary | ICD-10-CM

## 2023-10-09 DIAGNOSIS — M54.16 LUMBAR RADICULAR PAIN: Primary | ICD-10-CM

## 2023-10-09 DIAGNOSIS — E11.9 ENCOUNTER FOR DIABETIC FOOT EXAM (HCC): ICD-10-CM

## 2023-10-09 DIAGNOSIS — M51.36 DDD (DEGENERATIVE DISC DISEASE), LUMBAR: Primary | ICD-10-CM

## 2023-10-09 DIAGNOSIS — M47.817 LUMBOSACRAL SPONDYLOSIS WITHOUT MYELOPATHY: ICD-10-CM

## 2023-10-09 DIAGNOSIS — E11.9 TYPE 2 DIABETES MELLITUS WITHOUT COMPLICATION, WITHOUT LONG-TERM CURRENT USE OF INSULIN (HCC): ICD-10-CM

## 2023-10-09 DIAGNOSIS — M79.674 PAIN IN RIGHT TOE(S): ICD-10-CM

## 2023-10-09 DIAGNOSIS — M54.16 LUMBAR RADICULAR PAIN: ICD-10-CM

## 2023-10-09 DIAGNOSIS — M96.1 POSTLAMINECTOMY SYNDROME, LUMBAR: ICD-10-CM

## 2023-10-09 PROCEDURE — 99214 OFFICE O/P EST MOD 30 MIN: CPT | Performed by: ANESTHESIOLOGY

## 2023-10-09 PROCEDURE — 99214 OFFICE O/P EST MOD 30 MIN: CPT

## 2023-10-09 PROCEDURE — 11721 DEBRIDE NAIL 6 OR MORE: CPT | Performed by: PODIATRIST

## 2023-10-09 RX ORDER — SODIUM CHLORIDE 0.9 % (FLUSH) 0.9 %
5-40 SYRINGE (ML) INJECTION PRN
Status: CANCELLED | OUTPATIENT
Start: 2023-10-09

## 2023-10-09 RX ORDER — OXYCODONE HYDROCHLORIDE AND ACETAMINOPHEN 5; 325 MG/1; MG/1
1 TABLET ORAL 2 TIMES DAILY PRN
Qty: 30 TABLET | Refills: 0 | Status: SHIPPED | OUTPATIENT
Start: 2023-10-09 | End: 2023-11-08

## 2023-10-09 RX ORDER — SODIUM CHLORIDE 9 MG/ML
INJECTION, SOLUTION INTRAVENOUS PRN
Status: CANCELLED | OUTPATIENT
Start: 2023-10-09

## 2023-10-09 RX ORDER — SODIUM CHLORIDE 0.9 % (FLUSH) 0.9 %
5-40 SYRINGE (ML) INJECTION EVERY 12 HOURS SCHEDULED
Status: CANCELLED | OUTPATIENT
Start: 2023-10-09

## 2023-10-09 ASSESSMENT — ENCOUNTER SYMPTOMS
GASTROINTESTINAL NEGATIVE: 1
RESPIRATORY NEGATIVE: 1

## 2023-10-09 NOTE — H&P (VIEW-ONLY)
[facility]  [place], [address] [place] [address]  Dept: [phone]      Follow up Note      Sonia Leija     Date of Visit:  10/9/2023    CC:  Patient presents for follow up   Chief Complaint   Patient presents with    Follow-up    Lower Back Pain       HPI:  Low back pain. S/P L3-5 fusion and L4-5 TLIF in May 2021. Has noticed right LE pain after the surgery. Has been followed by NSG - imaging showed Intact fusion. Low back pain and LE pain- Has tingling of the foot (h/o DM). Pain causes functional limitations/ limits Adl's : Yes     Nursing notes and details of the pain history reviewed. Please see intake notes for details. Previous treatments:   Physical Therapy : yes, continues HEP. Medications: - NSAID's : yes                        - Membrane stabilizers : yes - gabapentin                       - Opioids : yes prn                       - Adjuvants or Others : yes,      Surgeries: yes, L3-5 fusion in May 2021     She has not been on anticoagulation medications      She has not been on herbal supplements. She is diabetic. 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 Degenerative and postsurgical changes as described above. There has been   prior bilateral laminectomy and posterior fusion at L3 through L5.   2. No evidence of significant central canal stenosis or disc herniation. 3. Mild bilateral L5-S1 neural foraminal stenosis. X- ray cervical, thoracic and lumbar spine: 1/19/2022:  FINDINGS:   C-spine: Mild degenerative disc disease primarily C5-6. No fracture or   dislocation. Normal soft tissues. T-spine: Mild multilevel degenerative disc disease. No fracture or   dislocation. Normal thoracic soft tissues. Mild thoracic dextroscoliosis.        Lumbar spine: Intact hardware associated with posterior

## 2023-10-09 NOTE — PROGRESS NOTES
PCP.  @ED   It was discussed in detail with the patient proper hygiene for the diabetic foot. They are to get into a habit of looking at their feet or have someone look at them. If they are unable to daily, they are to look for any signs of redness, blistering, cracking, swelling, drainage, open lesions, etc.  They are to dry in-between the toes after each bath or shower gently. The water should be tested with their elbow to prevent burns. The patient is to refrain from soaking their feet unless instructed by myself to do otherwise. They are to refrain from going barefoot. Shoe gear should be inspected for any foreign objects. Shoes should have a deep, wide toe box area. With any type of shoe, the feet should be inspected for any signs of pressure, i.e., redness, blistering, or open lesions. The patient was instructed to contact myself or other health care provider with any questions.    Electronically signed by Yanci Prescott DPM on 10/9/2023 at 7:28 AM  10/9/2023

## 2023-10-12 ENCOUNTER — TELEPHONE (OUTPATIENT)
Dept: PAIN MANAGEMENT | Age: 63
End: 2023-10-12

## 2023-10-12 DIAGNOSIS — M54.16 LUMBAR RADICULOPATHY: ICD-10-CM

## 2023-10-12 NOTE — TELEPHONE ENCOUNTER
Shayy to [facility] that procedure was approved for 10/19/2023 and that Bere Nicholson should call her a few days before for the pre op call and between 2:00 PM and 4:00 PM  the business day before with the arrival time. Instructed Conchis to hold ibuprofen for 24 hours, naprosyn for 4 days and any aspirin containing products or fish oil for 7 days. Instructed to call office back if any questions. Carrol Mcbride verbalized understanding.     Electronically signed by Alejandro Manning RN on 10/12/2023 at 1:02 PM

## 2023-10-16 NOTE — PROGRESS NOTES
[address] [facility] PAIN MANAGEMENT  INSTRUCTIONS  . .......................................................................................................................................... [x] Parking the day of Surgery is located in the [facility].   Upon entering the door, make immediate right into the surgery reception room    [x]  Bring photo ID and insurance card     [x] You may have a light breakfast day of procedure    [x]  Wear loose comfortable clothing    [x]  Please follow instructions for medications as given per Dr's office    [x] You can expect a call the business day prior to procedure to notify you of your arrival time     [x] Please arrange for     []  Other instructions

## 2023-10-19 ENCOUNTER — HOSPITAL ENCOUNTER (OUTPATIENT)
Dept: GENERAL RADIOLOGY | Age: 63
Setting detail: OUTPATIENT SURGERY
Discharge: HOME | End: 2023-10-21
Attending: ANESTHESIOLOGY
Payer: MEDICARE

## 2023-10-19 ENCOUNTER — HOSPITAL ENCOUNTER (OUTPATIENT)
Age: 63
Setting detail: OUTPATIENT SURGERY
Discharge: HOME | End: 2023-10-19
Attending: ANESTHESIOLOGY | Admitting: ANESTHESIOLOGY
Payer: MEDICARE

## 2023-10-19 VITALS
SYSTOLIC BLOOD PRESSURE: 116 MMHG | RESPIRATION RATE: 18 BRPM | WEIGHT: 170 LBS | TEMPERATURE: 98.1 F | HEART RATE: 52 BPM | DIASTOLIC BLOOD PRESSURE: 67 MMHG | BODY MASS INDEX: 30.12 KG/M2 | OXYGEN SATURATION: 97 % | HEIGHT: 63 IN

## 2023-10-19 DIAGNOSIS — R52 PAIN MANAGEMENT: ICD-10-CM

## 2023-10-19 PROCEDURE — 6360000002 HC RX W HCPCS: Performed by: ANESTHESIOLOGY

## 2023-10-19 PROCEDURE — 64483 NJX AA&/STRD TFRM EPI L/S 1: CPT | Performed by: ANESTHESIOLOGY

## 2023-10-19 PROCEDURE — 7100000011 HC PHASE II RECOVERY - ADDTL 15 MIN: Performed by: ANESTHESIOLOGY

## 2023-10-19 PROCEDURE — 2500000003 HC RX 250 WO HCPCS: Performed by: ANESTHESIOLOGY

## 2023-10-19 PROCEDURE — 7100000010 HC PHASE II RECOVERY - FIRST 15 MIN: Performed by: ANESTHESIOLOGY

## 2023-10-19 PROCEDURE — 2709999900 HC NON-CHARGEABLE SUPPLY: Performed by: ANESTHESIOLOGY

## 2023-10-19 PROCEDURE — 6360000004 HC RX CONTRAST MEDICATION: Performed by: ANESTHESIOLOGY

## 2023-10-19 PROCEDURE — 3600000002 HC SURGERY LEVEL 2 BASE: Performed by: ANESTHESIOLOGY

## 2023-10-19 RX ORDER — IOPAMIDOL 612 MG/ML
INJECTION, SOLUTION INTRATHECAL PRN
Status: DISCONTINUED | OUTPATIENT
Start: 2023-10-19 | End: 2023-10-19 | Stop reason: ALTCHOICE

## 2023-10-19 RX ORDER — METHYLPREDNISOLONE ACETATE 40 MG/ML
INJECTION, SUSPENSION INTRA-ARTICULAR; INTRALESIONAL; INTRAMUSCULAR; SOFT TISSUE PRN
Status: DISCONTINUED | OUTPATIENT
Start: 2023-10-19 | End: 2023-10-19 | Stop reason: ALTCHOICE

## 2023-10-19 RX ORDER — SODIUM CHLORIDE 0.9 % (FLUSH) 0.9 %
5-40 SYRINGE (ML) INJECTION EVERY 12 HOURS SCHEDULED
Status: DISCONTINUED | OUTPATIENT
Start: 2023-10-19 | End: 2023-10-19 | Stop reason: HOSPADM

## 2023-10-19 RX ORDER — SODIUM CHLORIDE 9 MG/ML
INJECTION, SOLUTION INTRAVENOUS PRN
Status: DISCONTINUED | OUTPATIENT
Start: 2023-10-19 | End: 2023-10-19 | Stop reason: HOSPADM

## 2023-10-19 RX ORDER — LIDOCAINE HYDROCHLORIDE 5 MG/ML
INJECTION, SOLUTION INFILTRATION; INTRAVENOUS PRN
Status: DISCONTINUED | OUTPATIENT
Start: 2023-10-19 | End: 2023-10-19 | Stop reason: ALTCHOICE

## 2023-10-19 RX ORDER — SODIUM CHLORIDE 0.9 % (FLUSH) 0.9 %
5-40 SYRINGE (ML) INJECTION PRN
Status: DISCONTINUED | OUTPATIENT
Start: 2023-10-19 | End: 2023-10-19 | Stop reason: HOSPADM

## 2023-10-19 ASSESSMENT — PAIN - FUNCTIONAL ASSESSMENT: PAIN_FUNCTIONAL_ASSESSMENT: 0-10

## 2023-10-19 ASSESSMENT — PAIN DESCRIPTION - DESCRIPTORS: DESCRIPTORS: ACHING

## 2023-10-19 ASSESSMENT — PAIN DESCRIPTION - ORIENTATION: ORIENTATION: RIGHT;LEFT;LOWER

## 2023-10-19 ASSESSMENT — PAIN DESCRIPTION - LOCATION: LOCATION: BACK;LEG

## 2023-10-19 ASSESSMENT — PAIN SCALES - GENERAL: PAINLEVEL_OUTOF10: 5

## 2023-10-19 NOTE — INTERVAL H&P NOTE
Update History & Physical    The patient's History and Physical of October 9, 2023 was reviewed with the patient and I examined the patient. There was no change. The surgical site was confirmed by the patient and me. Plan: The risks, benefits, expected outcome, and alternative to the recommended procedure have been discussed with the patient. Patient understands and wants to proceed with the procedure.      Electronically signed by Uma Lombardi MD on 10/19/2023

## 2023-10-19 NOTE — DISCHARGE INSTRUCTIONS
[facility]/Radio[facility]  Home Going Instructions    1-Go home, rest for the remainder of the day  2-Please do not lift over 20 pounds the day of the injection  3-If you received sedation No: alcohol, driving, operating lawn mowers, plows, tractors or other dangerous equipment until next morning. Do not make important decisions or sign legal documents for 24 hours. You may experience light headedness, dizziness, nausea or sleepiness after sedation. Do not stay alone. A responsible adult must be with you for 24 hours. You could be nauseated from the medications you have received. Your IV site may be sore and bruised. 4-No dietary restrictions     5-Resume all medications the same day, blood thinners to be resumed 24 hours after injection if you were instructed to stop any. 6-Keep the surgical site clean and dry, you may shower the next morning and remove the      dressing. 7- No sitz baths, tub baths or hot tubs/swimming for 24 hours. 8- If you have any pain at the injection site(s), application of an ice pack to the area should be       helpful, 20 minutes on/20 minutes off for next 48 hours. 9- Call [facility] immediately at if you develop.   Fever greater than 100.4 F  Have bleeding or drainage from the puncture site  Have progressive Leg/arm numbness and or weakness  Loss of control of bowel and or bladder (wet/soil yourself)  Severe headache with inability to lift head  10-You may return to work the next day

## 2023-10-19 NOTE — PROGRESS NOTES
Patient verbalizes the discomfort in left leg is improving, ORTIZ with difficulties, discharge instructions given to patient and spouse, denies any questions.

## 2023-10-19 NOTE — OP NOTE
Operative Note      Patient: Sonia Leija  YOB: 1960  MRN: [id number]    Date of Procedure: 10/19/2023    Pre-Op Diagnosis Codes:     * Lumbar radiculopathy [M54.16]    Post-Op Diagnosis: Same       Procedure(s):  LUMBAR TRANSFORAMINAL EPIDURAL STEROID INJECTION BILATERAL S1 UNDER FLUOROSCOPIC GUIDANCE    Surgeon(s):  Nish Matias MD    Assistant:   * No surgical staff found *    Anesthesia: Local    Estimated Blood Loss (mL): Minimal    Complications: None    Specimens:   * No specimens in log *    Implants:  * No implants in log *      Drains: * No LDAs found *    Findings: good needle placement        Detailed Description of Procedure:   10/19/2023    Patient: Sonia Leija  :  1960  Age:  59 y.o. Sex:  female     PRE-OPERATIVE DIAGNOSIS: Lumbar disc displacement, lumbar neural foraminal stenosis, lumbar radiculopathy. POST-OPERATIVE DIAGNOSIS: Same. PROCEDURE: Bilateral Transforaminal epidural steroid injection under fluoroscopic guidance at foraminal level S1.    SURGEON: Nish Matias MD    ANESTHESIA: Local    ESTIMATED BLOOD LOSS: None.  ______________________________________________________________________  BRIEF HISTORY: Sonia Leija comes in today for the  Bilateral transforaminal epidural steroid injection under fluoroscopic guidance at foraminal level S1 . The potential complications of this procedure were discussed with her again today. She has elected to undergo the aforementioned procedure. Van complete History & Physical examination were reviewed in depth, a copy of which is in the chart. DESCRIPTION OF PROCEDURE:    After confirming written and informed consent, a time-out was performed and Van name and date of birth, the procedure to be performed as well as the plan for the location of the needle insertion were confirmed. The patient was brought into the procedure room and placed in the prone position on the fluoroscopy table.

## 2023-10-30 NOTE — PROGRESS NOTES
[facility] NEUROSURGERY  [facility] [address] [phone]       Chief Complaint:   Chief Complaint   Patient presents with    Back Pain     ct myelogram results, back pain comes and goes       HPI:     I had the pleasure of seeing Mercy Russo today in neurosurgical clinic. As you know, this 62year old woman underwent an L3-L5 PLIF by Dr. Rodney Bahena last May. She was being worked up by our [email or web address] who ordered a CT myelogram to evaluate for a structural cause for her RLE pain. She presents with the results in hand. Since we last saw her she denies new neurological complaint.     Past Medical History:   Diagnosis Date    Back pain     Chronic fatigue     COVID-19 09/2020    mild per patient    Diabetes mellitus ([address]     History of blood transfusion     Hyperlipidemia     Hypothyroidism     IBS (irritable bowel syndrome)     Morbid obesity due to excess calories (HCC)     Obesity     Osteoarthritis     PONV (postoperative nausea and vomiting)      Past Surgical History:   Procedure Laterality Date    BACK SURGERY      CHOLECYSTECTOMY      COLONOSCOPY      HYSTERECTOMY, TOTAL ABDOMINAL (CERVIX REMOVED)      INCONTINENCE SURGERY      BLADDER SLING    KNEE SURGERY Left     LUMBAR SPINE SURGERY N/A 5/25/2021    L3- L5 DECOMPRESSION AND FUSION , L4- L5 TRANSFORAMINAL LUMBAR INTERBODY FUSION --OARM, PATY TABLE, AUDIOLOGY, PLATES ,SCREWS, --NUVASIVE performed by Marlyn Lizama MD at [address] [place] [address] Bilateral 10/11/2021    BILATERAL [address] (CPT [id number]) performed by Cherelle Garcia MD at [address] [place] [address] Bilateral 11/8/2021    BILATERAL LUMBAR FACET INJECTION AT L5-S 1 FACETS BLOCK UNDER FLUOROSCOPIC GUIDANCE performed by Cherelle Garcia MD at [address] [place] [address] Bilateral 12/21/2021    BILATERAL S1, S2, S3 BRANCH & L5  [facility] NERVE BLOCK UNDER XRAY GUIDANCE ([address]) (SEDATION) performed by Savanna Hernandez MD at [facility] N/A 2/7/2022    LUMBAR EPIDURAL STEROID INJECTION UNDER FLUOROSCOPIC GUIDANCE AT L5-S1 PARAMEDIAN [facility] N/A 3/9/2022    EGD BIOPSY performed by Mo Dumas MD at [address] History   Problem Relation Age of Onset    Diabetes Mother     Diabetes Father    Kasia Barger Rheum Arthritis Sister     Cancer Maternal Grandfather       Social History     Socioeconomic History    Marital status:      Spouse name: Not on file    Number of children: Not on file    Years of education: Not on file    Highest education level: Not on file   Occupational History     Employer: [employer]   Tobacco Use    Smoking status: Never Smoker    Smokeless tobacco: Never Used   Vaping Use    Vaping Use: Never used   Substance and Sexual Activity    Alcohol use: Never    Drug use: Never    Sexual activity: Not on file   Other Topics Concern    Not on file   Social History Narrative    Not on file     Social Determinants of Health     Financial Resource Strain: Low Risk     Difficulty of Paying Living Expenses: Not hard at all   Food Insecurity: No Food Insecurity    Worried About Running Out of Food in the Last Year: Never true    [address] in the Last Year: Never true   Transportation Needs:     Lack of Transportation (Medical): Not on file    Lack of Transportation (Non-Medical):  Not on file   Physical Activity:     Days of Exercise per Week: Not on file    Minutes of Exercise per Session: Not on file   Stress:     Feeling of Stress : Not on file   Social Connections:     Frequency of Communication with Friends and Family: Not on file    Frequency of Social Gatherings with Friends and Family: Not on file    Attends [employer] Services: Not on file   Kasia Mora Member of Clubs or Organizations: Not on file    Attends Club or Organization Meetings: Not on file    Marital Status: Not on file   Intimate Partner Violence:     Fear of Current or Ex-Partner: Not on file    Emotionally Abused: Not on file    Physically Abused: Not on file    Sexually Abused: Not on file   Housing Stability:     Unable to Pay for Housing in the Last Year: Not on file    Number of Places Lived in the Last Year: Not on file    Unstable Housing in the Last Year: Not on file       Medications:   Current Outpatient Medications   Medication Sig Dispense Refill    levothyroxine (SYNTHROID) 175 MCG tablet TAKE 1 TABLET BY MOUTH EVERY DAY 90 tablet 1    omega-3 acid ethyl esters (LOVAZA) 1 g capsule TAKE 1 CAPSULE BY MOUTH TWICE A  capsule 1    valsartan (DIOVAN) 80 MG tablet TAKE 1 TABLET BY MOUTH EVERY DAY 90 tablet 1    topiramate (TOPAMAX) 25 MG tablet Take 1 tablet by mouth 2 times daily 180 tablet 1    diclofenac sodium (VOLTAREN) 1 % GEL APPLY 4 GRAMS TOPICALLY 4 TIMES DAILY AS NEEDED FOR PAIN 100 g 3    cyclobenzaprine (FLEXERIL) 10 MG tablet TAKE 1 TABLET BY MOUTH THREE TIMES A DAY AS NEEDED FOR MUSCLE SPASMS 90 tablet 2    OZEMPIC, 1 MG/DOSE, 4 MG/3ML SOPN INJECT 1 MG INTO THE SKIN ONCE A WEEK 9 mL 2    DULoxetine (CYMBALTA) 60 MG extended release capsule Take 1 capsule by mouth daily 90 capsule 1    pravastatin (PRAVACHOL) 10 MG tablet TAKE 1 TABLET BY MOUTH EVERY OTHER DAY 45 tablet 1    Handicap Placard MISC by Does not apply route Patient cannot walk 200 ft without stopping to rest.    Expiration 5 yrs 1 each 0    Multiple Vitamins-Minerals (THERAPEUTIC MULTIVITAMIN-MINERALS) tablet Take 1 tablet by mouth daily      vitamin D (ERGOCALCIFEROL) 1.25 MG (33411 UT) CAPS capsule TAKE 1 CAPSULE BY MOUTH ONE TIME PER WEEK 12 capsule 1    vitamin B-12 (CYANOCOBALAMIN) 100 MCG tablet Take 1 tablet by mouth daily 90 tablet 3    torsemide (DEMADEX) 10 MG tablet take 1 tablet by mouth once daily (Patient taking differently: as needed ) 30 tablet 3    Thiamine HCl (B-1) 100 MG TABS Take 1 pill daily 90 tablet 1    gemfibrozil (LOPID) 600 MG tablet TAKE 1 TABLET BY MOUTH EVERY 12 HOURS 180 tablet 3    metFORMIN (GLUCOPHAGE) 500 MG tablet TAKE 1 TABLET BY MOUTH EVERY DAY WITH BREAKFAST 90 tablet 3    omeprazole (PRILOSEC) 20 MG delayed release capsule TAKE 1 CAPSULE BY MOUTH EVERY DAY 90 capsule 3    linaclotide (LINZESS) 145 MCG capsule TAKE 1 CAPSULE BY MOUTH EVERY DAY IN THE MORNING BEFORE BREAKFAST 90 capsule 3    acetaminophen (TYLENOL) 500 MG tablet Take 500 mg by mouth every 6 hours as needed for Pain      lidocaine (LIDODERM) 5 % PLACE 1 PATCH ONTO THE SKIN DAILY 12 HOURS ON, 12 HOURS OFF.  polyethylene glycol (GLYCOLAX) 17 g packet Take 17 g by mouth daily as needed      gabapentin (NEURONTIN) 800 MG tablet Take 1 tablet by mouth 3 times daily for 90 days. 270 tablet 0     No current facility-administered medications for this visit. Allergies:    Bactrim [sulfamethoxazole-trimethoprim], Ceftin [cefuroxime], and Keflex [cephalexin]       Review of Systems:    Denies any chest pain, headache, dyspnea, recent weight loss, fevers, chills or night sweats. Physical Examination:    /82   Pulse 78   Temp 98.4 °F (36.9 °C)   Resp 24   Ht 5' 3\" (1.6 m)   Wt 236 lb (107 kg)   SpO2 96%   BMI 41.81 kg/m²      On focused neurological examination, she  is awake alert oriented and rationally conversant. Speech is clear and fluent. Pupils are equal and reactive to light bilaterally, extraocular movements are intact, visual fields are full to confrontation. Her  face is symmetric and grossly intact to fine touch. Uvula and tongue are both midline. Shoulder shrug is symmetric and strong. Motor examination reveals preserved power in the upper and lower extremities at 5 out of 5 throughout. Reflexes are symmetric and brisk.   Plantar responses are downgoing. There is no clonus. Patient is intact to fine touch in all dermatomes throughout. Incision is well healed. ASSESSMENT:    I personally reviewed Bebeto Vogel's radiographic images, particularly her CT myelogram dated 16 June 2022 which fails to show any neural or thecal sac compression to explain this woman's complaints. 1. Spinal stenosis of lumbar region with neurogenic claudication      MEDICAL DECISION MAKING & PLAN:    I showed Mrs and Mr. Marisol Esparza the images and explained the findings. No neurosurgical intervention is required at this time. I encouraged her to continue to participate with pain management and PT. All questions were answered. Should new NSx issues arise, we would be happy to see this woman again in clinic. Thank you so much for allowing us to participate in the care of this patient. Return if symptoms worsen or fail to improve. Electronically signed by Gopal Sanchez MD on 7/12/2022 at 5:16 PM       NOTE: This report was transcribed using voice recognition software.  Every effort was made to ensure accuracy; however, inadvertent computerized transcription errors may be present 76F c/o right hip pain x several years. She reports difficulty with ambulation. Patient has undergone previous R TKR and L THR without complications. She has tried rest, time, medications, PT and currently uses a cane. Denies regular numbness and tingling in the bilateral LE.

## 2023-11-20 ENCOUNTER — PREP FOR PROCEDURE (OUTPATIENT)
Dept: PAIN MANAGEMENT | Age: 63
End: 2023-11-20

## 2023-11-20 ENCOUNTER — OFFICE VISIT (OUTPATIENT)
Dept: PAIN MANAGEMENT | Age: 63
End: 2023-11-20
Payer: MEDICARE

## 2023-11-20 VITALS
SYSTOLIC BLOOD PRESSURE: 111 MMHG | BODY MASS INDEX: 30.12 KG/M2 | OXYGEN SATURATION: 95 % | HEART RATE: 68 BPM | RESPIRATION RATE: 16 BRPM | DIASTOLIC BLOOD PRESSURE: 67 MMHG | HEIGHT: 63 IN | WEIGHT: 170 LBS | TEMPERATURE: 97.7 F

## 2023-11-20 DIAGNOSIS — F11.90 CHRONIC, CONTINUOUS USE OF OPIOIDS: ICD-10-CM

## 2023-11-20 DIAGNOSIS — M47.816 LUMBAR SPONDYLOSIS: Primary | ICD-10-CM

## 2023-11-20 DIAGNOSIS — M96.1 POSTLAMINECTOMY SYNDROME, LUMBAR: ICD-10-CM

## 2023-11-20 DIAGNOSIS — M54.16 LUMBAR RADICULOPATHY: ICD-10-CM

## 2023-11-20 DIAGNOSIS — M47.817 LUMBOSACRAL SPONDYLOSIS WITHOUT MYELOPATHY: ICD-10-CM

## 2023-11-20 DIAGNOSIS — M51.36 DDD (DEGENERATIVE DISC DISEASE), LUMBAR: Primary | ICD-10-CM

## 2023-11-20 PROCEDURE — 99214 OFFICE O/P EST MOD 30 MIN: CPT | Performed by: ANESTHESIOLOGY

## 2023-11-20 PROCEDURE — 99213 OFFICE O/P EST LOW 20 MIN: CPT | Performed by: ANESTHESIOLOGY

## 2023-11-20 RX ORDER — SODIUM CHLORIDE 9 MG/ML
INJECTION, SOLUTION INTRAVENOUS PRN
Status: CANCELLED | OUTPATIENT
Start: 2023-11-20

## 2023-11-20 RX ORDER — SODIUM CHLORIDE 0.9 % (FLUSH) 0.9 %
5-40 SYRINGE (ML) INJECTION EVERY 12 HOURS SCHEDULED
Status: CANCELLED | OUTPATIENT
Start: 2023-11-20

## 2023-11-20 RX ORDER — SODIUM CHLORIDE 0.9 % (FLUSH) 0.9 %
5-40 SYRINGE (ML) INJECTION PRN
Status: CANCELLED | OUTPATIENT
Start: 2023-11-20

## 2023-11-20 NOTE — H&P (VIEW-ONLY)
and intermittent LE pain. Has been evaluated by NSG-  no surgical interventions. CT myelogram reviewed-no significant stenosis. S/P  Spine interventions- had helped significantly. Has chronic right shoulder pain and knee pain. Has been evaluated by Dr. Darron Feldman. Obesity: Follows with Bariatric medicine. S/P Bariatric surgery in Aug 2022- continues to loose weight. Current pain predominantly axial in nature, Lumbar facet tenderness +    S/P Lumbar facet injection on 2/6/2023- excellent pain relief of facet pain. S/P TFESI on 10/19/2023- helped the LE pain significantly. Current pain low back Doing HEP. Facet tenderness +  Prior facet injection L5-S1 excellent pain relief. Plan: Will do Lumbar MBNB to address pain form B/l L5-S1 facet Under fluoroscopic guidance. RBA discussed. If short term relief, will do RFA. PT / Suarez Sieving for prn use only to help with PT/ HEP and exercise- will prescribe Percocet 5/325 po prn 0.5 tab once or twice a day. Uses prn use for severe pain only. Last filled # 30 on 5/31/2023. Refill given # 30 on 10/9/2023 (One script lasts for almost 2-3 months). OARRS reviewed- consistent. Recommended to take only for severe pain. To help with pain and functionality and do HEP/ PT. No signs of misuse abuse or diversion, no side effects, compliant with recommendations. RBA of chronic opioid use discussed. Complaint with the treatment recommendations. Continue Gabapentin (does not take it regularly)- can call for refill. Has stopped Cymbalta. F/U after the procedure. Opioid agreement- signed  10/6/2021- renew opioid agreement 1/30/2023. Salient features of opioid agreement discussed. Discussed issues with chronic opioid therapy including the phenomenon of tolerance/ dependence. Oral drug screen -10/6/2021-  consistent. on 1/30/2023 - result reviewed - consistent.      OARRS reviewed- today: Consistent      Counseling :

## 2023-12-04 SDOH — ECONOMIC STABILITY: FOOD INSECURITY: WITHIN THE PAST 12 MONTHS, THE FOOD YOU BOUGHT JUST DIDN'T LAST AND YOU DIDN'T HAVE MONEY TO GET MORE.: NEVER TRUE

## 2023-12-04 SDOH — ECONOMIC STABILITY: FOOD INSECURITY: WITHIN THE PAST 12 MONTHS, YOU WORRIED THAT YOUR FOOD WOULD RUN OUT BEFORE YOU GOT MONEY TO BUY MORE.: NEVER TRUE

## 2023-12-04 SDOH — ECONOMIC STABILITY: HOUSING INSECURITY
IN THE LAST 12 MONTHS, WAS THERE A TIME WHEN YOU DID NOT HAVE A STEADY PLACE TO SLEEP OR SLEPT IN A SHELTER (INCLUDING NOW)?: NO

## 2023-12-04 SDOH — ECONOMIC STABILITY: INCOME INSECURITY: HOW HARD IS IT FOR YOU TO PAY FOR THE VERY BASICS LIKE FOOD, HOUSING, MEDICAL CARE, AND HEATING?: NOT HARD AT ALL

## 2023-12-04 SDOH — HEALTH STABILITY: PHYSICAL HEALTH: ON AVERAGE, HOW MANY MINUTES DO YOU ENGAGE IN EXERCISE AT THIS LEVEL?: 0 MIN

## 2023-12-04 SDOH — HEALTH STABILITY: PHYSICAL HEALTH: ON AVERAGE, HOW MANY DAYS PER WEEK DO YOU ENGAGE IN MODERATE TO STRENUOUS EXERCISE (LIKE A BRISK WALK)?: 0 DAYS

## 2023-12-04 SDOH — ECONOMIC STABILITY: TRANSPORTATION INSECURITY
IN THE PAST 12 MONTHS, HAS LACK OF TRANSPORTATION KEPT YOU FROM MEETINGS, WORK, OR FROM GETTING THINGS NEEDED FOR DAILY LIVING?: NO

## 2023-12-04 ASSESSMENT — PATIENT HEALTH QUESTIONNAIRE - PHQ9
SUM OF ALL RESPONSES TO PHQ QUESTIONS 1-9: 0
SUM OF ALL RESPONSES TO PHQ QUESTIONS 1-9: 0
2. FEELING DOWN, DEPRESSED OR HOPELESS: 0
SUM OF ALL RESPONSES TO PHQ QUESTIONS 1-9: 0
SUM OF ALL RESPONSES TO PHQ9 QUESTIONS 1 & 2: 0
1. LITTLE INTEREST OR PLEASURE IN DOING THINGS: 0
SUM OF ALL RESPONSES TO PHQ QUESTIONS 1-9: 0

## 2023-12-04 ASSESSMENT — LIFESTYLE VARIABLES
HOW OFTEN DO YOU HAVE A DRINK CONTAINING ALCOHOL: NEVER
HOW OFTEN DO YOU HAVE A DRINK CONTAINING ALCOHOL: 1
HOW OFTEN DO YOU HAVE SIX OR MORE DRINKS ON ONE OCCASION: 1
HOW MANY STANDARD DRINKS CONTAINING ALCOHOL DO YOU HAVE ON A TYPICAL DAY: 0
HOW MANY STANDARD DRINKS CONTAINING ALCOHOL DO YOU HAVE ON A TYPICAL DAY: PATIENT DOES NOT DRINK

## 2023-12-05 ENCOUNTER — TELEPHONE (OUTPATIENT)
Dept: PAIN MANAGEMENT | Age: 63
End: 2023-12-05

## 2023-12-05 ENCOUNTER — OFFICE VISIT (OUTPATIENT)
Dept: PRIMARY CARE CLINIC | Age: 63
End: 2023-12-05
Payer: MEDICARE

## 2023-12-05 VITALS
WEIGHT: 165 LBS | TEMPERATURE: 98.2 F | SYSTOLIC BLOOD PRESSURE: 124 MMHG | OXYGEN SATURATION: 98 % | HEART RATE: 77 BPM | DIASTOLIC BLOOD PRESSURE: 78 MMHG | BODY MASS INDEX: 29.23 KG/M2 | HEIGHT: 63 IN | RESPIRATION RATE: 18 BRPM

## 2023-12-05 DIAGNOSIS — E78.49 OTHER HYPERLIPIDEMIA: ICD-10-CM

## 2023-12-05 DIAGNOSIS — Z12.31 ENCOUNTER FOR SCREENING MAMMOGRAM FOR BREAST CANCER: ICD-10-CM

## 2023-12-05 DIAGNOSIS — E11.9 TYPE 2 DIABETES MELLITUS WITHOUT COMPLICATION, WITHOUT LONG-TERM CURRENT USE OF INSULIN (HCC): ICD-10-CM

## 2023-12-05 DIAGNOSIS — Z98.84 S/P BARIATRIC SURGERY: ICD-10-CM

## 2023-12-05 DIAGNOSIS — E03.9 ACQUIRED HYPOTHYROIDISM: ICD-10-CM

## 2023-12-05 DIAGNOSIS — Z00.00 WELCOME TO MEDICARE PREVENTIVE VISIT: Primary | ICD-10-CM

## 2023-12-05 DIAGNOSIS — Z23 NEED FOR IMMUNIZATION AGAINST INFLUENZA: ICD-10-CM

## 2023-12-05 LAB
ALBUMIN SERPL-MCNC: 4 G/DL (ref 3.5–5.2)
ALP BLD-CCNC: 119 U/L (ref 35–104)
ALT SERPL-CCNC: 11 U/L (ref 0–32)
ANION GAP SERPL CALCULATED.3IONS-SCNC: 16 MMOL/L (ref 7–16)
AST SERPL-CCNC: 15 U/L (ref 0–31)
BILIRUB SERPL-MCNC: 0.9 MG/DL (ref 0–1.2)
BUN BLDV-MCNC: 15 MG/DL (ref 6–23)
CALCIUM SERPL-MCNC: 8.9 MG/DL (ref 8.6–10.2)
CHLORIDE BLD-SCNC: 106 MMOL/L (ref 98–107)
CHOLESTEROL: 148 MG/DL
CO2: 21 MMOL/L (ref 22–29)
CREAT SERPL-MCNC: 0.6 MG/DL (ref 0.5–1)
CREATININE URINE: 200.9 MG/DL (ref 29–226)
CREATININE URINE: 205.4 MG/DL (ref 29–226)
GFR SERPL CREATININE-BSD FRML MDRD: >60 ML/MIN/1.73M2
GLUCOSE BLD-MCNC: 79 MG/DL (ref 74–99)
HBA1C MFR BLD: 4.9 % (ref 4–5.6)
HCT VFR BLD CALC: 44.3 % (ref 34–48)
HDLC SERPL-MCNC: 33 MG/DL
HEMOGLOBIN: 14.6 G/DL (ref 11.5–15.5)
LDL CHOLESTEROL: 100 MG/DL
MCH RBC QN AUTO: 29.7 PG (ref 26–35)
MCHC RBC AUTO-ENTMCNC: 33 G/DL (ref 32–34.5)
MCV RBC AUTO: 90.2 FL (ref 80–99.9)
MICROALBUMIN/CREAT 24H UR: 13 MG/L (ref 0–19)
MICROALBUMIN/CREAT UR-RTO: 6 MCG/MG CREAT (ref 0–30)
PDW BLD-RTO: 12.4 % (ref 11.5–15)
PLATELET # BLD: 193 K/UL (ref 130–450)
PMV BLD AUTO: 10.3 FL (ref 7–12)
POTASSIUM SERPL-SCNC: 4.6 MMOL/L (ref 3.5–5)
RBC # BLD: 4.91 M/UL (ref 3.5–5.5)
SODIUM BLD-SCNC: 143 MMOL/L (ref 132–146)
TOTAL PROTEIN, URINE: 16 MG/DL (ref 0–12)
TOTAL PROTEIN: 6.8 G/DL (ref 6.4–8.3)
TRIGL SERPL-MCNC: 76 MG/DL
TSH SERPL DL<=0.05 MIU/L-ACNC: 1.86 UIU/ML (ref 0.27–4.2)
URINE TOTAL PROTEIN CREATININE RATIO: 0.08 (ref 0–0.2)
VLDLC SERPL CALC-MCNC: 15 MG/DL
WBC # BLD: 5.5 K/UL (ref 4.5–11.5)

## 2023-12-05 PROCEDURE — 99214 OFFICE O/P EST MOD 30 MIN: CPT | Performed by: FAMILY MEDICINE

## 2023-12-05 PROCEDURE — 90674 CCIIV4 VAC NO PRSV 0.5 ML IM: CPT | Performed by: FAMILY MEDICINE

## 2023-12-05 PROCEDURE — 3044F HG A1C LEVEL LT 7.0%: CPT | Performed by: FAMILY MEDICINE

## 2023-12-05 PROCEDURE — G0008 ADMIN INFLUENZA VIRUS VAC: HCPCS | Performed by: FAMILY MEDICINE

## 2023-12-05 PROCEDURE — G0402 INITIAL PREVENTIVE EXAM: HCPCS | Performed by: FAMILY MEDICINE

## 2023-12-05 ASSESSMENT — ENCOUNTER SYMPTOMS
BACK PAIN: 0
EYE PAIN: 0
CHEST TIGHTNESS: 0
EYE ITCHING: 0
NAUSEA: 0
VISUAL CHANGE: 0
WHEEZING: 0
BLOOD IN STOOL: 0
VOMITING: 0
RHINORRHEA: 0
DIARRHEA: 0
COUGH: 0
SHORTNESS OF BREATH: 0
COLOR CHANGE: 0
EYE REDNESS: 0
SORE THROAT: 0
SINUS PRESSURE: 0
CONSTIPATION: 0
ABDOMINAL PAIN: 0
APNEA: 0

## 2023-12-05 NOTE — PROGRESS NOTES
Chief Complaint:     Chief Complaint   Patient presents with    Medicare AWV    Diabetes    Thyroid Problem    Hyperlipidemia         Diabetes  She presents for her follow-up diabetic visit. She has type 2 diabetes mellitus. The initial diagnosis of diabetes was made 1 month ago. Her disease course has been stable. There are no hypoglycemic associated symptoms. Pertinent negatives for hypoglycemia include no dizziness, headaches or nervousness/anxiousness. Associated symptoms include fatigue. Pertinent negatives for diabetes include no chest pain, no visual change and no weakness. There are no hypoglycemic complications. Symptoms are stable. There are no diabetic complications. Pertinent negatives for diabetic complications include no CVA or PVD. Risk factors for coronary artery disease include dyslipidemia and obesity. Current diabetic treatment includes oral agent (monotherapy). She is compliant with treatment all of the time. She is following a generally healthy diet. When asked about meal planning, she reported none. She has not had a previous visit with a dietitian. There is no change in her home blood glucose trend. An ACE inhibitor/angiotensin II receptor blocker is not being taken. She does not see a podiatrist.Eye exam is not current. Thyroid Problem  Symptoms include fatigue. Patient reports no anxiety, constipation, diaphoresis, diarrhea, palpitations or visual change. Her past medical history is significant for diabetes and hyperlipidemia. Hyperlipidemia  This is a chronic problem. The current episode started more than 1 year ago. The problem is controlled. Recent lipid tests were reviewed and are normal. Exacerbating diseases include diabetes and obesity. Associated symptoms include leg pain and myalgias. Pertinent negatives include no chest pain or shortness of breath. Current antihyperlipidemic treatment includes statins. The current treatment provides significant improvement of lipids.  Compliance
without polyps  Neck: supple and non-tender without mass, no thyromegaly or thyroid nodules, no cervical lymphadenopathy  Pulmonary/Chest: clear to auscultation bilaterally- no wheezes, rales or rhonchi, normal air movement, no respiratory distress  Cardiovascular: normal rate, regular rhythm, normal S1 and S2, no murmurs, rubs, clicks, or gallops, distal pulses intact, no carotid bruits  Abdomen: soft, non-tender, non-distended, normal bowel sounds, no masses or organomegaly  Extremities: no cyanosis, clubbing or edema  Musculoskeletal: normal range of motion, no joint swelling, deformity or tenderness  Neurologic: reflexes normal and symmetric, no cranial nerve deficit, gait, coordination and speech normal       Allergies   Allergen Reactions    Azithromycin Other (See Comments)     Stomach pain    Bactrim [Sulfamethoxazole-Trimethoprim] Nausea Only    Ceftin [Cefuroxime] Rash    Keflex [Cephalexin] Rash     Prior to Visit Medications    Medication Sig Taking? Authorizing Provider   omeprazole (PRILOSEC) 20 MG delayed release capsule take 1 capsule by mouth once daily  Aaron Peacock DO   levothyroxine (SYNTHROID) 200 MCG tablet take 1 tablet by mouth every morning  Geovanna Hicks DO   cyclobenzaprine (FLEXERIL) 10 MG tablet Take 1 tablet by mouth 3 times daily as needed for Muscle spasms  Provider, MD Cass   linaclotide (LINZESS) 145 MCG capsule Take 1 capsule by mouth as needed  ProviderCass MD   diclofenac sodium (VOLTAREN) 1 % GEL APPLY 4 GRAMS TOPICALLY 4 TIMES DAILY AS NEEDED FOR PAIN  Prieto Robin, DO   pravastatin (PRAVACHOL) 10 MG tablet TAKE 1 TABLET BY MOUTH EVERY OTHER DAY  Geovanna Hicks DO   omega-3 acid ethyl esters (LOVAZA) 1 g capsule TAKE 1 CAPSULE BY MOUTH EVERY DAY  Prieto Robin DO   vitamin B-12 (CYANOCOBALAMIN) 100 MCG tablet Take 1 tablet by mouth in the morning.   Aaron Peacock DO   valsartan (DIOVAN) 80 MG tablet TAKE 1 TABLET BY MOUTH EVERY DAY  [place] Morning,

## 2023-12-05 NOTE — PATIENT INSTRUCTIONS
months. Try to get at least 150 minutes of exercise per week or 10,000 steps per day on a pedometer . Order or download the FREE \"Exercise & Physical Activity: Your Everyday Guide\" from [employer]. Call 0-182.563.4393 or search [employer] online. You need 9316-9085 mg of calcium and 1141-4188 IU of vitamin D per day. It is possible to meet your calcium requirement with diet alone, but a vitamin D supplement is usually necessary to meet this goal.  When exposed to the sun, use a sunscreen that protects against both UVA and UVB radiation with an SPF of 30 or greater. Reapply every 2 to 3 hours or after sweating, drying off with a towel, or swimming. Always wear a seat belt when traveling in a car. Always wear a helmet when riding a bicycle or motorcycle.

## 2023-12-05 NOTE — TELEPHONE ENCOUNTER
Call to Che Nazario that procedure was approved for 12/11/2023 and that Bere Nicholson should call her a few days before for the pre op call and between 2:00 PM and 4:00 PM  the business day before with the arrival time. Instructed Conchis to hold ibuprofen for 24 hours, naprosyn for 4 days and any aspirin containing products or fish oil for 7 days. Instructed to call office back if any questions. Christianne Carry verbalized understanding.     Electronically signed by Corey Persaud RN on 12/5/2023 at 3:20 PM

## 2023-12-07 NOTE — PROGRESS NOTES
[address] [facility] PAIN MANAGEMENT  INSTRUCTIONS  . .......................................................................................................................................... [x] Parking the day of Surgery is located in the [facility].   Upon entering the door, make immediate right into the surgery reception room    [x]  Bring photo ID and insurance card     [x] You may have a light breakfast day of procedure    [x]  Wear loose comfortable clothing    [x]  Please follow instructions for medications as given per Dr's office    [x] You can expect a call the business day prior to procedure to notify you of your arrival time     [x] Please arrange for     []  Other instructions

## 2023-12-11 ENCOUNTER — HOSPITAL ENCOUNTER (OUTPATIENT)
Dept: GENERAL RADIOLOGY | Age: 63
Discharge: HOME | End: 2023-12-13
Attending: ANESTHESIOLOGY
Payer: MEDICARE

## 2023-12-11 ENCOUNTER — HOSPITAL ENCOUNTER (OUTPATIENT)
Age: 63
Setting detail: OUTPATIENT SURGERY
Discharge: HOME | End: 2023-12-11
Attending: ANESTHESIOLOGY | Admitting: ANESTHESIOLOGY
Payer: MEDICARE

## 2023-12-11 VITALS
OXYGEN SATURATION: 94 % | BODY MASS INDEX: 29.23 KG/M2 | SYSTOLIC BLOOD PRESSURE: 113 MMHG | HEIGHT: 63 IN | DIASTOLIC BLOOD PRESSURE: 67 MMHG | HEART RATE: 57 BPM | WEIGHT: 165 LBS | TEMPERATURE: 97.8 F | RESPIRATION RATE: 18 BRPM

## 2023-12-11 DIAGNOSIS — R52 PAIN MANAGEMENT: ICD-10-CM

## 2023-12-11 PROCEDURE — 6360000002 HC RX W HCPCS: Performed by: ANESTHESIOLOGY

## 2023-12-11 PROCEDURE — 2709999900 HC NON-CHARGEABLE SUPPLY: Performed by: ANESTHESIOLOGY

## 2023-12-11 PROCEDURE — 3600000002 HC SURGERY LEVEL 2 BASE: Performed by: ANESTHESIOLOGY

## 2023-12-11 PROCEDURE — 64493 INJ PARAVERT F JNT L/S 1 LEV: CPT | Performed by: ANESTHESIOLOGY

## 2023-12-11 PROCEDURE — 2500000003 HC RX 250 WO HCPCS: Performed by: ANESTHESIOLOGY

## 2023-12-11 PROCEDURE — 7100000010 HC PHASE II RECOVERY - FIRST 15 MIN: Performed by: ANESTHESIOLOGY

## 2023-12-11 PROCEDURE — 7100000011 HC PHASE II RECOVERY - ADDTL 15 MIN: Performed by: ANESTHESIOLOGY

## 2023-12-11 RX ORDER — SODIUM CHLORIDE 0.9 % (FLUSH) 0.9 %
5-40 SYRINGE (ML) INJECTION EVERY 12 HOURS SCHEDULED
Status: DISCONTINUED | OUTPATIENT
Start: 2023-12-11 | End: 2023-12-11 | Stop reason: HOSPADM

## 2023-12-11 RX ORDER — SODIUM CHLORIDE 0.9 % (FLUSH) 0.9 %
5-40 SYRINGE (ML) INJECTION PRN
Status: DISCONTINUED | OUTPATIENT
Start: 2023-12-11 | End: 2023-12-11 | Stop reason: HOSPADM

## 2023-12-11 RX ORDER — METHYLPREDNISOLONE ACETATE 40 MG/ML
INJECTION, SUSPENSION INTRA-ARTICULAR; INTRALESIONAL; INTRAMUSCULAR; SOFT TISSUE PRN
Status: DISCONTINUED | OUTPATIENT
Start: 2023-12-11 | End: 2023-12-11 | Stop reason: ALTCHOICE

## 2023-12-11 RX ORDER — BUPIVACAINE HYDROCHLORIDE 5 MG/ML
INJECTION, SOLUTION EPIDURAL; INTRACAUDAL PRN
Status: DISCONTINUED | OUTPATIENT
Start: 2023-12-11 | End: 2023-12-11 | Stop reason: ALTCHOICE

## 2023-12-11 RX ORDER — LIDOCAINE HYDROCHLORIDE 5 MG/ML
INJECTION, SOLUTION INFILTRATION; INTRAVENOUS PRN
Status: DISCONTINUED | OUTPATIENT
Start: 2023-12-11 | End: 2023-12-11 | Stop reason: ALTCHOICE

## 2023-12-11 RX ORDER — SODIUM CHLORIDE 9 MG/ML
INJECTION, SOLUTION INTRAVENOUS PRN
Status: DISCONTINUED | OUTPATIENT
Start: 2023-12-11 | End: 2023-12-11 | Stop reason: HOSPADM

## 2023-12-11 ASSESSMENT — PAIN - FUNCTIONAL ASSESSMENT: PAIN_FUNCTIONAL_ASSESSMENT: 0-10

## 2023-12-11 ASSESSMENT — PAIN DESCRIPTION - DESCRIPTORS: DESCRIPTORS: DISCOMFORT

## 2023-12-11 NOTE — DISCHARGE INSTRUCTIONS
Shantanu Ospina Block/Radiofrequency  Home Going Instructions    1-Go home, rest for the remainder of the day  2-Please do not lift over 20 pounds the day of the injection  3-If you received sedation No: alcohol, driving, operating lawn mowers, plows, tractors or other dangerous equipment until next morning. Do not make important decisions or sign legal documents for 24 hours. You may experience light headedness, dizziness, nausea or sleepiness after sedation. Do not stay alone. A responsible adult must be with you for 24 hours. You could be nauseated from the medications you have received. Your IV site may be sore and bruised. 4-No dietary restrictions     5-Resume all medications the same day, blood thinners to be resumed 24 hours after injection if you were instructed to stop any. 6-Keep the surgical site clean and dry, you may shower the next morning and remove the      dressing. 7- No sitz baths, tub baths or hot tubs/swimming for 24 hours. 8- If you have any pain at the injection site(s), application of an ice pack to the area should be       helpful, 20 minutes on/20 minutes off for next 48 hours. 9- Call [facility] immediately at if you develop.   Fever greater than 100.4 F  Have bleeding or drainage from the puncture site  Have progressive Leg/arm numbness and or weakness  Loss of control of bowel and or bladder (wet/soil yourself)  Severe headache with inability to lift head  10-You may return to work the next Shantanu Ospina Block/Radiofrequency  Home Going Instructions    1-Go home, rest for the remainder of the day  2-Please do not lift over 20 pounds the day of the injection  3-If you received sedation No: alcohol, driving, operating lawn mowers, plows, tractors or other dangerous equipment

## 2023-12-11 NOTE — OP NOTE
Operative Note      Patient: Chele Buchanan  YOB: 1960  MRN: [id number]    Date of Procedure: 2023    Pre-Op Diagnosis Codes:     * Lumbar spondylosis [M47.816]    Post-Op Diagnosis: Same       Procedure(s):  BILATERAL LUMBARMEDIAL BRANCH NERVE BLOCK UNDER FLUOROSCOPIC GUIDANCE AT L5-S1 FACET    Surgeon(s):  Uma Lombardi MD    Assistant:   * No surgical staff found *    Anesthesia: Local    Estimated Blood Loss (mL): Minimal    Complications: None    Specimens:   * No specimens in log *    Implants:  * No implants in log *      Drains: * No LDAs found *    Findings: good needle placement        Detailed Description of Procedure:   2023    Patient: Chele Buchanan  :  1960  Age:  59 y.o. Sex:  female     PRE-OPERATIVE DIAGNOSIS:  Lumbar spondylosis, lumbar facet syndrome. POST-OPERATIVE DIAGNOSIS: Same. PROCEDURE:   Fluoroscopic guided lumbar medial branch blocks Bilateral at Levels: L5-S1. SURGEON: Uma Lombardi MD    ANESTHESIA: Local    ESTIMATED BLOOD LOSS: None.  ______________________________________________________________________  BRIEF HISTORY:  Chele Buchanan comes in today for  fluoroscopic guided lumbar medial branch blocks  Bilateral  at Levels: L5-S1. The potential complications of this procedure were discussed with her again today. She has elected to undergo the aforementioned procedure. Van complete History & Physical examination were reviewed in depth, a copy of which is in the chart. DESCRIPTION OF PROCEDURE:   After confirming written and informed consent, a time-out was performed and Van name and date of birth, the procedure to be performed as well as the plan for the location of the needle insertion were confirmed. The patient was brought into the procedure room and placed in the prone position on the fluoroscopy table. Standard monitors were placed and vital signs were observed throughout the procedure.  The area of the

## 2023-12-11 NOTE — INTERVAL H&P NOTE
Update History & Physical    The patient's History and Physical of November 20, 2023 was reviewed with the patient and I examined the patient. There was no change. The surgical site was confirmed by the patient and me. Plan: The risks, benefits, expected outcome, and alternative to the recommended procedure have been discussed with the patient. Patient understands and wants to proceed with the procedure.      Electronically signed by Daisy Parsons MD on 12/11/2023

## 2023-12-26 RX ORDER — OMEPRAZOLE 20 MG/1
CAPSULE, DELAYED RELEASE ORAL
Qty: 30 CAPSULE | Refills: 5 | Status: SHIPPED | OUTPATIENT
Start: 2023-12-26

## 2023-12-28 RX ORDER — LEVOTHYROXINE SODIUM 0.2 MG/1
TABLET ORAL
Qty: 90 TABLET | Refills: 1 | Status: SHIPPED | OUTPATIENT
Start: 2023-12-28

## 2024-01-08 ENCOUNTER — PROCEDURE VISIT (OUTPATIENT)
Dept: PODIATRY | Age: 64
End: 2024-01-08
Payer: MEDICARE

## 2024-01-08 VITALS
SYSTOLIC BLOOD PRESSURE: 122 MMHG | BODY MASS INDEX: 29.23 KG/M2 | DIASTOLIC BLOOD PRESSURE: 74 MMHG | TEMPERATURE: 98.2 F | WEIGHT: 165 LBS

## 2024-01-08 DIAGNOSIS — M79.675 PAIN IN LEFT TOE(S): ICD-10-CM

## 2024-01-08 DIAGNOSIS — M79.674 PAIN IN RIGHT TOE(S): ICD-10-CM

## 2024-01-08 DIAGNOSIS — I73.9 PERIPHERAL VASCULAR DISEASE, UNSPECIFIED (HCC): ICD-10-CM

## 2024-01-08 DIAGNOSIS — B35.1 DERMATOPHYTOSIS OF NAIL: Primary | ICD-10-CM

## 2024-01-08 DIAGNOSIS — E11.9 TYPE 2 DIABETES MELLITUS WITHOUT COMPLICATION, WITHOUT LONG-TERM CURRENT USE OF INSULIN (HCC): ICD-10-CM

## 2024-01-08 DIAGNOSIS — E11.9 ENCOUNTER FOR DIABETIC FOOT EXAM (HCC): ICD-10-CM

## 2024-01-08 DIAGNOSIS — B35.1 TINEA UNGUIUM: ICD-10-CM

## 2024-01-08 PROCEDURE — 99213 OFFICE O/P EST LOW 20 MIN: CPT | Performed by: PODIATRIST

## 2024-01-08 PROCEDURE — 11721 DEBRIDE NAIL 6 OR MORE: CPT | Performed by: PODIATRIST

## 2024-01-08 RX ORDER — KETOCONAZOLE 20 MG/G
CREAM TOPICAL
Qty: 30 G | Refills: 1 | Status: SHIPPED | OUTPATIENT
Start: 2024-01-08

## 2024-01-08 ASSESSMENT — ENCOUNTER SYMPTOMS
RESPIRATORY NEGATIVE: 1
GASTROINTESTINAL NEGATIVE: 1

## 2024-01-08 NOTE — PROGRESS NOTES
[facility] [place] [facility] PODIATRY  [address] [facility] [address]  Dept: [phone]  Dept Fax: [phone]  Loc: [phone]    DIABETIC NAIL PROGRESS NOTE  Date of patient's visit: 1/8/2024  Patient's Name:  Conchis Vogel YOB: 1960            Patient Care Team:  Prieto Robin DO as PCP - General (Family Medicine)  Prieto Robin DO as PCP - Empaneled Provider  Hayder Beal DPM as Consulting Physician (Podiatry)          Chief Complaint   Patient presents with    Diabetes    Toe Pain     Prieto Robin DO  12/26/2023       Subjective:   Conchis Vogel comes to clinic for Diabetes and Toe Pain (Prieto Robin DO/12/26/2023)    she is a diabetic and states that foot exam .  Pt currently has complaint of thickened, elongated nails that they cannot manage by themselves.   Pt's primary care physician is Prieto Robin DO    .     Lab Results   Component Value Date    LABA1C 4.9 12/05/2023      Complains of numbness in the feet bilat.  Past Medical History:   Diagnosis Date    Back pain     Chronic fatigue     COVID-19 09/2020    mild per patient    Diabetes mellitus (HCC)     diet controlled    History of blood transfusion     Hyperlipidemia     Hypothyroidism     IBS (irritable bowel syndrome)     Morbid obesity due to excess calories (HCC)     Obesity     Osteoarthritis     PONV (postoperative nausea and vomiting)        Allergies   Allergen Reactions    Azithromycin Other (See Comments)     Stomach pain    Bactrim [Sulfamethoxazole-Trimethoprim] Nausea Only    Ceftin [Cefuroxime] Rash    Keflex [Cephalexin] Rash     Current Outpatient Medications on File Prior to Visit   Medication Sig Dispense Refill    levothyroxine (SYNTHROID) 200 MCG tablet take 1 tablet by mouth every morning 90 tablet 1    omeprazole (PRILOSEC) 20 MG delayed release capsule take 1 capsule by mouth once daily 30 capsule 5    cyclobenzaprine

## 2024-01-10 ENCOUNTER — OFFICE VISIT (OUTPATIENT)
Dept: PAIN MANAGEMENT | Age: 64
End: 2024-01-10
Payer: MEDICARE

## 2024-01-10 VITALS
RESPIRATION RATE: 16 BRPM | DIASTOLIC BLOOD PRESSURE: 72 MMHG | BODY MASS INDEX: 29.23 KG/M2 | OXYGEN SATURATION: 95 % | SYSTOLIC BLOOD PRESSURE: 110 MMHG | HEART RATE: 107 BPM | TEMPERATURE: 98.2 F | HEIGHT: 63 IN | WEIGHT: 165 LBS

## 2024-01-10 DIAGNOSIS — M51.36 DDD (DEGENERATIVE DISC DISEASE), LUMBAR: ICD-10-CM

## 2024-01-10 DIAGNOSIS — M96.1 POSTLAMINECTOMY SYNDROME, LUMBAR: Primary | ICD-10-CM

## 2024-01-10 DIAGNOSIS — M54.16 LUMBAR RADICULAR PAIN: ICD-10-CM

## 2024-01-10 DIAGNOSIS — M47.817 LUMBOSACRAL SPONDYLOSIS WITHOUT MYELOPATHY: ICD-10-CM

## 2024-01-10 PROCEDURE — 99214 OFFICE O/P EST MOD 30 MIN: CPT | Performed by: ANESTHESIOLOGY

## 2024-01-10 PROCEDURE — 99213 OFFICE O/P EST LOW 20 MIN: CPT | Performed by: ANESTHESIOLOGY

## 2024-01-10 RX ORDER — SODIUM CHLORIDE 0.9 % (FLUSH) 0.9 %
5-40 SYRINGE (ML) INJECTION PRN
OUTPATIENT
Start: 2024-01-10

## 2024-01-10 RX ORDER — SODIUM CHLORIDE 9 MG/ML
INJECTION, SOLUTION INTRAVENOUS PRN
OUTPATIENT
Start: 2024-01-10

## 2024-01-10 RX ORDER — SODIUM CHLORIDE 0.9 % (FLUSH) 0.9 %
5-40 SYRINGE (ML) INJECTION EVERY 12 HOURS SCHEDULED
OUTPATIENT
Start: 2024-01-10

## 2024-01-10 NOTE — PROGRESS NOTES
[facility]  [place], [place] [address]  Dept: [phone]      Follow up Note      Conchis Vogel     Date of Visit:  1/10/2024    CC:  Patient presents for follow up   Chief Complaint   Patient presents with    Follow-up     BILATERAL LUMBARMEDIAL BRANCH NERVE BLOCK UNDER FLUOROSCOPIC GUIDANCE AT L5-S1 FACET       HPI:  Low back pain. S/P L3-5 fusion and L4-5 TLIF in May 2021.     Has noticed right LE pain after the surgery. Has been followed by NSG - imaging showed Intact fusion.     Low back pain and LE pain- Has tingling of the foot (h/o DM).     Pain causes functional limitations/ limits Adl's : Yes     Nursing notes and details of the pain history reviewed. Please see intake notes for details.     Previous treatments:   Physical Therapy : yes, continues HEP.     Medications: - NSAID's : yes                        - Membrane stabilizers : yes - gabapentin                       - Opioids : yes prn                       - Adjuvants or Others : yes,      Surgeries: yes, L3-5 fusion in May 2021     She has not been on anticoagulation medications      She has not been on herbal supplements.       She is diabetic.     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 Degenerative and postsurgical changes as described above.  There has been   prior bilateral laminectomy and posterior fusion at L3 through L5.   2. No evidence of significant central canal stenosis or disc herniation.   3. Mild bilateral L5-S1 neural foraminal stenosis.       X- ray cervical, thoracic and lumbar spine: 1/19/2022:  FINDINGS:   C-spine: Mild degenerative disc disease primarily C5-6.  No fracture or   dislocation.  Normal soft tissues.       T-spine: Mild multilevel degenerative disc disease.  No fracture or   dislocation.  Normal thoracic soft tissues.  Mild thoracic

## 2024-01-10 NOTE — H&P (VIEW-ONLY)
[facility]  [place], [place] [address]  Dept: [phone]      Follow up Note      Conchis Vogel     Date of Visit:  1/10/2024    CC:  Patient presents for follow up   Chief Complaint   Patient presents with    Follow-up     BILATERAL LUMBARMEDIAL BRANCH NERVE BLOCK UNDER FLUOROSCOPIC GUIDANCE AT L5-S1 FACET       HPI:  Low back pain. S/P L3-5 fusion and L4-5 TLIF in May 2021.     Has noticed right LE pain after the surgery. Has been followed by NSG - imaging showed Intact fusion.     Low back pain and LE pain- Has tingling of the foot (h/o DM).     Pain causes functional limitations/ limits Adl's : Yes     Nursing notes and details of the pain history reviewed. Please see intake notes for details.     Previous treatments:   Physical Therapy : yes, continues HEP.     Medications: - NSAID's : yes                        - Membrane stabilizers : yes - gabapentin                       - Opioids : yes prn                       - Adjuvants or Others : yes,      Surgeries: yes, L3-5 fusion in May 2021     She has not been on anticoagulation medications      She has not been on herbal supplements.       She is diabetic.     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 Degenerative and postsurgical changes as described above.  There has been   prior bilateral laminectomy and posterior fusion at L3 through L5.   2. No evidence of significant central canal stenosis or disc herniation.   3. Mild bilateral L5-S1 neural foraminal stenosis.       X- ray cervical, thoracic and lumbar spine: 1/19/2022:  FINDINGS:   C-spine: Mild degenerative disc disease primarily C5-6.  No fracture or   dislocation.  Normal soft tissues.       T-spine: Mild multilevel degenerative disc disease.  No fracture or   dislocation.  Normal thoracic soft tissues.  Mild thoracic

## 2024-01-10 NOTE — PROGRESS NOTES
Conchis Vogel presents to the [facility] on 1/10/2024. Conchis is complaining of pain low back. The pain is constant. The pain is described as aching and throbbing. Pain is rated on her best day at a 2, on her worst day at a 8, and on average at a 5 on the VAS scale. She took her last dose of Tylenol .    Any procedures since your last visit: Yes, Procedure: BILATERAL LUMBARMEDIAL BRANCH NERVE BLOCK UNDER FLUOROSCOPIC GUIDANCE AT L5-S1 FACET  , Date: 12/11/23 with 80 % relief .    She is not on NSAIDS and  is not on anticoagulation medications   Pacemaker or defibrillator: No         Medication Contract and Consent for Opioid Use Documents Filed       Patient Documents       Type of Document Status Date Received Received By Description    Medication Contract Received 10/6/2021 10:43 AM [facility] pain management    Medication Contract Received 1/30/2023 10:41 AM JOSAFAT GARCIA pain mangement agreement                       Temp 98.2 °F (36.8 °C) (Temporal)   Resp 16   Ht 1.6 m (5' 3\")   Wt 74.8 kg (165 lb)   SpO2 95%   BMI 29.23 kg/m²      No LMP recorded. Patient has had a hysterectomy.

## 2024-01-15 ENCOUNTER — TELEPHONE (OUTPATIENT)
Dept: PAIN MANAGEMENT | Age: 64
End: 2024-01-15

## 2024-01-15 NOTE — TELEPHONE ENCOUNTER
Call to Conchis to ask her percentage relief she had from her last TFESI on 10/19/23.  She states that she had significant relief, 85-90% relief from her pain.

## 2024-01-18 ENCOUNTER — TELEPHONE (OUTPATIENT)
Dept: PAIN MANAGEMENT | Age: 64
End: 2024-01-18

## 2024-01-18 NOTE — TELEPHONE ENCOUNTER
Call to Conchis Vogel that procedure was approved for 1/22/2024 and that the surgery center should call her a few days before for the pre op call and between 2:00 PM and 4:00 PM  the business day before with the arrival time. Instructed Conchis to hold ibuprofen for 24 hours, naprosyn for 4 days and any aspirin containing products or fish oil for 7 days. Instructed to call office back if any questions. Conchis verbalized understanding.    Electronically signed by Tyson Hale RN on 1/18/2024 at 1:53 PM

## 2024-01-18 NOTE — PROGRESS NOTES
[facility] PAIN MANAGEMENT  INSTRUCTIONS  ...........................................................................................................................................     [x] Parking the day of Surgery is located in the Main Entrance lot.  Upon entering the door, make immediate right into the surgery reception room    [x]  Bring photo ID and insurance card     [x] You may have a light breakfast day of procedure    [x]  Wear loose comfortable clothing    [x]  Please follow instructions for medications as given per Dr's office    [x] You can expect a call the business day prior to procedure to notify you of your arrival time     [x] Please arrange for     []  Other instructions

## 2024-01-22 ENCOUNTER — HOSPITAL ENCOUNTER (OUTPATIENT)
Dept: GENERAL RADIOLOGY | Age: 64
Setting detail: OUTPATIENT SURGERY
Discharge: HOME | End: 2024-01-24
Attending: ANESTHESIOLOGY
Payer: MEDICARE

## 2024-01-22 ENCOUNTER — HOSPITAL ENCOUNTER (OUTPATIENT)
Age: 64
Setting detail: OUTPATIENT SURGERY
Discharge: HOME | End: 2024-01-22
Attending: ANESTHESIOLOGY | Admitting: ANESTHESIOLOGY
Payer: MEDICARE

## 2024-01-22 VITALS
OXYGEN SATURATION: 96 % | HEART RATE: 57 BPM | TEMPERATURE: 97.4 F | RESPIRATION RATE: 18 BRPM | BODY MASS INDEX: 29.23 KG/M2 | DIASTOLIC BLOOD PRESSURE: 64 MMHG | WEIGHT: 165 LBS | SYSTOLIC BLOOD PRESSURE: 112 MMHG | HEIGHT: 63 IN

## 2024-01-22 DIAGNOSIS — R52 PAIN MANAGEMENT: ICD-10-CM

## 2024-01-22 LAB — GLUCOSE BLD-MCNC: 93 MG/DL (ref 74–99)

## 2024-01-22 PROCEDURE — 6360000002 HC RX W HCPCS: Performed by: ANESTHESIOLOGY

## 2024-01-22 PROCEDURE — 82962 GLUCOSE BLOOD TEST: CPT

## 2024-01-22 PROCEDURE — 3600000012 HC SURGERY LEVEL 2 ADDTL 15MIN: Performed by: ANESTHESIOLOGY

## 2024-01-22 PROCEDURE — 2709999900 HC NON-CHARGEABLE SUPPLY: Performed by: ANESTHESIOLOGY

## 2024-01-22 PROCEDURE — 2500000003 HC RX 250 WO HCPCS: Performed by: ANESTHESIOLOGY

## 2024-01-22 PROCEDURE — 6360000004 HC RX CONTRAST MEDICATION: Performed by: ANESTHESIOLOGY

## 2024-01-22 PROCEDURE — 3600000002 HC SURGERY LEVEL 2 BASE: Performed by: ANESTHESIOLOGY

## 2024-01-22 PROCEDURE — 7100000010 HC PHASE II RECOVERY - FIRST 15 MIN: Performed by: ANESTHESIOLOGY

## 2024-01-22 PROCEDURE — 7100000011 HC PHASE II RECOVERY - ADDTL 15 MIN: Performed by: ANESTHESIOLOGY

## 2024-01-22 PROCEDURE — 64484 NJX AA&/STRD TFRM EPI L/S EA: CPT | Performed by: ANESTHESIOLOGY

## 2024-01-22 PROCEDURE — 64483 NJX AA&/STRD TFRM EPI L/S 1: CPT | Performed by: ANESTHESIOLOGY

## 2024-01-22 RX ORDER — IOPAMIDOL 612 MG/ML
INJECTION, SOLUTION INTRATHECAL PRN
Status: DISCONTINUED | OUTPATIENT
Start: 2024-01-22 | End: 2024-01-22 | Stop reason: ALTCHOICE

## 2024-01-22 RX ORDER — DEXAMETHASONE SODIUM PHOSPHATE 10 MG/ML
INJECTION, SOLUTION INTRAMUSCULAR; INTRAVENOUS PRN
Status: DISCONTINUED | OUTPATIENT
Start: 2024-01-22 | End: 2024-01-22 | Stop reason: ALTCHOICE

## 2024-01-22 RX ORDER — SODIUM CHLORIDE 0.9 % (FLUSH) 0.9 %
5-40 SYRINGE (ML) INJECTION PRN
Status: DISCONTINUED | OUTPATIENT
Start: 2024-01-22 | End: 2024-01-22 | Stop reason: HOSPADM

## 2024-01-22 RX ORDER — LIDOCAINE HYDROCHLORIDE 5 MG/ML
INJECTION, SOLUTION INFILTRATION; INTRAVENOUS PRN
Status: DISCONTINUED | OUTPATIENT
Start: 2024-01-22 | End: 2024-01-22 | Stop reason: ALTCHOICE

## 2024-01-22 RX ORDER — SODIUM CHLORIDE 0.9 % (FLUSH) 0.9 %
5-40 SYRINGE (ML) INJECTION EVERY 12 HOURS SCHEDULED
Status: DISCONTINUED | OUTPATIENT
Start: 2024-01-22 | End: 2024-01-22 | Stop reason: HOSPADM

## 2024-01-22 RX ORDER — SODIUM CHLORIDE 9 MG/ML
INJECTION, SOLUTION INTRAVENOUS PRN
Status: DISCONTINUED | OUTPATIENT
Start: 2024-01-22 | End: 2024-01-22 | Stop reason: HOSPADM

## 2024-01-22 ASSESSMENT — PAIN - FUNCTIONAL ASSESSMENT: PAIN_FUNCTIONAL_ASSESSMENT: 0-10

## 2024-01-22 ASSESSMENT — PAIN DESCRIPTION - DESCRIPTORS: DESCRIPTORS: ACHING

## 2024-01-22 NOTE — OP NOTE
Operative Note      Patient: Conchis Vogel  YOB: 1960  MRN: [id number]    Date of Procedure: 2024    Pre-Op Diagnosis Codes:     * Lumbar radiculopathy [M54.16]    Post-Op Diagnosis: Same       Procedure(s):  LUMBAR TRANSFORAMINAL EPIDURAL STEROID INJECTION RIGHT L5 AND S1 UNDER FLUOROSCOPIC GUIDANCE    Surgeon(s):  Noman Oswald MD    Assistant:   * No surgical staff found *    Anesthesia: Local    Estimated Blood Loss (mL): Minimal    Complications: None    Specimens:   * No specimens in log *    Implants:  * No implants in log *      Drains: * No LDAs found *    Findings: good needle placement        Detailed Description of Procedure:   2024    Patient: Conchis Vogel  :  1960  Age:  63 y.o.  Sex:  female     PRE-OPERATIVE DIAGNOSIS: Lumbar disc displacement, lumbar neural foraminal stenosis, lumbar radiculopathy.     POST-OPERATIVE DIAGNOSIS: Same.    PROCEDURE: Right Transforaminal epidural steroid injection under fluoroscopic guidance at foraminal level L5 & S1.    SURGEON: Noman Oswald MD    ANESTHESIA: Local    ESTIMATED BLOOD LOSS: None.  ______________________________________________________________________  BRIEF HISTORY: Conchis Vogel comes in today for the  Right transforaminal epidural steroid injection under fluoroscopic guidance at foraminal level L5 & S1 . The potential complications of this procedure were discussed with her again today.  She has elected to undergo the aforementioned procedure.     Conchis’s complete History & Physical examination were reviewed in depth, a copy of which is in the chart.      DESCRIPTION OF PROCEDURE:    After confirming written and informed consent, a time-out was performed and Conchis’s name and date of birth, the procedure to be performed as well as the plan for the location of the needle insertion were confirmed.    The patient was brought into the procedure room and placed in the prone position on the fluoroscopy table.

## 2024-01-22 NOTE — INTERVAL H&P NOTE
Update History & Physical    The patient's History and Physical of January 10, 2024 was reviewed with the patient and I examined the patient. There was no change. The surgical site was confirmed by the patient and me.     Plan: The risks, benefits, expected outcome, and alternative to the recommended procedure have been discussed with the patient. Patient understands and wants to proceed with the procedure.     Electronically signed by Noman Oswald MD on 1/22/2024

## 2024-01-22 NOTE — DISCHARGE INSTRUCTIONS
[facility] Pain Management Department  [place] [facility]-[phone]  Dr. Darek Mendoza   Post-Pain Block/Radiofrequency  Home Going Instructions    1-Go home, rest for the remainder of the day  2-Please do not lift over 20 pounds the day of the injection  3-If you received sedation No: alcohol, driving, operating lawn mowers, plows, tractors or other dangerous equipment until next morning. Do not make important decisions or sign legal documents for 24 hours. You may experience light headedness, dizziness, nausea or sleepiness after sedation. Do not stay alone. A responsible adult must be with you for 24 hours. You could be nauseated from the medications you have received. Your IV site may be sore and bruised.    4-No dietary restrictions     5-Resume all medications the same day, blood thinners to be resumed 24 hours after injection if you were instructed to stop any.    6-Keep the surgical site clean and dry, you may shower the next morning and remove the      dressing.     7- No sitz baths, tub baths or hot tubs/swimming for 24 hours.       8- If you have any pain at the injection site(s), application of an ice pack to the area should be       helpful, 20 minutes on/20 minutes off for next 48 hours.  9- Call [facility] immediately at if you develop.  Fever greater than 100.4 F  Have bleeding or drainage from the puncture site  Have progressive Leg/arm numbness and or weakness  Loss of control of bowel and or bladder (wet/soil yourself)  Severe headache with inability to lift head  10-You may return to work the next day

## 2024-01-30 DIAGNOSIS — Z12.31 ENCOUNTER FOR SCREENING MAMMOGRAM FOR BREAST CANCER: ICD-10-CM

## 2024-04-08 ENCOUNTER — PROCEDURE VISIT (OUTPATIENT)
Dept: PODIATRY | Age: 64
End: 2024-04-08
Payer: MEDICARE

## 2024-04-08 VITALS
BODY MASS INDEX: 29.23 KG/M2 | SYSTOLIC BLOOD PRESSURE: 118 MMHG | TEMPERATURE: 97.8 F | WEIGHT: 165 LBS | DIASTOLIC BLOOD PRESSURE: 64 MMHG

## 2024-04-08 DIAGNOSIS — B35.1 DERMATOPHYTOSIS OF NAIL: ICD-10-CM

## 2024-04-08 DIAGNOSIS — M79.674 PAIN IN RIGHT TOE(S): ICD-10-CM

## 2024-04-08 DIAGNOSIS — E11.9 TYPE 2 DIABETES MELLITUS WITHOUT COMPLICATION, WITHOUT LONG-TERM CURRENT USE OF INSULIN (HCC): ICD-10-CM

## 2024-04-08 DIAGNOSIS — E11.9 ENCOUNTER FOR DIABETIC FOOT EXAM (HCC): ICD-10-CM

## 2024-04-08 DIAGNOSIS — M79.675 PAIN IN LEFT TOE(S): ICD-10-CM

## 2024-04-08 DIAGNOSIS — B35.1 TINEA UNGUIUM: Primary | ICD-10-CM

## 2024-04-08 DIAGNOSIS — M79.674 PAIN IN TOE OF RIGHT FOOT: ICD-10-CM

## 2024-04-08 DIAGNOSIS — I73.9 PERIPHERAL VASCULAR DISEASE, UNSPECIFIED (HCC): ICD-10-CM

## 2024-04-08 PROCEDURE — 11721 DEBRIDE NAIL 6 OR MORE: CPT | Performed by: PODIATRIST

## 2024-04-08 ASSESSMENT — ENCOUNTER SYMPTOMS
RESPIRATORY NEGATIVE: 1
GASTROINTESTINAL NEGATIVE: 1

## 2024-04-08 NOTE — PROGRESS NOTES
[facility] [place] [facility]  [address] [address]  Dept: [phone]  Dept Fax: [phone]  Loc: [phone]    DIABETIC NAIL PROGRESS NOTE  Date of patient's visit: 4/8/2024  Patient's Name:  Conchis Vogel YOB: 1960            Patient Care Team:  Prieto Robin DO as PCP - General (Family Medicine)  Prieto Robin DO as PCP - Empaneled Provider  Hayder Beal DPM as Consulting Physician (Podiatry)          Chief Complaint   Patient presents with    Toe Pain     Prieto Robin DO  1/10/24       Subjective:   Conchis Vogel comes to clinic for Toe Pain (Prieto Robin DO/1/10/24)    she is a diabetic and states that foot exam .  Pt currently has complaint of thickened, elongated nails that they cannot manage by themselves.   Pt's primary care physician is Prieto Robin DO    .     Lab Results   Component Value Date    LABA1C 4.9 12/05/2023      Complains of numbness in the feet bilat.  Past Medical History:   Diagnosis Date    Back pain     Chronic fatigue     COVID-19 09/2020    mild per patient    Diabetes mellitus (HCC)     diet controlled    History of blood transfusion     Hyperlipidemia     not on any medications    Hypothyroidism     IBS (irritable bowel syndrome)     Morbid obesity due to excess calories (HCC)     Obesity     Osteoarthritis     PONV (postoperative nausea and vomiting)        Allergies   Allergen Reactions    Azithromycin Other (See Comments)     Stomach pain    Bactrim [Sulfamethoxazole-Trimethoprim] Nausea Only    Ceftin [Cefuroxime] Rash    Keflex [Cephalexin] Rash     Current Outpatient Medications on File Prior to Visit   Medication Sig Dispense Refill    ketoconazole (NIZORAL) 2 % cream Apply topically daily. 30 g 1    levothyroxine (SYNTHROID) 200 MCG tablet take 1 tablet by mouth every morning 90 tablet 1    omeprazole (PRILOSEC) 20 MG delayed release capsule take 1 capsule

## 2024-04-11 ENCOUNTER — PREP FOR PROCEDURE (OUTPATIENT)
Dept: PAIN MANAGEMENT | Age: 64
End: 2024-04-11

## 2024-04-11 ENCOUNTER — OFFICE VISIT (OUTPATIENT)
Dept: PAIN MANAGEMENT | Age: 64
End: 2024-04-11
Payer: MEDICARE

## 2024-04-11 VITALS
SYSTOLIC BLOOD PRESSURE: 115 MMHG | BODY MASS INDEX: 29.22 KG/M2 | RESPIRATION RATE: 16 BRPM | HEIGHT: 63 IN | WEIGHT: 164.9 LBS | TEMPERATURE: 97.5 F | HEART RATE: 75 BPM | OXYGEN SATURATION: 95 % | DIASTOLIC BLOOD PRESSURE: 74 MMHG

## 2024-04-11 DIAGNOSIS — M47.817 LUMBOSACRAL SPONDYLOSIS WITHOUT MYELOPATHY: ICD-10-CM

## 2024-04-11 DIAGNOSIS — M54.16 LUMBAR RADICULOPATHY: Primary | ICD-10-CM

## 2024-04-11 DIAGNOSIS — M51.36 DDD (DEGENERATIVE DISC DISEASE), LUMBAR: ICD-10-CM

## 2024-04-11 DIAGNOSIS — Z98.1 S/P LUMBAR SPINAL FUSION: ICD-10-CM

## 2024-04-11 DIAGNOSIS — Z79.891 ADMISSION FOR LONG-TERM OPIATE ANALGESIC USE: ICD-10-CM

## 2024-04-11 DIAGNOSIS — M54.16 LUMBAR RADICULAR PAIN: Primary | ICD-10-CM

## 2024-04-11 DIAGNOSIS — M96.1 POSTLAMINECTOMY SYNDROME, LUMBAR: ICD-10-CM

## 2024-04-11 PROCEDURE — 99214 OFFICE O/P EST MOD 30 MIN: CPT | Performed by: ANESTHESIOLOGY

## 2024-04-11 PROCEDURE — 99214 OFFICE O/P EST MOD 30 MIN: CPT

## 2024-04-11 RX ORDER — SODIUM CHLORIDE 9 MG/ML
INJECTION, SOLUTION INTRAVENOUS PRN
Status: CANCELLED | OUTPATIENT
Start: 2024-04-11

## 2024-04-11 RX ORDER — SODIUM CHLORIDE 0.9 % (FLUSH) 0.9 %
5-40 SYRINGE (ML) INJECTION EVERY 12 HOURS SCHEDULED
Status: CANCELLED | OUTPATIENT
Start: 2024-04-11

## 2024-04-11 RX ORDER — SODIUM CHLORIDE 0.9 % (FLUSH) 0.9 %
5-40 SYRINGE (ML) INJECTION PRN
Status: CANCELLED | OUTPATIENT
Start: 2024-04-11

## 2024-04-11 RX ORDER — OXYCODONE HYDROCHLORIDE AND ACETAMINOPHEN 5; 325 MG/1; MG/1
1 TABLET ORAL DAILY PRN
Qty: 30 TABLET | Refills: 0 | Status: SHIPPED | OUTPATIENT
Start: 2024-04-11 | End: 2024-05-11

## 2024-04-11 NOTE — PROGRESS NOTES
Conchis Vogel presents to the [facility] on 4/11/2024. Conchis is complaining of pain low back. The pain is constant. The pain is described as aching. Pain is rated on her best day at a 2, on her worst day at a 8, and on average at a 5 on the VAS scale. She took her last dose of Percocet yesterday.     Any procedures since your last visit: Yes, LUMBAR TRANSFORAMINAL EPIDURAL STEROID INJECTION RIGHT L5 AND S1 UNDER FLUOROSCOPIC GUIDANCE with 80 % relief.    Pacemaker or defibrillator: No.    She is not on NSAIDS and is not on anticoagulation medications to include none.     Medication Contract and Consent for Opioid Use Documents Filed       Patient Documents       Type of Document Status Date Received Received By Description    Medication Contract Received 10/6/2021 10:43 AM [facility] pain management    Medication Contract Received 1/30/2023 10:41 AM JOSAFAT GARCIA pain mangement agreement                    /74   Pulse 75   Temp 97.5 °F (36.4 °C) (Infrared)   Resp 16   Ht 1.6 m (5' 3\")   Wt 74.8 kg (164 lb 14.5 oz)   SpO2 95%   BMI 29.21 kg/m²      No LMP recorded. Patient has had a hysterectomy.

## 2024-04-11 NOTE — PROGRESS NOTES
[facility]  [place], [place] [address]  Dept: [phone]      Follow up Note      Conchsi Vogel     Date of Visit:  4/11/2024    CC:  Patient presents for follow up   Chief Complaint   Patient presents with    Follow-up     Low back pain       HPI:  Low back pain. S/P L3-5 fusion and L4-5 TLIF in May 2021.     Has noticed right LE pain after the surgery. Has been followed by NSG - imaging showed Intact fusion.     Low back pain and LE pain- Has tingling of the foot (h/o DM).     Pain causes functional limitations/ limits Adl's : Yes     Nursing notes and details of the pain history reviewed. Please see intake notes for details.     Previous treatments:   Physical Therapy : yes, continues HEP.     Medications: - NSAID's : yes                        - Membrane stabilizers : yes - gabapentin                       - Opioids : yes prn                       - Adjuvants or Others : yes,      Surgeries: yes, L3-5 fusion in May 2021     She has not been on anticoagulation medications      She has not been on herbal supplements.       She is diabetic.     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 Degenerative and postsurgical changes as described above.  There has been   prior bilateral laminectomy and posterior fusion at L3 through L5.   2. No evidence of significant central canal stenosis or disc herniation.   3. Mild bilateral L5-S1 neural foraminal stenosis.       X- ray cervical, thoracic and lumbar spine: 1/19/2022:  FINDINGS:   C-spine: Mild degenerative disc disease primarily C5-6.  No fracture or   dislocation.  Normal soft tissues.       T-spine: Mild multilevel degenerative disc disease.  No fracture or   dislocation.  Normal thoracic soft tissues.  Mild thoracic dextroscoliosis.       Lumbar spine: Intact hardware associated with posterior

## 2024-04-11 NOTE — H&P (VIEW-ONLY)
[facility]  [place], [place] [address]  Dept: [phone]      Follow up Note      Conchis Vogel     Date of Visit:  4/11/2024    CC:  Patient presents for follow up   Chief Complaint   Patient presents with    Follow-up     Low back pain       HPI:  Low back pain. S/P L3-5 fusion and L4-5 TLIF in May 2021.     Has noticed right LE pain after the surgery. Has been followed by NSG - imaging showed Intact fusion.     Low back pain and LE pain- Has tingling of the foot (h/o DM).     Pain causes functional limitations/ limits Adl's : Yes     Nursing notes and details of the pain history reviewed. Please see intake notes for details.     Previous treatments:   Physical Therapy : yes, continues HEP.     Medications: - NSAID's : yes                        - Membrane stabilizers : yes - gabapentin                       - Opioids : yes prn                       - Adjuvants or Others : yes,      Surgeries: yes, L3-5 fusion in May 2021     She has not been on anticoagulation medications      She has not been on herbal supplements.       She is diabetic.     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 Degenerative and postsurgical changes as described above.  There has been   prior bilateral laminectomy and posterior fusion at L3 through L5.   2. No evidence of significant central canal stenosis or disc herniation.   3. Mild bilateral L5-S1 neural foraminal stenosis.       X- ray cervical, thoracic and lumbar spine: 1/19/2022:  FINDINGS:   C-spine: Mild degenerative disc disease primarily C5-6.  No fracture or   dislocation.  Normal soft tissues.       T-spine: Mild multilevel degenerative disc disease.  No fracture or   dislocation.  Normal thoracic soft tissues.  Mild thoracic dextroscoliosis.       Lumbar spine: Intact hardware associated with posterior  today- 4/11/2024-Addendum: result reviewed- consistent.    Opioid agreement:  10/6/2021  renew opioid agreement 1/30/2023. Salient features of opioid agreement discussed.      Oral drug screen   -10/6/2021-  consistent.   - 1/30/2023 - result reviewed - consistent.     [place] reviewed- today: [place]      Counseling : Patient encouraged to stay active, to continue Regular home exercise program and to watch/lose weight     Treatment plan discussed with the patient including medication and procedure side effects.     Controlled Substances Monitoring:   [place] reviewed.     We discussed with the patient that combining opioids, benzodiazepines, alcohol, illicit drugs or sleep aids increases the risk of respiratory depression including death. We discussed that these medications may cause drowsiness, sedation or dizziness and have counseled the patient not to drive or operate machinery. We have discussed that these medications will be prescribed only by one provider. We have discussed with the patient about age related risk factors and have thoroughly discussed the importance of taking these medications as prescribed. The patient verbalizes understanding.     Noman Oswald MD    CC:  Prieto Robin, DO

## 2024-04-12 LAB
6-MAM, QUANTITATIVE, URINE: <10 NG/ML
6-MONOACETYLMORPHINE, URINE: NEGATIVE
7-AMINOCLONAZEPAM, QUANTITATIVE, URINE: <50 NG/ML
ABNORMAL SPECIMEN VALIDITY TEST: ABNORMAL
ALCOHOL URINE: NOT DETECTED MG/DL
ALPHA-HYDROXYALPRAZOLAM, QUANTITATIVE, URINE: <50 NG/ML
ALPHA-HYDROXYMIDAZOLAM, QUANTITATIVE, URINE: <50 NG/ML
ALPHA-HYDROXYTRIAZOLAM, QUANTITATIVE, URINE: <50 NG/ML
ALPRAZOLAM URINE QUANT: <50 NG/ML
AMPHETAMINE SCREEN URINE: NEGATIVE
BARBITURATE SCREEN URINE: NEGATIVE
BENZODIAZEPINE SCREEN, URINE: NEGATIVE
BUPRENORPHINE URINE: NEGATIVE
CANNABINOID SCREEN URINE: NEGATIVE
CHLORDIAZEPOXIDE, QUANTITATIVE, URINE: <50 NG/ML
CLONAZEPAM, QUANTITATIVE, URINE: <50 NG/ML
COCAINE METABOLITE, URINE: NEGATIVE
CODEINE, QUANTITATIVE, URINE: <50 NG/ML
COMPLIANCE DRUG ANALYSIS, URINE: NORMAL
DIAZEPAM URINE QUANT: <50 NG/ML
FENTANYL URINE: NEGATIVE
FLUNITRAZEPAM, QUANTITATIVE, URINE: <50 NG/ML
FLURAZEPAM, QUANTITATIVE, URINE: <50 NG/ML
HYDROCODONE, QUANTITATIVE, URINE: <50 NG/ML
HYDROMORPHONE, QUANTITATIVE, URINE: <50 NG/ML
INTEGRITY CHECK, CREATININE, URINE: 152.3 MG/DL (ref 22–250)
INTEGRITY CHECK, OXIDANT, URINE: <40 MG/L
INTEGRITY CHECK, PH, URINE: 5.3 (ref 4.5–9)
INTEGRITY CHECK, SPECIFIC GRAVITY, URINE: 1.03 (ref 1–1.03)
LORAZEPAM URINE QUANT: <50 NG/ML
METHADONE SCREEN, URINE: NEGATIVE
MIDAZOLAM URINE QUANT: <50 NG/ML
MORPHINE, QUANTITATIVE, URINE: <50 NG/ML
NORDIAZEPAM URINE QUANT: <50 NG/ML
NORHYDROCODONE, QUANTITATIVE, URINE: <50 NG/ML
NOROXYCODONE, QUANTITATIVE, URINE: 409.5 NG/ML
OPIATES, URINE: NEGATIVE
OXAZEPAM URINE QUANT: <50 NG/ML
OXYCODONE SCREEN URINE: POSITIVE
OXYCODONE URINE, QUANTITATIVE: 155.1 NG/ML
OXYMORPHONE, QUANTITATIVE, URINE: 92 NG/ML
PHENCYCLIDINE, URINE: NEGATIVE
TEMAZEPAM, QUANTITATIVE, URINE: <50 NG/ML
TEST INFORMATION: ABNORMAL
TRAMADOL, URINE: NEGATIVE

## 2024-04-26 ENCOUNTER — TELEPHONE (OUTPATIENT)
Dept: PAIN MANAGEMENT | Age: 64
End: 2024-04-26

## 2024-04-26 DIAGNOSIS — M54.16 LUMBAR RADICULOPATHY: Primary | ICD-10-CM

## 2024-04-26 NOTE — TELEPHONE ENCOUNTER
Call to Conchis Vogel that procedure was approved for 5/2/2024 and that [facility] should call her a few days before for the pre op call and between 2:00 PM and 4:00 PM  the business day before with the arrival time. Instructed Conchis to hold ibuprofen for 24 hours, naprosyn for 4 days and any aspirin containing products or fish oil for 7 days. Instructed to call office back if any questions. Conchis verbalized understanding.    Electronically signed by Balaji Kaur RN on 4/26/2024 at 1:37 PM

## 2024-04-29 NOTE — PROGRESS NOTES
[facility] PAIN MANAGEMENT  INSTRUCTIONS  ...........................................................................................................................................     [x] Parking the day of Surgery is located in the Main Entrance lot.  Upon entering the door, make immediate right into the surgery reception room    [x]  Bring photo ID and insurance card     [x] You may have a light breakfast day of procedure    [x]  Wear loose comfortable clothing    [x]  Please follow instructions for medications as given per Dr's office    [x] You can expect a call the business day prior to procedure to notify you of your arrival time     [x] Please arrange for     []  Other instructions

## 2024-05-02 ENCOUNTER — HOSPITAL ENCOUNTER (OUTPATIENT)
Dept: GENERAL RADIOLOGY | Age: 64
Setting detail: OUTPATIENT SURGERY
Discharge: HOME | End: 2024-05-04
Attending: ANESTHESIOLOGY
Payer: MEDICARE

## 2024-05-02 ENCOUNTER — HOSPITAL ENCOUNTER (OUTPATIENT)
Age: 64
Setting detail: OUTPATIENT SURGERY
Discharge: HOME | End: 2024-05-02
Attending: ANESTHESIOLOGY | Admitting: ANESTHESIOLOGY
Payer: MEDICARE

## 2024-05-02 VITALS
HEIGHT: 63 IN | SYSTOLIC BLOOD PRESSURE: 130 MMHG | TEMPERATURE: 97.7 F | DIASTOLIC BLOOD PRESSURE: 62 MMHG | RESPIRATION RATE: 18 BRPM | WEIGHT: 164 LBS | HEART RATE: 68 BPM | OXYGEN SATURATION: 97 % | BODY MASS INDEX: 29.06 KG/M2

## 2024-05-02 DIAGNOSIS — R52 PAIN MANAGEMENT: ICD-10-CM

## 2024-05-02 PROCEDURE — 3600000002 HC SURGERY LEVEL 2 BASE: Performed by: ANESTHESIOLOGY

## 2024-05-02 PROCEDURE — 3600000012 HC SURGERY LEVEL 2 ADDTL 15MIN: Performed by: ANESTHESIOLOGY

## 2024-05-02 PROCEDURE — 7100000010 HC PHASE II RECOVERY - FIRST 15 MIN: Performed by: ANESTHESIOLOGY

## 2024-05-02 PROCEDURE — 2709999900 HC NON-CHARGEABLE SUPPLY: Performed by: ANESTHESIOLOGY

## 2024-05-02 PROCEDURE — 2500000003 HC RX 250 WO HCPCS: Performed by: ANESTHESIOLOGY

## 2024-05-02 PROCEDURE — 62323 NJX INTERLAMINAR LMBR/SAC: CPT | Performed by: ANESTHESIOLOGY

## 2024-05-02 PROCEDURE — 6360000002 HC RX W HCPCS: Performed by: ANESTHESIOLOGY

## 2024-05-02 PROCEDURE — 6360000004 HC RX CONTRAST MEDICATION: Performed by: ANESTHESIOLOGY

## 2024-05-02 RX ORDER — SODIUM CHLORIDE 0.9 % (FLUSH) 0.9 %
5-40 SYRINGE (ML) INJECTION PRN
Status: DISCONTINUED | OUTPATIENT
Start: 2024-05-02 | End: 2024-05-02 | Stop reason: HOSPADM

## 2024-05-02 RX ORDER — SODIUM CHLORIDE 0.9 % (FLUSH) 0.9 %
5-40 SYRINGE (ML) INJECTION EVERY 12 HOURS SCHEDULED
Status: DISCONTINUED | OUTPATIENT
Start: 2024-05-02 | End: 2024-05-02 | Stop reason: HOSPADM

## 2024-05-02 RX ORDER — METHYLPREDNISOLONE ACETATE 40 MG/ML
INJECTION, SUSPENSION INTRA-ARTICULAR; INTRALESIONAL; INTRAMUSCULAR; SOFT TISSUE PRN
Status: DISCONTINUED | OUTPATIENT
Start: 2024-05-02 | End: 2024-05-02 | Stop reason: ALTCHOICE

## 2024-05-02 RX ORDER — DEXAMETHASONE SODIUM PHOSPHATE 10 MG/ML
INJECTION, SOLUTION INTRAMUSCULAR; INTRAVENOUS PRN
Status: DISCONTINUED | OUTPATIENT
Start: 2024-05-02 | End: 2024-05-02 | Stop reason: ALTCHOICE

## 2024-05-02 RX ORDER — IOPAMIDOL 612 MG/ML
INJECTION, SOLUTION INTRATHECAL PRN
Status: DISCONTINUED | OUTPATIENT
Start: 2024-05-02 | End: 2024-05-02 | Stop reason: ALTCHOICE

## 2024-05-02 RX ORDER — SODIUM CHLORIDE 9 MG/ML
INJECTION, SOLUTION INTRAVENOUS PRN
Status: DISCONTINUED | OUTPATIENT
Start: 2024-05-02 | End: 2024-05-02 | Stop reason: HOSPADM

## 2024-05-02 RX ORDER — LIDOCAINE HYDROCHLORIDE 5 MG/ML
INJECTION, SOLUTION INFILTRATION; INTRAVENOUS PRN
Status: DISCONTINUED | OUTPATIENT
Start: 2024-05-02 | End: 2024-05-02 | Stop reason: ALTCHOICE

## 2024-05-02 ASSESSMENT — PAIN - FUNCTIONAL ASSESSMENT
PAIN_FUNCTIONAL_ASSESSMENT: 0-10
PAIN_FUNCTIONAL_ASSESSMENT: 0-10

## 2024-05-02 ASSESSMENT — PAIN DESCRIPTION - DESCRIPTORS: DESCRIPTORS: DISCOMFORT

## 2024-05-02 NOTE — DISCHARGE INSTRUCTIONS
[facility]  [place]-[phone]  Dr. Candace Mendoza   Post-Pain Block/Radiofrequency  Home Going Instructions    1-Go home, rest for the remainder of the day  2-Please do not lift over 20 pounds the day of the injection  3-If you received sedation No: alcohol, driving, operating lawn mowers, plows, tractors or other dangerous equipment until next morning. Do not make important decisions or sign legal documents for 24 hours. You may experience light headedness, dizziness, nausea or sleepiness after sedation. Do not stay alone. A responsible adult must be with you for 24 hours. You could be nauseated from the medications you have received. Your IV site may be sore and bruised.    4-No dietary restrictions     5-Resume all medications the same day, blood thinners to be resumed 24 hours after injection if you were instructed to stop any.    6-Keep the surgical site clean and dry, you may shower the next morning and remove the      dressing.     7- No sitz baths, tub baths or hot tubs/swimming for 24 hours.       8- If you have any pain at the injection site(s), application of an ice pack to the area should be       helpful, 20 minutes on/20 minutes off for next 48 hours.  9- Call [facility] immediately at if you develop.  Fever greater than 100.4 F  Have bleeding or drainage from the puncture site  Have progressive Leg/arm numbness and or weakness  Loss of control of bowel and or bladder (wet/soil yourself)  Severe headache with inability to lift head  10-You may return to work the next day

## 2024-05-02 NOTE — INTERVAL H&P NOTE
Update History & Physical    The patient's History and Physical of April 11, 2024 was reviewed with the patient and I examined the patient. There was no change. The surgical site was confirmed by the patient and me.     Plan: The risks, benefits, expected outcome, and alternative to the recommended procedure have been discussed with the patient. Patient understands and wants to proceed with the procedure.     Electronically signed by Noman Oswald MD on 5/2/2024

## 2024-05-02 NOTE — OP NOTE
Operative Note      Patient: Conchis Vogel  YOB: 1960  MRN: [id number]    Date of Procedure: 2024    Pre-Op Diagnosis Codes:     * Lumbar radiculopathy [M54.16]    Post-Op Diagnosis: Same       Procedure(s):  LUMBAR 5 AND SACRAL 1 EPIDURAL STEROID INJECTION UNDER FLUOROSCOPIC GUIDANCE    Surgeon(s):  Noman Oswald MD    Assistant:   * No surgical staff found *    Anesthesia: Local    Estimated Blood Loss (mL): Minimal    Complications: None    Specimens:   * No specimens in log *    Implants:  * No implants in log *      Drains: * No LDAs found *    Findings:  Infection Present At Time Of Surgery (PATOS) (choose all levels that have infection present):  No infection present  Other Findings: good needle placement    Detailed Description of Procedure:   2024    Patient: Conchis Vogel  :  1960  Age:  63 y.o.  Sex:  female     PRE-OPERATIVE DIAGNOSIS: Lumbar disc displacement, lumbar radiculopathy.    POST-OPERATIVE DIAGNOSIS: Same.    PROCEDURE: Fluoroscopic guided therapeutic lumbar epidural steroid injection at the L5-S1 level .    SURGEON: Noman Oswald MD    ANESTHESIA: Local    ESTIMATED BLOOD LOSS: None.  ______________________________________________________________________    BRIEF HISTORY:  Conchis Vogel comes in today for  lumbar epidural injection at L5-S1 level. The potential complications of this procedure were discussed with her again today.  She has elected to undergo the aforementioned procedure.    Conchis’s complete History & Physical examination were reviewed in depth, a copy of which is in the chart.      DESCRIPTION OF PROCEDURE:    After confirming written and informed consent, a time-out was performed and Conchis’s name and date of birth, the procedure to be performed as well as the plan for the location of the needle insertion were confirmed.    The patient was brought into the procedure room and placed in the prone position on the fluoroscopy table. A  pillow was placed under the patient's lower abdomen/upper pelvis to increase lumbar interlaminar space. Standard monitors were placed, and vital signs were observed throughout the procedure. The area of the lumbar spine was prepped with chloraprep and draped in a sterile manner. The L5-S1 interspace was identified and marked under AP fluoroscopy. The skin and subcutaneous tissues at the above level were anesthestized with 0.5% lidocaine. With intermittent fluoroscopy, an # 18 gauge 3 1/2 tuohy epidural needle was inserted and directed toward the interlaminar space. The needle was slowly advanced using loss of resistance technique and 5 cc glass syringe  until the tip of the epidural needle has passed through the ligamentum flavum and entered the epidural space. AP and lateral fluoroscopic imaging is performed to verify that the epidural needle is properly placed. Negative aspiration of blood and CSF was confirmed.  0.5 ml of Isovue M 300 was used for confirmation of even epidural spread under both live and AP fluoroscopy. After negative aspiration, a solution of 0.5 % Lidocaine 3 ml and 12 mg Dexamethasone was easily injected. The needle was gently removed intact . The patient back was cleaned and a Band-Aid was placed over the needle insertion point.    NOTE: Initial attempt for left L5 & S1 TFESI- difficult access.Technique changed ton interlaminar L5-S1 ZAK.    Disposition the patient tolerated the procedure well and there were no complications . Vital signs remained stable throughout the procedure. The patient was escorted to the recovery area where they remained until discharge and written discharge instructions for the procedure were given.    Plan: Conchis will return to our pain management center as scheduled.     Noman Oswald MD

## 2024-06-05 ASSESSMENT — PATIENT HEALTH QUESTIONNAIRE - PHQ9
SUM OF ALL RESPONSES TO PHQ QUESTIONS 1-9: 0
SUM OF ALL RESPONSES TO PHQ9 QUESTIONS 1 & 2: 0
SUM OF ALL RESPONSES TO PHQ QUESTIONS 1-9: 0
SUM OF ALL RESPONSES TO PHQ9 QUESTIONS 1 & 2: 0
SUM OF ALL RESPONSES TO PHQ QUESTIONS 1-9: 0
1. LITTLE INTEREST OR PLEASURE IN DOING THINGS: NOT AT ALL
SUM OF ALL RESPONSES TO PHQ QUESTIONS 1-9: 0
2. FEELING DOWN, DEPRESSED OR HOPELESS: NOT AT ALL
1. LITTLE INTEREST OR PLEASURE IN DOING THINGS: NOT AT ALL
2. FEELING DOWN, DEPRESSED OR HOPELESS: NOT AT ALL

## 2024-06-06 ENCOUNTER — OFFICE VISIT (OUTPATIENT)
Dept: PRIMARY CARE CLINIC | Age: 64
End: 2024-06-06
Payer: MEDICARE

## 2024-06-06 VITALS
DIASTOLIC BLOOD PRESSURE: 74 MMHG | SYSTOLIC BLOOD PRESSURE: 118 MMHG | HEART RATE: 66 BPM | BODY MASS INDEX: 30.83 KG/M2 | RESPIRATION RATE: 16 BRPM | HEIGHT: 63 IN | WEIGHT: 174 LBS | OXYGEN SATURATION: 97 %

## 2024-06-06 DIAGNOSIS — E11.9 TYPE 2 DIABETES MELLITUS WITHOUT COMPLICATION, WITHOUT LONG-TERM CURRENT USE OF INSULIN (HCC): ICD-10-CM

## 2024-06-06 DIAGNOSIS — Z98.1 S/P LUMBAR FUSION: ICD-10-CM

## 2024-06-06 DIAGNOSIS — E78.00 PURE HYPERCHOLESTEROLEMIA: Chronic | ICD-10-CM

## 2024-06-06 DIAGNOSIS — Z98.84 S/P BARIATRIC SURGERY: ICD-10-CM

## 2024-06-06 DIAGNOSIS — L73.9 FOLLICULITIS: ICD-10-CM

## 2024-06-06 DIAGNOSIS — R53.82 CHRONIC FATIGUE: ICD-10-CM

## 2024-06-06 DIAGNOSIS — E03.9 ACQUIRED HYPOTHYROIDISM: Chronic | ICD-10-CM

## 2024-06-06 PROBLEM — M54.16 LUMBAR RADICULAR PAIN: Status: RESOLVED | Noted: 2021-05-25 | Resolved: 2024-06-06

## 2024-06-06 PROBLEM — E66.01 MORBID OBESITY (HCC): Status: RESOLVED | Noted: 2022-08-23 | Resolved: 2024-06-06

## 2024-06-06 PROBLEM — M54.50 ACUTE EXACERBATION OF CHRONIC LOW BACK PAIN: Status: RESOLVED | Noted: 2021-01-07 | Resolved: 2024-06-06

## 2024-06-06 PROBLEM — E66.01 CLASS 3 SEVERE OBESITY DUE TO EXCESS CALORIES WITH SERIOUS COMORBIDITY AND BODY MASS INDEX (BMI) OF 45.0 TO 49.9 IN ADULT (HCC): Status: RESOLVED | Noted: 2022-01-18 | Resolved: 2024-06-06

## 2024-06-06 PROBLEM — G89.29 ACUTE EXACERBATION OF CHRONIC LOW BACK PAIN: Status: RESOLVED | Noted: 2021-01-07 | Resolved: 2024-06-06

## 2024-06-06 PROBLEM — M54.16 LUMBAR RADICULOPATHY: Status: RESOLVED | Noted: 2023-10-12 | Resolved: 2024-06-06

## 2024-06-06 PROBLEM — E66.813 CLASS 3 SEVERE OBESITY DUE TO EXCESS CALORIES WITH SERIOUS COMORBIDITY AND BODY MASS INDEX (BMI) OF 45.0 TO 49.9 IN ADULT: Status: RESOLVED | Noted: 2022-01-18 | Resolved: 2024-06-06

## 2024-06-06 PROCEDURE — 99214 OFFICE O/P EST MOD 30 MIN: CPT | Performed by: FAMILY MEDICINE

## 2024-06-06 RX ORDER — DOXYCYCLINE HYCLATE 100 MG
100 TABLET ORAL 2 TIMES DAILY
Qty: 20 TABLET | Refills: 0 | Status: SHIPPED | OUTPATIENT
Start: 2024-06-06 | End: 2024-06-16

## 2024-06-06 RX ORDER — CYCLOBENZAPRINE HCL 10 MG
10 TABLET ORAL 3 TIMES DAILY PRN
Qty: 90 TABLET | Refills: 0 | Status: SHIPPED | OUTPATIENT
Start: 2024-06-06

## 2024-06-06 RX ORDER — LEVOTHYROXINE SODIUM 0.2 MG/1
200 TABLET ORAL EVERY MORNING
Qty: 90 TABLET | Refills: 1 | Status: SHIPPED | OUTPATIENT
Start: 2024-06-06

## 2024-06-06 ASSESSMENT — ENCOUNTER SYMPTOMS
EYE ITCHING: 0
COLOR CHANGE: 0
BACK PAIN: 1
SHORTNESS OF BREATH: 0
SORE THROAT: 0
COUGH: 0
BLOOD IN STOOL: 0
CHEST TIGHTNESS: 0
DIARRHEA: 0
WHEEZING: 0
VOMITING: 0
NAUSEA: 0
EYE REDNESS: 0
ABDOMINAL PAIN: 0
VISUAL CHANGE: 0
CONSTIPATION: 0
RHINORRHEA: 0
APNEA: 0
EYE PAIN: 0
SINUS PRESSURE: 0

## 2024-06-06 NOTE — PROGRESS NOTES
Sacroiliac dysfunction    Postlaminectomy syndrome, lumbar    Chronic fatigue    GERD (gastroesophageal reflux disease)    S/P bariatric surgery    Lumbar spondylosis       Past Medical History:   Diagnosis Date    Back pain     Chronic fatigue     COVID-19 09/2020    mild per patient    Diabetes mellitus (HCC)     diet controlled    History of blood transfusion     Hyperlipidemia     not on any medications    Hypothyroidism     IBS (irritable bowel syndrome)     Morbid obesity due to excess calories (HCC)     Obesity     Osteoarthritis     PONV (postoperative nausea and vomiting)        Past Surgical History:   Procedure Laterality Date    BACK SURGERY      CHOLECYSTECTOMY      COLONOSCOPY      HYSTERECTOMY, TOTAL ABDOMINAL (CERVIX REMOVED)      INCONTINENCE SURGERY      BLADDER SLING    KNEE SURGERY Left     LUMBAR SPINE SURGERY N/A 5/25/2021    L3- L5 DECOMPRESSION AND FUSION , L4- L5 TRANSFORAMINAL LUMBAR INTERBODY FUSION --OARM, PATY TABLE, AUDIOLOGY, PLATES ,SCREWS, --NUVASIVE performed by Brad Haddad MD at [facility] OR    NERVE BLOCK Bilateral 10/11/2021    BILATERAL SACROILIAC JOINT INJECTION UNDER FLUOROSCOPY (CPT 43836) performed by Noman Oswald MD at [facility] OR    NERVE BLOCK Bilateral 11/8/2021    BILATERAL LUMBAR FACET INJECTION AT L5-S 1 FACETS BLOCK UNDER FLUOROSCOPIC GUIDANCE performed by Noman Oswald MD at [facility] OR    NERVE BLOCK Bilateral 12/21/2021    BILATERAL S1, S2, S3 BRANCH & L5 DR BRANCH NERVE BLOCK UNDER XRAY GUIDANCE (99011) (SEDATION) performed by Noman Oswald MD at [facility] OR    NERVE BLOCK Bilateral 2/6/2023    BILATERAL LUMBAR FACET JOINT  BLOCK UNDER FLUOROSCOPIC GUIDANCE AT L5-S1 performed by Noman Oswald MD at [facility] OR    NERVE BLOCK Left 6/15/2023    LEFT SACROILIAC JOINT INJECTION UNDER FLUOROSCOPIC GUIDANCE performed by Noman Oswald MD at [facility] OR    NERVE BLOCK Bilateral 12/11/2023    BILATERAL LUMBARMEDIAL BRANCH NERVE BLOCK UNDER

## 2024-06-27 RX ORDER — OMEPRAZOLE 20 MG/1
CAPSULE, DELAYED RELEASE ORAL
Qty: 30 CAPSULE | Refills: 5 | Status: SHIPPED | OUTPATIENT
Start: 2024-06-27

## 2024-07-08 ENCOUNTER — PROCEDURE VISIT (OUTPATIENT)
Dept: PODIATRY | Age: 64
End: 2024-07-08
Payer: MEDICARE

## 2024-07-08 VITALS — BODY MASS INDEX: 30.82 KG/M2 | TEMPERATURE: 97.5 F | WEIGHT: 174 LBS

## 2024-07-08 DIAGNOSIS — M79.675 PAIN IN LEFT TOE(S): ICD-10-CM

## 2024-07-08 DIAGNOSIS — I73.9 PERIPHERAL VASCULAR DISEASE, UNSPECIFIED (HCC): ICD-10-CM

## 2024-07-08 DIAGNOSIS — M79.674 PAIN IN TOE OF RIGHT FOOT: ICD-10-CM

## 2024-07-08 DIAGNOSIS — E11.9 TYPE 2 DIABETES MELLITUS WITHOUT COMPLICATION, WITHOUT LONG-TERM CURRENT USE OF INSULIN (HCC): Primary | ICD-10-CM

## 2024-07-08 DIAGNOSIS — M79.674 PAIN IN RIGHT TOE(S): ICD-10-CM

## 2024-07-08 DIAGNOSIS — B35.1 TINEA UNGUIUM: ICD-10-CM

## 2024-07-08 DIAGNOSIS — E11.9 ENCOUNTER FOR DIABETIC FOOT EXAM (HCC): ICD-10-CM

## 2024-07-08 PROCEDURE — 11721 DEBRIDE NAIL 6 OR MORE: CPT | Performed by: PODIATRIST

## 2024-07-08 ASSESSMENT — ENCOUNTER SYMPTOMS: GASTROINTESTINAL NEGATIVE: 1

## 2024-07-08 NOTE — PROGRESS NOTES
debrided sharply in length and thickness with a nipper and , without incident. Pt will follow up in 3 months or sooner if any problems arise. Diagnosis was discussed with the pt and all of their questions were answered in detail. Proper foot hygiene and care was discussed with the pt. Informed patient on proper diabetic foot care and importance of tight glycemic control.  Patient to check feet daily and contact the office with any questions/problems/concerns.   Other comorbidity noted and will be managed by PCP.  @ED   It was discussed in detail with the patient proper hygiene for the diabetic foot.  They are to get into a habit of looking at their feet or have someone look at them.  If they are unable to daily, they are to look for any signs of redness, blistering, cracking, swelling, drainage, open lesions, etc.  They are to dry in-between the toes after each bath or shower gently.  The water should be tested with their elbow to prevent burns.  The patient is to refrain from soaking their feet unless instructed by myself to do otherwise.  They are to refrain from going barefoot.  Shoe gear should be inspected for any foreign objects.  Shoes should have a deep, wide toe box area. With any type of shoe, the feet should be inspected for any signs of pressure, i.e., redness, blistering, or open lesions.  The patient was instructed to contact myself or other health care provider with any questions.   Electronically signed by Hayder Beal DPM on 7/8/2024 at 10:31 AM  7/8/2024

## 2024-07-09 DIAGNOSIS — K91.2 MALNUTRITION FOLLOWING GASTROINTESTINAL SURGERY: Primary | ICD-10-CM

## 2024-07-11 ENCOUNTER — PREP FOR PROCEDURE (OUTPATIENT)
Dept: PAIN MANAGEMENT | Age: 64
End: 2024-07-11

## 2024-07-11 ENCOUNTER — OFFICE VISIT (OUTPATIENT)
Dept: PAIN MANAGEMENT | Age: 64
End: 2024-07-11
Payer: MEDICARE

## 2024-07-11 VITALS
DIASTOLIC BLOOD PRESSURE: 65 MMHG | WEIGHT: 174 LBS | RESPIRATION RATE: 18 BRPM | SYSTOLIC BLOOD PRESSURE: 113 MMHG | TEMPERATURE: 97.7 F | OXYGEN SATURATION: 97 % | BODY MASS INDEX: 30.83 KG/M2 | HEIGHT: 63 IN | HEART RATE: 64 BPM

## 2024-07-11 DIAGNOSIS — M51.36 DDD (DEGENERATIVE DISC DISEASE), LUMBAR: ICD-10-CM

## 2024-07-11 DIAGNOSIS — M96.1 POSTLAMINECTOMY SYNDROME, LUMBAR: Primary | ICD-10-CM

## 2024-07-11 DIAGNOSIS — M47.816 LUMBAR SPONDYLOSIS: Primary | ICD-10-CM

## 2024-07-11 DIAGNOSIS — M47.817 LUMBOSACRAL SPONDYLOSIS WITHOUT MYELOPATHY: ICD-10-CM

## 2024-07-11 PROCEDURE — 99214 OFFICE O/P EST MOD 30 MIN: CPT

## 2024-07-11 RX ORDER — SODIUM CHLORIDE 0.9 % (FLUSH) 0.9 %
5-40 SYRINGE (ML) INJECTION EVERY 12 HOURS SCHEDULED
Status: CANCELLED | OUTPATIENT
Start: 2024-07-11

## 2024-07-11 RX ORDER — SODIUM CHLORIDE 9 MG/ML
INJECTION, SOLUTION INTRAVENOUS PRN
Status: CANCELLED | OUTPATIENT
Start: 2024-07-11

## 2024-07-11 RX ORDER — SODIUM CHLORIDE 0.9 % (FLUSH) 0.9 %
5-40 SYRINGE (ML) INJECTION PRN
Status: CANCELLED | OUTPATIENT
Start: 2024-07-11

## 2024-07-11 NOTE — H&P (VIEW-ONLY)
Not on file (12/4/2023)   Transportation Needs: Not on file   Physical Activity: Inactive (12/4/2023)    Exercise Vital Sign     Days of Exercise per Week: 0 days     Minutes of Exercise per Session: 0 min   Stress: Not on file   Social Connections: Not on file   Intimate Partner Violence: Not on file   Housing Stability: Not on file       Family History   Problem Relation Age of Onset    Diabetes Mother     Diabetes Father     Rheum Arthritis Sister     Cancer Maternal Grandfather        REVIEW OF SYSTEMS:     Conchis denies fever/chills, chest pain, shortness of breath, new bowel or bladder complaints. All other review of systems was negative.    PHYSICAL EXAMINATION:      /65   Pulse 64   Temp 97.7 °F (36.5 °C) (Infrared)   Resp 18   Ht 1.6 m (5' 3\")   Wt 78.9 kg (174 lb)   SpO2 97%   BMI 30.82 kg/m²   General:       General appearance:  Pleasant and well-hydrated, in no distress and A & O x 3  Build:Obese  Function: Rises from seated position easily and Moves about room without difficulty     HEENT:     Head:normocephalic, atraumatic    Lungs:     Breathing:normal breathing pattern      CVS:     RRR     Abdomen:     Shape:obese, non-distended and normal     Cervical spine:     Inspection:normal     Thoracic spine:                Spine inspection:normal      Lumbar spine:     Spine inspection: scar from the prior surgery noted- healed well  Palpation: Tenderness paravertebral muscles Yes bilaterally  Range of motion: Decreased, flexion Decreased, Lateral bending, extension and rotation bilaterally reduced is somewhat painful.  Lower lumbar facet tenderness +  Sacroiliac joint tenderness : no  Piriformis tenderness: negative bilaterally  SLR : negative bilaterally     Musculoskeletal:     Trigger points no     Extremities:     Tremors:None bilaterally upper and lower  Edema:no  Distal LE Peripheral pulses felt     Knee Right:     ROM : pain +   Joint line tenderness : yes      Neurological:     Sensory:

## 2024-07-11 NOTE — PROGRESS NOTES
Conchis Vogel presents to the [facility] on 7/11/2024. Conchis is complaining of pain right lower back. The pain is intermittent. The pain is described as penetrating and \"hurts\". Pain is rated on her best day at a 3, on her worst day at a 8, and on average at a 6 on the VAS scale. She took her last dose of Tylenol, Flexeril, and Voltaren Gel occasionally.     Any procedures since your last visit: Yes,   LUMBAR 5 AND SACRAL 1 EPIDURAL STEROID INJECTION UNDER FLUOROSCOPIC GUIDANCE     with 50 % relief.    Pacemaker or defibrillator: No   She is not on NSAIDS and is not on anticoagulation medications to include none   Medication Contract and Consent for Opioid Use Documents Filed       Patient Documents       Type of Document Status Date Received Received By Description    Medication Contract Received 10/6/2021 10:43 AM SHANE CRENSHAW pain management    Medication Contract Received 1/30/2023 10:41 AM JOSAFAT GARCIA pain mangement agreement    Medication Contract Received 4/11/2024  1:43 PM MENDEZ BEAULIEU medication contract                    There were no vitals taken for this visit.     No LMP recorded. Patient has had a hysterectomy.

## 2024-07-11 NOTE — PROGRESS NOTES
[facility]  [place], [place] [address]  Dept: [phone]      Follow up Note      Conchis Vogel     Date of Visit:  7/11/2024    CC:  Patient presents for follow up   Chief Complaint   Patient presents with    Follow-up     LUMBAR 5 AND SACRAL 1 EPIDURAL STEROID INJECTION UNDER FLUOROSCOPIC GUIDANCE        HPI:  Low back pain. S/P L3-5 fusion and L4-5 TLIF in May 2021.     Has noticed right LE pain after the surgery. Has been followed by NSG - imaging showed Intact fusion.     Low back pain and LE pain- Has tingling of the foot (h/o DM).     Pain causes functional limitations/ limits Adl's : Yes     Nursing notes and details of the pain history reviewed. Please see intake notes for details.     Previous treatments:   Physical Therapy : yes, continues HEP.     Medications: - NSAID's : yes                        - Membrane stabilizers : yes - gabapentin                       - Opioids : yes prn                       - Adjuvants or Others : yes,      Surgeries: yes, L3-5 fusion in May 2021     She has not been on anticoagulation medications      She has not been on herbal supplements.       She is diabetic.     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 Degenerative and postsurgical changes as described above.  There has been   prior bilateral laminectomy and posterior fusion at L3 through L5.   2. No evidence of significant central canal stenosis or disc herniation.   3. Mild bilateral L5-S1 neural foraminal stenosis.       X- ray cervical, thoracic and lumbar spine: 1/19/2022:  FINDINGS:   C-spine: Mild degenerative disc disease primarily C5-6.  No fracture or   dislocation.  Normal soft tissues.       T-spine: Mild multilevel degenerative disc disease.  No fracture or   dislocation.  Normal thoracic soft tissues.  Mild thoracic dextroscoliosis.

## 2024-07-29 ENCOUNTER — TELEPHONE (OUTPATIENT)
Dept: PAIN MANAGEMENT | Age: 64
End: 2024-07-29

## 2024-07-29 DIAGNOSIS — M47.816 LUMBAR SPONDYLOSIS: Primary | ICD-10-CM

## 2024-07-29 NOTE — TELEPHONE ENCOUNTER
Call to Conchis Vogel that procedure was scheduled for 8/5/2024 and that [facility] should call her a few days before for the pre op call and between 2:00 PM and 4:00 PM  the business day before with the arrival time. Instructed Conchis to hold ibuprofen for 24 hours, Celebrex, Mobic, and naprosyn for 4 days and any aspirin containing products, CoQ 10, or fish oil for 7 days. Instructed to call office back if any questions. Conchis verbalized understanding.    Electronically signed by Balaji Kaur RN on 7/29/2024 at 12:04 PM

## 2024-08-01 NOTE — PROGRESS NOTES
[facility] PAIN MANAGEMENT  INSTRUCTIONS  ...........................................................................................................................................     [x] Parking the day of Surgery is located in the Main Entrance lot.  Upon entering the door, make immediate right into the surgery reception room    [x]  Bring photo ID and insurance card     [x] You may have a light breakfast day of procedure    [x]  Wear loose comfortable clothing    [x]  Please follow instructions for medications as given per Dr's office    [x] You can expect a call the business day prior to procedure to notify you of your arrival time     [x] Please arrange for     []  Other instructions

## 2024-08-05 ENCOUNTER — HOSPITAL ENCOUNTER (OUTPATIENT)
Age: 64
Setting detail: OUTPATIENT SURGERY
Discharge: HOME | End: 2024-08-05
Attending: ANESTHESIOLOGY | Admitting: ANESTHESIOLOGY
Payer: MEDICARE

## 2024-08-05 ENCOUNTER — HOSPITAL ENCOUNTER (OUTPATIENT)
Dept: GENERAL RADIOLOGY | Age: 64
Setting detail: OUTPATIENT SURGERY
Discharge: HOME | End: 2024-08-07
Attending: ANESTHESIOLOGY
Payer: MEDICARE

## 2024-08-05 VITALS
DIASTOLIC BLOOD PRESSURE: 70 MMHG | OXYGEN SATURATION: 96 % | HEART RATE: 51 BPM | HEIGHT: 63 IN | TEMPERATURE: 97.4 F | SYSTOLIC BLOOD PRESSURE: 123 MMHG | RESPIRATION RATE: 19 BRPM | WEIGHT: 169 LBS | BODY MASS INDEX: 29.95 KG/M2

## 2024-08-05 DIAGNOSIS — R52 PAIN MANAGEMENT: ICD-10-CM

## 2024-08-05 PROCEDURE — 3600000012 HC SURGERY LEVEL 2 ADDTL 15MIN: Performed by: ANESTHESIOLOGY

## 2024-08-05 PROCEDURE — 7100000011 HC PHASE II RECOVERY - ADDTL 15 MIN: Performed by: ANESTHESIOLOGY

## 2024-08-05 PROCEDURE — 2709999900 HC NON-CHARGEABLE SUPPLY: Performed by: ANESTHESIOLOGY

## 2024-08-05 PROCEDURE — 64635 DESTROY LUMB/SAC FACET JNT: CPT | Performed by: ANESTHESIOLOGY

## 2024-08-05 PROCEDURE — 2500000003 HC RX 250 WO HCPCS: Performed by: ANESTHESIOLOGY

## 2024-08-05 PROCEDURE — 7100000010 HC PHASE II RECOVERY - FIRST 15 MIN: Performed by: ANESTHESIOLOGY

## 2024-08-05 PROCEDURE — 6360000002 HC RX W HCPCS: Performed by: ANESTHESIOLOGY

## 2024-08-05 PROCEDURE — 3600000002 HC SURGERY LEVEL 2 BASE: Performed by: ANESTHESIOLOGY

## 2024-08-05 RX ORDER — SODIUM CHLORIDE 0.9 % (FLUSH) 0.9 %
5-40 SYRINGE (ML) INJECTION PRN
Status: DISCONTINUED | OUTPATIENT
Start: 2024-08-05 | End: 2024-08-05 | Stop reason: HOSPADM

## 2024-08-05 RX ORDER — METHYLPREDNISOLONE ACETATE 40 MG/ML
INJECTION, SUSPENSION INTRA-ARTICULAR; INTRALESIONAL; INTRAMUSCULAR; SOFT TISSUE PRN
Status: DISCONTINUED | OUTPATIENT
Start: 2024-08-05 | End: 2024-08-05 | Stop reason: ALTCHOICE

## 2024-08-05 RX ORDER — SODIUM CHLORIDE 9 MG/ML
INJECTION, SOLUTION INTRAVENOUS PRN
Status: DISCONTINUED | OUTPATIENT
Start: 2024-08-05 | End: 2024-08-05 | Stop reason: HOSPADM

## 2024-08-05 RX ORDER — LIDOCAINE HYDROCHLORIDE 5 MG/ML
INJECTION, SOLUTION INFILTRATION; INTRAVENOUS PRN
Status: DISCONTINUED | OUTPATIENT
Start: 2024-08-05 | End: 2024-08-05 | Stop reason: ALTCHOICE

## 2024-08-05 RX ORDER — BUPIVACAINE HYDROCHLORIDE 2.5 MG/ML
INJECTION, SOLUTION EPIDURAL; INFILTRATION; INTRACAUDAL PRN
Status: DISCONTINUED | OUTPATIENT
Start: 2024-08-05 | End: 2024-08-05 | Stop reason: ALTCHOICE

## 2024-08-05 RX ORDER — LIDOCAINE HYDROCHLORIDE 10 MG/ML
INJECTION, SOLUTION EPIDURAL; INFILTRATION; INTRACAUDAL; PERINEURAL PRN
Status: DISCONTINUED | OUTPATIENT
Start: 2024-08-05 | End: 2024-08-05 | Stop reason: ALTCHOICE

## 2024-08-05 RX ORDER — SODIUM CHLORIDE 0.9 % (FLUSH) 0.9 %
5-40 SYRINGE (ML) INJECTION EVERY 12 HOURS SCHEDULED
Status: DISCONTINUED | OUTPATIENT
Start: 2024-08-05 | End: 2024-08-05 | Stop reason: HOSPADM

## 2024-08-05 ASSESSMENT — PAIN - FUNCTIONAL ASSESSMENT
PAIN_FUNCTIONAL_ASSESSMENT: 0-10

## 2024-08-05 ASSESSMENT — PAIN DESCRIPTION - DESCRIPTORS
DESCRIPTORS: ACHING
DESCRIPTORS: BURNING
DESCRIPTORS: ACHING

## 2024-08-05 NOTE — INTERVAL H&P NOTE
Update History & Physical    The patient's History and Physical of July 11, 2024 was reviewed with the patient and I examined the patient. There was no change. The surgical site was confirmed by the patient and me.     Plan: The risks, benefits, expected outcome, and alternative to the recommended procedure have been discussed with the patient. Patient understands and wants to proceed with the procedure.     Electronically signed by Noman Oswald MD on 8/5/2024

## 2024-08-05 NOTE — DISCHARGE INSTRUCTIONS
[facility] Pain Management Department  [place] Tfqfjz-[phone]  Dr. Darek Mendoza   Post-Pain Block/Radiofrequency  Home Going Instructions    1-Go home, rest for the remainder of the day  2-Please do not lift over 20 pounds the day of the injection  3-If you received sedation No: alcohol, driving, operating lawn mowers, plows, tractors or other dangerous equipment until next morning. Do not make important decisions or sign legal documents for 24 hours. You may experience light headedness, dizziness, nausea or sleepiness after sedation. Do not stay alone. A responsible adult must be with you for 24 hours. You could be nauseated from the medications you have received. Your IV site may be sore and bruised.    4-No dietary restrictions     5-Resume all medications the same day, blood thinners to be resumed 24 hours after injection if you were instructed to stop any.    6-Keep the surgical site clean and dry, you may shower the next morning and remove the      dressing.     7- No sitz baths, tub baths or hot tubs/swimming for 24 hours.       8- If you have any pain at the injection site(s), application of an ice pack to the area should be       helpful, 20 minutes on/20 minutes off for next 48 hours.  9- Call [facility] Pain Management immediately at if you develop.  Fever greater than 100.4 F  Have bleeding or drainage from the puncture site  Have progressive Leg/arm numbness and or weakness  Loss of control of bowel and or bladder (wet/soil yourself)  Severe headache with inability to lift head  10-You may return to work the next day

## 2024-08-05 NOTE — OP NOTE
Operative Note      Patient: Conchis Vogel  YOB: 1960  MRN: [id number]    Date of Procedure: 2024    Pre-Op Diagnosis Codes:     * Lumbar spondylosis [M47.816]    Post-Op Diagnosis: Same       Procedure(s):  BILATERAL LUMBAR RADIOFREQUENCY ABLATION AT L5-S1 UNDER FLUOROSCOPY    Surgeon(s):  Noman Oswald MD    Assistant:   * No surgical staff found *    Anesthesia: Local    Estimated Blood Loss (mL): Minimal    Complications: None    Specimens:   * No specimens in log *    Implants:  * No implants in log *      Drains: * No LDAs found *    Findings:  Infection Present At Time Of Surgery (PATOS) (choose all levels that have infection present):  No infection present  Other Findings: good needle placement    Detailed Description of Procedure:   2024    Patient: Conchis Vogel  :  1960  Age:  63 y.o.  Sex:  female     PRE-OPERATIVE DIAGNOSIS:  Lumbar spondylosis, lumbar facet arthropathy.    POST-OPERATIVE DIAGNOSIS: Same.    PROCEDURE:  Fluoroscopic-guided Bilateral  Lumbar facet medial branch radiofrequency ablation at levels L5-S1.    SURGEON:  Noman Oswald MD    ANESTHESIA: Local    ESTIMATED BLOOD LOSS: None.  ______________________________________________________________________  HISTORY & PHYSICAL: Conchis Vogel presents today for fluoroscopic-guided Bilateral lumbar facet medial branch radiofrequency ablation at levels L5-S1. The potential complications of the procedure were explained to Conchis again today and she has elected to undergo the aforementioned procedure.    Conchis’s complete History & Physical examination were reviewed in depth, a copy of which is in the chart.      DESCRIPTION OF PROCEDURE:    After confirming written and informed consent, a time-out was performed and Conchis’s name and date of birth, the procedure to be performed as well as the plan for the location of the needle insertion were confirmed.    The patient was brought into the procedure room and

## 2024-08-20 DIAGNOSIS — K91.2 MALNUTRITION FOLLOWING GASTROINTESTINAL SURGERY: ICD-10-CM

## 2024-08-20 LAB
ALBUMIN: 4.3 G/DL (ref 3.5–5.2)
ALP BLD-CCNC: 146 U/L (ref 35–104)
ALT SERPL-CCNC: 18 U/L (ref 0–32)
ANION GAP SERPL CALCULATED.3IONS-SCNC: 14 MMOL/L (ref 7–16)
AST SERPL-CCNC: 24 U/L (ref 0–31)
BILIRUB SERPL-MCNC: 0.7 MG/DL (ref 0–1.2)
BUN BLDV-MCNC: 16 MG/DL (ref 6–23)
CALCIUM SERPL-MCNC: 9.5 MG/DL (ref 8.6–10.2)
CHLORIDE BLD-SCNC: 103 MMOL/L (ref 98–107)
CHOLESTEROL, TOTAL: 154 MG/DL
CO2: 24 MMOL/L (ref 22–29)
CREAT SERPL-MCNC: 0.7 MG/DL (ref 0.5–1)
FERRITIN: 62 NG/ML
FOLATE: 10.1 NG/ML (ref 4.8–24.2)
GFR, ESTIMATED: >90 ML/MIN/1.73M2
GLUCOSE BLD-MCNC: 86 MG/DL (ref 74–99)
HCT VFR BLD CALC: 47.7 % (ref 34–48)
HDLC SERPL-MCNC: 36 MG/DL
HEMOGLOBIN: 15.3 G/DL (ref 11.5–15.5)
LDL CHOLESTEROL: 101 MG/DL
MCH RBC QN AUTO: 30 PG (ref 26–35)
MCHC RBC AUTO-ENTMCNC: 32.1 G/DL (ref 32–34.5)
MCV RBC AUTO: 93.5 FL (ref 80–99.9)
PDW BLD-RTO: 13.9 % (ref 11.5–15)
PLATELET # BLD: 191 K/UL (ref 130–450)
PMV BLD AUTO: 10.4 FL (ref 7–12)
POTASSIUM SERPL-SCNC: 4.7 MMOL/L (ref 3.5–5)
PREALBUMIN: 19 MG/DL (ref 20–40)
RBC # BLD: 5.1 M/UL (ref 3.5–5.5)
SODIUM BLD-SCNC: 141 MMOL/L (ref 132–146)
TOTAL PROTEIN: 7.2 G/DL (ref 6.4–8.3)
TRIGL SERPL-MCNC: 87 MG/DL
VITAMIN B-12: 417 PG/ML (ref 211–946)
VITAMIN D 25-HYDROXY: 34.1 NG/ML (ref 30–100)
VLDLC SERPL CALC-MCNC: 17 MG/DL
WBC # BLD: 5.5 K/UL (ref 4.5–11.5)

## 2024-08-23 LAB
COPPER: 109.8 UG/DL (ref 80–155)
ZINC: 68.4 UG/DL (ref 60–120)

## 2024-08-24 LAB
RETINYL PALMITATE: <0.02 MG/L (ref 0–0.1)
VITAMIN A LEVEL: 0.44 MG/L (ref 0.3–1.2)
VITAMIN A, INTERP: NORMAL

## 2024-08-26 LAB — VITAMIN B1 WHOLE BLOOD: 134 NMOL/L (ref 70–180)

## 2024-08-27 ENCOUNTER — OFFICE VISIT (OUTPATIENT)
Dept: BARIATRICS/WEIGHT MGMT | Age: 64
End: 2024-08-27
Payer: MEDICARE

## 2024-08-27 VITALS
HEIGHT: 63 IN | BODY MASS INDEX: 31.71 KG/M2 | RESPIRATION RATE: 20 BRPM | DIASTOLIC BLOOD PRESSURE: 62 MMHG | WEIGHT: 179 LBS | HEART RATE: 58 BPM | SYSTOLIC BLOOD PRESSURE: 117 MMHG | TEMPERATURE: 98.2 F

## 2024-08-27 DIAGNOSIS — K91.2 MALNUTRITION FOLLOWING GASTROINTESTINAL SURGERY: Primary | ICD-10-CM

## 2024-08-27 PROCEDURE — 99213 OFFICE O/P EST LOW 20 MIN: CPT | Performed by: NURSE PRACTITIONER

## 2024-08-27 PROCEDURE — 99211 OFF/OP EST MAY X REQ PHY/QHP: CPT

## 2024-08-27 NOTE — PATIENT INSTRUCTIONS
Please continue to take your vitamin and mineral supplements as instructed.      If you received a blood work prescription today for laboratory monitoring due prior to your next routine follow-up visit, please have this blood work obtained 10 to 14 days prior to your next visit.  It is important to fast for 12 hours prior to routine weight loss surgery blood work, EXCEPT for drinking water, to ensure accuracy of results.    Please report nausea, vomiting, abdominal pain, or any other problems you experience to your surgeon.  For problems related to weight loss surgery, it is best to go to [facility] Emergency Department and have your surgeon paged.    Last Surgical Weight Loss:      8/27/2024    10:36 AM   Surgical Weight Loss Tracker   Consult Date 2/17/2022   Initial Height 5' 3\"   Initial Weight 249 lb   Initial BMI 44.1   Ideal Body Weight 147 lb   Surgery Date 8/23/2022   Pre-Surgical Height 5' 3\"   Pre-Surgical Weight 220 lb   Pre Surgery BMI 38.97   Weight to Lose 73 lb   Date 8/27/2024   Height 5' 3\"   Weight 179 lb   BMI 31.7   Weight Change -41 lb   Total Weight Change -41 lb   % EBWL 56%

## 2024-08-27 NOTE — PROGRESS NOTES
Conchis Vogel  8/27/2024  [facility]    Ashu-en- Y Gastric Bypass  2 year Post-Operative Follow-up     Chief Complaint   Patient presents with    Follow Up After Procedure     2 yr LRYGB. Water intake is limited. Protein through mostly foods. Vitamin intake is good. Bowel movements - constipation.        Conchis Vogel is a 64 y.o. female who is 2 years  post Laparoscopic Ashu-en-Y Gastric Bypass surgery.  Reports that she is doing good. She is not having swallowing difficulty. Patient denies nausea and vomiting. Patient denies gastric reflux symptoms, uses omeprazole as needed. Bowel movements are normal per patient, however she does take Linzess. Patient is taking fiber supplements, which include gummies, takes 5 per day, miralax prn.     Patient is compliant most of the time with the MVI, calcium, vitamin d. She is not meeting fluid recommendations of at least 64 ounces per day, does drink milk, chocolate protein drink. She is meeting protein recommendations. She  is not exercising: no regular exercise. She has back problems, requires epidurals every 3 months.     Last Surgical Weight Loss:      8/27/2024    10:36 AM   Surgical Weight Loss Tracker   Consult Date 2/17/2022   Initial Height 5' 3\"   Initial Weight 249 lb   Initial BMI 44.1   Ideal Body Weight 147 lb   Surgery Date 8/23/2022   Pre-Surgical Height 5' 3\"   Pre-Surgical Weight 220 lb   Pre Surgery BMI 38.97   Weight to Lose 73 lb   Date 8/27/2024   Height 5' 3\"   Weight 179 lb   BMI 31.7   Weight Change -41 lb   Total Weight Change -41 lb   % EBWL 56%       Weight=81.2 kg (179 lb)  Today's weight represents a total weight loss of 41 pounds since surgery with 17 pounds regained since the lowest weight.    Lowest weight 162 lb    Prior to Admission medications    Medication Sig Start Date End Date Taking? Authorizing Provider   omeprazole (PRILOSEC) 20 MG delayed release capsule take 1 capsule by mouth once daily 6/27/24  Yes  History     Employer: [employer]   Tobacco Use    Smoking status: Never    Smokeless tobacco: Never   Vaping Use    Vaping status: Never Used   Substance and Sexual Activity    Alcohol use: Never    Drug use: Never    Sexual activity: Defer   Other Topics Concern    Not on file   Social History Narrative    Not on file     Social Determinants of Health     Financial Resource Strain: Low Risk  (7/15/2021)    Overall Financial Resource Strain (CARDIA)     Difficulty of Paying Living Expenses: Not hard at all   Food Insecurity: Not on file (12/4/2023)   Transportation Needs: Not on file   Physical Activity: Inactive (12/4/2023)    Exercise Vital Sign     Days of Exercise per Week: 0 days     Minutes of Exercise per Session: 0 min   Stress: Not on file   Social Connections: Not on file   Intimate Partner Violence: Not on file   Housing Stability: Not on file       A complete 10 system review was performed and are otherwise negative unless mentioned in the above HPI. Specific negatives are listed below but may not include all those reviewed.    General ROS: negative obtundation, AMS  ENT ROS: negative rhinorrhea, epistaxis  Allergy and Immunology ROS: negative itchy/watery eyes or nasal congestion  Hematological and Lymphatic ROS: negative spontaneous bleeding or bruising  Endocrine ROS: negative  lethargy, mood swings, palpitations or polydipsia/polyuria  Respiratory ROS: negative sputum changes, stridor, tachypnea or wheezing  Cardiovascular ROS: negative for - loss of consciousness, murmur or orthopnea  Gastrointestinal ROS: negative for - hematochezia or hematemesis  Genito-Urinary ROS: negative for -  genital discharge or hematuria  Musculoskeletal ROS: negative for - focal weakness, gangrene  Psych/Neuro ROS: negative for - visual or auditory hallucinations, suicidal ideation        Physical exam:   /62 (Site: Right Upper Arm, Position: Sitting, Cuff Size: Large Adult)   Pulse 58

## 2024-10-01 DIAGNOSIS — Z98.1 S/P LUMBAR FUSION: ICD-10-CM

## 2024-10-14 ENCOUNTER — PROCEDURE VISIT (OUTPATIENT)
Dept: PODIATRY | Age: 64
End: 2024-10-14
Payer: MEDICARE

## 2024-10-14 VITALS
DIASTOLIC BLOOD PRESSURE: 78 MMHG | WEIGHT: 179 LBS | BODY MASS INDEX: 31.71 KG/M2 | TEMPERATURE: 97.8 F | SYSTOLIC BLOOD PRESSURE: 130 MMHG

## 2024-10-14 DIAGNOSIS — E11.9 ENCOUNTER FOR DIABETIC FOOT EXAM (HCC): ICD-10-CM

## 2024-10-14 DIAGNOSIS — M79.674 PAIN IN TOE OF RIGHT FOOT: ICD-10-CM

## 2024-10-14 DIAGNOSIS — M79.675 PAIN IN LEFT TOE(S): ICD-10-CM

## 2024-10-14 DIAGNOSIS — E11.9 TYPE 2 DIABETES MELLITUS WITHOUT COMPLICATION, WITHOUT LONG-TERM CURRENT USE OF INSULIN (HCC): ICD-10-CM

## 2024-10-14 DIAGNOSIS — I73.9 PERIPHERAL VASCULAR DISEASE, UNSPECIFIED (HCC): ICD-10-CM

## 2024-10-14 DIAGNOSIS — B35.1 TINEA UNGUIUM: Primary | ICD-10-CM

## 2024-10-14 DIAGNOSIS — M79.674 PAIN IN RIGHT TOE(S): ICD-10-CM

## 2024-10-14 PROCEDURE — 11721 DEBRIDE NAIL 6 OR MORE: CPT | Performed by: PODIATRIST

## 2024-10-14 ASSESSMENT — ENCOUNTER SYMPTOMS: GASTROINTESTINAL NEGATIVE: 1

## 2024-10-14 NOTE — PROGRESS NOTES
problems arise. Diagnosis was discussed with the pt and all of their questions were answered in detail. Proper foot hygiene and care was discussed with the pt. Informed patient on proper diabetic foot care and importance of tight glycemic control.  Patient to check feet daily and contact the office with any questions/problems/concerns.   Other comorbidity noted and will be managed by PCP.  @ED   It was discussed in detail with the patient proper hygiene for the diabetic foot.  They are to get into a habit of looking at their feet or have someone look at them.  If they are unable to daily, they are to look for any signs of redness, blistering, cracking, swelling, drainage, open lesions, etc.  They are to dry in-between the toes after each bath or shower gently.  The water should be tested with their elbow to prevent burns.  The patient is to refrain from soaking their feet unless instructed by myself to do otherwise.  They are to refrain from going barefoot.  Shoe gear should be inspected for any foreign objects.  Shoes should have a deep, wide toe box area. With any type of shoe, the feet should be inspected for any signs of pressure, i.e., redness, blistering, or open lesions.  The patient was instructed to contact myself or other health care provider with any questions.   Electronically signed by Hayder Beal DPM on 10/14/2024 at 9:46 AM  10/14/2024

## 2024-10-29 ENCOUNTER — OFFICE VISIT (OUTPATIENT)
Dept: FAMILY MEDICINE CLINIC | Age: 64
End: 2024-10-29

## 2024-10-29 VITALS
OXYGEN SATURATION: 96 % | TEMPERATURE: 97.6 F | WEIGHT: 188.4 LBS | DIASTOLIC BLOOD PRESSURE: 80 MMHG | HEART RATE: 70 BPM | SYSTOLIC BLOOD PRESSURE: 124 MMHG | BODY MASS INDEX: 33.37 KG/M2

## 2024-10-29 DIAGNOSIS — J06.9 ACUTE UPPER RESPIRATORY INFECTION, UNSPECIFIED: ICD-10-CM

## 2024-10-29 DIAGNOSIS — R05.9 COUGH, UNSPECIFIED TYPE: ICD-10-CM

## 2024-10-29 DIAGNOSIS — J20.9 ACUTE BRONCHITIS, UNSPECIFIED ORGANISM: Primary | ICD-10-CM

## 2024-10-29 DIAGNOSIS — J01.90 ACUTE NON-RECURRENT SINUSITIS, UNSPECIFIED LOCATION: ICD-10-CM

## 2024-10-29 RX ORDER — BENZONATATE 100 MG/1
100 CAPSULE ORAL 3 TIMES DAILY PRN
Qty: 21 CAPSULE | Refills: 0 | Status: SHIPPED | OUTPATIENT
Start: 2024-10-29 | End: 2024-11-05

## 2024-10-29 RX ORDER — DOXYCYCLINE HYCLATE 100 MG
100 TABLET ORAL 2 TIMES DAILY
Qty: 20 TABLET | Refills: 0 | Status: SHIPPED | OUTPATIENT
Start: 2024-10-29 | End: 2024-11-08

## 2024-10-29 NOTE — PROGRESS NOTES
10/29/24  Conchis Vogel : 1960 Sex: female  Age 64 y.o.      Subjective:  Chief Complaint   Patient presents with    Cough     Started 1 week ago    Sore Throat    Drainage         HPI:   HPI  Conchis Vogel , 64 y.o. female presents to [facility] care for evaluation of cough, congestion, drainage.  The patient has had a little bit of a sore throat as well.  The patient started with her symptoms about a week ago.   is actually in the same room being evaluated for the same complaint.  The patient is not in any apparent distress.  The patient is eating and drinking normally.  The patient has not had any fevers.        ROS:   Unless otherwise stated in this report the patient's positive and negative responses for review of systems for constitutional, eyes, ENT, cardiovascular, respiratory, gastrointestinal, neurological, , musculoskeletal, and integument systems and related systems to the presenting problem are either stated in the history of present illness or were not pertinent or were negative for the symptoms and/or complaints related to the presenting medical problem.  Positives and pertinent negatives as per HPI.  All others reviewed and are negative.      PMH:     Past Medical History:   Diagnosis Date    Back pain     Chronic fatigue     COVID-19 2020    mild per patient    Diabetes mellitus (HCC)     diet controlled    History of blood transfusion     Hyperlipidemia     not on any medications    Hypothyroidism     IBS (irritable bowel syndrome)     Morbid obesity due to excess calories     Obesity     Osteoarthritis     PONV (postoperative nausea and vomiting)        Past Surgical History:   Procedure Laterality Date    BACK SURGERY      CHOLECYSTECTOMY      COLONOSCOPY      HYSTERECTOMY, TOTAL ABDOMINAL (CERVIX REMOVED)      INCONTINENCE SURGERY      BLADDER SLING    KNEE SURGERY Left     LUMBAR SPINE SURGERY N/A 2021    L3- L5 DECOMPRESSION AND FUSION , L4- L5 TRANSFORAMINAL LUMBAR

## 2024-12-05 SDOH — HEALTH STABILITY: PHYSICAL HEALTH: ON AVERAGE, HOW MANY DAYS PER WEEK DO YOU ENGAGE IN MODERATE TO STRENUOUS EXERCISE (LIKE A BRISK WALK)?: 0 DAYS

## 2024-12-05 ASSESSMENT — LIFESTYLE VARIABLES
HOW OFTEN DO YOU HAVE A DRINK CONTAINING ALCOHOL: 1
HOW OFTEN DO YOU HAVE SIX OR MORE DRINKS ON ONE OCCASION: 1
HOW OFTEN DO YOU HAVE A DRINK CONTAINING ALCOHOL: NEVER
HOW MANY STANDARD DRINKS CONTAINING ALCOHOL DO YOU HAVE ON A TYPICAL DAY: 0
HOW MANY STANDARD DRINKS CONTAINING ALCOHOL DO YOU HAVE ON A TYPICAL DAY: PATIENT DOES NOT DRINK

## 2024-12-05 ASSESSMENT — PATIENT HEALTH QUESTIONNAIRE - PHQ9
SUM OF ALL RESPONSES TO PHQ QUESTIONS 1-9: 0
2. FEELING DOWN, DEPRESSED OR HOPELESS: NOT AT ALL
SUM OF ALL RESPONSES TO PHQ9 QUESTIONS 1 & 2: 0
SUM OF ALL RESPONSES TO PHQ QUESTIONS 1-9: 0
1. LITTLE INTEREST OR PLEASURE IN DOING THINGS: NOT AT ALL
SUM OF ALL RESPONSES TO PHQ QUESTIONS 1-9: 0
SUM OF ALL RESPONSES TO PHQ QUESTIONS 1-9: 0

## 2024-12-06 ENCOUNTER — OFFICE VISIT (OUTPATIENT)
Dept: PRIMARY CARE CLINIC | Age: 64
End: 2024-12-06

## 2024-12-06 VITALS
HEART RATE: 62 BPM | SYSTOLIC BLOOD PRESSURE: 112 MMHG | OXYGEN SATURATION: 96 % | BODY MASS INDEX: 32.78 KG/M2 | HEIGHT: 63 IN | WEIGHT: 185 LBS | TEMPERATURE: 97 F | DIASTOLIC BLOOD PRESSURE: 70 MMHG

## 2024-12-06 DIAGNOSIS — Z98.1 S/P LUMBAR FUSION: ICD-10-CM

## 2024-12-06 DIAGNOSIS — E78.00 PURE HYPERCHOLESTEROLEMIA: Chronic | ICD-10-CM

## 2024-12-06 DIAGNOSIS — E11.9 TYPE 2 DIABETES MELLITUS WITHOUT COMPLICATION, WITHOUT LONG-TERM CURRENT USE OF INSULIN (HCC): Chronic | ICD-10-CM

## 2024-12-06 DIAGNOSIS — M47.817 LUMBOSACRAL SPONDYLOSIS WITHOUT MYELOPATHY: ICD-10-CM

## 2024-12-06 DIAGNOSIS — Z00.00 INITIAL MEDICARE ANNUAL WELLNESS VISIT: Primary | ICD-10-CM

## 2024-12-06 DIAGNOSIS — K21.9 GASTROESOPHAGEAL REFLUX DISEASE WITHOUT ESOPHAGITIS: ICD-10-CM

## 2024-12-06 DIAGNOSIS — I87.2 VENOUS INSUFFICIENCY OF BOTH LOWER EXTREMITIES: ICD-10-CM

## 2024-12-06 DIAGNOSIS — E03.9 ACQUIRED HYPOTHYROIDISM: Chronic | ICD-10-CM

## 2024-12-06 DIAGNOSIS — Z98.84 S/P BARIATRIC SURGERY: ICD-10-CM

## 2024-12-06 DIAGNOSIS — Z23 NEED FOR IMMUNIZATION AGAINST INFLUENZA: ICD-10-CM

## 2024-12-06 DIAGNOSIS — M43.16 SPONDYLOLISTHESIS OF LUMBAR REGION: ICD-10-CM

## 2024-12-06 LAB
ALBUMIN: 4.5 G/DL (ref 3.5–5.2)
ALP BLD-CCNC: 113 U/L (ref 35–104)
ALT SERPL-CCNC: 11 U/L (ref 0–32)
ANION GAP SERPL CALCULATED.3IONS-SCNC: 12 MMOL/L (ref 7–16)
AST SERPL-CCNC: 17 U/L (ref 0–31)
BILIRUB SERPL-MCNC: 1 MG/DL (ref 0–1.2)
BUN BLDV-MCNC: 12 MG/DL (ref 6–23)
CALCIUM SERPL-MCNC: 9.3 MG/DL (ref 8.6–10.2)
CHLORIDE BLD-SCNC: 104 MMOL/L (ref 98–107)
CHOLESTEROL, TOTAL: 157 MG/DL
CO2: 25 MMOL/L (ref 22–29)
CREAT SERPL-MCNC: 0.7 MG/DL (ref 0.5–1)
CREATININE URINE: 211.9 MG/DL (ref 29–226)
GFR, ESTIMATED: >90 ML/MIN/1.73M2
GLUCOSE BLD-MCNC: 88 MG/DL (ref 74–99)
HBA1C MFR BLD: 5.1 % (ref 4–5.6)
HCT VFR BLD CALC: 47.3 % (ref 34–48)
HDLC SERPL-MCNC: 33 MG/DL
HEMOGLOBIN: 15.4 G/DL (ref 11.5–15.5)
LDL CHOLESTEROL: 104 MG/DL
MCH RBC QN AUTO: 28.9 PG (ref 26–35)
MCHC RBC AUTO-ENTMCNC: 32.6 G/DL (ref 32–34.5)
MCV RBC AUTO: 88.7 FL (ref 80–99.9)
MICROALBUMIN/CREAT 24H UR: 15 MG/L (ref 0–19)
MICROALBUMIN/CREAT UR-RTO: 7 MCG/MG CREAT (ref 0–30)
PDW BLD-RTO: 12.4 % (ref 11.5–15)
PLATELET # BLD: 209 K/UL (ref 130–450)
PMV BLD AUTO: 10.3 FL (ref 7–12)
POTASSIUM SERPL-SCNC: 4.1 MMOL/L (ref 3.5–5)
RBC # BLD: 5.33 M/UL (ref 3.5–5.5)
SODIUM BLD-SCNC: 141 MMOL/L (ref 132–146)
TOTAL PROTEIN: 7.5 G/DL (ref 6.4–8.3)
TRIGL SERPL-MCNC: 102 MG/DL
TSH SERPL DL<=0.05 MIU/L-ACNC: 2.15 UIU/ML (ref 0.27–4.2)
VLDLC SERPL CALC-MCNC: 20 MG/DL
WBC # BLD: 5.1 K/UL (ref 4.5–11.5)

## 2024-12-06 RX ORDER — LEVOTHYROXINE SODIUM 200 UG/1
200 TABLET ORAL EVERY MORNING
Qty: 90 TABLET | Refills: 1 | Status: SHIPPED | OUTPATIENT
Start: 2024-12-06

## 2024-12-06 ASSESSMENT — ENCOUNTER SYMPTOMS
SORE THROAT: 0
EYE ITCHING: 0
BACK PAIN: 1
RHINORRHEA: 0
CONSTIPATION: 0
SHORTNESS OF BREATH: 0
WHEEZING: 0
DIARRHEA: 0
SINUS PRESSURE: 0
VOMITING: 0
CHEST TIGHTNESS: 0
EYE REDNESS: 0
EYE PAIN: 0
APNEA: 0
COUGH: 0
VISUAL CHANGE: 0
NAUSEA: 0
ABDOMINAL PAIN: 0
COLOR CHANGE: 0
BLOOD IN STOOL: 0

## 2024-12-06 NOTE — PROGRESS NOTES
Chief Complaint:     Chief Complaint   Patient presents with    Medicare AW    Diabetes    Hyperlipidemia    Thyroid Problem    Back Pain         Diabetes  She presents for her follow-up diabetic visit. She has type 2 diabetes mellitus. The initial diagnosis of diabetes was made 1 month ago. Her disease course has been stable. There are no hypoglycemic associated symptoms. Pertinent negatives for hypoglycemia include no dizziness, headaches or nervousness/anxiousness. Associated symptoms include fatigue. Pertinent negatives for diabetes include no chest pain, no visual change and no weakness. There are no hypoglycemic complications. Symptoms are stable. There are no diabetic complications. Pertinent negatives for diabetic complications include no CVA or PVD. Risk factors for coronary artery disease include dyslipidemia and obesity. Current diabetic treatment includes oral agent (monotherapy). She is compliant with treatment all of the time. She is following a generally healthy diet. When asked about meal planning, she reported none. She has not had a previous visit with a dietitian. There is no change in her home blood glucose trend. An ACE inhibitor/angiotensin II receptor blocker is not being taken. She does not see a podiatrist.Eye exam is not current.   Hyperlipidemia  This is a chronic problem. The current episode started more than 1 year ago. The problem is controlled. Recent lipid tests were reviewed and are normal. Exacerbating diseases include diabetes and obesity. Associated symptoms include leg pain and myalgias. Pertinent negatives include no chest pain or shortness of breath. Current antihyperlipidemic treatment includes statins. The current treatment provides significant improvement of lipids. Compliance problems include medication side effects.  Risk factors for coronary artery disease include diabetes mellitus, dyslipidemia, obesity and post-menopausal.   Thyroid Problem  Symptoms include fatigue.

## 2024-12-06 NOTE — PROGRESS NOTES
Medicare Annual Wellness Visit    Conchis Vogel is here for Medicare AWV, Diabetes, Hyperlipidemia, Thyroid Problem, and Back Pain    Assessment & Plan   Initial Medicare annual wellness visit  Need for immunization against influenza  -     Influenza, FLUCELVAX Trivalent, (age 6 mo+) IM, Preservative Free, 0.5mL    Recommendations for Preventive Services Due: see orders and patient instructions/AVS.  Recommended screening schedule for the next 5-10 years is provided to the patient in written form: see Patient Instructions/AVS.     Return for Medicare Annual Wellness Visit in 1 year.     Subjective   The following acute and/or chronic problems were also addressed today:  DM2, Thyroid, Lipids    Patient's complete Health Risk Assessment and screening values have been reviewed and are found in Flowsheets. The following problems were reviewed today and where indicated follow up appointments were made and/or referrals ordered.    Positive Risk Factor Screenings with Interventions:              Inactivity:  On average, how many days per week do you engage in moderate to strenuous exercise (like a brisk walk)?: 0 days (!) Abnormal  On average, how many minutes do you engage in exercise at this level?: 0 min  Interventions:  Patient declined any further interventions or treatment     Abnormal BMI (obese):  Body mass index is 32.77 kg/m². (!) Abnormal  Interventions:  Patient declines any further evaluation or treatment      Dentist Screen:  Have you seen the dentist within the past year?: (!) No    Intervention:  Advised to schedule with their dentist     Vision Screen:  Do you have difficulty driving, watching TV, or doing any of your daily activities because of your eyesight?: No  Have you had an eye exam within the past year?: (!) No  Interventions:   Patient encouraged to make appointment with their eye specialist      Advanced Directives:  Do you have a Living Will?: (!) No    Intervention:  has NO advanced directive -

## 2024-12-12 ENCOUNTER — PREP FOR PROCEDURE (OUTPATIENT)
Dept: PAIN MANAGEMENT | Age: 64
End: 2024-12-12

## 2024-12-12 ENCOUNTER — OFFICE VISIT (OUTPATIENT)
Dept: PAIN MANAGEMENT | Age: 64
End: 2024-12-12
Payer: MEDICARE

## 2024-12-12 VITALS
HEART RATE: 63 BPM | HEIGHT: 63 IN | RESPIRATION RATE: 18 BRPM | BODY MASS INDEX: 32.78 KG/M2 | SYSTOLIC BLOOD PRESSURE: 125 MMHG | TEMPERATURE: 97.3 F | DIASTOLIC BLOOD PRESSURE: 74 MMHG | WEIGHT: 185 LBS | OXYGEN SATURATION: 96 %

## 2024-12-12 DIAGNOSIS — M51.369 DEGENERATION OF INTERVERTEBRAL DISC OF LUMBAR REGION, UNSPECIFIED WHETHER PAIN PRESENT: ICD-10-CM

## 2024-12-12 DIAGNOSIS — M54.16 LUMBAR RADICULAR PAIN: Primary | ICD-10-CM

## 2024-12-12 DIAGNOSIS — M54.16 LUMBAR RADICULAR PAIN: ICD-10-CM

## 2024-12-12 DIAGNOSIS — M48.061 SPINAL STENOSIS OF LUMBAR REGION, UNSPECIFIED WHETHER NEUROGENIC CLAUDICATION PRESENT: ICD-10-CM

## 2024-12-12 DIAGNOSIS — M96.1 POSTLAMINECTOMY SYNDROME, LUMBAR: Primary | ICD-10-CM

## 2024-12-12 DIAGNOSIS — M47.817 LUMBOSACRAL SPONDYLOSIS WITHOUT MYELOPATHY: ICD-10-CM

## 2024-12-12 PROCEDURE — 99213 OFFICE O/P EST LOW 20 MIN: CPT | Performed by: ANESTHESIOLOGY

## 2024-12-12 RX ORDER — SODIUM CHLORIDE 0.9 % (FLUSH) 0.9 %
5-40 SYRINGE (ML) INJECTION PRN
Status: CANCELLED | OUTPATIENT
Start: 2024-12-12

## 2024-12-12 RX ORDER — SODIUM CHLORIDE 9 MG/ML
INJECTION, SOLUTION INTRAVENOUS PRN
Status: CANCELLED | OUTPATIENT
Start: 2024-12-12

## 2024-12-12 RX ORDER — SODIUM CHLORIDE 0.9 % (FLUSH) 0.9 %
5-40 SYRINGE (ML) INJECTION EVERY 12 HOURS SCHEDULED
Status: CANCELLED | OUTPATIENT
Start: 2024-12-12

## 2024-12-12 NOTE — PROGRESS NOTES
Conchis Vogel presents to the [facility] on 12/12/2024. Conchis is complaining of pain lower back, occasional radiation to RLE. The pain is intermittent. The pain is described as aching and throbbing. Pain is rated on her best day at a 4, on her worst day at a 9, and on average at a 6 on the VAS scale. She took her last dose of Tylenol 4 days ago.     Any procedures since your last visit: Yes, with 70 % relief    Pacemaker or defibrillator: No .    She is not on NSAIDS and is not on anticoagulation medications Do you want someone present when the provider examines you? no  Medication Contract and Consent for Opioid Use Documents Filed       Patient Documents       Type of Document Status Date Received Received By Description    Medication Contract Received 10/6/2021 10:43 AM SHANE CRENSHAW pain management    Medication Contract Received 1/30/2023 10:41 AM JOSAFAT GARCIA pain mangement agreement    Medication Contract Received 4/11/2024  1:43 PM MENDEZ BEAULIEU medication contract                    Resp 18   Ht 1.6 m (5' 3\")   Wt 83.9 kg (185 lb)   BMI 32.77 kg/m²      No LMP recorded. Patient has had a hysterectomy.  
2/6/2023    BILATERAL LUMBAR FACET JOINT  BLOCK UNDER FLUOROSCOPIC GUIDANCE AT L5-S1 performed by Noman Oswald MD at [facility] OR    NERVE BLOCK Left 6/15/2023    LEFT SACROILIAC JOINT INJECTION UNDER FLUOROSCOPIC GUIDANCE performed by Noman Oswald MD at [facility] OR    NERVE BLOCK Bilateral 12/11/2023    BILATERAL LUMBARMEDIAL BRANCH NERVE BLOCK UNDER FLUOROSCOPIC GUIDANCE AT L5-S1 FACET performed by Noman Oswald MD at [facility] OR    PAIN MANAGEMENT PROCEDURE N/A 2/7/2022    LUMBAR EPIDURAL STEROID INJECTION UNDER FLUOROSCOPIC GUIDANCE AT L5-S1 PARAMEDIAN performed by Noman Oswald MD at [facility] OR    PAIN MANAGEMENT PROCEDURE Bilateral 10/19/2023    LUMBAR TRANSFORAMINAL EPIDURAL STEROID INJECTION BILATERAL S1 UNDER FLUOROSCOPIC GUIDANCE performed by Noman Oswald MD at [facility] OR    PAIN MANAGEMENT PROCEDURE Right 1/22/2024    LUMBAR TRANSFORAMINAL EPIDURAL STEROID INJECTION RIGHT L5 AND S1 UNDER FLUOROSCOPIC GUIDANCE performed by Noman Oswald MD at [facility] OR    PAIN MANAGEMENT PROCEDURE Right 5/2/2024    LUMBAR 5 AND SACRAL 1 EPIDURAL STEROID INJECTION UNDER FLUOROSCOPIC GUIDANCE performed by Noman Oswald MD at [facility] OR    PAIN MANAGEMENT PROCEDURE Bilateral 8/5/2024    BILATERAL LUMBAR RADIOFREQUENCY ABLATION AT L5-S1 UNDER FLUOROSCOPY performed by Noman Oswald MD at [facility] OR    ANNA MARIE-EN-Y GASTRIC BYPASS N/A 8/23/2022    GASTRIC BYPASS ANNA MARIE-EN-Y LAPAROSCOPIC ROBOTIC XI performed by Suman Justice MD at [facility] OR    UPPER GASTROINTESTINAL ENDOSCOPY N/A 3/9/2022    EGD BIOPSY performed by Suman Justice MD at [facility] ENDOSCOPY       Prior to Admission medications    Medication Sig Start Date End Date Taking? Authorizing Provider   diclofenac sodium (VOLTAREN) 1 % GEL APPLY 4 GRAMS TO AFFECTED AREA FOUR TIMES A DAY AS NEEDED FOR PAIN 12/6/24  Yes Prieto Robin F, DO   levothyroxine (SYNTHROID) 200 MCG tablet Take 1 tablet by mouth every morning 12/6/24  Yes Prieto Robin

## 2024-12-12 NOTE — H&P (VIEW-ONLY)
treatment recommendations.     Has stopped gabapentin.     Has stopped Cymbalta.      Opioid agreement:  10/6/2021  renew opioid agreement 1/30/2023. Salient features of opioid agreement discussed.   Renew opioid agreement - 4/11/2024     Oral drug screen   -10/6/2021-  consistent.   - 1/30/2023 - result reviewed - consistent.  UDS / oral drug screen - 4/11/2024-Addendum: result reviewed- consistent.     [place]ARRS reviewed- today: Consistent      Counseling : Patient encouraged to stay active, to continue Regular home exercise program and to watch/lose weight     Treatment plan discussed with the patient including medication and procedure side effects.     Controlled Substances Monitoring:   [place] reviewed.     We discussed with the patient that combining opioids, benzodiazepines, alcohol, illicit drugs or sleep aids increases the risk of respiratory depression including death. We discussed that these medications may cause drowsiness, sedation or dizziness and have counseled the patient not to drive or operate machinery. We have discussed that these medications will be prescribed only by one provider. We have discussed with the patient about age related risk factors and have thoroughly discussed the importance of taking these medications as prescribed. The patient verbalizes understanding.     Noman Oswald MD    CC:  Prieto Robin DO

## 2024-12-16 ENCOUNTER — TELEPHONE (OUTPATIENT)
Dept: PAIN MANAGEMENT | Age: 64
End: 2024-12-16

## 2024-12-16 DIAGNOSIS — M54.16 LUMBAR RADICULOPATHY: ICD-10-CM

## 2024-12-16 NOTE — TELEPHONE ENCOUNTER
Call to Conchis Vogel that procedure was scheduled for 12/19/2024 and that [facility] should call her a few days before for the pre op call and between 2:00 PM and 4:00 PM  the business day before with the arrival time. Instructed Conchis to hold ibuprofen for 24 hours, Celebrex, Mobic, and naprosyn for 4 days and any aspirin containing products, CoQ 10, or fish oil for 7 days. Instructed to call office back if any questions. Conchis verbalized understanding.    Electronically signed by Dorita Murdock RN on 12/16/2024 at 11:55 AM

## 2024-12-19 NOTE — PROGRESS NOTES
[facility] PAIN MANAGEMENT  INSTRUCTIONS  ...........................................................................................................................................     [x] Parking the day of Surgery is located in the Main Entrance lot.  Upon entering the door, make immediate right into the surgery reception room    [x]  Bring photo ID and insurance card     [x] You may have a light breakfast day of procedure    [x]  Wear loose comfortable clothing    [x]  Please follow instructions for medications as given per Dr's office    [x] You can expect a call the business day prior to procedure to notify you of your arrival time     [x] Please arrange for     []  Other instructions

## 2024-12-23 ENCOUNTER — HOSPITAL ENCOUNTER (OUTPATIENT)
Dept: GENERAL RADIOLOGY | Age: 64
Discharge: HOME | End: 2024-12-25
Attending: ANESTHESIOLOGY
Payer: MEDICARE

## 2024-12-23 ENCOUNTER — HOSPITAL ENCOUNTER (OUTPATIENT)
Age: 64
Setting detail: OUTPATIENT SURGERY
Discharge: HOME | End: 2024-12-23
Attending: ANESTHESIOLOGY | Admitting: ANESTHESIOLOGY
Payer: MEDICARE

## 2024-12-23 VITALS
RESPIRATION RATE: 18 BRPM | TEMPERATURE: 98 F | HEART RATE: 55 BPM | SYSTOLIC BLOOD PRESSURE: 124 MMHG | HEIGHT: 63 IN | OXYGEN SATURATION: 98 % | BODY MASS INDEX: 32.78 KG/M2 | WEIGHT: 185 LBS | DIASTOLIC BLOOD PRESSURE: 64 MMHG

## 2024-12-23 DIAGNOSIS — R52 PAIN MANAGEMENT: ICD-10-CM

## 2024-12-23 LAB — GLUCOSE BLD-MCNC: 82 MG/DL (ref 74–99)

## 2024-12-23 PROCEDURE — 6360000002 HC RX W HCPCS: Performed by: ANESTHESIOLOGY

## 2024-12-23 PROCEDURE — 2709999900 HC NON-CHARGEABLE SUPPLY: Performed by: ANESTHESIOLOGY

## 2024-12-23 PROCEDURE — 82947 ASSAY GLUCOSE BLOOD QUANT: CPT

## 2024-12-23 PROCEDURE — 7100000010 HC PHASE II RECOVERY - FIRST 15 MIN: Performed by: ANESTHESIOLOGY

## 2024-12-23 PROCEDURE — 6360000004 HC RX CONTRAST MEDICATION: Performed by: ANESTHESIOLOGY

## 2024-12-23 PROCEDURE — 62323 NJX INTERLAMINAR LMBR/SAC: CPT | Performed by: ANESTHESIOLOGY

## 2024-12-23 PROCEDURE — 7100000011 HC PHASE II RECOVERY - ADDTL 15 MIN: Performed by: ANESTHESIOLOGY

## 2024-12-23 PROCEDURE — 3600000002 HC SURGERY LEVEL 2 BASE: Performed by: ANESTHESIOLOGY

## 2024-12-23 RX ORDER — IOPAMIDOL 612 MG/ML
INJECTION, SOLUTION INTRATHECAL PRN
Status: DISCONTINUED | OUTPATIENT
Start: 2024-12-23 | End: 2024-12-23 | Stop reason: ALTCHOICE

## 2024-12-23 RX ORDER — LIDOCAINE HYDROCHLORIDE 5 MG/ML
INJECTION, SOLUTION INFILTRATION; INTRAVENOUS PRN
Status: DISCONTINUED | OUTPATIENT
Start: 2024-12-23 | End: 2024-12-23 | Stop reason: ALTCHOICE

## 2024-12-23 RX ORDER — SODIUM CHLORIDE 0.9 % (FLUSH) 0.9 %
5-40 SYRINGE (ML) INJECTION EVERY 12 HOURS SCHEDULED
Status: DISCONTINUED | OUTPATIENT
Start: 2024-12-23 | End: 2024-12-23 | Stop reason: HOSPADM

## 2024-12-23 RX ORDER — SODIUM CHLORIDE 0.9 % (FLUSH) 0.9 %
5-40 SYRINGE (ML) INJECTION PRN
Status: DISCONTINUED | OUTPATIENT
Start: 2024-12-23 | End: 2024-12-23 | Stop reason: HOSPADM

## 2024-12-23 RX ORDER — DEXAMETHASONE SODIUM PHOSPHATE 10 MG/ML
INJECTION, SOLUTION INTRAMUSCULAR; INTRAVENOUS PRN
Status: DISCONTINUED | OUTPATIENT
Start: 2024-12-23 | End: 2024-12-23 | Stop reason: ALTCHOICE

## 2024-12-23 RX ORDER — SODIUM CHLORIDE 9 MG/ML
INJECTION, SOLUTION INTRAVENOUS PRN
Status: DISCONTINUED | OUTPATIENT
Start: 2024-12-23 | End: 2024-12-23 | Stop reason: HOSPADM

## 2024-12-23 ASSESSMENT — PAIN - FUNCTIONAL ASSESSMENT: PAIN_FUNCTIONAL_ASSESSMENT: 0-10

## 2024-12-23 ASSESSMENT — PAIN DESCRIPTION - DESCRIPTORS: DESCRIPTORS: DISCOMFORT

## 2024-12-23 NOTE — DISCHARGE INSTRUCTIONS
[facility] Pain Management Department  [place] Tdljum-[phone]  Dr. Darek Lemon   Post-Pain Block/Radiofrequency  Home Going Instructions    1-Go home, rest for the remainder of the day  2-Please do not lift over 20 pounds the day of the injection  3-If you received sedation No: alcohol, driving, operating lawn mowers, plows, tractors or other dangerous equipment until next morning. Do not make important decisions or sign legal documents for 24 hours. You may experience light headedness, dizziness, nausea or sleepiness after sedation. Do not stay alone. A responsible adult must be with you for 24 hours. You could be nauseated from the medications you have received. Your IV site may be sore and bruised.    4-No dietary restrictions     5-Resume all medications the same day, blood thinners to be resumed 24 hours after injection if you were instructed to stop any.    6-Keep the surgical site clean and dry, you may shower the next morning and remove the      dressing.     7- No sitz baths, tub baths or hot tubs/swimming for 24 hours.       8- If you have any pain at the injection site(s), application of an ice pack to the area should be       helpful, 20 minutes on/20 minutes off for next 48 hours.  9- Call [facility] Pain Management immediately at if you develop.  Fever greater than 100.4 F  Have bleeding or drainage from the puncture site  Have progressive Leg/arm numbness and or weakness  Loss of control of bowel and or bladder (wet/soil yourself)  Severe headache with inability to lift head  10-You may return to work the next day

## 2024-12-23 NOTE — INTERVAL H&P NOTE
Update History & Physical    The patient's History and Physical of December 12, 2024 was reviewed with the patient and I examined the patient. There was no change. The surgical site was confirmed by the patient and me.     Plan: The risks, benefits, expected outcome, and alternative to the recommended procedure have been discussed with the patient. Patient understands and wants to proceed with the procedure.     Electronically signed by Noman Oswald MD on 12/23/2024

## 2024-12-23 NOTE — OP NOTE
Operative Note      Patient: Conchis Vogel  YOB: 1960  MRN: [id number]    Date of Procedure: 2024    Pre-Op Diagnosis Codes:      * Lumbar radiculopathy [M54.16]    Post-Op Diagnosis: Same       Procedure(s):  LUMBAR 5-SACRAL 1 EPIDURAL STEROID INJECTION RIGHT PARAMEDIAN UNDER FLUOROSCOPIC GUIDANCE    Surgeon(s):  Noman Oswald MD    Assistant:   * No surgical staff found *    Anesthesia: Local    Estimated Blood Loss (mL): Minimal    Complications: None    Specimens:   * No specimens in log *    Implants:  * No implants in log *      Drains: * No LDAs found *    Findings:  Infection Present At Time Of Surgery (PATOS) (choose all levels that have infection present):  No infection present  Other Findings: good needle placement    Detailed Description of Procedure:   2024    Patient: Conchis Vogel  :  1960  Age:  64 y.o.  Sex:  female     PRE-OPERATIVE DIAGNOSIS: Lumbar disc displacement, lumbar radiculopathy.    POST-OPERATIVE DIAGNOSIS: Same.    PROCEDURE: Fluoroscopic guided therapeutic lumbar epidural steroid injection at the L5-S1 level (.    SURGEON: oNman Oswald MD    ANESTHESIA: Local    ESTIMATED BLOOD LOSS: None.  ______________________________________________________________________    BRIEF HISTORY:  Conchis Vogel comes in today for  lumbar epidural injection at L5-S1 level. The potential complications of this procedure were discussed with her again today.  She has elected to undergo the aforementioned procedure.    Conchis’s complete History & Physical examination were reviewed in depth, a copy of which is in the chart.      DESCRIPTION OF PROCEDURE:    After confirming written and informed consent, a time-out was performed and Conchis’s name and date of birth, the procedure to be performed as well as the plan for the location of the needle insertion were confirmed.    The patient was brought into the procedure room and placed in the prone position on the

## 2025-01-10 ENCOUNTER — OFFICE VISIT (OUTPATIENT)
Dept: FAMILY MEDICINE CLINIC | Age: 65
End: 2025-01-10

## 2025-01-10 VITALS
TEMPERATURE: 97.6 F | HEART RATE: 71 BPM | OXYGEN SATURATION: 98 % | RESPIRATION RATE: 18 BRPM | DIASTOLIC BLOOD PRESSURE: 66 MMHG | BODY MASS INDEX: 34.9 KG/M2 | WEIGHT: 197 LBS | SYSTOLIC BLOOD PRESSURE: 124 MMHG

## 2025-01-10 DIAGNOSIS — M54.16 LUMBAR RADICULOPATHY: Primary | ICD-10-CM

## 2025-01-10 RX ORDER — PREDNISONE 10 MG/1
TABLET ORAL
Qty: 18 TABLET | Refills: 0 | Status: SHIPPED | OUTPATIENT
Start: 2025-01-10

## 2025-01-10 RX ORDER — METHYLPREDNISOLONE ACETATE 80 MG/ML
80 INJECTION, SUSPENSION INTRA-ARTICULAR; INTRALESIONAL; INTRAMUSCULAR; SOFT TISSUE ONCE
Status: COMPLETED | OUTPATIENT
Start: 2025-01-10 | End: 2025-01-10

## 2025-01-10 RX ADMIN — METHYLPREDNISOLONE ACETATE 80 MG: 80 INJECTION, SUSPENSION INTRA-ARTICULAR; INTRALESIONAL; INTRAMUSCULAR; SOFT TISSUE at 11:59

## 2025-01-10 NOTE — PROGRESS NOTES
1/10/25  Conchis Vogel : 1960 Sex: female  Age 64 y.o.      Subjective:  Chief Complaint   Patient presents with    Back Pain     Right sided - started over last weekend and radiating into upper leg         HPI:     History of Present Illness  The patient is a 64-year-old female who presents for evaluation of back pain.    She reports experiencing pain in her right side, which extends downwards and intensifies upon bending over. The pain is so severe that she requires assistance from her  to lift her legs into bed at night. This discomfort is a new development, distinct from her usual back pain. She has not undergone any recent MRI scans but received an epidural injection last month. She reports no recent falls, incontinence, fevers, chills, or urinary symptoms. She has good sensation all the way down. She has been managing the pain with Tylenol, Voltaren gel, and Flexeril, which she takes before bedtime. She has a history of back pain, particularly following a surgical procedure performed 3 years ago.    Supplemental Information  She has a past medical history of diabetes, which has been well-controlled for several years following weight loss. Her most recent A1c level was approximately 4.9 or 5.    MEDICATIONS  Current: Tylenol, Voltaren gel, Flexeril            ROS:   Unless otherwise stated in this report the patient's positive and negative responses for review of systems for constitutional, eyes, ENT, cardiovascular, respiratory, gastrointestinal, neurological, , musculoskeletal, and integument systems and related systems to the presenting problem are either stated in the history of present illness or were not pertinent or were negative for the symptoms and/or complaints related to the presenting medical problem.  Positives and pertinent negatives as per HPI.  All others reviewed and are negative.      PMH:     Past Medical History:   Diagnosis Date    Back pain     Chronic back pain

## 2025-01-13 ENCOUNTER — PROCEDURE VISIT (OUTPATIENT)
Dept: PODIATRY | Age: 65
End: 2025-01-13
Payer: MEDICARE

## 2025-01-13 VITALS
HEART RATE: 64 BPM | TEMPERATURE: 97.7 F | OXYGEN SATURATION: 95 % | SYSTOLIC BLOOD PRESSURE: 135 MMHG | WEIGHT: 197 LBS | DIASTOLIC BLOOD PRESSURE: 94 MMHG | BODY MASS INDEX: 34.9 KG/M2

## 2025-01-13 DIAGNOSIS — B35.1 TINEA UNGUIUM: Primary | ICD-10-CM

## 2025-01-13 DIAGNOSIS — M79.675 PAIN IN LEFT TOE(S): ICD-10-CM

## 2025-01-13 DIAGNOSIS — E11.9 TYPE 2 DIABETES MELLITUS WITHOUT COMPLICATION, WITHOUT LONG-TERM CURRENT USE OF INSULIN (HCC): ICD-10-CM

## 2025-01-13 DIAGNOSIS — E11.9 ENCOUNTER FOR DIABETIC FOOT EXAM (HCC): ICD-10-CM

## 2025-01-13 DIAGNOSIS — M79.674 PAIN IN RIGHT TOE(S): ICD-10-CM

## 2025-01-13 DIAGNOSIS — I73.9 PERIPHERAL VASCULAR DISEASE, UNSPECIFIED (HCC): ICD-10-CM

## 2025-01-13 PROCEDURE — 11721 DEBRIDE NAIL 6 OR MORE: CPT | Performed by: PODIATRIST

## 2025-01-13 ASSESSMENT — ENCOUNTER SYMPTOMS: GASTROINTESTINAL NEGATIVE: 1

## 2025-01-13 NOTE — PROGRESS NOTES
Valleix sign, Bilateral.      Integument: nails   thickened > 3.0 mm, dystrophic and crumbly, discolored with subungual debris.    Toes cool to touch    Visual inspection:  Deformity: hammertoe deformity rodrick feet  amputation:     Edema:   Sensory exam:  Monofilament sensation: abnormal - 6/10 via SW 5.07/10g monofilament to the plantar foot bilateral feet    Pulses:     Pinprick: Impaired  Proprioception: Impaired  Vibration (128 Hz): Impaired    Visual inspection:  Deformity/amputation: absent  Skin lesions/pre-ulcerative calluses: absent  Edema: right- negative, left- negative    Sensory exam:  Monofilament sensation: abnormal -   (minimum of 5 random plantar locations tested, avoiding callused areas - > 1 area with absence of sensation is + for neuropathy)    Plus at least one of the following:  Pulses: abnormal - , using Doppler DP pulses were absent PT pulses were absent  Pinprick: Impaired  Proprioception: Impaired and Absent  Vibration (128 Hz): Impaired    DM with PVD       [x]Yes    []no    Foot Exam    General  General Appearance: appears stated age and healthy   Orientation: alert and oriented to person, place, and time       Right Foot/Ankle     Inspection and Palpation  Skin Exam: tinea;     Neurovascular  Posterior tibial: absent      Left Foot/Ankle      Neurovascular  Posterior tibial: absent           Ortho Exam      Assessment:  64 y.o. female with:  Conchis was seen today for toe pain.    Diagnoses and all orders for this visit:    Tinea unguium    Type 2 diabetes mellitus without complication, without long-term current use of insulin (HCC)    Pain in left toe(s)    Pain in right toe(s)    Encounter for diabetic foot exam (HCC)    Peripheral vascular disease, unspecified ([facility])          Q7   []Yes    []No                Q8   [x]Yes    []No                     Q9   []Yes    []No    Plan:   Pt was evaluated and examined. Patient was given personalized discharge instructions.  Nails 1-10 were debrided

## 2025-01-14 ENCOUNTER — OFFICE VISIT (OUTPATIENT)
Dept: PRIMARY CARE CLINIC | Age: 65
End: 2025-01-14
Payer: MEDICARE

## 2025-01-14 VITALS
BODY MASS INDEX: 34.91 KG/M2 | OXYGEN SATURATION: 98 % | DIASTOLIC BLOOD PRESSURE: 70 MMHG | SYSTOLIC BLOOD PRESSURE: 130 MMHG | WEIGHT: 197 LBS | HEIGHT: 63 IN | RESPIRATION RATE: 16 BRPM | HEART RATE: 64 BPM

## 2025-01-14 DIAGNOSIS — M43.16 SPONDYLOLISTHESIS OF LUMBAR REGION: ICD-10-CM

## 2025-01-14 DIAGNOSIS — E11.9 TYPE 2 DIABETES MELLITUS WITHOUT COMPLICATION, WITHOUT LONG-TERM CURRENT USE OF INSULIN (HCC): ICD-10-CM

## 2025-01-14 DIAGNOSIS — M48.062 SPINAL STENOSIS OF LUMBAR REGION WITH NEUROGENIC CLAUDICATION: ICD-10-CM

## 2025-01-14 DIAGNOSIS — E78.00 PURE HYPERCHOLESTEROLEMIA: ICD-10-CM

## 2025-01-14 DIAGNOSIS — E03.9 ACQUIRED HYPOTHYROIDISM: ICD-10-CM

## 2025-01-14 DIAGNOSIS — Z98.1 S/P LUMBAR FUSION: Primary | ICD-10-CM

## 2025-01-14 PROCEDURE — 99214 OFFICE O/P EST MOD 30 MIN: CPT | Performed by: FAMILY MEDICINE

## 2025-01-14 ASSESSMENT — PATIENT HEALTH QUESTIONNAIRE - PHQ9
2. FEELING DOWN, DEPRESSED OR HOPELESS: NOT AT ALL
SUM OF ALL RESPONSES TO PHQ QUESTIONS 1-9: 0
1. LITTLE INTEREST OR PLEASURE IN DOING THINGS: NOT AT ALL
SUM OF ALL RESPONSES TO PHQ QUESTIONS 1-9: 0
SUM OF ALL RESPONSES TO PHQ QUESTIONS 1-9: 0
SUM OF ALL RESPONSES TO PHQ9 QUESTIONS 1 & 2: 0
SUM OF ALL RESPONSES TO PHQ QUESTIONS 1-9: 0

## 2025-01-14 ASSESSMENT — ENCOUNTER SYMPTOMS
BACK PAIN: 1
SORE THROAT: 0
CONSTIPATION: 0
DIARRHEA: 0
RHINORRHEA: 0
EYE PAIN: 0
SHORTNESS OF BREATH: 0
CHEST TIGHTNESS: 0
COUGH: 0
EYE REDNESS: 0
BLOOD IN STOOL: 0
NAUSEA: 0
WHEEZING: 0
SINUS PRESSURE: 0
VOMITING: 0
EYE ITCHING: 0
APNEA: 0
ABDOMINAL PAIN: 0
COLOR CHANGE: 0
VISUAL CHANGE: 0

## 2025-01-14 NOTE — PROGRESS NOTES
CONTRAST; Future  Spinal stenosis of lumbar region with neurogenic claudication  -     MRI LUMBAR SPINE WO CONTRAST; Future  Type 2 diabetes mellitus without complication, without long-term current use of insulin (HCC)  Assessment & Plan:   Chronic, at goal (stable), continue current treatment plan  Acquired hypothyroidism  Pure hypercholesterolemia      Orders Placed This Encounter    MRI LUMBAR SPINE WO CONTRAST     Standing Status:   Future     Standing Expiration Date:   1/15/2026     Order Specific Question:   Reason for exam:     Answer:   pain      Counseled regarding above diagnosis, including possible risks and complications,  especially if left uncontrolled.     Counseled regarding the possible side effects, risks, benefits and alternatives to treatment; patient and/or guardian verbalizes understanding, agrees, feels comfortable with and wishes to proceed with above treatment plan.     Advised patient to call with any new medication issues, and read all Rx info from pharmacy to assure aware of all possible risks and side effects of medication before taking.    Advised ER if worsening pain, weakness or neuro deficit  Return if symptoms worsen or fail to improve.    I spent 30 minutes with this patient providing this service today, including time spent seeing the patient as well as documentation and chart review, excluding any separately billed procedures.     Prieto Robin DO  1/14/2025  11:42 AM

## 2025-01-17 ENCOUNTER — TELEPHONE (OUTPATIENT)
Dept: PRIMARY CARE CLINIC | Age: 65
End: 2025-01-17

## 2025-01-17 DIAGNOSIS — M43.16 SPONDYLOLISTHESIS OF LUMBAR REGION: Primary | ICD-10-CM

## 2025-01-17 DIAGNOSIS — M48.062 SPINAL STENOSIS OF LUMBAR REGION WITH NEUROGENIC CLAUDICATION: ICD-10-CM

## 2025-01-17 RX ORDER — TRAMADOL HYDROCHLORIDE 50 MG/1
50 TABLET ORAL EVERY 6 HOURS PRN
Qty: 28 TABLET | Refills: 0 | Status: SHIPPED | OUTPATIENT
Start: 2025-01-17 | End: 2025-01-24

## 2025-01-17 RX ORDER — PREDNISONE 10 MG/1
TABLET ORAL
Qty: 18 TABLET | Refills: 0 | Status: SHIPPED | OUTPATIENT
Start: 2025-01-17

## 2025-01-17 NOTE — TELEPHONE ENCOUNTER
Still having a lot of pain in back and legs, asking for more prednisone and pain medication, she can't get into pain management until 2/13.

## 2025-01-29 ENCOUNTER — HOSPITAL ENCOUNTER (OUTPATIENT)
Dept: MRI IMAGING | Age: 65
Discharge: HOME | End: 2025-01-31
Attending: FAMILY MEDICINE
Payer: MEDICARE

## 2025-01-29 DIAGNOSIS — Z98.1 S/P LUMBAR FUSION: ICD-10-CM

## 2025-01-29 DIAGNOSIS — M48.062 SPINAL STENOSIS OF LUMBAR REGION WITH NEUROGENIC CLAUDICATION: ICD-10-CM

## 2025-01-29 DIAGNOSIS — M43.16 SPONDYLOLISTHESIS OF LUMBAR REGION: ICD-10-CM

## 2025-01-29 PROCEDURE — 72148 MRI LUMBAR SPINE W/O DYE: CPT

## 2025-02-13 ENCOUNTER — PREP FOR PROCEDURE (OUTPATIENT)
Dept: PAIN MANAGEMENT | Age: 65
End: 2025-02-13

## 2025-02-13 ENCOUNTER — OFFICE VISIT (OUTPATIENT)
Dept: PAIN MANAGEMENT | Age: 65
End: 2025-02-13
Payer: MEDICARE

## 2025-02-13 VITALS
RESPIRATION RATE: 16 BRPM | TEMPERATURE: 99 F | DIASTOLIC BLOOD PRESSURE: 84 MMHG | WEIGHT: 197 LBS | BODY MASS INDEX: 34.91 KG/M2 | OXYGEN SATURATION: 96 % | HEIGHT: 63 IN | HEART RATE: 88 BPM | SYSTOLIC BLOOD PRESSURE: 157 MMHG

## 2025-02-13 DIAGNOSIS — M53.3 SACROILIAC DYSFUNCTION: Primary | ICD-10-CM

## 2025-02-13 DIAGNOSIS — M96.1 POSTLAMINECTOMY SYNDROME, LUMBAR: ICD-10-CM

## 2025-02-13 DIAGNOSIS — M47.817 LUMBOSACRAL SPONDYLOSIS WITHOUT MYELOPATHY: ICD-10-CM

## 2025-02-13 DIAGNOSIS — M54.16 LUMBAR RADICULAR PAIN: ICD-10-CM

## 2025-02-13 DIAGNOSIS — M48.061 SPINAL STENOSIS OF LUMBAR REGION, UNSPECIFIED WHETHER NEUROGENIC CLAUDICATION PRESENT: ICD-10-CM

## 2025-02-13 PROCEDURE — 99214 OFFICE O/P EST MOD 30 MIN: CPT | Performed by: ANESTHESIOLOGY

## 2025-02-13 RX ORDER — SODIUM CHLORIDE 0.9 % (FLUSH) 0.9 %
5-40 SYRINGE (ML) INJECTION EVERY 12 HOURS SCHEDULED
Status: CANCELLED | OUTPATIENT
Start: 2025-02-13

## 2025-02-13 RX ORDER — OXYCODONE AND ACETAMINOPHEN 5; 325 MG/1; MG/1
1 TABLET ORAL DAILY PRN
Qty: 30 TABLET | Refills: 0 | Status: SHIPPED | OUTPATIENT
Start: 2025-02-13 | End: 2025-03-15

## 2025-02-13 RX ORDER — SODIUM CHLORIDE 9 MG/ML
INJECTION, SOLUTION INTRAVENOUS PRN
Status: CANCELLED | OUTPATIENT
Start: 2025-02-13

## 2025-02-13 RX ORDER — SODIUM CHLORIDE 0.9 % (FLUSH) 0.9 %
5-40 SYRINGE (ML) INJECTION PRN
Status: CANCELLED | OUTPATIENT
Start: 2025-02-13

## 2025-02-13 NOTE — PROGRESS NOTES
Conchis Vogel presents to the [facility] on 2/13/2025. Conchis is complaining of pain in her low back. The pain is constant. The pain is described as aching, dull, and sharp. Pain is rated on her best day at a 4, on her worst day at a 9, and on average at a 5 on the VAS scale.     Any procedures since your last visit: Yes, with 80 % relief then pain returned.    Pacemaker or defibrillator: No     She is not on NSAIDS and is not on anticoagulation medications.    Do you want someone present when the provider examines you? No    Medication Contract and Consent for Opioid Use Documents Filed       Patient Documents       Type of Document Status Date Received Received By Description    Medication Contract Received 10/6/2021 10:43 AM SHANE CRENSHAW pain management    Medication Contract Received 1/30/2023 10:41 AM JOSAFAT GARCIA pain mangement agreement    Medication Contract Received 4/11/2024  1:43 PM MENDEZ BEAULIEU medication contract                    BP (!) 157/84   Pulse 88   Temp 99 °F (37.2 °C) (Infrared)   Resp 16   Ht 1.6 m (5' 3\")   Wt 89.4 kg (197 lb)   SpO2 96%   BMI 34.90 kg/m²      No LMP recorded. Patient has had a hysterectomy.

## 2025-02-13 NOTE — PROGRESS NOTES
[facility]  [place], [place] [address]  Dept: [phone]    Follow up Note      Conchis Vogel     Date of Visit:  2/13/2025    CC:  Patient presents for follow up   Chief Complaint   Patient presents with    Follow-up     LUMBAR 5-SACRAL 1 EPIDURAL STEROID INJECTION RIGHT PARAMEDIAN UNDER FLUOROSCOPIC GUIDANCE     HPI:  Low back pain. S/P L3-5 fusion and L4-5 TLIF in May 2021.     Has noticed right LE pain after the surgery. Has been followed by NSG - imaging showed Intact fusion.     Low back pain and LE pain- Has tingling of the foot (h/o DM).     Pain causes functional limitations/ limits Adl's : Yes     Nursing notes and details of the pain history reviewed. Please see intake notes for details.     Previous treatments:   Physical Therapy : yes, continues HEP.     Medications: - NSAID's : yes                        - Membrane stabilizers : yes - gabapentin                       - Opioids : yes prn                       - Adjuvants or Others : yes     Surgeries: yes, L3-5 fusion in May 2021     She has not been on anticoagulation medications      She has not been on herbal supplements.       She is diabetic.     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  Postsurgical  changes are noted including decompressive laminectomy and posterior fusion at  L3-L5.  Bilateral pedicle screws are seen at L3 through L5 and there is again  an interbody spacer in the L4-5 disc space.  There is disc desiccation at  multiple levels.  Disc space narrowing is noted, severe at L5-S1 and moderate  to severe at L2-3 and L3-4 as well as in the lower thoracic region.  Multiple  small Schmorl's nodes are noted.  The vertebral body heights are maintained.  The bone marrow signal appears unremarkable.     SPINAL CORD: The

## 2025-02-13 NOTE — H&P (VIEW-ONLY)
[facility]  [place], [place] [address]  Dept: [phone]    Follow up Note      Conchis Vogel     Date of Visit:  2/13/2025    CC:  Patient presents for follow up   Chief Complaint   Patient presents with    Follow-up     LUMBAR 5-SACRAL 1 EPIDURAL STEROID INJECTION RIGHT PARAMEDIAN UNDER FLUOROSCOPIC GUIDANCE     HPI:  Low back pain. S/P L3-5 fusion and L4-5 TLIF in May 2021.     Has noticed right LE pain after the surgery. Has been followed by NSG - imaging showed Intact fusion.     Low back pain and LE pain- Has tingling of the foot (h/o DM).     Pain causes functional limitations/ limits Adl's : Yes     Nursing notes and details of the pain history reviewed. Please see intake notes for details.     Previous treatments:   Physical Therapy : yes, continues HEP.     Medications: - NSAID's : yes                        - Membrane stabilizers : yes - gabapentin                       - Opioids : yes prn                       - Adjuvants or Others : yes     Surgeries: yes, L3-5 fusion in May 2021     She has not been on anticoagulation medications      She has not been on herbal supplements.       She is diabetic.     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  Postsurgical  changes are noted including decompressive laminectomy and posterior fusion at  L3-L5.  Bilateral pedicle screws are seen at L3 through L5 and there is again  an interbody spacer in the L4-5 disc space.  There is disc desiccation at  multiple levels.  Disc space narrowing is noted, severe at L5-S1 and moderate  to severe at L2-3 and L3-4 as well as in the lower thoracic region.  Multiple  small Schmorl's nodes are noted.  The vertebral body heights are maintained.  The bone marrow signal appears unremarkable.     SPINAL CORD: The

## 2025-02-14 ENCOUNTER — OFFICE VISIT (OUTPATIENT)
Dept: FAMILY MEDICINE CLINIC | Age: 65
End: 2025-02-14
Payer: MEDICARE

## 2025-02-14 VITALS
WEIGHT: 196 LBS | HEART RATE: 86 BPM | OXYGEN SATURATION: 94 % | HEIGHT: 63 IN | BODY MASS INDEX: 34.73 KG/M2 | DIASTOLIC BLOOD PRESSURE: 82 MMHG | RESPIRATION RATE: 16 BRPM | SYSTOLIC BLOOD PRESSURE: 148 MMHG | TEMPERATURE: 97.3 F

## 2025-02-14 DIAGNOSIS — J40 BRONCHITIS: Primary | ICD-10-CM

## 2025-02-14 PROCEDURE — 99213 OFFICE O/P EST LOW 20 MIN: CPT | Performed by: FAMILY MEDICINE

## 2025-02-14 RX ORDER — BENZONATATE 200 MG/1
200 CAPSULE ORAL 3 TIMES DAILY PRN
Qty: 30 CAPSULE | Refills: 0 | Status: SHIPPED | OUTPATIENT
Start: 2025-02-14 | End: 2025-02-24

## 2025-02-14 RX ORDER — PREDNISONE 20 MG/1
20 TABLET ORAL DAILY
Qty: 5 TABLET | Refills: 0 | Status: SHIPPED | OUTPATIENT
Start: 2025-02-14 | End: 2025-02-19

## 2025-02-14 RX ORDER — LEVOFLOXACIN 750 MG/1
750 TABLET, FILM COATED ORAL DAILY
Qty: 5 TABLET | Refills: 0 | Status: SHIPPED | OUTPATIENT
Start: 2025-02-14 | End: 2025-02-19

## 2025-02-14 ASSESSMENT — ENCOUNTER SYMPTOMS
SHORTNESS OF BREATH: 1
SORE THROAT: 0
RHINORRHEA: 1
VOMITING: 0
COLOR CHANGE: 0
COUGH: 1
CHEST TIGHTNESS: 0
NAUSEA: 0
APNEA: 0
EYE PAIN: 0
BLOOD IN STOOL: 0
SINUS PRESSURE: 0
WHEEZING: 1
DIARRHEA: 0
EYE ITCHING: 0
EYE REDNESS: 0
BACK PAIN: 0
CONSTIPATION: 0
ABDOMINAL PAIN: 0

## 2025-02-14 NOTE — PROGRESS NOTES
days     Dispense:  5 tablet     Refill:  0    benzonatate (TESSALON) 200 MG capsule     Sig: Take 1 capsule by mouth 3 times daily as needed for Cough     Dispense:  30 capsule     Refill:  0        No follow-ups on file.    I spent 20 minutes with this patient providing this service today, including time spent seeing the patient as well as documentation and chart review, excluding any separately billed procedures.     Prieto Robin DO  2/14/2025  9:44 AM

## 2025-02-17 ENCOUNTER — TELEPHONE (OUTPATIENT)
Dept: PAIN MANAGEMENT | Age: 65
End: 2025-02-17

## 2025-02-17 DIAGNOSIS — M53.3 DISORDER OF SACRUM: ICD-10-CM

## 2025-02-17 NOTE — TELEPHONE ENCOUNTER
Call to Conchis Vogel & left a message that procedure was scheduled for 02/24/2025 and that [facility] should call her a few days before for the pre op call and between 2:00 PM and 4:00 PM  the business day before with the arrival time. Instructed Conchis to hold ibuprofen for 24 hours, Celebrex, Mobic, and naprosyn for 4 days and any aspirin containing products, CoQ 10, or fish oil for 7 days. Instructed to call office back if any questions.     Electronically signed by Dorita Murdock RN on 2/17/2025 at 5:17 PM

## 2025-02-19 NOTE — PROGRESS NOTES
[facility] PAIN MANAGEMENT  INSTRUCTIONS  ...........................................................................................................................................    [x] Parking the day of Surgery is located in the Main Entrance lot.  Upon entering the door, make immediate right into the surgery reception room    [x]  Bring photo ID and insurance card    [x] You may have a light breakfast day of procedure    [x]  Wear loose comfortable clothing    [x]  Please follow instructions for medications as given per Dr's office    [x] You can expect a call the business day prior to procedure to notify you of your arrival time     [x] Please arrange for     []  Other instructions         96

## 2025-02-24 ENCOUNTER — HOSPITAL ENCOUNTER (OUTPATIENT)
Age: 65
Setting detail: OUTPATIENT SURGERY
Discharge: HOME | End: 2025-02-24
Attending: ANESTHESIOLOGY | Admitting: ANESTHESIOLOGY
Payer: MEDICARE

## 2025-02-24 ENCOUNTER — HOSPITAL ENCOUNTER (OUTPATIENT)
Dept: GENERAL RADIOLOGY | Age: 65
Setting detail: OUTPATIENT SURGERY
Discharge: HOME | End: 2025-02-26
Attending: ANESTHESIOLOGY
Payer: MEDICARE

## 2025-02-24 VITALS
OXYGEN SATURATION: 96 % | SYSTOLIC BLOOD PRESSURE: 123 MMHG | HEART RATE: 60 BPM | DIASTOLIC BLOOD PRESSURE: 70 MMHG | BODY MASS INDEX: 34.73 KG/M2 | RESPIRATION RATE: 16 BRPM | WEIGHT: 196 LBS | TEMPERATURE: 98.7 F | HEIGHT: 63 IN

## 2025-02-24 DIAGNOSIS — R52 PAIN MANAGEMENT: ICD-10-CM

## 2025-02-24 PROCEDURE — 27096 INJECT SACROILIAC JOINT: CPT | Performed by: ANESTHESIOLOGY

## 2025-02-24 PROCEDURE — 6360000004 HC RX CONTRAST MEDICATION: Performed by: ANESTHESIOLOGY

## 2025-02-24 PROCEDURE — 7100000010 HC PHASE II RECOVERY - FIRST 15 MIN: Performed by: ANESTHESIOLOGY

## 2025-02-24 PROCEDURE — 7100000011 HC PHASE II RECOVERY - ADDTL 15 MIN: Performed by: ANESTHESIOLOGY

## 2025-02-24 PROCEDURE — 2709999900 HC NON-CHARGEABLE SUPPLY: Performed by: ANESTHESIOLOGY

## 2025-02-24 PROCEDURE — 6360000002 HC RX W HCPCS: Performed by: ANESTHESIOLOGY

## 2025-02-24 PROCEDURE — 3600000012 HC SURGERY LEVEL 2 ADDTL 15MIN: Performed by: ANESTHESIOLOGY

## 2025-02-24 PROCEDURE — 3600000002 HC SURGERY LEVEL 2 BASE: Performed by: ANESTHESIOLOGY

## 2025-02-24 RX ORDER — SODIUM CHLORIDE 0.9 % (FLUSH) 0.9 %
5-40 SYRINGE (ML) INJECTION PRN
Status: DISCONTINUED | OUTPATIENT
Start: 2025-02-24 | End: 2025-02-24 | Stop reason: HOSPADM

## 2025-02-24 RX ORDER — SODIUM CHLORIDE 0.9 % (FLUSH) 0.9 %
5-40 SYRINGE (ML) INJECTION EVERY 12 HOURS SCHEDULED
Status: DISCONTINUED | OUTPATIENT
Start: 2025-02-24 | End: 2025-02-24 | Stop reason: HOSPADM

## 2025-02-24 RX ORDER — METHYLPREDNISOLONE ACETATE 40 MG/ML
INJECTION, SUSPENSION INTRA-ARTICULAR; INTRALESIONAL; INTRAMUSCULAR; SOFT TISSUE PRN
Status: DISCONTINUED | OUTPATIENT
Start: 2025-02-24 | End: 2025-02-24 | Stop reason: HOSPADM

## 2025-02-24 RX ORDER — BUPIVACAINE HYDROCHLORIDE 2.5 MG/ML
INJECTION, SOLUTION EPIDURAL; INFILTRATION; INTRACAUDAL PRN
Status: DISCONTINUED | OUTPATIENT
Start: 2025-02-24 | End: 2025-02-24 | Stop reason: HOSPADM

## 2025-02-24 RX ORDER — IOPAMIDOL 612 MG/ML
INJECTION, SOLUTION INTRATHECAL PRN
Status: DISCONTINUED | OUTPATIENT
Start: 2025-02-24 | End: 2025-02-24 | Stop reason: HOSPADM

## 2025-02-24 RX ORDER — LIDOCAINE HYDROCHLORIDE 5 MG/ML
INJECTION, SOLUTION INFILTRATION; INTRAVENOUS PRN
Status: DISCONTINUED | OUTPATIENT
Start: 2025-02-24 | End: 2025-02-24 | Stop reason: HOSPADM

## 2025-02-24 RX ORDER — SODIUM CHLORIDE 9 MG/ML
INJECTION, SOLUTION INTRAVENOUS PRN
Status: DISCONTINUED | OUTPATIENT
Start: 2025-02-24 | End: 2025-02-24 | Stop reason: HOSPADM

## 2025-02-24 ASSESSMENT — PAIN DESCRIPTION - DESCRIPTORS
DESCRIPTORS: ACHING;DULL;DISCOMFORT
DESCRIPTORS: ACHING;DULL;DISCOMFORT
DESCRIPTORS: ACHING
DESCRIPTORS: ACHING;DULL;DISCOMFORT

## 2025-02-24 ASSESSMENT — PAIN - FUNCTIONAL ASSESSMENT
PAIN_FUNCTIONAL_ASSESSMENT: ACTIVITIES ARE NOT PREVENTED
PAIN_FUNCTIONAL_ASSESSMENT: ACTIVITIES ARE NOT PREVENTED
PAIN_FUNCTIONAL_ASSESSMENT: 0-10
PAIN_FUNCTIONAL_ASSESSMENT: ACTIVITIES ARE NOT PREVENTED
PAIN_FUNCTIONAL_ASSESSMENT: 0-10

## 2025-02-24 ASSESSMENT — PAIN DESCRIPTION - ORIENTATION: ORIENTATION: RIGHT;LEFT

## 2025-02-24 ASSESSMENT — PAIN SCALES - GENERAL: PAINLEVEL_OUTOF10: 4

## 2025-02-24 ASSESSMENT — PAIN DESCRIPTION - LOCATION: LOCATION: HIP;BACK

## 2025-02-24 NOTE — OP NOTE
Operative Note      Patient: Conchis Vogel  YOB: 1960  MRN: [id number]    Date of Procedure: 2025    Pre-Op Diagnosis Codes:      * Disorder of sacrum [M53.3]    Post-Op Diagnosis: Same       Procedure(s):  BILATERAL SACROILIAC JOINT INJECTION UNDER FLUOROSCOPIC GUIDANCE    Surgeon(s):  Noman Oswald MD    Assistant:   * No surgical staff found *    Anesthesia: Local    Estimated Blood Loss (mL): Minimal    Complications: None    Specimens:   * No specimens in log *    Implants:  * No implants in log *      Drains: * No LDAs found *    Findings:  Infection Present At Time Of Surgery (PATOS) (choose all levels that have infection present):  No infection present  Other Findings: good needle placement    Detailed Description of Procedure:   2025    Patient: Conchis Vogel  :  1960  Age:  64 y.o.  Sex:  female     PRE-OPERATIVE DIAGNOSIS:      Sacroiliac dysfunction    POST-OPERATIVE DIAGNOSIS: Same.    PROCEDURE:  Fluoroscopic guided Bilateral   sacroiliac joint injection with steroid.    SURGEON:  Noman Oswald MD    ANESTHESIA: Local    ESTIMATED BLOOD LOSS: None.  ______________________________________________________________________  BRIEF HISTORY:  Conchis Vogel comes in today for  Bilateral sacroiliac joint injection under fluoroscopic guidance. The potential complications as well as the procedure in detail were explained to her today. She has elected to undergo the aforementioned procedure.    Conchis's complete History & Physical examination were reviewed in depth, a copy of which is in the chart.      DESCRIPTION OF PROCEDURE:    After confirming written and informed consent, a time-out was performed and Conchis’s name and date of birth, the procedure to be performed as well as the plan for the location of the needle insertion were confirmed.    The patient was brought into the procedure room and placed in the prone position on the fluoroscopy table. Standard monitors

## 2025-02-24 NOTE — INTERVAL H&P NOTE
Update History & Physical    The patient's History and Physical of February 13, 2025 was reviewed with the patient and I examined the patient. There was no change. The surgical site was confirmed by the patient and me.     Plan: The risks, benefits, expected outcome, and alternative to the recommended procedure have been discussed with the patient. Patient understands and wants to proceed with the procedure.     Electronically signed by Noman Oswald MD on 2/24/2025

## 2025-02-24 NOTE — DISCHARGE INSTRUCTIONS
[facility] Pain Management Department  [place]-[phone]  Dr. Darek Lemon   Post-Pain Block/Radiofrequency  Home Going Instructions    1-Go home, rest for the remainder of the day  2-Please do not lift over 20 pounds the day of the injection  3-If you received sedation No: alcohol, driving, operating lawn mowers, plows, tractors or other dangerous equipment until next morning. Do not make important decisions or sign legal documents for 24 hours. You may experience light headedness, dizziness, nausea or sleepiness after sedation. Do not stay alone. A responsible adult must be with you for 24 hours. You could be nauseated from the medications you have received. Your IV site may be sore and bruised.    4-No dietary restrictions     5-Resume all medications the same day, blood thinners to be resumed 24 hours after injection if you were instructed to stop any.    6-Keep the surgical site clean and dry, you may shower the next morning and remove the      dressing.     7- No sitz baths, tub baths or hot tubs/swimming for 24 hours.       8- If you have any pain at the injection site(s), application of an ice pack to the area should be       helpful, 20 minutes on/20 minutes off for next 48 hours.  9- Call [facility] immediately at if you develop.  Fever greater than 100.4 F  Have bleeding or drainage from the puncture site  Have progressive Leg/arm numbness and or weakness  Loss of control of bowel and or bladder (wet/soil yourself)  Severe headache with inability to lift head  10-You may return to work the next day

## 2025-03-11 ENCOUNTER — OFFICE VISIT (OUTPATIENT)
Dept: NEUROSURGERY | Age: 65
End: 2025-03-11
Payer: MEDICARE

## 2025-03-11 VITALS
OXYGEN SATURATION: 98 % | HEIGHT: 63 IN | DIASTOLIC BLOOD PRESSURE: 58 MMHG | SYSTOLIC BLOOD PRESSURE: 111 MMHG | WEIGHT: 196 LBS | BODY MASS INDEX: 34.73 KG/M2 | HEART RATE: 58 BPM | TEMPERATURE: 97.2 F

## 2025-03-11 DIAGNOSIS — M54.41 ACUTE MIDLINE LOW BACK PAIN WITH RIGHT-SIDED SCIATICA: Primary | ICD-10-CM

## 2025-03-11 PROCEDURE — 99212 OFFICE O/P EST SF 10 MIN: CPT

## 2025-03-11 NOTE — PROGRESS NOTES
Patient is here for follow up consult for: back and right leg pain that started in January.  She had surgery several years ago by Dr. Haddad. She had an epidural injection and it is better.     Physical exam  Alert and Oriented X3  PERRLA, EOMI  ORTIZ 5/5  Sensation intact to LT and PP  Reflexes are 2+ and symmetric    A/P: patient is here for follow up for: back pan.  MRI does not show any significant stenosis.  Continue with conservative therapy    Fausto Srivastava MD

## 2025-03-27 ENCOUNTER — OFFICE VISIT (OUTPATIENT)
Dept: PAIN MANAGEMENT | Age: 65
End: 2025-03-27
Payer: MEDICARE

## 2025-03-27 VITALS
OXYGEN SATURATION: 96 % | HEIGHT: 63 IN | WEIGHT: 196 LBS | SYSTOLIC BLOOD PRESSURE: 126 MMHG | HEART RATE: 66 BPM | BODY MASS INDEX: 34.73 KG/M2 | DIASTOLIC BLOOD PRESSURE: 76 MMHG | RESPIRATION RATE: 18 BRPM | TEMPERATURE: 97.2 F

## 2025-03-27 DIAGNOSIS — M53.3 SACROILIAC DYSFUNCTION: ICD-10-CM

## 2025-03-27 DIAGNOSIS — M48.061 SPINAL STENOSIS OF LUMBAR REGION, UNSPECIFIED WHETHER NEUROGENIC CLAUDICATION PRESENT: ICD-10-CM

## 2025-03-27 DIAGNOSIS — M47.817 LUMBOSACRAL SPONDYLOSIS WITHOUT MYELOPATHY: ICD-10-CM

## 2025-03-27 DIAGNOSIS — M51.369 DEGENERATION OF INTERVERTEBRAL DISC OF LUMBAR REGION, UNSPECIFIED WHETHER PAIN PRESENT: Primary | ICD-10-CM

## 2025-03-27 DIAGNOSIS — M96.1 POSTLAMINECTOMY SYNDROME, LUMBAR: ICD-10-CM

## 2025-03-27 DIAGNOSIS — M54.16 LUMBAR RADICULAR PAIN: ICD-10-CM

## 2025-03-27 PROCEDURE — 99213 OFFICE O/P EST LOW 20 MIN: CPT | Performed by: ANESTHESIOLOGY

## 2025-03-27 NOTE — PROGRESS NOTES
Conchis Vogel presents to the [facility] on 3/27/2025. Conchis is complaining of pain in her lumbar spine. The pain is constant. The pain is described as aching and tender. Pain is rated on her best day at a 3, on her worst day at a 10, and on average at a 5 on the VAS scale. She took her last dose of Percocet and Flexeril 3 weeks ago.     Any procedures since your last visit: Yes, with 70 % relief.    Pacemaker or defibrillator: No.    She is  on NSAIDS and is not on anticoagulation medications.    Do you want someone present when the provider examines you? No.    Medication Contract and Consent for Opioid Use Documents Filed       Patient Documents       Type of Document Status Date Received Received By Description    Medication Contract Received 10/6/2021 10:43 AM SHANE CRENSHAW pain management    Medication Contract Received 1/30/2023 10:41 AM JOSAFAT GARCIA pain mangement agreement    Medication Contract Received 4/11/2024  1:43 PM MENDEZ BEAULIEU medication contract                    /76 (BP Site: Left Upper Arm, Patient Position: Sitting, BP Cuff Size: Medium Adult)   Pulse 66   Temp 97.2 °F (36.2 °C) (Temporal)   Resp 18   Ht 1.6 m (5' 3\")   Wt 88.9 kg (196 lb)   SpO2 96%   BMI 34.72 kg/m²      No LMP recorded. Patient has had a hysterectomy.

## 2025-03-27 NOTE — PROGRESS NOTES
[facility]  [place], [place] [address]  Dept: [phone]    Follow up Note      Conchis Vogel     Date of Visit:  3/27/2025    CC:  Patient presents for follow up   Chief Complaint   Patient presents with    Follow-up     Follow up epidural injection. DOS 02/24/25. Patient states she is doing well.    Back Pain     Lumbar spine     HPI:  Low back pain. S/P L3-5 fusion and L4-5 TLIF in May 2021.     Has noticed right LE pain after the surgery. Has been followed by NSG - imaging showed Intact fusion.     Low back pain and LE pain- Has tingling of the foot (h/o DM).     Pain causes functional limitations/ limits Adl's : Yes     Nursing notes and details of the pain history reviewed. Please see intake notes for details.     Previous treatments:   Physical Therapy : yes, continues HEP.     Medications: - NSAID's : yes                        - Membrane stabilizers : yes - gabapentin                       - Opioids : yes prn                       - Adjuvants or Others : yes     Surgeries: yes, L3-5 fusion in May 2021     She has not been on anticoagulation medications      She has not been on herbal supplements.       She is diabetic.     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  Postsurgical  changes are noted including decompressive laminectomy and posterior fusion at  L3-L5.  Bilateral pedicle screws are seen at L3 through L5 and there is again  an interbody spacer in the L4-5 disc space.  There is disc desiccation at  multiple levels.  Disc space narrowing is noted, severe at L5-S1 and moderate  to severe at L2-3 and L3-4 as well as in the lower thoracic region.  Multiple  small Schmorl's nodes are noted.  The vertebral body heights are maintained.  The bone marrow signal appears unremarkable.

## 2025-04-14 ENCOUNTER — PROCEDURE VISIT (OUTPATIENT)
Dept: PODIATRY | Age: 65
End: 2025-04-14
Payer: MEDICARE

## 2025-04-14 VITALS
DIASTOLIC BLOOD PRESSURE: 70 MMHG | WEIGHT: 196 LBS | HEART RATE: 71 BPM | OXYGEN SATURATION: 99 % | SYSTOLIC BLOOD PRESSURE: 106 MMHG | BODY MASS INDEX: 34.72 KG/M2

## 2025-04-14 DIAGNOSIS — I73.9 PERIPHERAL VASCULAR DISEASE, UNSPECIFIED: ICD-10-CM

## 2025-04-14 DIAGNOSIS — M79.674 PAIN IN RIGHT TOE(S): ICD-10-CM

## 2025-04-14 DIAGNOSIS — E11.9 TYPE 2 DIABETES MELLITUS WITHOUT COMPLICATION, WITHOUT LONG-TERM CURRENT USE OF INSULIN: ICD-10-CM

## 2025-04-14 DIAGNOSIS — M79.675 PAIN IN LEFT TOE(S): ICD-10-CM

## 2025-04-14 DIAGNOSIS — B35.1 TINEA UNGUIUM: Primary | ICD-10-CM

## 2025-04-14 PROCEDURE — 11721 DEBRIDE NAIL 6 OR MORE: CPT | Performed by: PODIATRIST

## 2025-04-14 NOTE — PROGRESS NOTES
[facility] [place] [facility]  [address] [address]  Dept: [phone]  Dept Fax: [phone]  Loc: [phone]    DIABETIC NAIL PROGRESS NOTE  Date of patient's visit: 4/14/2025  Patient's Name:  Conchis Vogel YOB: 1960            Patient Care Team:  Prieto Robin DO as PCP - General (Family Medicine)  Prieto Robin DO as PCP - Empaneled Provider  Hayder Beal DPM as Consulting Physician (Podiatry)          Chief Complaint   Patient presents with    Diabetes    Nail Problem    Toe Pain     Prieto Robin DO  12/28/24       Subjective:   Conchis Vogel comes to clinic for Diabetes, Nail Problem, and Toe Pain (Prieto Robin DO/12/28/24)    she is a diabetic and states that foot exam .  Pt currently has complaint of thickened, elongated nails that they cannot manage by themselves.   Pt's primary care physician is Prieto Robin DO    .     Lab Results   Component Value Date    LABA1C 5.1 12/06/2024      Complains of numbness in the feet bilat.  Past Medical History:   Diagnosis Date    Back pain     Chronic back pain     Chronic fatigue     COVID-19 09/2020    mild per patient    Diabetes mellitus (HCC)     diet controlled    History of blood transfusion     Hyperlipidemia     not on any medications    Hypothyroidism     IBS (irritable bowel syndrome)     Morbid obesity due to excess calories     Neuropathy     Obesity     Osteoarthritis     PONV (postoperative nausea and vomiting)     Sacroiliac dysfunction        Allergies   Allergen Reactions    Azithromycin Other (See Comments)     Stomach pain    Bactrim [Sulfamethoxazole-Trimethoprim] Nausea Only    Ceftin [Cefuroxime] Rash    Keflex [Cephalexin] Rash     Current Outpatient Medications on File Prior to Visit   Medication Sig Dispense Refill    diclofenac sodium (VOLTAREN) 1 % GEL APPLY 4 GRAMS TO AFFECTED AREA FOUR TIMES A DAY AS NEEDED FOR PAIN 100

## 2025-06-05 SDOH — HEALTH STABILITY: PHYSICAL HEALTH: ON AVERAGE, HOW MANY MINUTES DO YOU ENGAGE IN EXERCISE AT THIS LEVEL?: 0 MIN

## 2025-06-05 SDOH — ECONOMIC STABILITY: FOOD INSECURITY: WITHIN THE PAST 12 MONTHS, YOU WORRIED THAT YOUR FOOD WOULD RUN OUT BEFORE YOU GOT MONEY TO BUY MORE.: NEVER TRUE

## 2025-06-05 SDOH — ECONOMIC STABILITY: INCOME INSECURITY: IN THE LAST 12 MONTHS, WAS THERE A TIME WHEN YOU WERE NOT ABLE TO PAY THE MORTGAGE OR RENT ON TIME?: NO

## 2025-06-05 SDOH — ECONOMIC STABILITY: FOOD INSECURITY: WITHIN THE PAST 12 MONTHS, THE FOOD YOU BOUGHT JUST DIDN'T LAST AND YOU DIDN'T HAVE MONEY TO GET MORE.: NEVER TRUE

## 2025-06-05 SDOH — ECONOMIC STABILITY: TRANSPORTATION INSECURITY
IN THE PAST 12 MONTHS, HAS THE LACK OF TRANSPORTATION KEPT YOU FROM MEDICAL APPOINTMENTS OR FROM GETTING MEDICATIONS?: NO

## 2025-06-05 SDOH — HEALTH STABILITY: PHYSICAL HEALTH: ON AVERAGE, HOW MANY DAYS PER WEEK DO YOU ENGAGE IN MODERATE TO STRENUOUS EXERCISE (LIKE A BRISK WALK)?: 0 DAYS

## 2025-06-05 ASSESSMENT — PATIENT HEALTH QUESTIONNAIRE - PHQ9
SUM OF ALL RESPONSES TO PHQ QUESTIONS 1-9: 0
SUM OF ALL RESPONSES TO PHQ QUESTIONS 1-9: 0
2. FEELING DOWN, DEPRESSED OR HOPELESS: NOT AT ALL
SUM OF ALL RESPONSES TO PHQ QUESTIONS 1-9: 0
1. LITTLE INTEREST OR PLEASURE IN DOING THINGS: NOT AT ALL
SUM OF ALL RESPONSES TO PHQ QUESTIONS 1-9: 0

## 2025-06-05 ASSESSMENT — LIFESTYLE VARIABLES
HOW OFTEN DO YOU HAVE SIX OR MORE DRINKS ON ONE OCCASION: 1
HOW MANY STANDARD DRINKS CONTAINING ALCOHOL DO YOU HAVE ON A TYPICAL DAY: PATIENT DOES NOT DRINK
HOW MANY STANDARD DRINKS CONTAINING ALCOHOL DO YOU HAVE ON A TYPICAL DAY: 0
HOW OFTEN DO YOU HAVE A DRINK CONTAINING ALCOHOL: 1
HOW OFTEN DO YOU HAVE A DRINK CONTAINING ALCOHOL: NEVER

## 2025-06-06 ENCOUNTER — OFFICE VISIT (OUTPATIENT)
Dept: PRIMARY CARE CLINIC | Age: 65
End: 2025-06-06

## 2025-06-06 VITALS
BODY MASS INDEX: 34.38 KG/M2 | SYSTOLIC BLOOD PRESSURE: 126 MMHG | WEIGHT: 194 LBS | RESPIRATION RATE: 16 BRPM | TEMPERATURE: 97.2 F | HEIGHT: 63 IN | OXYGEN SATURATION: 98 % | HEART RATE: 80 BPM | DIASTOLIC BLOOD PRESSURE: 68 MMHG

## 2025-06-06 DIAGNOSIS — Z00.00 MEDICARE ANNUAL WELLNESS VISIT, SUBSEQUENT: Primary | ICD-10-CM

## 2025-06-06 DIAGNOSIS — E11.9 TYPE 2 DIABETES MELLITUS WITHOUT COMPLICATION, WITHOUT LONG-TERM CURRENT USE OF INSULIN (HCC): ICD-10-CM

## 2025-06-06 DIAGNOSIS — E78.00 PURE HYPERCHOLESTEROLEMIA: ICD-10-CM

## 2025-06-06 DIAGNOSIS — E03.9 ACQUIRED HYPOTHYROIDISM: ICD-10-CM

## 2025-06-06 DIAGNOSIS — R53.82 CHRONIC FATIGUE: ICD-10-CM

## 2025-06-06 DIAGNOSIS — Z98.84 S/P BARIATRIC SURGERY: ICD-10-CM

## 2025-06-06 DIAGNOSIS — M16.12 PRIMARY OSTEOARTHRITIS OF LEFT HIP: ICD-10-CM

## 2025-06-06 DIAGNOSIS — Z98.1 S/P LUMBAR FUSION: ICD-10-CM

## 2025-06-06 LAB
ALBUMIN: 4 G/DL (ref 3.5–5.2)
ALP BLD-CCNC: 124 U/L (ref 35–104)
ALT SERPL-CCNC: 15 U/L (ref 0–35)
ANION GAP SERPL CALCULATED.3IONS-SCNC: 10 MMOL/L (ref 7–16)
AST SERPL-CCNC: 25 U/L (ref 0–35)
BILIRUB SERPL-MCNC: 0.5 MG/DL (ref 0–1.2)
BUN BLDV-MCNC: 11 MG/DL (ref 8–23)
CALCIUM SERPL-MCNC: 9 MG/DL (ref 8.8–10.2)
CHLORIDE BLD-SCNC: 105 MMOL/L (ref 98–107)
CHOLESTEROL, TOTAL: 137 MG/DL
CO2: 27 MMOL/L (ref 22–29)
CREAT SERPL-MCNC: 0.7 MG/DL (ref 0.5–1)
GFR, ESTIMATED: >90 ML/MIN/1.73M2
GLUCOSE BLD-MCNC: 79 MG/DL (ref 74–99)
HBA1C MFR BLD: 5.2 % (ref 4–5.6)
HCT VFR BLD CALC: 43.8 % (ref 34–48)
HDLC SERPL-MCNC: 34 MG/DL
HEMOGLOBIN: 14.4 G/DL (ref 11.5–15.5)
LDL CHOLESTEROL: 89 MG/DL
MCH RBC QN AUTO: 30.1 PG (ref 26–35)
MCHC RBC AUTO-ENTMCNC: 32.9 G/DL (ref 32–34.5)
MCV RBC AUTO: 91.6 FL (ref 80–99.9)
PDW BLD-RTO: 12.7 % (ref 11.5–15)
PLATELET # BLD: 216 K/UL (ref 130–450)
PMV BLD AUTO: 10.2 FL (ref 7–12)
POTASSIUM SERPL-SCNC: 4.1 MMOL/L (ref 3.5–5.1)
RBC # BLD: 4.78 M/UL (ref 3.5–5.5)
SODIUM BLD-SCNC: 141 MMOL/L (ref 136–145)
TOTAL PROTEIN: 6.8 G/DL (ref 6.4–8.3)
TRIGL SERPL-MCNC: 73 MG/DL
TSH SERPL DL<=0.05 MIU/L-ACNC: 12.6 UIU/ML (ref 0.27–4.2)
VLDLC SERPL CALC-MCNC: 15 MG/DL
WBC # BLD: 5.6 K/UL (ref 4.5–11.5)

## 2025-06-06 RX ORDER — OMEPRAZOLE 20 MG/1
20 CAPSULE, DELAYED RELEASE ORAL DAILY
Qty: 30 CAPSULE | Refills: 5 | Status: SHIPPED | OUTPATIENT
Start: 2025-06-06

## 2025-06-06 RX ORDER — CYCLOBENZAPRINE HCL 10 MG
10 TABLET ORAL 3 TIMES DAILY PRN
Qty: 90 TABLET | Refills: 0 | Status: SHIPPED | OUTPATIENT
Start: 2025-06-06

## 2025-06-06 RX ORDER — LEVOTHYROXINE SODIUM 200 UG/1
200 TABLET ORAL EVERY MORNING
Qty: 90 TABLET | Refills: 1 | Status: SHIPPED | OUTPATIENT
Start: 2025-06-06 | End: 2025-06-08

## 2025-06-06 ASSESSMENT — ENCOUNTER SYMPTOMS
COLOR CHANGE: 0
APNEA: 0
DIARRHEA: 0
BACK PAIN: 0
SHORTNESS OF BREATH: 0
VISUAL CHANGE: 0
SINUS PRESSURE: 0
NAUSEA: 0
EYE ITCHING: 0
BLOOD IN STOOL: 0
EYE PAIN: 0
CHEST TIGHTNESS: 0
VOMITING: 0
COUGH: 0
WHEEZING: 0
CONSTIPATION: 0
ABDOMINAL PAIN: 0
SORE THROAT: 0
RHINORRHEA: 0
EYE REDNESS: 0

## 2025-06-06 NOTE — PROGRESS NOTES
Medicare Annual Wellness Visit    Conchis Vogel is here for Medicare AWV    Assessment & Plan   Medicare annual wellness visit, subsequent       Return for Medicare Annual Wellness Visit in 1 year.     Subjective   The following acute and/or chronic problems were also addressed today:  DM2, Lipids, Thyroid    Patient's complete Health Risk Assessment and screening values have been reviewed and are found in Flowsheets. The following problems were reviewed today and where indicated follow up appointments were made and/or referrals ordered.    Positive Risk Factor Screenings with Interventions:              Inactivity:  On average, how many days per week do you engage in moderate to strenuous exercise (like a brisk walk)?: (Patient-Rptd) 0 days (!) Abnormal  On average, how many minutes do you engage in exercise at this level?: (Patient-Rptd) 0 min  Interventions:  Patient declined any further interventions or treatment     Abnormal BMI (obese):  Body mass index is 34.37 kg/m². (!) Abnormal  Interventions:  Patient declines any further evaluation or treatment        Vision Screen:  Do you have difficulty driving, watching TV, or doing any of your daily activities because of your eyesight?: (Patient-Rptd) No  Have you had an eye exam within the past year?: (!) (Patient-Rptd) No  Interventions:   Patient encouraged to make appointment with their eye specialist      Advanced Directives:  Do you have a Living Will?: (!) (Patient-Rptd) No    Intervention:  has NO advanced directive - not interested in additional information       CV Risk Counseling:  Patient was asked about her current diet and exercise habits, and personalized advice was provided regarding recommended lifestyle changes. Patient's individual cardiovascular disease risk factors, including advanced age (> 55 for men, > 65 for women), diabetes mellitus, dyslipidemia, and hypertension, were discussed, as well as the likely benefits of lifestyle changes. Based

## 2025-06-06 NOTE — PROGRESS NOTES
Chief Complaint:     Chief Complaint   Patient presents with    Medicare AWV         Back Pain  This is a recurrent problem. The current episode started in the past 7 days. The problem occurs daily. The problem is unchanged. The pain is present in the lumbar spine and sacro-iliac. The quality of the pain is described as aching and shooting. The pain radiates to the right thigh, right knee and right foot. The pain is severe. The symptoms are aggravated by bending, position and twisting. Associated symptoms include leg pain, numbness, paresis, paresthesias, tingling and weakness. Pertinent negatives include no abdominal pain, chest pain, dysuria, fever or headaches. She has tried home exercises (Steroids, rest, heat) for the symptoms. The treatment provided no relief.   Diabetes  She presents for her follow-up diabetic visit. She has type 2 diabetes mellitus. The initial diagnosis of diabetes was made 1 month ago. Her disease course has been stable. There are no hypoglycemic associated symptoms. Pertinent negatives for hypoglycemia include no dizziness, headaches or nervousness/anxiousness. Associated symptoms include fatigue and weakness. Pertinent negatives for diabetes include no chest pain and no visual change. There are no hypoglycemic complications. Symptoms are stable. There are no diabetic complications. Risk factors for coronary artery disease include dyslipidemia and obesity. Current diabetic treatment includes oral agent (monotherapy). She is compliant with treatment all of the time. She is following a generally healthy diet. When asked about meal planning, she reported none. She has not had a previous visit with a dietitian. There is no change in her home blood glucose trend. An ACE inhibitor/angiotensin II receptor blocker is not being taken. She does not see a podiatrist.Eye exam is not current.   Hyperlipidemia  This is a chronic problem. The current episode started more than 1 year ago. The problem is

## 2025-06-08 ENCOUNTER — RESULTS FOLLOW-UP (OUTPATIENT)
Dept: PRIMARY CARE CLINIC | Age: 65
End: 2025-06-08

## 2025-06-08 RX ORDER — LEVOTHYROXINE SODIUM 125 UG/1
250 TABLET ORAL DAILY
Qty: 180 TABLET | Refills: 1 | Status: SHIPPED | OUTPATIENT
Start: 2025-06-08

## 2025-07-02 NOTE — TELEPHONE ENCOUNTER
Voltaren was discontinued by her surgeon due to gastric ulcer risks after her gastric bypass surgery (8/24/22).     Per Dr. Beata Hart medication will not be filled at this time You could consider neuropathy medication for numbness, such as duloxetine. I will provide some information here to go over again in 3 month.    For your left hand sensory issue, I did not some mild weakness in muscles innervated by the median nerve at the wrist.  You should wear wrist brace on the left side nightly for 3 months to see if it impact

## 2025-07-14 ENCOUNTER — PROCEDURE VISIT (OUTPATIENT)
Dept: PODIATRY | Age: 65
End: 2025-07-14
Payer: MEDICARE

## 2025-07-14 VITALS
TEMPERATURE: 97.9 F | WEIGHT: 194 LBS | DIASTOLIC BLOOD PRESSURE: 73 MMHG | BODY MASS INDEX: 34.37 KG/M2 | SYSTOLIC BLOOD PRESSURE: 120 MMHG

## 2025-07-14 DIAGNOSIS — B35.1 TINEA UNGUIUM: Primary | ICD-10-CM

## 2025-07-14 DIAGNOSIS — M79.674 PAIN IN RIGHT TOE(S): ICD-10-CM

## 2025-07-14 DIAGNOSIS — I73.9 PERIPHERAL VASCULAR DISEASE, UNSPECIFIED: ICD-10-CM

## 2025-07-14 DIAGNOSIS — M79.675 PAIN IN LEFT TOE(S): ICD-10-CM

## 2025-07-14 DIAGNOSIS — E11.9 TYPE 2 DIABETES MELLITUS WITHOUT COMPLICATION, WITHOUT LONG-TERM CURRENT USE OF INSULIN (HCC): ICD-10-CM

## 2025-07-14 DIAGNOSIS — E11.9 ENCOUNTER FOR DIABETIC FOOT EXAM (HCC): ICD-10-CM

## 2025-07-14 PROCEDURE — 11721 DEBRIDE NAIL 6 OR MORE: CPT | Performed by: PODIATRIST

## 2025-07-14 NOTE — PROGRESS NOTES
[facility] [place] [facility]  [address] [facility] [address]  Dept: [phone]  Dept Fax: [phone]  Loc: [phone]    DIABETIC NAIL PROGRESS NOTE  Date of patient's visit: 7/14/2025  Patient's Name:  Conchis Vogel YOB: 1960            Patient Care Team:  Prieto Robin DO as PCP - General (Family Medicine)  Prieto Robin DO as PCP - Empaneled Provider  Hayder Beal DPM as Consulting Physician (Podiatry)          Chief Complaint   Patient presents with    Nail Problem    Toe Pain     Prieto Robin DO  LOV 6/8/25       Subjective:   Conchis Vogel comes to clinic for Nail Problem and Toe Pain (Prieto Robin DO/LOV 6/8/25)    she is a diabetic and states that foot exam .  Pt currently has complaint of thickened, elongated nails that they cannot manage by themselves.   Pt's primary care physician is Prieto Robin DO    .     Lab Results   Component Value Date    LABA1C 5.2 06/06/2025      Complains of numbness in the feet bilat.  Past Medical History:   Diagnosis Date    Back pain     Chronic back pain     Chronic fatigue     COVID-19 09/2020    mild per patient    Diabetes mellitus (HCC)     diet controlled    History of blood transfusion     Hyperlipidemia     not on any medications    Hypothyroidism     IBS (irritable bowel syndrome)     Morbid obesity due to excess calories (HCC)     Neuropathy     Obesity     Osteoarthritis     PONV (postoperative nausea and vomiting)     Sacroiliac dysfunction        Allergies   Allergen Reactions    Azithromycin Other (See Comments)     Stomach pain    Bactrim [Sulfamethoxazole-Trimethoprim] Nausea Only    Ceftin [Cefuroxime] Rash    Keflex [Cephalexin] Rash     Current Outpatient Medications on File Prior to Visit   Medication Sig Dispense Refill    levothyroxine (SYNTHROID) 125 MCG tablet Take 2 tablets by mouth daily 180 tablet 1    cyclobenzaprine (FLEXERIL) 10 MG

## 2025-07-21 ENCOUNTER — OFFICE VISIT (OUTPATIENT)
Age: 65
End: 2025-07-21
Payer: MEDICARE

## 2025-07-21 VITALS
TEMPERATURE: 98 F | OXYGEN SATURATION: 96 % | DIASTOLIC BLOOD PRESSURE: 82 MMHG | BODY MASS INDEX: 34.38 KG/M2 | HEART RATE: 62 BPM | SYSTOLIC BLOOD PRESSURE: 144 MMHG | WEIGHT: 194 LBS | HEIGHT: 63 IN

## 2025-07-21 DIAGNOSIS — M47.817 LUMBOSACRAL SPONDYLOSIS WITHOUT MYELOPATHY: ICD-10-CM

## 2025-07-21 DIAGNOSIS — M51.369 DEGENERATION OF INTERVERTEBRAL DISC OF LUMBAR REGION, UNSPECIFIED WHETHER PAIN PRESENT: ICD-10-CM

## 2025-07-21 DIAGNOSIS — M53.3 SACROILIAC DYSFUNCTION: ICD-10-CM

## 2025-07-21 DIAGNOSIS — M96.1 POSTLAMINECTOMY SYNDROME, LUMBAR: Primary | ICD-10-CM

## 2025-07-21 PROCEDURE — 99213 OFFICE O/P EST LOW 20 MIN: CPT | Performed by: ANESTHESIOLOGY

## 2025-07-21 NOTE — PROGRESS NOTES
Conchis Vogel presents to the [facility] on 7/21/2025. Conchis is complaining of pain lower back. The pain is constant. The pain is described as aching and tender. Pain is rated on her best day at a 3, on her worst day at a 8, and on average at a 5 on the VAS scale. She took her last dose of Percocet and Flexeril 2 weeks ago.    Any procedures since your last visit: No    She is  on NSAIDS and  is not on anticoagulation medications     Pacemaker or defibrillator: No     Do you want someone present when the provider examines you? No    Medication Contract and Consent for Opioid Use Documents Filed       Patient Documents       Type of Document Status Date Received Received By Description    Medication Contract Received 10/6/2021 10:43 AM SHANE CRENSHAW pain management    Medication Contract Received 1/30/2023 10:41 AM JOSAFAT GARCIA pain mangement agreement    Medication Contract Received 4/11/2024  1:43 PM MENDEZ BEAULIEU medication contract                       BP (!) 144/82   Pulse 62   Temp 98 °F (36.7 °C)   Ht 1.6 m (5' 3\")   Wt 88 kg (194 lb)   SpO2 96%   BMI 34.37 kg/m²      Conchis's blood pressure was elevated today. She was instructed to contact his primary care provider as soon as possible.    No LMP recorded. Patient has had a hysterectomy.

## 2025-07-21 NOTE — PROGRESS NOTES
[facility]  [place], [place] [address]  Dept: [phone]    Follow up Note      Conchis Vogel     Date of Visit:  7/21/2025    CC:  Patient presents for follow up   Chief Complaint   Patient presents with    Follow-up     Lower back pain     HPI:  Low back pain. S/P L3-5 fusion and L4-5 TLIF in May 2021.     Has noticed right LE pain after the surgery. Has been followed by NSG - imaging showed Intact fusion.     Low back pain and LE pain- Has tingling of the foot (h/o DM).     Pain causes functional limitations/ limits Adl's : Yes     Nursing notes and details of the pain history reviewed. Please see intake notes for details.     Previous treatments:   Physical Therapy : yes, continues HEP.     Medications: - NSAID's : yes                        - Membrane stabilizers : yes - gabapentin                       - Opioids : yes prn                       - Adjuvants or Others : yes     Surgeries: yes, L3-5 fusion in May 2021     She has not been on anticoagulation medications      She has not been on herbal supplements.       She is diabetic.     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  Postsurgical  changes are noted including decompressive laminectomy and posterior fusion at  L3-L5.  Bilateral pedicle screws are seen at L3 through L5 and there is again  an interbody spacer in the L4-5 disc space.  There is disc desiccation at  multiple levels.  Disc space narrowing is noted, severe at L5-S1 and moderate  to severe at L2-3 and L3-4 as well as in the lower thoracic region.  Multiple  small Schmorl's nodes are noted.  The vertebral body heights are maintained.  The bone marrow signal appears unremarkable.     SPINAL CORD: The conus terminates normally.     SOFT TISSUES: No paraspinal mass identified.

## 2025-08-12 ENCOUNTER — TELEPHONE (OUTPATIENT)
Age: 65
End: 2025-08-12

## 2025-08-12 DIAGNOSIS — K91.2 MALNUTRITION FOLLOWING GASTROINTESTINAL SURGERY: ICD-10-CM

## 2025-08-12 LAB
FERRITIN: 122 NG/ML
FOLATE: 12.3 NG/ML (ref 4.6–34.8)
PREALBUMIN: 18.5 MG/DL (ref 20–40)
VITAMIN B-12: 372 PG/ML (ref 232–1245)
VITAMIN D 25-HYDROXY: 30.5 NG/ML (ref 30–100)

## 2025-08-15 LAB
COPPER: 142.8 UG/DL (ref 80–155)
ZINC: 75 UG/DL (ref 60–120)

## 2025-08-16 LAB
RETINYL PALMITATE: <0.02 MG/L (ref 0–0.1)
VITAMIN A LEVEL: 0.38 MG/L (ref 0.3–1.2)
VITAMIN A, INTERP: NORMAL
VITAMIN B1 WHOLE BLOOD: 126 NMOL/L (ref 70–180)

## 2025-08-21 ENCOUNTER — HOSPITAL ENCOUNTER (OUTPATIENT)
Dept: GENERAL RADIOLOGY | Age: 65
Setting detail: OUTPATIENT SURGERY
Discharge: HOME | End: 2025-08-23
Attending: ANESTHESIOLOGY
Payer: MEDICARE

## 2025-08-21 ENCOUNTER — HOSPITAL ENCOUNTER (OUTPATIENT)
Age: 65
Setting detail: OUTPATIENT SURGERY
Discharge: HOME | End: 2025-08-21
Attending: ANESTHESIOLOGY | Admitting: ANESTHESIOLOGY
Payer: MEDICARE

## 2025-08-21 VITALS
WEIGHT: 175 LBS | HEART RATE: 72 BPM | HEIGHT: 63 IN | SYSTOLIC BLOOD PRESSURE: 114 MMHG | RESPIRATION RATE: 16 BRPM | BODY MASS INDEX: 31.01 KG/M2 | TEMPERATURE: 97.8 F | DIASTOLIC BLOOD PRESSURE: 72 MMHG | OXYGEN SATURATION: 99 %

## 2025-08-21 DIAGNOSIS — R52 PAIN MANAGEMENT: ICD-10-CM

## 2025-08-21 PROCEDURE — 7100000010 HC PHASE II RECOVERY - FIRST 15 MIN: Performed by: ANESTHESIOLOGY

## 2025-08-21 PROCEDURE — 27096 INJECT SACROILIAC JOINT: CPT | Performed by: ANESTHESIOLOGY

## 2025-08-21 PROCEDURE — 7100000011 HC PHASE II RECOVERY - ADDTL 15 MIN: Performed by: ANESTHESIOLOGY

## 2025-08-21 PROCEDURE — 6360000002 HC RX W HCPCS: Performed by: ANESTHESIOLOGY

## 2025-08-21 PROCEDURE — 2709999900 HC NON-CHARGEABLE SUPPLY: Performed by: ANESTHESIOLOGY

## 2025-08-21 PROCEDURE — 3600000002 HC SURGERY LEVEL 2 BASE: Performed by: ANESTHESIOLOGY

## 2025-08-21 PROCEDURE — 6360000004 HC RX CONTRAST MEDICATION: Performed by: ANESTHESIOLOGY

## 2025-08-21 RX ORDER — METHYLPREDNISOLONE ACETATE 40 MG/ML
INJECTION, SUSPENSION INTRA-ARTICULAR; INTRALESIONAL; INTRAMUSCULAR; SOFT TISSUE PRN
Status: DISCONTINUED | OUTPATIENT
Start: 2025-08-21 | End: 2025-08-21 | Stop reason: ALTCHOICE

## 2025-08-21 RX ORDER — LIDOCAINE HYDROCHLORIDE 5 MG/ML
INJECTION, SOLUTION INFILTRATION; INTRAVENOUS PRN
Status: DISCONTINUED | OUTPATIENT
Start: 2025-08-21 | End: 2025-08-21 | Stop reason: ALTCHOICE

## 2025-08-21 RX ORDER — SODIUM CHLORIDE 0.9 % (FLUSH) 0.9 %
5-40 SYRINGE (ML) INJECTION EVERY 12 HOURS SCHEDULED
Status: DISCONTINUED | OUTPATIENT
Start: 2025-08-21 | End: 2025-08-21 | Stop reason: HOSPADM

## 2025-08-21 RX ORDER — SODIUM CHLORIDE 9 MG/ML
INJECTION, SOLUTION INTRAVENOUS PRN
Status: DISCONTINUED | OUTPATIENT
Start: 2025-08-21 | End: 2025-08-21 | Stop reason: HOSPADM

## 2025-08-21 RX ORDER — BUPIVACAINE HYDROCHLORIDE 2.5 MG/ML
INJECTION, SOLUTION EPIDURAL; INFILTRATION; INTRACAUDAL; PERINEURAL PRN
Status: DISCONTINUED | OUTPATIENT
Start: 2025-08-21 | End: 2025-08-21 | Stop reason: ALTCHOICE

## 2025-08-21 RX ORDER — IOPAMIDOL 612 MG/ML
INJECTION, SOLUTION INTRATHECAL PRN
Status: DISCONTINUED | OUTPATIENT
Start: 2025-08-21 | End: 2025-08-21 | Stop reason: ALTCHOICE

## 2025-08-21 RX ORDER — SODIUM CHLORIDE 0.9 % (FLUSH) 0.9 %
5-40 SYRINGE (ML) INJECTION PRN
Status: DISCONTINUED | OUTPATIENT
Start: 2025-08-21 | End: 2025-08-21 | Stop reason: HOSPADM

## 2025-08-21 ASSESSMENT — PAIN - FUNCTIONAL ASSESSMENT
PAIN_FUNCTIONAL_ASSESSMENT: 0-10
PAIN_FUNCTIONAL_ASSESSMENT: 0-10

## 2025-08-21 ASSESSMENT — PAIN DESCRIPTION - DESCRIPTORS
DESCRIPTORS: ACHING
DESCRIPTORS: ACHING;DISCOMFORT

## 2025-08-21 ASSESSMENT — PAIN SCALES - GENERAL
PAINLEVEL_OUTOF10: 4
PAINLEVEL_OUTOF10: 4

## 2025-08-21 ASSESSMENT — PAIN DESCRIPTION - LOCATION: LOCATION: BACK

## 2025-08-21 ASSESSMENT — PAIN DESCRIPTION - PAIN TYPE: TYPE: CHRONIC PAIN

## 2025-08-21 ASSESSMENT — PAIN DESCRIPTION - ORIENTATION: ORIENTATION: LOWER;RIGHT

## (undated) DEVICE — 6 ML SYRINGE LUER-LOCK TIP: Brand: MONOJECT

## (undated) DEVICE — SYRINGE, LUER LOCK, 5ML: Brand: MEDLINE

## (undated) DEVICE — BANDAGE ADH W1XL3IN NAT FAB WVN FLX DURABLE N ADH PD SEAL

## (undated) DEVICE — CODMAN® SURGICAL PATTIES 1/2" X 1" (1.27CM X 2.54CM): Brand: CODMAN®

## (undated) DEVICE — NEEDLE HYPO 18GA L1.5IN PNK POLYPR HUB S STL REG BVL STR

## (undated) DEVICE — GLOVE ORANGE PI 7 1/2   MSG9075

## (undated) DEVICE — KIT BEDSIDE REVITAL OX 500ML

## (undated) DEVICE — COVER,TABLE,44X90,STERILE: Brand: MEDLINE

## (undated) DEVICE — HYPODERMIC SAFETY NEEDLE: Brand: MAGELLAN

## (undated) DEVICE — GAUZE,SPONGE,4"X4",12PLY,STERILE,LF,2'S: Brand: MEDLINE

## (undated) DEVICE — TOTAL TRAY, DB, 100% SILI FOLEY, 16FR 10: Brand: MEDLINE

## (undated) DEVICE — NON-DEHP CATHETER EXTENSION SET, MALE LUER LOCK ADAPTER

## (undated) DEVICE — TOWEL OR BLUEE 16X26IN ST 8 PACK ORB08 16X26ORTWL

## (undated) DEVICE — ELECTRODE EKG AD W1.6XL1.36IN FOAM TAPE DIAPHORETIC FLD

## (undated) DEVICE — NEEDLE HYPO 25GA L1.5IN BLU POLYPR HUB S STL REG BVL STR

## (undated) DEVICE — C-ARM: Brand: UNBRANDED

## (undated) DEVICE — 3M™ RED DOT™ MONITORING ELECTRODE WITH FOAM TAPE AND STICKY GEL 2560, 50/BAG, 20/CASE, 72/PLT: Brand: RED DOT™

## (undated) DEVICE — THE MILL DISPOSABLE - MEDIUM

## (undated) DEVICE — KENDALL 450 SERIES MONITORING FOAM ELECTRODE - RECTANGULAR SHAPE ( 3/PK): Brand: KENDALL

## (undated) DEVICE — SET SPINAL NEURO STNEU1

## (undated) DEVICE — Device: Brand: PORTEX

## (undated) DEVICE — CLOTH SURG PREP PREOPERATIVE CHLORHEXIDINE GLUC 2% READYPREP

## (undated) DEVICE — Z DISCONTINUED APPLICATOR SURG PREP 0.35OZ 2% CHG 70% ISO ALC W/ HI LT

## (undated) DEVICE — SOLUTION IRRIG 3000ML 0.9% SOD CHL USP UROMATIC PLAS CONT

## (undated) DEVICE — PITCHER PT 1200ML W HNDL CSR WRP

## (undated) DEVICE — BRUNNS CURRETTES

## (undated) DEVICE — STAPLER 60: Brand: SUREFORM

## (undated) DEVICE — FORCEPS BX L240CM JAW DIA2.8MM L CAP W/ NDL MIC MESH TOOTH

## (undated) DEVICE — SPHERE EYE 1 MRK GUIDANCE PASS STEALTHSTATION 1PK/EA

## (undated) DEVICE — TIP-UP FENESTRATED GRASPER: Brand: ENDOWRIST

## (undated) DEVICE — PLUMEPORT LAPAROSCOPIC SMOKE FILTRATION DEVICE: Brand: PLUMEPORT ACTIV

## (undated) DEVICE — PACK SURG LAP CHOLE CUSTOM

## (undated) DEVICE — ELECTRODE BLDE L4IN NONINSULATED EDGE

## (undated) DEVICE — JACKSON TABLE POSITIONER KIT: Brand: MEDLINE INDUSTRIES, INC.

## (undated) DEVICE — GLOVE SURG 8.5 PF POLYMER WHT STRL SIGN LTX ESSENTIAL LTX

## (undated) DEVICE — SEAL

## (undated) DEVICE — PATIENT RETURN ELECTRODE, SINGLE-USE, CONTACT QUALITY MONITORING, ADULT, WITH 9FT CORD, FOR PATIENTS WEIGING OVER 33LBS. (15KG): Brand: MEGADYNE

## (undated) DEVICE — APPLICATOR PREP 26ML 0.7% IOD POVACRYLEX 74% ISO ALC ST

## (undated) DEVICE — GOWN,SIRUS,FABRNF,XL,20/CS: Brand: MEDLINE

## (undated) DEVICE — BLADE ES L6IN ELASTOMERIC COAT EXT DURABLE BEND UPTO 90DEG

## (undated) DEVICE — 12 ML SYRINGE,LUER-LOCK TIP: Brand: MONOJECT

## (undated) DEVICE — MANIFOLD REPROC 4 PRT NEP 1

## (undated) DEVICE — 3 ML SYRINGE LUER-LOCK TIP: Brand: MONOJECT

## (undated) DEVICE — NEEDLE SPNL 22GA L5IN BLK HUB S STL W/ QNCKE PNT W/OUT

## (undated) DEVICE — CANNULA SEAL

## (undated) DEVICE — GAUZE,SPONGE,4"X4",8PLY,STRL,LF,10/TRAY: Brand: MEDLINE

## (undated) DEVICE — INTENDED FOR TISSUE SEPARATION, AND OTHER PROCEDURES THAT REQUIRE A SHARP SURGICAL BLADE TO PUNCTURE OR CUT.: Brand: BARD-PARKER ® STAINLESS STEEL BLADES

## (undated) DEVICE — Device

## (undated) DEVICE — SUCTION IRRIGATOR: Brand: ENDOWRIST

## (undated) DEVICE — APPLICATOR MEDICATED 10.5 CC SOLUTION HI LT ORNG CHLORAPREP

## (undated) DEVICE — TOWEL,OR,DSP,ST,BLUE,STD,6/PK,12PK/CS: Brand: MEDLINE

## (undated) DEVICE — TUBING, SUCTION, 1/4" X 10', STRAIGHT: Brand: MEDLINE

## (undated) DEVICE — GARMENT,MEDLINE,DVT,INT,CALF,MED, GEN2: Brand: MEDLINE

## (undated) DEVICE — VESSEL SEALER EXTEND: Brand: ENDOWRIST

## (undated) DEVICE — NEEDLE SPNL L3.5IN PNK HUB S STL REG WALL FIT STYL W/ QNCKE

## (undated) DEVICE — NEEDLE HYPO 18GA L1.5IN PNK POLYPR HUB S STL THN WALL FILL

## (undated) DEVICE — SURGICAL PROCEDURE PACK LAMINECTOMY LUMBAR

## (undated) DEVICE — VALVE SUCTION AIR H2O HYDR H2O JET CONN STRL ORCA POD + DISP

## (undated) DEVICE — DOUBLE BASIN SET: Brand: MEDLINE INDUSTRIES, INC.

## (undated) DEVICE — CLEANER,CAUTERY TIP,2X2",STERILE: Brand: MEDLINE

## (undated) DEVICE — SHEET, T, LAPAROTOMY, STERILE: Brand: MEDLINE

## (undated) DEVICE — DRESSING ADH N ADH 8X35 IN 6X175 IN SFT CLTH MEDIPORE +

## (undated) DEVICE — ARM DRAPE

## (undated) DEVICE — MARKER,SKIN,WI/RULER AND LABELS: Brand: MEDLINE

## (undated) DEVICE — CONTAINER SPEC COLL 960ML POLYPR TRIANG GRAD INTAKE/OUTPUT

## (undated) DEVICE — SYRINGE MED 5ML STD CLR PLAS LUERLOCK TIP N CTRL DISP

## (undated) DEVICE — SPONGE GZ W4XL4IN 8 PLY 100% COTTON

## (undated) DEVICE — GOWN,SIRUS,NONRNF,SETINSLV,XL,20/CS: Brand: MEDLINE

## (undated) DEVICE — EXTRAS UGOKWE

## (undated) DEVICE — IRON INTERN

## (undated) DEVICE — SYRINGE EPI 7ML POLYPR PLAS LUERSLIP LOSS OF RESISTANCE LO

## (undated) DEVICE — LABEL MED 4 IN SURG PANEL W/ PEN STRL

## (undated) DEVICE — DRAPE,REIN 53X77,STERILE: Brand: MEDLINE

## (undated) DEVICE — NEEDLE SPNL 22GA L3.5IN BLK HUB S STL REG WALL FIT STYL W/

## (undated) DEVICE — MEDI-VAC NON-CONDUCTIVE SUCTION TUBING: Brand: CARDINAL HEALTH

## (undated) DEVICE — SYRINGE 20ML LL S/C 50

## (undated) DEVICE — SET INST DAVINCI XI ACCESSORIES

## (undated) DEVICE — LUBRICANT SURG JELLY ST BACTER TUBE 4.25OZ

## (undated) DEVICE — MEGA SUTURECUT ND: Brand: ENDOWRIST

## (undated) DEVICE — MEDI-VAC YANKAUER SUCTION HANDLE W/BULBOUS TIP: Brand: CARDINAL HEALTH

## (undated) DEVICE — 3M™ IOBAN™ 2 ANTIMICROBIAL INCISE DRAPE 6650EZ: Brand: IOBAN™ 2

## (undated) DEVICE — 6 X 9  1.75MIL 4-WALL LABGUARD: Brand: MINIGRIP COMMERCIAL LLC

## (undated) DEVICE — SYRINGE MED 12ML STD CLR PLAS LUERLOCK TIP ULT SHRP

## (undated) DEVICE — SHEET DRAPE FULL 70X100

## (undated) DEVICE — TUBING SUCT 12FR MAL ALUM SHFT FN CAP VENT UNIV CONN W/ OBT

## (undated) DEVICE — COVER,TABLE,60X90,STERILE: Brand: MEDLINE

## (undated) DEVICE — SPONGE GZ 4IN 4IN 4 PLY N WVN AVANT

## (undated) DEVICE — GOWN ISOLATN REG YEL M WT MULTIPLY SIDETIE LEV 2

## (undated) DEVICE — GLOVE ORANGE PI 8   MSG9080

## (undated) DEVICE — STAPLER 60 RELOAD WHITE: Brand: SUREFORM

## (undated) DEVICE — COLUMN DRAPE

## (undated) DEVICE — AIRLIFE™ ADULT OXYGEN MASK VINYL, UNDER THE CHIN STYLE, 3 IN 1 MASK WITH SAFETY VENT, WITH 7 FEET (2.1 M) CRUSH RESISTANT OXYGEN TUBING: Brand: AIRLIFE™

## (undated) DEVICE — [HIGH FLOW INSUFFLATOR,  DO NOT USE IF PACKAGE IS DAMAGED,  KEEP DRY,  KEEP AWAY FROM SUNLIGHT,  PROTECT FROM HEAT AND RADIOACTIVE SOURCES.]: Brand: PNEUMOSURE

## (undated) DEVICE — 22GX5" WHITACRE SPINAL NEEDLE: Brand: MEDLINE

## (undated) DEVICE — MASK,FACE,MAXFLUIDPROTECT,SHIELD/ERLPS: Brand: MEDLINE

## (undated) DEVICE — SCOPE DAVINCI XI 30 DEG W/CORD

## (undated) DEVICE — LOCATOR RAD PASS SPHR MRK NAVIGATION BALL DISP STLTH STN

## (undated) DEVICE — SHEET,DRAPE,40X58,STERILE: Brand: MEDLINE

## (undated) DEVICE — SET ENDO INSTR LAPAROSCOPIC INCISIONAL

## (undated) DEVICE — ELECTRODE PT RET AD L9FT HI MOIST COND ADH HYDRGEL CORDED

## (undated) DEVICE — STAPLER SHEATH: Brand: ENDOWRIST

## (undated) DEVICE — PENCIL,CAUTERY,ROCKER,PTFE,15'CORD: Brand: MEDLINE INDUSTRIES, INC.

## (undated) DEVICE — BLADE ES ELASTOMERIC COAT INSUL DURABLE BEND UPTO 90DEG

## (undated) DEVICE — GLOVE ORANGE PI 7   MSG9070

## (undated) DEVICE — E-Z CLEAN, NON-STICK, PTFE COATED, ELECTROSURGICAL BLADE ELECTRODE, MODIFIED EXTENDED INSULATION, 2.5 INCH (6.35 CM): Brand: MEGADYNE

## (undated) DEVICE — REDUCER: Brand: ENDOWRIST

## (undated) DEVICE — DRILL SYSTEM 7

## (undated) DEVICE — BLOCK BITE 60FR CAREGUARD

## (undated) DEVICE — MICRO TIP WIPE: Brand: DEVON

## (undated) DEVICE — NEEDLE LAP VERES 14 GAX120 MM IN124] THE OR COMPANY]

## (undated) DEVICE — ADHESIVE SKIN CLOSURE TOP 36 CC HI VISC DERMBND MINI

## (undated) DEVICE — APPLICATOR MEDICATED 26 CC SOLUTION HI LT ORNG CHLORAPREP

## (undated) DEVICE — SOLUTION IV IRRIG POUR BRL 0.9% SODIUM CHL 2F7124

## (undated) DEVICE — GRADUATE

## (undated) DEVICE — BLADELESS OBTURATOR: Brand: WECK VISTA

## (undated) DEVICE — 1000 S-DRAPE TOWEL DRAPE 10/BX: Brand: STERI-DRAPE™

## (undated) DEVICE — STAPLER 60 RELOAD BLUE: Brand: SUREFORM

## (undated) DEVICE — ELECTRO LUBE IS A SINGLE PATIENT USE DEVICE THAT IS INTENDED TO BE USED ON ELECTROSURGICAL ELECTRODES TO REDUCE STICKING.: Brand: KEY SURGICAL ELECTRO LUBE

## (undated) DEVICE — TROCAR: Brand: KII SLEEVE

## (undated) DEVICE — Device: Brand: INSTRUSAFE

## (undated) DEVICE — SYRINGE FLSH 6ML BOLD GRAD 0.2ML LUERLOCK TIP ULT SHRP TRI

## (undated) DEVICE — SUTURE V-LOC 180 SZ 0 L9IN ABSRB GRN GS-21 L37MM 1/2 CIR VLOCL0346

## (undated) DEVICE — COVER,LIGHT HANDLE,FLX,1/PK: Brand: MEDLINE INDUSTRIES, INC.

## (undated) DEVICE — COUNTER NDL 10 COUNT HLD 20 FOAM BLK SGL MAG

## (undated) DEVICE — READY WET SKIN SCRUB TRAY-LF: Brand: MEDLINE INDUSTRIES, INC.

## (undated) DEVICE — KIT EVAC 400CC DIA1/8IN H PAT 12.5IN 3 SPR RND SHP PVC DRN

## (undated) DEVICE — GOWN,SIRUS,FABRNF,L,20/CS: Brand: MEDLINE

## (undated) DEVICE — PERMANENT CAUTERY HOOK: Brand: ENDOWRIST

## (undated) DEVICE — SUTURE ABSRB L6IN L37MM 0 GS-21 GRN 1/2 CIR TAPR PNT NDL VLOCL0306

## (undated) DEVICE — SET EXTN PRIMING 0.26ML L6.8IN MICBOR STR TYP CATH M

## (undated) DEVICE — NDL CNTR 40CT FM MAG: Brand: MEDLINE INDUSTRIES, INC.